# Patient Record
Sex: FEMALE | Race: WHITE | Employment: FULL TIME | URBAN - METROPOLITAN AREA
[De-identification: names, ages, dates, MRNs, and addresses within clinical notes are randomized per-mention and may not be internally consistent; named-entity substitution may affect disease eponyms.]

---

## 2017-02-19 ENCOUNTER — MYC MEDICAL ADVICE (OUTPATIENT)
Dept: FAMILY MEDICINE | Facility: CLINIC | Age: 57
End: 2017-02-19

## 2017-02-20 ENCOUNTER — MYC MEDICAL ADVICE (OUTPATIENT)
Dept: FAMILY MEDICINE | Facility: CLINIC | Age: 57
End: 2017-02-20

## 2017-02-24 ENCOUNTER — OFFICE VISIT (OUTPATIENT)
Dept: FAMILY MEDICINE | Facility: CLINIC | Age: 57
End: 2017-02-24
Payer: COMMERCIAL

## 2017-02-24 VITALS
DIASTOLIC BLOOD PRESSURE: 72 MMHG | SYSTOLIC BLOOD PRESSURE: 118 MMHG | HEIGHT: 69 IN | HEART RATE: 72 BPM | TEMPERATURE: 98 F

## 2017-02-24 DIAGNOSIS — F33.1 MAJOR DEPRESSIVE DISORDER, RECURRENT EPISODE, MODERATE (H): Primary | ICD-10-CM

## 2017-02-24 PROCEDURE — 99213 OFFICE O/P EST LOW 20 MIN: CPT | Performed by: FAMILY MEDICINE

## 2017-02-24 RX ORDER — BUPROPION HYDROCHLORIDE 150 MG/1
150 TABLET ORAL EVERY MORNING
Qty: 30 TABLET | Refills: 1 | Status: SHIPPED | OUTPATIENT
Start: 2017-02-24 | End: 2017-03-21 | Stop reason: DRUGHIGH

## 2017-02-24 RX ORDER — SERTRALINE HYDROCHLORIDE 100 MG/1
TABLET, FILM COATED ORAL
Qty: 180 TABLET | Refills: 1 | Status: CANCELLED | OUTPATIENT
Start: 2017-02-24

## 2017-02-24 NOTE — NURSING NOTE
"Chief Complaint   Patient presents with     Depression     Other     Due for Health Maintenance       Initial /72 (BP Location: Right arm, Patient Position: Chair, Cuff Size: Adult Large)  Pulse 72  Temp 98  F (36.7  C) (Oral)  Ht 5' 9\" (1.753 m) Estimated body mass index is 39.23 kg/(m^2) as calculated from the following:    Height as of 7/21/14: 5' 8\" (1.727 m).    Weight as of 7/21/14: 258 lb (117 kg).  Medication Reconciliation: complete   Dahlia Putnam MA      "

## 2017-02-24 NOTE — PROGRESS NOTES
"  SUBJECTIVE:                                                    Nela Mares is a 56 year old female who presents to clinic today for the following health issues:    Hypertension Follow-up      Outpatient blood pressures are not being checked.    Low Salt Diet: not monitoring salt     Depression Followup    Status since last visit: \"not good,\"    See PHQ-9 for current symptoms.  Other associated symptoms: None    Complicating factors:   Significant life event:  No   Current substance abuse:  None  Anxiety or Panic symptoms:  No    PHQ-9  English PHQ-9   Any Language          Amount of exercise or physical activity: None    Problems taking medications regularly: No    Medication side effects: none    Diet: regular (no restrictions)      She thought she was feeling a little better the last couple days until she filled out the PHQ-9.     Patient has thought this through and here is her plan: She has called three different therapists and would like to consult with them before changing her medications. See someone and ride this out for six weeks. If she gets worse before then she will call and return to clinic. Has considered Wellbutrin.     When her mood is low she is fatigued and sluggish. Last Sunday she sat down in her chair at 8 AM and didn't move until 3 PM.    Asthma has been stable. She had a cold a few weeks ago and no flare in her asthma.        Problem list and histories reviewed & adjusted, as indicated.  Additional history: as documented    Patient Active Problem List   Diagnosis     Allergic rhinitis     Mild persistent asthma     Obstructive sleep apnea     Leiomyoma of uterus     Other disorder of menstruation and other abnormal bleeding from female genital tract     Mild major depression (H)     CARDIOVASCULAR SCREENING; LDL GOAL LESS THAN 160     Surveillance of previously prescribed intrauterine contraceptive device     Hypertension goal BP (blood pressure) < 140/90     Pilar cyst     Hypertrophy " of breast     Past Surgical History   Procedure Laterality Date     No history of surgery       D & c       Colonoscopy  6/21/2012     Procedure: COLONOSCOPY;  COLONOSCOPY, SCREEN;  Surgeon: Bart You MD;  Location: [facility] OR     Laparoscopic cholecystectomy with cholangiograms  1/4/2014     Procedure: LAPAROSCOPIC CHOLECYSTECTOMY WITH CHOLANGIOGRAMS;;  Surgeon: Haja Sage MD;  Location: [facility] OR     Endoscopic retrograde cholangiopancreatogram  1/4/2014     Procedure: ENDOSCOPIC RETROGRADE CHOLANGIOPANCREATOGRAM;  ERCP biliary sphincterotomy, bile duct stone removal, epinepherine washing.;  Surgeon: Ba Meyers MD;  Location: [facility] OR     Operative hysteroscopy  6/17/2014     Procedure: OPERATIVE HYSTEROSCOPY;  Surgeon: Paola Camara MD;  Location: [facility] OR     Remove intrauterine device  6/17/2014     Procedure: REMOVE INTRAUTERINE DEVICE;  Surgeon: Paola Camara MD;  Location: [facility] OR       Social History   Substance Use Topics     Smoking status: Never Smoker     Smokeless tobacco: Never Used     Alcohol use No     Family History   Problem Relation Age of Onset     C.A.D. Paternal Grandmother      CEREBROVASCULAR DISEASE Paternal Grandmother      C.A.D. Paternal Grandfather      CEREBROVASCULAR DISEASE Paternal Grandfather      unsure     Hypertension Father      Neurologic Disorder Mother      Graves disease     Other - See Comments Other      CANCER Sister      mild skin cancer,cured from excision     DIABETES No family hx of          Current Outpatient Prescriptions   Medication Sig Dispense Refill     fluticasone-salmeterol (ADVAIR DISKUS) 500-50 MCG/DOSE diskus inhaler Inhale 1 puff into the lungs 2 times daily 3 Inhaler 3     hydrochlorothiazide (MICROZIDE) 12.5 MG capsule Take 1 capsule (12.5 mg) by mouth daily 90 capsule 1     albuterol (PROAIR HFA, PROVENTIL HFA, VENTOLIN HFA) 108 (90 BASE) MCG/ACT inhaler Inhale 2 puffs into the lungs every 4 hours as needed  "for shortness of breath / dyspnea or wheezing 3 Inhaler 3     sertraline (ZOLOFT) 100 MG tablet 2 tablets (200mg) daily6 180 tablet 1     lisinopril (PRINIVIL,ZESTRIL) 40 MG tablet Take 1 tablet (40 mg) by mouth daily 90 tablet 1     fluticasone (FLONASE) 50 MCG/ACT nasal spray Spray 1-2 sprays into both nostrils daily 48 g 3     cetirizine (ZYRTEC) 10 MG tablet Take 10 mg by mouth daily       order for DME Equipment being ordered: Nebulizer 1 each 0     Allergies   Allergen Reactions     No Known Drug Allergies      BP Readings from Last 3 Encounters:   02/24/17 118/72   10/14/16 112/80   02/16/16 129/84    Wt Readings from Last 3 Encounters:   07/21/14 117 kg (258 lb)   06/17/14 117.3 kg (258 lb 9.6 oz)   03/14/14 117.5 kg (259 lb)            ROS:  Constitutional, HEENT, cardiovascular, pulmonary, gi and gu systems are negative, except as otherwise noted.    This document serves as a record of the services and decisions personally performed and made by Padma Lozano MD. It was created on his/her behalf by Alix Hernandez, trained medical scribe. The creation of this document is based the provider's statements to the medical scribes.    Scribdrew Hernandez 1:59 PM, February 24, 2017    OBJECTIVE:                                                    /72 (BP Location: Right arm, Patient Position: Chair, Cuff Size: Adult Large)  Pulse 72  Temp 98  F (36.7  C) (Oral)  Ht 1.753 m (5' 9\")  There is no height or weight on file to calculate BMI.  GENERAL: healthy, alert and no distress  PSYCH: mentation appears normal, affect normal/bright    Diagnostic Test Results:  none      ASSESSMENT/PLAN:                                                    (F33.1) Major depressive disorder, recurrent episode, moderate (H)  (primary encounter diagnosis)  Comment: Will start to wean off the Zoloft and start taking Wellbutrin for better control. She will continue to look into options for therapy. Follow up with me in " 4-6 weeks for med check and preventative visit.  Plan: buPROPion (WELLBUTRIN XL) 150 MG 24 hr tablet           Patient Instructions     Decrease your Zoloft dose from two pills to one pill for three days.  Start taking Wellbutrin.    When you start the wellbutrin, decrease the zoloft down to 1/2 tablet daily for 5 days and then stop.    In addition I think therapy is a good idea to follow through with.  Follow up in 4-6 weeks.   We can do your preventative visit at this time.    [facility]   Discharged by : Apple AGUILAR MA    If you have any questions regarding your visit please contact your care team:     [facility] Clinic Hours Telephone Number   Dr. Bailey Freed PA-C   7am-7pm Monday - Thursday   7am-5pm Fridays  [phone]   (Appointment scheduling available 24/7)   RN Line   [phone] option 2       For a Price Quote for your services, please call our StreamLine Call Price Line at [phone].     What options do I have for visits at the clinic other than the traditional office visit?     To expand how we care for you, many of our providers are utilizing electronic visits (e-visits) and telephone visits, when medically appropriate, for interactions with their patients rather than a visit in the clinic. We also offer nurse visits for many medical concerns. Just like any other service, we will bill your insurance company for this type of visit based on time spent on the phone with your provider. Not all insurance companies cover these visits. Please check with your medical insurance if this type of visit is covered. You will be responsible for any charges that are not paid by your insurance.   E-visits via Cloud.CM: generally incur a $35.00 fee.     Telephone visits:   Time spent on the phone: *charged based on time that is spent on the phone in increments of 10 minutes. Estimated cost:   5-10 mins $30.00   11-20 mins. $59.00   21-30 mins.  $85.00     Use Appside (secure email communication and access to your chart) to send your primary care provider a message or make an appointment. Ask someone on your Team how to sign up for Appside.     As always, Thank you for trusting us with your health care needs!      [facility]:    Scheduling Appointments  [facility]  Call: [phone]    [facility]  Call: [phone]    [facility]  Call: [phone]      WHERE TO GO FOR CARE?    Clinic    Make an appointment if you:       Are sick (cold, cough, flu, sore throat, earache or in pain).       Have a small injury (sprain, small cut, burn or broken bone).       Need a physical exam, Pap smear, vaccine or prescription refill.       Have questions about your health or medicines.    To reach us:      Call [phone] ([phone]). Open 24 hours every day. (For counseling services, call [phone].)    Log into Appside at [email or web address]. (Visit [email or web address] to create an account.) Hospital emergency room    An emergency is a serious or life- threatening problem that must be treated right away.    Call [phone] or get to the hospital if you have:      Very bad or sudden:            - Chest pain or pressure         - Bleeding         - Head or belly pain         - Dizziness or trouble seeing, walking or                          Speaking      Problems breathing      Blood in your vomit or you are coughing up blood      A major injury (knocked out, loss of a finger or limb, rape, broken bone protruding from skin)    A mental health crisis. (Or call the Mental Health Crisis line at 1-533.543.4686 or Suicide Prevention Hotline at 1-855.796.8826.)    Open 24 hours every day. You don't need an appointment.     Urgent care    Visit urgent care for sickness or small injuries when the clinic is closed. You don't need an appointment. To check hours or find an urgent  care near you, visit [email or web address]. Online care    Get online care from [facility] for more than 70 common problems, like colds, allergies and infections. Open 24 hours every day at: [email or web address]   Need help deciding?    For advice about where to be seen, you may call your clinic and ask to speak with a nurse. We're here for you 24 hours every day.         If you are deaf or hard of hearing, please let us know. We provide many free services including sign language interpreters, oral interpreters, TTYs, telephone amplifiers, note takers and written materials.                     The information in this document, created by the medical scribe for me, accurately reflects the services I personally performed and the decisions made by me. I have reviewed and approved this document for accuracy prior to leaving the patient care area.  Padma Lozano MD  2:05 PM, 02/24/17    Padma Lozano MD  [facility]

## 2017-02-24 NOTE — PATIENT INSTRUCTIONS
Decrease your Zoloft dose from two pills to one pill for three days.  Start taking Wellbutrin.    When you start the wellbutrin, decrease the zoloft down to 1/2 tablet daily for 5 days and then stop.    In addition I think therapy is a good idea to follow through with.  Follow up in 4-6 weeks.   We can do your preventative visit at this time.    [facility]   Discharged by : Apple AGUILAR MA    If you have any questions regarding your visit please contact your care team:     [facility] Clinic Hours Telephone Number   Dr. Bailey Freed, PATEL   7am-7pm Monday - Thursday   7am-5pm Fridays  [phone]   (Appointment scheduling available 24/7)   RN Line   [phone] option 2       For a Price Quote for your services, please call our Galenea Price Line at [phone].     What options do I have for visits at the clinic other than the traditional office visit?     To expand how we care for you, many of our providers are utilizing electronic visits (e-visits) and telephone visits, when medically appropriate, for interactions with their patients rather than a visit in the clinic. We also offer nurse visits for many medical concerns. Just like any other service, we will bill your insurance company for this type of visit based on time spent on the phone with your provider. Not all insurance companies cover these visits. Please check with your medical insurance if this type of visit is covered. You will be responsible for any charges that are not paid by your insurance.   E-visits via [employer]: generally incur a $35.00 fee.     Telephone visits:   Time spent on the phone: *charged based on time that is spent on the phone in increments of 10 minutes. Estimated cost:   5-10 mins $30.00   11-20 mins. $59.00   21-30 mins. $85.00     Use [employer] (secure email communication and access to your chart) to send your primary care provider a message or make an  appointment. Ask someone on your Team how to sign up for [email or web address].     As always, Thank you for trusting us with your health care needs!      [facility]:    Scheduling Appointments  [place], [place], [facility]  Call: [phone]    Denny Mariscal, [facility]  Call: [phone]    [facility]  Call: [phone]      WHERE TO GO FOR CARE?    Clinic    Make an appointment if you:       Are sick (cold, cough, flu, sore throat, earache or in pain).       Have a small injury (sprain, small cut, burn or broken bone).       Need a physical exam, Pap smear, vaccine or prescription refill.       Have questions about your health or medicines.    To reach us:      Call [phone] ([phone]). Open 24 hours every day. (For counseling services, call [phone].)    Log into Tuition.[email or web address] at [email or web address]. (Visit [email or web address] sale to [email or web address] [email or web address] to create an account.) Hospital emergency room    An emergency is a serious or life- threatening problem that must be treated right away.    Call [phone] or get to the hospital if you have:      Very bad or sudden:            - Chest pain or pressure         - Bleeding         - Head or belly pain         - Dizziness or trouble seeing, walking or                          Speaking      Problems breathing      Blood in your vomit or you are coughing up blood      A major injury (knocked out, loss of a finger or limb, rape, broken bone protruding from skin)    A mental health crisis. (Or call the Mental Health Crisis line at 1-129.566.4133 or Suicide Prevention Hotline at 1-588.138.4902.)    Open 24 hours every day. You don't need an appointment.     Urgent care    Visit urgent care for sickness or small injuries when the clinic is closed. You don't need an appointment. To check hours or find an urgent care near you, visit [email or web address]. Online care    Get online care from [facility] for more than 70 common problems, like  colds, allergies and infections. Open 24 hours every day at: [email or web address]   Need help deciding?    For advice about where to be seen, you may call your clinic and ask to speak with a nurse. We're here for you 24 hours every day.         If you are deaf or hard of hearing, please let us know. We provide many free services including sign language interpreters, oral interpreters, TTYs, telephone amplifiers, note takers and written materials.

## 2017-02-24 NOTE — MR AVS SNAPSHOT
After Visit Summary   2/24/2017    Nela Mares    MRN: [id number]           Patient Information     Date Of Birth          1960        Visit Information        Provider Department      2/24/2017 1:40 PM Padma Lozano MD [facility]        Today's Diagnoses     Major depressive disorder, recurrent episode, moderate (H)    -  1      Care Instructions    Decrease your Zoloft dose from two pills to one pill for three days.  Start taking Wellbutrin.    When you start the wellbutrin, decrease the zoloft down to 1/2 tablet daily for 5 days and then stop.    In addition I think therapy is a good idea to follow through with.  Follow up in 4-6 weeks.   We can do your preventative visit at this time.        Follow-ups after your visit        Who to contact     If you have questions or need follow up information about today's clinic visit or your schedule please contact [facility] directly at [phone].  Normal or non-critical lab and imaging results will be communicated to you by Kashhart, letter or phone within 4 business days after the clinic has received the results. If you do not hear from us within 7 days, please contact the clinic through Mashed jobst or phone. If you have a critical or abnormal lab result, we will notify you by phone as soon as possible.  Submit refill requests through Redbeacon or call your pharmacy and they will forward the refill request to us. Please allow 3 business days for your refill to be completed.          Additional Information About Your Visit        Kashhart Information     Redbeacon gives you secure access to your electronic health record. If you see a primary care provider, you can also send messages to your care team and make appointments. If you have questions, please call your primary care clinic.  If you do not have a primary care provider, please call [phone] and they will assist you.        Care EveryWhere ID   "   This is your Care EveryWhere ID. This could be used by other organizations to access your [place] medical records  [id number]        Your Vitals Were     Pulse Temperature Height             72 98  F (36.7  C) (Oral) 5' 9\" (1.753 m)          Blood Pressure from Last 3 Encounters:   02/24/17 118/72   10/14/16 112/80   02/16/16 129/84    Weight from Last 3 Encounters:   07/21/14 258 lb (117 kg)   06/17/14 258 lb 9.6 oz (117.3 kg)   03/14/14 259 lb (117.5 kg)              Today, you had the following     No orders found for display         Today's Medication Changes          These changes are accurate as of: 2/24/17  2:23 PM.  If you have any questions, ask your nurse or doctor.               Start taking these medicines.        Dose/Directions    buPROPion 150 MG 24 hr tablet   Commonly known as:  WELLBUTRIN XL   Used for:  Major depressive disorder, recurrent episode, moderate (H)   Started by:  Padma Lozano MD        Dose:  150 mg   Take 1 tablet (150 mg) by mouth every morning   Quantity:  30 tablet   Refills:  1         Stop taking these medicines if you haven't already. Please contact your care team if you have questions.     predniSONE 20 MG tablet   Commonly known as:  DELTASONE   Stopped by:  Padma Lozano MD           sertraline 100 MG tablet   Commonly known as:  ZOLOFT   Stopped by:  Padma Lozano MD                Where to get your medicines      These medications were sent to [facility] [address] [place] [id number]     Phone:  [phone]     buPROPion 150 MG 24 hr tablet                Primary Care Provider Office Phone # Fax #    Padma Lozano [phone]       [facility] [address] [facility] [address]        Thank you!     Thank you for choosing [facility]  for your care. Our goal is always to provide you with excellent " care. Hearing back from our patients is one way we can continue to improve our services. Please take a few minutes to complete the written survey that you may receive in the mail after your visit with us. Thank you!             Your Updated Medication List - Protect others around you: Learn how to safely use, store and throw away your medicines at www.disposemymeds.org.          This list is accurate as of: 2/24/17  2:23 PM.  Always use your most recent med list.                   Brand Name Dispense Instructions for use    albuterol 108 (90 BASE) MCG/ACT Inhaler    PROAIR HFA/PROVENTIL HFA/VENTOLIN HFA    3 Inhaler    Inhale 2 puffs into the lungs every 4 hours as needed for shortness of breath / dyspnea or wheezing       buPROPion 150 MG 24 hr tablet    WELLBUTRIN XL    30 tablet    Take 1 tablet (150 mg) by mouth every morning       cetirizine 10 MG tablet    zyrTEC     Take 10 mg by mouth daily       fluticasone 50 MCG/ACT spray    FLONASE    48 g    Spray 1-2 sprays into both nostrils daily       fluticasone-salmeterol 500-50 MCG/DOSE diskus inhaler    ADVAIR DISKUS    3 Inhaler    Inhale 1 puff into the lungs 2 times daily       hydrochlorothiazide 12.5 MG capsule    MICROZIDE    90 capsule    Take 1 capsule (12.5 mg) by mouth daily       lisinopril 40 MG tablet    PRINIVIL/ZESTRIL    90 tablet    Take 1 tablet (40 mg) by mouth daily       order for DME     1 each    Equipment being ordered: Nebulizer

## 2017-02-25 ASSESSMENT — PATIENT HEALTH QUESTIONNAIRE - PHQ9: SUM OF ALL RESPONSES TO PHQ QUESTIONS 1-9: 16

## 2017-03-01 ENCOUNTER — DOCUMENTATION ONLY (OUTPATIENT)
Dept: LAB | Facility: CLINIC | Age: 57
End: 2017-03-01

## 2017-03-01 DIAGNOSIS — Z13.6 CARDIOVASCULAR SCREENING; LDL GOAL LESS THAN 160: Primary | ICD-10-CM

## 2017-03-01 DIAGNOSIS — I10 HYPERTENSION GOAL BP (BLOOD PRESSURE) < 140/90: ICD-10-CM

## 2017-03-01 DIAGNOSIS — R79.89 ABNORMAL TSH: ICD-10-CM

## 2017-03-01 NOTE — PROGRESS NOTES
This patient has a future lab only appointment on 03/16/20117 and needs orders. Please sign the pended labs taken from the overdue  and make any needed changes. Thanks aamir

## 2017-03-16 DIAGNOSIS — R73.9 ELEVATED BLOOD SUGAR: Primary | ICD-10-CM

## 2017-03-16 DIAGNOSIS — R79.89 ABNORMAL TSH: ICD-10-CM

## 2017-03-16 DIAGNOSIS — I10 HYPERTENSION GOAL BP (BLOOD PRESSURE) < 140/90: ICD-10-CM

## 2017-03-16 DIAGNOSIS — Z13.6 CARDIOVASCULAR SCREENING; LDL GOAL LESS THAN 160: ICD-10-CM

## 2017-03-16 PROCEDURE — 84443 ASSAY THYROID STIM HORMONE: CPT | Performed by: FAMILY MEDICINE

## 2017-03-16 PROCEDURE — 83036 HEMOGLOBIN GLYCOSYLATED A1C: CPT | Performed by: FAMILY MEDICINE

## 2017-03-16 PROCEDURE — 80048 BASIC METABOLIC PNL TOTAL CA: CPT | Performed by: FAMILY MEDICINE

## 2017-03-16 PROCEDURE — 84439 ASSAY OF FREE THYROXINE: CPT | Performed by: FAMILY MEDICINE

## 2017-03-16 PROCEDURE — 36415 COLL VENOUS BLD VENIPUNCTURE: CPT | Performed by: FAMILY MEDICINE

## 2017-03-16 PROCEDURE — 80061 LIPID PANEL: CPT | Performed by: FAMILY MEDICINE

## 2017-03-17 ENCOUNTER — MYC MEDICAL ADVICE (OUTPATIENT)
Dept: FAMILY MEDICINE | Facility: CLINIC | Age: 57
End: 2017-03-17

## 2017-03-17 LAB
ANION GAP SERPL CALCULATED.3IONS-SCNC: 10 MMOL/L (ref 3–14)
BUN SERPL-MCNC: 12 MG/DL (ref 7–30)
CALCIUM SERPL-MCNC: 9.1 MG/DL (ref 8.5–10.1)
CHLORIDE SERPL-SCNC: 104 MMOL/L (ref 94–109)
CHOLEST SERPL-MCNC: 237 MG/DL
CO2 SERPL-SCNC: 29 MMOL/L (ref 20–32)
CREAT SERPL-MCNC: 0.95 MG/DL (ref 0.52–1.04)
GFR SERPL CREATININE-BSD FRML MDRD: 61 ML/MIN/1.7M2
GLUCOSE SERPL-MCNC: 106 MG/DL (ref 70–99)
HBA1C MFR BLD: 5.7 % (ref 4.3–6)
HDLC SERPL-MCNC: 47 MG/DL
LDLC SERPL CALC-MCNC: 163 MG/DL
NONHDLC SERPL-MCNC: 190 MG/DL
POTASSIUM SERPL-SCNC: 3.9 MMOL/L (ref 3.4–5.3)
SODIUM SERPL-SCNC: 143 MMOL/L (ref 133–144)
T4 FREE SERPL-MCNC: 1.01 NG/DL (ref 0.76–1.46)
TRIGL SERPL-MCNC: 137 MG/DL
TSH SERPL DL<=0.005 MIU/L-ACNC: 4.93 MU/L (ref 0.4–4)

## 2017-03-21 ENCOUNTER — RESULT FOLLOW UP (OUTPATIENT)
Dept: FAMILY MEDICINE | Facility: CLINIC | Age: 57
End: 2017-03-21

## 2017-03-21 ENCOUNTER — OFFICE VISIT (OUTPATIENT)
Dept: FAMILY MEDICINE | Facility: CLINIC | Age: 57
End: 2017-03-21
Payer: COMMERCIAL

## 2017-03-21 VITALS
HEIGHT: 69 IN | HEART RATE: 72 BPM | SYSTOLIC BLOOD PRESSURE: 130 MMHG | TEMPERATURE: 98.3 F | DIASTOLIC BLOOD PRESSURE: 74 MMHG

## 2017-03-21 DIAGNOSIS — Z00.00 ROUTINE GENERAL MEDICAL EXAMINATION AT A HEALTH CARE FACILITY: Primary | ICD-10-CM

## 2017-03-21 DIAGNOSIS — G47.33 OBSTRUCTIVE SLEEP APNEA: ICD-10-CM

## 2017-03-21 DIAGNOSIS — I10 HYPERTENSION GOAL BP (BLOOD PRESSURE) < 140/90: ICD-10-CM

## 2017-03-21 DIAGNOSIS — F33.1 MAJOR DEPRESSIVE DISORDER, RECURRENT EPISODE, MODERATE (H): ICD-10-CM

## 2017-03-21 DIAGNOSIS — R87.810 CERVICAL HIGH RISK HPV (HUMAN PAPILLOMAVIRUS) TEST POSITIVE: Primary | ICD-10-CM

## 2017-03-21 DIAGNOSIS — I10 ESSENTIAL HYPERTENSION WITH GOAL BLOOD PRESSURE LESS THAN 140/90: ICD-10-CM

## 2017-03-21 DIAGNOSIS — J30.89 OTHER ALLERGIC RHINITIS: ICD-10-CM

## 2017-03-21 DIAGNOSIS — J45.30 MILD PERSISTENT ASTHMA WITHOUT COMPLICATION: ICD-10-CM

## 2017-03-21 DIAGNOSIS — N93.9 VAGINAL SPOTTING: ICD-10-CM

## 2017-03-21 DIAGNOSIS — Z12.4 SCREENING FOR MALIGNANT NEOPLASM OF CERVIX: ICD-10-CM

## 2017-03-21 DIAGNOSIS — Z97.5 IUD (INTRAUTERINE DEVICE) IN PLACE: ICD-10-CM

## 2017-03-21 PROCEDURE — 99396 PREV VISIT EST AGE 40-64: CPT | Performed by: FAMILY MEDICINE

## 2017-03-21 PROCEDURE — G0145 SCR C/V CYTO,THINLAYER,RESCR: HCPCS | Performed by: FAMILY MEDICINE

## 2017-03-21 PROCEDURE — 87624 HPV HI-RISK TYP POOLED RSLT: CPT | Performed by: FAMILY MEDICINE

## 2017-03-21 PROCEDURE — 99213 OFFICE O/P EST LOW 20 MIN: CPT | Mod: 25 | Performed by: FAMILY MEDICINE

## 2017-03-21 RX ORDER — ALBUTEROL SULFATE 90 UG/1
2 AEROSOL, METERED RESPIRATORY (INHALATION) EVERY 4 HOURS PRN
Qty: 3 INHALER | Refills: 3 | Status: SHIPPED | OUTPATIENT
Start: 2017-03-21 | End: 2018-03-20

## 2017-03-21 RX ORDER — LISINOPRIL 40 MG/1
40 TABLET ORAL DAILY
Qty: 90 TABLET | Refills: 1 | Status: SHIPPED | OUTPATIENT
Start: 2017-03-21 | End: 2017-09-27

## 2017-03-21 RX ORDER — BUPROPION HYDROCHLORIDE 150 MG/1
150 TABLET ORAL EVERY MORNING
Qty: 30 TABLET | Refills: 1 | Status: CANCELLED | OUTPATIENT
Start: 2017-03-21

## 2017-03-21 RX ORDER — HYDROCHLOROTHIAZIDE 12.5 MG/1
12.5 CAPSULE ORAL DAILY
Qty: 90 CAPSULE | Refills: 1 | Status: SHIPPED | OUTPATIENT
Start: 2017-03-21 | End: 2017-09-27

## 2017-03-21 RX ORDER — FLUTICASONE PROPIONATE 50 MCG
1-2 SPRAY, SUSPENSION (ML) NASAL DAILY
Qty: 48 G | Refills: 3 | Status: SHIPPED | OUTPATIENT
Start: 2017-03-21 | End: 2018-03-20

## 2017-03-21 RX ORDER — BUPROPION HYDROCHLORIDE 300 MG/1
300 TABLET ORAL EVERY MORNING
Qty: 30 TABLET | Refills: 1 | Status: SHIPPED | OUTPATIENT
Start: 2017-03-21 | End: 2017-04-04

## 2017-03-21 ASSESSMENT — ANXIETY QUESTIONNAIRES
GAD7 TOTAL SCORE: 15
7. FEELING AFRAID AS IF SOMETHING AWFUL MIGHT HAPPEN: NOT AT ALL
2. NOT BEING ABLE TO STOP OR CONTROL WORRYING: NEARLY EVERY DAY
1. FEELING NERVOUS, ANXIOUS, OR ON EDGE: MORE THAN HALF THE DAYS
5. BEING SO RESTLESS THAT IT IS HARD TO SIT STILL: SEVERAL DAYS
3. WORRYING TOO MUCH ABOUT DIFFERENT THINGS: NEARLY EVERY DAY
6. BECOMING EASILY ANNOYED OR IRRITABLE: NEARLY EVERY DAY

## 2017-03-21 ASSESSMENT — PATIENT HEALTH QUESTIONNAIRE - PHQ9: 5. POOR APPETITE OR OVEREATING: NEARLY EVERY DAY

## 2017-03-21 NOTE — MR AVS SNAPSHOT
After Visit Summary   3/21/2017    Nela Mares    MRN: [id number]           Patient Information     Date Of Birth          1960        Visit Information        Provider Department      3/21/2017 9:40 AM Padma Lozano MD [facility]        Today's Diagnoses     Routine general medical examination at a health care facility    -  1    Major depressive disorder, recurrent episode, moderate (H)        Mild persistent asthma without complication        Essential hypertension with goal blood pressure less than 140/90        Other allergic rhinitis        Obstructive sleep apnea        Hypertension goal BP (blood pressure) < 140/90        Vaginal spotting        Screening for malignant neoplasm of cervix        IUD (intrauterine device) in place          Care Instructions    Let's follow up in 2 weeks.     Please schedule an ultra sound.  [facility] (xray, mammogram, bone density, and ultrasound) [phone]  OR  [facility] (CT, MRI, ultrasound) [phone]    Mental Health Scheduling will contact you to schedule a counseling appointment.          Preventive Health Recommendations  Female Ages 50 - 64    Yearly exam: See your health care provider every year in order to  o Review health changes.   o Discuss preventive care.    o Review your medicines if your doctor has prescribed any.      Get a Pap test every three years (unless you have an abnormal result and your provider advises testing more often).    If you get Pap tests with HPV test, you only need to test every 5 years, unless you have an abnormal result.     You do not need a Pap test if your uterus was removed (hysterectomy) and you have not had cancer.    You should be tested each year for STDs (sexually transmitted diseases) if you're at risk.     Have a mammogram every 1 to 2 years.    Have a colonoscopy at age 50, or have a yearly FIT  test (stool test). These exams screen for colon cancer.      Have a cholesterol test every 5 years, or more often if advised.    Have a diabetes test (fasting glucose) every three years. If you are at risk for diabetes, you should have this test more often.     If you are at risk for osteoporosis (brittle bone disease), think about having a bone density scan (DEXA).    Shots: Get a flu shot each year. Get a tetanus shot every 10 years.    Nutrition:     Eat at least 5 servings of fruits and vegetables each day.    Eat whole-grain bread, whole-wheat pasta and brown rice instead of white grains and rice.    Talk to your provider about Calcium and Vitamin D.     Lifestyle    Exercise at least 150 minutes a week (30 minutes a day, 5 days a week). This will help you control your weight and prevent disease.    Limit alcohol to one drink per day.    No smoking.     Wear sunscreen to prevent skin cancer.     See your dentist every six months for an exam and cleaning.    See your eye doctor every 1 to 2 years.          Follow-ups after your visit        Additional Services     MENTAL HEALTH REFERRAL       Your provider has referred you to: [facility]: [facility] - Counseling (Individual/Couples/Family) - any site preferred by patient.  Appointment within next two weeks    All scheduling is subject to the client's specific insurance plan & benefits, provider/location availability, and provider clinical specialities.  Please arrive 15 minutes early for your first appointment and bring your completed paperwork.    Please be aware that coverage of these services is subject to the terms and limitations of your health insurance plan.  Call member services at your health plan with any benefit or coverage questions.                  Future tests that were ordered for you today     Open Future Orders        Priority Expected Expires Ordered    US Pelvic Complete w Transvaginal Routine 6/19/2017 3/21/2018 3/21/2017           "  Who to contact     If you have questions or need follow up information about today's clinic visit or your schedule please contact [facility] directly at [phone].  Normal or non-critical lab and imaging results will be communicated to you by MyChart, letter or phone within 4 business days after the clinic has received the results. If you do not hear from us within 7 days, please contact the clinic through [employer] or phone. If you have a critical or abnormal lab result, we will notify you by phone as soon as possible.  Submit refill requests through [employer] or call your pharmacy and they will forward the refill request to us. Please allow 3 business days for your refill to be completed.          Additional Information About Your Visit        [employer] Information     [employer] gives you secure access to your electronic health record. If you see a primary care provider, you can also send messages to your care team and make appointments. If you have questions, please call your primary care clinic.  If you do not have a primary care provider, please call [phone] and they will assist you.        Care EveryWhere ID     This is your Care EveryWhere ID. This could be used by other organizations to access your [place] medical records  [id number]        Your Vitals Were     Pulse Temperature Height             72 98.3  F (36.8  C) (Oral) 5' 9\" (1.753 m)          Blood Pressure from Last 3 Encounters:   03/21/17 130/74   02/24/17 118/72   10/14/16 112/80    Weight from Last 3 Encounters:   07/21/14 258 lb (117 kg)   06/17/14 258 lb 9.6 oz (117.3 kg)   03/14/14 259 lb (117.5 kg)              We Performed the Following     HPV High Risk Types DNA Cervical     MENTAL HEALTH REFERRAL     Pap imaged thin layer screen with HPV - recommended age 30 - 65 years (select HPV order below)          Today's Medication Changes          These changes are accurate as of: 3/21/17 10:51 AM.  If you have any " questions, ask your nurse or doctor.               These medicines have changed or have updated prescriptions.        Dose/Directions    buPROPion 300 MG 24 hr tablet   Commonly known as:  WELLBUTRIN XL   This may have changed:    - medication strength  - how much to take   Used for:  Major depressive disorder, recurrent episode, moderate (H)   Changed by:  Padma Lozano MD        Dose:  300 mg   Take 1 tablet (300 mg) by mouth every morning   Quantity:  30 tablet   Refills:  1            Where to get your medicines      These medications were sent to [place] [id number] [address]     Phone:  [phone]     albuterol 108 (90 BASE) MCG/ACT Inhaler    buPROPion 300 MG 24 hr tablet    fluticasone 50 MCG/ACT spray    fluticasone-salmeterol 500-50 MCG/DOSE diskus inhaler    hydrochlorothiazide 12.5 MG capsule    lisinopril 40 MG tablet                Primary Care Provider Office Phone # Fax #    Padma Lozano [phone]       [facility] [address] [place] [id number]        Thank you!     Thank you for choosing [facility]  for your care. Our goal is always to provide you with excellent care. Hearing back from our patients is one way we can continue to improve our services. Please take a few minutes to complete the written survey that you may receive in the mail after your visit with us. Thank you!             Your Updated Medication List - Protect others around you: Learn how to safely use, store and throw away your medicines at www.disposemymeds.org.          This list is accurate as of: 3/21/17 10:51 AM.  Always use your most recent med list.                   Brand Name Dispense Instructions for use    albuterol 108 (90 BASE) MCG/ACT Inhaler    PROAIR HFA/PROVENTIL HFA/VENTOLIN HFA    3 Inhaler    Inhale 2 puffs into the lungs every 4 hours as needed for  shortness of breath / dyspnea or wheezing       buPROPion 300 MG 24 hr tablet    WELLBUTRIN XL    30 tablet    Take 1 tablet (300 mg) by mouth every morning       cetirizine 10 MG tablet    zyrTEC     Take 10 mg by mouth daily       fluticasone 50 MCG/ACT spray    FLONASE    48 g    Spray 1-2 sprays into both nostrils daily       fluticasone-salmeterol 500-50 MCG/DOSE diskus inhaler    ADVAIR DISKUS    3 Inhaler    Inhale 1 puff into the lungs 2 times daily       hydrochlorothiazide 12.5 MG capsule    MICROZIDE    90 capsule    Take 1 capsule (12.5 mg) by mouth daily       lisinopril 40 MG tablet    PRINIVIL/ZESTRIL    90 tablet    Take 1 tablet (40 mg) by mouth daily       order for DME     1 each    Equipment being ordered: Nebulizer

## 2017-03-21 NOTE — NURSING NOTE
"Chief Complaint   Patient presents with     Physical     RECHECK     depression, worsened     Health Maintenance Due     Due for PAP, Advanced Care Planning and ACT     Refill Request     pended, reported no further refills on any bottles       Initial /74 (BP Location: Right arm, Patient Position: Chair, Cuff Size: Adult Large)  Pulse 72  Temp 98.3  F (36.8  C) (Oral)  Ht 5' 9\" (1.753 m) Estimated body mass index is 39.23 kg/(m^2) as calculated from the following:    Height as of 7/21/14: 5' 8\" (1.727 m).    Weight as of 7/21/14: 258 lb (117 kg).  Medication Reconciliation: complete   Dahlia Putnam MA      "

## 2017-03-21 NOTE — LETTER
Certification of Health Care Provider  Family Medical Leave Act (FMLA)      Patient's name: Nela Mares    Provider's name and business address:  Blake Padma Jones  [address] [address]  Phone: [phone]      PART A:  MEDICAL FACTS  1)  Approximate date condition commenced:  3/12/17    Probable duration of condition:  4-12 weeks, not yet determined     Ken below as applicable:  Was the patient admitted for an overnight stay in a hospital, hospice, or residential medical care facility?  no.    Date(s) you treated the patient for condition: 3/21/17    Will the patient need to have treatment visits at least twice per year due to the                     condition? yes    Was medication, other than over-the-counter medication prescribed?  yes    Was the patient referred to other health care provider(s) for evaluation or treatment     (e.g., physical therapist)?  no.       2)  Is the medical condition pregnancy?  no.      3)  Is the employee unable to perform any of his/her job functions due to the     condition: yes:  Identify the job functions the employee is unable to perform: is unable to manage substitute schedule or planning.  is unable to concentrate on multistep tasks      4)  Describe other relevant medical facts, if any, related to the condition which the employee seeks leave (such as medical facts may include symptoms, diagnosis, or any regimen of continuing treatment such as the use of specialized equipment):   Depression, worsening. Inability to concentrate or do multistep tasks reliably      PART B: AMOUNT OF LEAVE NEEDED  5)  Will the employee be incapacitated for a single continuous period of time due to his/her medical condition, including any time for treatment and recovery?  yes:  Estimate the beginning and ending dates for the period of incapacity: 3/21/17, for 4 weeks ending 4/18/17    6)  Will the employee need to attend  follow-up treatment appointments or work part-time or on a reduced schedule because of the employee's medical condition?  yes:  Are the treatments or the reduced number of hours medically necessary?   Yes:      Estimate treatment schedule, if any, including the dates of any scheduled appointments and the time required for each appointment, including any recovery period: office visit in 2 weeks       Estimate the part-time or reduced work schedule the employee needs, if any:      7) Will the condition cause episodic flare ups periodically preventing the employee from performing his/her job functions? yes:  Is it medically necessary for the patient to be absent from work during the flare-ups?  yes:  Explain: not yet able to determine frequency    Based upon the patient's medical history and your knowledge of the medical condition, estimate the frequency of flare-ups and the duration of related incapacity that the patient may have over the next six months (e.g., 1 episode every 3 months lasting 1-2 days):          ADDITIONAL INFORMATION: IDENTIFY QUESTION NUMBER WITH YOUR ADDITIONAL ANSWER patient's symptoms include also fatigue, inability to concentrate, low mood      ===========================================    IF EMPLOYEE'S FAMILY MEMBER IS THE PATIENT, PLEASE COMPLETE SECTION BELOW:  N/A      13) Does the patient/family member need the employee to provide basic medical or personal needs, or for safety or transportation?      14)  If no, would the employee's presence to provide psychological comfort be beneficial to the patient or assist in the patient's recovery?        ================================================================    TO BE COMPLETED BY THE HEALTH CARE PROVIDER:    Signature:  Padma Lozano ________________________________________  Date:   3/21/2017

## 2017-03-21 NOTE — PROGRESS NOTES
"   SUBJECTIVE:     CC: Nela Mares is an 56 year old woman who presents for preventive health visit.     Physical   Annual:     Getting at least 3 servings of Calcium per day::  Yes    Bi-annual eye exam::  Yes    Dental care twice a year::  Yes    Sleep apnea or symptoms of sleep apnea::  Sleep apnea    Diet::  Regular (no restrictions)    Frequency of exercise::  None    Taking medications regularly::  Yes    Medication side effects::  Lightheadedness    Additional concerns today::  YES      Depression Followup    Status since last visit: Worsened     See PHQ-9 for current symptoms.  Other associated symptoms: None    Complicating factors:   Significant life event:  No   Current substance abuse:  None  Anxiety or Panic symptoms:  No    PHQ-9  English PHQ-9   Any Language        Weaned off Zoloft and switched to Wellbutrin 2/24/17. Patient isn't sure if the Wellbutrin is not working or if the dose is just too low. She would like dose increased.    Also unsure if mood is worse because she's on spring break and \"I can be worse. I don't have to hold it together.\"    Also notes she talked to family and friends who said her depression has been going on much longer than she realized. She says for two weekends a while ago she \"just sat in a chair\" which \"was bizarre.\" Since starting medication she hasn't been sitting in the chair like she used to but says she wants to. She is occupying her time by painting a bedroom but she says she is obsessing over every little thing about the room and it's taking her a lot longer than normal. Says \"I can either get obsessive right now or I can sit and cry.\" She would rather obsess about things.     She doesn't know if her low mood is depression-related, menopause-related, weather-related or a combination.     Supposed to go back to work Monday but says \"I can't. I'll get hysterical if I talk about it. I have an incredibly high stress job.\" She is going to take a leave of absence " from work for 4 weeks. Has a plan to set up care with a therapist while on leave. She has contacted 5-6 therapists but says she has trouble getting in because of scheduling conflicts.     Asthma Follow-Up  Was ACT completed today?    Yes    ACT Total Scores 10/14/2016   ACT TOTAL SCORE (Goal Greater than or Equal to 20) 25   In the past 12 months, how many times did you visit the emergency room for your asthma without being admitted to the hospital? 0   In the past 12 months, how many times were you hospitalized overnight because of your asthma? 0   Recent asthma triggers that patient is dealing with: None    Last week, 10 days ago, patient missed work because of asthma exacerbation. It has been much better the last 5 days.     Menopause:  Having night sweats over the last couple months ago, before starting Wellbutrin. She wakes up hot/cold and sleep is more interrupted. Also gets occasional hot flashes during the day.     First Mirena IUD was placed 2007 to treat excessive bleeding from fibroids. It was removed June 2014 and then she went a year without one. Her excessive bleeding came back so second IUD was placed 01/2015. She wants to know if/when she should get it removed. She did not have a period for 10 months but then recently had 3 months of sporadic spotting.     Today's PHQ-2 Score:   PHQ-2 ( 1999 [employer]) 3/21/2017   Q1: Little interest or pleasure in doing things -   Q2: Feeling down, depressed or hopeless -   PHQ-2 Score -   Little interest or pleasure in doing things Nearly every day   Feeling down, depressed or hopeless Nearly every day   PHQ-2 Score 6       Abuse: Current or Past(Physical, Sexual or Emotional)- No  Do you feel safe in your environment - Yes    Social History   Substance Use Topics     Smoking status: Never Smoker     Smokeless tobacco: Never Used     Alcohol use No     The patient does not drink >3 drinks per day nor >7 drinks per week.    Recent Labs   Lab Test  03/16/17   6539   02/12/16   0714  03/13/14   0748  07/25/12   0900   CHOL  237*  228*  195  186   HDL  47*  51  41*  41*   LDL  163*  145*  127  117   TRIG  137  158*  135  144   CHOLHDLRATIO   --    --   4.8  4.6   NHDL  190*  177*   --    --        Reviewed orders with patient.  Reviewed health maintenance and updated orders accordingly - Yes    Mammo Decision Support:  Patient over age 50, mutual decision to screen reflected in health maintenance.    Pertinent mammograms are reviewed under the imaging tab.  History of abnormal Pap smear: NO - age 30- 65 PAP every 3 years recommended    Reviewed and updated as needed this visit by clinical staff  Tobacco  Allergies  Meds  Problems  Med Hx  Surg Hx  Fam Hx  Soc Hx          Reviewed and updated as needed this visit by Provider  Allergies  Meds  Problems          ROS:  C: NEGATIVE for fever, chills, change in weight  I: NEGATIVE for worrisome rashes, moles or lesions  E: NEGATIVE for vision changes or irritation  ENT: NEGATIVE for ear, mouth and throat problems  R: NEGATIVE for significant cough or SOB  B: NEGATIVE for masses, tenderness or discharge  CV: NEGATIVE for chest pain, palpitations or peripheral edema  GI: NEGATIVE for nausea, abdominal pain, heartburn, or change in bowel habits  : NEGATIVE for unusual urinary or vaginal symptoms. POSITIVE for sporadic spotting.  M: NEGATIVE for significant arthralgias or myalgia  N: NEGATIVE for weakness, dizziness or paresthesias  P: NEGATIVE for changes in mood or affect. POSITIVE for depression.     Patient Active Problem List   Diagnosis     Allergic rhinitis     Mild persistent asthma     Obstructive sleep apnea     Leiomyoma of uterus     Mild major depression (H)     CARDIOVASCULAR SCREENING; LDL GOAL LESS THAN 160     Hypertension goal BP (blood pressure) < 140/90     Pilar cyst     Hypertrophy of breast     IUD (intrauterine device) in place     Past Surgical History   Procedure Laterality Date     No history of  surgery       D & c       Colonoscopy  6/21/2012     Procedure: COLONOSCOPY;  COLONOSCOPY, SCREEN;  Surgeon: Bart You MD;  Location: [facility] OR     Laparoscopic cholecystectomy with cholangiograms  1/4/2014     Procedure: LAPAROSCOPIC CHOLECYSTECTOMY WITH CHOLANGIOGRAMS;;  Surgeon: Haja Sage MD;  Location: [facility] OR     Endoscopic retrograde cholangiopancreatogram  1/4/2014     Procedure: ENDOSCOPIC RETROGRADE CHOLANGIOPANCREATOGRAM;  ERCP biliary sphincterotomy, bile duct stone removal, epinepherine washing.;  Surgeon: Ba Meyesr MD;  Location: [facility] OR     Operative hysteroscopy  6/17/2014     Procedure: OPERATIVE HYSTEROSCOPY;  Surgeon: Paola Camara MD;  Location: [facility] OR     Remove intrauterine device  6/17/2014     Procedure: REMOVE INTRAUTERINE DEVICE;  Surgeon: Paola Camara MD;  Location: [facility] OR       Social History   Substance Use Topics     Smoking status: Never Smoker     Smokeless tobacco: Never Used     Alcohol use No     Family History   Problem Relation Age of Onset     C.A.D. Paternal Grandmother      CEREBROVASCULAR DISEASE Paternal Grandmother      C.A.D. Paternal Grandfather      CEREBROVASCULAR DISEASE Paternal Grandfather      unsure     Hypertension Father      Neurologic Disorder Mother      Graves disease     Other - See Comments Other      CANCER Sister      mild skin cancer,cured from excision     DIABETES No family hx of          Current Outpatient Prescriptions   Medication Sig Dispense Refill     buPROPion (WELLBUTRIN XL) 300 MG 24 hr tablet Take 1 tablet (300 mg) by mouth every morning 30 tablet 1     fluticasone-salmeterol (ADVAIR DISKUS) 500-50 MCG/DOSE diskus inhaler Inhale 1 puff into the lungs 2 times daily 3 Inhaler 3     hydrochlorothiazide (MICROZIDE) 12.5 MG capsule Take 1 capsule (12.5 mg) by mouth daily 90 capsule 1     albuterol (PROAIR HFA, PROVENTIL HFA, VENTOLIN HFA) 108 (90 BASE) MCG/ACT inhaler Inhale 2 puffs into the  "lungs every 4 hours as needed for shortness of breath / dyspnea or wheezing 3 Inhaler 3     lisinopril (PRINIVIL,ZESTRIL) 40 MG tablet Take 1 tablet (40 mg) by mouth daily 90 tablet 1     fluticasone (FLONASE) 50 MCG/ACT nasal spray Spray 1-2 sprays into both nostrils daily 48 g 3     cetirizine (ZYRTEC) 10 MG tablet Take 10 mg by mouth daily       order for DME Equipment being ordered: Nebulizer 1 each 0     [DISCONTINUED] buPROPion (WELLBUTRIN XL) 150 MG 24 hr tablet Take 1 tablet (150 mg) by mouth every morning 30 tablet 1     Allergies   Allergen Reactions     No Known Drug Allergies        This document serves as a record of the services and decisions personally performed by Padma Lozano MD. It was created on his/her behalf by Alix Hayes, a trained medical scribe. The creation of this document is based on the provider's statements to the medical scribe. Alix Hayes March 21, 2017 9:54 AM  OBJECTIVE:     /74 (BP Location: Right arm, Patient Position: Chair, Cuff Size: Adult Large)  Pulse 72  Temp 98.3  F (36.8  C) (Oral)  Ht 5' 9\" (1.753 m)  EXAM:  GENERAL APPEARANCE: healthy, alert and no distress  EYES: Eyes grossly normal to inspection, PERRL and conjunctivae and sclerae normal  HENT: ear canals and TM's normal, nose and mouth without ulcers or lesions, oropharynx clear and oral mucous membranes moist  NECK: no adenopathy, no asymmetry, masses, or scars and thyroid normal to palpation  RESP: lungs clear to auscultation - no rales, rhonchi or wheezes  BREAST: normal without masses, tenderness or nipple discharge and no palpable axillary masses or adenopathy  CV: regular rate and rhythm, normal S1 S2, no S3 or S4, no murmur, click or rub, no peripheral edema and peripheral pulses strong  ABDOMEN: soft, nontender, no hepatosplenomegaly, no masses and bowel sounds normal   (female): Could not visualize IUD strings due to long and collapsing vaginal canal. Normal female external genitalia, " normal urethral meatus, vaginal mucosa normal, normal cervix, adnexae, and uterus without masses or abnormal discharge  MS: no musculoskeletal defects are noted and gait is age appropriate without ataxia  SKIN: no suspicious lesions or rashes  NEURO: Normal strength and tone, sensory exam grossly normal, mentation intact and speech normal  PSYCH: mentation appears normal and affect normal/bright    ASSESSMENT/PLAN:     (Z00.00) Routine general medical examination at a health care facility  (primary encounter diagnosis)  Comment: Stable health  Plan: Other health care maintenance up to date per chart or patient report.    (F33.1) Major depressive disorder, recurrent episode, moderate (H)  Comment: Not well controlled. Pt would like to increase Wellbutrin. Also going to take a leave of absence from work.   Plan: buPROPion (WELLBUTRIN XL) 300 MG 24 hr tablet,         MENTAL HEALTH REFERRAL        Provided 4 weeks leave of absence. But will re-evaluate in 2 weeks,  Discussed that I want her to see a therapist while she is out on leave to establish care and get additional support.   Has a supportive sister.    (J45.30) Mild persistent asthma without complication  Comment: Recent flare but better now  Plan: fluticasone-salmeterol (ADVAIR DISKUS) 500-50         MCG/DOSE diskus inhaler, albuterol (PROAIR         HFA/PROVENTIL HFA/VENTOLIN HFA) 108 (90 BASE)         MCG/ACT Inhaler        Continue current medications. Recheck in 2 weeks.    (I10) Essential hypertension with goal blood pressure less than 140/90  Comment: well controlled  Plan: hydrochlorothiazide (MICROZIDE) 12.5 MG         capsule, lisinopril (PRINIVIL/ZESTRIL) 40 MG         tablet        Continue current medications.    (J30.89) Other allergic rhinitis  Comment: Controlled with Flonase  Plan: fluticasone (FLONASE) 50 MCG/ACT spray        Continue current medications.    (G47.33) Obstructive sleep apnea  Comment: Stable  Plan:     (N92.0) Vaginal  "spotting  Comment: Sporadic, no pattern. Has mirena IUD in place, as well as history of fibroids.  Plan: US Pelvic Complete w Transvaginal        Address treatment plan based on results.    (Z12.4) Screening for malignant neoplasm of cervix  Comment:   Plan: Pap imaged thin layer screen with HPV -         recommended age 30 - 65 years (select HPV order        below), HPV High Risk Types DNA Cervical            (Z97.5) IUD (intrauterine device) in place  Comment: Recently has had sporadic vaginal spotting. IUD strings not visible today.  Plan: US Pelvic Complete w Transvaginal              COUNSELING:  Reviewed preventive health counseling, as reflected in patient instructions       Contraception       ([place]hel[place])menopause management     reports that she has never smoked. She has never used smokeless tobacco.  Estimated body mass index is 39.23 kg/(m^2) as calculated from the following:    Height as of 7/21/14: 5' 8\" (1.727 m).    Weight as of 7/21/14: 258 lb (117 kg).     Counseling Resources:  ATP IV Guidelines  Pooled Cohorts Equation Calculator  Breast Cancer Risk Calculator  FRAX Risk Assessment  ICSI Preventive Guidelines  Dietary Guidelines for Americans, 2010  [employer]'s MyPlate  ASA Prophylaxis  Lung CA Screening    The information in this document, created by the medical scribdrew Hayes for me, accurately reflects the services I personally performed and the decisions made by me. I have reviewed and approved this document for accuracy prior to leaving the patient care area.    Padma Lozano MD  [facility]  Answers for HPI/ROS submitted by the patient on 3/21/2017   Q1: Little interest or pleasure in doing things: 3=Nearly every day  Q2: Feeling down, depressed or hopeless: 3=Nearly every day  PHQ-2 Score: 6    "

## 2017-03-21 NOTE — PATIENT INSTRUCTIONS
Let's follow up in 2 weeks.     Please schedule an ultra sound.  [facility] (xray, mammogram, bone density, and ultrasound) [phone]  OR  [facility] (CT, MRI, ultrasound) [phone]    Mental Health Scheduling will contact you to schedule a counseling appointment.          Preventive Health Recommendations  Female Ages 50 - 64    Yearly exam: See your health care provider every year in order to  o Review health changes.   o Discuss preventive care.    o Review your medicines if your doctor has prescribed any.      Get a Pap test every three years (unless you have an abnormal result and your provider advises testing more often).    If you get Pap tests with HPV test, you only need to test every 5 years, unless you have an abnormal result.     You do not need a Pap test if your uterus was removed (hysterectomy) and you have not had cancer.    You should be tested each year for STDs (sexually transmitted diseases) if you're at risk.     Have a mammogram every 1 to 2 years.    Have a colonoscopy at age 50, or have a yearly FIT test (stool test). These exams screen for colon cancer.      Have a cholesterol test every 5 years, or more often if advised.    Have a diabetes test (fasting glucose) every three years. If you are at risk for diabetes, you should have this test more often.     If you are at risk for osteoporosis (brittle bone disease), think about having a bone density scan (DEXA).    Shots: Get a flu shot each year. Get a tetanus shot every 10 years.    Nutrition:     Eat at least 5 servings of fruits and vegetables each day.    Eat whole-grain bread, whole-wheat pasta and brown rice instead of white grains and rice.    Talk to your provider about Calcium and Vitamin D.     Lifestyle    Exercise at least 150 minutes a week (30 minutes a day, 5 days a week). This will help you control your weight and prevent disease.    Limit alcohol to  one drink per day.    No smoking.     Wear sunscreen to prevent skin cancer.     See your dentist every six months for an exam and cleaning.    See your eye doctor every 1 to 2 years.

## 2017-03-21 NOTE — LETTER
March 7, 2018      Nelaivy Arthur Suzan  [address] [address]    Dear ,      At [facility], your health and wellness is our primary concern. That is why we are following up on a positive high risk HPV test from 3/21/17. Your provider had recommended that you have a Pap smear and HPV test completed by 3/21/18. Our records do not show that this has been scheduled.    It is important to complete the follow up that your provider has suggested for you to ensure that there are no worsening changes which may, over time, develop into cancer.      Please contact our office at  [phone] to schedule an appointment for a Pap smear and HPV test at your earliest convenience. If you have questions or concerns, please call the clinic and we will be happy to assist you.    If you have completed the tests outside of [facility], please have the results forwarded to our office. We will update the chart for your primary Physician to review before your next annual physical.     Thank you for choosing [facility]!    Sincerely,      Padma Lozano MD/kamla

## 2017-03-22 ASSESSMENT — ASTHMA QUESTIONNAIRES: ACT_TOTALSCORE: 17

## 2017-03-22 ASSESSMENT — ANXIETY QUESTIONNAIRES: GAD7 TOTAL SCORE: 15

## 2017-03-22 ASSESSMENT — PATIENT HEALTH QUESTIONNAIRE - PHQ9: SUM OF ALL RESPONSES TO PHQ QUESTIONS 1-9: 23

## 2017-03-22 NOTE — TELEPHONE ENCOUNTER
She was seen yesterday, will close this encounter.     Yaneth Sheikh RN   March 22, 2017 5:14 PM  [facility] Triage   [phone]

## 2017-03-23 LAB
COPATH REPORT: NORMAL
PAP: NORMAL

## 2017-03-24 ENCOUNTER — MYC MEDICAL ADVICE (OUTPATIENT)
Dept: FAMILY MEDICINE | Facility: CLINIC | Age: 57
End: 2017-03-24

## 2017-03-24 DIAGNOSIS — N76.0 BACTERIAL VAGINOSIS: Primary | ICD-10-CM

## 2017-03-24 DIAGNOSIS — B96.89 BACTERIAL VAGINOSIS: Primary | ICD-10-CM

## 2017-03-24 PROBLEM — R87.810 CERVICAL HIGH RISK HPV (HUMAN PAPILLOMAVIRUS) TEST POSITIVE: Status: ACTIVE | Noted: 2017-03-21

## 2017-03-24 LAB
FINAL DIAGNOSIS: ABNORMAL
HPV HR 12 DNA CVX QL NAA+PROBE: POSITIVE
HPV16 DNA SPEC QL NAA+PROBE: NEGATIVE
HPV18 DNA SPEC QL NAA+PROBE: NEGATIVE
SPECIMEN DESCRIPTION: ABNORMAL

## 2017-03-24 NOTE — PROGRESS NOTES
2005, 2007, 2008, 2010, 2014 NIL paps.  3/21/17 NIL pap/+ HR HPV (not 16 or 18).  Plan: cotest 1 yr, due 3/21/18  5/2/18 NIL pap, Neg HPV. Plan: Co-test in 3 years. ([facility]RA)

## 2017-03-27 RX ORDER — METRONIDAZOLE 7.5 MG/G
1 GEL VAGINAL AT BEDTIME
Qty: 70 G | Refills: 0 | Status: SHIPPED | OUTPATIENT
Start: 2017-03-27 | End: 2017-04-01

## 2017-03-27 NOTE — TELEPHONE ENCOUNTER
Reviewed chart- 3/21 pap is suggestive of a bacterial vaginosis. PCP is out of the office this week- route to team for medication treatment for BV. Pended metrogel & flagyl- I can ask patient's preference once I have okay to proceed from provider.     Irma Yu RN

## 2017-03-28 ENCOUNTER — RADIANT APPOINTMENT (OUTPATIENT)
Dept: ULTRASOUND IMAGING | Facility: CLINIC | Age: 57
End: 2017-03-28
Attending: FAMILY MEDICINE
Payer: COMMERCIAL

## 2017-03-28 DIAGNOSIS — Z97.5 IUD (INTRAUTERINE DEVICE) IN PLACE: ICD-10-CM

## 2017-03-28 DIAGNOSIS — N93.9 VAGINAL SPOTTING: ICD-10-CM

## 2017-03-28 PROCEDURE — 76830 TRANSVAGINAL US NON-OB: CPT | Performed by: OBSTETRICS & GYNECOLOGY

## 2017-04-04 ENCOUNTER — TELEPHONE (OUTPATIENT)
Dept: FAMILY MEDICINE | Facility: CLINIC | Age: 57
End: 2017-04-04

## 2017-04-04 ENCOUNTER — VIRTUAL VISIT (OUTPATIENT)
Dept: FAMILY MEDICINE | Facility: CLINIC | Age: 57
End: 2017-04-04
Payer: COMMERCIAL

## 2017-04-04 DIAGNOSIS — F33.0 MAJOR DEPRESSIVE DISORDER, RECURRENT EPISODE, MILD (H): Primary | ICD-10-CM

## 2017-04-04 DIAGNOSIS — F33.1 MAJOR DEPRESSIVE DISORDER, RECURRENT EPISODE, MODERATE (H): ICD-10-CM

## 2017-04-04 DIAGNOSIS — D25.9 UTERINE LEIOMYOMA, UNSPECIFIED LOCATION: ICD-10-CM

## 2017-04-04 DIAGNOSIS — G47.00 INSOMNIA, UNSPECIFIED TYPE: ICD-10-CM

## 2017-04-04 PROCEDURE — 99442 ZZC PHYSICIAN TELEPHONE EVALUATION 11-20 MIN: CPT | Performed by: FAMILY MEDICINE

## 2017-04-04 RX ORDER — TRAZODONE HYDROCHLORIDE 50 MG/1
TABLET, FILM COATED ORAL
Qty: 60 TABLET | Refills: 1 | Status: SHIPPED | OUTPATIENT
Start: 2017-04-04 | End: 2017-05-19

## 2017-04-04 RX ORDER — BUPROPION HYDROCHLORIDE 300 MG/1
300 TABLET ORAL EVERY MORNING
Qty: 90 TABLET | Refills: 1 | Status: SHIPPED | OUTPATIENT
Start: 2017-04-04 | End: 2017-05-19

## 2017-04-04 ASSESSMENT — ANXIETY QUESTIONNAIRES
3. WORRYING TOO MUCH ABOUT DIFFERENT THINGS: NOT AT ALL
GAD7 TOTAL SCORE: 4
7. FEELING AFRAID AS IF SOMETHING AWFUL MIGHT HAPPEN: NOT AT ALL
1. FEELING NERVOUS, ANXIOUS, OR ON EDGE: SEVERAL DAYS
2. NOT BEING ABLE TO STOP OR CONTROL WORRYING: NOT AT ALL
5. BEING SO RESTLESS THAT IT IS HARD TO SIT STILL: NOT AT ALL
6. BECOMING EASILY ANNOYED OR IRRITABLE: NEARLY EVERY DAY

## 2017-04-04 ASSESSMENT — PATIENT HEALTH QUESTIONNAIRE - PHQ9: 5. POOR APPETITE OR OVEREATING: NOT AT ALL

## 2017-04-04 NOTE — TELEPHONE ENCOUNTER
Called patient and left a VM message that I was calling to help schedule another phone visit in 4 weeks and also to find out where to send letter.    Chelsea Khoury

## 2017-04-04 NOTE — LETTER
[facility]  [address] [place] [id number] [phone]      April 4, 2017      RE: Nela Mares  [address] [address]       To whom it may concern:    Nela Mares was re-evaluated today.  She  may return to work with the following: No working or lifting restrictions on or about 4/17/17.          Sincerely,        Bailey Ray MD

## 2017-04-04 NOTE — MR AVS SNAPSHOT
After Visit Summary   4/4/2017    Nela Mares    MRN: [id number]           Patient Information     Date Of Birth          1960        Visit Information        Provider Department      4/4/2017 9:20 AM Padma Lozano MD [facility]        Today's Diagnoses     Major depressive disorder, recurrent episode, mild (H)    -  1    Insomnia, unspecified type        Uterine leiomyoma, unspecified location        Major depressive disorder, recurrent episode, moderate (H)           Follow-ups after your visit        Follow-up notes from your care team     Return in about 4 weeks (around 5/2/2017).      Who to contact     If you have questions or need follow up information about today's clinic visit or your schedule please contact [facility] directly at [phone].  Normal or non-critical lab and imaging results will be communicated to you by MyChart, letter or phone within 4 business days after the clinic has received the results. If you do not hear from us within 7 days, please contact the clinic through MyChart or phone. If you have a critical or abnormal lab result, we will notify you by phone as soon as possible.  Submit refill requests through [employer] or call your pharmacy and they will forward the refill request to us. Please allow 3 business days for your refill to be completed.          Additional Information About Your Visit        MyChart Information     [employer] gives you secure access to your electronic health record. If you see a primary care provider, you can also send messages to your care team and make appointments. If you have questions, please call your primary care clinic.  If you do not have a primary care provider, please call [phone] and they will assist you.        Care EveryWhere ID     This is your Care EveryWhere ID. This could be used by other organizations to access your [facility] medical records  [id number]          Blood Pressure from Last 3 Encounters:   03/21/17 130/74   02/24/17 118/72   10/14/16 112/80    Weight from Last 3 Encounters:   07/21/14 258 lb (117 kg)   06/17/14 258 lb 9.6 oz (117.3 kg)   03/14/14 259 lb (117.5 kg)              Today, you had the following     No orders found for display         Today's Medication Changes          These changes are accurate as of: 4/4/17 10:00 AM.  If you have any questions, ask your nurse or doctor.               Start taking these medicines.        Dose/Directions    traZODone 50 MG tablet   Commonly known as:  DESYREL   Used for:  Insomnia, unspecified type   Started by:  Padma Lozano MD        Take 1/2 up to 2 tablets per night as needed for insomnia   Quantity:  60 tablet   Refills:  1         Stop taking these medicines if you haven't already. Please contact your care team if you have questions.     order for DME   Stopped by:  Padma Lozano MD                Where to get your medicines      These medications were sent to [facility] [address] [place] [id number]     Phone:  [phone]     buPROPion 300 MG 24 hr tablet    traZODone 50 MG tablet                Primary Care Provider Office Phone # Fax #    Padma Lozano [phone]       [facility] [address] [place] [id number]        Thank you!     Thank you for choosing [facility]  for your care. Our goal is always to provide you with excellent care. Hearing back from our patients is one way we can continue to improve our services. Please take a few minutes to complete the written survey that you may receive in the mail after your visit with us. Thank you!             Your Updated Medication List - Protect others around you: Learn how to safely use, store and throw away your medicines at www.disposemymeds.org.          This list is accurate as of: 4/4/17 10:00  AM.  Always use your most recent med list.                   Brand Name Dispense Instructions for use    albuterol 108 (90 BASE) MCG/ACT Inhaler    PROAIR HFA/PROVENTIL HFA/VENTOLIN HFA    3 Inhaler    Inhale 2 puffs into the lungs every 4 hours as needed for shortness of breath / dyspnea or wheezing       buPROPion 300 MG 24 hr tablet    WELLBUTRIN XL    90 tablet    Take 1 tablet (300 mg) by mouth every morning       cetirizine 10 MG tablet    zyrTEC     Take 10 mg by mouth daily       fluticasone 50 MCG/ACT spray    FLONASE    48 g    Spray 1-2 sprays into both nostrils daily       fluticasone-salmeterol 500-50 MCG/DOSE diskus inhaler    ADVAIR DISKUS    3 Inhaler    Inhale 1 puff into the lungs 2 times daily       hydrochlorothiazide 12.5 MG capsule    MICROZIDE    90 capsule    Take 1 capsule (12.5 mg) by mouth daily       lisinopril 40 MG tablet    PRINIVIL/ZESTRIL    90 tablet    Take 1 tablet (40 mg) by mouth daily       traZODone 50 MG tablet    DESYREL    60 tablet    Take 1/2 up to 2 tablets per night as needed for insomnia

## 2017-04-04 NOTE — TELEPHONE ENCOUNTER
Two things:  Please help patient schedule telephone follow up with me in 4 weeks for follow up of her mood and insomnia.  Okay to double book end of day or 40 minute visit.    i have also printed a letter that needs to be sent to patient.  It is in my pending folder.

## 2017-04-04 NOTE — PROGRESS NOTES
"Nela Mares is a 56 year old female who is being evaluated via a telephone visit.      The patient has been notified of following:     \"This telephone visit will be conducted via a call between you and your physician/provider. We have found that certain health care needs can be provided without the need for a physical exam.  This service lets us provide the care you need with a short phone conversation.  If a prescription is necessary we can send it directly to your pharmacy.  If lab work is needed we can place an order for that and you can then stop by our lab to have the test done at a later time.    We will bill your insurance company for this service.  Please check with your medical insurance if this type of visit is covered. You may be responsible for the cost of this type of visit if insurance coverage is denied.  The typical cost is $30 (10min), $59(11-20min) and $85 (21-30min).  Most often these visits are shorter than 10 minutes.    If during the course of the call the physician/provider feels a telephone visit is not appropriate, you will not be charged for this service. \"     Consent has been obtained for this service by 1 care team member:yes. See the scanned image in the medical record.    Preferred patient phone number to be used for this call: [phone]     Nela Mares complains of follow up to change in dose of Wellbutrin.  Symptoms of anxiety and depression improving.  She states her biggest concern is that she wakes up 5-6 times a night with night sweats. This is a real concern.  She does have a lack of appetite with the dose increase but she states \"this is a good thing\".    Asthma has been very well controlled.        Past Medical History:   Diagnosis Date     Allergic rhinitis, cause unspecified      Cervical high risk HPV (human papillomavirus) test positive 3/21/2017    3/21/17 NIL pap/+ HR HPV (not 16 or 18).      Depressive disorder, not elsewhere classified     seasonal     " Hypertension      Mild persistent asthma      Obstructive sleep apnea (adult) (pediatric)     uses CPAP     Scalp mass 7/2014      Social History     Social History     Marital status: Significant other     Spouse name: N/A     Number of children: 0     Years of education: 18     Occupational History           Social History Main Topics     Smoking status: Never Smoker     Smokeless tobacco: Never Used     Alcohol use No     Drug use: No     Sexual activity: Yes     Partners: Male     Birth control/ protection: IUD     Other Topics Concern     Parent/Sibling W/ Cabg, Mi Or Angioplasty Before 65f 55m? No     Social History Narrative    Dairy/d 3 servings/d.     Caffeine 2 servings/d    Exercise 0 x week    Sunscreen used - No    Seatbelts used - Yes    Working smoke/CO detectors in the home - Yes    Guns stored in the home - No    Self Breast Exams - no    Eye Exam up to date - No    Dental Exam up to date - Yes    Pap Smear up to date - Yes    Mammogram up to date - Yes    Dexa Scan up to date - NOT APPLICABLE    Flex Sig / Colonoscopy up to date - NOT APPLICABLE    Immunizations up to date - Yes    Abuse: Current or Past(Physical, Sexual or Emotional)- No    Do you feel safe in your environment - Yes    GÓMEZ Cummings    11/23/11                     ALLERGIES  No known drug allergies        Bailey MARIE, Certified Medical Assistant (AAMA)April 4, 2017 9:05 AM   (MA signature)    Additional provider notes:has been meeting 2 times weekly with a therapist.  Wants to go back to work on 4/17/17  Has been out walking and has had a decreased appetite - so finds that she has been eating healthier.    Has tried to establish a schedule.  Feels her memory and focus are better.    Still finds that she is crabby around other people.    Night sweats have started more regularly.  Also having some hot flashes. All of her sisters - who are about her age - are also struggling with night sweats.    Start 9:17 AM  Stop  9:32 AM      Assessment/Plan:  (F33.0) Major depressive disorder, recurrent episode, mild (H)  (primary encounter diagnosis)  Comment: improving  Plan: wellbutrin refilled.  Encouraged to continue counseling.  Will release back to work on 4/17/17  Advised follow up by phone in 4 weeks.    (G47.00) Insomnia, unspecified type  Comment: due mainly to night sweats  Plan: traZODone (DESYREL) 50 MG tablet        I discussed with the patient risks and benefits of the new medications prescribed including potential side effects.  The patient had opportunity to ask questions and is comfortable with and interested in medications as prescribed.      (D25.9) Uterine leiomyoma, unspecified location  Comment: reducing in size, as expected with menopausal transition  Plan: when she did have vaginal bleeding it was with the timing of q 3-4 weeks like a period.  Suspect this was as the hormone in the mirena was decreasing in strength.  Discussed monitoring - if bleeding not occurring in a 3-4 week pattern, or becoming heavier than spotting, then advised to let me know.  Anticipate that her IUD could be removed once she has a full 12 months without period, or sooner if she desires.            Total time spent on this phone visit with the patient = 15 minutes     I have reviewed the note as documented above.  This accurately captures the substance of my conversation with the patient,  Bailey Ray MD

## 2017-04-05 ASSESSMENT — ASTHMA QUESTIONNAIRES: ACT_TOTALSCORE: 25

## 2017-04-05 ASSESSMENT — ANXIETY QUESTIONNAIRES: GAD7 TOTAL SCORE: 4

## 2017-04-05 ASSESSMENT — PATIENT HEALTH QUESTIONNAIRE - PHQ9: SUM OF ALL RESPONSES TO PHQ QUESTIONS 1-9: 13

## 2017-04-05 NOTE — TELEPHONE ENCOUNTER
Patient returned phone call and scheduled follow up phone visit for 05/12/17. Patient needs the letter faxed to [employer] at [phone]. Patient also requesting that a copy be mailed to her at her home address.

## 2017-04-05 NOTE — TELEPHONE ENCOUNTER
Called patient and left a VM message that I was calling to help schedule another phone visit in 4 weeks and also to find out where to send letter.    Placed letter back in Dr Lozano pending folder.    Chelsea Khoury

## 2017-04-06 NOTE — TELEPHONE ENCOUNTER
Faxed letter to [employer] at [phone] and mailed letter to patient.    Chelsea Khoury

## 2017-04-20 ENCOUNTER — MYC MEDICAL ADVICE (OUTPATIENT)
Dept: FAMILY MEDICINE | Facility: CLINIC | Age: 57
End: 2017-04-20

## 2017-04-20 DIAGNOSIS — F41.9 ANXIETY: Primary | ICD-10-CM

## 2017-04-20 RX ORDER — HYDROXYZINE PAMOATE 25 MG/1
25 CAPSULE ORAL 3 TIMES DAILY PRN
Qty: 30 CAPSULE | Refills: 0 | Status: SHIPPED | OUTPATIENT
Start: 2017-04-20 | End: 2017-04-25

## 2017-04-20 NOTE — TELEPHONE ENCOUNTER
"Called patient- she began to cry stating that she can't wait until then. I explained that PCP is out of the office & offered an appt with a team member tomorrow but patient states she can't miss work. I offered a evening appt tomorrow at another clinic but patient became frustrated stating \"feeling this way isn't normal for me\" & wonders why we \"can't just give her something\" & states that we \"need to figure it out.\" I informed her that I would ask about something in the mean time but that an appointment would be best to determine what would be best for her.  She is seeing a therapist.     I asked if 2:40pm would work for a phone visit on Tuesday. She said we should MyChart her with a telephone visit time & she will try to make it work & then disconnected the line. I scheduled her for Tuesday afternoon around 2:40pm. Sent MyChart asking what time would work for patient, the scheduling number & the crisis connection number.  Irma Yu RN   "

## 2017-04-20 NOTE — TELEPHONE ENCOUNTER
Route to PCP to advise. Virtual visit was on 4/4. Is a phone visit okay for the below or would you prefer an office visit?  Patient is scheduled on 5/12.  Irma Yu RN

## 2017-04-20 NOTE — TELEPHONE ENCOUNTER
I do need to talk to her to best answer her questions.  And I am sorry to hear she is struggling.  I assume she is also discussing this with her therapist.    Phone visit is fine - and okay to double book on Tuesday, April 25th when I am back in clinic.

## 2017-04-20 NOTE — TELEPHONE ENCOUNTER
Ok to call the patient back and let her know I have reviewed the chart and recent notes.  I will send over a script to [facility] for some hydroxyzine, this is a medication she can use as needed to calm her down.  It is not habit forming and safe to use with the wellbutrin until she can follow up with Dr. Ray.    Chema Laura MD

## 2017-04-20 NOTE — TELEPHONE ENCOUNTER
Route to a teammate. Patient sent another MyChart & really wants some medication to get her through until she can speak with PCP on Tuesday.    Irma Yu RN

## 2017-04-21 ENCOUNTER — MYC MEDICAL ADVICE (OUTPATIENT)
Dept: FAMILY MEDICINE | Facility: CLINIC | Age: 57
End: 2017-04-21

## 2017-04-21 NOTE — TELEPHONE ENCOUNTER
Route her response to PCP. There is a 10:40 unavailable on Tuesday instead of the afternoon if that works for her. (I can respond, but if you are responding then it might be easier to do it at the same & may go over better coming from you. Please let me know.)   Irma Yu RN

## 2017-04-21 NOTE — TELEPHONE ENCOUNTER
"See my response.  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  I have a meeting from 4:30-5.  But really we could book her anytime in the afternoon - up to and including 4:15 if need be.   When you remove her 2:40 visit - just put a \"hold\" back on that time.  And then I can work around the timing that is best for her - and have catch up time during that spot.  "

## 2017-04-25 ENCOUNTER — VIRTUAL VISIT (OUTPATIENT)
Dept: FAMILY MEDICINE | Facility: CLINIC | Age: 57
End: 2017-04-25
Payer: COMMERCIAL

## 2017-04-25 DIAGNOSIS — F33.0 MAJOR DEPRESSIVE DISORDER, RECURRENT EPISODE, MILD (H): Primary | ICD-10-CM

## 2017-04-25 DIAGNOSIS — F41.9 ANXIETY: ICD-10-CM

## 2017-04-25 PROCEDURE — 99441 ZZC PHYSICIAN TELEPHONE EVALUATION 5-10 MIN: CPT | Performed by: FAMILY MEDICINE

## 2017-04-25 RX ORDER — HYDROXYZINE PAMOATE 25 MG/1
CAPSULE ORAL
Qty: 90 CAPSULE | Refills: 1 | Status: SHIPPED | OUTPATIENT
Start: 2017-04-25 | End: 2017-05-19

## 2017-04-25 ASSESSMENT — PATIENT HEALTH QUESTIONNAIRE - PHQ9: 5. POOR APPETITE OR OVEREATING: NOT AT ALL

## 2017-04-25 ASSESSMENT — ANXIETY QUESTIONNAIRES
3. WORRYING TOO MUCH ABOUT DIFFERENT THINGS: NEARLY EVERY DAY
2. NOT BEING ABLE TO STOP OR CONTROL WORRYING: NEARLY EVERY DAY
7. FEELING AFRAID AS IF SOMETHING AWFUL MIGHT HAPPEN: NOT AT ALL
6. BECOMING EASILY ANNOYED OR IRRITABLE: NEARLY EVERY DAY
GAD7 TOTAL SCORE: 12
1. FEELING NERVOUS, ANXIOUS, OR ON EDGE: NEARLY EVERY DAY
5. BEING SO RESTLESS THAT IT IS HARD TO SIT STILL: NOT AT ALL

## 2017-04-25 NOTE — MR AVS SNAPSHOT
After Visit Summary   4/25/2017    Nela Mares    MRN: [id number]           Patient Information     Date Of Birth          1960        Visit Information        Provider Department      4/25/2017 10:40 AM Padma Lozano MD [facility]        Today's Diagnoses     Major depressive disorder, recurrent episode, mild (H)    -  1    Anxiety           Follow-ups after your visit        Your next 10 appointments already scheduled     May 12, 2017 11:40 AM CDT   [facility]phone [facility] with [facility]    [address] [place] [id number] [phone]              Who to contact     If you have questions or need follow up information about today's clinic visit or your schedule please contact [facility] directly at [phone].  Normal or non-critical lab and imaging results will be communicated to you by MyChart, letter or phone within 4 business days after the clinic has received the results. If you do not hear from us within 7 days, please contact the clinic through Feed[employer]-Machine[employer] or phone. If you have a critical or abnormal lab result, we will notify you by phone as soon as possible.  Submit refill requests through PatientsLikeMe or call your pharmacy and they will forward the refill request to us. Please allow 3 business days for your refill to be completed.          Additional Information About Your Visit        MyChart Information     PatientsLikeMe gives you secure access to your electronic health record. If you see a primary care provider, you can also send messages to your care team and make appointments. If you have questions, please call your primary care clinic.  If you do not have a primary care provider, please call [phone] and they will assist you.        Care EveryWhere ID     This is your Care EveryWhere ID. This could be used by other organizations to access  your [facility] medical records  [id number]         Blood Pressure from Last 3 Encounters:   03/21/17 130/74   02/24/17 118/72   10/14/16 112/80    Weight from Last 3 Encounters:   07/21/14 258 lb (117 kg)   06/17/14 258 lb 9.6 oz (117.3 kg)   03/14/14 259 lb (117.5 kg)              Today, you had the following     No orders found for display         Today's Medication Changes          These changes are accurate as of: 4/25/17  8:34 PM.  If you have any questions, ask your nurse or doctor.               These medicines have changed or have updated prescriptions.        Dose/Directions    hydrOXYzine 25 MG capsule   Commonly known as:  VISTARIL   This may have changed:    - how much to take  - how to take this  - when to take this  - reasons to take this  - additional instructions   Used for:  Anxiety        Take 1- 2 tablets every 6 hours as needed for anxiety   Quantity:  90 capsule   Refills:  1            Where to get your medicines      These medications were sent to [facility] [address] [place] [id number]     Phone:  [phone]     hydrOXYzine 25 MG capsule                Primary Care Provider Office Phone # Fax #    Padma Lozano [phone]       [facility] [address] [place] [address]        Thank you!     Thank you for choosing [facility]  for your care. Our goal is always to provide you with excellent care. Hearing back from our patients is one way we can continue to improve our services. Please take a few minutes to complete the written survey that you may receive in the mail after your visit with us. Thank you!             Your Updated Medication List - Protect others around you: Learn how to safely use, store and throw away your medicines at www.disposemymeds.org.          This list is accurate as of: 4/25/17  8:34 PM.  Always use your most recent med list.                    Brand Name Dispense Instructions for use    albuterol 108 (90 BASE) MCG/ACT Inhaler    PROAIR HFA/PROVENTIL HFA/VENTOLIN HFA    3 Inhaler    Inhale 2 puffs into the lungs every 4 hours as needed for shortness of breath / dyspnea or wheezing       buPROPion 300 MG 24 hr tablet    WELLBUTRIN XL    90 tablet    Take 1 tablet (300 mg) by mouth every morning       cetirizine 10 MG tablet    zyrTEC     Take 10 mg by mouth daily       fluticasone 50 MCG/ACT spray    FLONASE    48 g    Spray 1-2 sprays into both nostrils daily       fluticasone-salmeterol 500-50 MCG/DOSE diskus inhaler    ADVAIR DISKUS    3 Inhaler    Inhale 1 puff into the lungs 2 times daily       hydrochlorothiazide 12.5 MG capsule    MICROZIDE    90 capsule    Take 1 capsule (12.5 mg) by mouth daily       hydrOXYzine 25 MG capsule    VISTARIL    90 capsule    Take 1- 2 tablets every 6 hours as needed for anxiety       lisinopril 40 MG tablet    PRINIVIL/ZESTRIL    90 tablet    Take 1 tablet (40 mg) by mouth daily       traZODone 50 MG tablet    DESYREL    60 tablet    Take 1/2 up to 2 tablets per night as needed for insomnia

## 2017-04-25 NOTE — PROGRESS NOTES
"Nela Mares is a 56 year old female who is being evaluated via a telephone visit.      The patient has been notified of following:     \"This telephone visit will be conducted via a call between you and your physician/provider. We have found that certain health care needs can be provided without the need for a physical exam.  This service lets us provide the care you need with a short phone conversation.  If a prescription is necessary we can send it directly to your pharmacy.  If lab work is needed we can place an order for that and you can then stop by our lab to have the test done at a later time.    We will bill your insurance company for this service.  Please check with your medical insurance if this type of visit is covered. You may be responsible for the cost of this type of visit if insurance coverage is denied.  The typical cost is $30 (10min), $59(11-20min) and $85 (21-30min).  Most often these visits are shorter than 10 minutes.    If during the course of the call the physician/provider feels a telephone visit is not appropriate, you will not be charged for this service. \"     Consent has been obtained for this service by 1 care team member:yes. See the scanned image in the medical record.    Preferred patient phone number to be used for this call: [phone]     Nela Mares complains of  Drepression    Past Medical History:   Diagnosis Date     Allergic rhinitis, cause unspecified      Cervical high risk HPV (human papillomavirus) test positive 3/21/2017    3/21/17 NIL pap/+ HR HPV (not 16 or 18).      Depressive disorder      Depressive disorder, not elsewhere classified     seasonal     Hypertension      Mild persistent asthma      Obstructive sleep apnea (adult) (pediatric)     uses CPAP     Scalp mass 7/2014      Social History     Social History     Marital status: Significant other     Spouse name: N/A     Number of children: 0     Years of education: 18     Occupational History     " "      Social History Main Topics     Smoking status: Never Smoker     Smokeless tobacco: Never Used     Alcohol use No     Drug use: No     Sexual activity: Not Currently     Partners: Male     Birth control/ protection: IUD     Other Topics Concern     Parent/Sibling W/ Cabg, Mi Or Angioplasty Before 65f 55m? No     Social History Narrative    Dairy/d 3 servings/d.     Caffeine 2 servings/d    Exercise 0 x week    Sunscreen used - No    Seatbelts used - Yes    Working smoke/CO detectors in the home - Yes    Guns stored in the home - No    Self Breast Exams - no    Eye Exam up to date - No    Dental Exam up to date - Yes    Pap Smear up to date - Yes    Mammogram up to date - Yes    Dexa Scan up to date - NOT APPLICABLE    Flex Sig / Colonoscopy up to date - NOT APPLICABLE    Immunizations up to date - Yes    Abuse: Current or Past(Physical, Sexual or Emotional)- No    Do you feel safe in your environment - Yes    GÓMEZ Cummings    11/23/11                     ALLERGIES  No known drug allergies        Evon (MA signature)    Start 10:56 AM   Stop 11:03 AM    Additional provider notes:  \"marginal\" benefit from hydroxyzine.  Takes it at 8 am and 12pm on weekdays.  Helped some.  Was definitely more anxious over the weekend.  Was trying to determine if this anxiety is related to returning to work.  Anxiety was caused this weekend - by work, friendships, \"what if's\"  Is working with therapist on this.  Didn't have a therapy appointment last week - and was the only week she missed a weekly appointments    Assessment/Plan:  (F33.0) Major depressive disorder, recurrent episode, mild (H)  (primary encounter diagnosis)  Comment: improving  Plan: Continue current medication      (F41.9) Anxiety  Comment: heightened upon return to work after time off.  Plan: hydrOXYzine (VISTARIL) 25 MG capsule        Encouraged increasing hydroxyzine to tid or even qid if needed and if she doesn't get too drowsy.  Especially " until she can begin more intensive work with therapist to help manage triggers.    Keep scheduled appointment in two weeks.        Total time spent on this phone visit with the patient = 7 minutes     I have reviewed the note as documented above.  This accurately captures the substance of my conversation with the patient,  Bailey Ray MD

## 2017-04-26 ASSESSMENT — PATIENT HEALTH QUESTIONNAIRE - PHQ9: SUM OF ALL RESPONSES TO PHQ QUESTIONS 1-9: 6

## 2017-04-26 ASSESSMENT — ANXIETY QUESTIONNAIRES: GAD7 TOTAL SCORE: 12

## 2017-05-12 ENCOUNTER — VIRTUAL VISIT (OUTPATIENT)
Dept: FAMILY MEDICINE | Facility: CLINIC | Age: 57
End: 2017-05-12
Payer: COMMERCIAL

## 2017-05-12 DIAGNOSIS — F33.0 MAJOR DEPRESSIVE DISORDER, RECURRENT EPISODE, MILD (H): Primary | ICD-10-CM

## 2017-05-12 PROCEDURE — 99207 ZZC NO CHARGE LOS: CPT | Performed by: FAMILY MEDICINE

## 2017-05-12 NOTE — PROGRESS NOTES
Phone visit attempted with Nela.  She did not respond to call.    Attempted phone visit a second time.    No response to call to cell phone.    Will respond to my chart message.

## 2017-05-12 NOTE — MR AVS SNAPSHOT
After Visit Summary   5/12/2017    Nela Mares    MRN: [id number]           Patient Information     Date Of Birth          1960        Visit Information        Provider Department      5/12/2017 4:20 PM Padma Lozano MD [facility]        Today's Diagnoses     Major depressive disorder, recurrent episode, mild (H)    -  1       Follow-ups after your visit        Who to contact     If you have questions or need follow up information about today's clinic visit or your schedule please contact [facility] directly at [phone].  Normal or non-critical lab and imaging results will be communicated to you by Isentiohart, letter or phone within 4 business days after the clinic has received the results. If you do not hear from us within 7 days, please contact the clinic through ab&jb properties and servicest or phone. If you have a critical or abnormal lab result, we will notify you by phone as soon as possible.  Submit refill requests through Friendemic or call your pharmacy and they will forward the refill request to us. Please allow 3 business days for your refill to be completed.          Additional Information About Your Visit        MyChart Information     Friendemic gives you secure access to your electronic health record. If you see a primary care provider, you can also send messages to your care team and make appointments. If you have questions, please call your primary care clinic.  If you do not have a primary care provider, please call [phone] and they will assist you.        Care EveryWhere ID     This is your Care EveryWhere ID. This could be used by other organizations to access your [facility] medical records  [id number]         Blood Pressure from Last 3 Encounters:   03/21/17 130/74   02/24/17 118/72   10/14/16 112/80    Weight from Last 3 Encounters:   07/21/14 258 lb (117 kg)   06/17/14 258 lb 9.6 oz (117.3 kg)   03/14/14 259 lb (117.5 kg)               Today, you had the following     No orders found for display       Primary Care Provider Office Phone # Fax #    Padma Robert Lozano [phone]       [facility] [address] [facility] [address]        Thank you!     Thank you for choosing [facility]  for your care. Our goal is always to provide you with excellent care. Hearing back from our patients is one way we can continue to improve our services. Please take a few minutes to complete the written survey that you may receive in the mail after your visit with us. Thank you!             Your Updated Medication List - Protect others around you: Learn how to safely use, store and throw away your medicines at www.disposemymeds.org.          This list is accurate as of: 5/12/17  5:20 PM.  Always use your most recent med list.                   Brand Name Dispense Instructions for use    albuterol 108 (90 BASE) MCG/ACT Inhaler    PROAIR HFA/PROVENTIL HFA/VENTOLIN HFA    3 Inhaler    Inhale 2 puffs into the lungs every 4 hours as needed for shortness of breath / dyspnea or wheezing       buPROPion 300 MG 24 hr tablet    WELLBUTRIN XL    90 tablet    Take 1 tablet (300 mg) by mouth every morning       cetirizine 10 MG tablet    zyrTEC     Take 10 mg by mouth daily       fluticasone 50 MCG/ACT spray    FLONASE    48 g    Spray 1-2 sprays into both nostrils daily       fluticasone-salmeterol 500-50 MCG/DOSE diskus inhaler    ADVAIR DISKUS    3 Inhaler    Inhale 1 puff into the lungs 2 times daily       hydrochlorothiazide 12.5 MG capsule    MICROZIDE    90 capsule    Take 1 capsule (12.5 mg) by mouth daily       hydrOXYzine 25 MG capsule    VISTARIL    90 capsule    Take 1- 2 tablets every 6 hours as needed for anxiety       lisinopril 40 MG tablet    PRINIVIL/ZESTRIL    90 tablet    Take 1 tablet (40 mg) by mouth daily       traZODone 50 MG tablet    DESYREL    60 tablet    Take 1/2 up to 2 tablets per night  as needed for insomnia

## 2017-05-13 ENCOUNTER — MYC MEDICAL ADVICE (OUTPATIENT)
Dept: FAMILY MEDICINE | Facility: CLINIC | Age: 57
End: 2017-05-13

## 2017-05-15 NOTE — TELEPHONE ENCOUNTER
Is she willing to schedule a phone visit this week?  And if so, I am very comfortable scheduling it as the last appointment of the day - or double booking at the end of the day if that works better for her schedule

## 2017-05-15 NOTE — TELEPHONE ENCOUNTER
Will route to provider to advise.  See message below.  See [employer] encounter dated 5/12/17, this is response to that message.     Yaneth Sheikh RN

## 2017-05-17 NOTE — TELEPHONE ENCOUNTER
Sent [employer] message confirming appointment with patient and also confirming phone number patient wants to be called at.    Chelsea Khoury

## 2017-05-19 ENCOUNTER — VIRTUAL VISIT (OUTPATIENT)
Dept: FAMILY MEDICINE | Facility: CLINIC | Age: 57
End: 2017-05-19
Payer: COMMERCIAL

## 2017-05-19 DIAGNOSIS — F33.0 MAJOR DEPRESSIVE DISORDER, RECURRENT EPISODE, MILD (H): ICD-10-CM

## 2017-05-19 PROCEDURE — 99441 ZZC PHYSICIAN TELEPHONE EVALUATION 5-10 MIN: CPT | Performed by: FAMILY MEDICINE

## 2017-05-19 NOTE — MR AVS SNAPSHOT
After Visit Summary   5/19/2017    Nela Mares    MRN: [id number]           Patient Information     Date Of Birth          1960        Visit Information        Provider Department      5/19/2017 4:40 PM Padma Lozano MD [facility]        Today's Diagnoses     Major depressive disorder, recurrent episode, mild (H)           Follow-ups after your visit        Who to contact     If you have questions or need follow up information about today's clinic visit or your schedule please contact [facility] directly at [phone].  Normal or non-critical lab and imaging results will be communicated to you by [employer]opol[employer], letter or phone within 4 business days after the clinic has received the results. If you do not hear from us within 7 days, please contact the clinic through [employer] or phone. If you have a critical or abnormal lab result, we will notify you by phone as soon as possible.  Submit refill requests through [employer] or call your pharmacy and they will forward the refill request to us. Please allow 3 business days for your refill to be completed.          Additional Information About Your Visit        MyChart Information     [employer] gives you secure access to your electronic health record. If you see a primary care provider, you can also send messages to your care team and make appointments. If you have questions, please call your primary care clinic.  If you do not have a primary care provider, please call [phone] and they will assist you.        Care EveryWhere ID     This is your Care EveryWhere ID. This could be used by other organizations to access your [facility] medical records  [id number]         Blood Pressure from Last 3 Encounters:   03/21/17 130/74   02/24/17 118/72   10/14/16 112/80    Weight from Last 3 Encounters:   07/21/14 258 lb (117 kg)   06/17/14 258 lb 9.6 oz (117.3 kg)   03/14/14 259 lb (117.5 kg)              Today,  you had the following     No orders found for display       Primary Care Provider Office Phone # Fax #    Padma Robert Lozano [phone]       [facility] [address] [facility] [address]        Thank you!     Thank you for choosing [facility]  for your care. Our goal is always to provide you with excellent care. Hearing back from our patients is one way we can continue to improve our services. Please take a few minutes to complete the written survey that you may receive in the mail after your visit with us. Thank you!             Your Updated Medication List - Protect others around you: Learn how to safely use, store and throw away your medicines at www.disposemymeds.org.          This list is accurate as of: 5/19/17  4:40 PM.  Always use your most recent med list.                   Brand Name Dispense Instructions for use    albuterol 108 (90 BASE) MCG/ACT Inhaler    PROAIR HFA/PROVENTIL HFA/VENTOLIN HFA    3 Inhaler    Inhale 2 puffs into the lungs every 4 hours as needed for shortness of breath / dyspnea or wheezing       cetirizine 10 MG tablet    zyrTEC     Take 10 mg by mouth daily       fluticasone 50 MCG/ACT spray    FLONASE    48 g    Spray 1-2 sprays into both nostrils daily       fluticasone-salmeterol 500-50 MCG/DOSE diskus inhaler    ADVAIR DISKUS    3 Inhaler    Inhale 1 puff into the lungs 2 times daily       hydrochlorothiazide 12.5 MG capsule    MICROZIDE    90 capsule    Take 1 capsule (12.5 mg) by mouth daily       lisinopril 40 MG tablet    PRINIVIL/ZESTRIL    90 tablet    Take 1 tablet (40 mg) by mouth daily

## 2017-05-19 NOTE — PROGRESS NOTES
"Nela Mares is a 56 year old female who is being evaluated via a telephone visit.      The patient has been notified of following:     \"This telephone visit will be conducted via a call between you and your physician/provider. We have found that certain health care needs can be provided without the need for a physical exam.  This service lets us provide the care you need with a short phone conversation.  If a prescription is necessary we can send it directly to your pharmacy.  If lab work is needed we can place an order for that and you can then stop by our lab to have the test done at a later time.    We will bill your insurance company for this service.  Please check with your medical insurance if this type of visit is covered. You may be responsible for the cost of this type of visit if insurance coverage is denied.  The typical cost is $30 (10min), $59(11-20min) and $85 (21-30min).  Most often these visits are shorter than 10 minutes.    If during the course of the call the physician/provider feels a telephone visit is not appropriate, you will not be charged for this service. \"     Consent has been obtained for this service by 1 care team member:yes. See the scanned image in the medical record.    Preferred patient phone number to be used for this call: [phone]     Nela Mares complains of  Depression follow-up    Past Medical History:   Diagnosis Date     Allergic rhinitis, cause unspecified      Cervical high risk HPV (human papillomavirus) test positive 3/21/2017    3/21/17 NIL pap/+ HR HPV (not 16 or 18).      Depressive disorder      Depressive disorder, not elsewhere classified     seasonal     Hypertension      Mild persistent asthma      Obstructive sleep apnea (adult) (pediatric)     uses CPAP     Scalp mass 7/2014      Social History     Social History     Marital status: Significant other     Spouse name: N/A     Number of children: 0     Years of education: 18     Occupational " History           Social History Main Topics     Smoking status: Never Smoker     Smokeless tobacco: Never Used     Alcohol use No     Drug use: No     Sexual activity: Not Currently     Partners: Male     Birth control/ protection: IUD     Other Topics Concern     Parent/Sibling W/ Cabg, Mi Or Angioplasty Before 65f 55m? No     Social History Narrative    Dairy/d 3 servings/d.     Caffeine 2 servings/d    Exercise 0 x week    Sunscreen used - No    Seatbelts used - Yes    Working smoke/CO detectors in the home - Yes    Guns stored in the home - No    Self Breast Exams - no    Eye Exam up to date - No    Dental Exam up to date - Yes    Pap Smear up to date - Yes    Mammogram up to date - Yes    Dexa Scan up to date - NOT APPLICABLE    Flex Sig / Colonoscopy up to date - NOT APPLICABLE    Immunizations up to date - Yes    Abuse: Current or Past(Physical, Sexual or Emotional)- No    Do you feel safe in your environment - Yes    GÓMEZ Cummings    11/23/11                     ALLERGIES  No known drug allergies        Sarah Cruz CMA ([facility])   (MA signature)    Additional provider notes:stopped medication on Saturday and by Monday was back to normal.  wellbutrin seems to have caused the crying - which then lead to anxiety.  Stopped wellbutrin on her own and symptoms resolved immediately.  Mood is now good and she is feeling fine off medication.    Discussed using medication during the fall and winter months - which tend to be her worst months.    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  She has not been on effexor in the past.   zoloft was otherwise the most effective medication for her.        Total  time spent on this phone visit with the patient = 7 minutes     I have reviewed the note as documented above.  This accurately captures the substance of my conversation with the patient,  Dr. Bailey Ray

## 2017-06-29 ENCOUNTER — RADIANT APPOINTMENT (OUTPATIENT)
Dept: MAMMOGRAPHY | Facility: CLINIC | Age: 57
End: 2017-06-29

## 2017-06-29 DIAGNOSIS — Z12.31 VISIT FOR SCREENING MAMMOGRAM: ICD-10-CM

## 2017-07-18 ENCOUNTER — TELEPHONE (OUTPATIENT)
Dept: FAMILY MEDICINE | Facility: CLINIC | Age: 57
End: 2017-07-18

## 2017-07-18 NOTE — TELEPHONE ENCOUNTER
Please repeat PHQ-9.  Index date: 2/24/17  Follow up start date:  7/24/17  Follow up end date:  9/24/17    Dahlia Putnam MA

## 2017-07-20 ENCOUNTER — MYC MEDICAL ADVICE (OUTPATIENT)
Dept: NURSING | Facility: CLINIC | Age: 57
End: 2017-07-20

## 2017-07-20 ASSESSMENT — PATIENT HEALTH QUESTIONNAIRE - PHQ9
SUM OF ALL RESPONSES TO PHQ QUESTIONS 1-9: 3
SUM OF ALL RESPONSES TO PHQ QUESTIONS 1-9: 3
10. IF YOU CHECKED OFF ANY PROBLEMS, HOW DIFFICULT HAVE THESE PROBLEMS MADE IT FOR YOU TO DO YOUR WORK, TAKE CARE OF THINGS AT HOME, OR GET ALONG WITH OTHER PEOPLE: NOT DIFFICULT AT ALL

## 2017-07-20 NOTE — TELEPHONE ENCOUNTER
PHQ-9 SCORE 4/4/2017 4/25/2017 7/20/2017   Total Score - - -   Total Score MyChart - - 3 (Minimal depression)   Total Score 13 6 3     At goal of <5.     Yaneth Sheikh RN

## 2017-07-21 ASSESSMENT — PATIENT HEALTH QUESTIONNAIRE - PHQ9: SUM OF ALL RESPONSES TO PHQ QUESTIONS 1-9: 3

## 2017-08-11 ENCOUNTER — MYC MEDICAL ADVICE (OUTPATIENT)
Dept: FAMILY MEDICINE | Facility: CLINIC | Age: 57
End: 2017-08-11

## 2017-08-11 DIAGNOSIS — F32.0 MILD MAJOR DEPRESSION (H): Primary | ICD-10-CM

## 2017-08-11 RX ORDER — VENLAFAXINE HYDROCHLORIDE 37.5 MG/1
CAPSULE, EXTENDED RELEASE ORAL
Qty: 46 CAPSULE | Refills: 1 | Status: SHIPPED | OUTPATIENT
Start: 2017-08-11 | End: 2017-09-27 | Stop reason: DRUGHIGH

## 2017-09-26 ENCOUNTER — MYC MEDICAL ADVICE (OUTPATIENT)
Dept: FAMILY MEDICINE | Facility: CLINIC | Age: 57
End: 2017-09-26

## 2017-09-27 ENCOUNTER — TELEPHONE (OUTPATIENT)
Dept: FAMILY MEDICINE | Facility: CLINIC | Age: 57
End: 2017-09-27

## 2017-09-27 ENCOUNTER — E-VISIT (OUTPATIENT)
Dept: FAMILY MEDICINE | Facility: CLINIC | Age: 57
End: 2017-09-27
Payer: COMMERCIAL

## 2017-09-27 DIAGNOSIS — I10 ESSENTIAL HYPERTENSION WITH GOAL BLOOD PRESSURE LESS THAN 140/90: ICD-10-CM

## 2017-09-27 DIAGNOSIS — F32.0 MILD MAJOR DEPRESSION (H): ICD-10-CM

## 2017-09-27 PROCEDURE — 99444 ZZC PHYSICIAN ONLINE EVALUATION & MANAGEMENT SERVICE: CPT | Performed by: FAMILY MEDICINE

## 2017-09-27 RX ORDER — VENLAFAXINE HYDROCHLORIDE 75 MG/1
75 CAPSULE, EXTENDED RELEASE ORAL DAILY
Qty: 90 CAPSULE | Refills: 1 | Status: SHIPPED | OUTPATIENT
Start: 2017-09-27 | End: 2018-03-20

## 2017-09-27 RX ORDER — HYDROCHLOROTHIAZIDE 12.5 MG/1
12.5 CAPSULE ORAL DAILY
Qty: 90 CAPSULE | Refills: 1 | Status: SHIPPED | OUTPATIENT
Start: 2017-09-27 | End: 2018-03-20

## 2017-09-27 RX ORDER — LISINOPRIL 40 MG/1
40 TABLET ORAL DAILY
Qty: 90 TABLET | Refills: 1 | Status: SHIPPED | OUTPATIENT
Start: 2017-09-27 | End: 2018-03-20

## 2017-09-27 NOTE — LETTER
[address] [address] [phone]                                                                                                September 27, 2017    Nela Mares  [address] [facility] [address]        Dear Ms. Mares,    Please hold onto the Asthma Control Test contained with this letter. A nurse will be contacting you soon to go over your answers so we can update your asthma status. Thank you and have a great day!      Sincerely,      Bailey Ray MD/hl

## 2017-09-27 NOTE — MR AVS SNAPSHOT
After Visit Summary   9/27/2017    Nela Mares    MRN: [id number]           Patient Information     Date Of Birth          1960        Visit Information        Provider Department      9/27/2017 1:21 PM Padma Lozano MD [facility]        Today's Diagnoses     Mild major depression (H)        Essential hypertension with goal blood pressure less than 140/90           Follow-ups after your visit        Who to contact     If you have questions or need follow up information about today's clinic visit or your schedule please contact [facility] directly at [phone].  Normal or non-critical lab and imaging results will be communicated to you by [employer], letter or phone within 4 business days after the clinic has received the results. If you do not hear from us within 7 days, please contact the clinic through [employer] or phone. If you have a critical or abnormal lab result, we will notify you by phone as soon as possible.  Submit refill requests through [employer] or call your pharmacy and they will forward the refill request to us. Please allow 3 business days for your refill to be completed.          Additional Information About Your Visit        MyChart Information     [employer] gives you secure access to your electronic health record. If you see a primary care provider, you can also send messages to your care team and make appointments. If you have questions, please call your primary care clinic.  If you do not have a primary care provider, please call [phone] and they will assist you.        Care EveryWhere ID     This is your Care EveryWhere ID. This could be used by other organizations to access your [facility] medical records  [id number]         Blood Pressure from Last 3 Encounters:   03/21/17 130/74   02/24/17 118/72   10/14/16 112/80    Weight from Last 3 Encounters:   07/21/14 258 lb (117 kg)   06/17/14 258 lb 9.6 oz (117.3 kg)    03/14/14 259 lb (117.5 kg)              Today, you had the following     No orders found for display         Today's Medication Changes          These changes are accurate as of: 9/27/17  2:21 PM.  If you have any questions, ask your nurse or doctor.               These medicines have changed or have updated prescriptions.        Dose/Directions    venlafaxine 75 MG 24 hr capsule   Commonly known as:  EFFEXOR-XR   This may have changed:    - medication strength  - how much to take  - how to take this  - when to take this  - additional instructions   Used for:  Mild major depression (H)        Dose:  75 mg   Take 1 capsule (75 mg) by mouth daily   Quantity:  90 capsule   Refills:  1            Where to get your medicines      These medications were sent to [facility] [id number] [address] [address]     Phone:  [phone]     hydrochlorothiazide 12.5 MG capsule    lisinopril 40 MG tablet    venlafaxine 75 MG 24 hr capsule                Primary Care Provider Office Phone # Fax #    Padma Lozano [phone]       [address] [facility] [address]        Equal Access to Services     ANISH MUIR AH: Hadii nirmala rosas hadasho Soomaali, waaxda luqadaha, qaybta kaalmada adeegyada, waxay rangel hayabelardon tyson suh. So [facility] [phone].    ATENCIÓN: Si habla español, tiene a burgos disposición servicios gratuitos de asistencia lingüística. Llame al [phone].    We comply with applicable federal civil rights laws and [place] laws. We do not discriminate on the basis of race, color, national origin, age, disability sex, sexual orientation or gender identity.            Thank you!     Thank you for choosing [facility]  for your care. Our goal is always to provide you with excellent care. Hearing back from our patients is one way we can continue to improve our services. Please take a few minutes to  complete the written survey that you may receive in the mail after your visit with us. Thank you!             Your Updated Medication List - Protect others around you: Learn how to safely use, store and throw away your medicines at www.disposemymeds.org.          This list is accurate as of: 9/27/17  2:21 PM.  Always use your most recent med list.                   Brand Name Dispense Instructions for use Diagnosis    albuterol 108 (90 BASE) MCG/ACT Inhaler    PROAIR HFA/PROVENTIL HFA/VENTOLIN HFA    3 Inhaler    Inhale 2 puffs into the lungs every 4 hours as needed for shortness of breath / dyspnea or wheezing    Mild persistent asthma without complication       cetirizine 10 MG tablet    zyrTEC     Take 10 mg by mouth daily        fluticasone 50 MCG/ACT spray    FLONASE    48 g    Spray 1-2 sprays into both nostrils daily    Other allergic rhinitis       fluticasone-salmeterol 500-50 MCG/DOSE diskus inhaler    ADVAIR DISKUS    3 Inhaler    Inhale 1 puff into the lungs 2 times daily    Mild persistent asthma without complication       hydrochlorothiazide 12.5 MG capsule    MICROZIDE    90 capsule    Take 1 capsule (12.5 mg) by mouth daily    Essential hypertension with goal blood pressure less than 140/90       lisinopril 40 MG tablet    PRINIVIL/ZESTRIL    90 tablet    Take 1 tablet (40 mg) by mouth daily    Essential hypertension with goal blood pressure less than 140/90       venlafaxine 75 MG 24 hr capsule    EFFEXOR-XR    90 capsule    Take 1 capsule (75 mg) by mouth daily    Mild major depression (H)

## 2017-09-28 ENCOUNTER — MYC MEDICAL ADVICE (OUTPATIENT)
Dept: FAMILY MEDICINE | Facility: CLINIC | Age: 57
End: 2017-09-28

## 2018-03-20 ENCOUNTER — MYC MEDICAL ADVICE (OUTPATIENT)
Dept: FAMILY MEDICINE | Facility: CLINIC | Age: 58
End: 2018-03-20

## 2018-03-20 DIAGNOSIS — F32.0 MILD MAJOR DEPRESSION (H): ICD-10-CM

## 2018-03-20 DIAGNOSIS — J30.89 OTHER ALLERGIC RHINITIS: ICD-10-CM

## 2018-03-20 DIAGNOSIS — E78.5 HYPERLIPIDEMIA LDL GOAL <130: Primary | ICD-10-CM

## 2018-03-20 DIAGNOSIS — I10 ESSENTIAL HYPERTENSION WITH GOAL BLOOD PRESSURE LESS THAN 140/90: ICD-10-CM

## 2018-03-20 DIAGNOSIS — J45.30 MILD PERSISTENT ASTHMA WITHOUT COMPLICATION: ICD-10-CM

## 2018-03-20 RX ORDER — ALBUTEROL SULFATE 90 UG/1
2 AEROSOL, METERED RESPIRATORY (INHALATION) EVERY 4 HOURS PRN
Qty: 36 G | Refills: 0 | Status: SHIPPED | OUTPATIENT
Start: 2018-03-20 | End: 2018-05-02

## 2018-03-20 RX ORDER — FLUTICASONE PROPIONATE 50 MCG
1-2 SPRAY, SUSPENSION (ML) NASAL DAILY
Qty: 32 G | Refills: 0 | Status: SHIPPED | OUTPATIENT
Start: 2018-03-20 | End: 2018-05-02

## 2018-03-20 RX ORDER — LISINOPRIL 40 MG/1
40 TABLET ORAL DAILY
Qty: 60 TABLET | Refills: 0 | Status: SHIPPED | OUTPATIENT
Start: 2018-03-20 | End: 2018-05-02

## 2018-03-20 RX ORDER — HYDROCHLOROTHIAZIDE 12.5 MG/1
12.5 CAPSULE ORAL DAILY
Qty: 60 CAPSULE | Refills: 0 | Status: SHIPPED | OUTPATIENT
Start: 2018-03-20 | End: 2018-05-02

## 2018-03-20 RX ORDER — VENLAFAXINE HYDROCHLORIDE 75 MG/1
75 CAPSULE, EXTENDED RELEASE ORAL DAILY
Qty: 60 CAPSULE | Refills: 0 | Status: SHIPPED | OUTPATIENT
Start: 2018-03-20 | End: 2018-05-02

## 2018-04-10 ENCOUNTER — MYC MEDICAL ADVICE (OUTPATIENT)
Dept: FAMILY MEDICINE | Facility: CLINIC | Age: 58
End: 2018-04-10

## 2018-04-22 ENCOUNTER — HEALTH MAINTENANCE LETTER (OUTPATIENT)
Age: 58
End: 2018-04-22

## 2018-04-26 DIAGNOSIS — I10 ESSENTIAL HYPERTENSION WITH GOAL BLOOD PRESSURE LESS THAN 140/90: ICD-10-CM

## 2018-04-26 DIAGNOSIS — E78.5 HYPERLIPIDEMIA LDL GOAL <130: ICD-10-CM

## 2018-04-26 LAB
ANION GAP SERPL CALCULATED.3IONS-SCNC: 9 MMOL/L (ref 3–14)
BUN SERPL-MCNC: 14 MG/DL (ref 7–30)
CALCIUM SERPL-MCNC: 9.3 MG/DL (ref 8.5–10.1)
CHLORIDE SERPL-SCNC: 107 MMOL/L (ref 94–109)
CHOLEST SERPL-MCNC: 180 MG/DL
CO2 SERPL-SCNC: 25 MMOL/L (ref 20–32)
CREAT SERPL-MCNC: 0.81 MG/DL (ref 0.52–1.04)
GFR SERPL CREATININE-BSD FRML MDRD: 73 ML/MIN/1.7M2
GLUCOSE SERPL-MCNC: 118 MG/DL (ref 70–99)
HDLC SERPL-MCNC: 42 MG/DL
LDLC SERPL CALC-MCNC: 115 MG/DL
NONHDLC SERPL-MCNC: 138 MG/DL
POTASSIUM SERPL-SCNC: 3.8 MMOL/L (ref 3.4–5.3)
SODIUM SERPL-SCNC: 141 MMOL/L (ref 133–144)
TRIGL SERPL-MCNC: 113 MG/DL

## 2018-04-26 PROCEDURE — 80048 BASIC METABOLIC PNL TOTAL CA: CPT | Performed by: FAMILY MEDICINE

## 2018-04-26 PROCEDURE — 36415 COLL VENOUS BLD VENIPUNCTURE: CPT | Performed by: FAMILY MEDICINE

## 2018-04-26 PROCEDURE — 80061 LIPID PANEL: CPT | Performed by: FAMILY MEDICINE

## 2018-05-02 ENCOUNTER — OFFICE VISIT (OUTPATIENT)
Dept: FAMILY MEDICINE | Facility: CLINIC | Age: 58
End: 2018-05-02
Payer: COMMERCIAL

## 2018-05-02 VITALS
OXYGEN SATURATION: 96 % | HEART RATE: 67 BPM | DIASTOLIC BLOOD PRESSURE: 82 MMHG | HEIGHT: 69 IN | SYSTOLIC BLOOD PRESSURE: 130 MMHG | TEMPERATURE: 98.3 F

## 2018-05-02 DIAGNOSIS — F32.0 MILD MAJOR DEPRESSION (H): ICD-10-CM

## 2018-05-02 DIAGNOSIS — Z00.00 ROUTINE GENERAL MEDICAL EXAMINATION AT A HEALTH CARE FACILITY: Primary | ICD-10-CM

## 2018-05-02 DIAGNOSIS — Z12.4 SCREENING FOR MALIGNANT NEOPLASM OF CERVIX: ICD-10-CM

## 2018-05-02 DIAGNOSIS — Z12.31 ENCOUNTER FOR SCREENING MAMMOGRAM FOR BREAST CANCER: ICD-10-CM

## 2018-05-02 DIAGNOSIS — R87.810 CERVICAL HIGH RISK HPV (HUMAN PAPILLOMAVIRUS) TEST POSITIVE: ICD-10-CM

## 2018-05-02 DIAGNOSIS — Z13.6 CARDIOVASCULAR SCREENING; LDL GOAL LESS THAN 160: ICD-10-CM

## 2018-05-02 DIAGNOSIS — I10 HYPERTENSION GOAL BP (BLOOD PRESSURE) < 140/90: ICD-10-CM

## 2018-05-02 DIAGNOSIS — I10 ESSENTIAL HYPERTENSION WITH GOAL BLOOD PRESSURE LESS THAN 140/90: ICD-10-CM

## 2018-05-02 DIAGNOSIS — J45.30 MILD PERSISTENT ASTHMA WITHOUT COMPLICATION: ICD-10-CM

## 2018-05-02 DIAGNOSIS — R73.9 ELEVATED BLOOD SUGAR: ICD-10-CM

## 2018-05-02 DIAGNOSIS — J30.89 OTHER ALLERGIC RHINITIS: ICD-10-CM

## 2018-05-02 DIAGNOSIS — Z12.4 SCREENING FOR CERVICAL CANCER: ICD-10-CM

## 2018-05-02 PROCEDURE — 88175 CYTOPATH C/V AUTO FLUID REDO: CPT | Performed by: FAMILY MEDICINE

## 2018-05-02 PROCEDURE — 87624 HPV HI-RISK TYP POOLED RSLT: CPT | Performed by: FAMILY MEDICINE

## 2018-05-02 PROCEDURE — 99396 PREV VISIT EST AGE 40-64: CPT | Performed by: FAMILY MEDICINE

## 2018-05-02 RX ORDER — LISINOPRIL 40 MG/1
40 TABLET ORAL DAILY
Qty: 90 TABLET | Refills: 3 | Status: SHIPPED | OUTPATIENT
Start: 2018-05-02 | End: 2019-05-16

## 2018-05-02 RX ORDER — HYDROCHLOROTHIAZIDE 12.5 MG/1
12.5 CAPSULE ORAL DAILY
Qty: 90 CAPSULE | Refills: 3 | Status: SHIPPED | OUTPATIENT
Start: 2018-05-02 | End: 2019-05-16

## 2018-05-02 RX ORDER — ALBUTEROL SULFATE 90 UG/1
2 AEROSOL, METERED RESPIRATORY (INHALATION) EVERY 4 HOURS PRN
Qty: 3 INHALER | Refills: 3 | Status: SHIPPED | OUTPATIENT
Start: 2018-05-02 | End: 2019-06-13

## 2018-05-02 RX ORDER — VENLAFAXINE HYDROCHLORIDE 75 MG/1
75 CAPSULE, EXTENDED RELEASE ORAL DAILY
Qty: 90 CAPSULE | Refills: 3 | Status: SHIPPED | OUTPATIENT
Start: 2018-05-02 | End: 2019-05-16

## 2018-05-02 RX ORDER — FLUTICASONE PROPIONATE 50 MCG
1-2 SPRAY, SUSPENSION (ML) NASAL DAILY
Qty: 32 G | Refills: 3 | Status: SHIPPED | OUTPATIENT
Start: 2018-05-02 | End: 2019-03-15

## 2018-05-02 ASSESSMENT — PAIN SCALES - GENERAL: PAINLEVEL: NO PAIN (0)

## 2018-05-02 NOTE — PROGRESS NOTES
SUBJECTIVE:   CC: Nela Mares is an 57 year old woman who presents for preventive health visit.     Physical   Annual:     Getting at least 3 servings of Calcium per day::  Yes    Bi-annual eye exam::  Yes    Dental care twice a year::  Yes    Sleep apnea or symptoms of sleep apnea::  Sleep apnea    Diet::  Regular (no restrictions)    Frequency of exercise::  None    Taking medications regularly::  Yes    Medication side effects::  None    Additional concerns today::  No            IUD:  She is wondering when she should have her IUD removed. It was placed in 2015. She was having issues with spotting due to fibroids. US last year showed fibroids were shrinking. She doesn't want to go back to having issues with bleeding or spotting, but she also doesn't want to leave her IUD in any longer than needed.    Weight Management:  She has been watching her fat intake. She has been eating low fat proteins. She reports that she has not been paying attention to carbs. She has increased the amount of fruit and dairy she has been eating, and eats lots of vegetables. She states that this has helped lower her cholesterol, but her blood sugar continues to be elevated.     Additional comments:  She reports her asthma has been well controlled.  Effexor has been working very well to control her mood.       Today's PHQ-2 Score:   PHQ-2 ( 1999 Pfizer) 4/26/2018   Q1: Little interest or pleasure in doing things 0   Q2: Feeling down, depressed or hopeless 0   PHQ-2 Score 0   Q1: Little interest or pleasure in doing things Not at all   Q2: Feeling down, depressed or hopeless Not at all   PHQ-2 Score 0       Abuse: Current or Past(Physical, Sexual or Emotional)- NO  Do you feel safe in your environment - YES    Social History   Substance Use Topics     Smoking status: Never Smoker     Smokeless tobacco: Never Used     Alcohol use No     Alcohol Use 4/26/2018   If you drink alcohol do you typically have greater than 3 drinks per day  OR greater than 7 drinks per week? Not Applicable   No flowsheet data found.    Reviewed orders with patient.  Reviewed health maintenance and updated orders accordingly - Yes  Patient Active Problem List   Diagnosis     Allergic rhinitis     Mild persistent asthma     Obstructive sleep apnea     Leiomyoma of uterus     Mild major depression (H)     CARDIOVASCULAR SCREENING; LDL GOAL LESS THAN 160     Hypertension goal BP (blood pressure) < 140/90     Pilar cyst     Hypertrophy of breast     IUD (intrauterine device) in place     Cervical high risk HPV (human papillomavirus) test positive     Past Surgical History:   Procedure Laterality Date     COLONOSCOPY  6/21/2012    Procedure: COLONOSCOPY;  COLONOSCOPY, SCREEN;  Surgeon: Bart You MD;  Location: [facility] OR     D & C       ENDOSCOPIC RETROGRADE CHOLANGIOPANCREATOGRAM  1/4/2014    Procedure: ENDOSCOPIC RETROGRADE CHOLANGIOPANCREATOGRAM;  ERCP biliary sphincterotomy, bile duct stone removal, epinepherine washing.;  Surgeon: Ba Meyers MD;  Location: [facility] OR     LAPAROSCOPIC CHOLECYSTECTOMY WITH CHOLANGIOGRAMS  1/4/2014    Procedure: LAPAROSCOPIC CHOLECYSTECTOMY WITH CHOLANGIOGRAMS;;  Surgeon: Haja Sage MD;  Location: [facility] OR     NO HISTORY OF SURGERY       OPERATIVE HYSTEROSCOPY  6/17/2014    Procedure: OPERATIVE HYSTEROSCOPY;  Surgeon: Paola Camara MD;  Location: [facility]     REMOVE INTRAUTERINE DEVICE  6/17/2014    Procedure: REMOVE INTRAUTERINE DEVICE;  Surgeon: Paola Camara MD;  Location: [facility] OR       Social History   Substance Use Topics     Smoking status: Never Smoker     Smokeless tobacco: Never Used     Alcohol use No     Family History   Problem Relation Age of Onset     C.A.D. Paternal Grandmother      CEREBROVASCULAR DISEASE Paternal Grandmother      Depression Paternal Grandmother      Obesity Paternal Grandmother      C.A.D. Paternal Grandfather      CEREBROVASCULAR DISEASE Paternal Grandfather       unsure     Substance Abuse Paternal Grandfather      Hypertension Father      Asthma Father      Obesity Father      Neurologic Disorder Mother      Graves disease     Hyperlipidemia Mother      Depression Mother      Thyroid Disease Mother      Other - See Comments Other      Depression Maternal Grandmother      CANCER Sister      mild skin cancer,cured from excision     Depression Nephew      Anxiety Disorder Nephew      MENTAL ILLNESS Maternal Half-Brother      MENTAL ILLNESS Paternal Half-Brother      Substance Abuse Nephew      Asthma Sister      Asthma Nephew      Thyroid Disease Maternal Half-Sister      Thyroid Disease Paternal Half-Sister      DIABETES No family hx of          Current Outpatient Prescriptions   Medication Sig Dispense Refill     albuterol (PROAIR HFA/PROVENTIL HFA/VENTOLIN HFA) 108 (90 BASE) MCG/ACT Inhaler Inhale 2 puffs into the lungs every 4 hours as needed for shortness of breath / dyspnea or wheezing 36 g 0     cetirizine (ZYRTEC) 10 MG tablet Take 10 mg by mouth daily       fluticasone (FLONASE) 50 MCG/ACT spray Spray 1-2 sprays into both nostrils daily 32 g 0     fluticasone-salmeterol (ADVAIR DISKUS) 500-50 MCG/DOSE diskus inhaler Inhale 1 puff into the lungs 2 times daily 2 Inhaler 0     hydrochlorothiazide (MICROZIDE) 12.5 MG capsule Take 1 capsule (12.5 mg) by mouth daily 60 capsule 0     lisinopril (PRINIVIL/ZESTRIL) 40 MG tablet Take 1 tablet (40 mg) by mouth daily 60 tablet 0     venlafaxine (EFFEXOR-XR) 75 MG 24 hr capsule Take 1 capsule (75 mg) by mouth daily 60 capsule 0     Allergies   Allergen Reactions     No Known Drug Allergies        Patient over age 50, mutual decision to screen reflected in health maintenance.    Pertinent mammograms are reviewed under the imaging tab.  History of abnormal Pap smear: NO - age 30- 65 PAP every 3 years recommended    Reviewed and updated as needed this visit by clinical staff  Tobacco  Allergies  Meds         Reviewed and updated  "as needed this visit by Provider  Allergies  Meds  Problems            Review of Systems  CONSTITUTIONAL: NEGATIVE for fever, chills, change in weight  INTEGUMENTARY/SKIN: NEGATIVE for worrisome rashes, moles or lesions  EYES: NEGATIVE for vision changes or irritation  ENT: NEGATIVE for ear, mouth and throat problems  RESP: NEGATIVE for significant cough or SOB  BREAST: NEGATIVE for masses, tenderness or discharge  CV: NEGATIVE for chest pain, palpitations or peripheral edema  GI: NEGATIVE for nausea, abdominal pain, heartburn, or change in bowel habits  : NEGATIVE for unusual urinary or vaginal symptoms. No vaginal bleeding.  MUSCULOSKELETAL: NEGATIVE for significant arthralgias or myalgia  NEURO: NEGATIVE for weakness, dizziness or paresthesias  PSYCHIATRIC: NEGATIVE for changes in mood or affect      This document serves as a record of the services and decisions personally performed by BRADLEY FINCH. It was created on his/her behalf by Idania Ferguson, a trained medical scribe. The creation of this document is based on the provider's statements to the medical scribe. Idania Ferguson, May 2, 2018 3:46 PM    OBJECTIVE:   /82 (BP Location: Right arm, Patient Position: Chair, Cuff Size: Adult Large)  Pulse 67  Temp 98.3  F (36.8  C) (Oral)  Ht 1.753 m (5' 9\")  SpO2 96%  Physical Exam  GENERAL APPEARANCE: healthy, alert and no distress  EYES: Eyes grossly normal to inspection, PERRL and conjunctivae and sclerae normal  HENT: ear canals and TM's normal, nose and mouth without ulcers or lesions, oropharynx clear and oral mucous membranes moist  NECK: no adenopathy, no asymmetry, masses, or scars and thyroid normal to palpation  RESP: lungs clear to auscultation - no rales, rhonchi or wheezes  BREAST: normal without masses, tenderness or nipple discharge and no palpable axillary masses or adenopathy  CV: regular rate and rhythm, normal S1 S2, no S3 or S4, no murmur, click or rub, no peripheral edema and " peripheral pulses strong  ABDOMEN: soft, nontender, no hepatosplenomegaly, no masses and bowel sounds normal   (female): normal female external genitalia, normal urethral meatus, vaginal mucosal atrophy noted, normal cervix, adnexae, and uterus without masses or abnormal discharge.  IUD strings not visualized,  But cervix was difficult to fully visualize - although most of os was visualized such that I am confident Pap was performed in necessary area.  MS: no musculoskeletal defects are noted and gait is age appropriate without ataxia  SKIN: no suspicious lesions or rashes  NEURO: Normal strength and tone, sensory exam grossly normal, mentation intact and speech normal  PSYCH: mentation appears normal and affect normal/bright    ASSESSMENT/PLAN:   (Z00.00) Routine general medical examination at a health care facility  (primary encounter diagnosis)  Comment: overweight  Plan: All other health maintenance updated per chart.   We did discuss the health benefits (related not only to weight management, but also to cardiovascular health and bone/joint health) of regular exercise.  IUD can remain in place until 2020.  And patient does not wish to have issues with spotting again, so would like to leave it in until 2020 or until no period for a full 12 months    Elevated blood sugar:  Discussed lowering simple carbs.  Will recheck glucose and cholesterol later this summer after making additional dietary changes.   And anticipates increasing exercise once school year is done.    (J45.30) Mild persistent asthma without complication  Comment: Well controlled on current medications. No flares this winter/spring.  Plan: albuterol (PROAIR HFA/PROVENTIL HFA/VENTOLIN         HFA) 108 (90 Base) MCG/ACT Inhaler,         fluticasone-salmeterol (ADVAIR DISKUS) 500-50         MCG/DOSE diskus inhaler        Continue current medications.    (F32.0) Mild major depression (H)  Comment: Well controlled on current medications.  Plan:  "venlafaxine (EFFEXOR-XR) 75 MG 24 hr capsule        Continue current medications.  She is very happy to be on Effexor    (Z12.4) Screening for cervical cancer  Comment: Health maintenance.  Plan: Pap imaged thin layer diagnostic with HPV         (select HPV order below), HPV High Risk Types         DNA Cervical            (R87.810) Cervical high risk HPV (human papillomavirus) test positive  Comment: Health maintenance.  Plan: Pap imaged thin layer diagnostic with HPV         (select HPV order below), HPV High Risk Types         DNA Cervical            (J30.89) Other allergic rhinitis  Comment: Well controlled on current medications.  Plan: fluticasone (FLONASE) 50 MCG/ACT spray        Continue current medications.    (I10) Essential hypertension with goal blood pressure less than 140/90  Comment: Well controlled on current medications.  Plan: hydrochlorothiazide (MICROZIDE) 12.5 MG         capsule, lisinopril (PRINIVIL/ZESTRIL) 40 MG         tablet        Continue current medications.      COUNSELING:  Reviewed preventive health counseling, as reflected in patient instructions         reports that she has never smoked. She has never used smokeless tobacco.    Estimated body mass index is 39.23 kg/(m^2) as calculated from the following:    Height as of 7/21/14: 1.727 m (5' 8\").    Weight as of 7/21/14: 117 kg (258 lb).   Weight management plan: Discussed healthy diet and exercise guidelines and patient will follow up in 12 months in clinic to re-evaluate.    Counseling Resources:  ATP IV Guidelines  Pooled Cohorts Equation Calculator  Breast Cancer Risk Calculator  FRAX Risk Assessment  ICSI Preventive Guidelines  Dietary Guidelines for Americans, 2010  USDA's MyPlate  ASA Prophylaxis  Lung CA Screening    Padma Lozano MD  [facility]  Answers for HPI/ROS submitted by the patient on 4/26/2018   PHQ-2 Score: 0    The information in this document, created by the medical scribdrew Ferguson " for me, accurately reflects the services I personally performed and the decisions made by me. I have reviewed and approved this document for accuracy prior to leaving the patient care area.

## 2018-05-02 NOTE — PATIENT INSTRUCTIONS
Please schedule your mammogram and lab visit this summer.      Preventive Health Recommendations  Female Ages 50 - 64    Yearly exam: See your health care provider every year in order to  o Review health changes.   o Discuss preventive care.    o Review your medicines if your doctor has prescribed any.      Get a Pap test every three years (unless you have an abnormal result and your provider advises testing more often).    If you get Pap tests with HPV test, you only need to test every 5 years, unless you have an abnormal result.     You do not need a Pap test if your uterus was removed (hysterectomy) and you have not had cancer.    You should be tested each year for STDs (sexually transmitted diseases) if you're at risk.     Have a mammogram every 1 to 2 years.    Have a colonoscopy at age 50, or have a yearly FIT test (stool test). These exams screen for colon cancer.      Have a cholesterol test every 5 years, or more often if advised.    Have a diabetes test (fasting glucose) every three years. If you are at risk for diabetes, you should have this test more often.     If you are at risk for osteoporosis (brittle bone disease), think about having a bone density scan (DEXA).    Shots: Get a flu shot each year. Get a tetanus shot every 10 years.    Nutrition:     Eat at least 5 servings of fruits and vegetables each day.    Eat whole-grain bread, whole-wheat pasta and brown rice instead of white grains and rice.    Talk to your provider about Calcium and Vitamin D.     Lifestyle    Exercise at least 150 minutes a week (30 minutes a day, 5 days a week). This will help you control your weight and prevent disease.    Limit alcohol to one drink per day.    No smoking.     Wear sunscreen to prevent skin cancer.     See your dentist every six months for an exam and cleaning.    See your eye doctor every 1 to 2 years.  [facility]   Discharged by : Dahlia Putnam MA    Paper scripts provided to patient  : no     If you have any questions regarding your visit please contact your care team:     Team Gold                Clinic Hours Telephone Number     Dr. Bailey Kaba, NP 7am-7pm  Monday - Thursday   7am-5pm  Fridays  [phone]   (Appointment scheduling available 24/7)     RN Line  [phone] option 2     Urgent Care - [place] - 11am-9pm Monday-Friday Saturday-Sunday- 9am-5pm     [place] -   5pm-9pm Monday-Friday Saturday-Sunday- 9am-5pm    [phone] - [place]    [phone] - [place]       For a Price Quote for your services, please call our Consumer Price Line at [phone].     What options do I have for visits at the clinic other than the traditional office visit?     To expand how we care for you, many of our providers are utilizing electronic visits (e-visits) and telephone visits, when medically appropriate, for interactions with their patients rather than a visit in the clinic. We also offer nurse visits for many medical concerns. Just like any other service, we will bill your insurance company for this type of visit based on time spent on the phone with your provider. Not all insurance companies cover these visits. Please check with your medical insurance if this type of visit is covered. You will be responsible for any charges that are not paid by your insurance.   E-visits via [employer]: generally incur a $35.00 fee.     Telephone visits:  Time spent on the phone: *charged based on time that is spent on the phone in increments of 10 minutes. Estimated cost:   5-10 mins $30.00   11-20 mins. $59.00   21-30 mins. $85.00       Use [employer] (secure email communication and access to your chart) to send your primary care provider a message or make an appointment. Ask someone on your Team how to sign up for [employer].     As always, Thank you for trusting us with your health care  needs!      [facility]:    Scheduling Appointments  [facility]  Call: [phone]    [facility] [place]  Call: [phone]    [facility]  Call: [phone]    For Gastroenterology referrals   [facility]   Clinics and Surgery Center, 4th Floor   [address] [place] [address]   Appointments: [phone]    WHERE TO GO FOR CARE?  Clinic    Make an appointment if you:       Are sick (cold, cough, flu, sore throat, earache or in pain).       Have a small injury (sprain, small cut, burn or broken bone).       Need a physical exam, Pap smear, vaccine or prescription refill.       Have questions about your health or medicines.    To reach us:      Call [phone] ([phone]). Open 24 hours every day. (For counseling services, call [phone].)    Log into "[employer] at [email or web address]. (Visit [email or web address] to create an account.) Hospital emergency room    An emergency is a serious or life- threatening problem that must be treated right away.    Call 877 or get to the hospital if you have:      Very bad or sudden:            - Chest pain or pressure         - Bleeding         - Head or belly pain         - Dizziness or trouble seeing, walking or                          Speaking      Problems breathing      Blood in your vomit or you are coughing up blood      A major injury (knocked out, loss of a finger or limb, rape, broken bone protruding from skin)    A mental health crisis. (Or call the Mental Health Crisis line at 1-900.799.9424 or Suicide Prevention Hotline at 1-870.616.5249.)    Open 24 hours every day. You don't need an appointment.     Urgent care    Visit urgent care for sickness or small injuries when the clinic is closed. You don't need an appointment. To check hours or find an urgent care near you, visit [email or web address]. Online care    Get online care from [facility] for more  than 70 common problems, like colds, allergies and infections. Open 24 hours every day at:   [email or web address]   Need help deciding?    For advice about where to be seen, you may call your clinic and ask to speak with a nurse. We're here for you 24 hours every day.         If you are deaf or hard of hearing, please let us know. We provide many free services including sign language interpreters, oral interpreters, TTYs, telephone amplifiers, note takers and written materials.

## 2018-05-02 NOTE — MR AVS SNAPSHOT
After Visit Summary   5/2/2018    Nela Mares    MRN: [id number]           Patient Information     Date Of Birth          1960        Visit Information        Provider Department      5/2/2018 3:00 PM Padma Lozano MD [facility]        Today's Diagnoses     Routine general medical examination at a health care facility    -  1    Mild persistent asthma without complication        Mild major depression (H)        Hypertension goal BP (blood pressure) < 140/90        Screening for cervical cancer        Cervical high risk HPV (human papillomavirus) test positive        Other allergic rhinitis        Essential hypertension with goal blood pressure less than 140/90        Screening for malignant neoplasm of cervix          Care Instructions    Please schedule your mammogram and lab visit this summer.      Preventive Health Recommendations  Female Ages 50 - 64    Yearly exam: See your health care provider every year in order to  o Review health changes.   o Discuss preventive care.    o Review your medicines if your doctor has prescribed any.      Get a Pap test every three years (unless you have an abnormal result and your provider advises testing more often).    If you get Pap tests with HPV test, you only need to test every 5 years, unless you have an abnormal result.     You do not need a Pap test if your uterus was removed (hysterectomy) and you have not had cancer.    You should be tested each year for STDs (sexually transmitted diseases) if you're at risk.     Have a mammogram every 1 to 2 years.    Have a colonoscopy at age 50, or have a yearly FIT test (stool test). These exams screen for colon cancer.      Have a cholesterol test every 5 years, or more often if advised.    Have a diabetes test (fasting glucose) every three years. If you are at risk for diabetes, you should have this test more often.     If you are at risk for osteoporosis (brittle bone  disease), think about having a bone density scan (DEXA).    Shots: Get a flu shot each year. Get a tetanus shot every 10 years.    Nutrition:     Eat at least 5 servings of fruits and vegetables each day.    Eat whole-grain bread, whole-wheat pasta and brown rice instead of white grains and rice.    Talk to your provider about Calcium and Vitamin D.     Lifestyle    Exercise at least 150 minutes a week (30 minutes a day, 5 days a week). This will help you control your weight and prevent disease.    Limit alcohol to one drink per day.    No smoking.     Wear sunscreen to prevent skin cancer.     See your dentist every six months for an exam and cleaning.    See your eye doctor every 1 to 2 years.  [facility]   Discharged by : Dahlia Putnam MA    Paper scripts provided to patient : no     If you have any questions regarding your visit please contact your care team:     Team Gold                Clinic Hours Telephone Number     Dr. Bailey Kaba NP 7am-7pm  Monday - Thursday   7am-5pm  Fridays  [phone]   (Appointment scheduling available 24/7)     RN Line  [phone] option 2     Urgent Care - [place] - 11am-9pm Monday-Friday Saturday-Sunday- 9am-5pm     [place] -   5pm-9pm Monday-Friday Saturday-Sunday- 9am-5pm    [phone] - [place]    [phone] - [place]       For a Price Quote for your services, please call our Consumer Price Line at [phone].     What options do I have for visits at the clinic other than the traditional office visit?     To expand how we care for you, many of our providers are utilizing electronic visits (e-visits) and telephone visits, when medically appropriate, for interactions with their patients rather than a visit in the clinic. We also offer nurse visits for many medical concerns. Just like any other service, we will bill your  insurance company for this type of visit based on time spent on the phone with your provider. Not all insurance companies cover these visits. Please check with your medical insurance if this type of visit is covered. You will be responsible for any charges that are not paid by your insurance.   E-visits via [employer]: generally incur a $35.00 fee.     Telephone visits:  Time spent on the phone: *charged based on time that is spent on the phone in increments of 10 minutes. Estimated cost:   5-10 mins $30.00   11-20 mins. $59.00   21-30 mins. $85.00       Use [employer] (secure email communication and access to your chart) to send your primary care provider a message or make an appointment. Ask someone on your Team how to sign up for [employer].     As always, Thank you for trusting us with your health care needs!      [facility]:    Scheduling Appointments  [facility]  Call: [phone]    [facility]  Call: [phone]    [facility]  Call: [phone]    For Gastroenterology referrals   [facility]   Clinics and Surgery Center, 4th Floor   [address] [place] [address]   Appointments: [phone]    WHERE TO GO FOR CARE?  Clinic    Make an appointment if you:       Are sick (cold, cough, flu, sore throat, earache or in pain).       Have a small injury (sprain, small cut, burn or broken bone).       Need a physical exam, Pap smear, vaccine or prescription refill.       Have questions about your health or medicines.    To reach us:      Call [phone] ([phone]). Open 24 hours every day. (For counseling services, call [phone].)    Log into [employer] at [email or web address]. (Visit [email or web address] to create an account.) Hospital emergency room    An emergency is a serious or life- threatening problem that must be treated right away.    Call [phone] or get to the hospital if you  have:      Very bad or sudden:            - Chest pain or pressure         - Bleeding         - Head or belly pain         - Dizziness or trouble seeing, walking or                          Speaking      Problems breathing      Blood in your vomit or you are coughing up blood      A major injury (knocked out, loss of a finger or limb, rape, broken bone protruding from skin)    A mental health crisis. (Or call the Mental Health Crisis line at 1-118.670.4454 or Suicide Prevention Hotline at 1-604.659.8484.)    Open 24 hours every day. You don't need an appointment.     Urgent care    Visit urgent care for sickness or small injuries when the clinic is closed. You don't need an appointment. To check hours or find an urgent care near you, visit [email or web address]. Online care    Get online care from [facility] for more than 70 common problems, like colds, allergies and infections. Open 24 hours every day at:   [email or web address]   Need help deciding?    For advice about where to be seen, you may call your clinic and ask to speak with a nurse. We're here for you 24 hours every day.         If you are deaf or hard of hearing, please let us know. We provide many free services including sign language interpreters, oral interpreters, TTYs, telephone amplifiers, note takers and written materials.                   Follow-ups after your visit        Who to contact     If you have questions or need follow up information about today's clinic visit or your schedule please contact [facility] directly at [phone].  Normal or non-critical lab and imaging results will be communicated to you by MyChart, letter or phone within 4 business days after the clinic has received the results. If you do not hear from us within 7 days, please contact the clinic through [employer] or phone. If you have a critical or abnormal lab result, we will notify you by phone as soon as possible.  Submit refill requests through [employer] or call your  "pharmacy and they will forward the refill request to us. Please allow 3 business days for your refill to be completed.          Additional Information About Your Visit        MyChart Information     [employer] gives you secure access to your electronic health record. If you see a primary care provider, you can also send messages to your care team and make appointments. If you have questions, please call your primary care clinic.  If you do not have a primary care provider, please call [phone] and they will assist you.        Care EveryWhere ID     This is your Care EveryWhere ID. This could be used by other organizations to access your [place] medical records  [id number]        Your Vitals Were     Pulse Temperature Height Pulse Oximetry          67 98.3  F (36.8  C) (Oral) 5' 9\" (1.753 m) 96%         Blood Pressure from Last 3 Encounters:   05/02/18 130/82   03/21/17 130/74   02/24/17 118/72    Weight from Last 3 Encounters:   07/21/14 258 lb (117 kg)   06/17/14 258 lb 9.6 oz (117.3 kg)   03/14/14 259 lb (117.5 kg)              Today, you had the following     No orders found for display       Primary Care Provider Office Phone # Fax #    Padma Robert Lozano [phone]       [address] [facility] [address]        Equal Access to Services     SEBASTIAN MUIR : Hadii aad ku hadasho Soomaali, waaxda luqadaha, qaybta kaalmada adeegyada, westley suh. So [facility] [phone].    ATENCIÓN: Si habla español, tiene a burgos disposición servicios gratuitos de asistencia lingüística. Llame al [phone].    We comply with applicable federal civil rights laws and [place] laws. We do not discriminate on the basis of race, color, national origin, age, disability, sex, sexual orientation, or gender identity.            Thank you!     Thank you for choosing [facility]  for your care. Our goal is always to provide you with excellent care. Hearing " back from our patients is one way we can continue to improve our services. Please take a few minutes to complete the written survey that you may receive in the mail after your visit with us. Thank you!             Your Updated Medication List - Protect others around you: Learn how to safely use, store and throw away your medicines at www.disposemymeds.org.          This list is accurate as of 5/2/18  3:58 PM.  Always use your most recent med list.                   Brand Name Dispense Instructions for use Diagnosis    albuterol 108 (90 Base) MCG/ACT Inhaler    PROAIR HFA/PROVENTIL HFA/VENTOLIN HFA    36 g    Inhale 2 puffs into the lungs every 4 hours as needed for shortness of breath / dyspnea or wheezing    Mild persistent asthma without complication       cetirizine 10 MG tablet    zyrTEC     Take 10 mg by mouth daily        fluticasone 50 MCG/ACT spray    FLONASE    32 g    Spray 1-2 sprays into both nostrils daily    Other allergic rhinitis       fluticasone-salmeterol 500-50 MCG/DOSE diskus inhaler    ADVAIR DISKUS    2 Inhaler    Inhale 1 puff into the lungs 2 times daily    Mild persistent asthma without complication       hydrochlorothiazide 12.5 MG capsule    MICROZIDE    60 capsule    Take 1 capsule (12.5 mg) by mouth daily    Essential hypertension with goal blood pressure less than 140/90       lisinopril 40 MG tablet    PRINIVIL/ZESTRIL    60 tablet    Take 1 tablet (40 mg) by mouth daily    Essential hypertension with goal blood pressure less than 140/90       venlafaxine 75 MG 24 hr capsule    EFFEXOR-XR    60 capsule    Take 1 capsule (75 mg) by mouth daily    Mild major depression (H)

## 2018-05-06 LAB
COPATH REPORT: NORMAL
PAP: NORMAL

## 2018-05-08 PROBLEM — R87.810 CERVICAL HIGH RISK HPV (HUMAN PAPILLOMAVIRUS) TEST POSITIVE: Status: RESOLVED | Noted: 2017-03-21 | Resolved: 2018-05-08

## 2018-05-08 LAB
FINAL DIAGNOSIS: NORMAL
HPV HR 12 DNA CVX QL NAA+PROBE: NEGATIVE
HPV16 DNA SPEC QL NAA+PROBE: NEGATIVE
HPV18 DNA SPEC QL NAA+PROBE: NEGATIVE
SPECIMEN DESCRIPTION: NORMAL
SPECIMEN SOURCE CVX/VAG CYTO: NORMAL

## 2018-07-26 ENCOUNTER — RADIANT APPOINTMENT (OUTPATIENT)
Dept: MAMMOGRAPHY | Facility: CLINIC | Age: 58
End: 2018-07-26
Attending: FAMILY MEDICINE
Payer: COMMERCIAL

## 2018-07-26 DIAGNOSIS — Z12.31 ENCOUNTER FOR SCREENING MAMMOGRAM FOR BREAST CANCER: ICD-10-CM

## 2018-09-29 ENCOUNTER — MYC MEDICAL ADVICE (OUTPATIENT)
Dept: FAMILY MEDICINE | Facility: CLINIC | Age: 58
End: 2018-09-29

## 2018-10-09 ENCOUNTER — OFFICE VISIT (OUTPATIENT)
Dept: ALLERGY | Facility: CLINIC | Age: 58
End: 2018-10-09
Payer: COMMERCIAL

## 2018-10-09 VITALS
RESPIRATION RATE: 16 BRPM | HEIGHT: 69 IN | HEART RATE: 63 BPM | OXYGEN SATURATION: 96 % | BODY MASS INDEX: 38.1 KG/M2 | SYSTOLIC BLOOD PRESSURE: 131 MMHG | DIASTOLIC BLOOD PRESSURE: 81 MMHG

## 2018-10-09 DIAGNOSIS — Z23 NEED FOR PROPHYLACTIC VACCINATION AND INOCULATION AGAINST INFLUENZA: ICD-10-CM

## 2018-10-09 DIAGNOSIS — J30.89 OTHER ALLERGIC RHINITIS: ICD-10-CM

## 2018-10-09 DIAGNOSIS — J45.30 MILD PERSISTENT ASTHMA WITHOUT COMPLICATION: Primary | ICD-10-CM

## 2018-10-09 LAB
FEF 25/75: NORMAL
FEV-1: NORMAL
FEV1/FVC: NORMAL
FVC: NORMAL

## 2018-10-09 PROCEDURE — 99204 OFFICE O/P NEW MOD 45 MIN: CPT | Mod: 25 | Performed by: ALLERGY & IMMUNOLOGY

## 2018-10-09 PROCEDURE — 90471 IMMUNIZATION ADMIN: CPT | Performed by: ALLERGY & IMMUNOLOGY

## 2018-10-09 PROCEDURE — 90682 RIV4 VACC RECOMBINANT DNA IM: CPT | Performed by: ALLERGY & IMMUNOLOGY

## 2018-10-09 PROCEDURE — 94010 BREATHING CAPACITY TEST: CPT | Performed by: ALLERGY & IMMUNOLOGY

## 2018-10-09 RX ORDER — MONTELUKAST SODIUM 10 MG/1
10 TABLET ORAL AT BEDTIME
Qty: 30 TABLET | Refills: 1 | Status: SHIPPED | OUTPATIENT
Start: 2018-10-09 | End: 2018-12-11

## 2018-10-09 NOTE — PROGRESS NOTES
"Nela Mares was seen in the Allergy Clinic at [facility]. The following are my recommendations regarding her Mild Persistent Asthma and Allergic Rhinitis    1. Increase advair 500/50mcg to 1 puff twice daily  2. Begin montelukast 10mg daily  3. Use albuterol HFA, 2-4 puffs every 4 hours as needed  4. Continue cetirizine 10mg daily - will need to hold for 7 days prior to skin testing  5. Continue fluticasone nasal spray, 2 sprays in each nostril daily  6. Influenza vaccine given in clinic today  7. Follow-up in 1 month      Nela Mares is a 58 year old White female being seen today in consultation for allergies and asthma. She states she has had both asthma and allergies for many years and last had allergy testing done 25 years ago. Nela does not recall the results of the testing and was never on immunotherapy. About 2 months ago she got 2 pet dogs and has noted increased symptoms. She is not sure whether this is due to the seasonal changes she typically experiences in the fall or if it may be related to her new pets. Nela's symptoms consist of sneezing, rhinorrhea, nasal congestion, post-nasal drainage, itchy throat, itchy ears, throat clearing, and coughing. She has symptoms throughout the year but they do worsen in the spring and fall. Nela takes zyrtec and flonase daily and while they provide relief her symptoms are not 100% controlled.    Nela was diagnosed with asthma in her 30s. She has never been hospitalized or to the ED. On average she has an exacerbation requiring prednisone once per year but has not been on prednisone since 10/2016. Currently she is taking 1 puff of advair daily but increases to twice daily \"when things get rough.\" On average she uses albuterol once or twice per month. In the last month Nela has had 3 days of more severe asthma symptoms and did miss 1 day of work due to asthma.      Past Medical History:   Diagnosis Date     Allergic rhinitis, " cause unspecified      Cervical high risk HPV (human papillomavirus) test positive 03/21/2017    3/21/17 NIL pap/+ HR HPV (not 16 or 18).      Depressive disorder      Depressive disorder, not elsewhere classified     seasonal     Hypertension      Mild persistent asthma      Obstructive sleep apnea (adult) (pediatric)     uses CPAP     Scalp mass 7/2014     Family History   Problem Relation Age of Onset     C.A.D. Paternal Grandmother      Cerebrovascular Disease Paternal Grandmother      Depression Paternal Grandmother      Obesity Paternal Grandmother      C.A.D. Paternal Grandfather      Cerebrovascular Disease Paternal Grandfather      unsure     Substance Abuse Paternal Grandfather      Hypertension Father      Asthma Father      Obesity Father      Neurologic Disorder Mother      Graves disease     Hyperlipidemia Mother      Depression Mother      Thyroid Disease Mother      Other - See Comments Other      Depression Maternal Grandmother      Cancer Sister      mild skin cancer,cured from excision     Depression Nephew      Anxiety Disorder Nephew      Mental Illness Maternal Half-Brother      Mental Illness Paternal Half-Brother      Substance Abuse Nephew      Asthma Sister      Asthma Nephew      Thyroid Disease Maternal Half-Sister      Thyroid Disease Paternal Half-Sister      Diabetes No family hx of      Past Surgical History:   Procedure Laterality Date     COLONOSCOPY  6/21/2012    Procedure: COLONOSCOPY;  COLONOSCOPY, SCREEN;  Surgeon: Bart You MD;  Location: [facility] OR     D & C       ENDOSCOPIC RETROGRADE CHOLANGIOPANCREATOGRAM  1/4/2014    Procedure: ENDOSCOPIC RETROGRADE CHOLANGIOPANCREATOGRAM;  ERCP biliary sphincterotomy, bile duct stone removal, epinepherine washing.;  Surgeon: Ba Meyers MD;  Location: [facility] OR     LAPAROSCOPIC CHOLECYSTECTOMY WITH CHOLANGIOGRAMS  1/4/2014    Procedure: LAPAROSCOPIC CHOLECYSTECTOMY WITH CHOLANGIOGRAMS;;  Surgeon: Haja Sage MD;   Location: [facility] OR     NO HISTORY OF SURGERY       OPERATIVE HYSTEROSCOPY  6/17/2014    Procedure: OPERATIVE HYSTEROSCOPY;  Surgeon: Paola Camara MD;  Location: [facility] OR     REMOVE INTRAUTERINE DEVICE  6/17/2014    Procedure: REMOVE INTRAUTERINE DEVICE;  Surgeon: Paola Camara MD;  Location: [facility] OR       ENVIRONMENTAL HISTORY: [place]. The home is heated with a forced air and gas furnace. They does have central air conditioning. The patient's bedroom is furnished with feather/wool bedding or pillows, hard deshawn in bedroom, allergen mattress cover, allergen pillowcase cover and fabric window coverings.  Pets inside the house include 2 dog(s). There is no history of cockroach or mice infestation. There is/are 0 smokers in the house.  The house does not have a damp basement.     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 She has missed 1 day on about once a month of school/work due to asthma. She lives alone.    REVIEW OF SYSTEMS:  General: negative for weight gain. negative for weight loss. negative for changes in sleep.   Eyes: negative for itching. negative for redness. negative for tearing/watering. negative for vision changes  Ears: positive  for fullness. negative for hearing loss. positive  for dizziness.   Nose: positive  for snoring.negative for changes in smell. positive  for drainage.   Throat: positive  for hoarseness. negative for sore throat. negative for trouble swallowing.   Lungs: positive  for cough. positive  for shortness of breath.negative for wheezing. positive  for sputum production.   Cardiovascular: negative for chest pain. negative for swelling of ankles. negative for fast or irregular heartbeat.   Gastrointestinal: negative for nausea. negative for heartburn. negative for acid reflux.   Musculoskeletal: negative for joint pain. negative for joint stiffness. negative for joint swelling.   Neurologic: negative for seizures.  "negative for fainting. negative for weakness.   Psychiatric: negative for changes in mood. positive  for anxiety.   Endocrine: negative for cold intolerance. negative for heat intolerance. negative for tremors.   Hematologic: negative for easy bruising. negative for easy bleeding.  Integumentary: negative for rash. negative for scaling. negative for nail changes.       Current Outpatient Prescriptions:      albuterol (PROAIR HFA/PROVENTIL HFA/VENTOLIN HFA) 108 (90 Base) MCG/ACT Inhaler, Inhale 2 puffs into the lungs every 4 hours as needed for shortness of breath / dyspnea or wheezing, Disp: 3 Inhaler, Rfl: 3     cetirizine (ZYRTEC) 10 MG tablet, Take 10 mg by mouth daily, Disp: , Rfl:      fluticasone (FLONASE) 50 MCG/ACT spray, Spray 1-2 sprays into both nostrils daily, Disp: 32 g, Rfl: 3     fluticasone-salmeterol (ADVAIR DISKUS) 500-50 MCG/DOSE diskus inhaler, Inhale 1 puff into the lungs 2 times daily (Patient taking differently: Inhale 1 puff into the lungs daily ), Disp: 3 Inhaler, Rfl: 3     hydrochlorothiazide (MICROZIDE) 12.5 MG capsule, Take 1 capsule (12.5 mg) by mouth daily, Disp: 90 capsule, Rfl: 3     lisinopril (PRINIVIL/ZESTRIL) 40 MG tablet, Take 1 tablet (40 mg) by mouth daily, Disp: 90 tablet, Rfl: 3     venlafaxine (EFFEXOR-XR) 75 MG 24 hr capsule, Take 1 capsule (75 mg) by mouth daily, Disp: 90 capsule, Rfl: 3  Immunization History   Administered Date(s) Administered     HepB 12/12/2007     Influenza (H1N1) 11/25/2009     Influenza (IIV3) PF 12/27/2000, 11/15/2006, 12/12/2007, 11/05/2008, 10/19/2009, 10/22/2010, 11/23/2011     Influenza Vaccine IM 3yrs+ 4 Valent IIV4 10/18/2013, 10/01/2014, 10/03/2015, 10/14/2016     Pneumococcal 23 valent 09/16/2015     TDAP Vaccine (Adacel) 11/05/2008     Allergies   Allergen Reactions     No Known Drug Allergies          EXAM:   /81 (BP Location: Left arm, Patient Position: Sitting, Cuff Size: Adult Large)  Pulse 63  Resp 16  Ht 1.753 m (5' 9\")  " SpO2 96%  BMI 38.1 kg/m2  GENERAL APPEARANCE: alert, cooperative and not in distress  SKIN: no rashes, no lesions  HEAD: atraumatic, normocephalic  EYES: lids and lashes normal, conjunctivae and sclerae clear, pupils equal, round, reactive to light, EOM full and intact  ENT: no scars or lesions, nasal exam showed no discharge, swelling or lesions noted, otoscopy showed external auditory canals clear, tympanic membranes normal, tongue midline and normal, soft palate, uvula, and tonsils normal  NECK: no asymmetry, masses, or scars, supple without significant adenopathy  LUNGS: unlabored respirations, no intercostal retractions or accessory muscle use, clear to auscultation without rales or wheezes  HEART: regular rate and rhythm without murmurs and normal S1 and S2  MUSCULOSKELETAL: no musculoskeletal defects are noted  NEURO: no focal deficits noted  PSYCH: does not appear depressed or anxious    WORKUP: Spirometry  SPIROMETRY       FVC 3.91L (98% of predicted).     FEV1 3.12L (101% of predicted).     FEV1/FVC 80%     FEF 25%-75%  3.26L/s (119% of predicted).    These values are consistent with normal lung function without evidence of airflow obstruction    Asthma Control Test (ACT) total score: 19     ASSESSMENT/PLAN:  Nela Mares is a 58 year old female here for evaluation of allergies and asthma. Skin testing was deferred due to recent antihistamine use. Nela reports having both seasonal and perennial allergy symptoms and her asthma typically flares in the fall as well. She is not sure if her current symptoms are due to her usual seasonal exacerbation or may also be in part due to her new dogs. She is interested in pursuing allergy treatment and possibly immunotherapy as well. We reviewed the risks, benefits, and anticipated duration of immunotherapy long with traditional and accelerated build schedules.    1. Increase advair 500/50mcg to 1 puff twice daily  2. Begin montelukast 10mg daily  3. Use  albuterol HFA, 2-4 puffs every 4 hours as needed  4. Continue cetirizine 10mg daily - will need to hold for 7 days prior to skin testing  5. Continue fluticasone nasal spray, 2 sprays in each nostril daily  6. Influenza vaccine given in clinic today  7. Follow-up in 1 month      Karen Clark MD  Allergy/Immunology  [facility] and [place], [place]      Chart documentation done in part with Dragon Voice Recognition Software. Although reviewed after completion, some word and grammatical errors may remain.

## 2018-10-09 NOTE — LETTER
"    10/9/2018         RE: Nela Mares  [address] [facility] [address]        Dear Colleague,    Thank you for referring your patient, Nela Mares, to the [facility]. Please see a copy of my visit note below.    Nela Mares was seen in the Allergy Clinic at [facility]. The following are my recommendations regarding her Mild Persistent Asthma and Allergic Rhinitis    1. Increase advair 500/50mcg to 1 puff twice daily  2. Begin montelukast 10mg daily  3. Use albuterol HFA, 2-4 puffs every 4 hours as needed  4. Continue cetirizine 10mg daily - will need to hold for 7 days prior to skin testing  5. Continue fluticasone nasal spray, 2 sprays in each nostril daily  6. Influenza vaccine given in clinic today  7. Follow-up in 1 month      Nela Mares is a 58 year old White female being seen today in consultation for allergies and asthma. She states she has had both asthma and allergies for many years and last had allergy testing done 25 years ago. Nela does not recall the results of the testing and was never on immunotherapy. About 2 months ago she got 2 pet dogs and has noted increased symptoms. She is not sure whether this is due to the seasonal changes she typically experiences in the fall or if it may be related to her new pets. Nela's symptoms consist of sneezing, rhinorrhea, nasal congestion, post-nasal drainage, itchy throat, itchy ears, throat clearing, and coughing. She has symptoms throughout the year but they do worsen in the spring and fall. Nela takes zyrtec and flonase daily and while they provide relief her symptoms are not 100% controlled.    Nela was diagnosed with asthma in her 30s. She has never been hospitalized or to the ED. On average she has an exacerbation requiring prednisone once per year but has not been on prednisone since 10/2016. Currently she is taking 1 puff of advair daily but increases to twice daily \"when " "things get rough.\" On average she uses albuterol once or twice per month. In the last month Nela has had 3 days of more severe asthma symptoms and did miss 1 day of work due to asthma.      Past Medical History:   Diagnosis Date     Allergic rhinitis, cause unspecified      Cervical high risk HPV (human papillomavirus) test positive 03/21/2017    3/21/17 NIL pap/+ HR HPV (not 16 or 18).      Depressive disorder      Depressive disorder, not elsewhere classified     seasonal     Hypertension      Mild persistent asthma      Obstructive sleep apnea (adult) (pediatric)     uses CPAP     Scalp mass 7/2014     Family History   Problem Relation Age of Onset     C.A.D. Paternal Grandmother      Cerebrovascular Disease Paternal Grandmother      Depression Paternal Grandmother      Obesity Paternal Grandmother      C.A.D. Paternal Grandfather      Cerebrovascular Disease Paternal Grandfather      unsure     Substance Abuse Paternal Grandfather      Hypertension Father      Asthma Father      Obesity Father      Neurologic Disorder Mother      Graves disease     Hyperlipidemia Mother      Depression Mother      Thyroid Disease Mother      Other - See Comments Other      Depression Maternal Grandmother      Cancer Sister      mild skin cancer,cured from excision     Depression Nephew      Anxiety Disorder Nephew      Mental Illness Maternal Half-Brother      Mental Illness Paternal Half-Brother      Substance Abuse Nephew      Asthma Sister      Asthma Nephew      Thyroid Disease Maternal Half-Sister      Thyroid Disease Paternal Half-Sister      Diabetes No family hx of      Past Surgical History:   Procedure Laterality Date     COLONOSCOPY  6/21/2012    Procedure: COLONOSCOPY;  COLONOSCOPY, SCREEN;  Surgeon: Bart You MD;  Location:  OR     D & C       ENDOSCOPIC RETROGRADE CHOLANGIOPANCREATOGRAM  1/4/2014    Procedure: ENDOSCOPIC RETROGRADE CHOLANGIOPANCREATOGRAM;  ERCP biliary sphincterotomy, bile duct " stone removal, epinepherine washing.;  Surgeon: Ba Meyers MD;  Location: [facility] OR     LAPAROSCOPIC CHOLECYSTECTOMY WITH CHOLANGIOGRAMS  1/4/2014    Procedure: LAPAROSCOPIC CHOLECYSTECTOMY WITH CHOLANGIOGRAMS;;  Surgeon: Haja Sage MD;  Location: [facility] OR     NO HISTORY OF SURGERY       OPERATIVE HYSTEROSCOPY  6/17/2014    Procedure: OPERATIVE HYSTEROSCOPY;  Surgeon: Paola Camara MD;  Location: [facility] OR     REMOVE INTRAUTERINE DEVICE  6/17/2014    Procedure: REMOVE INTRAUTERINE DEVICE;  Surgeon: Paola Camara MD;  Location: [facility] OR       ENVIRONMENTAL HISTORY: [place]. The home is heated with a forced air and gas furnace. They does have central air conditioning. The patient's bedroom is furnished with feather/wool bedding or pillows, hard deshawn in bedroom, allergen mattress cover, allergen pillowcase cover and fabric window coverings.  Pets inside the house include 2 dog(s). There is no history of cockroach or mice infestation. There is/are 0 smokers in the house.  The house does not have a damp basement.     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 She has missed 1 day on about once a month of school/work due to asthma. She lives alone.    REVIEW OF SYSTEMS:  General: negative for weight gain. negative for weight loss. negative for changes in sleep.   Eyes: negative for itching. negative for redness. negative for tearing/watering. negative for vision changes  Ears: positive  for fullness. negative for hearing loss. positive  for dizziness.   Nose: positive  for snoring.negative for changes in smell. positive  for drainage.   Throat: positive  for hoarseness. negative for sore throat. negative for trouble swallowing.   Lungs: positive  for cough. positive  for shortness of breath.negative for wheezing. positive  for sputum production.   Cardiovascular: negative for chest pain. negative for swelling of ankles. negative for  fast or irregular heartbeat.   Gastrointestinal: negative for nausea. negative for heartburn. negative for acid reflux.   Musculoskeletal: negative for joint pain. negative for joint stiffness. negative for joint swelling.   Neurologic: negative for seizures. negative for fainting. negative for weakness.   Psychiatric: negative for changes in mood. positive  for anxiety.   Endocrine: negative for cold intolerance. negative for heat intolerance. negative for tremors.   Hematologic: negative for easy bruising. negative for easy bleeding.  Integumentary: negative for rash. negative for scaling. negative for nail changes.       Current Outpatient Prescriptions:      albuterol (PROAIR HFA/PROVENTIL HFA/VENTOLIN HFA) 108 (90 Base) MCG/ACT Inhaler, Inhale 2 puffs into the lungs every 4 hours as needed for shortness of breath / dyspnea or wheezing, Disp: 3 Inhaler, Rfl: 3     cetirizine (ZYRTEC) 10 MG tablet, Take 10 mg by mouth daily, Disp: , Rfl:      fluticasone (FLONASE) 50 MCG/ACT spray, Spray 1-2 sprays into both nostrils daily, Disp: 32 g, Rfl: 3     fluticasone-salmeterol (ADVAIR DISKUS) 500-50 MCG/DOSE diskus inhaler, Inhale 1 puff into the lungs 2 times daily (Patient taking differently: Inhale 1 puff into the lungs daily ), Disp: 3 Inhaler, Rfl: 3     hydrochlorothiazide (MICROZIDE) 12.5 MG capsule, Take 1 capsule (12.5 mg) by mouth daily, Disp: 90 capsule, Rfl: 3     lisinopril (PRINIVIL/ZESTRIL) 40 MG tablet, Take 1 tablet (40 mg) by mouth daily, Disp: 90 tablet, Rfl: 3     venlafaxine (EFFEXOR-XR) 75 MG 24 hr capsule, Take 1 capsule (75 mg) by mouth daily, Disp: 90 capsule, Rfl: 3  Immunization History   Administered Date(s) Administered     HepB 12/12/2007     Influenza (H1N1) 11/25/2009     Influenza (IIV3) PF 12/27/2000, 11/15/2006, 12/12/2007, 11/05/2008, 10/19/2009, 10/22/2010, 11/23/2011     Influenza Vaccine IM 3yrs+ 4 Valent IIV4 10/18/2013, 10/01/2014, 10/03/2015, 10/14/2016     Pneumococcal 23 valent  "09/16/2015     TDAP Vaccine (Adacel) 11/05/2008     Allergies   Allergen Reactions     No Known Drug Allergies          EXAM:   /81 (BP Location: Left arm, Patient Position: Sitting, Cuff Size: Adult Large)  Pulse 63  Resp 16  Ht 1.753 m (5' 9\")  SpO2 96%  BMI 38.1 kg/m2  GENERAL APPEARANCE: alert, cooperative and not in distress  SKIN: no rashes, no lesions  HEAD: atraumatic, normocephalic  EYES: lids and lashes normal, conjunctivae and sclerae clear, pupils equal, round, reactive to light, EOM full and intact  ENT: no scars or lesions, nasal exam showed no discharge, swelling or lesions noted, otoscopy showed external auditory canals clear, tympanic membranes normal, tongue midline and normal, soft palate, uvula, and tonsils normal  NECK: no asymmetry, masses, or scars, supple without significant adenopathy  LUNGS: unlabored respirations, no intercostal retractions or accessory muscle use, clear to auscultation without rales or wheezes  HEART: regular rate and rhythm without murmurs and normal S1 and S2  MUSCULOSKELETAL: no musculoskeletal defects are noted  NEURO: no focal deficits noted  PSYCH: does not appear depressed or anxious    WORKUP: Spirometry  SPIROMETRY       FVC 3.91L (98% of predicted).     FEV1 3.12L (101% of predicted).     FEV1/FVC 80%     FEF 25%-75%  3.26L/s (119% of predicted).    These values are consistent with normal lung function without evidence of airflow obstruction    Asthma Control Test (ACT) total score: 19     ASSESSMENT/PLAN:  Nela Mares is a 58 year old female here for evaluation of allergies and asthma. Skin testing was deferred due to recent antihistamine use. Nela reports having both seasonal and perennial allergy symptoms and her asthma typically flares in the fall as well. She is not sure if her current symptoms are due to her usual seasonal exacerbation or may also be in part due to her new dogs. She is interested in pursuing allergy treatment and " possibly immunotherapy as well. We reviewed the risks, benefits, and anticipated duration of immunotherapy long with traditional and accelerated build schedules.    1. Increase advair 500/50mcg to 1 puff twice daily  2. Begin montelukast 10mg daily  3. Use albuterol HFA, 2-4 puffs every 4 hours as needed  4. Continue cetirizine 10mg daily - will need to hold for 7 days prior to skin testing  5. Continue fluticasone nasal spray, 2 sprays in each nostril daily  6. Influenza vaccine given in clinic today  7. Follow-up in 1 month      Karen Clark MD  Allergy/Immunology  [place], [place]      Chart documentation done in part with Dragon Voice Recognition Software. Although reviewed after completion, some word and grammatical errors may remain.      Injectable Influenza Immunization Documentation    1.  Is the person to be vaccinated sick today?   No    2. Does the person to be vaccinated have an allergy to a component   of the vaccine?   No  Egg Allergy Algorithm Link    3. Has the person to be vaccinated ever had a serious reaction   to influenza vaccine in the past?   No    4. Has the person to be vaccinated ever had Guillain-Barré syndrome?   No    Form completed by Sarah Allen RN             Again, thank you for allowing me to participate in the care of your patient.        Sincerely,        Karen Clark MD

## 2018-10-09 NOTE — PATIENT INSTRUCTIONS
If you have any questions regarding your allergies, asthma, or what we discussed during your visit today please call the allergy clinic or contact us via [employer].    [facility]: [phone]      Stop the zyrtec (cetirizine) until you come in for allergy testing    Start the singulair (montelukast) and take once a day    Increase the advair to twice daily      These are the billing and diagnosis codes that your insurance company may need to help you determine if allergy shots are covered and what potential out of pocked costs you may have. You may also want to contact the [facility] at [phone] for more information.    Regular Allergy Shots: 53365    Cluster Accelerated Build Codes: 90171 - rapid desensitization. This could be a single unit or multiple units billed per day depending on the length of your visit.    Allergy Shot Serum: 18654 - the amount of serum in total varies depending on how many allergy shots you will need (1 injection is 20mL or 20 doses, 4 injections is 80mL or 80 doses)    Diagnosis Codes:    Allergic Rhinitis Due to Dust Mite or Mold - J30.89  Chronic Allergic Rhinitis Due to Animals - J30.81  Chronic Seasonal Allergic Rhinitis Due to Pollens - J30.1  Allergic Conjunctivitis - H10.13      Allergy Shots (Immunotherapy)  What You Need to Know  What are allergy shots?  Allergy shots are used to treat allergic reactions. They are given in the upper arm.  These shots will slowly build your tolerance to the things you are allergic to (such as pollen, mold and animal dander). They reduce the body's allergic response.  What are the benefits of getting allergy shots?  Allergy shots may:    Relieve symptoms long after treatment has ended    Allow you to take less allergy medicine, or stop taking it altogether    Prevent new allergies in the future    Keep children's allergies from getting worse and turning into asthma    Improve eczema caused by  allergies.  The average patient sees a large improvement in his or her symptoms (up to 80%).  Who should have allergy shots?  Allergy shots are helpful when allergies cause:    Asthma    Itchy, watery eyes (allergic conjunctivitis)    Runny nose, sneezing, sore throat and other symptoms (allergic rhinitis)    Severe reaction to insect stings.  For children, it is best to wait until age 5 before starting allergy shots.  How will I feel during and after treatment?  You will have shots every 3 to 14 days for 4 to 8 months. We will increase the dose each time until we reach a level that works for you. After that, you will have the shots less often.     It may take another year for symptoms to improve. Many people improve much sooner.    You will likely have shots for another 3 to 5 years.  Allergy shots can reduce your symptoms a little or lot. Some people find that their allergies go away entirely.   Most people have long-lasting relief after treatment ends. You should see your doctor every year to find out if you need more shots.  What are the risks?  With each allergy shot, there is the risk of allergic reaction. Some reactions are mild. Others can be life threatening and must be treated at once.   Common reactions  It is common to have a mild reaction, such as redness, swelling, pain and itching in the area of the shot. This can occur right away or up to several hours after the shot. Sometimes the reaction moves into the upper arm. Call your doctor if:    The reaction area is more than 2 inches wide.    You still have the reaction the day after the shot.  Severe reactions  The reactions below are rare, but serious. If not treated, they can lead to very low blood pressure and even death. If you have any of these, get help at once.     Hives include a rash, swelling and itching on more than one part of the body. They may occur within minutes to hours after a shot.    Swelling of any part of the body. For example, you  may have swelling of the ears, tongue, lips, throat, intestines, hands or feet. Swelling may affect more than one body part. These reactions could occur within minutes to hours after the shot.    Trouble breathing. You may develop sneezing, runny nose, stuffy nose, cough or asthma symptoms. These reactions can occur within minutes to hours after the shot.    Anaphylactic shock is sudden, severe and may include symptoms such as hives, trouble breathing, stomach pain, feeling faint or dizzy and low blood pressure. It may cause a loss of consciousness and could lead to death. This reaction usually occurs within minutes of the shot but can happen a few hours later. It is very rare.  You might have a severe reaction after the first shot, or it may occur after a series of shots. If you have a reaction, we will treat you right away. In the future, we will change the dose of your allergy shots.  What happens after each shot?  You should wait in the doctor's office for 30 minutes after each shot. If you have a reaction, we may ask you to stay longer. If you cannot wait the 30 minutes, you should not have your allergy shot.  If you have a severe reaction after leaving the doctor's office, use your epinephrine auto-injector (Epi-pen) and go to the nearest emergency room. If treated promptly, severe reactions are rarely life threatening. We will never give an allergy shot unless medical help is nearby in case of emergency.   What else do I need to know?  If you start taking any new medicines  It is not safe to have these shots while taking certain medicines. If any doctor prescribes new medicine for you, please let us know. This includes:    Beta blockers, often used for heart disease    Blood pressure medicine    Medicine for migraine headaches    Glaucoma medicine.  A note for women  If you get pregnant, tell the office staff at once.   Your doctor will decide how often you should receive shots during pregnancy. We will not  increase your dose while you are pregnant.  If you will have shots at another clinic  If you will have your allergy shots at another clinic, you must provide the name and address of the doctor who will give the shots. We will ask you to fill out a form.  If you have any questions or concerns, please speak to your doctor or a member of our staff.   For informational purposes only. Not to replace the advice of your health care provider.   Copyright   2009 [facility]. All rights reserved. [employer] [id number] - REV 07/17.

## 2018-10-09 NOTE — MR AVS SNAPSHOT
After Visit Summary   10/9/2018    Nela Mares    MRN: [id number]           Patient Information     Date Of Birth          1960        Visit Information        Provider Department      10/9/2018 5:20 PM Karen Clark MD [facility]        Today's Diagnoses     Mild persistent asthma without complication    -  1      Care Instructions    If you have any questions regarding your allergies, asthma, or what we discussed during your visit today please call the allergy clinic or contact us via [employer].    [facility]: [phone]      Stop the zyrtec (cetirizine) until you come in for allergy testing    Start the singulair (montelukast) and take once a day    Increase the advair to twice daily      These are the billing and diagnosis codes that your insurance company may need to help you determine if allergy shots are covered and what potential out of pocked costs you may have. You may also want to contact the [facility] at [phone] for more information.    Regular Allergy Shots: 47405    Cluster Accelerated Build Codes: 32521 - rapid desensitization. This could be a single unit or multiple units billed per day depending on the length of your visit.    Allergy Shot Serum: 33725 - the amount of serum in total varies depending on how many allergy shots you will need (1 injection is 20mL or 20 doses, 4 injections is 80mL or 80 doses)    Diagnosis Codes:    Allergic Rhinitis Due to Dust Mite or Mold - J30.89  Chronic Allergic Rhinitis Due to Animals - J30.81  Chronic Seasonal Allergic Rhinitis Due to Pollens - J30.1  Allergic Conjunctivitis - H10.13      Allergy Shots (Immunotherapy)  What You Need to Know  What are allergy shots?  Allergy shots are used to treat allergic reactions. They are given in the upper arm.  These shots will slowly build your tolerance to the things you are allergic to (such as pollen, mold and animal dander). They reduce  the body's allergic response.  What are the benefits of getting allergy shots?  Allergy shots may:    Relieve symptoms long after treatment has ended    Allow you to take less allergy medicine, or stop taking it altogether    Prevent new allergies in the future    Keep children's allergies from getting worse and turning into asthma    Improve eczema caused by allergies.  The average patient sees a large improvement in his or her symptoms (up to 80%).  Who should have allergy shots?  Allergy shots are helpful when allergies cause:    Asthma    Itchy, watery eyes (allergic conjunctivitis)    Runny nose, sneezing, sore throat and other symptoms (allergic rhinitis)    Severe reaction to insect stings.  For children, it is best to wait until age 5 before starting allergy shots.  How will I feel during and after treatment?  You will have shots every 3 to 14 days for 4 to 8 months. We will increase the dose each time until we reach a level that works for you. After that, you will have the shots less often.     It may take another year for symptoms to improve. Many people improve much sooner.    You will likely have shots for another 3 to 5 years.  Allergy shots can reduce your symptoms a little or lot. Some people find that their allergies go away entirely.   Most people have long-lasting relief after treatment ends. You should see your doctor every year to find out if you need more shots.  What are the risks?  With each allergy shot, there is the risk of allergic reaction. Some reactions are mild. Others can be life threatening and must be treated at once.   Common reactions  It is common to have a mild reaction, such as redness, swelling, pain and itching in the area of the shot. This can occur right away or up to several hours after the shot. Sometimes the reaction moves into the upper arm. Call your doctor if:    The reaction area is more than 2 inches wide.    You still have the reaction the day after the  shot.  Severe reactions  The reactions below are rare, but serious. If not treated, they can lead to very low blood pressure and even death. If you have any of these, get help at once.     Hives include a rash, swelling and itching on more than one part of the body. They may occur within minutes to hours after a shot.    Swelling of any part of the body. For example, you may have swelling of the ears, tongue, lips, throat, intestines, hands or feet. Swelling may affect more than one body part. These reactions could occur within minutes to hours after the shot.    Trouble breathing. You may develop sneezing, runny nose, stuffy nose, cough or asthma symptoms. These reactions can occur within minutes to hours after the shot.    Anaphylactic shock is sudden, severe and may include symptoms such as hives, trouble breathing, stomach pain, feeling faint or dizzy and low blood pressure. It may cause a loss of consciousness and could lead to death. This reaction usually occurs within minutes of the shot but can happen a few hours later. It is very rare.  You might have a severe reaction after the first shot, or it may occur after a series of shots. If you have a reaction, we will treat you right away. In the future, we will change the dose of your allergy shots.  What happens after each shot?  You should wait in the doctor's office for 30 minutes after each shot. If you have a reaction, we may ask you to stay longer. If you cannot wait the 30 minutes, you should not have your allergy shot.  If you have a severe reaction after leaving the doctor's office, use your epinephrine auto-injector (Epi-pen) and go to the nearest emergency room. If treated promptly, severe reactions are rarely life threatening. We will never give an allergy shot unless medical help is nearby in case of emergency.   What else do I need to know?  If you start taking any new medicines  It is not safe to have these shots while taking certain medicines. If  any doctor prescribes new medicine for you, please let us know. This includes:    Beta blockers, often used for heart disease    Blood pressure medicine    Medicine for migraine headaches    Glaucoma medicine.  A note for women  If you get pregnant, tell the office staff at once.   Your doctor will decide how often you should receive shots during pregnancy. We will not increase your dose while you are pregnant.  If you will have shots at another clinic  If you will have your allergy shots at another clinic, you must provide the name and address of the doctor who will give the shots. We will ask you to fill out a form.  If you have any questions or concerns, please speak to your doctor or a member of our staff.   For informational purposes only. Not to replace the advice of your health care provider.   Copyright   2009 [place] [employer]. All rights reserved. [employer] 206506 - REV 07/17.            Follow-ups after your visit        Your next 10 appointments already scheduled     Oct 18, 2018  8:20 AM CDT   Return Visit with Karen Clark MD   [facility] ([facility]    [address] [place] [id number] [phone]              Who to contact     If you have questions or need follow up information about today's clinic visit or your schedule please contact [facility] directly at [phone].  Normal or non-critical lab and imaging results will be communicated to you by MyChart, letter or phone within 4 business days after the clinic has received the results. If you do not hear from us within 7 days, please contact the clinic through MyChart or phone. If you have a critical or abnormal lab result, we will notify you by phone as soon as possible.  Submit refill requests through [employer] or call your pharmacy and they will forward the refill request to us. Please allow 3 business days for your refill to be completed.          Additional Information About Your  "Visit        [employer] Information     [employer] gives you secure access to your electronic health record. If you see a primary care provider, you can also send messages to your care team and make appointments. If you have questions, please call your primary care clinic.  If you do not have a primary care provider, please call [phone] and they will assist you.        Care EveryWhere ID     This is your Care EveryWhere ID. This could be used by other organizations to access your [place] medical records  [id number]        Your Vitals Were     Pulse Respirations Height Pulse Oximetry BMI (Body Mass Index)       63 16 1.753 m (5' 9\") 96% 38.1 kg/m2        Blood Pressure from Last 3 Encounters:   10/09/18 131/81   05/02/18 130/82   03/21/17 130/74    Weight from Last 3 Encounters:   07/21/14 117 kg (258 lb)   06/17/14 117.3 kg (258 lb 9.6 oz)   03/14/14 117.5 kg (259 lb)              We Performed the Following     Spirometry, Breathing Capacity: Normal Order, Clinic Performed          Today's Medication Changes          These changes are accurate as of 10/9/18  5:59 PM.  If you have any questions, ask your nurse or doctor.               Start taking these medicines.        Dose/Directions    montelukast 10 MG tablet   Commonly known as:  SINGULAIR   Used for:  Mild persistent asthma without complication   Started by:  Karen Clark MD        Dose:  10 mg   Take 1 tablet (10 mg) by mouth At Bedtime   Quantity:  30 tablet   Refills:  1         These medicines have changed or have updated prescriptions.        Dose/Directions    fluticasone-salmeterol 500-50 MCG/DOSE diskus inhaler   Commonly known as:  ADVAIR DISKUS   This may have changed:  when to take this   Used for:  Mild persistent asthma without complication        Dose:  1 puff   Inhale 1 puff into the lungs 2 times daily   Quantity:  3 Inhaler   Refills:  3            Where to get your medicines      These medications were sent to [facility] [address] [place], [place] - [address]  [address] [place] [address]     Phone:  [phone]     montelukast 10 MG tablet                Primary Care Provider Office Phone # Fax #    Padma Robert Lozano [phone] [address] [address]        Equal Access to Services     [facility] ServicesNERIS : Hadii aad ku hadasho Soomaali, waaxda luqadaha, qaybta kaalmada adeegyada, waxay idiin hayaan adeeg kharash la'aan ah. So [facility] [phone].    ATENCIÓN: Si habla espjuno, tiene a burgos disposición servicios gratuitos de asistencia lingüística. Chon al [phone].    We comply with applicable federal civil rights laws and [place] laws. We do not discriminate on the basis of race, color, national origin, age, disability, sex, sexual orientation, or gender identity.            Thank you!     Thank you for choosing [facility]  for your care. Our goal is always to provide you with excellent care. Hearing back from our patients is one way we can continue to improve our services. Please take a few minutes to complete the written survey that you may receive in the mail after your visit with us. Thank you!             Your Updated Medication List - Protect others around you: Learn how to safely use, store and throw away your medicines at www.disposemymeds.org.          This list is accurate as of 10/9/18  5:59 PM.  Always use your most recent med list.                   Brand Name Dispense Instructions for use Diagnosis    albuterol 108 (90 Base) MCG/ACT inhaler    PROAIR HFA/PROVENTIL HFA/VENTOLIN HFA    3 Inhaler    Inhale 2 puffs into the lungs every 4 hours as needed for shortness of breath / dyspnea or wheezing    Mild persistent asthma without complication       cetirizine 10 MG tablet    zyrTEC     Take 10 mg by mouth daily        fluticasone 50 MCG/ACT spray    FLONASE    32 g    Spray 1-2 sprays into both nostrils daily    Other allergic rhinitis        fluticasone-salmeterol 500-50 MCG/DOSE diskus inhaler    ADVAIR DISKUS    3 Inhaler    Inhale 1 puff into the lungs 2 times daily    Mild persistent asthma without complication       hydrochlorothiazide 12.5 MG capsule    MICROZIDE    90 capsule    Take 1 capsule (12.5 mg) by mouth daily    Essential hypertension with goal blood pressure less than 140/90       lisinopril 40 MG tablet    PRINIVIL/ZESTRIL    90 tablet    Take 1 tablet (40 mg) by mouth daily    Essential hypertension with goal blood pressure less than 140/90       montelukast 10 MG tablet    SINGULAIR    30 tablet    Take 1 tablet (10 mg) by mouth At Bedtime    Mild persistent asthma without complication       venlafaxine 75 MG 24 hr capsule    EFFEXOR-XR    90 capsule    Take 1 capsule (75 mg) by mouth daily    Mild major depression (H)

## 2018-10-09 NOTE — PROGRESS NOTES

## 2018-10-10 ASSESSMENT — ASTHMA QUESTIONNAIRES: ACT_TOTALSCORE: 19

## 2018-10-18 ENCOUNTER — OFFICE VISIT (OUTPATIENT)
Dept: ALLERGY | Facility: CLINIC | Age: 58
End: 2018-10-18
Payer: COMMERCIAL

## 2018-10-18 VITALS — OXYGEN SATURATION: 98 % | DIASTOLIC BLOOD PRESSURE: 82 MMHG | HEART RATE: 68 BPM | SYSTOLIC BLOOD PRESSURE: 135 MMHG

## 2018-10-18 DIAGNOSIS — J30.1 SEASONAL ALLERGIC RHINITIS DUE TO POLLEN: ICD-10-CM

## 2018-10-18 DIAGNOSIS — J30.89 ALLERGIC RHINITIS DUE TO MOLD: ICD-10-CM

## 2018-10-18 DIAGNOSIS — Z51.6 NEED FOR DESENSITIZATION TO ALLERGENS: ICD-10-CM

## 2018-10-18 DIAGNOSIS — J30.81 ALLERGIC RHINITIS DUE TO ANIMALS: Primary | ICD-10-CM

## 2018-10-18 DIAGNOSIS — J30.89 ALLERGIC RHINITIS DUE TO DUST MITE: ICD-10-CM

## 2018-10-18 PROCEDURE — 95004 PERQ TESTS W/ALRGNC XTRCS: CPT | Performed by: ALLERGY & IMMUNOLOGY

## 2018-10-18 PROCEDURE — 99207 ZZC DROP WITH A PROCEDURE: CPT | Mod: 25 | Performed by: ALLERGY & IMMUNOLOGY

## 2018-10-18 RX ORDER — EPINEPHRINE 0.3 MG/.3ML
0.3 INJECTION SUBCUTANEOUS PRN
Qty: 2 ML | Refills: 2 | Status: SHIPPED | OUTPATIENT
Start: 2018-10-18 | End: 2020-08-05

## 2018-10-18 NOTE — PROGRESS NOTES
ALLERGY SOLUTION NEW REQUEST    Nela Mares 1960 MRN: [id number]    DATE NEEDED:  1-2 weeks  Vial Color   Content   Top Dose         Vial Size  Green 1:1,000, Blue 1:100, Yellow 1:10 and Red 1:1  Cat, Dog, Dust Mite  Red 1:1 0.5   5mL  Green 1:1,000, Blue 1:100, Yellow 1:10 and Red 1:1  Trees, Weeds   Red 1:1 0.5   5mL  Green 1:1,000, Blue 1:100, Yellow 1:10 and Red 1:1  Molds    Red 1:1 0.5   5mL        Shot Clinic Location:  [place]  Ship to Location: [place]  Special Instructions:  Please call patient when serum arrives so she may begin scheduling appointments - would like to do cluster build        Requester Signature  Karen Clark MD

## 2018-10-18 NOTE — PATIENT INSTRUCTIONS
If you have any questions regarding your allergies, asthma, or what we discussed during your visit today please call the allergy clinic or contact us via [employer]    [facility]: [phone]      ACCELERATED IMMUNOTHERAPY PATIENT INFORMATION    Immunotherapy is a treatment that alters the patient s immune system so they have less allergy symptoms, use less medications to control symptoms, improved quality of life, and less health care utilization    Accelerated immunotherapy schedules are designed to allow patients to reach their maintenance immunotherapy dose in a shorter time frame than conventional immunotherapy.    Clinical benefit can be reached more rapidly using accelerated immunotherapy.     A lot of patients do not want to start allergen immunotherapy secondary to the upfront time commitment associated with conventional allergy shots. Rush immunotherapy would allow a patient to be on monthly allergy shots within 6-10 weeks. Cluster immunotherapy would allow the patient to be on monthly injections around 8-9 weeks.     Rush immunotherapy has historically been associated with an increased risk of reactions, however the risk is substantially lowered by only advancing on RUSH immunotherapy day to the mid-yellow vial, using a pre-medication regimen consisting of steroids and antihistamines, and making sure asthma is well controlled the RUSH immunotherapy day. This puts the risk of a systemic reaction similar to conventional immunotherapy. Cluster immunotherapy is similar in the risk of systemic reaction to conventional immunotherapy. The patient would still need to take oral antihistamine on cluster immunotherapy days.    CLUSTER IMMUNOTHERAPY PATIENT INSTRUCTIONS    Asthma medications must be continued and asthma must be well controlled prior to receiving CLUSTER immunotherapy. Immunotherapy should not be given if you are feeling ill.    Other allergy medications may be continued    You  should plan to spend approximately 2 hours at the clinic. Please feel free to bring things to occupy your time such as books, work, or computers. You may also wish to bring something to eat as you will not be able to leave the clinic once the procedure has begun.    You will be required to bring an epinephrine auto-injector with you to your CLUSTER immunotherapy appointments and all subsequent allergy shot appointments    You will be required to take the following pre-medication regimen prior  to your CLUSTER immunotherapy appointments  o Medications to be taken 1 day prior to CLUSTER:  - Zyrtec 10mg or Allegra 180mg twice daily  o Medications to be taken the morning of CLUSTER:  - Zyrtec 10mg or Allegra 180mg      Anaphylaxis Action Plan for Immunotherapy Patients    There is risk of systemic reactions when receiving immunotherapy injections. Local reactions such as a wheal (hive) smaller than a quarter, redness, swelling, and soreness are common. If the wheal is greater than the size of a half dollar (3.4 cm) please contact our clinic (numbers below), as we will need to adjust the dose that you receive at your next injection. Waiting until the next appointment to inform us of the reaction could increase the wait time that you experience, because your allergist will need to be contacted for new orders prior to giving the injection.  If you have symptoms not localized to the injection site, please follow the anaphylaxis plan, and contact our office to update after you have received emergency medical treatment. Please ask to speak to an Allergy RN with any questions or updates.     [facility]: [phone]  [facility]: [phone]  [facility]: [phone]  [place]: [phone]  [phone]

## 2018-10-18 NOTE — NURSING NOTE
Per provider verbal order, placed Adult Environmental Panel scratch test.  Consent was obtained prior to procedure.  Once panels were placed, patient was monitored for 15 minutes in clinic.  Provider read test after 15 minutes..  Pt tolerated procedure well.  All questions and concerns were addressed at office visit.         Sarah Allen RN

## 2018-10-18 NOTE — PROGRESS NOTES
Nela Mares was seen in the Allergy Clinic at [facility]. The following are my recommendations regarding her Allergic Rhinitis Due to Animals, Allergic Rhinitis Due to Pollen, Allergic Rhinitis Due to Dust Mites and Allergic Rhinitis Due to Mold    1. Plan to initiate allergen immunotherapy treatment - consent form signed in clinic. Patient wishes to build via cluster protocol.  2. Follow-up in 1 month      Nela Mares is a 58 year old American female who is seen today for allergy testing. She reports having increased allergy symptoms over the past week since discontinuing her antihistamines but has otherwise felt well.      Past Medical History:   Diagnosis Date     Allergic rhinitis, cause unspecified      Cervical high risk HPV (human papillomavirus) test positive 03/21/2017    3/21/17 NIL pap/+ HR HPV (not 16 or 18).      Depressive disorder      Depressive disorder, not elsewhere classified     seasonal     Hypertension      Mild persistent asthma      Obstructive sleep apnea (adult) (pediatric)     uses CPAP     Scalp mass 7/2014       REVIEW OF SYSTEMS:  General: negative for weight gain. negative for weight loss. negative for changes in sleep.   Eyes: positive  for itching. positive  for redness. positive  for tearing/watering. negative for vision changes  Ears: negative for fullness. negative for hearing loss. negative for dizziness.   Nose: negative for snoring.negative for changes in smell. positive  for drainage. Positive for congestion.  Throat: negative for hoarseness. negative for sore throat. negative for trouble swallowing.   Lungs: positive  for cough. negative for shortness of breath.negative for wheezing. negative for sputum production.   Cardiovascular: negative for chest pain. negative for swelling of ankles. negative for fast or irregular heartbeat.   Gastrointestinal: negative for nausea. negative for heartburn. negative for acid reflux.   Musculoskeletal: negative  for joint pain. negative for joint stiffness. negative for joint swelling.   Neurologic: negative for seizures. negative for fainting. negative for weakness.   Psychiatric: negative for changes in mood. negative for anxiety.   Endocrine: negative for cold intolerance. negative for heat intolerance. negative for tremors.   Hematologic: negative for easy bruising. negative for easy bleeding.  Integumentary: negative for rash. negative for scaling. negative for nail changes.       Current Outpatient Prescriptions:      albuterol (PROAIR HFA/PROVENTIL HFA/VENTOLIN HFA) 108 (90 Base) MCG/ACT Inhaler, Inhale 2 puffs into the lungs every 4 hours as needed for shortness of breath / dyspnea or wheezing, Disp: 3 Inhaler, Rfl: 3     fluticasone-salmeterol (ADVAIR DISKUS) 500-50 MCG/DOSE diskus inhaler, Inhale 1 puff into the lungs 2 times daily (Patient taking differently: Inhale 1 puff into the lungs daily ), Disp: 3 Inhaler, Rfl: 3     hydrochlorothiazide (MICROZIDE) 12.5 MG capsule, Take 1 capsule (12.5 mg) by mouth daily, Disp: 90 capsule, Rfl: 3     lisinopril (PRINIVIL/ZESTRIL) 40 MG tablet, Take 1 tablet (40 mg) by mouth daily, Disp: 90 tablet, Rfl: 3     venlafaxine (EFFEXOR-XR) 75 MG 24 hr capsule, Take 1 capsule (75 mg) by mouth daily, Disp: 90 capsule, Rfl: 3     cetirizine (ZYRTEC) 10 MG tablet, Take 10 mg by mouth daily, Disp: , Rfl:      fluticasone (FLONASE) 50 MCG/ACT spray, Spray 1-2 sprays into both nostrils daily, Disp: 32 g, Rfl: 3     montelukast (SINGULAIR) 10 MG tablet, Take 1 tablet (10 mg) by mouth At Bedtime (Patient not taking: Reported on 10/18/2018), Disp: 30 tablet, Rfl: 1    EXAM:   /82 (BP Location: Left arm, Patient Position: Sitting, Cuff Size: Adult Large)  Pulse 68  SpO2 98%  GENERAL APPEARANCE: alert, cooperative and not in distress  SKIN: no rashes, no lesions  HEAD: atraumatic, normocephalic  MUSCULOSKELETAL: no musculoskeletal defects are noted  NEURO: no focal deficits  noted  PSYCH: does not appear depressed or anxious      WORKUP:  Skin testing    ENVIRONMENTAL PERCUTANEOUS SKIN TESTING: ADULT  [facility] 10/18/2018   Consent Y   Ordering Physician Dr. Clark   Interpreting Physician Dr. Clark   Testing Technician Sarah Allen, [facility]   Location Back   Time start:  8:32 AM   Time End:  8:47 AM   Positive Control: Histatrol*ALK 1 mg/ml 7/30   Negative Control: 50% Glycerin 0   Cat Hair*ALK (10,000 BAU/ml) 4/7   AP Dog Hair/Dander (1:100 w/v) 4/10   Dust Mite p. 30,000 AU/ml 3/8   Dust Mite f. (30,000 AU/ml) 0   Kurt (W/F in millimeters) 0   Grass Mix #7 (100,000 BAU/ml) 0   Red Cedar (W/F in millimeters) 0   Maple/Sargent (W/F in millimeters) 0   Hackberry (W/F in millimeters) 0   Borden (W/F in millimeters) 3/7   [place] *ALK (W/F in millimeters) 0   American Elm (W/F in millimeters) 0   [place] (W/F in millimeters) 0   Black Reeders (W/F in millimeters) 0   Birch Mix (W/F in millimeters) 4/10   Pixley (W/F in millimeters) 3/8   Spicer (W/F in millimeters) 4/12   Cocklebur (W/F in millimeters) 0   Fifty Six (W/F in millimeters) 0   White Freeman (W/F in millimeters) 0   Careless (W/F in millimeters) 0   Nettle (W/F in millimeters) 3/10   English Plantain (W/F in millimeters) 0   Kochia (W/F in millimeters) 4/10   Lamb's Quarter (W/F in millimeters) 0   Marshelder (W/F in millimeters) 0   Ragweed Mix* ALK (W/F in millimeters) 4/12   Russian Thistle (W/F in millimeters) 0   Sagebrush/Mugwort (W/F in millimeters) 3/5   Sheep Sorrel (W/F in millimeters) 0   Feather Mix* ALK (W/F in millimeters) 4/12   Penicillium Mix (1:10 w/v) 4/10   Curvularia spicifera (1:10 w/v) 5/15   Epicoccum (1:10 w/v) 5/20   Aspergillus fumigatus (1:10 w/v): 5/17   Alternaria tenius (1:10 w/v) 5/25   H. Cladosporium (1:10 w/v) 4/10   Phoma herbarum (1:10 w/v) 5/25        ASSESSMENT/PLAN:  Nela Arthur Suzan is a 58 year old female here for allergy testing. Skin testing was positive for sensitization  to several seasonal and perennial aeroallergens. She would like to proceed with allergen immunotherapy treatment as discussed at her previous visit. The risks, benefits, and anticipated duration of treatment were reviewed.    1. Plan to initiate allergen immunotherapy treatment - consent form signed in clinic. Patient wishes to build via cluster protocol.  2. Follow-up in 1 month      Karen Clark MD  Allergy/Immunology  [facility] and [place], [place]      Chart documentation done in part with Dragon Voice Recognition Software. Although reviewed after completion, some word and grammatical errors may remain.

## 2018-10-18 NOTE — MR AVS SNAPSHOT
After Visit Summary   10/18/2018    Nela Mares    MRN: [id number]           Patient Information     Date Of Birth          1960        Visit Information        Provider Department      10/18/2018 8:20 AM Karen Clark MD [facility]        Care Instructions    If you have any questions regarding your allergies, asthma, or what we discussed during your visit today please call the allergy clinic or contact us via [employer].    [facility]: [phone]      ACCELERATED IMMUNOTHERAPY PATIENT INFORMATION    Immunotherapy is a treatment that alters the patient s immune system so they have less allergy symptoms, use less medications to control symptoms, improved quality of life, and less health care utilization    Accelerated immunotherapy schedules are designed to allow patients to reach their maintenance immunotherapy dose in a shorter time frame than conventional immunotherapy.    Clinical benefit can be reached more rapidly using accelerated immunotherapy.     A lot of patients do not want to start allergen immunotherapy secondary to the upfront time commitment associated with conventional allergy shots. Rush immunotherapy would allow a patient to be on monthly allergy shots within 6-10 weeks. Cluster immunotherapy would allow the patient to be on monthly injections around 8-9 weeks.     Rush immunotherapy has historically been associated with an increased risk of reactions, however the risk is substantially lowered by only advancing on RUSH immunotherapy day to the mid-yellow vial, using a pre-medication regimen consisting of steroids and antihistamines, and making sure asthma is well controlled the RUSH immunotherapy day. This puts the risk of a systemic reaction similar to conventional immunotherapy. Cluster immunotherapy is similar in the risk of systemic reaction to conventional immunotherapy. The patient would still need to take oral antihistamine on  cluster immunotherapy days.    CLUSTER IMMUNOTHERAPY PATIENT INSTRUCTIONS    Asthma medications must be continued and asthma must be well controlled prior to receiving CLUSTER immunotherapy. Immunotherapy should not be given if you are feeling ill.    Other allergy medications may be continued    You should plan to spend approximately 2 hours at the clinic. Please feel free to bring things to occupy your time such as books, work, or computers. You may also wish to bring something to eat as you will not be able to leave the clinic once the procedure has begun.    You will be required to bring an epinephrine auto-injector with you to your CLUSTER immunotherapy appointments and all subsequent allergy shot appointments    You will be required to take the following pre-medication regimen prior  to your CLUSTER immunotherapy appointments  o Medications to be taken 1 day prior to CLUSTER:  - Zyrtec 10mg or Allegra 180mg twice daily  o Medications to be taken the morning of CLUSTER:  - Zyrtec 10mg or Allegra 180mg      Anaphylaxis Action Plan for Immunotherapy Patients    There is risk of systemic reactions when receiving immunotherapy injections. Local reactions such as a wheal (hive) smaller than a quarter, redness, swelling, and soreness are common. If the wheal is greater than the size of a half dollar (3.4 cm) please contact our clinic (numbers below), as we will need to adjust the dose that you receive at your next injection. Waiting until the next appointment to inform us of the reaction could increase the wait time that you experience, because your allergist will need to be contacted for new orders prior to giving the injection.  If you have symptoms not localized to the injection site, please follow the anaphylaxis plan, and contact our office to update after you have received emergency medical treatment. Please ask to speak to an Allergy RN with any questions or updates.     [facility]: [phone]  [place]  [facility]: [phone]  [facility] [phone]  [place]: [phone]  [phone]                Follow-ups after your visit        Who to contact     If you have questions or need follow up information about today's clinic visit or your schedule please contact [facility] directly at [phone].  Normal or non-critical lab and imaging results will be communicated to you by [facility], letter or phone within 4 business days after the clinic has received the results. If you do not hear from us within 7 days, please contact the clinic through [employer] or phone. If you have a critical or abnormal lab result, we will notify you by phone as soon as possible.  Submit refill requests through [employer] or call your pharmacy and they will forward the refill request to us. Please allow 3 business days for your refill to be completed.          Additional Information About Your Visit        [facility] [place] Information     [employer] gives you secure access to your electronic health record. If you see a primary care provider, you can also send messages to your care team and make appointments. If you have questions, please call your primary care clinic.  If you do not have a primary care provider, please call [phone] and they will assist you.        Care EveryWhere ID     This is your Care EveryWhere ID. This could be used by other organizations to access your [facility] medical records  [id number]        Your Vitals Were     Pulse Pulse Oximetry                68 98%           Blood Pressure from Last 3 Encounters:   10/18/18 135/82   10/09/18 131/81   18 130/82    Weight from Last 3 Encounters:   14 117 kg (258 lb)   14 117.3 kg (258 lb 9.6 oz)   14 117.5 kg (259 lb)              Today, you had the following     No orders found for display         Today's Medication Changes          These changes are accurate as of 10/18/18  9:00 AM.  If you have any questions, ask your nurse or  doctor.               These medicines have changed or have updated prescriptions.        Dose/Directions    fluticasone-salmeterol 500-50 MCG/DOSE diskus inhaler   Commonly known as:  ADVAIR DISKUS   This may have changed:  when to take this   Used for:  Mild persistent asthma without complication        Dose:  1 puff   Inhale 1 puff into the lungs 2 times daily   Quantity:  3 Inhaler   Refills:  3                Primary Care Provider Office Phone # Fax #    Padma Robert Lozano [phone]       [facility] [address]        Equal Access to Services     [facility]: Hadii nirmala rosas hadashshell Soayse, waaxda luqadaha, qaybta kaalmada ruma, westley engel . So [facility] [phone].    ATENCIÓN: Si habla español, tiene a burgos disposición servicios gratuitos de asistencia lingüística. [facility] [phone].    We comply with applicable federal civil rights laws and [place] laws. We do not discriminate on the basis of race, color, national origin, age, disability, sex, sexual orientation, or gender identity.            Thank you!     Thank you for choosing [facility]  for your care. Our goal is always to provide you with excellent care. Hearing back from our patients is one way we can continue to improve our services. Please take a few minutes to complete the written survey that you may receive in the mail after your visit with us. Thank you!             Your Updated Medication List - Protect others around you: Learn how to safely use, store and throw away your medicines at www.disposemymeds.org.          This list is accurate as of 10/18/18  9:00 AM.  Always use your most recent med list.                   Brand Name Dispense Instructions for use Diagnosis    albuterol 108 (90 Base) MCG/ACT inhaler    PROAIR HFA/PROVENTIL HFA/VENTOLIN HFA    3 Inhaler    Inhale 2 puffs into the lungs every 4 hours as needed for shortness of breath /  dyspnea or wheezing    Mild persistent asthma without complication       cetirizine 10 MG tablet    zyrTEC     Take 10 mg by mouth daily        fluticasone 50 MCG/ACT spray    FLONASE    32 g    Spray 1-2 sprays into both nostrils daily    Other allergic rhinitis       fluticasone-salmeterol 500-50 MCG/DOSE diskus inhaler    ADVAIR DISKUS    3 Inhaler    Inhale 1 puff into the lungs 2 times daily    Mild persistent asthma without complication       hydrochlorothiazide 12.5 MG capsule    MICROZIDE    90 capsule    Take 1 capsule (12.5 mg) by mouth daily    Essential hypertension with goal blood pressure less than 140/90       lisinopril 40 MG tablet    PRINIVIL/ZESTRIL    90 tablet    Take 1 tablet (40 mg) by mouth daily    Essential hypertension with goal blood pressure less than 140/90       montelukast 10 MG tablet    SINGULAIR    30 tablet    Take 1 tablet (10 mg) by mouth At Bedtime    Mild persistent asthma without complication       venlafaxine 75 MG 24 hr capsule    EFFEXOR-XR    90 capsule    Take 1 capsule (75 mg) by mouth daily    Mild major depression (H)

## 2018-10-18 NOTE — LETTER
10/18/2018         RE: Nela Mares  [address] [address]        Dear Colleague,    Thank you for referring your patient, Nela Mares, to the [facility]. Please see a copy of my visit note below.    Nela Mares was seen in the Allergy Clinic at [facility]. The following are my recommendations regarding her Allergic Rhinitis Due to Animals, Allergic Rhinitis Due to Pollen, Allergic Rhinitis Due to Dust Mites and Allergic Rhinitis Due to Mold    1. Plan to initiate allergen immunotherapy treatment - consent form signed in clinic. Patient wishes to build via cluster protocol.  2. Follow-up in 1 month      Nela Mares is a 58 year old American female who is seen today for allergy testing. She reports having increased allergy symptoms over the past week since discontinuing her antihistamines but has otherwise felt well.      Past Medical History:   Diagnosis Date     Allergic rhinitis, cause unspecified      Cervical high risk HPV (human papillomavirus) test positive 03/21/2017    3/21/17 NIL pap/+ HR HPV (not 16 or 18).      Depressive disorder      Depressive disorder, not elsewhere classified     seasonal     Hypertension      Mild persistent asthma      Obstructive sleep apnea (adult) (pediatric)     uses CPAP     Scalp mass 7/2014       REVIEW OF SYSTEMS:  General: negative for weight gain. negative for weight loss. negative for changes in sleep.   Eyes: positive  for itching. positive  for redness. positive  for tearing/watering. negative for vision changes  Ears: negative for fullness. negative for hearing loss. negative for dizziness.   Nose: negative for snoring.negative for changes in smell. positive  for drainage. Positive for congestion.  Throat: negative for hoarseness. negative for sore throat. negative for trouble swallowing.   Lungs: positive  for cough. negative for shortness of breath.negative for wheezing. negative for  sputum production.   Cardiovascular: negative for chest pain. negative for swelling of ankles. negative for fast or irregular heartbeat.   Gastrointestinal: negative for nausea. negative for heartburn. negative for acid reflux.   Musculoskeletal: negative for joint pain. negative for joint stiffness. negative for joint swelling.   Neurologic: negative for seizures. negative for fainting. negative for weakness.   Psychiatric: negative for changes in mood. negative for anxiety.   Endocrine: negative for cold intolerance. negative for heat intolerance. negative for tremors.   Hematologic: negative for easy bruising. negative for easy bleeding.  Integumentary: negative for rash. negative for scaling. negative for nail changes.       Current Outpatient Prescriptions:      albuterol (PROAIR HFA/PROVENTIL HFA/VENTOLIN HFA) 108 (90 Base) MCG/ACT Inhaler, Inhale 2 puffs into the lungs every 4 hours as needed for shortness of breath / dyspnea or wheezing, Disp: 3 Inhaler, Rfl: 3     fluticasone-salmeterol (ADVAIR DISKUS) 500-50 MCG/DOSE diskus inhaler, Inhale 1 puff into the lungs 2 times daily (Patient taking differently: Inhale 1 puff into the lungs daily ), Disp: 3 Inhaler, Rfl: 3     hydrochlorothiazide (MICROZIDE) 12.5 MG capsule, Take 1 capsule (12.5 mg) by mouth daily, Disp: 90 capsule, Rfl: 3     lisinopril (PRINIVIL/ZESTRIL) 40 MG tablet, Take 1 tablet (40 mg) by mouth daily, Disp: 90 tablet, Rfl: 3     venlafaxine (EFFEXOR-XR) 75 MG 24 hr capsule, Take 1 capsule (75 mg) by mouth daily, Disp: 90 capsule, Rfl: 3     cetirizine (ZYRTEC) 10 MG tablet, Take 10 mg by mouth daily, Disp: , Rfl:      fluticasone (FLONASE) 50 MCG/ACT spray, Spray 1-2 sprays into both nostrils daily, Disp: 32 g, Rfl: 3     montelukast (SINGULAIR) 10 MG tablet, Take 1 tablet (10 mg) by mouth At Bedtime (Patient not taking: Reported on 10/18/2018), Disp: 30 tablet, Rfl: 1    EXAM:   /82 (BP Location: Left arm, Patient Position: Sitting,  Cuff Size: Adult Large)  Pulse 68  SpO2 98%  GENERAL APPEARANCE: alert, cooperative and not in distress  SKIN: no rashes, no lesions  HEAD: atraumatic, normocephalic  MUSCULOSKELETAL: no musculoskeletal defects are noted  NEURO: no focal deficits noted  PSYCH: does not appear depressed or anxious      WORKUP:  Skin testing    ENVIRONMENTAL PERCUTANEOUS SKIN TESTING: ADULT  [facility] 10/18/2018   Consent Y   Ordering Physician Dr. Clark   Interpreting Physician Dr. Clark   Testing Technician Sarah Allen, RN   Location Back   Time start:  8:32 AM   Time End:  8:47 AM   Positive Control: Histatrol*ALK 1 mg/ml 7/30   Negative Control: 50% Glycerin 0   Cat Hair*ALK (10,000 BAU/ml) 4/7   AP Dog Hair/Dander (1:100 w/v) 4/10   Dust Mite p. 30,000 AU/ml 3/8   Dust Mite f. (30,000 AU/ml) 0   Kurt (W/F in millimeters) 0   Grass Mix #7 (100,000 BAU/ml) 0   Red Cedar (W/F in millimeters) 0   Maple/[place] (W/F in millimeters) 0   Hackberry (W/F in millimeters) 0   Volusia (W/F in millimeters) 3/7   Grafton *ALK (W/F in millimeters) 0   American Elm (W/F in millimeters) 0   Williamson (W/F in millimeters) 0   Black [place] (W/F in millimeters) 0   Birch Mix (W/F in millimeters) 4/10   Ozark (W/F in millimeters) 3/8   Ellerslie (W/F in millimeters) 4/12   Cocklebur (W/F in millimeters) 0   Neville (W/F in millimeters) 0   White Freeman (W/F in millimeters) 0   Careless (W/F in millimeters) 0   Nettle (W/F in millimeters) 3/10   English Plantain (W/F in millimeters) 0   Kochia (W/F in millimeters) 4/10   Lamb's Quarter (W/F in millimeters) 0   Marshelder (W/F in millimeters) 0   Ragweed Mix* ALK (W/F in millimeters) 4/12   Russian Thistle (W/F in millimeters) 0   Sagebrush/Mugwort (W/F in millimeters) 3/5   Sheep Sorrel (W/F in millimeters) 0   Feather Mix* ALK (W/F in millimeters) 4/12   Penicillium Mix (1:10 w/v) 4/10   Curvularia spicifera (1:10 w/v) 5/15   Epicoccum (1:10 w/v) 5/20   Aspergillus fumigatus (1:10  w/v): 5/17   Alternaria tenius (1:10 w/v) 5/25   H. Cladosporium (1:10 w/v) 4/10   Phoma herbarum (1:10 w/v) 5/25        ASSESSMENT/PLAN:  Nela Mares is a 58 year old female here for allergy testing. Skin testing was positive for sensitization to several seasonal and perennial aeroallergens. She would like to proceed with allergen immunotherapy treatment as discussed at her previous visit. The risks, benefits, and anticipated duration of treatment were reviewed.    1. Plan to initiate allergen immunotherapy treatment - consent form signed in clinic. Patient wishes to build via cluster protocol.  2. Follow-up in 1 month      Karen Clark MD  Allergy/Immunology  [facility] and [place], [place]      Chart documentation done in part with Dragon Voice Recognition Software. Although reviewed after completion, some word and grammatical errors may remain.    Again, thank you for allowing me to participate in the care of your patient.        Sincerely,        Karen Clark MD

## 2018-10-18 NOTE — LETTER
My Asthma Action Plan  Name: Nela Mares   YOB: 1960  Date: 10/18/2018   My doctor: Karen Clark MD   My clinic: [facility]        My Control Medicine: { :606491}  My Rescue Medicine: { :685735}  {AAP include Oral Steroid:924541} My Asthma Severity: { :846538}  Avoid your asthma triggers: { :908357}  None     {Is patient a child or adult?:015632}       GREEN ZONE   Good Control    I feel good    No cough or wheeze    Can work, sleep and play without asthma symptoms       Take your asthma control medicine every day.     1. If exercise triggers your asthma, take your rescue medication    15 minutes before exercise or sports, and    During exercise if you have asthma symptoms  2. Spacer to use with inhaler: If you have a spacer, make sure to use it with your inhaler             YELLOW ZONE Getting Worse  I have ANY of these:    I do not feel good    Cough or wheeze    Chest feels tight    Wake up at night   1. Keep taking your Green Zone medications  2. Start taking your rescue medicine:    every 20 minutes for up to 1 hour. Then every 4 hours for 24-48 hours.  3. If you stay in the Yellow Zone for more than 12-24 hours, contact your doctor.  4. If you do not return to the Green Zone in 12-24 hours or you get worse, start taking your oral steroid medicine if prescribed by your provider.           RED ZONE Medical Alert - Get Help  I have ANY of these:    I feel awful    Medicine is not helping    Breathing getting harder    Trouble walking or talking    Nose opens wide to breathe       1. Take your rescue medicine NOW  2. If your provider has prescribed an oral steroid medicine, start taking it NOW  3. Call your doctor NOW  4. If you are still in the Red Zone after 20 minutes and you have not reached your doctor:    Take your rescue medicine again and    Call 911 or go to the emergency room right away    See your regular doctor within 2 weeks of an Emergency Room or Urgent Care visit  for follow-up treatment.          Annual Reminders:  Meet with Asthma Educator,  Flu Shot in the Fall, consider Pneumonia Vaccination for patients with asthma (aged 19 and older).    Pharmacy: [id number] [address] [place] [address]                      Asthma Triggers  How To Control Things That Make Your Asthma Worse    Triggers are things that make your asthma worse.  Look at the list below to help you find your triggers and what you can do about them.  You can help prevent asthma flare-ups by staying away from your triggers.      Trigger                                                          What you can do   Cigarette Smoke  Tobacco smoke can make asthma worse. Do not allow smoking in your home, car or around you.  Be sure no one smokes at a child s day care or school.  If you smoke, ask your health care provider for ways to help you quit.  Ask family members to quit too.  Ask your health care provider for a referral to Quit Plan to help you quit smoking, or call 5-586-433-PLAN.     Colds, Flu, Bronchitis  These are common triggers of asthma. Wash your hands often.  Don t touch your eyes, nose or mouth.  Get a flu shot every year.     Dust Mites  These are tiny bugs that live in cloth or carpet. They are too small to see. Wash sheets and blankets in hot water every week.   Encase pillows and mattress in dust mite proof covers.  Avoid having carpet if you can. If you have carpet, vacuum weekly.   Use a dust mask and HEPA vacuum.   Pollen and Outdoor Mold  Some people are allergic to trees, grass, or weed pollen, or molds. Try to keep your windows closed.  Limit time out doors when pollen count is high.   Ask you health care provider about taking medicine during allergy season.     Animal Dander  Some people are allergic to skin flakes, urine or saliva from pets with fur or feathers. Keep pets with fur or feathers out of your home.    If you can t keep the pet outdoors, then keep the pet out  of your bedroom.  Keep the bedroom door closed.  Keep pets off cloth furniture and away from stuffed toys.     Mice, Rats, and Cockroaches  Some people are allergic to the waste from these pests.   Cover food and garbage.  Clean up spills and food crumbs.  Store grease in the refrigerator.   Keep food out of the bedroom.   Indoor Mold  This can be a trigger if your home has high moisture. Fix leaking faucets, pipes, or other sources of water.   Clean moldy surfaces.  Dehumidify basement if it is damp and smelly.   Smoke, Strong Odors, and Sprays  These can reduce air quality. Stay away from strong odors and sprays, such as perfume, powder, hair spray, paints, smoke incense, paint, cleaning products, candles and new carpet.   Exercise or Sports  Some people with asthma have this trigger. Be active!  Ask your doctor about taking medicine before sports or exercise to prevent symptoms.    Warm up for 5-10 minutes before and after sports or exercise.     Other Triggers of Asthma  Cold air:  Cover your nose and mouth with a scarf.  Sometimes laughing or crying can be a trigger.  Some medicines and food can trigger asthma.

## 2018-11-05 ENCOUNTER — MYC MEDICAL ADVICE (OUTPATIENT)
Dept: ALLERGY | Facility: CLINIC | Age: 58
End: 2018-11-05

## 2018-11-05 ENCOUNTER — TELEPHONE (OUTPATIENT)
Dept: ALLERGY | Facility: CLINIC | Age: 58
End: 2018-11-05

## 2018-11-05 DIAGNOSIS — Z51.6 NEED FOR DESENSITIZATION TO ALLERGENS: Primary | ICD-10-CM

## 2018-11-05 PROCEDURE — 95165 ANTIGEN THERAPY SERVICES: CPT | Mod: 59 | Performed by: ALLERGY & IMMUNOLOGY

## 2018-11-05 PROCEDURE — 95165 ANTIGEN THERAPY SERVICES: CPT | Performed by: ALLERGY & IMMUNOLOGY

## 2018-11-05 NOTE — PROGRESS NOTES
Allergy serums billed at [facility].     Vials received below:    Vial Color Content                      Vial Size Expiration Date  Green 1:1,000 Cat, Dog, Dust Mite 5mL  04/30/2019  Blue 1:100 Cat, Dog, Dust Mite 5mL  10/30/2019  Yellow 1:10 Cat, Dog, Dust Mite 5mL  10/30/2019  Green 1:1,000 Trees, Weeds 5mL  04/30/2019  Blue 1:100   Trees, Weeds  5mL  10/30/2019  Yellow 1:10   Trees, Weeds  5mL  10/30/2019  Red 1:1   Trees, Weeds  5mL  10/30/2019  Green 1:1,000   Molds   5mL  04/30/2019  Blue 1:100   Molds   5mL  10/30/2019  Yellow 1:10   Molds   5mL  10/30/2019  Red 1:1   Molds   5mL  10/30/2019    Original Refill encounter date: 10/18/2018      Signature  Shawna Osborne, [facility]  11/5/2018  11:05 AM

## 2018-11-05 NOTE — PROGRESS NOTES
Allergy serums received at [place].     Vials received below:    Vial Color Content                      Vial Size Expiration Date  Green 1:1,000 Cat, Dog, Dust Mite 5mL  04/30/2019  Blue 1:100 Cat, Dog, Dust Mite 5mL  10/30/2019  Yellow 1:10 Cat, Dog, Dust Mite 5mL  10/30/2019  Green 1:1,000 Trees, Weeds 5mL  04/30/2019  Blue 1:100   Trees, Weeds  5mL  10/30/2019  Yellow 1:10   Trees, Weeds  5mL  10/30/2019  Red 1:1   Trees, Weeds  5mL  10/30/2019  Green 1:1,000   Molds   5mL  04/30/2019  Blue 1:100   Molds   5mL  10/30/2019  Yellow 1:10   Molds   5mL  10/30/2019  Red 1:1   Molds   5mL  10/30/2019        Signature  Shawna Osborne, [facility]  11/5/2018  10:55 AM

## 2018-11-05 NOTE — TELEPHONE ENCOUNTER
----- Message from Shawna Osborne sent at 11/5/2018 12:07 PM CST -----  Regarding: Cluster Build  Vials have been delivered, received and billed.  Pt is interested in Cluster Building.    Shawna Osborne, [facility]  11/5/2018  12:08 PM

## 2018-11-13 ENCOUNTER — OFFICE VISIT (OUTPATIENT)
Dept: ALLERGY | Facility: CLINIC | Age: 58
End: 2018-11-13
Payer: COMMERCIAL

## 2018-11-13 VITALS
OXYGEN SATURATION: 96 % | SYSTOLIC BLOOD PRESSURE: 126 MMHG | HEART RATE: 69 BPM | TEMPERATURE: 98.5 F | DIASTOLIC BLOOD PRESSURE: 80 MMHG

## 2018-11-13 DIAGNOSIS — J30.1 SEASONAL ALLERGIC RHINITIS DUE TO POLLEN: Primary | ICD-10-CM

## 2018-11-13 DIAGNOSIS — J30.89 ALLERGIC RHINITIS DUE TO MOLD: ICD-10-CM

## 2018-11-13 DIAGNOSIS — J30.89 ALLERGIC RHINITIS DUE TO DUST MITE: ICD-10-CM

## 2018-11-13 DIAGNOSIS — J30.81 ALLERGIC RHINITIS DUE TO ANIMALS: ICD-10-CM

## 2018-11-13 PROCEDURE — 95180 RAPID DESENSITIZATION: CPT | Performed by: ALLERGY & IMMUNOLOGY

## 2018-11-13 PROCEDURE — 99207 ZZC DROP WITH A PROCEDURE: CPT | Performed by: ALLERGY & IMMUNOLOGY

## 2018-11-13 NOTE — LETTER
11/13/2018         RE: Nela Mares  [address] [facility] [id number]        Dear Colleague,    Thank you for referring your patient, Nela Mares, to the [facility]. Please see a copy of my visit note below.    Nela Mares was seen in the Allergy Clinic at [facility]. The following are my recommendations regarding her Allergic Rhinitis Due to Animals, Allergic Rhinitis Due to Pollen, Allergic Rhinitis Due to Dust Mites and Allergic Rhinitis Due to Mold    1. Continue cluster immunotherapy per protocol  2. Pre-medicate with antihistamines as directed prior to injection appointments      Nela Mares is a 58 year old American female who is seen today for cluster immunotherapy. She has been feeling well and has no questions or concerns today. Sanam pre-medicated with antihistamines as directed prior to today's visit.      Past Medical History:   Diagnosis Date     Allergic rhinitis, cause unspecified      Cervical high risk HPV (human papillomavirus) test positive 03/21/2017    3/21/17 NIL pap/+ HR HPV (not 16 or 18).      Depressive disorder      Depressive disorder, not elsewhere classified     seasonal     Hypertension      Mild persistent asthma      Obstructive sleep apnea (adult) (pediatric)     uses CPAP     Scalp mass 7/2014       REVIEW OF SYSTEMS:  General: negative for weight gain. negative for weight loss. negative for changes in sleep.   Eyes: negative for itching. negative for redness. negative for tearing/watering. negative for vision changes  Ears: negative for fullness. negative for hearing loss. negative for dizziness.   Nose: negative for snoring.negative for changes in smell. negative for drainage.   Throat: negative for hoarseness. negative for sore throat. negative for trouble swallowing.   Lungs: negative for cough. negative for shortness of breath.negative for wheezing. negative for sputum production.   Cardiovascular: negative  for chest pain. negative for swelling of ankles. negative for fast or irregular heartbeat.   Gastrointestinal: negative for nausea. negative for heartburn. negative for acid reflux.   Musculoskeletal: negative for joint pain. negative for joint stiffness. negative for joint swelling.   Neurologic: negative for seizures. negative for fainting. negative for weakness.   Psychiatric: negative for changes in mood. negative for anxiety.   Endocrine: negative for cold intolerance. negative for heat intolerance. negative for tremors.   Hematologic: negative for easy bruising. negative for easy bleeding.  Integumentary: negative for rash. negative for scaling. negative for nail changes.       Current Outpatient Prescriptions:      cetirizine (ZYRTEC) 10 MG tablet, Take 10 mg by mouth daily, Disp: , Rfl:      fluticasone (FLONASE) 50 MCG/ACT spray, Spray 1-2 sprays into both nostrils daily, Disp: 32 g, Rfl: 3     fluticasone-salmeterol (ADVAIR DISKUS) 500-50 MCG/DOSE diskus inhaler, Inhale 1 puff into the lungs 2 times daily (Patient taking differently: Inhale 1 puff into the lungs daily ), Disp: 3 Inhaler, Rfl: 3     hydrochlorothiazide (MICROZIDE) 12.5 MG capsule, Take 1 capsule (12.5 mg) by mouth daily, Disp: 90 capsule, Rfl: 3     lisinopril (PRINIVIL/ZESTRIL) 40 MG tablet, Take 1 tablet (40 mg) by mouth daily, Disp: 90 tablet, Rfl: 3     montelukast (SINGULAIR) 10 MG tablet, Take 1 tablet (10 mg) by mouth At Bedtime, Disp: 30 tablet, Rfl: 1     venlafaxine (EFFEXOR-XR) 75 MG 24 hr capsule, Take 1 capsule (75 mg) by mouth daily, Disp: 90 capsule, Rfl: 3     albuterol (PROAIR HFA/PROVENTIL HFA/VENTOLIN HFA) 108 (90 Base) MCG/ACT Inhaler, Inhale 2 puffs into the lungs every 4 hours as needed for shortness of breath / dyspnea or wheezing (Patient not taking: Reported on 11/13/2018), Disp: 3 Inhaler, Rfl: 3     EPINEPHrine (AUVI-Q) 0.3 MG/0.3ML injection 2-pack, Inject 0.3 mLs (0.3 mg) into the muscle as needed for  anaphylaxis (Patient not taking: Reported on 11/13/2018), Disp: 2 mL, Rfl: 2     ORDER FOR ALLERGEN IMMUNOTHERAPY, Name of Mix: Mix #1  Dust Mite, Cat, Dog Cat Hair, Standardized 10,000 BAU/mL, ALK  2.0 ml Dog Hair-Dander, A. P.  1:100 w/v, HS  1.0 ml Dust Mites DP. 30,000 AU/mL, HS  0.6 ml  Diluent: HSA qs to 5ml, Disp: 5 mL, Rfl: PRN     ORDER FOR ALLERGEN IMMUNOTHERAPY, Name of Mix: Mix #2  Tree , Weeds Birch Mix PRW 1:20 w/v, HS  0.5 ml Hickory, Shagbark 1:20 w/v, HS  0.5 ml [place] Mix RW 1:20 w/v, HS 0.5 ml Oak Mix RVW 1:20 w/v, HS 0.5 ml Kochia 1:20 w/v, HS 0.5 ml Nettle 1:20 w/v, HS 0.5 ml Ragweed Mixed 1:20 w/v ALK  0.5 ml Sagebrush, Mugwort 1:20 w/v, HS 0.5 ml Diluent: HSA qs to 5ml, Disp: 5 mL, Rfl: PRN     ORDER FOR ALLERGEN IMMUNOTHERAPY, Name of Mix: Mix #3  Mold Alternaria Tenuis 1:10 w/v, HS  0.5 ml Aspergillus Fumigatus 1:10 w/v, HS  0.5 ml Curvularia Spicifera 1:10 w/v, HS  0.5 ml Epicoccum Nigrum 1:10 w/v, HS 0.5 ml Hormodendrum Cladosporioides 1:10 w/v, HS 0.5 ml Penicillium Mix 1:10 w/v, HS  0.5 ml Phoma Herbarum 1:10 w/v, HS  0.5 ml Diluent: HSA qs to 5ml, Disp: 5 mL, Rfl: PRN    EXAM:   /80 (BP Location: Left arm, Patient Position: Sitting, Cuff Size: Adult Large)  Pulse 69  Temp 98.5  F (36.9  C) (Oral)  SpO2 96%  GENERAL APPEARANCE: alert, cooperative and not in distress  SKIN: no rashes, no lesions  HEAD: atraumatic, normocephalic  ENT: no scars or lesions, tongue midline and normal, soft palate, uvula, and tonsils normal  NECK: no asymmetry, masses, or scars, supple without significant adenopathy  LUNGS: unlabored respirations, no intercostal retractions or accessory muscle use, clear to auscultation without rales or wheezes  HEART: regular rate and rhythm without murmurs and normal S1 and S2  MUSCULOSKELETAL: no musculoskeletal defects are noted  NEURO: no focal deficits noted  PSYCH: does not appear depressed or anxious      WORKUP:  Cluster Immunotherapy    Cluster Allergen  Immunotherapy:    After explaining risks and benefits and obtaining consent, we proceeded with cluster immunotherapy.      Injection given: 14:45  Green 1:1,000   Cat, Dog, Dust Mite    0.1 mL  Green 1:1,000   Trees, Weeds     0.1 mL  Green 1:1,000   Molds      0.1 mL          Injection given: 15:19  Green 1:1,000   Cat, Dog, Dust Mite    0.2 mL  Green 1:1,000   Trees, Weeds     0.2 mL  Green 1:1,000   Molds      0.2 mL      Injection given: 15:53  Green 1:1,000   Cat, Dog, Dust Mite    0.4 mL  Green 1:1,000   Trees, Weeds     0.4 mL  Green 1:1,000   Molds      0.4 mL      Injection given: 16:26  Blue 1:100   Cat, Dog, Dust Mite    0.1 mL  Blue 1:100   Trees, Weeds     0.1 mL  Blue 1:100   Molds      0.1 mL        Start Time: 14:45  End Time: 16:56      ASSESSMENT/PLAN:  Nela Mares is a 58 year old female here for cluster immunotherapy. She tolerated the procedure well without developing any signs or symptoms of an allergic reaction.    1. Continue cluster immunotherapy per protocol  2. Pre-medicate with antihistamines as directed prior to injection appointments      Karen Clark MD  Allergy/Immunology  [place], [place]      Chart documentation done in part with Dragon Voice Recognition Software. Although reviewed after completion, some word and grammatical errors may remain.      Again, thank you for allowing me to participate in the care of your patient.        Sincerely,        Karen Clark MD

## 2018-11-13 NOTE — LETTER
My Asthma Action Plan  Name: Nela Mares   YOB: 1960  Date: 11/13/2018   My doctor: Karen Clark MD   My clinic: [facility]        My Control Medicine: Fluticasone + salmeterol (Advair) -  Diskus 500/50 mcg 1 puff twice daily  Montelukast (Singulair) -  10 mg 1 tablet daily  My Rescue Medicine: Albuterol (Proair/Ventolin/Proventil) inhaler 2-4 puffs every 4 hours as needed   My Asthma Severity: mild persistent  Avoid your asthma triggers: upper respiratory infections and allergens              GREEN ZONE   Good Control    I feel good    No cough or wheeze    Can work, sleep and play without asthma symptoms       Take your asthma control medicine every day.     1. If exercise triggers your asthma, take your rescue medication    15 minutes before exercise or sports, and    During exercise if you have asthma symptoms  2. Spacer to use with inhaler: If you have a spacer, make sure to use it with your inhaler             YELLOW ZONE Getting Worse  I have ANY of these:    I do not feel good    Cough or wheeze    Chest feels tight    Wake up at night   1. Keep taking your Green Zone medications  2. Start taking your rescue medicine:    every 20 minutes for up to 1 hour. Then every 4 hours for 24-48 hours.  3. If you stay in the Yellow Zone for more than 12-24 hours, contact your doctor.             RED ZONE Medical Alert - Get Help  I have ANY of these:    I feel awful    Medicine is not helping    Breathing getting harder    Trouble walking or talking    Nose opens wide to breathe       1. Take your rescue medicine NOW  2. If your provider has prescribed an oral steroid medicine, start taking it NOW  3. Call your doctor NOW  4. If you are still in the Red Zone after 20 minutes and you have not reached your doctor:    Take your rescue medicine again and    Call 911 or go to the emergency room right away    See your regular doctor within 2 weeks of an Emergency Room or Urgent Care visit  for follow-up treatment.          Annual Reminders:  Meet with Asthma Educator,  Flu Shot in the Fall, consider Pneumonia Vaccination for patients with asthma (aged 19 and older).    Pharmacy:    [id number] - [place], [place] - [address] [place] [address]  [facility] - [place], [place] - [address] [place] [address] PHARMACY - [place], [place] - [address]                      Asthma Triggers  How To Control Things That Make Your Asthma Worse    Triggers are things that make your asthma worse.  Look at the list below to help you find your triggers and what you can do about them.  You can help prevent asthma flare-ups by staying away from your triggers.      Trigger                                                          What you can do   Cigarette Smoke  Tobacco smoke can make asthma worse. Do not allow smoking in your home, car or around you.  Be sure no one smokes at a child s day care or school.  If you smoke, ask your health care provider for ways to help you quit.  Ask family members to quit too.  Ask your health care provider for a referral to Quit Plan to help you quit smoking, or call 5-049-354-PLAN.     Colds, Flu, Bronchitis  These are common triggers of asthma. Wash your hands often.  Don t touch your eyes, nose or mouth.  Get a flu shot every year.     Dust Mites  These are tiny bugs that live in cloth or carpet. They are too small to see. Wash sheets and blankets in hot water every week.   Encase pillows and mattress in dust mite proof covers.  Avoid having carpet if you can. If you have carpet, vacuum weekly.   Use a dust mask and HEPA vacuum.   Pollen and Outdoor Mold  Some people are allergic to trees, grass, or weed pollen, or molds. Try to keep your windows closed.  Limit time out doors when pollen count is high.   Ask you health care provider about taking medicine during allergy season.     Animal Dander  Some people are allergic to skin flakes, urine or saliva from pets  with fur or feathers. Keep pets with fur or feathers out of your home.    If you can t keep the pet outdoors, then keep the pet out of your bedroom.  Keep the bedroom door closed.  Keep pets off cloth furniture and away from stuffed toys.     Mice, Rats, and Cockroaches  Some people are allergic to the waste from these pests.   Cover food and garbage.  Clean up spills and food crumbs.  Store grease in the refrigerator.   Keep food out of the bedroom.   Indoor Mold  This can be a trigger if your home has high moisture. Fix leaking faucets, pipes, or other sources of water.   Clean moldy surfaces.  Dehumidify basement if it is damp and smelly.   Smoke, Strong Odors, and Sprays  These can reduce air quality. Stay away from strong odors and sprays, such as perfume, powder, hair spray, paints, smoke incense, paint, cleaning products, candles and new carpet.   Exercise or Sports  Some people with asthma have this trigger. Be active!  Ask your doctor about taking medicine before sports or exercise to prevent symptoms.    Warm up for 5-10 minutes before and after sports or exercise.     Other Triggers of Asthma  Cold air:  Cover your nose and mouth with a scarf.  Sometimes laughing or crying can be a trigger.  Some medicines and food can trigger asthma.

## 2018-11-13 NOTE — PROGRESS NOTES
Nela Mares was seen in the Allergy Clinic at [facility]. The following are my recommendations regarding her Allergic Rhinitis Due to Animals, Allergic Rhinitis Due to Pollen, Allergic Rhinitis Due to Dust Mites and Allergic Rhinitis Due to Mold    1. Continue cluster immunotherapy per protocol  2. Pre-medicate with antihistamines as directed prior to injection appointments      Nela Mares is a 58 year old American female who is seen today for cluster immunotherapy. She has been feeling well and has no questions or concerns today. Sanam pre-medicated with antihistamines as directed prior to today's visit.      Past Medical History:   Diagnosis Date     Allergic rhinitis, cause unspecified      Cervical high risk HPV (human papillomavirus) test positive 03/21/2017    3/21/17 NIL pap/+ HR HPV (not 16 or 18).      Depressive disorder      Depressive disorder, not elsewhere classified     seasonal     Hypertension      Mild persistent asthma      Obstructive sleep apnea (adult) (pediatric)     uses CPAP     Scalp mass 7/2014       REVIEW OF SYSTEMS:  General: negative for weight gain. negative for weight loss. negative for changes in sleep.   Eyes: negative for itching. negative for redness. negative for tearing/watering. negative for vision changes  Ears: negative for fullness. negative for hearing loss. negative for dizziness.   Nose: negative for snoring.negative for changes in smell. negative for drainage.   Throat: negative for hoarseness. negative for sore throat. negative for trouble swallowing.   Lungs: negative for cough. negative for shortness of breath.negative for wheezing. negative for sputum production.   Cardiovascular: negative for chest pain. negative for swelling of ankles. negative for fast or irregular heartbeat.   Gastrointestinal: negative for nausea. negative for heartburn. negative for acid reflux.   Musculoskeletal: negative for joint pain. negative for joint  stiffness. negative for joint swelling.   Neurologic: negative for seizures. negative for fainting. negative for weakness.   Psychiatric: negative for changes in mood. negative for anxiety.   Endocrine: negative for cold intolerance. negative for heat intolerance. negative for tremors.   Hematologic: negative for easy bruising. negative for easy bleeding.  Integumentary: negative for rash. negative for scaling. negative for nail changes.       Current Outpatient Prescriptions:      cetirizine (ZYRTEC) 10 MG tablet, Take 10 mg by mouth daily, Disp: , Rfl:      fluticasone (FLONASE) 50 MCG/ACT spray, Spray 1-2 sprays into both nostrils daily, Disp: 32 g, Rfl: 3     fluticasone-salmeterol (ADVAIR DISKUS) 500-50 MCG/DOSE diskus inhaler, Inhale 1 puff into the lungs 2 times daily (Patient taking differently: Inhale 1 puff into the lungs daily ), Disp: 3 Inhaler, Rfl: 3     hydrochlorothiazide (MICROZIDE) 12.5 MG capsule, Take 1 capsule (12.5 mg) by mouth daily, Disp: 90 capsule, Rfl: 3     lisinopril (PRINIVIL/ZESTRIL) 40 MG tablet, Take 1 tablet (40 mg) by mouth daily, Disp: 90 tablet, Rfl: 3     montelukast (SINGULAIR) 10 MG tablet, Take 1 tablet (10 mg) by mouth At Bedtime, Disp: 30 tablet, Rfl: 1     venlafaxine (EFFEXOR-XR) 75 MG 24 hr capsule, Take 1 capsule (75 mg) by mouth daily, Disp: 90 capsule, Rfl: 3     albuterol (PROAIR HFA/PROVENTIL HFA/VENTOLIN HFA) 108 (90 Base) MCG/ACT Inhaler, Inhale 2 puffs into the lungs every 4 hours as needed for shortness of breath / dyspnea or wheezing (Patient not taking: Reported on 11/13/2018), Disp: 3 Inhaler, Rfl: 3     EPINEPHrine (AUVI-Q) 0.3 MG/0.3ML injection 2-pack, Inject 0.3 mLs (0.3 mg) into the muscle as needed for anaphylaxis (Patient not taking: Reported on 11/13/2018), Disp: 2 mL, Rfl: 2     ORDER FOR ALLERGEN IMMUNOTHERAPY, Name of Mix: Mix #1  Dust Mite, Cat, Dog Cat Hair, Standardized 10,000 BAU/mL, ALK  2.0 ml Dog Hair-Dander, A. P.  1:100 w/v, HS  1.0 ml  Dust Mites DP. 30,000 AU/mL, HS  0.6 ml  Diluent: HSA qs to 5ml, Disp: 5 mL, Rfl: PRN     ORDER FOR ALLERGEN IMMUNOTHERAPY, Name of Mix: Mix #2  Tree , Weeds Birch Mix PRW 1:20 w/v, HS  0.5 ml Hickory, Shagbark 1:20 w/v, HS  0.5 ml [place]cher Mix RW 1:20 w/v, HS 0.5 ml Oak Mix RVW 1:20 w/v, HS 0.5 ml Kochia 1:20 w/v, HS 0.5 ml Nettle 1:20 w/v, HS 0.5 ml Ragweed Mixed 1:20 w/v ALK  0.5 ml Sagebrush, Mugwort 1:20 w/v, HS 0.5 ml Diluent: HSA qs to 5ml, Disp: 5 mL, Rfl: PRN     ORDER FOR ALLERGEN IMMUNOTHERAPY, Name of Mix: Mix #3  Mold Alternaria Tenuis 1:10 w/v, HS  0.5 ml Aspergillus Fumigatus 1:10 w/v, HS  0.5 ml Curvularia Spicifera 1:10 w/v, HS  0.5 ml Epicoccum Nigrum 1:10 w/v, HS 0.5 ml Hormodendrum Cladosporioides 1:10 w/v, HS 0.5 ml Penicillium Mix 1:10 w/v, HS  0.5 ml Phoma Herbarum 1:10 w/v, HS  0.5 ml Diluent: HSA qs to 5ml, Disp: 5 mL, Rfl: PRN    EXAM:   /80 (BP Location: Left arm, Patient Position: Sitting, Cuff Size: Adult Large)  Pulse 69  Temp 98.5  F (36.9  C) (Oral)  SpO2 96%  GENERAL APPEARANCE: alert, cooperative and not in distress  SKIN: no rashes, no lesions  HEAD: atraumatic, normocephalic  ENT: no scars or lesions, tongue midline and normal, soft palate, uvula, and tonsils normal  NECK: no asymmetry, masses, or scars, supple without significant adenopathy  LUNGS: unlabored respirations, no intercostal retractions or accessory muscle use, clear to auscultation without rales or wheezes  HEART: regular rate and rhythm without murmurs and normal S1 and S2  MUSCULOSKELETAL: no musculoskeletal defects are noted  NEURO: no focal deficits noted  PSYCH: does not appear depressed or anxious      WORKUP:  Cluster Immunotherapy    Cluster Allergen Immunotherapy:    After explaining risks and benefits and obtaining consent, we proceeded with cluster immunotherapy.      Injection given: 14:45  Green 1:1,000   Cat, Dog, Dust Mite    0.1 mL  Green 1:1,000   Trees, Weeds     0.1 mL  Green  1:1,000   Molds      0.1 mL          Injection given: 15:19  Green 1:1,000   Cat, Dog, Dust Mite    0.2 mL  Green 1:1,000   Trees, Weeds     0.2 mL  Green 1:1,000   Molds      0.2 mL      Injection given: 15:53  Green 1:1,000   Cat, Dog, Dust Mite    0.4 mL  Green 1:1,000   Trees, Weeds     0.4 mL  Green 1:1,000   Molds      0.4 mL      Injection given: 16:26  Blue 1:100   Cat, Dog, Dust Mite    0.1 mL  Blue 1:100   Trees, Weeds     0.1 mL  Blue 1:100   Molds      0.1 mL        Start Time: 14:45  End Time: 16:56      ASSESSMENT/PLAN:  Nela Mares is a 58 year old female here for cluster immunotherapy. She tolerated the procedure well without developing any signs or symptoms of an allergic reaction.    1. Continue cluster immunotherapy per protocol  2. Pre-medicate with antihistamines as directed prior to injection appointments      Karen Clark MD  Allergy/Immunology  [facility] and [place], [place]      Chart documentation done in part with Dragon Voice Recognition Software. Although reviewed after completion, some word and grammatical errors may remain.

## 2018-11-13 NOTE — MR AVS SNAPSHOT
After Visit Summary   11/13/2018    Nela Mares    MRN: [id number]           Patient Information     Date Of Birth          1960        Visit Information        Provider Department      11/13/2018 2:20 PM Karen Clark MD [facility]        Today's Diagnoses     Seasonal allergic rhinitis due to pollen    -  1    Allergic rhinitis due to dust mite        Allergic rhinitis due to mold        Allergic rhinitis due to animals           Follow-ups after your visit        Your next 10 appointments already scheduled     Nov 20, 2018  2:00 PM CST   [facility]    [address] [place] [address]   [phone]            Nov 27, 2018  2:00 PM CST   [facility]    [address] [place] [address]   [phone]            Dec 04, 2018  2:00 PM CST   Return Visit with Karen Clark MD   [facility] ([facility])    [address] [place] [address]   [phone]            Dec 11, 2018  2:00 PM CST   [facility]    [address] [place] [address]   [phone]            Dec 18, 2018  2:00 PM CST   [facility]    [address] [place] [address]   [phone]              Who to contact     If you have questions or need follow up information about today's clinic visit or your schedule please contact [facility] directly at [phone].  Normal or non-critical lab and imaging results will be communicated to you by MyChart, letter or phone within 4 business days after the clinic has received the results. If you do not hear from us within 7 days, please contact the clinic through MyChart or  phone. If you have a critical or abnormal lab result, we will notify you by phone as soon as possible.  Submit refill requests through Homuork or call your pharmacy and they will forward the refill request to us. Please allow 3 business days for your refill to be completed.          Additional Information About Your Visit        Doochoohart Information     Homuork gives you secure access to your electronic health record. If you see a primary care provider, you can also send messages to your care team and make appointments. If you have questions, please call your primary care clinic.  If you do not have a primary care provider, please call [phone] and they will assist you.        Care EveryWhere ID     This is your Care EveryWhere ID. This could be used by other organizations to access your [facility] medical records  [id number]        Your Vitals Were     Pulse Temperature Pulse Oximetry             69 98.5  F (36.9  C) (Oral) 96%          Blood Pressure from Last 3 Encounters:   11/13/18 126/80   10/18/18 135/82   10/09/18 131/81    Weight from Last 3 Encounters:   07/21/14 117 kg (258 lb)   06/17/14 117.3 kg (258 lb 9.6 oz)   03/14/14 117.5 kg (259 lb)              We Performed the Following     RAPID DESENSITIZATION          Today's Medication Changes          These changes are accurate as of 11/13/18  5:57 PM.  If you have any questions, ask your nurse or doctor.               These medicines have changed or have updated prescriptions.        Dose/Directions    fluticasone-salmeterol 500-50 MCG/DOSE diskus inhaler   Commonly known as:  ADVAIR DISKUS   This may have changed:  when to take this   Used for:  Mild persistent asthma without complication        Dose:  1 puff   Inhale 1 puff into the lungs 2 times daily   Quantity:  3 Inhaler   Refills:  3                Primary Care Provider Office Phone # Fax #    Padma Lozano [phone]       [facility]  [address]        Equal Access to Services     [facility] : Hadii nirmala rosas lorrieshell Jose, wacollinsda luqshahida, qaybta kadishazia hendrix, westley suh. So [facility] [phone].    ATENCIÓN: Si habla español, tiene a burgos disposición servicios gratuitos de asistencia lingüística. Chon al [phone].    We comply with applicable federal civil rights laws and [place] laws. We do not discriminate on the basis of race, color, national origin, age, disability, sex, sexual orientation, or gender identity.            Thank you!     Thank you for choosing [facility]  for your care. Our goal is always to provide you with excellent care. Hearing back from our patients is one way we can continue to improve our services. Please take a few minutes to complete the written survey that you may receive in the mail after your visit with us. Thank you!             Your Updated Medication List - Protect others around you: Learn how to safely use, store and throw away your medicines at www.disposemymeds.org.          This list is accurate as of 11/13/18  5:57 PM.  Always use your most recent med list.                   Brand Name Dispense Instructions for use Diagnosis    albuterol 108 (90 Base) MCG/ACT inhaler    PROAIR HFA/PROVENTIL HFA/VENTOLIN HFA    3 Inhaler    Inhale 2 puffs into the lungs every 4 hours as needed for shortness of breath / dyspnea or wheezing    Mild persistent asthma without complication       cetirizine 10 MG tablet    zyrTEC     Take 10 mg by mouth daily        EPINEPHrine 0.3 MG/0.3ML injection 2-pack    AUVI-Q    2 mL    Inject 0.3 mLs (0.3 mg) into the muscle as needed for anaphylaxis    Need for desensitization to allergens       fluticasone 50 MCG/ACT spray    FLONASE    32 g    Spray 1-2 sprays into both nostrils daily    Other allergic rhinitis       fluticasone-salmeterol 500-50 MCG/DOSE diskus inhaler    ADVAIR DISKUS    3 Inhaler    Inhale 1 puff into the lungs 2  times daily    Mild persistent asthma without complication       hydrochlorothiazide 12.5 MG capsule    MICROZIDE    90 capsule    Take 1 capsule (12.5 mg) by mouth daily    Essential hypertension with goal blood pressure less than 140/90       lisinopril 40 MG tablet    PRINIVIL/ZESTRIL    90 tablet    Take 1 tablet (40 mg) by mouth daily    Essential hypertension with goal blood pressure less than 140/90       montelukast 10 MG tablet    SINGULAIR    30 tablet    Take 1 tablet (10 mg) by mouth At Bedtime    Mild persistent asthma without complication       ORDER FOR ALLERGEN IMMUNOTHERAPY 5 mL vial     5 mL    Name of Mix: Mix #1  Dust Mite, Cat, Dog Cat Hair, Standardized 10,000 BAU/mL, ALK  2.0 ml Dog Hair-Dander, A. P.  1:100 w/v, HS  1.0 ml Dust Mites DP. 30,000 AU/mL, HS  0.6 ml  Diluent: HSA qs to 5ml    Allergic rhinitis due to animals, Allergic rhinitis due to dust mite       ORDER FOR ALLERGEN IMMUNOTHERAPY 5 mL vial     5 mL    Name of Mix: Mix #2  Tree , Weeds Birch Mix PRW 1:20 w/v, HS  0.5 ml Hickory, Shagbark 1:20 w/v, HS  0.5 ml [place]mera Mix RW 1:20 w/v, HS 0.5 ml Oak Mix RVW 1:20 w/v, HS 0.5 ml Kochia 1:20 w/v, HS 0.5 ml Nettle 1:20 w/v, HS 0.5 ml Ragweed Mixed 1:20 w/v ALK  0.5 ml Sagebrush, Mugwort 1:20 w/v, HS 0.5 ml Diluent: HSA qs to 5ml    Seasonal allergic rhinitis due to pollen       ORDER FOR ALLERGEN IMMUNOTHERAPY 5 mL vial     5 mL    Name of Mix: Mix #3  Mold Alternaria Tenuis 1:10 w/v, HS  0.5 ml Aspergillus Fumigatus 1:10 w/v, HS  0.5 ml Curvularia Spicifera 1:10 w/v, HS  0.5 ml Epicoccum Nigrum 1:10 w/v, HS 0.5 ml Hormodendrum Cladosporioides 1:10 w/v, HS 0.5 ml Penicillium Mix 1:10 w/v, HS  0.5 ml Phoma Herbarum 1:10 w/v, HS  0.5 ml Diluent: HSA qs to 5ml    Allergic rhinitis due to mold       venlafaxine 75 MG 24 hr capsule    EFFEXOR-XR    90 capsule    Take 1 capsule (75 mg) by mouth daily    Mild major depression (H)

## 2018-11-20 ENCOUNTER — OFFICE VISIT (OUTPATIENT)
Dept: ALLERGY | Facility: CLINIC | Age: 58
End: 2018-11-20
Payer: COMMERCIAL

## 2018-11-20 VITALS — SYSTOLIC BLOOD PRESSURE: 132 MMHG | OXYGEN SATURATION: 97 % | DIASTOLIC BLOOD PRESSURE: 88 MMHG | HEART RATE: 61 BPM

## 2018-11-20 DIAGNOSIS — J30.89 ALLERGIC RHINITIS DUE TO MOLD: ICD-10-CM

## 2018-11-20 DIAGNOSIS — J30.81 ALLERGIC RHINITIS DUE TO ANIMALS: Primary | ICD-10-CM

## 2018-11-20 DIAGNOSIS — J30.1 SEASONAL ALLERGIC RHINITIS DUE TO POLLEN: ICD-10-CM

## 2018-11-20 DIAGNOSIS — J30.89 ALLERGIC RHINITIS DUE TO DUST MITE: ICD-10-CM

## 2018-11-20 PROCEDURE — 99207 ZZC DROP WITH A PROCEDURE: CPT | Mod: 25 | Performed by: ALLERGY & IMMUNOLOGY

## 2018-11-20 PROCEDURE — 95180 RAPID DESENSITIZATION: CPT | Performed by: ALLERGY & IMMUNOLOGY

## 2018-11-20 NOTE — LETTER
11/20/2018         RE: Nela Mares  [address] [facility] [address]        Dear Colleague,    Thank you for referring your patient, Nela Mares, to the [facility]. Please see a copy of my visit note below.    Nela Mares was seen in the Allergy Clinic at [facility]. The following are my recommendations regarding her Allergic Rhinitis Due to Animals, Allergic Rhinitis Due to Pollen, Allergic Rhinitis Due to Dust Mites and Allergic Rhinitis Due to Mold    1. Continue cluster immunotherapy per protocol  2. Pre-medicate with antihistamines as directed prior to injection appointments      Nela Mares is a 58 year old American female who is seen today for cluster immunotherapy. She did well after last week's visit and has not had any recent illness. Sanam pre-medicated with antihistamines as directed prior to today's visit.      Past Medical History:   Diagnosis Date     Allergic rhinitis, cause unspecified      Cervical high risk HPV (human papillomavirus) test positive 03/21/2017    3/21/17 NIL pap/+ HR HPV (not 16 or 18).      Depressive disorder      Depressive disorder, not elsewhere classified     seasonal     Hypertension      Mild persistent asthma      Obstructive sleep apnea (adult) (pediatric)     uses CPAP     Scalp mass 7/2014       REVIEW OF SYSTEMS:  General: negative for weight gain. negative for weight loss. negative for changes in sleep.   Eyes: negative for itching. negative for redness. negative for tearing/watering. negative for vision changes  Ears: negative for fullness. negative for hearing loss. negative for dizziness.   Nose: negative for snoring.negative for changes in smell. negative for drainage.   Throat: negative for hoarseness. negative for sore throat. negative for trouble swallowing.   Lungs: negative for cough. negative for shortness of breath.negative for wheezing. negative for sputum production.   Cardiovascular:  negative for chest pain. negative for swelling of ankles. negative for fast or irregular heartbeat.   Gastrointestinal: negative for nausea. negative for heartburn. negative for acid reflux.   Musculoskeletal: negative for joint pain. negative for joint stiffness. negative for joint swelling.   Neurologic: negative for seizures. negative for fainting. negative for weakness.   Psychiatric: negative for changes in mood. negative for anxiety.   Endocrine: negative for cold intolerance. negative for heat intolerance. negative for tremors.   Hematologic: negative for easy bruising. negative for easy bleeding.  Integumentary: negative for rash. negative for scaling. negative for nail changes.       Current Outpatient Prescriptions:      cetirizine (ZYRTEC) 10 MG tablet, Take 10 mg by mouth daily, Disp: , Rfl:      fluticasone (FLONASE) 50 MCG/ACT spray, Spray 1-2 sprays into both nostrils daily, Disp: 32 g, Rfl: 3     fluticasone-salmeterol (ADVAIR DISKUS) 500-50 MCG/DOSE diskus inhaler, Inhale 1 puff into the lungs 2 times daily (Patient taking differently: Inhale 1 puff into the lungs daily ), Disp: 3 Inhaler, Rfl: 3     hydrochlorothiazide (MICROZIDE) 12.5 MG capsule, Take 1 capsule (12.5 mg) by mouth daily, Disp: 90 capsule, Rfl: 3     lisinopril (PRINIVIL/ZESTRIL) 40 MG tablet, Take 1 tablet (40 mg) by mouth daily, Disp: 90 tablet, Rfl: 3     montelukast (SINGULAIR) 10 MG tablet, Take 1 tablet (10 mg) by mouth At Bedtime, Disp: 30 tablet, Rfl: 1     ORDER FOR ALLERGEN IMMUNOTHERAPY, Name of Mix: Mix #1  Dust Mite, Cat, Dog Cat Hair, Standardized 10,000 BAU/mL, ALK  2.0 ml Dog Hair-Dander, A. P.  1:100 w/v, HS  1.0 ml Dust Mites DP. 30,000 AU/mL, HS  0.6 ml  Diluent: HSA qs to 5ml, Disp: 5 mL, Rfl: PRN     ORDER FOR ALLERGEN IMMUNOTHERAPY, Name of Mix: Mix #2  Tree , Weeds Birch Mix PRW 1:20 w/v, HS  0.5 ml Hickory, Shagbark 1:20 w/v, HS  0.5 ml [place] Mix RW 1:20 w/v, HS 0.5 ml Oak Mix RVW 1:20 w/v, HS 0.5 ml  Kochia 1:20 w/v, HS 0.5 ml Nettle 1:20 w/v, HS 0.5 ml Ragweed Mixed 1:20 w/v ALK  0.5 ml Sagebrush, Mugwort 1:20 w/v, HS 0.5 ml Diluent: HSA qs to 5ml, Disp: 5 mL, Rfl: PRN     ORDER FOR ALLERGEN IMMUNOTHERAPY, Name of Mix: Mix #3  Mold Alternaria Tenuis 1:10 w/v, HS  0.5 ml Aspergillus Fumigatus 1:10 w/v, HS  0.5 ml Curvularia Spicifera 1:10 w/v, HS  0.5 ml Epicoccum Nigrum 1:10 w/v, HS 0.5 ml Hormodendrum Cladosporioides 1:10 w/v, HS 0.5 ml Penicillium Mix 1:10 w/v, HS  0.5 ml Phoma Herbarum 1:10 w/v, HS  0.5 ml Diluent: HSA qs to 5ml, Disp: 5 mL, Rfl: PRN     venlafaxine (EFFEXOR-XR) 75 MG 24 hr capsule, Take 1 capsule (75 mg) by mouth daily, Disp: 90 capsule, Rfl: 3     albuterol (PROAIR HFA/PROVENTIL HFA/VENTOLIN HFA) 108 (90 Base) MCG/ACT Inhaler, Inhale 2 puffs into the lungs every 4 hours as needed for shortness of breath / dyspnea or wheezing (Patient not taking: Reported on 11/13/2018), Disp: 3 Inhaler, Rfl: 3     EPINEPHrine (AUVI-Q) 0.3 MG/0.3ML injection 2-pack, Inject 0.3 mLs (0.3 mg) into the muscle as needed for anaphylaxis (Patient not taking: Reported on 11/13/2018), Disp: 2 mL, Rfl: 2    EXAM:   /88 (BP Location: Left arm, Patient Position: Sitting, Cuff Size: Adult Large)  Pulse 61  SpO2 97%  GENERAL APPEARANCE: alert, cooperative and not in distress  SKIN: no rashes, no lesions  HEAD: atraumatic, normocephalic  ENT: no scars or lesions, tongue midline and normal, soft palate, uvula, and tonsils normal  NECK: no asymmetry, masses, or scars, supple without significant adenopathy  LUNGS: unlabored respirations, no intercostal retractions or accessory muscle use, clear to auscultation without rales or wheezes  HEART: regular rate and rhythm without murmurs and normal S1 and S2  MUSCULOSKELETAL: no musculoskeletal defects are noted  NEURO: no focal deficits noted  PSYCH: does not appear depressed or anxious      WORKUP:  Cluster Immunotherapy    Cluster Allergen Immunotherapy:    After  explaining risks and benefits and obtaining consent, we proceeded with cluster immunotherapy.      Injection given: 14:10  Blue 1:100   Cat, Dog, Dust Mite    0.2 mL  Blue 1:100   Trees, Weeds     0.2 mL  Blue 1:100   Molds      0.2 mL    Injection given: 14:45  Blue 1:100   Cat, Dog, Dust Mite    0.4 mL  Blue 1:100   Trees, Weeds     0.4 mL  Blue 1:100   Molds      0.4 mL    Injection given: 15:25  Yellow 1:10   Cat, Dog, Dust Mite    0.05 mL  Yellow 1:10   Trees, Weeds     0.05 mL  Yellow 1:10   Molds      0.05 mL      Start Time: 14:10  End Time: 15:55      ASSESSMENT/PLAN:  Nela Mares is a 58 year old female here for cluster immunotherapy. She tolerated the procedure well without developing any signs or symptoms of an allergic reaction.     1. Continue cluster immunotherapy per protocol  2. Pre-medicate with antihistamines as directed prior to injection appointments      Karen Clark MD  Allergy/Immunology  [facility] and [place], [place]      Chart documentation done in part with Dragon Voice Recognition Software. Although reviewed after completion, some word and grammatical errors may remain.    Again, thank you for allowing me to participate in the care of your patient.        Sincerely,        Karen Clark MD

## 2018-11-20 NOTE — MR AVS SNAPSHOT
After Visit Summary   11/20/2018    Nela Mares    MRN: [id number]           Patient Information     Date Of Birth          1960        Visit Information        Provider Department      11/20/2018 2:00 PM Karen Clark MD [facility]        Today's Diagnoses     Allergic rhinitis due to animals    -  1    Seasonal allergic rhinitis due to pollen        Allergic rhinitis due to dust mite        Allergic rhinitis due to mold           Follow-ups after your visit        Your next 10 appointments already scheduled     Nov 27, 2018  2:00 PM CST   [facility]    [address] [place] [address]   [phone]            Dec 04, 2018  2:00 PM CST   [facility]    [address] [place] [address]   [phone]            Dec 11, 2018  2:00 PM CST   Return Visit with Karen Clark MD   [facility] ([facility])    [address] [place] [address]   [phone]            Dec 18, 2018  2:00 PM CST   [facility]    [address] [place] [address]   [phone]            Dec 24, 2018  8:00 AM CST   [facility]    [address] [place] [address]   [phone]              Who to contact     If you have questions or need follow up information about today's clinic visit or your schedule please contact [facility] directly at [phone].  Normal or non-critical lab and imaging results will be communicated to you by MyChart, letter or phone within 4 business days after the clinic has received the results. If you do not hear from us within 7 days, please contact the clinic through MyChart or  phone. If you have a critical or abnormal lab result, we will notify you by phone as soon as possible.  Submit refill requests through SocialTagg or call your pharmacy and they will forward the refill request to us. Please allow 3 business days for your refill to be completed.          Additional Information About Your Visit        IDxhart Information     SocialTagg gives you secure access to your electronic health record. If you see a primary care provider, you can also send messages to your care team and make appointments. If you have questions, please call your primary care clinic.  If you do not have a primary care provider, please call [phone] and they will assist you.        Care EveryWhere ID     This is your Care EveryWhere ID. This could be used by other organizations to access your [facility] medical records  [id number]        Your Vitals Were     Pulse Pulse Oximetry                61 97%           Blood Pressure from Last 3 Encounters:   11/20/18 132/88   11/13/18 126/80   10/18/18 135/82    Weight from Last 3 Encounters:   07/21/14 117 kg (258 lb)   06/17/14 117.3 kg (258 lb 9.6 oz)   03/14/14 117.5 kg (259 lb)              We Performed the Following     RAPID DESENSITIZATION          Today's Medication Changes          These changes are accurate as of 11/20/18  4:17 PM.  If you have any questions, ask your nurse or doctor.               These medicines have changed or have updated prescriptions.        Dose/Directions    fluticasone-salmeterol 500-50 MCG/DOSE diskus inhaler   Commonly known as:  ADVAIR DISKUS   This may have changed:  when to take this   Used for:  Mild persistent asthma without complication        Dose:  1 puff   Inhale 1 puff into the lungs 2 times daily   Quantity:  3 Inhaler   Refills:  3                Primary Care Provider Office Phone # Fax #    Padma Lozano [phone]       [facility] [address]        Equal Access to Services      [facility] : Hadii aad ku avila Garcia, waaxda luqadaha, qaybta kaalmada adeanatoliy, westley kaiserin hayaashakeel mehta veronique toro . So [facility] [phone].    ATENCIÓN: Si habla español, tiene a burgos disposición servicios gratuitos de asistencia lingüística. Llame al [phone].    We comply with applicable federal civil rights laws and [place] laws. We do not discriminate on the basis of race, color, national origin, age, disability, sex, sexual orientation, or gender identity.            Thank you!     Thank you for choosing [facility]  for your care. Our goal is always to provide you with excellent care. Hearing back from our patients is one way we can continue to improve our services. Please take a few minutes to complete the written survey that you may receive in the mail after your visit with us. Thank you!             Your Updated Medication List - Protect others around you: Learn how to safely use, store and throw away your medicines at www.disposemymeds.org.          This list is accurate as of 11/20/18  4:17 PM.  Always use your most recent med list.                   Brand Name Dispense Instructions for use Diagnosis    albuterol 108 (90 Base) MCG/ACT inhaler    PROAIR HFA/PROVENTIL HFA/VENTOLIN HFA    3 Inhaler    Inhale 2 puffs into the lungs every 4 hours as needed for shortness of breath / dyspnea or wheezing    Mild persistent asthma without complication       cetirizine 10 MG tablet    zyrTEC     Take 10 mg by mouth daily        EPINEPHrine 0.3 MG/0.3ML injection 2-pack    AUVI-Q    2 mL    Inject 0.3 mLs (0.3 mg) into the muscle as needed for anaphylaxis    Need for desensitization to allergens       fluticasone 50 MCG/ACT spray    FLONASE    32 g    Spray 1-2 sprays into both nostrils daily    Other allergic rhinitis       fluticasone-salmeterol 500-50 MCG/DOSE diskus inhaler    ADVAIR DISKUS    3 Inhaler    Inhale 1 puff into the lungs 2 times daily    Mild persistent asthma  without complication       hydrochlorothiazide 12.5 MG capsule    MICROZIDE    90 capsule    Take 1 capsule (12.5 mg) by mouth daily    Essential hypertension with goal blood pressure less than 140/90       lisinopril 40 MG tablet    PRINIVIL/ZESTRIL    90 tablet    Take 1 tablet (40 mg) by mouth daily    Essential hypertension with goal blood pressure less than 140/90       montelukast 10 MG tablet    SINGULAIR    30 tablet    Take 1 tablet (10 mg) by mouth At Bedtime    Mild persistent asthma without complication       ORDER FOR ALLERGEN IMMUNOTHERAPY 5 mL vial     5 mL    Name of Mix: Mix #1  Dust Mite, Cat, Dog Cat Hair, Standardized 10,000 BAU/mL, ALK  2.0 ml Dog Hair-Dander, A. P.  1:100 w/v, HS  1.0 ml Dust Mites DP. 30,000 AU/mL, HS  0.6 ml  Diluent: HSA qs to 5ml    Allergic rhinitis due to animals, Allergic rhinitis due to dust mite       ORDER FOR ALLERGEN IMMUNOTHERAPY 5 mL vial     5 mL    Name of Mix: Mix #2  Tree , Weeds Birch Mix PRW 1:20 w/v, HS  0.5 ml Hickory, Shagbark 1:20 w/v, HS  0.5 ml [place] Mix RW 1:20 w/v, HS 0.5 ml Oak Mix RVW 1:20 w/v, HS 0.5 ml Kochia 1:20 w/v, HS 0.5 ml Nettle 1:20 w/v, HS 0.5 ml Ragweed Mixed 1:20 w/v ALK  0.5 ml Sagebrush, Mugwort 1:20 w/v, HS 0.5 ml Diluent: HSA qs to 5ml    Seasonal allergic rhinitis due to pollen       ORDER FOR ALLERGEN IMMUNOTHERAPY 5 mL vial     5 mL    Name of Mix: Mix #3  Mold Alternaria Tenuis 1:10 w/v, HS  0.5 ml Aspergillus Fumigatus 1:10 w/v, HS  0.5 ml Curvularia Spicifera 1:10 w/v, HS  0.5 ml Epicoccum Nigrum 1:10 w/v, HS 0.5 ml Hormodendrum Cladosporioides 1:10 w/v, HS 0.5 ml Penicillium Mix 1:10 w/v, HS  0.5 ml Phoma Herbarum 1:10 w/v, HS  0.5 ml Diluent: HSA qs to 5ml    Allergic rhinitis due to mold       venlafaxine 75 MG 24 hr capsule    EFFEXOR-XR    90 capsule    Take 1 capsule (75 mg) by mouth daily    Mild major depression (H)

## 2018-11-27 ENCOUNTER — OFFICE VISIT (OUTPATIENT)
Dept: ALLERGY | Facility: CLINIC | Age: 58
End: 2018-11-27
Payer: COMMERCIAL

## 2018-11-27 VITALS
TEMPERATURE: 99.1 F | HEART RATE: 65 BPM | SYSTOLIC BLOOD PRESSURE: 118 MMHG | OXYGEN SATURATION: 97 % | DIASTOLIC BLOOD PRESSURE: 81 MMHG

## 2018-11-27 DIAGNOSIS — J30.1 SEASONAL ALLERGIC RHINITIS DUE TO POLLEN: ICD-10-CM

## 2018-11-27 DIAGNOSIS — J30.81 ALLERGIC RHINITIS DUE TO ANIMALS: Primary | ICD-10-CM

## 2018-11-27 DIAGNOSIS — J30.89 ALLERGIC RHINITIS DUE TO DUST MITE: ICD-10-CM

## 2018-11-27 DIAGNOSIS — J30.89 ALLERGIC RHINITIS DUE TO MOLD: ICD-10-CM

## 2018-11-27 PROCEDURE — 99207 ZZC DROP WITH A PROCEDURE: CPT | Mod: 25 | Performed by: ALLERGY & IMMUNOLOGY

## 2018-11-27 PROCEDURE — 95180 RAPID DESENSITIZATION: CPT | Performed by: ALLERGY & IMMUNOLOGY

## 2018-11-27 NOTE — MR AVS SNAPSHOT
After Visit Summary   11/27/2018    Nela Mares    MRN: [id number]           Patient Information     Date Of Birth          1960        Visit Information        Provider Department      11/27/2018 2:00 PM Karen Clark MD [facility]        Today's Diagnoses     Allergic rhinitis due to animals    -  1    Seasonal allergic rhinitis due to pollen        Allergic rhinitis due to dust mite        Allergic rhinitis due to mold           Follow-ups after your visit        Your next 10 appointments already scheduled     Dec 04, 2018  2:00 PM CST   Return Visit with Karen Clark MD   [facility] ([facility])    [address] [place] [address]   [phone]            Dec 11, 2018  2:00 PM CST   Return Visit with Karen Clark MD   [facility] ([facility])    [address] [place] [address]   [phone]            Dec 18, 2018  2:00 PM CST   Return Visit with Karen Clark MD   [facility] ([facility])    [address] [place] [address]   [phone]            Dec 24, 2018  8:00 AM CST   Return Visit with Karen Clark MD   [facility] ([facility])    [address] [place] [address]   [phone]              Who to contact     If you have questions or need follow up information about today's clinic visit or your schedule please contact [facility] directly at [phone].  Normal or non-critical lab and imaging results will be communicated to you by MyChart, letter or phone within 4 business days after the clinic has received the results. If you do not hear from us within 7 days, please contact the clinic through MyChart or phone. If you have a critical or abnormal lab result, we will notify you by phone as soon as possible.  Submit refill requests through [employer] or call your pharmacy and they will forward the  refill request to us. Please allow 3 business days for your refill to be completed.          Additional Information About Your Visit        [employer] Information     [employer] gives you secure access to your electronic health record. If you see a primary care provider, you can also send messages to your care team and make appointments. If you have questions, please call your primary care clinic.  If you do not have a primary care provider, please call [phone] and they will assist you.        Care EveryWhere ID     This is your Care EveryWhere ID. This could be used by other organizations to access your [facility] medical records  [id number]        Your Vitals Were     Pulse Temperature Pulse Oximetry             65 99.1  F (37.3  C) (Oral) 97%          Blood Pressure from Last 3 Encounters:   11/27/18 118/81   11/20/18 132/88   11/13/18 126/80    Weight from Last 3 Encounters:   07/21/14 117 kg (258 lb)   06/17/14 117.3 kg (258 lb 9.6 oz)   03/14/14 117.5 kg (259 lb)              We Performed the Following     RAPID DESENSITIZATION          Today's Medication Changes          These changes are accurate as of 11/27/18  5:26 PM.  If you have any questions, ask your nurse or doctor.               These medicines have changed or have updated prescriptions.        Dose/Directions    fluticasone-salmeterol 500-50 MCG/DOSE inhaler   Commonly known as:  ADVAIR DISKUS   This may have changed:  when to take this   Used for:  Mild persistent asthma without complication        Dose:  1 puff   Inhale 1 puff into the lungs 2 times daily   Quantity:  3 Inhaler   Refills:  3                Primary Care Provider Office Phone # Fax #    Padma Lozano [phone] [address] [facility] [address]        Equal Access to Services     SEBASTIAN MUIR AH: Wendi Garcia, christiana bunn, westley friedman. So [facility]  [phone].    ATENCIÓN: Si destiny dee, tiene a burgos disposición servicios gratuitos de asistencia lingüística. Chon luna [phone].    We comply with applicable federal civil rights laws and [place] laws. We do not discriminate on the basis of race, color, national origin, age, disability, sex, sexual orientation, or gender identity.            Thank you!     Thank you for choosing [facility]  for your care. Our goal is always to provide you with excellent care. Hearing back from our patients is one way we can continue to improve our services. Please take a few minutes to complete the written survey that you may receive in the mail after your visit with us. Thank you!             Your Updated Medication List - Protect others around you: Learn how to safely use, store and throw away your medicines at www.disposemymeds.org.          This list is accurate as of 11/27/18  5:26 PM.  Always use your most recent med list.                   Brand Name Dispense Instructions for use Diagnosis    albuterol 108 (90 Base) MCG/ACT inhaler    PROAIR HFA/PROVENTIL HFA/VENTOLIN HFA    3 Inhaler    Inhale 2 puffs into the lungs every 4 hours as needed for shortness of breath / dyspnea or wheezing    Mild persistent asthma without complication       cetirizine 10 MG tablet    zyrTEC     Take 10 mg by mouth daily        EPINEPHrine 0.3 MG/0.3ML injection 2-pack    AUVI-Q    2 mL    Inject 0.3 mLs (0.3 mg) into the muscle as needed for anaphylaxis    Need for desensitization to allergens       fluticasone 50 MCG/ACT nasal spray    FLONASE    32 g    Spray 1-2 sprays into both nostrils daily    Other allergic rhinitis       fluticasone-salmeterol 500-50 MCG/DOSE inhaler    ADVAIR DISKUS    3 Inhaler    Inhale 1 puff into the lungs 2 times daily    Mild persistent asthma without complication       hydrochlorothiazide 12.5 MG capsule    MICROZIDE    90 capsule    Take 1 capsule (12.5 mg) by mouth daily    Essential  hypertension with goal blood pressure less than 140/90       lisinopril 40 MG tablet    PRINIVIL/ZESTRIL    90 tablet    Take 1 tablet (40 mg) by mouth daily    Essential hypertension with goal blood pressure less than 140/90       montelukast 10 MG tablet    SINGULAIR    30 tablet    Take 1 tablet (10 mg) by mouth At Bedtime    Mild persistent asthma without complication       ORDER FOR ALLERGEN IMMUNOTHERAPY 5 mL vial     5 mL    Name of Mix: Mix #1  Dust Mite, Cat, Dog Cat Hair, Standardized 10,000 BAU/mL, ALK  2.0 ml Dog Hair-Dander, A. P.  1:100 w/v, HS  1.0 ml Dust Mites DP. 30,000 AU/mL, HS  0.6 ml  Diluent: HSA qs to 5ml    Allergic rhinitis due to animals, Allergic rhinitis due to dust mite       ORDER FOR ALLERGEN IMMUNOTHERAPY 5 mL vial     5 mL    Name of Mix: Mix #2  Tree , Weeds Birch Mix PRW 1:20 w/v, HS  0.5 ml Hickory, Shagbark 1:20 w/v, HS  0.5 ml [place] Mix RW 1:20 w/v, HS 0.5 ml Oak Mix RVW 1:20 w/v, HS 0.5 ml Kochia 1:20 w/v, HS 0.5 ml Nettle 1:20 w/v, HS 0.5 ml Ragweed Mixed 1:20 w/v ALK  0.5 ml Sagebrush, Mugwort 1:20 w/v, HS 0.5 ml Diluent: HSA qs to 5ml    Seasonal allergic rhinitis due to pollen       ORDER FOR ALLERGEN IMMUNOTHERAPY 5 mL vial     5 mL    Name of Mix: Mix #3  Mold Alternaria Tenuis 1:10 w/v, HS  0.5 ml Aspergillus Fumigatus 1:10 w/v, HS  0.5 ml Curvularia Spicifera 1:10 w/v, HS  0.5 ml Epicoccum Nigrum 1:10 w/v, HS 0.5 ml Hormodendrum Cladosporioides 1:10 w/v, HS 0.5 ml Penicillium Mix 1:10 w/v, HS  0.5 ml Phoma Herbarum 1:10 w/v, HS  0.5 ml Diluent: HSA qs to 5ml    Allergic rhinitis due to mold       venlafaxine 75 MG 24 hr capsule    EFFEXOR-XR    90 capsule    Take 1 capsule (75 mg) by mouth daily    Mild major depression (H)

## 2018-11-27 NOTE — LETTER
11/27/2018         RE: Nela Mares  [address] [facility] [facility] [address]        Dear Colleague,    Thank you for referring your patient, Nela Mares, to the [facility]. Please see a copy of my visit note below.    Nela Mares was seen in the Allergy Clinic at [facility]. The following are my recommendations regarding her Allergic Rhinitis Due to Animals, Allergic Rhinitis Due to Pollen, Allergic Rhinitis Due to Dust Mites and Allergic Rhinitis Due to Mold    1. Continue cluster immunotherapy per protocol  2. Pre-medicate with antihistamines as directed prior to injection appointments      Nela Mares is a 58 year old American female who is seen today for cluster immunotherapy. She has been feeling well and had no issues after last week's visit. Sanam pre-medicated with antihistamines as directed prior to today's visit.      Past Medical History:   Diagnosis Date     Allergic rhinitis, cause unspecified      Cervical high risk HPV (human papillomavirus) test positive 03/21/2017    3/21/17 NIL pap/+ HR HPV (not 16 or 18).      Depressive disorder      Depressive disorder, not elsewhere classified     seasonal     Hypertension      Mild persistent asthma      Obstructive sleep apnea (adult) (pediatric)     uses CPAP     Scalp mass 7/2014       REVIEW OF SYSTEMS:  General: negative for weight gain. negative for weight loss. negative for changes in sleep.   Eyes: negative for itching. negative for redness. negative for tearing/watering. negative for vision changes  Ears: negative for fullness. negative for hearing loss. negative for dizziness.   Nose: negative for snoring.negative for changes in smell. negative for drainage.   Throat: negative for hoarseness. negative for sore throat. negative for trouble swallowing.   Lungs: negative for cough. negative for shortness of breath.negative for wheezing. negative for sputum production.   Cardiovascular:  negative for chest pain. negative for swelling of ankles. negative for fast or irregular heartbeat.   Gastrointestinal: negative for nausea. negative for heartburn. negative for acid reflux.   Musculoskeletal: negative for joint pain. negative for joint stiffness. negative for joint swelling.   Neurologic: negative for seizures. negative for fainting. negative for weakness.   Psychiatric: negative for changes in mood. negative for anxiety.   Endocrine: negative for cold intolerance. negative for heat intolerance. negative for tremors.   Hematologic: negative for easy bruising. negative for easy bleeding.  Integumentary: negative for rash. negative for scaling. negative for nail changes.       Current Outpatient Prescriptions:      cetirizine (ZYRTEC) 10 MG tablet, Take 10 mg by mouth daily, Disp: , Rfl:      fluticasone (FLONASE) 50 MCG/ACT spray, Spray 1-2 sprays into both nostrils daily, Disp: 32 g, Rfl: 3     fluticasone-salmeterol (ADVAIR DISKUS) 500-50 MCG/DOSE diskus inhaler, Inhale 1 puff into the lungs 2 times daily (Patient taking differently: Inhale 1 puff into the lungs daily ), Disp: 3 Inhaler, Rfl: 3     hydrochlorothiazide (MICROZIDE) 12.5 MG capsule, Take 1 capsule (12.5 mg) by mouth daily, Disp: 90 capsule, Rfl: 3     lisinopril (PRINIVIL/ZESTRIL) 40 MG tablet, Take 1 tablet (40 mg) by mouth daily, Disp: 90 tablet, Rfl: 3     montelukast (SINGULAIR) 10 MG tablet, Take 1 tablet (10 mg) by mouth At Bedtime, Disp: 30 tablet, Rfl: 1     ORDER FOR ALLERGEN IMMUNOTHERAPY, Name of Mix: Mix #1  Dust Mite, Cat, Dog Cat Hair, Standardized 10,000 BAU/mL, ALK  2.0 ml Dog Hair-Dander, A. P.  1:100 w/v, HS  1.0 ml Dust Mites DP. 30,000 AU/mL, HS  0.6 ml  Diluent: HSA qs to 5ml, Disp: 5 mL, Rfl: PRN     ORDER FOR ALLERGEN IMMUNOTHERAPY, Name of Mix: Mix #2  Tree , Weeds Birch Mix PRW 1:20 w/v, HS  0.5 ml Hickory, Shagbark 1:20 w/v, HS  0.5 ml [place] Mix RW 1:20 w/v, HS 0.5 ml Oak Mix RVW 1:20 w/v, HS 0.5 ml  Kochia 1:20 w/v, HS 0.5 ml Nettle 1:20 w/v, HS 0.5 ml Ragweed Mixed 1:20 w/v ALK  0.5 ml Sagebrush, Mugwort 1:20 w/v, HS 0.5 ml Diluent: HSA qs to 5ml, Disp: 5 mL, Rfl: PRN     ORDER FOR ALLERGEN IMMUNOTHERAPY, Name of Mix: Mix #3  Mold Alternaria Tenuis 1:10 w/v, HS  0.5 ml Aspergillus Fumigatus 1:10 w/v, HS  0.5 ml Curvularia Spicifera 1:10 w/v, HS  0.5 ml Epicoccum Nigrum 1:10 w/v, HS 0.5 ml Hormodendrum Cladosporioides 1:10 w/v, HS 0.5 ml Penicillium Mix 1:10 w/v, HS  0.5 ml Phoma Herbarum 1:10 w/v, HS  0.5 ml Diluent: HSA qs to 5ml, Disp: 5 mL, Rfl: PRN     venlafaxine (EFFEXOR-XR) 75 MG 24 hr capsule, Take 1 capsule (75 mg) by mouth daily, Disp: 90 capsule, Rfl: 3     albuterol (PROAIR HFA/PROVENTIL HFA/VENTOLIN HFA) 108 (90 Base) MCG/ACT Inhaler, Inhale 2 puffs into the lungs every 4 hours as needed for shortness of breath / dyspnea or wheezing (Patient not taking: Reported on 11/13/2018), Disp: 3 Inhaler, Rfl: 3     EPINEPHrine (AUVI-Q) 0.3 MG/0.3ML injection 2-pack, Inject 0.3 mLs (0.3 mg) into the muscle as needed for anaphylaxis (Patient not taking: Reported on 11/13/2018), Disp: 2 mL, Rfl: 2    EXAM:   /81 (BP Location: Left arm, Patient Position: Sitting, Cuff Size: Adult Large)  Pulse 65  Temp 99.1  F (37.3  C) (Oral)  SpO2 97%  GENERAL APPEARANCE: alert, cooperative and not in distress  SKIN: no rashes, no lesions  HEAD: atraumatic, normocephalic  ENT: no scars or lesions, tongue midline and normal, soft palate, uvula, and tonsils normal  NECK: no asymmetry, masses, or scars, supple without significant adenopathy  LUNGS: unlabored respirations, no intercostal retractions or accessory muscle use, clear to auscultation without rales or wheezes  HEART: regular rate and rhythm without murmurs and normal S1 and S2  MUSCULOSKELETAL: no musculoskeletal defects are noted  NEURO: no focal deficits noted  PSYCH: does not appear depressed or anxious      WORKUP:  Cluster Immunotherapy  Cluster Allergen  Immunotherapy:    After explaining risks and benefits and obtaining consent, we proceeded with cluster immunotherapy.      Injection given: 14:12  Yellow 1:10   Cat, Dog, Dust Mite    0.1 mL  Yellow 1:10   Trees, Weeds     0.1 mL  Yellow 1:10   Molds      0.1 mL    Injection given: 14:46  Yellow 1:10   Cat, Dog, Dust Mite    0.15 mL  Yellow 1:10   Trees, Weeds     0.15 mL  Yellow 1:10   Molds      0.15 mL    Injection given: 15:24  Yellow 1:10   Cat, Dog, Dust Mite    0.25 mL  Yellow 1:10   Trees, Weeds     0.25 mL  Yellow 1:10   Molds      0.25 mL      Start Time: 14:12  End Time: 15:55    ASSESSMENT/PLAN:  Nela Mares is a 58 year old female here for cluster immunotherapy. She tolerated the procedure well without developing any signs or symptoms of an allergic reaction.      1. Continue cluster immunotherapy per protocol  2. Pre-medicate with antihistamines as directed prior to injection appointments      Karen Clark MD  Allergy/Immunology  [place], [place]      Chart documentation done in part with Dragon Voice Recognition Software. Although reviewed after completion, some word and grammatical errors may remain.      Again, thank you for allowing me to participate in the care of your patient.        Sincerely,        Karen Clark MD

## 2018-11-27 NOTE — PROGRESS NOTES
Nela Mares was seen in the Allergy Clinic at [facility]. The following are my recommendations regarding her Allergic Rhinitis Due to Animals, Allergic Rhinitis Due to Pollen, Allergic Rhinitis Due to Dust Mites and Allergic Rhinitis Due to Mold    1. Continue cluster immunotherapy per protocol  2. Pre-medicate with antihistamines as directed prior to injection appointments      Nela Mares is a 58 year old American female who is seen today for cluster immunotherapy. She has been feeling well and had no issues after last week's visit. Sanam pre-medicated with antihistamines as directed prior to today's visit.      Past Medical History:   Diagnosis Date     Allergic rhinitis, cause unspecified      Cervical high risk HPV (human papillomavirus) test positive 03/21/2017    3/21/17 NIL pap/+ HR HPV (not 16 or 18).      Depressive disorder      Depressive disorder, not elsewhere classified     seasonal     Hypertension      Mild persistent asthma      Obstructive sleep apnea (adult) (pediatric)     uses CPAP     Scalp mass 7/2014       REVIEW OF SYSTEMS:  General: negative for weight gain. negative for weight loss. negative for changes in sleep.   Eyes: negative for itching. negative for redness. negative for tearing/watering. negative for vision changes  Ears: negative for fullness. negative for hearing loss. negative for dizziness.   Nose: negative for snoring.negative for changes in smell. negative for drainage.   Throat: negative for hoarseness. negative for sore throat. negative for trouble swallowing.   Lungs: negative for cough. negative for shortness of breath.negative for wheezing. negative for sputum production.   Cardiovascular: negative for chest pain. negative for swelling of ankles. negative for fast or irregular heartbeat.   Gastrointestinal: negative for nausea. negative for heartburn. negative for acid reflux.   Musculoskeletal: negative for joint pain. negative for joint  stiffness. negative for joint swelling.   Neurologic: negative for seizures. negative for fainting. negative for weakness.   Psychiatric: negative for changes in mood. negative for anxiety.   Endocrine: negative for cold intolerance. negative for heat intolerance. negative for tremors.   Hematologic: negative for easy bruising. negative for easy bleeding.  Integumentary: negative for rash. negative for scaling. negative for nail changes.       Current Outpatient Prescriptions:      cetirizine (ZYRTEC) 10 MG tablet, Take 10 mg by mouth daily, Disp: , Rfl:      fluticasone (FLONASE) 50 MCG/ACT spray, Spray 1-2 sprays into both nostrils daily, Disp: 32 g, Rfl: 3     fluticasone-salmeterol (ADVAIR DISKUS) 500-50 MCG/DOSE diskus inhaler, Inhale 1 puff into the lungs 2 times daily (Patient taking differently: Inhale 1 puff into the lungs daily ), Disp: 3 Inhaler, Rfl: 3     hydrochlorothiazide (MICROZIDE) 12.5 MG capsule, Take 1 capsule (12.5 mg) by mouth daily, Disp: 90 capsule, Rfl: 3     lisinopril (PRINIVIL/ZESTRIL) 40 MG tablet, Take 1 tablet (40 mg) by mouth daily, Disp: 90 tablet, Rfl: 3     montelukast (SINGULAIR) 10 MG tablet, Take 1 tablet (10 mg) by mouth At Bedtime, Disp: 30 tablet, Rfl: 1     ORDER FOR ALLERGEN IMMUNOTHERAPY, Name of Mix: Mix #1  Dust Mite, Cat, Dog Cat Hair, Standardized 10,000 BAU/mL, ALK  2.0 ml Dog Hair-Dander, A. P.  1:100 w/v, HS  1.0 ml Dust Mites DP. 30,000 AU/mL, HS  0.6 ml  Diluent: HSA qs to 5ml, Disp: 5 mL, Rfl: PRN     ORDER FOR ALLERGEN IMMUNOTHERAPY, Name of Mix: Mix #2  Tree , Weeds Birch Mix PRW 1:20 w/v, HS  0.5 ml Hickory, Shagbark 1:20 w/v, HS  0.5 ml [place] Mix RW 1:20 w/v, HS 0.5 ml Oak Mix RVW 1:20 w/v, HS 0.5 ml Kochia 1:20 w/v, HS 0.5 ml Nettle 1:20 w/v, HS 0.5 ml Ragweed Mixed 1:20 w/v ALK  0.5 ml Sagebrush, Mugwort 1:20 w/v, HS 0.5 ml Diluent: HSA qs to 5ml, Disp: 5 mL, Rfl: PRN     ORDER FOR ALLERGEN IMMUNOTHERAPY, Name of Mix: Mix #3  Mold Alternaria Tenuis 1:10  w/v, HS  0.5 ml Aspergillus Fumigatus 1:10 w/v, HS  0.5 ml Curvularia Spicifera 1:10 w/v, HS  0.5 ml Epicoccum Nigrum 1:10 w/v, HS 0.5 ml Hormodendrum Cladosporioides 1:10 w/v, HS 0.5 ml Penicillium Mix 1:10 w/v, HS  0.5 ml Phoma Herbarum 1:10 w/v, HS  0.5 ml Diluent: HSA qs to 5ml, Disp: 5 mL, Rfl: PRN     venlafaxine (EFFEXOR-XR) 75 MG 24 hr capsule, Take 1 capsule (75 mg) by mouth daily, Disp: 90 capsule, Rfl: 3     albuterol (PROAIR HFA/PROVENTIL HFA/VENTOLIN HFA) 108 (90 Base) MCG/ACT Inhaler, Inhale 2 puffs into the lungs every 4 hours as needed for shortness of breath / dyspnea or wheezing (Patient not taking: Reported on 11/13/2018), Disp: 3 Inhaler, Rfl: 3     EPINEPHrine (AUVI-Q) 0.3 MG/0.3ML injection 2-pack, Inject 0.3 mLs (0.3 mg) into the muscle as needed for anaphylaxis (Patient not taking: Reported on 11/13/2018), Disp: 2 mL, Rfl: 2    EXAM:   /81 (BP Location: Left arm, Patient Position: Sitting, Cuff Size: Adult Large)  Pulse 65  Temp 99.1  F (37.3  C) (Oral)  SpO2 97%  GENERAL APPEARANCE: alert, cooperative and not in distress  SKIN: no rashes, no lesions  HEAD: atraumatic, normocephalic  ENT: no scars or lesions, tongue midline and normal, soft palate, uvula, and tonsils normal  NECK: no asymmetry, masses, or scars, supple without significant adenopathy  LUNGS: unlabored respirations, no intercostal retractions or accessory muscle use, clear to auscultation without rales or wheezes  HEART: regular rate and rhythm without murmurs and normal S1 and S2  MUSCULOSKELETAL: no musculoskeletal defects are noted  NEURO: no focal deficits noted  PSYCH: does not appear depressed or anxious      WORKUP:  Cluster Immunotherapy  Cluster Allergen Immunotherapy:    After explaining risks and benefits and obtaining consent, we proceeded with cluster immunotherapy.      Injection given: 14:12  Yellow 1:10   Cat, Dog, Dust Mite    0.1 mL  Yellow 1:10   Trees, Weeds     0.1 mL  Yellow 1:10   Molds      0.1  mL    Injection given: 14:46  Yellow 1:10   Cat, Dog, Dust Mite    0.15 mL  Yellow 1:10   Trees, Weeds     0.15 mL  Yellow 1:10   Molds      0.15 mL    Injection given: 15:24  Yellow 1:10   Cat, Dog, Dust Mite    0.25 mL  Yellow 1:10   Trees, Weeds     0.25 mL  Yellow 1:10   Molds      0.25 mL      Start Time: 14:12  End Time: 15:55    ASSESSMENT/PLAN:  Nela Mares is a 58 year old female here for cluster immunotherapy. She tolerated the procedure well without developing any signs or symptoms of an allergic reaction.      1. Continue cluster immunotherapy per protocol  2. Pre-medicate with antihistamines as directed prior to injection appointments      Karen Clark MD  Allergy/Immunology  [facility] [place]      Chart documentation done in part with Dragon Voice Recognition Software. Although reviewed after completion, some word and grammatical errors may remain.

## 2018-11-28 ENCOUNTER — MYC MEDICAL ADVICE (OUTPATIENT)
Dept: ALLERGY | Facility: CLINIC | Age: 58
End: 2018-11-28

## 2018-12-04 ENCOUNTER — OFFICE VISIT (OUTPATIENT)
Dept: ALLERGY | Facility: CLINIC | Age: 58
End: 2018-12-04
Payer: COMMERCIAL

## 2018-12-04 VITALS — HEART RATE: 66 BPM | SYSTOLIC BLOOD PRESSURE: 137 MMHG | OXYGEN SATURATION: 98 % | DIASTOLIC BLOOD PRESSURE: 92 MMHG

## 2018-12-04 DIAGNOSIS — J30.81 ALLERGIC RHINITIS DUE TO ANIMALS: Primary | ICD-10-CM

## 2018-12-04 DIAGNOSIS — J30.1 SEASONAL ALLERGIC RHINITIS DUE TO POLLEN: ICD-10-CM

## 2018-12-04 DIAGNOSIS — J30.89 ALLERGIC RHINITIS DUE TO DUST MITE: ICD-10-CM

## 2018-12-04 DIAGNOSIS — J30.89 ALLERGIC RHINITIS DUE TO MOLD: ICD-10-CM

## 2018-12-04 PROCEDURE — 99207 ZZC DROP WITH A PROCEDURE: CPT | Mod: 25 | Performed by: ALLERGY & IMMUNOLOGY

## 2018-12-04 PROCEDURE — 95180 RAPID DESENSITIZATION: CPT | Performed by: ALLERGY & IMMUNOLOGY

## 2018-12-04 NOTE — LETTER
"    12/4/2018         RE: Nela Mares  [address] [id number]        Dear Colleague,    Thank you for referring your patient, Nela Mares, to the [facility]. Please see a copy of my visit note below.    Nela Mares was seen in the Allergy Clinic at [facility]. The following are my recommendations regarding her Allergic Rhinitis Due to Animals, Allergic Rhinitis Due to Pollen, Allergic Rhinitis Due to Dust Mites and Allergic Rhinitis Due to Mold    1. Continue cluster immunotherapy per protocol  2. Pre-medicate with antihistamines as directed prior to injection appointments  3. Patient to follow up with Primary Care provider regarding elevated blood pressure.      Nela Mares is a 58 year old American female who is seen today for cluster immunotherapy. She had prolonged localized itching and redness after her last visit but otherwise had no issues. Sanam pre-medicated with antihistamines prior to today's visit as directed.    Sanam was also concerned last week that she may have developed thrush secondary to Advair. She reports having a film in her mouth that looked like a \"net\" however this resolved after 4 or 5 days. She had no soreness or irritation of her mouth and throat and the symptoms have since resolved.      Past Medical History:   Diagnosis Date     Allergic rhinitis, cause unspecified      Cervical high risk HPV (human papillomavirus) test positive 03/21/2017    3/21/17 NIL pap/+ HR HPV (not 16 or 18).      Depressive disorder      Depressive disorder, not elsewhere classified     seasonal     Hypertension      Mild persistent asthma      Obstructive sleep apnea (adult) (pediatric)     uses CPAP     Scalp mass 7/2014       REVIEW OF SYSTEMS:  General: negative for weight gain. negative for weight loss. negative for changes in sleep.   Eyes: negative for itching. negative for redness. negative for tearing/watering. negative for " vision changes  Ears: negative for fullness. negative for hearing loss. negative for dizziness.   Nose: negative for snoring.negative for changes in smell. negative for drainage.   Throat: negative for hoarseness. negative for sore throat. negative for trouble swallowing.   Lungs: negative for cough. negative for shortness of breath.negative for wheezing. negative for sputum production.   Cardiovascular: negative for chest pain. negative for swelling of ankles. negative for fast or irregular heartbeat.   Gastrointestinal: negative for nausea. negative for heartburn. negative for acid reflux.   Musculoskeletal: negative for joint pain. negative for joint stiffness. negative for joint swelling.   Neurologic: negative for seizures. negative for fainting. negative for weakness.   Psychiatric: negative for changes in mood. negative for anxiety.   Endocrine: negative for cold intolerance. negative for heat intolerance. negative for tremors.   Hematologic: negative for easy bruising. negative for easy bleeding.  Integumentary: negative for rash. negative for scaling. negative for nail changes.       Current Outpatient Prescriptions:      cetirizine (ZYRTEC) 10 MG tablet, Take 10 mg by mouth daily, Disp: , Rfl:      fluticasone (FLONASE) 50 MCG/ACT spray, Spray 1-2 sprays into both nostrils daily, Disp: 32 g, Rfl: 3     fluticasone-salmeterol (ADVAIR DISKUS) 500-50 MCG/DOSE diskus inhaler, Inhale 1 puff into the lungs 2 times daily (Patient taking differently: Inhale 1 puff into the lungs daily ), Disp: 3 Inhaler, Rfl: 3     hydrochlorothiazide (MICROZIDE) 12.5 MG capsule, Take 1 capsule (12.5 mg) by mouth daily, Disp: 90 capsule, Rfl: 3     lisinopril (PRINIVIL/ZESTRIL) 40 MG tablet, Take 1 tablet (40 mg) by mouth daily, Disp: 90 tablet, Rfl: 3     montelukast (SINGULAIR) 10 MG tablet, Take 1 tablet (10 mg) by mouth At Bedtime, Disp: 30 tablet, Rfl: 1     ORDER FOR ALLERGEN IMMUNOTHERAPY, Name of Mix: Mix #1  Dust Mite,  Cat, Dog Cat Hair, Standardized 10,000 BAU/mL, ALK  2.0 ml Dog Hair-Dander, A. P.  1:100 w/v, HS  1.0 ml Dust Mites DP. 30,000 AU/mL, HS  0.6 ml  Diluent: HSA qs to 5ml, Disp: 5 mL, Rfl: PRN     ORDER FOR ALLERGEN IMMUNOTHERAPY, Name of Mix: Mix #2  Tree , Weeds Birch Mix PRW 1:20 w/v, HS  0.5 ml Hickory, Shagbark 1:20 w/v, HS  0.5 ml [place] Mix RW 1:20 w/v, HS 0.5 ml Oak Mix RVW 1:20 w/v, HS 0.5 ml Kochia 1:20 w/v, HS 0.5 ml Nettle 1:20 w/v, HS 0.5 ml Ragweed Mixed 1:20 w/v ALK  0.5 ml Sagebrush, Mugwort 1:20 w/v, HS 0.5 ml Diluent: HSA qs to 5ml, Disp: 5 mL, Rfl: PRN     ORDER FOR ALLERGEN IMMUNOTHERAPY, Name of Mix: Mix #3  Mold Alternaria Tenuis 1:10 w/v, HS  0.5 ml Aspergillus Fumigatus 1:10 w/v, HS  0.5 ml Curvularia Spicifera 1:10 w/v, HS  0.5 ml Epicoccum Nigrum 1:10 w/v, HS 0.5 ml Hormodendrum Cladosporioides 1:10 w/v, HS 0.5 ml Penicillium Mix 1:10 w/v, HS  0.5 ml Phoma Herbarum 1:10 w/v, HS  0.5 ml Diluent: HSA qs to 5ml, Disp: 5 mL, Rfl: PRN     venlafaxine (EFFEXOR-XR) 75 MG 24 hr capsule, Take 1 capsule (75 mg) by mouth daily, Disp: 90 capsule, Rfl: 3     albuterol (PROAIR HFA/PROVENTIL HFA/VENTOLIN HFA) 108 (90 Base) MCG/ACT Inhaler, Inhale 2 puffs into the lungs every 4 hours as needed for shortness of breath / dyspnea or wheezing (Patient not taking: Reported on 11/13/2018), Disp: 3 Inhaler, Rfl: 3     EPINEPHrine (AUVI-Q) 0.3 MG/0.3ML injection 2-pack, Inject 0.3 mLs (0.3 mg) into the muscle as needed for anaphylaxis (Patient not taking: Reported on 11/13/2018), Disp: 2 mL, Rfl: 2    EXAM:   BP (!) 137/92 (BP Location: Left arm, Patient Position: Sitting, Cuff Size: Adult Large)  Pulse 66  SpO2 98%  GENERAL APPEARANCE: alert, cooperative and not in distress  SKIN: no rashes, no lesions  HEAD: atraumatic, normocephalic  ENT: no scars or lesions, tongue midline and normal, soft palate, uvula, and tonsils normal  NECK: no asymmetry, masses, or scars, supple without significant adenopathy  LUNGS:  unlabored respirations, no intercostal retractions or accessory muscle use, clear to auscultation without rales or wheezes  HEART: regular rate and rhythm without murmurs and normal S1 and S2  MUSCULOSKELETAL: no musculoskeletal defects are noted  NEURO: no focal deficits noted  PSYCH: does not appear depressed or anxious      WORKUP:  Cluster Immunotherapy    Cluster Allergen Immunotherapy:    After explaining risks and benefits and obtaining consent, we proceeded with cluster immunotherapy.      Injection given: 1418  Yellow 1:10   Cat, Dog, Dust Mite    0.35 mL  Yellow 1:10   Trees, Weeds     0.35 mL  Yellow 1:10   Molds      0.35 mL    Injection given: 1454  Yellow 1:10   Cat, Dog, Dust Mite    0.5 mL  Yellow 1:10   Trees, Weeds     0.5 mL  Yellow 1:10   Molds      0.5 mL      Start Time: 1418  End Time: 1524      ASSESSMENT/PLAN:  Nela Mares is a 58 year old female here for cluster immunotherapy. She tolerated the procedure well without developing any signs or symptoms of an allergic reaction.      1. Continue cluster immunotherapy per protocol  2. Pre-medicate with antihistamines as directed prior to injection appointments  3. Patient to follow up with Primary Care provider regarding elevated blood pressure.      Karen Clark MD  Allergy/Immunology  [place], [place]      Chart documentation done in part with Dragon Voice Recognition Software. Although reviewed after completion, some word and grammatical errors may remain.    Again, thank you for allowing me to participate in the care of your patient.        Sincerely,        Karen Clark MD

## 2018-12-04 NOTE — MR AVS SNAPSHOT
After Visit Summary   12/4/2018    Nela Mares    MRN: [id number]           Patient Information     Date Of Birth          1960        Visit Information        Provider Department      12/4/2018 2:00 PM Karen Clark MD [facility]        Today's Diagnoses     Allergic rhinitis due to animals    -  1    Seasonal allergic rhinitis due to pollen        Allergic rhinitis due to dust mite        Allergic rhinitis due to mold           Follow-ups after your visit        Your next 10 appointments already scheduled     Dec 11, 2018  2:00 PM CST   [facility] Visit with Karen Clark MD   [facility]    [address] [place] [id number]   [phone]            Dec 18, 2018  2:00 PM CST   [facility] Visit with Karen Clark MD   [facility]    [address] [place] [id number]   [phone]            Dec 24, 2018  8:00 AM CST   Return Visit with Karen Clark MD   [facility] ([facility]    [address] [place] [id number]   [phone]              Who to contact     If you have questions or need follow up information about today's clinic visit or your schedule please contact [facility] directly at [phone].  Normal or non-critical lab and imaging results will be communicated to you by MyChart, letter or phone within 4 business days after the clinic has received the results. If you do not hear from us within 7 days, please contact the clinic through [email or web address] or phone. If you have a critical or abnormal lab result, we will notify you by phone as soon as possible.  Submit refill requests through [employer] or call your pharmacy and they will forward the refill request to us. Please allow 3 business days for your refill to be completed.          Additional Information About Your Visit        [email or web address]     [employer] gives you secure  access to your electronic health record. If you see a primary care provider, you can also send messages to your care team and make appointments. If you have questions, please call your primary care clinic.  If you do not have a primary care provider, please call [phone] and they will assist you.        Care EveryWhere ID     This is your Care EveryWhere ID. This could be used by other organizations to access your [facility] medical records  [id number]        Your Vitals Were     Pulse Pulse Oximetry                66 98%           Blood Pressure from Last 3 Encounters:   12/04/18 (!) 137/92   11/27/18 118/81   11/20/18 132/88    Weight from Last 3 Encounters:   07/21/14 117 kg (258 lb)   06/17/14 117.3 kg (258 lb 9.6 oz)   03/14/14 117.5 kg (259 lb)              We Performed the Following     RAPID DESENSITIZATION          Today's Medication Changes          These changes are accurate as of 12/4/18  4:03 PM.  If you have any questions, ask your nurse or doctor.               These medicines have changed or have updated prescriptions.        Dose/Directions    fluticasone-salmeterol 500-50 MCG/DOSE inhaler   Commonly known as:  ADVAIR DISKUS   This may have changed:  when to take this   Used for:  Mild persistent asthma without complication        Dose:  1 puff   Inhale 1 puff into the lungs 2 times daily   Quantity:  3 Inhaler   Refills:  3                Primary Care Provider Office Phone # Fax #    Padma Robert Lozano [phone]       [facility] [facility] [address]        Equal Access to Services     SEBASTIAN MUIR AH: Hadii aad ku hadasho Soayse, waaxda luqadaha, qaybta kaalmada tysonyada, westley suh. So [facility] [phone].    ATENCIÓN: Si habla español, tiene a burgos disposición servicios gratuitos de asistencia lingüística. Llame al [phone].    We comply with applicable federal civil rights laws and [place] laws. We do not discriminate on  the basis of race, color, national origin, age, disability, sex, sexual orientation, or gender identity.            Thank you!     Thank you for choosing [facility]  for your care. Our goal is always to provide you with excellent care. Hearing back from our patients is one way we can continue to improve our services. Please take a few minutes to complete the written survey that you may receive in the mail after your visit with us. Thank you!             Your Updated Medication List - Protect others around you: Learn how to safely use, store and throw away your medicines at www.disposemymeds.org.          This list is accurate as of 12/4/18  4:03 PM.  Always use your most recent med list.                   Brand Name Dispense Instructions for use Diagnosis    albuterol 108 (90 Base) MCG/ACT inhaler    PROAIR HFA/PROVENTIL HFA/VENTOLIN HFA    3 Inhaler    Inhale 2 puffs into the lungs every 4 hours as needed for shortness of breath / dyspnea or wheezing    Mild persistent asthma without complication       cetirizine 10 MG tablet    zyrTEC     Take 10 mg by mouth daily        EPINEPHrine 0.3 MG/0.3ML injection 2-pack    AUVI-Q    2 mL    Inject 0.3 mLs (0.3 mg) into the muscle as needed for anaphylaxis    Need for desensitization to allergens       fluticasone 50 MCG/ACT nasal spray    FLONASE    32 g    Spray 1-2 sprays into both nostrils daily    Other allergic rhinitis       fluticasone-salmeterol 500-50 MCG/DOSE inhaler    ADVAIR DISKUS    3 Inhaler    Inhale 1 puff into the lungs 2 times daily    Mild persistent asthma without complication       hydrochlorothiazide 12.5 MG capsule    MICROZIDE    90 capsule    Take 1 capsule (12.5 mg) by mouth daily    Essential hypertension with goal blood pressure less than 140/90       lisinopril 40 MG tablet    PRINIVIL/ZESTRIL    90 tablet    Take 1 tablet (40 mg) by mouth daily    Essential hypertension with goal blood pressure less than 140/90        montelukast 10 MG tablet    SINGULAIR    30 tablet    Take 1 tablet (10 mg) by mouth At Bedtime    Mild persistent asthma without complication       ORDER FOR ALLERGEN IMMUNOTHERAPY 5 mL vial     5 mL    Name of Mix: Mix #1  Dust Mite, Cat, Dog Cat Hair, Standardized 10,000 BAU/mL, ALK  2.0 ml Dog Hair-Dander, A. P.  1:100 w/v, HS  1.0 ml Dust Mites DP. 30,000 AU/mL, HS  0.6 ml  Diluent: HSA qs to 5ml    Allergic rhinitis due to animals, Allergic rhinitis due to dust mite       ORDER FOR ALLERGEN IMMUNOTHERAPY 5 mL vial     5 mL    Name of Mix: Mix #2  Tree , Weeds Birch Mix PRW 1:20 w/v, HS  0.5 ml Hickory, Shagbark 1:20 w/v, HS  0.5 ml [place] Mix RW 1:20 w/v, HS 0.5 ml Oak Mix RVW 1:20 w/v, HS 0.5 ml Kochia 1:20 w/v, HS 0.5 ml Nettle 1:20 w/v, HS 0.5 ml Ragweed Mixed 1:20 w/v ALK  0.5 ml Sagebrush, Mugwort 1:20 w/v, HS 0.5 ml Diluent: HSA qs to 5ml    Seasonal allergic rhinitis due to pollen       ORDER FOR ALLERGEN IMMUNOTHERAPY 5 mL vial     5 mL    Name of Mix: Mix #3  Mold Alternaria Tenuis 1:10 w/v, HS  0.5 ml Aspergillus Fumigatus 1:10 w/v, HS  0.5 ml Curvularia Spicifera 1:10 w/v, HS  0.5 ml Epicoccum Nigrum 1:10 w/v, HS 0.5 ml Hormodendrum Cladosporioides 1:10 w/v, HS 0.5 ml Penicillium Mix 1:10 w/v, HS  0.5 ml Phoma Herbarum 1:10 w/v, HS  0.5 ml Diluent: HSA qs to 5ml    Allergic rhinitis due to mold       venlafaxine 75 MG 24 hr capsule    EFFEXOR-XR    90 capsule    Take 1 capsule (75 mg) by mouth daily    Mild major depression (H)

## 2018-12-04 NOTE — PROGRESS NOTES
"Nela Mares was seen in the Allergy Clinic at [facility]. The following are my recommendations regarding her Allergic Rhinitis Due to Animals, Allergic Rhinitis Due to Pollen, Allergic Rhinitis Due to Dust Mites and Allergic Rhinitis Due to Mold    1. Continue cluster immunotherapy per protocol  2. Pre-medicate with antihistamines as directed prior to injection appointments  3. Patient to follow up with Primary Care provider regarding elevated blood pressure.      Nela Mares is a 58 year old American female who is seen today for cluster immunotherapy. She had prolonged localized itching and redness after her last visit but otherwise had no issues. Sanam pre-medicated with antihistamines prior to today's visit as directed.    Sanam was also concerned last week that she may have developed thrush secondary to Advair. She reports having a film in her mouth that looked like a \"net\" however this resolved after 4 or 5 days. She had no soreness or irritation of her mouth and throat and the symptoms have since resolved.      Past Medical History:   Diagnosis Date     Allergic rhinitis, cause unspecified      Cervical high risk HPV (human papillomavirus) test positive 03/21/2017    3/21/17 NIL pap/+ HR HPV (not 16 or 18).      Depressive disorder      Depressive disorder, not elsewhere classified     seasonal     Hypertension      Mild persistent asthma      Obstructive sleep apnea (adult) (pediatric)     uses CPAP     Scalp mass 7/2014       REVIEW OF SYSTEMS:  General: negative for weight gain. negative for weight loss. negative for changes in sleep.   Eyes: negative for itching. negative for redness. negative for tearing/watering. negative for vision changes  Ears: negative for fullness. negative for hearing loss. negative for dizziness.   Nose: negative for snoring.negative for changes in smell. negative for drainage.   Throat: negative for hoarseness. negative for sore throat. negative for " trouble swallowing.   Lungs: negative for cough. negative for shortness of breath.negative for wheezing. negative for sputum production.   Cardiovascular: negative for chest pain. negative for swelling of ankles. negative for fast or irregular heartbeat.   Gastrointestinal: negative for nausea. negative for heartburn. negative for acid reflux.   Musculoskeletal: negative for joint pain. negative for joint stiffness. negative for joint swelling.   Neurologic: negative for seizures. negative for fainting. negative for weakness.   Psychiatric: negative for changes in mood. negative for anxiety.   Endocrine: negative for cold intolerance. negative for heat intolerance. negative for tremors.   Hematologic: negative for easy bruising. negative for easy bleeding.  Integumentary: negative for rash. negative for scaling. negative for nail changes.       Current Outpatient Prescriptions:      cetirizine (ZYRTEC) 10 MG tablet, Take 10 mg by mouth daily, Disp: , Rfl:      fluticasone (FLONASE) 50 MCG/ACT spray, Spray 1-2 sprays into both nostrils daily, Disp: 32 g, Rfl: 3     fluticasone-salmeterol (ADVAIR DISKUS) 500-50 MCG/DOSE diskus inhaler, Inhale 1 puff into the lungs 2 times daily (Patient taking differently: Inhale 1 puff into the lungs daily ), Disp: 3 Inhaler, Rfl: 3     hydrochlorothiazide (MICROZIDE) 12.5 MG capsule, Take 1 capsule (12.5 mg) by mouth daily, Disp: 90 capsule, Rfl: 3     lisinopril (PRINIVIL/ZESTRIL) 40 MG tablet, Take 1 tablet (40 mg) by mouth daily, Disp: 90 tablet, Rfl: 3     montelukast (SINGULAIR) 10 MG tablet, Take 1 tablet (10 mg) by mouth At Bedtime, Disp: 30 tablet, Rfl: 1     ORDER FOR ALLERGEN IMMUNOTHERAPY, Name of Mix: Mix #1  Dust Mite, Cat, Dog Cat Hair, Standardized 10,000 BAU/mL, ALK  2.0 ml Dog Hair-Dander, A. P.  1:100 w/v, HS  1.0 ml Dust Mites DP. 30,000 AU/mL, HS  0.6 ml  Diluent: HSA qs to 5ml, Disp: 5 mL, Rfl: PRN     ORDER FOR ALLERGEN IMMUNOTHERAPY, Name of Mix: Mix #2  Tree  , Weeds Birch Mix PRW 1:20 w/v, HS  0.5 ml Hickory, Shagbark 1:20 w/v, HS  0.5 ml [place] Mix RW 1:20 w/v, HS 0.5 ml Oak Mix RVW 1:20 w/v, HS 0.5 ml Kochia 1:20 w/v, HS 0.5 ml Nettle 1:20 w/v, HS 0.5 ml Ragweed Mixed 1:20 w/v ALK  0.5 ml Sagebrush, Mugwort 1:20 w/v, HS 0.5 ml Diluent: HSA qs to 5ml, Disp: 5 mL, Rfl: PRN     ORDER FOR ALLERGEN IMMUNOTHERAPY, Name of Mix: Mix #3  Mold Alternaria Tenuis 1:10 w/v, HS  0.5 ml Aspergillus Fumigatus 1:10 w/v, HS  0.5 ml Curvularia Spicifera 1:10 w/v, HS  0.5 ml Epicoccum Nigrum 1:10 w/v, HS 0.5 ml Hormodendrum Cladosporioides 1:10 w/v, HS 0.5 ml Penicillium Mix 1:10 w/v, HS  0.5 ml Phoma Herbarum 1:10 w/v, HS  0.5 ml Diluent: HSA qs to 5ml, Disp: 5 mL, Rfl: PRN     venlafaxine (EFFEXOR-XR) 75 MG 24 hr capsule, Take 1 capsule (75 mg) by mouth daily, Disp: 90 capsule, Rfl: 3     albuterol (PROAIR HFA/PROVENTIL HFA/VENTOLIN HFA) 108 (90 Base) MCG/ACT Inhaler, Inhale 2 puffs into the lungs every 4 hours as needed for shortness of breath / dyspnea or wheezing (Patient not taking: Reported on 11/13/2018), Disp: 3 Inhaler, Rfl: 3     EPINEPHrine (AUVI-Q) 0.3 MG/0.3ML injection 2-pack, Inject 0.3 mLs (0.3 mg) into the muscle as needed for anaphylaxis (Patient not taking: Reported on 11/13/2018), Disp: 2 mL, Rfl: 2    EXAM:   BP (!) 137/92 (BP Location: Left arm, Patient Position: Sitting, Cuff Size: Adult Large)  Pulse 66  SpO2 98%  GENERAL APPEARANCE: alert, cooperative and not in distress  SKIN: no rashes, no lesions  HEAD: atraumatic, normocephalic  ENT: no scars or lesions, tongue midline and normal, soft palate, uvula, and tonsils normal  NECK: no asymmetry, masses, or scars, supple without significant adenopathy  LUNGS: unlabored respirations, no intercostal retractions or accessory muscle use, clear to auscultation without rales or wheezes  HEART: regular rate and rhythm without murmurs and normal S1 and S2  MUSCULOSKELETAL: no musculoskeletal defects are noted  NEURO:  no focal deficits noted  PSYCH: does not appear depressed or anxious      WORKUP:  Cluster Immunotherapy    Cluster Allergen Immunotherapy:    After explaining risks and benefits and obtaining consent, we proceeded with cluster immunotherapy.      Injection given: 1418  Yellow 1:10   Cat, Dog, Dust Mite    0.35 mL  Yellow 1:10   Trees, Weeds     0.35 mL  Yellow 1:10   Molds      0.35 mL    Injection given: 1454  Yellow 1:10   Cat, Dog, Dust Mite    0.5 mL  Yellow 1:10   Trees, Weeds     0.5 mL  Yellow 1:10   Molds      0.5 mL      Start Time: 1418  End Time: 1524      ASSESSMENT/PLAN:  Nela Mares is a 58 year old female here for cluster immunotherapy. She tolerated the procedure well without developing any signs or symptoms of an allergic reaction.      1. Continue cluster immunotherapy per protocol  2. Pre-medicate with antihistamines as directed prior to injection appointments  3. Patient to follow up with Primary Care provider regarding elevated blood pressure.      Karen Clark MD  Allergy/Immunology  [facility] and [place], [place]      Chart documentation done in part with Dragon Voice Recognition Software. Although reviewed after completion, some word and grammatical errors may remain.

## 2018-12-11 ENCOUNTER — OFFICE VISIT (OUTPATIENT)
Dept: ALLERGY | Facility: CLINIC | Age: 58
End: 2018-12-11
Payer: COMMERCIAL

## 2018-12-11 VITALS
RESPIRATION RATE: 14 BRPM | HEIGHT: 69 IN | HEART RATE: 63 BPM | DIASTOLIC BLOOD PRESSURE: 78 MMHG | SYSTOLIC BLOOD PRESSURE: 135 MMHG | BODY MASS INDEX: 38.1 KG/M2 | OXYGEN SATURATION: 99 %

## 2018-12-11 DIAGNOSIS — J30.81 ALLERGIC RHINITIS DUE TO ANIMALS: Primary | ICD-10-CM

## 2018-12-11 DIAGNOSIS — J30.89 ALLERGIC RHINITIS DUE TO MOLD: ICD-10-CM

## 2018-12-11 DIAGNOSIS — J30.1 SEASONAL ALLERGIC RHINITIS DUE TO POLLEN: ICD-10-CM

## 2018-12-11 DIAGNOSIS — J45.30 MILD PERSISTENT ASTHMA WITHOUT COMPLICATION: ICD-10-CM

## 2018-12-11 DIAGNOSIS — J30.89 ALLERGIC RHINITIS DUE TO DUST MITE: ICD-10-CM

## 2018-12-11 PROCEDURE — 95180 RAPID DESENSITIZATION: CPT | Performed by: ALLERGY & IMMUNOLOGY

## 2018-12-11 PROCEDURE — 99207 ZZC DROP WITH A PROCEDURE: CPT | Performed by: ALLERGY & IMMUNOLOGY

## 2018-12-11 RX ORDER — MONTELUKAST SODIUM 10 MG/1
10 TABLET ORAL AT BEDTIME
Qty: 90 TABLET | Refills: 1 | Status: SHIPPED | OUTPATIENT
Start: 2018-12-11 | End: 2019-06-13

## 2018-12-11 NOTE — LETTER
12/11/2018         RE: Nela Mares  [address] [id number]        Dear Colleague,    Thank you for referring your patient, Nela Mares, to the [facility]. Please see a copy of my visit note below.    Nela Mares was seen in the Allergy Clinic at [facility]. The following are my recommendations regarding her Allergic Rhinitis Due to Animals, Allergic Rhinitis Due to Pollen, Allergic Rhinitis Due to Dust Mites and Allergic Rhinitis Due to Mold    1. Continue cluster immunotherapy per protocol  2. Pre-medicate with antihistamines as directed prior to injection appointments      Nela Mares is a 58 year old American female who is seen today for cluster immunotherapy. She reports that applying ice to her arms last week was helpful in reducing itching and redness. She has been feeling well and has not been ill in the past week. Sanam pre-medicated with antihistamines as directed prior to today's visit.    Sanam also requests a refill of her singulair today.    Past Medical History:   Diagnosis Date     Allergic rhinitis, cause unspecified      Cervical high risk HPV (human papillomavirus) test positive 03/21/2017    3/21/17 NIL pap/+ HR HPV (not 16 or 18).      Depressive disorder      Depressive disorder, not elsewhere classified     seasonal     Hypertension      Mild persistent asthma      Obstructive sleep apnea (adult) (pediatric)     uses CPAP     Scalp mass 7/2014       REVIEW OF SYSTEMS:  General: negative for weight gain. negative for weight loss. negative for changes in sleep.   Eyes: negative for itching. negative for redness. negative for tearing/watering. negative for vision changes  Ears: negative for fullness. negative for hearing loss. negative for dizziness.   Nose: negative for snoring.negative for changes in smell. negative for drainage.   Throat: negative for hoarseness. negative for sore throat. negative for trouble  swallowing.   Lungs: negative for cough. negative for shortness of breath.negative for wheezing. negative for sputum production.   Cardiovascular: negative for chest pain. negative for swelling of ankles. negative for fast or irregular heartbeat.   Gastrointestinal: negative for nausea. negative for heartburn. negative for acid reflux.   Musculoskeletal: negative for joint pain. negative for joint stiffness. negative for joint swelling.   Neurologic: negative for seizures. negative for fainting. negative for weakness.   Psychiatric: negative for changes in mood. negative for anxiety.   Endocrine: negative for cold intolerance. negative for heat intolerance. negative for tremors.   Hematologic: negative for easy bruising. negative for easy bleeding.  Integumentary: negative for rash. negative for scaling. negative for nail changes.       Current Outpatient Medications:      albuterol (PROAIR HFA/PROVENTIL HFA/VENTOLIN HFA) 108 (90 Base) MCG/ACT Inhaler, Inhale 2 puffs into the lungs every 4 hours as needed for shortness of breath / dyspnea or wheezing, Disp: 3 Inhaler, Rfl: 3     cetirizine (ZYRTEC) 10 MG tablet, Take 10 mg by mouth daily, Disp: , Rfl:      EPINEPHrine (AUVI-Q) 0.3 MG/0.3ML injection 2-pack, Inject 0.3 mLs (0.3 mg) into the muscle as needed for anaphylaxis, Disp: 2 mL, Rfl: 2     fluticasone (FLONASE) 50 MCG/ACT spray, Spray 1-2 sprays into both nostrils daily, Disp: 32 g, Rfl: 3     fluticasone-salmeterol (ADVAIR DISKUS) 500-50 MCG/DOSE diskus inhaler, Inhale 1 puff into the lungs 2 times daily (Patient taking differently: Inhale 1 puff into the lungs daily ), Disp: 3 Inhaler, Rfl: 3     hydrochlorothiazide (MICROZIDE) 12.5 MG capsule, Take 1 capsule (12.5 mg) by mouth daily, Disp: 90 capsule, Rfl: 3     lisinopril (PRINIVIL/ZESTRIL) 40 MG tablet, Take 1 tablet (40 mg) by mouth daily, Disp: 90 tablet, Rfl: 3     montelukast (SINGULAIR) 10 MG tablet, Take 1 tablet (10 mg) by mouth At Bedtime, Disp:  "30 tablet, Rfl: 1     ORDER FOR ALLERGEN IMMUNOTHERAPY, Name of Mix: Mix #1  Dust Mite, Cat, Dog Cat Hair, Standardized 10,000 BAU/mL, ALK  2.0 ml Dog Hair-Dander, A. P.  1:100 w/v, HS  1.0 ml Dust Mites DP. 30,000 AU/mL, HS  0.6 ml  Diluent: HSA qs to 5ml, Disp: 5 mL, Rfl: PRN     ORDER FOR ALLERGEN IMMUNOTHERAPY, Name of Mix: Mix #2  Tree , Weeds Birch Mix PRW 1:20 w/v, HS  0.5 ml Hickory, Shagbark 1:20 w/v, HS  0.5 ml [place] Mix RW 1:20 w/v, HS 0.5 ml Oak Mix RVW 1:20 w/v, HS 0.5 ml Kochia 1:20 w/v, HS 0.5 ml Nettle 1:20 w/v, HS 0.5 ml Ragweed Mixed 1:20 w/v ALK  0.5 ml Sagebrush, Mugwort 1:20 w/v, HS 0.5 ml Diluent: HSA qs to 5ml, Disp: 5 mL, Rfl: PRN     ORDER FOR ALLERGEN IMMUNOTHERAPY, Name of Mix: Mix #3  Mold Alternaria Tenuis 1:10 w/v, HS  0.5 ml Aspergillus Fumigatus 1:10 w/v, HS  0.5 ml Curvularia Spicifera 1:10 w/v, HS  0.5 ml Epicoccum Nigrum 1:10 w/v, HS 0.5 ml Hormodendrum Cladosporioides 1:10 w/v, HS 0.5 ml Penicillium Mix 1:10 w/v, HS  0.5 ml Phoma Herbarum 1:10 w/v, HS  0.5 ml Diluent: HSA qs to 5ml, Disp: 5 mL, Rfl: PRN     venlafaxine (EFFEXOR-XR) 75 MG 24 hr capsule, Take 1 capsule (75 mg) by mouth daily, Disp: 90 capsule, Rfl: 3    EXAM:   /78   Pulse 63   Resp 14   Ht 1.753 m (5' 9\")   SpO2 99%   BMI 38.10 kg/m     GENERAL APPEARANCE: alert, cooperative and not in distress  SKIN: no rashes, no lesions  HEAD: atraumatic, normocephalic  ENT: no scars or lesions, tongue midline and normal, soft palate, uvula, and tonsils normal  NECK: no asymmetry, masses, or scars, supple without significant adenopathy  LUNGS: unlabored respirations, no intercostal retractions or accessory muscle use, clear to auscultation without rales or wheezes  HEART: regular rate and rhythm without murmurs and normal S1 and S2  MUSCULOSKELETAL: no musculoskeletal defects are noted  NEURO: no focal deficits noted  PSYCH: does not appear depressed or anxious      WORKUP:  Cluster Immunotherapy  Cluster Allergen " Immunotherapy:    After explaining risks and benefits and obtaining consent, we proceeded with cluster immunotherapy.      Injection given: 14:15  Red 1:1   Cat, Dog, Dust Mite    0.05 mL  Red 1:1   Trees, Weeds     0.05 mL  Red 1:1   Molds      0.05 mL    Injection given: 14:51  Red 1:1   Cat, Dog, Dust Mite    0.1 mL  Red 1:1   Trees, Weeds     0.1 mL  Red 1:1   Molds      0.1 mL      Start Time: 14:15  End Time: 15:21    ASSESSMENT/PLAN:  Nela Mares is a 58 year old female here for cluster immunotherapy. She tolerated the procedure well without developing any signs or symptoms of an allergic reaction.      1. Continue cluster immunotherapy per protocol  2. Pre-medicate with antihistamines as directed prior to injection appointments      Karen Clark MD  Allergy/Immunology  [facility] and [place], [place]      Chart documentation done in part with Dragon Voice Recognition Software. Although reviewed after completion, some word and grammatical errors may remain.    Again, thank you for allowing me to participate in the care of your patient.        Sincerely,        Karen Clark MD

## 2018-12-11 NOTE — PROGRESS NOTES
Nela Mares was seen in the Allergy Clinic at [facility]. The following are my recommendations regarding her Allergic Rhinitis Due to Animals, Allergic Rhinitis Due to Pollen, Allergic Rhinitis Due to Dust Mites and Allergic Rhinitis Due to Mold    1. Continue cluster immunotherapy per protocol  2. Pre-medicate with antihistamines as directed prior to injection appointments      Nela Mares is a 58 year old American female who is seen today for cluster immunotherapy. She reports that applying ice to her arms last week was helpful in reducing itching and redness. She has been feeling well and has not been ill in the past week. Sanam pre-medicated with antihistamines as directed prior to today's visit.    Sanam also requests a refill of her singulair today.    Past Medical History:   Diagnosis Date     Allergic rhinitis, cause unspecified      Cervical high risk HPV (human papillomavirus) test positive 03/21/2017    3/21/17 NIL pap/+ HR HPV (not 16 or 18).      Depressive disorder      Depressive disorder, not elsewhere classified     seasonal     Hypertension      Mild persistent asthma      Obstructive sleep apnea (adult) (pediatric)     uses CPAP     Scalp mass 7/2014       REVIEW OF SYSTEMS:  General: negative for weight gain. negative for weight loss. negative for changes in sleep.   Eyes: negative for itching. negative for redness. negative for tearing/watering. negative for vision changes  Ears: negative for fullness. negative for hearing loss. negative for dizziness.   Nose: negative for snoring.negative for changes in smell. negative for drainage.   Throat: negative for hoarseness. negative for sore throat. negative for trouble swallowing.   Lungs: negative for cough. negative for shortness of breath.negative for wheezing. negative for sputum production.   Cardiovascular: negative for chest pain. negative for swelling of ankles. negative for fast or irregular heartbeat.    Gastrointestinal: negative for nausea. negative for heartburn. negative for acid reflux.   Musculoskeletal: negative for joint pain. negative for joint stiffness. negative for joint swelling.   Neurologic: negative for seizures. negative for fainting. negative for weakness.   Psychiatric: negative for changes in mood. negative for anxiety.   Endocrine: negative for cold intolerance. negative for heat intolerance. negative for tremors.   Hematologic: negative for easy bruising. negative for easy bleeding.  Integumentary: negative for rash. negative for scaling. negative for nail changes.       Current Outpatient Medications:      albuterol (PROAIR HFA/PROVENTIL HFA/VENTOLIN HFA) 108 (90 Base) MCG/ACT Inhaler, Inhale 2 puffs into the lungs every 4 hours as needed for shortness of breath / dyspnea or wheezing, Disp: 3 Inhaler, Rfl: 3     cetirizine (ZYRTEC) 10 MG tablet, Take 10 mg by mouth daily, Disp: , Rfl:      EPINEPHrine (AUVI-Q) 0.3 MG/0.3ML injection 2-pack, Inject 0.3 mLs (0.3 mg) into the muscle as needed for anaphylaxis, Disp: 2 mL, Rfl: 2     fluticasone (FLONASE) 50 MCG/ACT spray, Spray 1-2 sprays into both nostrils daily, Disp: 32 g, Rfl: 3     fluticasone-salmeterol (ADVAIR DISKUS) 500-50 MCG/DOSE diskus inhaler, Inhale 1 puff into the lungs 2 times daily (Patient taking differently: Inhale 1 puff into the lungs daily ), Disp: 3 Inhaler, Rfl: 3     hydrochlorothiazide (MICROZIDE) 12.5 MG capsule, Take 1 capsule (12.5 mg) by mouth daily, Disp: 90 capsule, Rfl: 3     lisinopril (PRINIVIL/ZESTRIL) 40 MG tablet, Take 1 tablet (40 mg) by mouth daily, Disp: 90 tablet, Rfl: 3     montelukast (SINGULAIR) 10 MG tablet, Take 1 tablet (10 mg) by mouth At Bedtime, Disp: 30 tablet, Rfl: 1     ORDER FOR ALLERGEN IMMUNOTHERAPY, Name of Mix: Mix #1  Dust Mite, Cat, Dog Cat Hair, Standardized 10,000 BAU/mL, ALK  2.0 ml Dog Hair-Dander, A. P.  1:100 w/v, HS  1.0 ml Dust Mites DP. 30,000 AU/mL, HS  0.6 ml  Diluent: HSA  "qs to 5ml, Disp: 5 mL, Rfl: PRN     ORDER FOR ALLERGEN IMMUNOTHERAPY, Name of Mix: Mix #2  Tree , Weeds Birch Mix PRW 1:20 w/v, HS  0.5 ml Hickory, Shagbark 1:20 w/v, HS  0.5 ml [place] Mix RW 1:20 w/v, HS 0.5 ml Oak Mix RVW 1:20 w/v, HS 0.5 ml Kochia 1:20 w/v, HS 0.5 ml Nettle 1:20 w/v, HS 0.5 ml Ragweed Mixed 1:20 w/v ALK  0.5 ml Sagebrush, Mugwort 1:20 w/v, HS 0.5 ml Diluent: HSA qs to 5ml, Disp: 5 mL, Rfl: PRN     ORDER FOR ALLERGEN IMMUNOTHERAPY, Name of Mix: Mix #3  Mold Alternaria Tenuis 1:10 w/v, HS  0.5 ml Aspergillus Fumigatus 1:10 w/v, HS  0.5 ml Curvularia Spicifera 1:10 w/v, HS  0.5 ml Epicoccum Nigrum 1:10 w/v, HS 0.5 ml Hormodendrum Cladosporioides 1:10 w/v, HS 0.5 ml Penicillium Mix 1:10 w/v, HS  0.5 ml Phoma Herbarum 1:10 w/v, HS  0.5 ml Diluent: HSA qs to 5ml, Disp: 5 mL, Rfl: PRN     venlafaxine (EFFEXOR-XR) 75 MG 24 hr capsule, Take 1 capsule (75 mg) by mouth daily, Disp: 90 capsule, Rfl: 3    EXAM:   /78   Pulse 63   Resp 14   Ht 1.753 m (5' 9\")   SpO2 99%   BMI 38.10 kg/m    GENERAL APPEARANCE: alert, cooperative and not in distress  SKIN: no rashes, no lesions  HEAD: atraumatic, normocephalic  ENT: no scars or lesions, tongue midline and normal, soft palate, uvula, and tonsils normal  NECK: no asymmetry, masses, or scars, supple without significant adenopathy  LUNGS: unlabored respirations, no intercostal retractions or accessory muscle use, clear to auscultation without rales or wheezes  HEART: regular rate and rhythm without murmurs and normal S1 and S2  MUSCULOSKELETAL: no musculoskeletal defects are noted  NEURO: no focal deficits noted  PSYCH: does not appear depressed or anxious      WORKUP:  Cluster Immunotherapy  Cluster Allergen Immunotherapy:    After explaining risks and benefits and obtaining consent, we proceeded with cluster immunotherapy.      Injection given: 14:15  Red 1:1   Cat, Dog, Dust Mite    0.05 mL  Red 1:1   Trees, Weeds     0.05 mL  Red " 1:1   Molds      0.05 mL    Injection given: 14:51  Red 1:1   Cat, Dog, Dust Mite    0.1 mL  Red 1:1   Trees, Weeds     0.1 mL  Red 1:1   Molds      0.1 mL      Start Time: 14:15  End Time: 15:21    ASSESSMENT/PLAN:  Nela Mares is a 58 year old female here for cluster immunotherapy. She tolerated the procedure well without developing any signs or symptoms of an allergic reaction.      1. Continue cluster immunotherapy per protocol  2. Pre-medicate with antihistamines as directed prior to injection appointments      Karen Clark MD  Allergy/Immunology  [facility] and [place], [place]      Chart documentation done in part with Dragon Voice Recognition Software. Although reviewed after completion, some word and grammatical errors may remain.

## 2018-12-11 NOTE — PATIENT INSTRUCTIONS
If you have any questions regarding your allergies, asthma, or what we discussed during your visit today please call the allergy clinic or contact us via [employer].    [facility]: [phone]

## 2018-12-18 ENCOUNTER — OFFICE VISIT (OUTPATIENT)
Dept: ALLERGY | Facility: CLINIC | Age: 58
End: 2018-12-18
Payer: COMMERCIAL

## 2018-12-18 VITALS
TEMPERATURE: 97.5 F | SYSTOLIC BLOOD PRESSURE: 133 MMHG | HEART RATE: 72 BPM | OXYGEN SATURATION: 99 % | DIASTOLIC BLOOD PRESSURE: 84 MMHG

## 2018-12-18 DIAGNOSIS — J30.1 SEASONAL ALLERGIC RHINITIS DUE TO POLLEN: ICD-10-CM

## 2018-12-18 DIAGNOSIS — J30.89 ALLERGIC RHINITIS DUE TO DUST MITE: ICD-10-CM

## 2018-12-18 DIAGNOSIS — J30.81 ALLERGIC RHINITIS DUE TO ANIMALS: Primary | ICD-10-CM

## 2018-12-18 DIAGNOSIS — J30.89 ALLERGIC RHINITIS DUE TO MOLD: ICD-10-CM

## 2018-12-18 PROCEDURE — 99207 ZZC DROP WITH A PROCEDURE: CPT | Performed by: ALLERGY & IMMUNOLOGY

## 2018-12-18 PROCEDURE — 95180 RAPID DESENSITIZATION: CPT | Performed by: ALLERGY & IMMUNOLOGY

## 2018-12-18 NOTE — PROGRESS NOTES
Nela Mares was seen in the Allergy Clinic at [facility]. The following are my recommendations regarding her Allergic Rhinitis Due to Animals, Allergic Rhinitis Due to Pollen, Allergic Rhinitis Due to Dust Mites and Allergic Rhinitis Due to Mold    1. Continue cluster immunotherapy per protocol  2. Pre-medicate with antihistamines as directed prior to injection appointments      Nela Mares is a 58 year old American female who is seen today for cluster immunotherapy. She had no issues after her last visit and has been feeling well over the last week. Sanam pre-medicated with antihistamines as directed prior to today's visit.      Past Medical History:   Diagnosis Date     Allergic rhinitis, cause unspecified      Cervical high risk HPV (human papillomavirus) test positive 03/21/2017    3/21/17 NIL pap/+ HR HPV (not 16 or 18).      Depressive disorder      Depressive disorder, not elsewhere classified     seasonal     Hypertension      Mild persistent asthma      Obstructive sleep apnea (adult) (pediatric)     uses CPAP     Scalp mass 7/2014       REVIEW OF SYSTEMS:  General: negative for weight gain. negative for weight loss. negative for changes in sleep.   Eyes: negative for itching. negative for redness. negative for tearing/watering. negative for vision changes  Ears: negative for fullness. negative for hearing loss. negative for dizziness.   Nose: negative for snoring.negative for changes in smell. negative for drainage.   Throat: negative for hoarseness. negative for sore throat. negative for trouble swallowing.   Lungs: negative for cough. negative for shortness of breath.negative for wheezing. negative for sputum production.   Cardiovascular: negative for chest pain. negative for swelling of ankles. negative for fast or irregular heartbeat.   Gastrointestinal: negative for nausea. negative for heartburn. negative for acid reflux.   Musculoskeletal: negative for joint pain. negative  for joint stiffness. negative for joint swelling.   Neurologic: negative for seizures. negative for fainting. negative for weakness.   Psychiatric: negative for changes in mood. negative for anxiety.   Endocrine: negative for cold intolerance. negative for heat intolerance. negative for tremors.   Hematologic: negative for easy bruising. negative for easy bleeding.  Integumentary: negative for rash. negative for scaling. negative for nail changes.       Current Outpatient Medications:      cetirizine (ZYRTEC) 10 MG tablet, Take 10 mg by mouth daily, Disp: , Rfl:      fluticasone (FLONASE) 50 MCG/ACT spray, Spray 1-2 sprays into both nostrils daily, Disp: 32 g, Rfl: 3     fluticasone-salmeterol (ADVAIR DISKUS) 500-50 MCG/DOSE diskus inhaler, Inhale 1 puff into the lungs 2 times daily (Patient taking differently: Inhale 1 puff into the lungs daily ), Disp: 3 Inhaler, Rfl: 3     hydrochlorothiazide (MICROZIDE) 12.5 MG capsule, Take 1 capsule (12.5 mg) by mouth daily, Disp: 90 capsule, Rfl: 3     lisinopril (PRINIVIL/ZESTRIL) 40 MG tablet, Take 1 tablet (40 mg) by mouth daily, Disp: 90 tablet, Rfl: 3     montelukast (SINGULAIR) 10 MG tablet, Take 1 tablet (10 mg) by mouth At Bedtime, Disp: 90 tablet, Rfl: 1     ORDER FOR ALLERGEN IMMUNOTHERAPY, Name of Mix: Mix #1  Dust Mite, Cat, Dog Cat Hair, Standardized 10,000 BAU/mL, ALK  2.0 ml Dog Hair-Dander, A. P.  1:100 w/v, HS  1.0 ml Dust Mites DP. 30,000 AU/mL, HS  0.6 ml  Diluent: HSA qs to 5ml, Disp: 5 mL, Rfl: PRN     ORDER FOR ALLERGEN IMMUNOTHERAPY, Name of Mix: Mix #2  Tree , Weeds Birch Mix PRW 1:20 w/v, HS  0.5 ml Hickory, Shagbark 1:20 w/v, HS  0.5 ml [place] Mix RW 1:20 w/v, HS 0.5 ml Oak Mix RVW 1:20 w/v, HS 0.5 ml Kochia 1:20 w/v, HS 0.5 ml Nettle 1:20 w/v, HS 0.5 ml Ragweed Mixed 1:20 w/v ALK  0.5 ml Sagebrush, Mugwort 1:20 w/v, HS 0.5 ml Diluent: HSA qs to 5ml, Disp: 5 mL, Rfl: PRN     ORDER FOR ALLERGEN IMMUNOTHERAPY, Name of Mix: Mix #3  Mold Alternaria  Tenuis 1:10 w/v, HS  0.5 ml Aspergillus Fumigatus 1:10 w/v, HS  0.5 ml Curvularia Spicifera 1:10 w/v, HS  0.5 ml Epicoccum Nigrum 1:10 w/v, HS 0.5 ml Hormodendrum Cladosporioides 1:10 w/v, HS 0.5 ml Penicillium Mix 1:10 w/v, HS  0.5 ml Phoma Herbarum 1:10 w/v, HS  0.5 ml Diluent: HSA qs to 5ml, Disp: 5 mL, Rfl: PRN     venlafaxine (EFFEXOR-XR) 75 MG 24 hr capsule, Take 1 capsule (75 mg) by mouth daily, Disp: 90 capsule, Rfl: 3     albuterol (PROAIR HFA/PROVENTIL HFA/VENTOLIN HFA) 108 (90 Base) MCG/ACT Inhaler, Inhale 2 puffs into the lungs every 4 hours as needed for shortness of breath / dyspnea or wheezing (Patient not taking: Reported on 12/18/2018), Disp: 3 Inhaler, Rfl: 3     EPINEPHrine (AUVI-Q) 0.3 MG/0.3ML injection 2-pack, Inject 0.3 mLs (0.3 mg) into the muscle as needed for anaphylaxis (Patient not taking: Reported on 12/18/2018), Disp: 2 mL, Rfl: 2    EXAM:   /84 (BP Location: Left arm, Patient Position: Sitting, Cuff Size: Adult Large)   Pulse 72   Temp 97.5  F (36.4  C) (Oral)   SpO2 99%   GENERAL APPEARANCE: alert, cooperative and not in distress  SKIN: no rashes, no lesions  HEAD: atraumatic, normocephalic  ENT: no scars or lesions, tongue midline and normal, soft palate, uvula, and tonsils normal  NECK: no asymmetry, masses, or scars, supple without significant adenopathy  LUNGS: unlabored respirations, no intercostal retractions or accessory muscle use, clear to auscultation without rales or wheezes  HEART: regular rate and rhythm without murmurs and normal S1 and S2  MUSCULOSKELETAL: no musculoskeletal defects are noted  NEURO: no focal deficits noted  PSYCH: does not appear depressed or anxious      WORKUP:  Cluster Immunotherapy    Cluster Allergen Immunotherapy:    After explaining risks and benefits and obtaining consent, we proceeded with cluster immunotherapy.      Injection given: 14:10  Red 1:1   Cat, Dog, Dust Mite    0.15mL  Red 1:1   Trees, Weeds     0.15mL  Red  1:1   Molds      0.15mL    Injection given: 14:48  Red 1:1   Cat, Dog, Dust Mite    0.2 mL  Red 1:1   Trees, Weeds     0.2 mL  Red 1:1   Molds      0.2 mL      Start Time: 14:10  End Time: 15:19    ASSESSMENT/PLAN:  Nela Mares is a 58 year old female here for cluster immunotherapy. She tolerated the procedure well without developing any signs or symptoms of an allergic reaction.      1. Continue cluster immunotherapy per protocol  2. Pre-medicate with antihistamines as directed prior to injection appointments      Karen Clark MD  Allergy/Immunology  [facility] and [place], [place]      Chart documentation done in part with Dragon Voice Recognition Software. Although reviewed after completion, some word and grammatical errors may remain.

## 2018-12-18 NOTE — LETTER
12/18/2018         RE: Nela Mares  [address] [facility] [id number]        Dear Colleague,    Thank you for referring your patient, Nela Mares, to the [facility]. Please see a copy of my visit note below.    Nela Mares was seen in the Allergy Clinic at [facility]. The following are my recommendations regarding her Allergic Rhinitis Due to Animals, Allergic Rhinitis Due to Pollen, Allergic Rhinitis Due to Dust Mites and Allergic Rhinitis Due to Mold    1. Continue cluster immunotherapy per protocol  2. Pre-medicate with antihistamines as directed prior to injection appointments      Nela Mares is a 58 year old American female who is seen today for cluster immunotherapy. She had no issues after her last visit and has been feeling well over the last week. Sanam pre-medicated with antihistamines as directed prior to today's visit.      Past Medical History:   Diagnosis Date     Allergic rhinitis, cause unspecified      Cervical high risk HPV (human papillomavirus) test positive 03/21/2017    3/21/17 NIL pap/+ HR HPV (not 16 or 18).      Depressive disorder      Depressive disorder, not elsewhere classified     seasonal     Hypertension      Mild persistent asthma      Obstructive sleep apnea (adult) (pediatric)     uses CPAP     Scalp mass 7/2014       REVIEW OF SYSTEMS:  General: negative for weight gain. negative for weight loss. negative for changes in sleep.   Eyes: negative for itching. negative for redness. negative for tearing/watering. negative for vision changes  Ears: negative for fullness. negative for hearing loss. negative for dizziness.   Nose: negative for snoring.negative for changes in smell. negative for drainage.   Throat: negative for hoarseness. negative for sore throat. negative for trouble swallowing.   Lungs: negative for cough. negative for shortness of breath.negative for wheezing. negative for sputum production.    Cardiovascular: negative for chest pain. negative for swelling of ankles. negative for fast or irregular heartbeat.   Gastrointestinal: negative for nausea. negative for heartburn. negative for acid reflux.   Musculoskeletal: negative for joint pain. negative for joint stiffness. negative for joint swelling.   Neurologic: negative for seizures. negative for fainting. negative for weakness.   Psychiatric: negative for changes in mood. negative for anxiety.   Endocrine: negative for cold intolerance. negative for heat intolerance. negative for tremors.   Hematologic: negative for easy bruising. negative for easy bleeding.  Integumentary: negative for rash. negative for scaling. negative for nail changes.       Current Outpatient Medications:      cetirizine (ZYRTEC) 10 MG tablet, Take 10 mg by mouth daily, Disp: , Rfl:      fluticasone (FLONASE) 50 MCG/ACT spray, Spray 1-2 sprays into both nostrils daily, Disp: 32 g, Rfl: 3     fluticasone-salmeterol (ADVAIR DISKUS) 500-50 MCG/DOSE diskus inhaler, Inhale 1 puff into the lungs 2 times daily (Patient taking differently: Inhale 1 puff into the lungs daily ), Disp: 3 Inhaler, Rfl: 3     hydrochlorothiazide (MICROZIDE) 12.5 MG capsule, Take 1 capsule (12.5 mg) by mouth daily, Disp: 90 capsule, Rfl: 3     lisinopril (PRINIVIL/ZESTRIL) 40 MG tablet, Take 1 tablet (40 mg) by mouth daily, Disp: 90 tablet, Rfl: 3     montelukast (SINGULAIR) 10 MG tablet, Take 1 tablet (10 mg) by mouth At Bedtime, Disp: 90 tablet, Rfl: 1     ORDER FOR ALLERGEN IMMUNOTHERAPY, Name of Mix: Mix #1  Dust Mite, Cat, Dog Cat Hair, Standardized 10,000 BAU/mL, ALK  2.0 ml Dog Hair-Dander, A. P.  1:100 w/v, HS  1.0 ml Dust Mites DP. 30,000 AU/mL, HS  0.6 ml  Diluent: HSA qs to 5ml, Disp: 5 mL, Rfl: PRN     ORDER FOR ALLERGEN IMMUNOTHERAPY, Name of Mix: Mix #2  Tree , Weeds Birch Mix PRW 1:20 w/v, HS  0.5 ml Hickory, Shagbark 1:20 w/v, HS  0.5 ml [place] Mix RW 1:20 w/v, HS 0.5 ml Oak Mix RVW 1:20 w/v,  HS 0.5 ml Kochia 1:20 w/v, HS 0.5 ml Nettle 1:20 w/v, HS 0.5 ml Ragweed Mixed 1:20 w/v ALK  0.5 ml Sagebrush, Mugwort 1:20 w/v, HS 0.5 ml Diluent: HSA qs to 5ml, Disp: 5 mL, Rfl: PRN     ORDER FOR ALLERGEN IMMUNOTHERAPY, Name of Mix: Mix #3  Mold Alternaria Tenuis 1:10 w/v, HS  0.5 ml Aspergillus Fumigatus 1:10 w/v, HS  0.5 ml Curvularia Spicifera 1:10 w/v, HS  0.5 ml Epicoccum Nigrum 1:10 w/v, HS 0.5 ml Hormodendrum Cladosporioides 1:10 w/v, HS 0.5 ml Penicillium Mix 1:10 w/v, HS  0.5 ml Phoma Herbarum 1:10 w/v, HS  0.5 ml Diluent: HSA qs to 5ml, Disp: 5 mL, Rfl: PRN     venlafaxine (EFFEXOR-XR) 75 MG 24 hr capsule, Take 1 capsule (75 mg) by mouth daily, Disp: 90 capsule, Rfl: 3     albuterol (PROAIR HFA/PROVENTIL HFA/VENTOLIN HFA) 108 (90 Base) MCG/ACT Inhaler, Inhale 2 puffs into the lungs every 4 hours as needed for shortness of breath / dyspnea or wheezing (Patient not taking: Reported on 12/18/2018), Disp: 3 Inhaler, Rfl: 3     EPINEPHrine (AUVI-Q) 0.3 MG/0.3ML injection 2-pack, Inject 0.3 mLs (0.3 mg) into the muscle as needed for anaphylaxis (Patient not taking: Reported on 12/18/2018), Disp: 2 mL, Rfl: 2    EXAM:   /84 (BP Location: Left arm, Patient Position: Sitting, Cuff Size: Adult Large)   Pulse 72   Temp 97.5  F (36.4  C) (Oral)   SpO2 99%   GENERAL APPEARANCE: alert, cooperative and not in distress  SKIN: no rashes, no lesions  HEAD: atraumatic, normocephalic  ENT: no scars or lesions, tongue midline and normal, soft palate, uvula, and tonsils normal  NECK: no asymmetry, masses, or scars, supple without significant adenopathy  LUNGS: unlabored respirations, no intercostal retractions or accessory muscle use, clear to auscultation without rales or wheezes  HEART: regular rate and rhythm without murmurs and normal S1 and S2  MUSCULOSKELETAL: no musculoskeletal defects are noted  NEURO: no focal deficits noted  PSYCH: does not appear depressed or anxious      WORKUP:  Cluster  Immunotherapy    Cluster Allergen Immunotherapy:    After explaining risks and benefits and obtaining consent, we proceeded with cluster immunotherapy.      Injection given: 14:10  Red 1:1   Cat, Dog, Dust Mite    0.15mL  Red 1:1   Trees, Weeds     0.15mL  Red 1:1   Molds      0.15mL    Injection given: 14:48  Red 1:1   Cat, Dog, Dust Mite    0.2 mL  Red 1:1   Trees, Weeds     0.2 mL  Red 1:1   Molds      0.2 mL      Start Time: 14:10  End Time: 15:19    ASSESSMENT/PLAN:  Nela Mares is a 58 year old female here for cluster immunotherapy. She tolerated the procedure well without developing any signs or symptoms of an allergic reaction.      1. Continue cluster immunotherapy per protocol  2. Pre-medicate with antihistamines as directed prior to injection appointments      Karen Clark MD  Allergy/Immunology  [place], [place]      Chart documentation done in part with Dragon Voice Recognition Software. Although reviewed after completion, some word and grammatical errors may remain.      Again, thank you for allowing me to participate in the care of your patient.        Sincerely,        Karen Clark MD

## 2018-12-24 ENCOUNTER — OFFICE VISIT (OUTPATIENT)
Dept: ALLERGY | Facility: CLINIC | Age: 58
End: 2018-12-24
Payer: COMMERCIAL

## 2018-12-24 VITALS
SYSTOLIC BLOOD PRESSURE: 124 MMHG | TEMPERATURE: 97.8 F | HEART RATE: 70 BPM | DIASTOLIC BLOOD PRESSURE: 82 MMHG | OXYGEN SATURATION: 97 %

## 2018-12-24 DIAGNOSIS — J30.89 ALLERGIC RHINITIS DUE TO DUST MITE: ICD-10-CM

## 2018-12-24 DIAGNOSIS — J30.81 ALLERGIC RHINITIS DUE TO ANIMALS: ICD-10-CM

## 2018-12-24 DIAGNOSIS — J30.1 SEASONAL ALLERGIC RHINITIS DUE TO POLLEN: Primary | ICD-10-CM

## 2018-12-24 DIAGNOSIS — J30.89 ALLERGIC RHINITIS DUE TO MOLD: ICD-10-CM

## 2018-12-24 PROCEDURE — 95180 RAPID DESENSITIZATION: CPT | Performed by: ALLERGY & IMMUNOLOGY

## 2018-12-24 PROCEDURE — 99207 ZZC DROP WITH A PROCEDURE: CPT | Performed by: ALLERGY & IMMUNOLOGY

## 2018-12-24 NOTE — PROGRESS NOTES
Nela Mares was seen in the Allergy Clinic at [facility]. The following are my recommendations regarding her Allergic Rhinitis Due to Animals, Allergic Rhinitis Due to Pollen, Allergic Rhinitis Due to Dust Mites and Allergic Rhinitis Due to Mold    1. Return in 7-14 days for first maintenance injection dose. Continue at maintenance per the protocol.  2. Pre-medicate with antihistamines as directed prior to injection appointments  3. Follow-up in 6 months      Nela Mares is a 58 year old American female who is seen today for cluster immunotherapy. She has been feeling well and had no issues after her last injection visit. Sanam pre-medicated with antihistamines as directed prior to today's visit.      Past Medical History:   Diagnosis Date     Allergic rhinitis, cause unspecified      Cervical high risk HPV (human papillomavirus) test positive 03/21/2017    3/21/17 NIL pap/+ HR HPV (not 16 or 18).      Depressive disorder      Depressive disorder, not elsewhere classified     seasonal     Hypertension      Mild persistent asthma      Obstructive sleep apnea (adult) (pediatric)     uses CPAP     Scalp mass 7/2014       REVIEW OF SYSTEMS:  General: negative for weight gain. negative for weight loss. negative for changes in sleep.   Eyes: negative for itching. negative for redness. negative for tearing/watering. negative for vision changes  Ears: negative for fullness. negative for hearing loss. negative for dizziness.   Nose: negative for snoring.negative for changes in smell. negative for drainage.   Throat: negative for hoarseness. negative for sore throat. negative for trouble swallowing.   Lungs: negative for cough. negative for shortness of breath.negative for wheezing. negative for sputum production.   Cardiovascular: negative for chest pain. negative for swelling of ankles. negative for fast or irregular heartbeat.   Gastrointestinal: negative for nausea. negative for heartburn.  negative for acid reflux.   Musculoskeletal: negative for joint pain. negative for joint stiffness. negative for joint swelling.   Neurologic: negative for seizures. negative for fainting. negative for weakness.   Psychiatric: negative for changes in mood. negative for anxiety.   Endocrine: negative for cold intolerance. negative for heat intolerance. negative for tremors.   Hematologic: negative for easy bruising. negative for easy bleeding.  Integumentary: negative for rash. negative for scaling. negative for nail changes.       Current Outpatient Medications:      cetirizine (ZYRTEC) 10 MG tablet, Take 10 mg by mouth daily, Disp: , Rfl:      EPINEPHrine (AUVI-Q) 0.3 MG/0.3ML injection 2-pack, Inject 0.3 mLs (0.3 mg) into the muscle as needed for anaphylaxis, Disp: 2 mL, Rfl: 2     fluticasone-salmeterol (ADVAIR DISKUS) 500-50 MCG/DOSE diskus inhaler, Inhale 1 puff into the lungs 2 times daily (Patient taking differently: Inhale 1 puff into the lungs daily ), Disp: 3 Inhaler, Rfl: 3     lisinopril (PRINIVIL/ZESTRIL) 40 MG tablet, Take 1 tablet (40 mg) by mouth daily, Disp: 90 tablet, Rfl: 3     montelukast (SINGULAIR) 10 MG tablet, Take 1 tablet (10 mg) by mouth At Bedtime, Disp: 90 tablet, Rfl: 1     ORDER FOR ALLERGEN IMMUNOTHERAPY, Name of Mix: Mix #1  Dust Mite, Cat, Dog Cat Hair, Standardized 10,000 BAU/mL, ALK  2.0 ml Dog Hair-Dander, A. P.  1:100 w/v, HS  1.0 ml Dust Mites DP. 30,000 AU/mL, HS  0.6 ml  Diluent: HSA qs to 5ml, Disp: 5 mL, Rfl: PRN     ORDER FOR ALLERGEN IMMUNOTHERAPY, Name of Mix: Mix #2  Tree , Weeds Birch Mix PRW 1:20 w/v, HS  0.5 ml Hickory, Shagbark 1:20 w/v, HS  0.5 ml [place] Mix RW 1:20 w/v, HS 0.5 ml Oak Mix RVW 1:20 w/v, HS 0.5 ml Kochia 1:20 w/v, HS 0.5 ml Nettle 1:20 w/v, HS 0.5 ml Ragweed Mixed 1:20 w/v ALK  0.5 ml Sagebrush, Mugwort 1:20 w/v, HS 0.5 ml Diluent: HSA qs to 5ml, Disp: 5 mL, Rfl: PRN     ORDER FOR ALLERGEN IMMUNOTHERAPY, Name of Mix: Mix #3  Mold Alternaria Tenuis  1:10 w/v, HS  0.5 ml Aspergillus Fumigatus 1:10 w/v, HS  0.5 ml Curvularia Spicifera 1:10 w/v, HS  0.5 ml Epicoccum Nigrum 1:10 w/v, HS 0.5 ml Hormodendrum Cladosporioides 1:10 w/v, HS 0.5 ml Penicillium Mix 1:10 w/v, HS  0.5 ml Phoma Herbarum 1:10 w/v, HS  0.5 ml Diluent: HSA qs to 5ml, Disp: 5 mL, Rfl: PRN     venlafaxine (EFFEXOR-XR) 75 MG 24 hr capsule, Take 1 capsule (75 mg) by mouth daily, Disp: 90 capsule, Rfl: 3     albuterol (PROAIR HFA/PROVENTIL HFA/VENTOLIN HFA) 108 (90 Base) MCG/ACT Inhaler, Inhale 2 puffs into the lungs every 4 hours as needed for shortness of breath / dyspnea or wheezing (Patient not taking: Reported on 12/18/2018), Disp: 3 Inhaler, Rfl: 3     fluticasone (FLONASE) 50 MCG/ACT spray, Spray 1-2 sprays into both nostrils daily, Disp: 32 g, Rfl: 3     hydrochlorothiazide (MICROZIDE) 12.5 MG capsule, Take 1 capsule (12.5 mg) by mouth daily, Disp: 90 capsule, Rfl: 3    EXAM:   /82   Pulse 70   Temp 97.8  F (36.6  C)   SpO2 97%   GENERAL APPEARANCE: alert, cooperative and not in distress  SKIN: no rashes, no lesions  HEAD: atraumatic, normocephalic  ENT: no scars or lesions, tongue midline and normal, soft palate, uvula, and tonsils normal  NECK: no asymmetry, masses, or scars, supple without significant adenopathy  LUNGS: unlabored respirations, no intercostal retractions or accessory muscle use, clear to auscultation without rales or wheezes  HEART: regular rate and rhythm without murmurs and normal S1 and S2  MUSCULOSKELETAL: no musculoskeletal defects are noted  NEURO: no focal deficits noted  PSYCH: does not appear depressed or anxious      WORKUP:  Cluster Immunotherapy    Cluster Allergen Immunotherapy:    After explaining risks and benefits and obtaining consent, we proceeded with cluster immunotherapy.      Injection given: 8:26  Red 1:1   Cat, Dog, Dust Mite    0.3 mL  Red 1:1   Trees, Weeds     0.3 mL  Red 1:1   Molds      0.3 mL    Injection given: 9:05  Red 1:1   Cat,  Dog, Dust Mite    0.4 mL  Red 1:1   Trees, Weeds     0.4 mL  Red 1:1   Molds      0.4 mL    Start Time: 8:26  End Time: 9:36      ASSESSMENT/PLAN:  Nela Mares is a 58 year old female here for cluster immunotherapy. She tolerated the procedure well without developing any signs or symptoms of an allergic reaction.      1. Return in 7-14 days for first maintenance injection dose. Continue at maintenance per the protocol.  2. Pre-medicate with antihistamines as directed prior to injection appointments  3. Follow-up in 6 months      Karen Clark MD  Allergy/Immunology  [place], [place]      Chart documentation done in part with Dragon Voice Recognition Software. Although reviewed after completion, some word and grammatical errors may remain.

## 2018-12-24 NOTE — LETTER
12/24/2018         RE: Nela Mares  [address] [facility] [address]        Dear Colleague,    Thank you for referring your patient, Nela Mares, to the [facility]. Please see a copy of my visit note below.    Nela Mares was seen in the Allergy Clinic at [facility]. The following are my recommendations regarding her Allergic Rhinitis Due to Animals, Allergic Rhinitis Due to Pollen, Allergic Rhinitis Due to Dust Mites and Allergic Rhinitis Due to Mold    1. Return in 7-14 days for first maintenance injection dose. Continue at maintenance per the protocol.  2. Pre-medicate with antihistamines as directed prior to injection appointments  3. Follow-up in 6 months      Nela Mares is a 58 year old American female who is seen today for cluster immunotherapy. She has been feeling well and had no issues after her last injection visit. Sanam pre-medicated with antihistamines as directed prior to today's visit.      Past Medical History:   Diagnosis Date     Allergic rhinitis, cause unspecified      Cervical high risk HPV (human papillomavirus) test positive 03/21/2017    3/21/17 NIL pap/+ HR HPV (not 16 or 18).      Depressive disorder      Depressive disorder, not elsewhere classified     seasonal     Hypertension      Mild persistent asthma      Obstructive sleep apnea (adult) (pediatric)     uses CPAP     Scalp mass 7/2014       REVIEW OF SYSTEMS:  General: negative for weight gain. negative for weight loss. negative for changes in sleep.   Eyes: negative for itching. negative for redness. negative for tearing/watering. negative for vision changes  Ears: negative for fullness. negative for hearing loss. negative for dizziness.   Nose: negative for snoring.negative for changes in smell. negative for drainage.   Throat: negative for hoarseness. negative for sore throat. negative for trouble swallowing.   Lungs: negative for cough. negative for shortness  of breath.negative for wheezing. negative for sputum production.   Cardiovascular: negative for chest pain. negative for swelling of ankles. negative for fast or irregular heartbeat.   Gastrointestinal: negative for nausea. negative for heartburn. negative for acid reflux.   Musculoskeletal: negative for joint pain. negative for joint stiffness. negative for joint swelling.   Neurologic: negative for seizures. negative for fainting. negative for weakness.   Psychiatric: negative for changes in mood. negative for anxiety.   Endocrine: negative for cold intolerance. negative for heat intolerance. negative for tremors.   Hematologic: negative for easy bruising. negative for easy bleeding.  Integumentary: negative for rash. negative for scaling. negative for nail changes.       Current Outpatient Medications:      cetirizine (ZYRTEC) 10 MG tablet, Take 10 mg by mouth daily, Disp: , Rfl:      EPINEPHrine (AUVI-Q) 0.3 MG/0.3ML injection 2-pack, Inject 0.3 mLs (0.3 mg) into the muscle as needed for anaphylaxis, Disp: 2 mL, Rfl: 2     fluticasone-salmeterol (ADVAIR DISKUS) 500-50 MCG/DOSE diskus inhaler, Inhale 1 puff into the lungs 2 times daily (Patient taking differently: Inhale 1 puff into the lungs daily ), Disp: 3 Inhaler, Rfl: 3     lisinopril (PRINIVIL/ZESTRIL) 40 MG tablet, Take 1 tablet (40 mg) by mouth daily, Disp: 90 tablet, Rfl: 3     montelukast (SINGULAIR) 10 MG tablet, Take 1 tablet (10 mg) by mouth At Bedtime, Disp: 90 tablet, Rfl: 1     ORDER FOR ALLERGEN IMMUNOTHERAPY, Name of Mix: Mix #1  Dust Mite, Cat, Dog Cat Hair, Standardized 10,000 BAU/mL, ALK  2.0 ml Dog Hair-Dander, A. P.  1:100 w/v, HS  1.0 ml Dust Mites DP. 30,000 AU/mL, HS  0.6 ml  Diluent: HSA qs to 5ml, Disp: 5 mL, Rfl: PRN     ORDER FOR ALLERGEN IMMUNOTHERAPY, Name of Mix: Mix #2  Tree , Weeds Birch Mix PRW 1:20 w/v, HS  0.5 ml Hickory, Shagbark 1:20 w/v, HS  0.5 ml [place] Mix RW 1:20 w/v, HS 0.5 ml Oak Mix RVW 1:20 w/v, HS 0.5 ml Kochia  1:20 w/v, HS 0.5 ml Nettle 1:20 w/v, HS 0.5 ml Ragweed Mixed 1:20 w/v ALK  0.5 ml Sagebrush, Mugwort 1:20 w/v, HS 0.5 ml Diluent: HSA qs to 5ml, Disp: 5 mL, Rfl: PRN     ORDER FOR ALLERGEN IMMUNOTHERAPY, Name of Mix: Mix #3  Mold Alternaria Tenuis 1:10 w/v, HS  0.5 ml Aspergillus Fumigatus 1:10 w/v, HS  0.5 ml Curvularia Spicifera 1:10 w/v, HS  0.5 ml Epicoccum Nigrum 1:10 w/v, HS 0.5 ml Hormodendrum Cladosporioides 1:10 w/v, HS 0.5 ml Penicillium Mix 1:10 w/v, HS  0.5 ml Phoma Herbarum 1:10 w/v, HS  0.5 ml Diluent: HSA qs to 5ml, Disp: 5 mL, Rfl: PRN     venlafaxine (EFFEXOR-XR) 75 MG 24 hr capsule, Take 1 capsule (75 mg) by mouth daily, Disp: 90 capsule, Rfl: 3     albuterol (PROAIR HFA/PROVENTIL HFA/VENTOLIN HFA) 108 (90 Base) MCG/ACT Inhaler, Inhale 2 puffs into the lungs every 4 hours as needed for shortness of breath / dyspnea or wheezing (Patient not taking: Reported on 12/18/2018), Disp: 3 Inhaler, Rfl: 3     fluticasone (FLONASE) 50 MCG/ACT spray, Spray 1-2 sprays into both nostrils daily, Disp: 32 g, Rfl: 3     hydrochlorothiazide (MICROZIDE) 12.5 MG capsule, Take 1 capsule (12.5 mg) by mouth daily, Disp: 90 capsule, Rfl: 3    EXAM:   /82   Pulse 70   Temp 97.8  F (36.6  C)   SpO2 97%   GENERAL APPEARANCE: alert, cooperative and not in distress  SKIN: no rashes, no lesions  HEAD: atraumatic, normocephalic  ENT: no scars or lesions, tongue midline and normal, soft palate, uvula, and tonsils normal  NECK: no asymmetry, masses, or scars, supple without significant adenopathy  LUNGS: unlabored respirations, no intercostal retractions or accessory muscle use, clear to auscultation without rales or wheezes  HEART: regular rate and rhythm without murmurs and normal S1 and S2  MUSCULOSKELETAL: no musculoskeletal defects are noted  NEURO: no focal deficits noted  PSYCH: does not appear depressed or anxious      WORKUP:  Cluster Immunotherapy    Cluster Allergen Immunotherapy:    After explaining risks and  benefits and obtaining consent, we proceeded with cluster immunotherapy.      Injection given: 8:26  Red 1:1   Cat, Dog, Dust Mite    0.3 mL  Red 1:1   Trees, Weeds     0.3 mL  Red 1:1   Molds      0.3 mL    Injection given: 9:05  Red 1:1   Cat, Dog, Dust Mite    0.4 mL  Red 1:1   Trees, Weeds     0.4 mL  Red 1:1   Molds      0.4 mL    Start Time: 8:26  End Time: 9:36      ASSESSMENT/PLAN:  Nela Mares is a 58 year old female here for cluster immunotherapy. She tolerated the procedure well without developing any signs or symptoms of an allergic reaction.      1. Return in 7-14 days for first maintenance injection dose. Continue at maintenance per the protocol.  2. Pre-medicate with antihistamines as directed prior to injection appointments  3. Follow-up in 6 months      Karen Clark MD  Allergy/Immunology  [place], [place]      Chart documentation done in part with Dragon Voice Recognition Software. Although reviewed after completion, some word and grammatical errors may remain.      Again, thank you for allowing me to participate in the care of your patient.        Sincerely,        Karen Clark MD

## 2018-12-31 ENCOUNTER — ALLIED HEALTH/NURSE VISIT (OUTPATIENT)
Dept: ALLERGY | Facility: CLINIC | Age: 58
End: 2018-12-31
Payer: COMMERCIAL

## 2018-12-31 DIAGNOSIS — J30.9 ALLERGIC RHINITIS: Primary | ICD-10-CM

## 2018-12-31 PROCEDURE — 95117 IMMUNOTHERAPY INJECTIONS: CPT

## 2018-12-31 PROCEDURE — 99207 ZZC DROP WITH A PROCEDURE: CPT

## 2018-12-31 NOTE — PROGRESS NOTES
Patient presented after waiting 30 minutes with normal reaction to allergy injections. Discharged from clinic.    Paula Walters RN ............   12/31/2018...9:34 AM

## 2019-01-10 ENCOUNTER — ALLIED HEALTH/NURSE VISIT (OUTPATIENT)
Dept: ALLERGY | Facility: CLINIC | Age: 59
End: 2019-01-10
Payer: COMMERCIAL

## 2019-01-10 DIAGNOSIS — J30.1 SEASONAL ALLERGIC RHINITIS DUE TO POLLEN: Primary | ICD-10-CM

## 2019-01-10 PROCEDURE — 95117 IMMUNOTHERAPY INJECTIONS: CPT

## 2019-01-10 PROCEDURE — 99207 ZZC DROP WITH A PROCEDURE: CPT

## 2019-01-10 NOTE — PROGRESS NOTES
Patient presented after waiting 30 minutes with no reaction to allergy injections. Discharged from clinic.    Sterling Elkins RN....1/10/2019 8:14 AM

## 2019-01-17 ENCOUNTER — OFFICE VISIT (OUTPATIENT)
Dept: URGENT CARE | Facility: URGENT CARE | Age: 59
End: 2019-01-17
Payer: COMMERCIAL

## 2019-01-17 VITALS
TEMPERATURE: 97.8 F | OXYGEN SATURATION: 97 % | BODY MASS INDEX: 38.1 KG/M2 | HEART RATE: 71 BPM | HEIGHT: 69 IN | SYSTOLIC BLOOD PRESSURE: 130 MMHG | DIASTOLIC BLOOD PRESSURE: 78 MMHG

## 2019-01-17 DIAGNOSIS — B37.0 ORAL THRUSH: Primary | ICD-10-CM

## 2019-01-17 PROCEDURE — 99213 OFFICE O/P EST LOW 20 MIN: CPT | Performed by: PHYSICIAN ASSISTANT

## 2019-01-17 RX ORDER — NYSTATIN 100000/ML
500000 SUSPENSION, ORAL (FINAL DOSE FORM) ORAL 4 TIMES DAILY
Qty: 200 ML | Refills: 0 | Status: SHIPPED | OUTPATIENT
Start: 2019-01-17 | End: 2019-04-02

## 2019-01-18 NOTE — PATIENT INSTRUCTIONS
You have oral thrush.    Swish and spit Nystatin mouthwash 4 times daily x 10 days.    Take a probiotic daily.    Be sure to rinse your mouth after using Advair inhaler.    Follow up with your PCP if no improvement in 10 days, sooner if no improvement. If worsening or new symptoms such as fever or difficultly swalling, be seen immediately.

## 2019-01-18 NOTE — PROGRESS NOTES
"SUBJECTIVE:   Nela Mares is a 58 year old female presenting with a chief complaint of   Chief Complaint   Patient presents with     Urgent Care     Mouth/Lip Problem     c/o thrush inside mouth for 6 weeks     White discharge on the roof of her mouth and on uvula x 6 weeks, waxing and waning initially but now consistent. She can scrape it off but it comes right back. This started after she increased her Advair use. She has been on Advair for years she says but this was increased when she started some allergy shots.  No sore throat, but does have \"irritation.\" No fever. No nausea, vomiting. No skin rashes.  She has not had this before. She does not have diabetes. She has not been on oral steroids x 2 years.      ROS   See HPI    PMH:  Past Medical History:   Diagnosis Date     Allergic rhinitis, cause unspecified      Cervical high risk HPV (human papillomavirus) test positive 03/21/2017    3/21/17 NIL pap/+ HR HPV (not 16 or 18).      Depressive disorder      Depressive disorder, not elsewhere classified     seasonal     Hypertension      Mild persistent asthma      Obstructive sleep apnea (adult) (pediatric)     uses CPAP     Scalp mass 7/2014     Patient Active Problem List   Diagnosis     Allergic rhinitis due to animals     Mild persistent asthma     Obstructive sleep apnea     Leiomyoma of uterus     Mild major depression (H)     CARDIOVASCULAR SCREENING; LDL GOAL LESS THAN 160     Hypertension goal BP (blood pressure) < 140/90     Pilar cyst     Hypertrophy of breast     IUD (intrauterine device) in place     Seasonal allergic rhinitis due to pollen     Allergic rhinitis due to dust mite     Allergic rhinitis due to mold       Current Medications:  Current Outpatient Medications   Medication Sig Dispense Refill     albuterol (PROAIR HFA/PROVENTIL HFA/VENTOLIN HFA) 108 (90 Base) MCG/ACT Inhaler Inhale 2 puffs into the lungs every 4 hours as needed for shortness of breath / dyspnea or wheezing 3 Inhaler 3 "     cetirizine (ZYRTEC) 10 MG tablet Take 10 mg by mouth daily       EPINEPHrine (AUVI-Q) 0.3 MG/0.3ML injection 2-pack Inject 0.3 mLs (0.3 mg) into the muscle as needed for anaphylaxis 2 mL 2     fluticasone (FLONASE) 50 MCG/ACT spray Spray 1-2 sprays into both nostrils daily 32 g 3     fluticasone-salmeterol (ADVAIR DISKUS) 500-50 MCG/DOSE diskus inhaler Inhale 1 puff into the lungs 2 times daily (Patient taking differently: Inhale 1 puff into the lungs daily ) 3 Inhaler 3     hydrochlorothiazide (MICROZIDE) 12.5 MG capsule Take 1 capsule (12.5 mg) by mouth daily 90 capsule 3     lisinopril (PRINIVIL/ZESTRIL) 40 MG tablet Take 1 tablet (40 mg) by mouth daily 90 tablet 3     montelukast (SINGULAIR) 10 MG tablet Take 1 tablet (10 mg) by mouth At Bedtime 90 tablet 1     nystatin (MYCOSTATIN) 894173 UNIT/ML suspension Take 5 mLs (500,000 Units) by mouth 4 times daily for 10 days 200 mL 0     ORDER FOR ALLERGEN IMMUNOTHERAPY Name of Mix: Mix #1  Dust Mite, Cat, Dog  Cat Hair, Standardized 10,000 BAU/mL, ALK  2.0 ml  Dog Hair-Dander, A. P.  1:100 w/v, HS  1.0 ml  Dust Mites DP. 30,000 AU/mL, HS  0.6 ml   Diluent: HSA qs to 5ml 5 mL PRN     ORDER FOR ALLERGEN IMMUNOTHERAPY Name of Mix: Mix #2  Tree , Weeds  Birch Mix PRW 1:20 w/v, HS  0.5 ml  Hickory, Shagbark 1:20 w/v, HS  0.5 ml  [place] Mix RW 1:20 w/v, HS 0.5 ml  Oak Mix RVW 1:20 w/v, HS 0.5 ml  Kochia 1:20 w/v, HS 0.5 ml  Nettle 1:20 w/v, HS 0.5 ml  Ragweed Mixed 1:20 w/v ALK  0.5 ml  Sagebrush, Mugwort 1:20 w/v, HS 0.5 ml  Diluent: HSA qs to 5ml 5 mL PRN     ORDER FOR ALLERGEN IMMUNOTHERAPY Name of Mix: Mix #3  Mold  Alternaria Tenuis 1:10 w/v, HS  0.5 ml  Aspergillus Fumigatus 1:10 w/v, HS  0.5 ml  Curvularia Spicifera 1:10 w/v, HS  0.5 ml  Epicoccum Nigrum 1:10 w/v, HS 0.5 ml  Hormodendrum Cladosporioides 1:10 w/v, HS 0.5 ml  Penicillium Mix 1:10 w/v, HS  0.5 ml  Phoma Herbarum 1:10 w/v, HS  0.5 ml  Diluent: HSA qs to 5ml 5 mL PRN     venlafaxine (EFFEXOR-XR) 75  MG 24 hr capsule Take 1 capsule (75 mg) by mouth daily 90 capsule 3       Surgical History:  Past Surgical History:   Procedure Laterality Date     COLONOSCOPY  6/21/2012    Procedure: COLONOSCOPY;  COLONOSCOPY, SCREEN;  Surgeon: Bart You MD;  Location: [facility] OR     D & C       ENDOSCOPIC RETROGRADE CHOLANGIOPANCREATOGRAM  1/4/2014    Procedure: ENDOSCOPIC RETROGRADE CHOLANGIOPANCREATOGRAM;  ERCP biliary sphincterotomy, bile duct stone removal, epinepherine washing.;  Surgeon: Ba Meyers MD;  Location: [facility] OR     LAPAROSCOPIC CHOLECYSTECTOMY WITH CHOLANGIOGRAMS  1/4/2014    Procedure: LAPAROSCOPIC CHOLECYSTECTOMY WITH CHOLANGIOGRAMS;;  Surgeon: Haja Sage MD;  Location: [facility] OR     NO HISTORY OF SURGERY       OPERATIVE HYSTEROSCOPY  6/17/2014    Procedure: OPERATIVE HYSTEROSCOPY;  Surgeon: Paola Camara MD;  Location: [facility]     REMOVE INTRAUTERINE DEVICE  6/17/2014    Procedure: REMOVE INTRAUTERINE DEVICE;  Surgeon: Paola Camara MD;  Location: [facility] OR       Family History:  Family History   Problem Relation Age of Onset     C.A.D. Paternal Grandmother      Cerebrovascular Disease Paternal Grandmother      Depression Paternal Grandmother      Obesity Paternal Grandmother      C.A.D. Paternal Grandfather      Cerebrovascular Disease Paternal Grandfather         unsure     Substance Abuse Paternal Grandfather      Hypertension Father      Asthma Father      Obesity Father      Neurologic Disorder Mother         Graves disease     Hyperlipidemia Mother      Depression Mother      Thyroid Disease Mother      Other - See Comments Other      Depression Maternal Grandmother      Cancer Sister         mild skin cancer,cured from excision     Depression Nephew      Anxiety Disorder Nephew      Mental Illness Maternal Half-Brother      Mental Illness Paternal Half-Brother      Substance Abuse Nephew      Asthma Sister      Asthma Nephew      Thyroid Disease Maternal  "Half-Sister      Thyroid Disease Paternal Half-Sister      Diabetes No family hx of        Social History:  Social History     Tobacco Use     Smoking status: Never Smoker     Smokeless tobacco: Never Used   Substance Use Topics     Alcohol use: No          OBJECTIVE  /78   Pulse 71   Temp 97.8  F (36.6  C) (Tympanic)   Ht 1.753 m (5' 9\")   SpO2 97%   BMI 38.10 kg/m      General: alert, appears well, NAD. Afebrile.  Skin: no suspicious lesions or rashes.  HEENT: Normocephalic.   Eyes: conjunctiva clear.  Oropharynx: MMM. There are patches of mild erythema and scant white patches on the roof of her mouth. No posterior pharyngeal erythema, petechiae, or exudate. There is a raised lesion on the inside of the right cheek- patient reports she bit her cheek there. Uvula midline.    Neck: supple, no lymphadenopathy.  Respiratory: No distress.   Psych: normal mood and pleasant affect.      Labs:  No results found for this or any previous visit (from the past 24 hour(s)).        ASSESSMENT/PLAN:    ICD-10-CM    1. Oral thrush B37.0 nystatin (MYCOSTATIN) 224283 UNIT/ML suspension           Medical Decision Making:    Serious Comorbid Conditions: allergies, asthma    Exam is very consistent with oral thrush. Likely this is due to increased Advair use (per her allergist). Appears confined to the mouth- does not appear to extend to the esophagus. I think treatment with topical nystatin is appropriate. Discussed doing probiotic daily as well, and importance of rinsing mouth after Advair.  Recommend f/up with PCP if no improvement in 10 days, sooner if worsening.    At the end of the encounter, I discussed all available results, as well as the diagnosis and any associated medications. I discussed red flags for immediate return to clinic/ER, as well as indications for follow up. Refer to patient instructions below, which were all addressed with patient. Patient understood and agreed to plan. Patient was appropriate for " discharge.      Patient Instructions   You have oral thrush.    Swish and spit Nystatin mouthwash 4 times daily x 10 days.    Take a probiotic daily.    Be sure to rinse your mouth after using Advair inhaler.    Follow up with your PCP if no improvement in 10 days, sooner if no improvement. If worsening or new symptoms such as fever or difficultly swalling, be seen immediately.               Marnie Burrows PA-C

## 2019-01-22 ENCOUNTER — OFFICE VISIT (OUTPATIENT)
Dept: DERMATOLOGY | Facility: CLINIC | Age: 59
End: 2019-01-22
Payer: COMMERCIAL

## 2019-01-22 VITALS — HEART RATE: 76 BPM | DIASTOLIC BLOOD PRESSURE: 92 MMHG | OXYGEN SATURATION: 96 % | SYSTOLIC BLOOD PRESSURE: 134 MMHG

## 2019-01-22 DIAGNOSIS — L72.11 PILAR CYST: Primary | ICD-10-CM

## 2019-01-22 PROCEDURE — 99213 OFFICE O/P EST LOW 20 MIN: CPT | Performed by: PHYSICIAN ASSISTANT

## 2019-01-22 NOTE — PROGRESS NOTES
HPI:   Nela Mares is a 58 year old female who presents for evaluation of several cyst on scalp    Location: scalp     Review Of Systems  Eyes: negative  Ears/Nose/Throat: negative  Respiratory: No shortness of breath, dyspnea on exertion, cough, or hemoptysis  Cardiovascular: negative  Gastrointestinal: negative  Genitourinary: negative  Musculoskeletal: negative  Neurologic: negative  Psychiatric: negative    This document serves as a record of the services and decisions personally performed and made by Gena Titus, MS, PA-C. It was created on her behalf by Berkley Mercedes, a trained medical scribe. The creation of this document is based on the provider's statements to the medical scribe.  Berkley Mercedes 2:04 PM January 22, 2019    PHYSICAL EXAM:    BP (!) 134/92   Pulse 76   SpO2 96%   Breastfeeding? No   Skin exam performed as follows: Type 2 skin. Mood appropriate  Alert and Oriented X 3. Well developed, well nourished in no distress.  General appearance: Normal  Head including face: Normal  Eyes: conjunctiva and lids: Normal  Mouth: Lips, teeth, gums: Normal  Neck: Normal  Chest-breast/axillae: Normal  Back: Normal  Spleen and liver: Normal  Cardiovascular: Exam of peripheral vascular system by observation for swelling, varicosities, edema: Normal  Genitalia: groin, buttocks: Normal  Extremities: digits/nails (clubbing): Normal  Eccrine and Apocrine glands: Normal  Right upper extremity: Normal  Left upper extremity: Normal  Right lower extremity: Normal  Left lower extremity: Normal  Skin: Scalp and body hair: See below    1. 8 mm smooth subcutaneous nodule located on left parietal   2. 8 mm smooth subcutaneous nodule located on left frontal scalp  3. 7 mm smooth subcutaneous nodule located on right frontal scalp  4. 6 mm smooth subcutaneous nodule located on right parietal scalp  5. 3 mm smooth subcutaneous nodule located on left inferior parietal scalp    ASSESSMENT/PLAN:      1. Pilar cysts on the scalp - advised benign and no treatment needed. Becoming irritated and bothersome. Discussed excision with Dr. German or aNe Grove if bothersome; she will schedule for this. Knows it may take several appointments to address all areas.   2. Patient to follow up with Primary Care provider regarding elevated blood pressure.      Follow-up: follow up for excision   CC:   Scribed By: Berkley Mercedes, Medical Scribe    The information in this document, created by the medical scribe for me, accurately reflects the services I personally performed and the decisions made by me. I have reviewed and approved this document for accuracy prior to leaving the patient care area.  January 22, 2019 2:08 PM    Gena Titus MS, PA-C

## 2019-01-22 NOTE — LETTER
1/22/2019         RE: Nela Mares  [address] [facility] [id number]        Dear Colleague,    Thank you for referring your patient, Nela Mares, to the [facility]. Please see a copy of my visit note below.    HPI:   Nela Mares is a 58 year old female who presents for evaluation of several cyst on scalp    Location: scalp     Review Of Systems  Eyes: negative  Ears/Nose/Throat: negative  Respiratory: No shortness of breath, dyspnea on exertion, cough, or hemoptysis  Cardiovascular: negative  Gastrointestinal: negative  Genitourinary: negative  Musculoskeletal: negative  Neurologic: negative  Psychiatric: negative    This document serves as a record of the services and decisions personally performed and made by Gena Titus, MS, PA-C. It was created on her behalf by Berkley Mercedes, a trained medical scribe. The creation of this document is based on the provider's statements to the medical scribe.  Berkley Mercedes 2:04 PM January 22, 2019    PHYSICAL EXAM:    BP (!) 134/92   Pulse 76   SpO2 96%   Breastfeeding? No   Skin exam performed as follows: Type 2 skin. Mood appropriate  Alert and Oriented X 3. Well developed, well nourished in no distress.  General appearance: Normal  Head including face: Normal  Eyes: conjunctiva and lids: Normal  Mouth: Lips, teeth, gums: Normal  Neck: Normal  Chest-breast/axillae: Normal  Back: Normal  Spleen and liver: Normal  Cardiovascular: Exam of peripheral vascular system by observation for swelling, varicosities, edema: Normal  Genitalia: groin, buttocks: Normal  Extremities: digits/nails (clubbing): Normal  Eccrine and Apocrine glands: Normal  Right upper extremity: Normal  Left upper extremity: Normal  Right lower extremity: Normal  Left lower extremity: Normal  Skin: Scalp and body hair: See below    1. 8 mm smooth subcutaneous nodule located on left parietal   2. 8 mm smooth subcutaneous  nodule located on left frontal scalp  3. 7 mm smooth subcutaneous nodule located on right frontal scalp  4. 6 mm smooth subcutaneous nodule located on right parietal scalp  5. 3 mm smooth subcutaneous nodule located on left inferior parietal scalp    ASSESSMENT/PLAN:     1. Pilar cysts on the scalp - advised benign and no treatment needed. Becoming irritated and bothersome. Discussed excision with Dr. German or [facility] [place] if bothersome; she will schedule for this. Knows it may take several appointments to address all areas.   2. Patient to follow up with Primary Care provider regarding elevated blood pressure.      Follow-up: follow up for excision   CC:   Scribed By: Berkley Mercedes, Medical Scribe    The information in this document, created by the medical scribe for me, accurately reflects the services I personally performed and the decisions made by me. I have reviewed and approved this document for accuracy prior to leaving the patient care area.  January 22, 2019 2:08 PM    Gena Meadows MS, PAKIMBERLEE      Again, thank you for allowing me to participate in the care of your patient.        Sincerely,        Gena Meadows PA-C

## 2019-01-24 ENCOUNTER — ALLIED HEALTH/NURSE VISIT (OUTPATIENT)
Dept: ALLERGY | Facility: CLINIC | Age: 59
End: 2019-01-24
Payer: COMMERCIAL

## 2019-01-24 DIAGNOSIS — J30.1 SEASONAL ALLERGIC RHINITIS DUE TO POLLEN: Primary | ICD-10-CM

## 2019-01-24 PROCEDURE — 95117 IMMUNOTHERAPY INJECTIONS: CPT

## 2019-01-24 PROCEDURE — 99207 ZZC DROP WITH A PROCEDURE: CPT

## 2019-01-24 NOTE — PROGRESS NOTES
Patient presented after waiting 30 minutes with no reaction to allergy injections. Discharged from clinic.    Sterling Elkins RN....1/24/2019 7:57 AM

## 2019-02-07 ENCOUNTER — ALLIED HEALTH/NURSE VISIT (OUTPATIENT)
Dept: ALLERGY | Facility: CLINIC | Age: 59
End: 2019-02-07
Payer: COMMERCIAL

## 2019-02-07 DIAGNOSIS — J30.1 SEASONAL ALLERGIC RHINITIS DUE TO POLLEN: Primary | ICD-10-CM

## 2019-02-07 PROCEDURE — 95117 IMMUNOTHERAPY INJECTIONS: CPT

## 2019-02-07 PROCEDURE — 99207 ZZC DROP WITH A PROCEDURE: CPT

## 2019-02-07 NOTE — PROGRESS NOTES
Patient presented after waiting 30 minutes with normal reaction to allergy injections. Discharged from clinic.    Paula Walters RN ............   2/7/2019...8:05 AM

## 2019-02-18 ENCOUNTER — OFFICE VISIT (OUTPATIENT)
Dept: DERMATOLOGY | Facility: CLINIC | Age: 59
End: 2019-02-18
Payer: COMMERCIAL

## 2019-02-18 ENCOUNTER — TELEPHONE (OUTPATIENT)
Dept: DERMATOLOGY | Facility: CLINIC | Age: 59
End: 2019-02-18

## 2019-02-18 VITALS — DIASTOLIC BLOOD PRESSURE: 88 MMHG | OXYGEN SATURATION: 97 % | HEART RATE: 71 BPM | SYSTOLIC BLOOD PRESSURE: 128 MMHG

## 2019-02-18 DIAGNOSIS — D48.5 NEOPLASM OF UNCERTAIN BEHAVIOR OF SCALP: Primary | ICD-10-CM

## 2019-02-18 DIAGNOSIS — L72.11 PILAR CYSTS: ICD-10-CM

## 2019-02-18 PROCEDURE — 10061 I&D ABSCESS COMP/MULTIPLE: CPT | Performed by: PHYSICIAN ASSISTANT

## 2019-02-18 PROCEDURE — 88304 TISSUE EXAM BY PATHOLOGIST: CPT | Mod: TC | Performed by: PHYSICIAN ASSISTANT

## 2019-02-18 NOTE — PROGRESS NOTES
HPI:  Nela Mares is a 58 year old female patient here today for removal of pilar cysts on scalp.  Patient states this has been present for years.  Patient reports the following symptoms: irritated, growing .  Patient reports the following previous treatments: none. Has had 8 removed in the past with no complications. Have never grown back. Develops new cysts in other areas of scalp..  Patient reports the following modifying factors: none.  Associated symptoms: none.  Patient has no other skin complaints today.  Remainder of the HPI, Meds, PMH, Allergies, FH, and SH was reviewed in chart.    Pertinent Hx:   Pilar cysts  Past Medical History:   Diagnosis Date     Allergic rhinitis, cause unspecified      Cervical high risk HPV (human papillomavirus) test positive 03/21/2017    3/21/17 NIL pap/+ HR HPV (not 16 or 18).      Depressive disorder      Depressive disorder, not elsewhere classified     seasonal     Hypertension      Mild persistent asthma      Obstructive sleep apnea (adult) (pediatric)     uses CPAP     Scalp mass 7/2014       Past Surgical History:   Procedure Laterality Date     COLONOSCOPY  6/21/2012    Procedure: COLONOSCOPY;  COLONOSCOPY, SCREEN;  Surgeon: Bart You MD;  Location: [facility] OR     D & C       ENDOSCOPIC RETROGRADE CHOLANGIOPANCREATOGRAM  1/4/2014    Procedure: ENDOSCOPIC RETROGRADE CHOLANGIOPANCREATOGRAM;  ERCP biliary sphincterotomy, bile duct stone removal, epinepherine washing.;  Surgeon: Ba Meyers MD;  Location: [facility]     LAPAROSCOPIC CHOLECYSTECTOMY WITH CHOLANGIOGRAMS  1/4/2014    Procedure: LAPAROSCOPIC CHOLECYSTECTOMY WITH CHOLANGIOGRAMS;;  Surgeon: Haja Sage MD;  Location: [facility] OR     NO HISTORY OF SURGERY       OPERATIVE HYSTEROSCOPY  6/17/2014    Procedure: OPERATIVE HYSTEROSCOPY;  Surgeon: Paola Camara MD;  Location: [facility]     REMOVE INTRAUTERINE DEVICE  6/17/2014    Procedure: REMOVE INTRAUTERINE DEVICE;  Surgeon:  Paola Camara MD;  Location: [facility]        Family History   Problem Relation Age of Onset     C.A.D. Paternal Grandmother      Cerebrovascular Disease Paternal Grandmother      Depression Paternal Grandmother      Obesity Paternal Grandmother      C.A.D. Paternal Grandfather      Cerebrovascular Disease Paternal Grandfather         unsure     Substance Abuse Paternal Grandfather      Hypertension Father      Asthma Father      Obesity Father      Skin Cancer Father      Neurologic Disorder Mother         Graves disease     Hyperlipidemia Mother      Depression Mother      Thyroid Disease Mother      Other - See Comments Other      Skin Cancer Sister      Depression Maternal Grandmother      Cancer Sister         mild skin cancer,cured from excision     Depression Nephew      Anxiety Disorder Nephew      Mental Illness Maternal Half-Brother      Mental Illness Paternal Half-Brother      Substance Abuse Nephew      Asthma Sister      Asthma Nephew      Thyroid Disease Maternal Half-Sister      Thyroid Disease Paternal Half-Sister      Diabetes No family hx of        Social History     Socioeconomic History     Marital status: Significant other     Spouse name: Not on file     Number of children: 0     Years of education: 18     Highest education level: Not on file   Social Needs     Financial resource strain: Not on file     Food insecurity - worry: Not on file     Food insecurity - inability: Not on file     Transportation needs - medical: Not on file     Transportation needs - non-medical: Not on file   Occupational History     Occupation:    Tobacco Use     Smoking status: Never Smoker     Smokeless tobacco: Never Used   Substance and Sexual Activity     Alcohol use: No     Drug use: No     Sexual activity: Not Currently     Partners: Male     Birth control/protection: IUD   Other Topics Concern     Parent/sibling w/ CABG, MI or angioplasty before 65F 55M? No   Social History  Narrative    Dairy/d 3 servings/d.     Caffeine 2 servings/d    Exercise 0 x week    Sunscreen used - No    Seatbelts used - Yes    Working smoke/CO detectors in the home - Yes    Guns stored in the home - No    Self Breast Exams - no    Eye Exam up to date - No    Dental Exam up to date - Yes    Pap Smear up to date - Yes    Mammogram up to date - Yes    Dexa Scan up to date - NOT APPLICABLE    Flex Sig / Colonoscopy up to date - NOT APPLICABLE    Immunizations up to date - Yes    Abuse: Current or Past(Physical, Sexual or Emotional)- No    Do you feel safe in your environment - Yes    GÓMEZ Cummings    [birth year]                       Outpatient Encounter Medications as of 2/18/2019   Medication Sig Dispense Refill     albuterol (PROAIR HFA/PROVENTIL HFA/VENTOLIN HFA) 108 (90 Base) MCG/ACT Inhaler Inhale 2 puffs into the lungs every 4 hours as needed for shortness of breath / dyspnea or wheezing 3 Inhaler 3     cetirizine (ZYRTEC) 10 MG tablet Take 10 mg by mouth daily       EPINEPHrine (AUVI-Q) 0.3 MG/0.3ML injection 2-pack Inject 0.3 mLs (0.3 mg) into the muscle as needed for anaphylaxis 2 mL 2     fluticasone (FLONASE) 50 MCG/ACT spray Spray 1-2 sprays into both nostrils daily 32 g 3     fluticasone-salmeterol (ADVAIR DISKUS) 500-50 MCG/DOSE diskus inhaler Inhale 1 puff into the lungs 2 times daily (Patient taking differently: Inhale 1 puff into the lungs daily ) 3 Inhaler 3     hydrochlorothiazide (MICROZIDE) 12.5 MG capsule Take 1 capsule (12.5 mg) by mouth daily 90 capsule 3     lisinopril (PRINIVIL/ZESTRIL) 40 MG tablet Take 1 tablet (40 mg) by mouth daily 90 tablet 3     montelukast (SINGULAIR) 10 MG tablet Take 1 tablet (10 mg) by mouth At Bedtime 90 tablet 1     ORDER FOR ALLERGEN IMMUNOTHERAPY Name of Mix: Mix #1  Dust Mite, Cat, Dog  Cat Hair, Standardized 10,000 BAU/mL, ALK  2.0 ml  Dog Hair-Dander, A. P.  1:100 w/v, HS  1.0 ml  Dust Mites DP. 30,000 AU/mL, HS  0.6 ml   Diluent: HSA qs to 5ml 5 mL PRN      ORDER FOR ALLERGEN IMMUNOTHERAPY Name of Mix: Mix #2  Tree , Weeds  Birch Mix PRW 1:20 w/v, HS  0.5 ml  Hickory, Shagbark 1:20 w/v, HS  0.5 ml  [place] Mix RW 1:20 w/v, HS 0.5 ml  Oak Mix RVW 1:20 w/v, HS 0.5 ml  Kochia 1:20 w/v, HS 0.5 ml  Nettle 1:20 w/v, HS 0.5 ml  Ragweed Mixed 1:20 w/v ALK  0.5 ml  Sagebrush, Mugwort 1:20 w/v, HS 0.5 ml  Diluent: HSA qs to 5ml 5 mL PRN     ORDER FOR ALLERGEN IMMUNOTHERAPY Name of Mix: Mix #3  Mold  Alternaria Tenuis 1:10 w/v, HS  0.5 ml  Aspergillus Fumigatus 1:10 w/v, HS  0.5 ml  Curvularia Spicifera 1:10 w/v, HS  0.5 ml  Epicoccum Nigrum 1:10 w/v, HS 0.5 ml  Hormodendrum Cladosporioides 1:10 w/v, HS 0.5 ml  Penicillium Mix 1:10 w/v, HS  0.5 ml  Phoma Herbarum 1:10 w/v, HS  0.5 ml  Diluent: HSA qs to 5ml 5 mL PRN     venlafaxine (EFFEXOR-XR) 75 MG 24 hr capsule Take 1 capsule (75 mg) by mouth daily 90 capsule 3     [] nystatin (MYCOSTATIN) 317886 UNIT/ML suspension Take 5 mLs (500,000 Units) by mouth 4 times daily for 10 days 200 mL 0     No facility-administered encounter medications on file as of 2019.        Review Of Systems:  Skin: As above  Eyes: negative  Ears/Nose/Throat: negative  Respiratory: No shortness of breath, dyspnea on exertion, cough, or hemoptysis  Cardiovascular: negative  Gastrointestinal: negative  Genitourinary: negative  Musculoskeletal: negative  Neurologic: negative  Psychiatric: negative  Hematologic/Lymphatic/Immunologic: negative  Endocrine: negative      Objective:     /88   Pulse 71   SpO2 97%   Eyes: Conjunctivae/lids: Normal   ENT: Lips:  Normal  MSK: Normal  Cardiovascular: Peripheral edema none  Pulm: Breathing Normal  Neuro/Psych: Orientation: Normal; Mood/Affect: Normal, NAD, WDWN  Pt accompanied by: self  Following areas examined: face, neck, scalp  Cesar skin type:i   Findings:  Soft mobile smooth nodule on on left parietal scalp 1.2cm  Soft mobile smooth nodule on on left inferior parietal scalp 0.6cm  Soft  mobile smooth nodule on left  frontal scalp, 0.8cm, right frontal scalp 0.7cm, and right parietal scalp 0.6cm    Assessment and Plan:  1) Neoplasm of uncertain behavior on left parietal scalp 1.2cm  Rule out  pilar cyst  Epidermal cyst was cleansed with surgical cleanser. It was infiltrated with buffered solution of 1% lidocaine with epinephrine. An incision was made with a number 11 blade over the top of the lesion, curette used to dislodge from surrounding tissue, and hemostat used to remove cyst sac and contents. Minimal yellow keratinous foul smelling drainage with minimal blood was expressed.   Dressing was applied. Patient was discharged in satisfactory condition. FACG 0.6 used for superficial closure.   Based on lesion type may need to completely remove lesion. Patient will be notified in 7-10 days of results. Wound care directions given.  FACG 0.6 used for superficial closure.     2) Neoplasm of uncertain behavior on left inferior parietal scalp 0.6cm  Rule out pilar cyst  Epidermal cyst was cleansed with surgical cleanser. It was infiltrated with buffered solution of 1% lidocaine with epinephrine. An incision was made with a number 11 blade over the top of the lesion, curette used to dislodge from surrounding tissue, and hemostat used to remove cyst sac. Minimal yellow keratinous foul smelling drainage with minimal blood was expressed.   Dressing was applied. Patient was discharged in satisfactory condition.  Based on lesion type may need to completely remove lesion. Patient will be notified in 7-10 days of results. Wound care directions given.    3) irritated/inflamed pilar cysts  Follow up for removal. Can do two at each appointment.     Follow up in prn for removal of other pilar cysts

## 2019-02-18 NOTE — TELEPHONE ENCOUNTER
Spoke to pt to remind her of her appt this morning and asked that she arrives 10-15 minutes early to prep for cyst (s) removal. Pt voiced understanding and agreed to come early, had no other questions or concerns.     Mattie Balderrama MA

## 2019-02-18 NOTE — LETTER
2/18/2019         RE: Nela Mares  [address] [facility] [address]        Dear Colleague,    Thank you for referring your patient, Nela Mares, to the [facility]. Please see a copy of my visit note below.    HPI:  Nela Mares is a 58 year old female patient here today for removal of pilar cysts on scalp.  Patient states this has been present for years.  Patient reports the following symptoms: irritated, growing .  Patient reports the following previous treatments: none. Has had 8 removed in the past with no complications. Have never grown back. Develops new cysts in other areas of scalp..  Patient reports the following modifying factors: none.  Associated symptoms: none.  Patient has no other skin complaints today.  Remainder of the HPI, Meds, PMH, Allergies, FH, and SH was reviewed in chart.    Pertinent Hx:   Pilar cysts  Past Medical History:   Diagnosis Date     Allergic rhinitis, cause unspecified      Cervical high risk HPV (human papillomavirus) test positive 03/21/2017    3/21/17 NIL pap/+ HR HPV (not 16 or 18).      Depressive disorder      Depressive disorder, not elsewhere classified     seasonal     Hypertension      Mild persistent asthma      Obstructive sleep apnea (adult) (pediatric)     uses CPAP     Scalp mass 7/2014       Past Surgical History:   Procedure Laterality Date     COLONOSCOPY  6/21/2012    Procedure: COLONOSCOPY;  COLONOSCOPY, SCREEN;  Surgeon: Bart You MD;  Location:  OR     D [facility] C       ENDOSCOPIC RETROGRADE CHOLANGIOPANCREATOGRAM  1/4/2014    Procedure: ENDOSCOPIC RETROGRADE CHOLANGIOPANCREATOGRAM;  ERCP biliary sphincterotomy, bile duct stone removal, epinepherine washing.;  Surgeon: Ba eMyers MD;  Location: [facility] OR     LAPAROSCOPIC CHOLECYSTECTOMY WITH CHOLANGIOGRAMS  1/4/2014    Procedure: LAPAROSCOPIC CHOLECYSTECTOMY WITH CHOLANGIOGRAMS;;  Surgeon: Haja Sage MD;  [facility]     NO HISTORY OF SURGERY       OPERATIVE HYSTEROSCOPY  6/17/2014    Procedure: OPERATIVE HYSTEROSCOPY;  Surgeon: Paola Camara MD;  Location: [facility] OR     REMOVE INTRAUTERINE DEVICE  6/17/2014    Procedure: REMOVE INTRAUTERINE DEVICE;  Surgeon: Paola Camara MD;  Location: [facility] OR        Family History   Problem Relation Age of Onset     C.A.D. Paternal Grandmother      Cerebrovascular Disease Paternal Grandmother      Depression Paternal Grandmother      Obesity Paternal Grandmother      C.A.D. Paternal Grandfather      Cerebrovascular Disease Paternal Grandfather         unsure     Substance Abuse Paternal Grandfather      Hypertension Father      Asthma Father      Obesity Father      Skin Cancer Father      Neurologic Disorder Mother         Graves disease     Hyperlipidemia Mother      Depression Mother      Thyroid Disease Mother      Other - See Comments Other      Skin Cancer Sister      Depression Maternal Grandmother      Cancer Sister         mild skin cancer,cured from excision     Depression Nephew      Anxiety Disorder Nephew      Mental Illness Maternal Half-Brother      Mental Illness Paternal Half-Brother      Substance Abuse Nephew      Asthma Sister      Asthma Nephew      Thyroid Disease Maternal Half-Sister      Thyroid Disease Paternal Half-Sister      Diabetes No family hx of        Social History     Socioeconomic History     Marital status: Significant other     Spouse name: Not on file     Number of children: 0     Years of education: 18     Highest education level: Not on file   Social Needs     Financial resource strain: Not on file     Food insecurity - worry: Not on file     Food insecurity - inability: Not on file     Transportation needs - medical: Not on file     Transportation needs - non-medical: Not on file   Occupational History     Occupation:    Tobacco Use     Smoking status: Never Smoker     Smokeless tobacco: Never Used    Substance and Sexual Activity     Alcohol use: No     Drug use: No     Sexual activity: Not Currently     Partners: Male     Birth control/protection: IUD   Other Topics Concern     Parent/sibling w/ CABG, MI or angioplasty before 65F 55M? No   Social History Narrative    Dairy/d 3 servings/d.     Caffeine 2 servings/d    Exercise 0 x week    Sunscreen used - No    Seatbelts used - Yes    Working smoke/CO detectors in the home - Yes    Guns stored in the home - No    Self Breast Exams - no    Eye Exam up to date - No    Dental Exam up to date - Yes    Pap Smear up to date - Yes    Mammogram up to date - Yes    Dexa Scan up to date - NOT APPLICABLE    Flex Sig / Colonoscopy up to date - NOT APPLICABLE    Immunizations up to date - Yes    Abuse: Current or Past(Physical, Sexual or Emotional)- No    Do you feel safe in your environment - Yes    GÓMEZ Cummings    [birth year]                       Outpatient Encounter Medications as of 2/18/2019   Medication Sig Dispense Refill     albuterol (PROAIR HFA/PROVENTIL HFA/VENTOLIN HFA) 108 (90 Base) MCG/ACT Inhaler Inhale 2 puffs into the lungs every 4 hours as needed for shortness of breath / dyspnea or wheezing 3 Inhaler 3     cetirizine (ZYRTEC) 10 MG tablet Take 10 mg by mouth daily       EPINEPHrine (AUVI-Q) 0.3 MG/0.3ML injection 2-pack Inject 0.3 mLs (0.3 mg) into the muscle as needed for anaphylaxis 2 mL 2     fluticasone (FLONASE) 50 MCG/ACT spray Spray 1-2 sprays into both nostrils daily 32 g 3     fluticasone-salmeterol (ADVAIR DISKUS) 500-50 MCG/DOSE diskus inhaler Inhale 1 puff into the lungs 2 times daily (Patient taking differently: Inhale 1 puff into the lungs daily ) 3 Inhaler 3     hydrochlorothiazide (MICROZIDE) 12.5 MG capsule Take 1 capsule (12.5 mg) by mouth daily 90 capsule 3     lisinopril (PRINIVIL/ZESTRIL) 40 MG tablet Take 1 tablet (40 mg) by mouth daily 90 tablet 3     montelukast (SINGULAIR) 10 MG tablet Take 1 tablet (10 mg) by mouth At Bedtime 90  tablet 1     ORDER FOR ALLERGEN IMMUNOTHERAPY Name of Mix: Mix #1  Dust Mite, Cat, Dog  Cat Hair, Standardized 10,000 BAU/mL, ALK  2.0 ml  Dog Hair-Dander, A. P.  1:100 w/v, HS  1.0 ml  Dust Mites DP. 30,000 AU/mL, HS  0.6 ml   Diluent: HSA qs to 5ml 5 mL PRN     ORDER FOR ALLERGEN IMMUNOTHERAPY Name of Mix: Mix #2  Tree , Weeds  Birch Mix PRW 1:20 w/v, HS  0.5 ml  Hickory, Shagbark 1:20 w/v, HS  0.5 ml  Dequincy Mix RW 1:20 w/v, HS 0.5 ml  Oak Mix RVW 1:20 w/v, HS 0.5 ml  Kochia 1:20 w/v, HS 0.5 ml  Nettle 1:20 w/v, HS 0.5 ml  Ragweed Mixed 1:20 w/v ALK  0.5 ml  Sagebrush, Mugwort 1:20 w/v, HS 0.5 ml  Diluent: HSA qs to 5ml 5 mL PRN     ORDER FOR ALLERGEN IMMUNOTHERAPY Name of Mix: Mix #3  Mold  Alternaria Tenuis 1:10 w/v, HS  0.5 ml  Aspergillus Fumigatus 1:10 w/v, HS  0.5 ml  Curvularia Spicifera 1:10 w/v, HS  0.5 ml  Epicoccum Nigrum 1:10 w/v, HS 0.5 ml  Hormodendrum Cladosporioides 1:10 w/v, HS 0.5 ml  Penicillium Mix 1:10 w/v, HS  0.5 ml  Phoma Herbarum 1:10 w/v, HS  0.5 ml  Diluent: HSA qs to 5ml 5 mL PRN     venlafaxine (EFFEXOR-XR) 75 MG 24 hr capsule Take 1 capsule (75 mg) by mouth daily 90 capsule 3     [] nystatin (MYCOSTATIN) 470625 UNIT/ML suspension Take 5 mLs (500,000 Units) by mouth 4 times daily for 10 days 200 mL 0     No facility-administered encounter medications on file as of 2019.        Review Of Systems:  Skin: As above  Eyes: negative  Ears/Nose/Throat: negative  Respiratory: No shortness of breath, dyspnea on exertion, cough, or hemoptysis  Cardiovascular: negative  Gastrointestinal: negative  Genitourinary: negative  Musculoskeletal: negative  Neurologic: negative  Psychiatric: negative  Hematologic/Lymphatic/Immunologic: negative  Endocrine: negative      Objective:     /88   Pulse 71   SpO2 97%   Eyes: Conjunctivae/lids: Normal   ENT: Lips:  Normal  MSK: Normal  Cardiovascular: Peripheral edema none  Pulm: Breathing Normal  Neuro/Psych: Orientation: Normal;  Mood/Affect: Normal, NAD, WDWN  Pt accompanied by: self  Following areas examined: face, neck, scalp  Cesar skin type:i   Findings:  Soft mobile smooth nodule on on left parietal scalp 1.2cm  Soft mobile smooth nodule on on left inferior parietal scalp 0.6cm  Soft mobile smooth nodule on left  frontal scalp, 0.8cm, right frontal scalp 0.7cm, and right parietal scalp 0.6cm    Assessment and Plan:  1) Neoplasm of uncertain behavior on left parietal scalp 1.2cm  Rule out  pilar cyst  Epidermal cyst was cleansed with surgical cleanser. It was infiltrated with buffered solution of 1% lidocaine with epinephrine. An incision was made with a number 11 blade over the top of the lesion, curette used to dislodge from surrounding tissue, and hemostat used to remove cyst sac and contents. Minimal yellow keratinous foul smelling drainage with minimal blood was expressed.   Dressing was applied. Patient was discharged in satisfactory condition. FACG 0.6 used for superficial closure.   Based on lesion type may need to completely remove lesion. Patient will be notified in 7-10 days of results. Wound care directions given.  FACG 0.6 used for superficial closure.     2) Neoplasm of uncertain behavior on left inferior parietal scalp 0.6cm  Rule out pilar cyst  Epidermal cyst was cleansed with surgical cleanser. It was infiltrated with buffered solution of 1% lidocaine with epinephrine. An incision was made with a number 11 blade over the top of the lesion, curette used to dislodge from surrounding tissue, and hemostat used to remove cyst sac. Minimal yellow keratinous foul smelling drainage with minimal blood was expressed.   Dressing was applied. Patient was discharged in satisfactory condition.  Based on lesion type may need to completely remove lesion. Patient will be notified in 7-10 days of results. Wound care directions given.    3) irritated/inflamed pilar cysts  Follow up for removal. Can do two at each appointment.      Follow up in prn for removal of other pilar cysts      Again, thank you for allowing me to participate in the care of your patient.        Sincerely,        Dana Calabrese PA-C

## 2019-02-18 NOTE — PATIENT INSTRUCTIONS
Wound Care Instructions       [facility] [phone]  [facility] [phone]    Keep Pressure Bandage on for 24 hours.     After pressure bandage removal, keep area dry for 1 week with flat bandage. You may have to take a bath to avoid getting the area wet. The suture is dissolvable. Follow instructions below after 1 week and begin daily bandage change.                    AFTER 1 WEEK YOU SHOULD BEGIN DAILY DRESSING CHANGES AS FOLLOWS:     1) Remove Dressing.     2) Clean and dry the area with tap water using a Q-tip or sterile gauze pad.     3) Apply Vaseline, Aquaphor, Polysporin ointment or Bacitracin ointment over entire wound.  Do NOT use Neosporin ointment.     4) Cover the wound with a band-aid, or a sterile non-stick gauze pad and micropore paper tape      REPEAT THESE INSTRUCTIONS AT LEAST ONCE A DAY UNTIL THE WOUND HAS COMPLETELY HEALED.      It is an old wives tale that a wound heals better when it is exposed to air and allowed to dry out. The wound will heal faster with a better cosmetic result if it is kept moist with ointment and covered with a bandage.    **Do not let the wound dry out.**      Supplies Needed:      *Cotton tipped applicators (Q-tips)    *Vaseline, Aquaphor, Polysporin Ointment or Bacitracin Ointment (NOT NEOSPORIN)    *Band-aids or non-stick gauze pads and micropore paper tape.          PATIENT INFORMATION:  During the healing process you will notice a number of changes. All wounds develop a small halo of redness surrounding the wound.  This means healing is occurring. Severe itching with extensive redness usually indicates sensitivity to the ointment or bandage tape used to dress the wound.  You should call our office if this develops.      Swelling  and/or discoloration around your surgical site is common, particularly when performed around the eye.    After the wound is healed you may discontinue dressing changes.     You may experience a sensation of  tightness as your wound heals. This is normal and will gradually subside.    Your healed wound may be sensitive to temperature changes. This sensitivity improves with time, but if you re having a lot of discomfort, try to avoid temperature extremes.    Patients frequently experience itching after their wound appears to have healed because of the continue healing under the skin.  Plain Vaseline will help relieve the itching.      POSSIBLE COMPLICATIONS    BLEEDIN. Leave the bandage in place.  2. Use tightly rolled up gauze or a cloth to apply direct pressure over the bandage for 30  minutes.  3. Reapply pressure for an additional 30 minutes if necessary  4. Use additional gauze and tape to maintain pressure once the bleeding has stopped.      Proper skin care from [facility]:    -Eliminate harsh soaps as they strip the natural oils from the skin, often resulting in dry itchy skin ( i.e. Dial, Zest, [place] Spring)  -Use mild soaps such as Cetaphil or Dove Sensitive Skin in the shower. You do not need to use soap on arms, legs, and trunk every time you shower unless visibly soiled.   -Avoid hot or cold showers.  -After showering, lightly dry off and apply moisturizing within 2-3 minutes. This will help trap moisture in the skin.   -Aggressive use of a moisturizer at least 1-2 times a day to the entire body (including -Vanicream, Cetaphil, Aquaphor or Cerave) and moisturize hands after every washing.  -We recommend using moisturizers that come in a tub that needs to be scooped out, not a pump. This has more of an oil base. It will hold moisture in your skin much better than a water base moisturizer. The above recommended are non-pore clogging.

## 2019-02-25 LAB — COPATH REPORT: NORMAL

## 2019-03-07 ENCOUNTER — ALLIED HEALTH/NURSE VISIT (OUTPATIENT)
Dept: ALLERGY | Facility: CLINIC | Age: 59
End: 2019-03-07
Payer: COMMERCIAL

## 2019-03-07 DIAGNOSIS — J30.1 ALLERGIC RHINITIS DUE TO POLLEN, UNSPECIFIED SEASONALITY: Primary | ICD-10-CM

## 2019-03-07 DIAGNOSIS — J30.89 ALLERGIC RHINITIS DUE TO DUST MITE: ICD-10-CM

## 2019-03-07 DIAGNOSIS — J30.81 ALLERGIC RHINITIS DUE TO ANIMALS: ICD-10-CM

## 2019-03-07 DIAGNOSIS — J30.89 ALLERGIC RHINITIS DUE TO MOLD: ICD-10-CM

## 2019-03-07 DIAGNOSIS — J30.1 SEASONAL ALLERGIC RHINITIS DUE TO POLLEN: ICD-10-CM

## 2019-03-07 PROCEDURE — 99207 ZZC DROP WITH A PROCEDURE: CPT

## 2019-03-07 PROCEDURE — 95117 IMMUNOTHERAPY INJECTIONS: CPT

## 2019-03-07 NOTE — PROGRESS NOTES
Patient presented after waiting 30 minutes with no reaction to allergy injections. Discharged from clinic.    Sterling Elkins RN....3/7/2019 8:08 AM

## 2019-03-15 DIAGNOSIS — J30.89 OTHER ALLERGIC RHINITIS: ICD-10-CM

## 2019-03-15 RX ORDER — FLUTICASONE PROPIONATE 50 MCG
1-2 SPRAY, SUSPENSION (ML) NASAL DAILY
Qty: 32 G | Refills: 3 | Status: SHIPPED | OUTPATIENT
Start: 2019-03-15 | End: 2020-02-17

## 2019-03-15 NOTE — TELEPHONE ENCOUNTER
"Requested Prescriptions   Pending Prescriptions Disp Refills     fluticasone (FLONASE) 50 MCG/ACT nasal spray [Pharmacy Med Name: Fluticasone Propionate Nasal Suspension 50 MCG/ACT]  Last Written Prescription Date:  5/2/2018  Last Fill Quantity: 32 g,  # refills: 3   Last office visit: 5/2/2018 with prescribing provider:  EARLINE Lozano   Future Office Visit:   Next 5 appointments (look out 90 days)    Apr 04, 2019  7:20 AM CDT  Nurse Only with [facility]  [facility] [address] [address]  [phone]          32 g 2     Sig: Spray 1-2 sprays into both nostrils daily    Inhaled Steroids Protocol Failed - 3/15/2019 10:21 AM       Failed - Asthma control assessment score within normal limits in last 6 months    Please review ACT score.     ACT Total Scores 3/21/2017 4/4/2017 10/9/2018   ACT TOTAL SCORE - - -   ASTHMA ER VISITS - - -   ASTHMA HOSPITALIZATIONS - - -   ACT TOTAL SCORE (Goal Greater than or Equal to 20) 17 25 19   In the past 12 months, how many times did you visit the emergency room for your asthma without being admitted to the hospital? 0 0 0   In the past 12 months, how many times were you hospitalized overnight because of your asthma? 0 0 0            Passed - Patient is age 12 or older       Passed - Medication is active on med list       Passed - Recent (6 mo) or future (30 days) visit within the authorizing provider's specialty    Patient had office visit in the last 6 months or has a visit in the next 30 days with authorizing provider or within the authorizing provider's specialty.  See \"Patient Info\" tab in inbasket, or \"Choose Columns\" in Meds & Orders section of the refill encounter.              "

## 2019-03-18 ENCOUNTER — OFFICE VISIT (OUTPATIENT)
Dept: DERMATOLOGY | Facility: CLINIC | Age: 59
End: 2019-03-18
Payer: COMMERCIAL

## 2019-03-18 VITALS — SYSTOLIC BLOOD PRESSURE: 124 MMHG | DIASTOLIC BLOOD PRESSURE: 86 MMHG | OXYGEN SATURATION: 97 % | HEART RATE: 63 BPM

## 2019-03-18 DIAGNOSIS — L72.11 PILAR CYSTS: ICD-10-CM

## 2019-03-18 DIAGNOSIS — Z51.6 NEED FOR DESENSITIZATION TO ALLERGENS: Primary | ICD-10-CM

## 2019-03-18 DIAGNOSIS — L71.9 ROSACEA: Primary | ICD-10-CM

## 2019-03-18 PROCEDURE — 88304 TISSUE EXAM BY PATHOLOGIST: CPT | Mod: TC | Performed by: PHYSICIAN ASSISTANT

## 2019-03-18 PROCEDURE — 11421 EXC H-F-NK-SP B9+MARG 0.6-1: CPT | Performed by: PHYSICIAN ASSISTANT

## 2019-03-18 PROCEDURE — 95165 ANTIGEN THERAPY SERVICES: CPT | Performed by: ALLERGY & IMMUNOLOGY

## 2019-03-18 PROCEDURE — 11421 EXC H-F-NK-SP B9+MARG 0.6-1: CPT | Mod: 59 | Performed by: PHYSICIAN ASSISTANT

## 2019-03-18 PROCEDURE — 99213 OFFICE O/P EST LOW 20 MIN: CPT | Mod: 25 | Performed by: PHYSICIAN ASSISTANT

## 2019-03-18 RX ORDER — METRONIDAZOLE 7.5 MG/G
GEL TOPICAL 2 TIMES DAILY
Qty: 45 G | Refills: 3 | Status: SHIPPED | OUTPATIENT
Start: 2019-03-18 | End: 2019-07-18

## 2019-03-18 NOTE — LETTER
3/18/2019         RE: Nela Mares  [address] [facility] [address]        Dear Colleague,    Thank you for referring your patient, Nela Mares, to the [facility]. Please see a copy of my visit note below.    HPI:  Nela Mares is a 58 year old female patient here today for removal of irritated pilar cysts on frontal scalp .  Patient states this has been present for a while.  Patient reports the following symptoms: growing and tender .  Patient reports the following previous treatments: none.  Patient reports the following modifying factors: none. Has had pilar cysts removed in the past.  Associated symptoms: none. Also thinks she may have rosacea on face. Pt states she has a family history. Has not tried anything for it. Does not wear sunscreen. Notices that it is worsening lately. Patient has no other skin complaints today.  Remainder of the HPI, Meds, PMH, Allergies, FH, and SH was reviewed in chart.    Pertinent Hx:   Pilar cysts. Family history of rosacea.   Past Medical History:   Diagnosis Date     Allergic rhinitis, cause unspecified      Cervical high risk HPV (human papillomavirus) test positive 03/21/2017    3/21/17 NIL pap/+ HR HPV (not 16 or 18).      Depressive disorder      Depressive disorder, not elsewhere classified     seasonal     Hypertension      Mild persistent asthma      Obstructive sleep apnea (adult) (pediatric)     uses CPAP     Scalp mass 7/2014       Past Surgical History:   Procedure Laterality Date     COLONOSCOPY  6/21/2012    Procedure: COLONOSCOPY;  COLONOSCOPY, SCREEN;  Surgeon: Bart You MD;  Location:  OR     D & C       ENDOSCOPIC RETROGRADE CHOLANGIOPANCREATOGRAM  1/4/2014    Procedure: ENDOSCOPIC RETROGRADE CHOLANGIOPANCREATOGRAM;  ERCP biliary sphincterotomy, bile duct stone removal, epinepherine washing.;  Surgeon: Ba Meyers MD;  Location: [facility] OR     LAPAROSCOPIC CHOLECYSTECTOMY WITH  CHOLANGIOGRAMS  1/4/2014    Procedure: LAPAROSCOPIC CHOLECYSTECTOMY WITH CHOLANGIOGRAMS;;  Surgeon: Haja Sage MD;  Location: [facility] OR     NO HISTORY OF SURGERY       OPERATIVE HYSTEROSCOPY  6/17/2014    Procedure: OPERATIVE HYSTEROSCOPY;  Surgeon: Paola Camara MD;  Location: [facility] OR     REMOVE INTRAUTERINE DEVICE  6/17/2014    Procedure: REMOVE INTRAUTERINE DEVICE;  Surgeon: Paola Camara MD;  Location: [facility] OR        Family History   Problem Relation Age of Onset     C.A.D. Paternal Grandmother      Cerebrovascular Disease Paternal Grandmother      Depression Paternal Grandmother      Obesity Paternal Grandmother      C.A.D. Paternal Grandfather      Cerebrovascular Disease Paternal Grandfather         unsure     Substance Abuse Paternal Grandfather      Hypertension Father      Asthma Father      Obesity Father      Skin Cancer Father      Neurologic Disorder Mother         Graves disease     Hyperlipidemia Mother      Depression Mother      Thyroid Disease Mother      Other - See Comments Other      Skin Cancer Sister      Depression Maternal Grandmother      Cancer Sister         mild skin cancer,cured from excision     Depression Nephew      Anxiety Disorder Nephew      Mental Illness Maternal Half-Brother      Mental Illness Paternal Half-Brother      Substance Abuse Nephew      Asthma Sister      Asthma Nephew      Thyroid Disease Maternal Half-Sister      Thyroid Disease Paternal Half-Sister      Diabetes No family hx of        Social History     Socioeconomic History     Marital status: Significant other     Spouse name: Not on file     Number of children: 0     Years of education: 18     Highest education level: Not on file   Occupational History     Occupation:    Social Needs     Financial resource strain: Not on file     Food insecurity:     Worry: Not on file     Inability: Not on file     Transportation needs:     Medical: Not on file      Non-medical: Not on file   Tobacco Use     Smoking status: Never Smoker     Smokeless tobacco: Never Used   Substance and Sexual Activity     Alcohol use: No     Drug use: No     Sexual activity: Not Currently     Partners: Male     Birth control/protection: IUD   Lifestyle     Physical activity:     Days per week: Not on file     Minutes per session: Not on file     Stress: Not on file   Relationships     Social connections:     Talks on phone: Not on file     Gets together: Not on file     Attends Sikh service: Not on file     Active member of club or organization: Not on file     Attends meetings of clubs or organizations: Not on file     Relationship status: Not on file     Intimate partner violence:     Fear of current or ex partner: Not on file     Emotionally abused: Not on file     Physically abused: Not on file     Forced sexual activity: Not on file   Other Topics Concern     Parent/sibling w/ CABG, MI or angioplasty before 65F 55M? No   Social History Narrative    Dairy/d 3 servings/d.     Caffeine 2 servings/d    Exercise 0 x week    Sunscreen used - No    Seatbelts used - Yes    Working smoke/CO detectors in the home - Yes    Guns stored in the home - No    Self Breast Exams - no    Eye Exam up to date - No    Dental Exam up to date - Yes    Pap Smear up to date - Yes    Mammogram up to date - Yes    Dexa Scan up to date - NOT APPLICABLE    Flex Sig / Colonoscopy up to date - NOT APPLICABLE    Immunizations up to date - Yes    Abuse: Current or Past(Physical, Sexual or Emotional)- No    Do you feel safe in your environment - Yes    GÓMEZ Cummings    [birth year]                       Outpatient Encounter Medications as of 3/18/2019   Medication Sig Dispense Refill     albuterol (PROAIR HFA/PROVENTIL HFA/VENTOLIN HFA) 108 (90 Base) MCG/ACT Inhaler Inhale 2 puffs into the lungs every 4 hours as needed for shortness of breath / dyspnea or wheezing 3 Inhaler 3     cetirizine (ZYRTEC) 10 MG tablet Take 10 mg by  mouth daily       EPINEPHrine (AUVI-Q) 0.3 MG/0.3ML injection 2-pack Inject 0.3 mLs (0.3 mg) into the muscle as needed for anaphylaxis 2 mL 2     fluticasone (FLONASE) 50 MCG/ACT nasal spray Spray 1-2 sprays into both nostrils daily 32 g 3     fluticasone-salmeterol (ADVAIR DISKUS) 500-50 MCG/DOSE diskus inhaler Inhale 1 puff into the lungs 2 times daily (Patient taking differently: Inhale 1 puff into the lungs daily ) 3 Inhaler 3     hydrochlorothiazide (MICROZIDE) 12.5 MG capsule Take 1 capsule (12.5 mg) by mouth daily 90 capsule 3     lisinopril (PRINIVIL/ZESTRIL) 40 MG tablet Take 1 tablet (40 mg) by mouth daily 90 tablet 3     montelukast (SINGULAIR) 10 MG tablet Take 1 tablet (10 mg) by mouth At Bedtime 90 tablet 1     ORDER FOR ALLERGEN IMMUNOTHERAPY Name of Mix: Mix #1  Dust Mite, Cat, Dog  Cat Hair, Standardized 10,000 BAU/mL, ALK  2.0 ml  Dog Hair-Dander, A. P.  1:100 w/v, HS  1.0 ml  Dust Mites DP. 30,000 AU/mL, HS  0.6 ml   Diluent: HSA qs to 5ml 5 mL PRN     ORDER FOR ALLERGEN IMMUNOTHERAPY Name of Mix: Mix #2  Tree , Weeds  Birch Mix PRW 1:20 w/v, HS  0.5 ml  Hickory, Shagbark 1:20 w/v, HS  0.5 ml  [place] Mix RW 1:20 w/v, HS 0.5 ml  Oak Mix RVW 1:20 w/v, HS 0.5 ml  Kochia 1:20 w/v, HS 0.5 ml  Nettle 1:20 w/v, HS 0.5 ml  Ragweed Mixed 1:20 w/v ALK  0.5 ml  Sagebrush, Mugwort 1:20 w/v, HS 0.5 ml  Diluent: HSA qs to 5ml 5 mL PRN     ORDER FOR ALLERGEN IMMUNOTHERAPY Name of Mix: Mix #3  Mold  Alternaria Tenuis 1:10 w/v, HS  0.5 ml  Aspergillus Fumigatus 1:10 w/v, HS  0.5 ml  Curvularia Spicifera 1:10 w/v, HS  0.5 ml  Epicoccum Nigrum 1:10 w/v, HS 0.5 ml  Hormodendrum Cladosporioides 1:10 w/v, HS 0.5 ml  Penicillium Mix 1:10 w/v, HS  0.5 ml  Phoma Herbarum 1:10 w/v, HS  0.5 ml  Diluent: HSA qs to 5ml 5 mL PRN     venlafaxine (EFFEXOR-XR) 75 MG 24 hr capsule Take 1 capsule (75 mg) by mouth daily 90 capsule 3     [] nystatin (MYCOSTATIN) 387961 UNIT/ML suspension Take 5 mLs (500,000 Units) by mouth 4  times daily for 10 days 200 mL 0     No facility-administered encounter medications on file as of 3/18/2019.        Review Of Systems:  Skin: As above  Eyes: negative  Ears/Nose/Throat: negative  Respiratory: No shortness of breath, dyspnea on exertion, cough, or hemoptysis  Cardiovascular: negative  Gastrointestinal: negative  Genitourinary: negative  Musculoskeletal: negative  Neurologic: negative  Psychiatric: negative  Hematologic/Lymphatic/Immunologic: negative  Endocrine: negative      Objective:     /86   Pulse 63   SpO2 97%   Eyes: Conjunctivae/lids: Normal   ENT: Lips:  Normal  MSK: Normal  Cardiovascular: Peripheral edema none  Pulm: Breathing Normal  Neuro/Psych: Orientation: Normal; Mood/Affect: Normal, NAD, WDWN  Pt accompanied by: self  Following areas examined: face, neck, scalp  Cesar skin type:i   Findings:  1)Pink beni cheeks, forehead, chin and nose. No inflammatory papules seen  2) Soft mobile smooth nodule on right frontal scalp 0.7cm and left frontal scalp 0.8cm  3) Soft mobile smooth nodule on right parietal scalp and left superior parietal scalp  Assessment and Plan:  1) rosacea  Disc etiology and course  Pt elects laser. She will call for appt.   Wear sunscreen daily.  Treatment options including topical Finacea, Sulfa, Metronidazole, Ivermectin, and oral Doxycyline. Redness of rosacea is more difficult to treat and treatment is considered cosmetic. Redness can be treated with lasers, Mirvaso or Rofade.    Avoid triggers such as sunlight, spicy foods, and alcohol. Wear sunscreen everyday of the year.     Wear a sunscreen with at least SPF 30 on your face, ears, neck and V of the chest daily. Wear sunscreen on other areas of the body if those areas are exposed to the sun throughout the day. Sunscreens can contain physical and/or chemical blockers. Physical blockers are less likely to clog pores, these include zinc oxide and titanium dioxide. Reapply every two hour and after  swimming. Sunscreen examples include Neutrogena, CeraVe, Blue Lizard, Elta MD and many others.    2) Neoplasm of uncertain behavior on on right frontal scalp 0.8cm  Rule out inflamed pilar cyst  EXCISIONAL BIOPSY AND COMPLEX:  After thorough discussion of PGACAC, consent obtained, anesthesia with LEC and prep, the margins of the lesion were identified and an elliptical incision was made encompassing the lesion with 0 mm margin.  The incisions were made through to include the mid-subcutaneous tissue.  The lesion was removed en bloc and submitted for derm pathologic review. Because of the full-thickness nature of the wound and to avoid standing cone deformities, a complex linear repair was planned.  The wound edges were widely undermined until adequate tissue mobility was obtained.  Hemostasis was achieved.  The wound edges were then closed in a layered fashion, using Vicryl for subcutaneous stitches and FAPG sutures for running top stitches.  Postoperative length was 1.0 cm.  Patient will be contacted with result.  EBL minimal; complications none; wound care routine.  The patient was discharged in good condition and will return in one week for wound evaluation.    3) Neoplasm of uncertain behavior on on left frontal scalp 0.7cm  Rule out inflamed pilar cyst  EXCISIONAL BIOPSY AND COMPLEX:  After thorough discussion of PGACAC, consent obtained, anesthesia with LEC and prep, the margins of the lesion were identified and an elliptical incision was made encompassing the lesion with 0 mm margin.  The incisions were made through to include the mid-subcutaneous tissue.  The lesion was removed en bloc and submitted for derm pathologic review. Because of the full-thickness nature of the wound and to avoid standing cone deformities, a complex linear repair was planned.  The wound edges were widely undermined until adequate tissue mobility was obtained.  Hemostasis was achieved.  The wound edges were then closed in a layered  fashion, using Vicryl for subcutaneous stitches and FAPG sutures for running top stitches.  Postoperative length was 1.2 cm.  Patient will be contacted with result.  EBL minimal; complications none; wound care routine.  The patient was discharged in good condition and will return in one week for wound evaluation.    4) pilar cysts  Pt will schedule an appointment for excision         Follow up in 2 months      Again, thank you for allowing me to participate in the care of your patient.        Sincerely,        Dana Calabrese PA-C

## 2019-03-18 NOTE — PROGRESS NOTES
Allergy serums billed at [facility].     Vials received below:  Vial Color Content                      Vial Size Expiration Date  Red 1:1 Cat, Dog, Dust Mite 5mL  03/08/2020  Red 1:1 Molds 5mL  03/08/2020  Red 1:1 Trees, Weeds 5mL  03/08/2020      Original Refill encounter date: 03/07/2019      Signature  Shawna Osborne CMA  3/18/2019  10:46 AM

## 2019-03-18 NOTE — PATIENT INSTRUCTIONS
Leave Bandage on For 1 week and keep area DRY - Follow Instructions below After:    WOUND CARE INSTRUCTIONS  for  Cyst Draining          1) Wash area daily with gentle soap and water. Apply Vaseline and cover area daily with gauze. Cyst may drain for the next few days.    2) Once area stops draining and incision has healed you may resume your regular skin care routine, including washing with mild soap and water, applying moisturizer, make-up and sunscreen.    3) If there are any open or bleeding areas at the incision you should continue to cover the area with a bandage daily as follows:    1) Clean and dry the area with plain tap water using a Q-tip or sterile gauze pad.  2) Apply Polysporin or Bacitracin ointment to the open area.  3) Cover the wound with a band-aid or a sterile non-stick gauze pad and micropore paper tape.         SIGNS OF INFECTION  - If you notice any of these signs of infection, call your doctor right away: expanding redness around the wound.  - Yellow or greenish-colored pus or cloudy wound drainage.    - Red streaking spreading from the wound.  - Increased swelling, tenderness, or pain around the wound.   - Fever.    Please remember that yellow and clear drainage from a wound can be normal and related to normal wound healing.  Isolated drainage from a wound without a combination of the above features does not indicate infection.       *Once the bandages are removed, the scar may be red and firm. This is normal and will fade in time. It might take 6-12 months for this to happen.     *Numbness in the surgical area is expected. It might take 12-18 months for the feeling to return to normal. During this time sensations of itchiness, tingling and occasional sharp pains might be noted. These feelings are normal and will subside once the nerves have completely healed.       If you were seen in [place] call : [phone]    If you were seen in [place] call: [phone]    Proper skin care  from [facility]:    -Eliminate harsh soaps as they strip the natural oils from the skin, often resulting in dry itchy skin ( i.e. Dial, Zest, [place])  -Use mild soaps such as Cetaphil or Dove Sensitive Skin in the shower. You do not need to use soap on arms, legs, and trunk every time you shower unless visibly soiled.   -Avoid hot or cold showers.  -After showering, lightly dry off and apply moisturizing within 2-3 minutes. This will help trap moisture in the skin.   -Aggressive use of a moisturizer at least 1-2 times a day to the entire body (including -Vanicream, Cetaphil, Aquaphor or Cerave) and moisturize hands after every washing.  -We recommend using moisturizers that come in a tub that needs to be scooped out, not a pump. This has more of an oil base. It will hold moisture in your skin much better than a water base moisturizer. The above recommended are non-pore clogging.           Wear a sunscreen with at least SPF 30 on your face, ears, neck and V of the chest daily. Wear sunscreen on other areas of the body if those areas are exposed to the sun throughout the day. Sunscreens can contain physical and/or chemical blockers. Physical blockers are less likely to clog pores, these include zinc oxide and titanium dioxide. Reapply every two hour and after swimming. Sunscreen examples include Neutrogena, CeraVe, Blue Lizard, Elta MD and many others.    UV radiation  UVA radiation remains constant throughout the day and throughout the year. It is a longer wavelength than UVB and therefore penetrates deeper into the skin leading to immediate and delayed tanning, photoaging, and skin cancer. 70-80% of UVA and UVB radiation occurs between the hours of 10am-2pm.  UVB radiation  UVB radiation causes the most harmful effects and is more significant during the summer months. However, snow and ice can reflect UVB radiation leading to skin damage during the winter months as well. UVB radiation is responsible  for tanning, burning, inflammation, delayed erythema (pinkness), pigmentation (brown spots), and skin cancer.   Just because you do not burn or are not developing a tan does not mean that you are not damaging your skin. A 15 minute drive to and from work for 30 years an lead to chronic sun damage of the skin. It is important to wear a broad spectrum (both UVA and UVB) sunscreen EVERY day with at least 30 SPF. Apply to face, ears, neck and v of the chest as this is where most of our sun exposure is. Reapply sunscreen every two hours if you plan on being outside.   Ronny Duran. Clinical Dermatology: A Color Guide to Diagnosis and Therapy. Elsevier, 2016.     Wear sunscreen daily.  Treatment options including topical Finacea, Sulfa, Metronidazole, Ivermectin, and oral Doxycyline. Redness of rosacea is more difficult to treat and treatment is considered cosmetic. Redness can be treated with lasers, Mirvaso or Rofade.    Avoid triggers such as sunlight, spicy foods, and alcohol. Wear sunscreen everyday of the year.     Wear a sunscreen with at least SPF 30 on your face, ears, neck and V of the chest daily. Wear sunscreen on other areas of the body if those areas are exposed to the sun throughout the day. Sunscreens can contain physical and/or chemical blockers. Physical blockers are less likely to clog pores, these include zinc oxide and titanium dioxide. Reapply every two hour and after swimming. Sunscreen examples include Neutrogena, CeraVe, Blue Lizard, Elta MD and many others.    [facility]   [address] [place] [id number]  Phone: [phone]      [facility]   [address] [place], [place] [address] [phone]

## 2019-03-18 NOTE — PROGRESS NOTES
HPI:  Nela Mares is a 58 year old female patient here today for removal of irritated pilar cysts on frontal scalp .  Patient states this has been present for a while.  Patient reports the following symptoms: growing and tender .  Patient reports the following previous treatments: none.  Patient reports the following modifying factors: none. Has had pilar cysts removed in the past.  Associated symptoms: none. Also thinks she may have rosacea on face. Pt states she has a family history. Has not tried anything for it. Does not wear sunscreen. Notices that it is worsening lately. Patient has no other skin complaints today.  Remainder of the HPI, Meds, PMH, Allergies, FH, and SH was reviewed in chart.    Pertinent Hx:   Pilar cysts. Family history of rosacea.   Past Medical History:   Diagnosis Date     Allergic rhinitis, cause unspecified      Cervical high risk HPV (human papillomavirus) test positive 03/21/2017    3/21/17 NIL pap/+ HR HPV (not 16 or 18).      Depressive disorder      Depressive disorder, not elsewhere classified     seasonal     Hypertension      Mild persistent asthma      Obstructive sleep apnea (adult) (pediatric)     uses CPAP     Scalp mass 7/2014       Past Surgical History:   Procedure Laterality Date     COLONOSCOPY  6/21/2012    Procedure: COLONOSCOPY;  COLONOSCOPY, SCREEN;  Surgeon: Bart You MD;  Location:  OR     [facility]       ENDOSCOPIC RETROGRADE CHOLANGIOPANCREATOGRAM  1/4/2014    Procedure: ENDOSCOPIC RETROGRADE CHOLANGIOPANCREATOGRAM;  ERCP biliary sphincterotomy, bile duct stone removal, epinepherine washing.;  Surgeon: Ba Meyers MD;  Location: [facility] OR     LAPAROSCOPIC CHOLECYSTECTOMY WITH CHOLANGIOGRAMS  1/4/2014    Procedure: LAPAROSCOPIC CHOLECYSTECTOMY WITH CHOLANGIOGRAMS;;  Surgeon: Haja Sage MD;  Location: [facility] OR     NO HISTORY OF SURGERY       OPERATIVE HYSTEROSCOPY  6/17/2014    Procedure: OPERATIVE HYSTEROSCOPY;  Surgeon:  Paola Camara MD;  Location: [facility] OR     REMOVE INTRAUTERINE DEVICE  6/17/2014    Procedure: REMOVE INTRAUTERINE DEVICE;  Surgeon: Paola Camara MD;  Location: [facility] OR        Family History   Problem Relation Age of Onset     C.A.D. Paternal Grandmother      Cerebrovascular Disease Paternal Grandmother      Depression Paternal Grandmother      Obesity Paternal Grandmother      C.A.D. Paternal Grandfather      Cerebrovascular Disease Paternal Grandfather         unsure     Substance Abuse Paternal Grandfather      Hypertension Father      Asthma Father      Obesity Father      Skin Cancer Father      Neurologic Disorder Mother         Graves disease     Hyperlipidemia Mother      Depression Mother      Thyroid Disease Mother      Other - See Comments Other      Skin Cancer Sister      Depression Maternal Grandmother      Cancer Sister         mild skin cancer,cured from excision     Depression Nephew      Anxiety Disorder Nephew      Mental Illness Maternal Half-Brother      Mental Illness Paternal Half-Brother      Substance Abuse Nephew      Asthma Sister      Asthma Nephew      Thyroid Disease Maternal Half-Sister      Thyroid Disease Paternal Half-Sister      Diabetes No family hx of        Social History     Socioeconomic History     Marital status: Significant other     Spouse name: Not on file     Number of children: 0     Years of education: 18     Highest education level: Not on file   Occupational History     Occupation:    Social Needs     Financial resource strain: Not on file     Food insecurity:     Worry: Not on file     Inability: Not on file     Transportation needs:     Medical: Not on file     Non-medical: Not on file   Tobacco Use     Smoking status: Never Smoker     Smokeless tobacco: Never Used   Substance and Sexual Activity     Alcohol use: No     Drug use: No     Sexual activity: Not Currently     Partners: Male     Birth control/protection: IUD    Lifestyle     Physical activity:     Days per week: Not on file     Minutes per session: Not on file     Stress: Not on file   Relationships     Social connections:     Talks on phone: Not on file     Gets together: Not on file     Attends Hindu service: Not on file     Active member of club or organization: Not on file     Attends meetings of clubs or organizations: Not on file     Relationship status: Not on file     Intimate partner violence:     Fear of current or ex partner: Not on file     Emotionally abused: Not on file     Physically abused: Not on file     Forced sexual activity: Not on file   Other Topics Concern     Parent/sibling w/ CABG, MI or angioplasty before 65F 55M? No   Social History Narrative    Dairy/d 3 servings/d.     Caffeine 2 servings/d    Exercise 0 x week    Sunscreen used - No    Seatbelts used - Yes    Working smoke/CO detectors in the home - Yes    Guns stored in the home - No    Self Breast Exams - no    Eye Exam up to date - No    Dental Exam up to date - Yes    Pap Smear up to date - Yes    Mammogram up to date - Yes    Dexa Scan up to date - NOT APPLICABLE    Flex Sig / Colonoscopy up to date - NOT APPLICABLE    Immunizations up to date - Yes    Abuse: Current or Past(Physical, Sexual or Emotional)- No    Do you feel safe in your environment - Yes    GMÓEZ Cummings    11[birth year]                       Outpatient Encounter Medications as of 3/18/2019   Medication Sig Dispense Refill     albuterol (PROAIR HFA/PROVENTIL HFA/VENTOLIN HFA) 108 (90 Base) MCG/ACT Inhaler Inhale 2 puffs into the lungs every 4 hours as needed for shortness of breath / dyspnea or wheezing 3 Inhaler 3     cetirizine (ZYRTEC) 10 MG tablet Take 10 mg by mouth daily       EPINEPHrine (AUVI-Q) 0.3 MG/0.3ML injection 2-pack Inject 0.3 mLs (0.3 mg) into the muscle as needed for anaphylaxis 2 mL 2     fluticasone (FLONASE) 50 MCG/ACT nasal spray Spray 1-2 sprays into both nostrils daily 32 g 3      fluticasone-salmeterol (ADVAIR DISKUS) 500-50 MCG/DOSE diskus inhaler Inhale 1 puff into the lungs 2 times daily (Patient taking differently: Inhale 1 puff into the lungs daily ) 3 Inhaler 3     hydrochlorothiazide (MICROZIDE) 12.5 MG capsule Take 1 capsule (12.5 mg) by mouth daily 90 capsule 3     lisinopril (PRINIVIL/ZESTRIL) 40 MG tablet Take 1 tablet (40 mg) by mouth daily 90 tablet 3     montelukast (SINGULAIR) 10 MG tablet Take 1 tablet (10 mg) by mouth At Bedtime 90 tablet 1     ORDER FOR ALLERGEN IMMUNOTHERAPY Name of Mix: Mix #1  Dust Mite, Cat, Dog  Cat Hair, Standardized 10,000 BAU/mL, ALK  2.0 ml  Dog Hair-Dander, A. P.  1:100 w/v, HS  1.0 ml  Dust Mites DP. 30,000 AU/mL, HS  0.6 ml   Diluent: HSA qs to 5ml 5 mL PRN     ORDER FOR ALLERGEN IMMUNOTHERAPY Name of Mix: Mix #2  Tree , Weeds  Birch Mix PRW 1:20 w/v, HS  0.5 ml  Hickory, Shagbark 1:20 w/v, HS  0.5 ml  [place] Mix RW 1:20 w/v, HS 0.5 ml  Oak Mix RVW 1:20 w/v, HS 0.5 ml  Kochia 1:20 w/v, HS 0.5 ml  Nettle 1:20 w/v, HS 0.5 ml  Ragweed Mixed 1:20 w/v ALK  0.5 ml  Sagebrush, Mugwort 1:20 w/v, HS 0.5 ml  Diluent: HSA qs to 5ml 5 mL PRN     ORDER FOR ALLERGEN IMMUNOTHERAPY Name of Mix: Mix #3  Mold  Alternaria Tenuis 1:10 w/v, HS  0.5 ml  Aspergillus Fumigatus 1:10 w/v, HS  0.5 ml  Curvularia Spicifera 1:10 w/v, HS  0.5 ml  Epicoccum Nigrum 1:10 w/v, HS 0.5 ml  Hormodendrum Cladosporioides 1:10 w/v, HS 0.5 ml  Penicillium Mix 1:10 w/v, HS  0.5 ml  Phoma Herbarum 1:10 w/v, HS  0.5 ml  Diluent: HSA qs to 5ml 5 mL PRN     venlafaxine (EFFEXOR-XR) 75 MG 24 hr capsule Take 1 capsule (75 mg) by mouth daily 90 capsule 3     [] nystatin (MYCOSTATIN) 428057 UNIT/ML suspension Take 5 mLs (500,000 Units) by mouth 4 times daily for 10 days 200 mL 0     No facility-administered encounter medications on file as of 3/18/2019.        Review Of Systems:  Skin: As above  Eyes: negative  Ears/Nose/Throat: negative  Respiratory: No shortness of breath, dyspnea on  exertion, cough, or hemoptysis  Cardiovascular: negative  Gastrointestinal: negative  Genitourinary: negative  Musculoskeletal: negative  Neurologic: negative  Psychiatric: negative  Hematologic/Lymphatic/Immunologic: negative  Endocrine: negative      Objective:     /86   Pulse 63   SpO2 97%   Eyes: Conjunctivae/lids: Normal   ENT: Lips:  Normal  MSK: Normal  Cardiovascular: Peripheral edema none  Pulm: Breathing Normal  Neuro/Psych: Orientation: Normal; Mood/Affect: Normal, NAD, WDWN  Pt accompanied by: self  Following areas examined: face, neck, scalp  Cesar skin type:i   Findings:  1)Pink beni cheeks, forehead, chin and nose. No inflammatory papules seen  2) Soft mobile smooth nodule on right frontal scalp 0.7cm and left frontal scalp 0.8cm  3) Soft mobile smooth nodule on right parietal scalp and left superior parietal scalp  Assessment and Plan:  1) rosacea  Disc etiology and course  Pt elects laser. She will call for appt.   Wear sunscreen daily.  Treatment options including topical Finacea, Sulfa, Metronidazole, Ivermectin, and oral Doxycyline. Redness of rosacea is more difficult to treat and treatment is considered cosmetic. Redness can be treated with lasers, Mirvaso or Rofade.    Avoid triggers such as sunlight, spicy foods, and alcohol. Wear sunscreen everyday of the year.     Wear a sunscreen with at least SPF 30 on your face, ears, neck and V of the chest daily. Wear sunscreen on other areas of the body if those areas are exposed to the sun throughout the day. Sunscreens can contain physical and/or chemical blockers. Physical blockers are less likely to clog pores, these include zinc oxide and titanium dioxide. Reapply every two hour and after swimming. Sunscreen examples include Neutrogena, CeraVe, Blue Lizard, Elta MD and many others.    2) Neoplasm of uncertain behavior on on right frontal scalp 0.8cm  Rule out inflamed pilar cyst  EXCISIONAL BIOPSY AND COMPLEX:  After thorough  discussion of PGACAC, consent obtained, anesthesia with LEC and prep, the margins of the lesion were identified and an elliptical incision was made encompassing the lesion with 0 mm margin.  The incisions were made through to include the mid-subcutaneous tissue.  The lesion was removed en bloc and submitted for derm pathologic review. Because of the full-thickness nature of the wound and to avoid standing cone deformities, a complex linear repair was planned.  The wound edges were widely undermined until adequate tissue mobility was obtained.  Hemostasis was achieved.  The wound edges were then closed in a layered fashion, using Vicryl for subcutaneous stitches and FAPG sutures for running top stitches.  Postoperative length was 1.0 cm.  Patient will be contacted with result.  EBL minimal; complications none; wound care routine.  The patient was discharged in good condition and will return in one week for wound evaluation.    3) Neoplasm of uncertain behavior on on left frontal scalp 0.7cm  Rule out inflamed pilar cyst  EXCISIONAL BIOPSY AND COMPLEX:  After thorough discussion of PGACA, consent obtained, anesthesia with LEC and prep, the margins of the lesion were identified and an elliptical incision was made encompassing the lesion with 0 mm margin.  The incisions were made through to include the mid-subcutaneous tissue.  The lesion was removed en bloc and submitted for derm pathologic review. Because of the full-thickness nature of the wound and to avoid standing cone deformities, a complex linear repair was planned.  The wound edges were widely undermined until adequate tissue mobility was obtained.  Hemostasis was achieved.  The wound edges were then closed in a layered fashion, using Vicryl for subcutaneous stitches and FAPG sutures for running top stitches.  Postoperative length was 1.2 cm.  Patient will be contacted with result.  EBL minimal; complications none; wound care routine.  The patient was  discharged in good condition and will return in one week for wound evaluation.    4) pilar cysts  Pt will schedule an appointment for excision         Follow up in 2 months

## 2019-03-18 NOTE — TELEPHONE ENCOUNTER
Allergy serums received at [facility].     Vials received below:    Vial Color Content                      Vial Size Expiration Date  Red 1:1 Cat, Dog, Dust Mite 5mL  03/08/2020  Red 1:1 Molds 5mL  03/08/2020  Red 1:1 Trees, Weeds 5mL  03/08/2020        Signature  Shawna Osborne CMA  3/18/2019  10:42 AM

## 2019-03-22 LAB — COPATH REPORT: NORMAL

## 2019-04-02 ENCOUNTER — OFFICE VISIT (OUTPATIENT)
Dept: URGENT CARE | Facility: URGENT CARE | Age: 59
End: 2019-04-02
Payer: COMMERCIAL

## 2019-04-02 VITALS
DIASTOLIC BLOOD PRESSURE: 85 MMHG | SYSTOLIC BLOOD PRESSURE: 122 MMHG | TEMPERATURE: 97.5 F | HEART RATE: 64 BPM | OXYGEN SATURATION: 96 %

## 2019-04-02 DIAGNOSIS — B37.0 ORAL THRUSH: Primary | ICD-10-CM

## 2019-04-02 DIAGNOSIS — B37.0 THRUSH, ORAL: ICD-10-CM

## 2019-04-02 PROCEDURE — 99213 OFFICE O/P EST LOW 20 MIN: CPT | Performed by: INTERNAL MEDICINE

## 2019-04-02 RX ORDER — NYSTATIN 100000/ML
500000 SUSPENSION, ORAL (FINAL DOSE FORM) ORAL 4 TIMES DAILY
Qty: 400 ML | Refills: 0 | Status: SHIPPED | OUTPATIENT
Start: 2019-04-02 | End: 2019-07-18

## 2019-04-02 NOTE — PATIENT INSTRUCTIONS
Gargling after advair.  Nystatin suspension 4 x day.    Add probiotics & yogurt.    Call or return to clinic if symptoms worsen or fail to improve as anticipated.    Preventing thrush  You may be able to help prevent some cases of thrush. Make sure to:    Practice good oral hygiene. Try using a chlorhexidine mouthwash.    After using a corticosteroid inhaler, rinse out your mouth with water or mouthwash.    Do not use broad-spectrum antibiotics, if possible.    Get treated for health problems that increase your risk for thrush, such as diabetes.

## 2019-04-02 NOTE — PROGRESS NOTES
SUBJECTIVE:   Nela Mares is a 58 year old female presenting with a chief complaint of   Chief Complaint   Patient presents with     Urgent Care     Pt in clinic to have eval for possible thush of the mouth.     Mouth Problem       She is an established patient of [facility].    On advair  Concerned recurrent thrush    Seen 1/1/7/2019 for thrush    Returned this past week  White spots on roof of mouth  nontender   Out of medication  Treatment measures tried include rinse mouth.  Predisposing factors include advair.        Review of Systems    Past Medical History:   Diagnosis Date     Allergic rhinitis, cause unspecified      Cervical high risk HPV (human papillomavirus) test positive 03/21/2017    3/21/17 NIL pap/+ HR HPV (not 16 or 18).      Depressive disorder      Depressive disorder, not elsewhere classified     seasonal     Hypertension      Mild persistent asthma      Obstructive sleep apnea (adult) (pediatric)     uses CPAP     Scalp mass 7/2014     Family History   Problem Relation Age of Onset     C.A.D. Paternal Grandmother      Cerebrovascular Disease Paternal Grandmother      Depression Paternal Grandmother      Obesity Paternal Grandmother      C.A.D. Paternal Grandfather      Cerebrovascular Disease Paternal Grandfather         unsure     Substance Abuse Paternal Grandfather      Hypertension Father      Asthma Father      Obesity Father      Skin Cancer Father      Neurologic Disorder Mother         Graves disease     Hyperlipidemia Mother      Depression Mother      Thyroid Disease Mother      Other - See Comments Other      Skin Cancer Sister      Depression Maternal Grandmother      Cancer Sister         mild skin cancer,cured from excision     Depression Nephew      Anxiety Disorder Nephew      Mental Illness Maternal Half-Brother      Mental Illness Paternal Half-Brother      Substance Abuse Nephew      Asthma Sister      Asthma Nephew      Thyroid Disease Maternal Half-Sister      Thyroid  Disease Paternal Half-Sister      Diabetes No family hx of      Current Outpatient Medications   Medication Sig Dispense Refill     albuterol (PROAIR HFA/PROVENTIL HFA/VENTOLIN HFA) 108 (90 Base) MCG/ACT Inhaler Inhale 2 puffs into the lungs every 4 hours as needed for shortness of breath / dyspnea or wheezing 3 Inhaler 3     cetirizine (ZYRTEC) 10 MG tablet Take 10 mg by mouth daily       EPINEPHrine (AUVI-Q) 0.3 MG/0.3ML injection 2-pack Inject 0.3 mLs (0.3 mg) into the muscle as needed for anaphylaxis 2 mL 2     fluticasone (FLONASE) 50 MCG/ACT nasal spray Spray 1-2 sprays into both nostrils daily 32 g 3     fluticasone-salmeterol (ADVAIR DISKUS) 500-50 MCG/DOSE diskus inhaler Inhale 1 puff into the lungs 2 times daily (Patient taking differently: Inhale 1 puff into the lungs daily ) 3 Inhaler 3     hydrochlorothiazide (MICROZIDE) 12.5 MG capsule Take 1 capsule (12.5 mg) by mouth daily 90 capsule 3     lisinopril (PRINIVIL/ZESTRIL) 40 MG tablet Take 1 tablet (40 mg) by mouth daily 90 tablet 3     metroNIDAZOLE (METROGEL) 0.75 % external gel Apply topically 2 times daily To affected area. 45 g 3     montelukast (SINGULAIR) 10 MG tablet Take 1 tablet (10 mg) by mouth At Bedtime 90 tablet 1     nystatin (MYCOSTATIN) 138361 UNIT/ML suspension Take 5 mLs (500,000 Units) by mouth 4 times daily 400 mL 0     ORDER FOR ALLERGEN IMMUNOTHERAPY Name of Mix: Mix #1  Dust Mite, Cat, Dog  Cat Hair, Standardized 10,000 BAU/mL, ALK  2.0 ml  Dog Hair-Dander, A. P.  1:100 w/v, HS  1.0 ml  Dust Mites DP. 30,000 AU/mL, HS  0.6 ml   Diluent: HSA qs to 5ml 5 mL PRN     ORDER FOR ALLERGEN IMMUNOTHERAPY Name of Mix: Mix #2  Tree , Weeds  Birch Mix PRW 1:20 w/v, HS  0.5 ml  Hickory, Shagbark 1:20 w/v, HS  0.5 ml  [place] Mix RW 1:20 w/v, HS 0.5 ml  Oak Mix RVW 1:20 w/v, HS 0.5 ml  Kochia 1:20 w/v, HS 0.5 ml  Nettle 1:20 w/v, HS 0.5 ml  Ragweed Mixed 1:20 w/v ALK  0.5 ml  Sagebrush, Mugwort 1:20 w/v, HS 0.5 ml  Diluent: HSA qs to 5ml 5 mL  PRN     ORDER FOR ALLERGEN IMMUNOTHERAPY Name of Mix: Mix #3  Mold  Alternaria Tenuis 1:10 w/v, HS  0.5 ml  Aspergillus Fumigatus 1:10 w/v, HS  0.5 ml  Curvularia Spicifera 1:10 w/v, HS  0.5 ml  Epicoccum Nigrum 1:10 w/v, HS 0.5 ml  Hormodendrum Cladosporioides 1:10 w/v, HS 0.5 ml  Penicillium Mix 1:10 w/v, HS  0.5 ml  Phoma Herbarum 1:10 w/v, HS  0.5 ml  Diluent: HSA qs to 5ml 5 mL PRN     venlafaxine (EFFEXOR-XR) 75 MG 24 hr capsule Take 1 capsule (75 mg) by mouth daily 90 capsule 3     Social History     Tobacco Use     Smoking status: Never Smoker     Smokeless tobacco: Never Used   Substance Use Topics     Alcohol use: No       OBJECTIVE  /85   Pulse 64   Temp 97.5  F (36.4  C) (Tympanic)   SpO2 96%     Physical Exam   Constitutional: She appears well-developed and well-nourished.   HENT:   Soft palate  right upper soft palate 1 cm white patch   Vitals reviewed.      Labs:  No results found for this or any previous visit (from the past 24 hour(s)).        ASSESSMENT:      ICD-10-CM    1. Oral thrush B37.0 nystatin (MYCOSTATIN) 619447 UNIT/ML suspension   2. Thrush, oral B37.0     related to advair          Patient Instructions   Gargling after advair.  Nystatin suspension 4 x day.    Add probiotics & yogurt.    Call or return to clinic if symptoms worsen or fail to improve as anticipated.    Preventing thrush  You may be able to help prevent some cases of thrush. Make sure to:    Practice good oral hygiene. Try using a chlorhexidine mouthwash.    After using a corticosteroid inhaler, rinse out your mouth with water or mouthwash.    Do not use broad-spectrum antibiotics, if possible.    Get treated for health problems that increase your risk for thrush, such as diabetes.

## 2019-04-03 ENCOUNTER — TELEPHONE (OUTPATIENT)
Dept: URGENT CARE | Facility: URGENT CARE | Age: 59
End: 2019-04-03

## 2019-04-03 ENCOUNTER — NURSE TRIAGE (OUTPATIENT)
Dept: NURSING | Facility: CLINIC | Age: 59
End: 2019-04-03

## 2019-04-03 ENCOUNTER — OFFICE VISIT (OUTPATIENT)
Dept: DERMATOLOGY | Facility: CLINIC | Age: 59
End: 2019-04-03
Payer: COMMERCIAL

## 2019-04-03 VITALS — DIASTOLIC BLOOD PRESSURE: 82 MMHG | OXYGEN SATURATION: 96 % | SYSTOLIC BLOOD PRESSURE: 112 MMHG | HEART RATE: 64 BPM

## 2019-04-03 DIAGNOSIS — B37.0 THRUSH: Primary | ICD-10-CM

## 2019-04-03 DIAGNOSIS — L71.9 ROSACEA: Primary | ICD-10-CM

## 2019-04-03 PROCEDURE — 99213 OFFICE O/P EST LOW 20 MIN: CPT | Performed by: PHYSICIAN ASSISTANT

## 2019-04-03 RX ORDER — CLOTRIMAZOLE 10 MG/1
10 LOZENGE ORAL
Qty: 100 TROCHE | Refills: 0 | Status: SHIPPED | OUTPATIENT
Start: 2019-04-03 | End: 2019-08-21

## 2019-04-03 NOTE — PATIENT INSTRUCTIONS
[facility] ph: [phone]    PDL laser works great for broken blood vessels : can ask how much PDL is for cheeks.

## 2019-04-03 NOTE — TELEPHONE ENCOUNTER
Pharmacist is calling and states that Nystatin is on back order from [facility].    Pharmacist is requesting to leave a message from MD Franco to prescribe a different medication for patient.

## 2019-04-03 NOTE — PROGRESS NOTES
Nela Mares is a 58 year old year old female patient here today for rosacea.  Patient was just prescribed metrogel which she hasn't started using. She denies any acne lesions.  Patient has no other skin complaints today.  Remainder of the HPI, Meds, PMH, Allergies, FH, and SH was reviewed in chart.    Past Medical History:   Diagnosis Date     Allergic rhinitis, cause unspecified      Cervical high risk HPV (human papillomavirus) test positive 03/21/2017    3/21/17 NIL pap/+ HR HPV (not 16 or 18).      Depressive disorder      Depressive disorder, not elsewhere classified     seasonal     Hypertension      Mild persistent asthma      Obstructive sleep apnea (adult) (pediatric)     uses CPAP     Scalp mass 7/2014       Past Surgical History:   Procedure Laterality Date     COLONOSCOPY  6/21/2012    Procedure: COLONOSCOPY;  COLONOSCOPY, SCREEN;  Surgeon: Bart You MD;  Location: [facility]     D & C       ENDOSCOPIC RETROGRADE CHOLANGIOPANCREATOGRAM  1/4/2014    Procedure: ENDOSCOPIC RETROGRADE CHOLANGIOPANCREATOGRAM;  ERCP biliary sphincterotomy, bile duct stone removal, epinepherine washing.;  Surgeon: Ba Meyers MD;  Location: [facility]     LAPAROSCOPIC CHOLECYSTECTOMY WITH CHOLANGIOGRAMS  1/4/2014    Procedure: LAPAROSCOPIC CHOLECYSTECTOMY WITH CHOLANGIOGRAMS;;  Surgeon: Haja Sage MD;  Location: [facility]     NO HISTORY OF SURGERY       OPERATIVE HYSTEROSCOPY  6/17/2014    Procedure: OPERATIVE HYSTEROSCOPY;  Surgeon: Paola Camara MD;  Location: [facility]     REMOVE INTRAUTERINE DEVICE  6/17/2014    Procedure: REMOVE INTRAUTERINE DEVICE;  Surgeon: Paola Camara MD;  Location: [facility]        Family History   Problem Relation Age of Onset     C.A.D. Paternal Grandmother      Cerebrovascular Disease Paternal Grandmother      Depression Paternal Grandmother      Obesity Paternal Grandmother      C.A.D. Paternal Grandfather      Cerebrovascular Disease Paternal  Grandfather         unsure     Substance Abuse Paternal Grandfather      Hypertension Father      Asthma Father      Obesity Father      Skin Cancer Father      Neurologic Disorder Mother         Graves disease     Hyperlipidemia Mother      Depression Mother      Thyroid Disease Mother      Other - See Comments Other      Skin Cancer Sister      Depression Maternal Grandmother      Cancer Sister         mild skin cancer,cured from excision     Depression Nephew      Anxiety Disorder Nephew      Mental Illness Maternal Half-Brother      Mental Illness Paternal Half-Brother      Substance Abuse Nephew      Asthma Sister      Asthma Nephew      Thyroid Disease Maternal Half-Sister      Thyroid Disease Paternal Half-Sister      Diabetes No family hx of        Social History     Socioeconomic History     Marital status: Significant other     Spouse name: Not on file     Number of children: 0     Years of education: 18     Highest education level: Not on file   Occupational History     Occupation:    Social Needs     Financial resource strain: Not on file     Food insecurity:     Worry: Not on file     Inability: Not on file     Transportation needs:     Medical: Not on file     Non-medical: Not on file   Tobacco Use     Smoking status: Never Smoker     Smokeless tobacco: Never Used   Substance and Sexual Activity     Alcohol use: No     Drug use: No     Sexual activity: Not Currently     Partners: Male     Birth control/protection: IUD   Lifestyle     Physical activity:     Days per week: Not on file     Minutes per session: Not on file     Stress: Not on file   Relationships     Social connections:     Talks on phone: Not on file     Gets together: Not on file     Attends Islam service: Not on file     Active member of club or organization: Not on file     Attends meetings of clubs or organizations: Not on file     Relationship status: Not on file     Intimate partner violence:     Fear of  current or ex partner: Not on file     Emotionally abused: Not on file     Physically abused: Not on file     Forced sexual activity: Not on file   Other Topics Concern     Parent/sibling w/ CABG, MI or angioplasty before 65F 55M? No   Social History Narrative    Dairy/d 3 servings/d.     Caffeine 2 servings/d    Exercise 0 x week    Sunscreen used - No    Seatbelts used - Yes    Working smoke/CO detectors in the home - Yes    Guns stored in the home - No    Self Breast Exams - no    Eye Exam up to date - No    Dental Exam up to date - Yes    Pap Smear up to date - Yes    Mammogram up to date - Yes    Dexa Scan up to date - NOT APPLICABLE    Flex Sig / Colonoscopy up to date - NOT APPLICABLE    Immunizations up to date - Yes    Abuse: Current or Past(Physical, Sexual or Emotional)- No    Do you feel safe in your environment - Yes    GÓMEZ Cummings    [birth year]                       Outpatient Encounter Medications as of 4/3/2019   Medication Sig Dispense Refill     albuterol (PROAIR HFA/PROVENTIL HFA/VENTOLIN HFA) 108 (90 Base) MCG/ACT Inhaler Inhale 2 puffs into the lungs every 4 hours as needed for shortness of breath / dyspnea or wheezing 3 Inhaler 3     cetirizine (ZYRTEC) 10 MG tablet Take 10 mg by mouth daily       EPINEPHrine (AUVI-Q) 0.3 MG/0.3ML injection 2-pack Inject 0.3 mLs (0.3 mg) into the muscle as needed for anaphylaxis 2 mL 2     fluticasone (FLONASE) 50 MCG/ACT nasal spray Spray 1-2 sprays into both nostrils daily 32 g 3     fluticasone-salmeterol (ADVAIR DISKUS) 500-50 MCG/DOSE diskus inhaler Inhale 1 puff into the lungs 2 times daily (Patient taking differently: Inhale 1 puff into the lungs daily ) 3 Inhaler 3     hydrochlorothiazide (MICROZIDE) 12.5 MG capsule Take 1 capsule (12.5 mg) by mouth daily 90 capsule 3     lisinopril (PRINIVIL/ZESTRIL) 40 MG tablet Take 1 tablet (40 mg) by mouth daily 90 tablet 3     montelukast (SINGULAIR) 10 MG tablet Take 1 tablet (10 mg) by mouth At Bedtime 90 tablet 1      nystatin (MYCOSTATIN) 032351 UNIT/ML suspension Take 5 mLs (500,000 Units) by mouth 4 times daily 400 mL 0     ORDER FOR ALLERGEN IMMUNOTHERAPY Name of Mix: Mix #1  Dust Mite, Cat, Dog  Cat Hair, Standardized 10,000 BAU/mL, ALK  2.0 ml  Dog Hair-Dander, A. P.  1:100 w/v, HS  1.0 ml  Dust Mites DP. 30,000 AU/mL, HS  0.6 ml   Diluent: HSA qs to 5ml 5 mL PRN     ORDER FOR ALLERGEN IMMUNOTHERAPY Name of Mix: Mix #2  Tree , Weeds  Birch Mix PRW 1:20 w/v, HS  0.5 ml  Hickory, Shagbark 1:20 w/v, HS  0.5 ml  Golden Mix RW 1:20 w/v, HS 0.5 ml  Oak Mix RVW 1:20 w/v, HS 0.5 ml  Kochia 1:20 w/v, HS 0.5 ml  Nettle 1:20 w/v, HS 0.5 ml  Ragweed Mixed 1:20 w/v ALK  0.5 ml  Sagebrush, Mugwort 1:20 w/v, HS 0.5 ml  Diluent: HSA qs to 5ml 5 mL PRN     ORDER FOR ALLERGEN IMMUNOTHERAPY Name of Mix: Mix #3  Mold  Alternaria Tenuis 1:10 w/v, HS  0.5 ml  Aspergillus Fumigatus 1:10 w/v, HS  0.5 ml  Curvularia Spicifera 1:10 w/v, HS  0.5 ml  Epicoccum Nigrum 1:10 w/v, HS 0.5 ml  Hormodendrum Cladosporioides 1:10 w/v, HS 0.5 ml  Penicillium Mix 1:10 w/v, HS  0.5 ml  Phoma Herbarum 1:10 w/v, HS  0.5 ml  Diluent: HSA qs to 5ml 5 mL PRN     venlafaxine (EFFEXOR-XR) 75 MG 24 hr capsule Take 1 capsule (75 mg) by mouth daily 90 capsule 3     metroNIDAZOLE (METROGEL) 0.75 % external gel Apply topically 2 times daily To affected area. (Patient not taking: Reported on 4/3/2019) 45 g 3     No facility-administered encounter medications on file as of 4/3/2019.              Review Of Systems  Skin: As above  Eyes: negative  Ears/Nose/Throat: negative  Respiratory: No shortness of breath, dyspnea on exertion, cough, or hemoptysis  Cardiovascular: negative  Gastrointestinal: negative  Genitourinary: negative  Musculoskeletal: negative  Neurologic: negative  Psychiatric: negative  Hematologic/Lymphatic/Immunologic: negative  Endocrine: negative      O:   NAD, WDWN, Alert & Oriented, Mood & Affect wnl, Vitals stable   Here today alone   /82 (BP  Location: Left arm, Patient Position: Sitting, Cuff Size: Adult Large)   Pulse 64   SpO2 96%    General appearance normal   Vitals stable   Alert, oriented and in no acute distress     telangiectasia mostly located on cheeks, mild redness    Eyes: Conjunctivae/lids:Normal     ENT: Lips: normal    MSK:Normal    Pulm: Breathing Normal    Neuro/Psych: Orientation:Normal; Mood/Affect:Normal  A/P:  1. Rosacea   Discussed Mirvaso, rhofade, PDL vs IPL.   Discussed IPL will help decrease redness and give a photofacial, if just wanting to focus on broken blood vessel discussed to try PDL laser.   Discussed PDL laser, zel skin and laser.   IPL- treatment for cheek $150 dollar a treatment.     Wear daily sunscreen.

## 2019-04-03 NOTE — TELEPHONE ENCOUNTER
Dr. Franco,  Received call from [facility]. Staff states nystatin (MYCOSTATIN) [id number] UNIT/ML suspension is on back order.    Recommended alternative Clotrimazole pari and they do have supply in stock    Medication cued    Please review/sign or advise    Thank You!  Margi Cole, RN  Triage Nurse

## 2019-04-03 NOTE — NURSING NOTE
"Initial /82 (BP Location: Left arm, Patient Position: Sitting, Cuff Size: Adult Large)   Pulse 64   SpO2 96%  Estimated body mass index is 38.1 kg/m  as calculated from the following:    Height as of 1/17/19: 1.753 m (5' 9\").    Weight as of 7/21/14: 117 kg (258 lb). .      "

## 2019-04-03 NOTE — LETTER
4/3/2019         RE: Nela Mares  [address] [id number]        Dear Colleague,    Thank you for referring your patient, Nela Mares, to the [facility]. Please see a copy of my visit note below.    Nela Mares is a 58 year old year old female patient here today for rosacea.  Patient was just prescribed metrogel which she hasn't started using. She denies any acne lesions.  Patient has no other skin complaints today.  Remainder of the HPI, Meds, PMH, Allergies, FH, and SH was reviewed in chart.    Past Medical History:   Diagnosis Date     Allergic rhinitis, cause unspecified      Cervical high risk HPV (human papillomavirus) test positive 03/21/2017    3/21/17 NIL pap/+ HR HPV (not 16 or 18).      Depressive disorder      Depressive disorder, not elsewhere classified     seasonal     Hypertension      Mild persistent asthma      Obstructive sleep apnea (adult) (pediatric)     uses CPAP     Scalp mass 7/2014       Past Surgical History:   Procedure Laterality Date     COLONOSCOPY  6/21/2012    Procedure: COLONOSCOPY;  COLONOSCOPY, SCREEN;  Surgeon: Bart You MD;  Location: [facility]     D & C       ENDOSCOPIC RETROGRADE CHOLANGIOPANCREATOGRAM  1/4/2014    Procedure: ENDOSCOPIC RETROGRADE CHOLANGIOPANCREATOGRAM;  ERCP biliary sphincterotomy, bile duct stone removal, epinepherine washing.;  Surgeon: Ba Meyers MD;  [facility]     LAPAROSCOPIC CHOLECYSTECTOMY WITH CHOLANGIOGRAMS  1/4/2014    Procedure: LAPAROSCOPIC CHOLECYSTECTOMY WITH CHOLANGIOGRAMS;;  Surgeon: Haja Sage MD;  Location: [facility]     NO HISTORY OF SURGERY       OPERATIVE HYSTEROSCOPY  6/17/2014    Procedure: OPERATIVE HYSTEROSCOPY;  Surgeon: Paola Camara MD;  [facility]     REMOVE INTRAUTERINE DEVICE  6/17/2014    Procedure: REMOVE INTRAUTERINE DEVICE;  Surgeon: Paola Camara MD;  Location: [facility]        Family History   Problem  Relation Age of Onset     C.A.D. Paternal Grandmother      Cerebrovascular Disease Paternal Grandmother      Depression Paternal Grandmother      Obesity Paternal Grandmother      C.A.D. Paternal Grandfather      Cerebrovascular Disease Paternal Grandfather         unsure     Substance Abuse Paternal Grandfather      Hypertension Father      Asthma Father      Obesity Father      Skin Cancer Father      Neurologic Disorder Mother         Graves disease     Hyperlipidemia Mother      Depression Mother      Thyroid Disease Mother      Other - See Comments Other      Skin Cancer Sister      Depression Maternal Grandmother      Cancer Sister         mild skin cancer,cured from excision     Depression Nephew      Anxiety Disorder Nephew      Mental Illness Maternal Half-Brother      Mental Illness Paternal Half-Brother      Substance Abuse Nephew      Asthma Sister      Asthma Nephew      Thyroid Disease Maternal Half-Sister      Thyroid Disease Paternal Half-Sister      Diabetes No family hx of        Social History     Socioeconomic History     Marital status: Significant other     Spouse name: Not on file     Number of children: 0     Years of education: 18     Highest education level: Not on file   Occupational History     Occupation:    Social Needs     Financial resource strain: Not on file     Food insecurity:     Worry: Not on file     Inability: Not on file     Transportation needs:     Medical: Not on file     Non-medical: Not on file   Tobacco Use     Smoking status: Never Smoker     Smokeless tobacco: Never Used   Substance and Sexual Activity     Alcohol use: No     Drug use: No     Sexual activity: Not Currently     Partners: Male     Birth control/protection: IUD   Lifestyle     Physical activity:     Days per week: Not on file     Minutes per session: Not on file     Stress: Not on file   Relationships     Social connections:     Talks on phone: Not on file     Gets together: Not  on file     Attends Adventism service: Not on file     Active member of club or organization: Not on file     Attends meetings of clubs or organizations: Not on file     Relationship status: Not on file     Intimate partner violence:     Fear of current or ex partner: Not on file     Emotionally abused: Not on file     Physically abused: Not on file     Forced sexual activity: Not on file   Other Topics Concern     Parent/sibling w/ CABG, MI or angioplasty before 65F 55M? No   Social History Narrative    Dairy/d 3 servings/d.     Caffeine 2 servings/d    Exercise 0 x week    Sunscreen used - No    Seatbelts used - Yes    Working smoke/CO detectors in the home - Yes    Guns stored in the home - No    Self Breast Exams - no    Eye Exam up to date - No    Dental Exam up to date - Yes    Pap Smear up to date - Yes    Mammogram up to date - Yes    Dexa Scan up to date - NOT APPLICABLE    Flex Sig / Colonoscopy up to date - NOT APPLICABLE    Immunizations up to date - Yes    Abuse: Current or Past(Physical, Sexual or Emotional)- No    Do you feel safe in your environment - Yes    GÓMEZ Cummings    11/[birth year]/11                       Outpatient Encounter Medications as of 4/3/2019   Medication Sig Dispense Refill     albuterol (PROAIR HFA/PROVENTIL HFA/VENTOLIN HFA) 108 (90 Base) MCG/ACT Inhaler Inhale 2 puffs into the lungs every 4 hours as needed for shortness of breath / dyspnea or wheezing 3 Inhaler 3     cetirizine (ZYRTEC) 10 MG tablet Take 10 mg by mouth daily       EPINEPHrine (AUVI-Q) 0.3 MG/0.3ML injection 2-pack Inject 0.3 mLs (0.3 mg) into the muscle as needed for anaphylaxis 2 mL 2     fluticasone (FLONASE) 50 MCG/ACT nasal spray Spray 1-2 sprays into both nostrils daily 32 g 3     fluticasone-salmeterol (ADVAIR DISKUS) 500-50 MCG/DOSE diskus inhaler Inhale 1 puff into the lungs 2 times daily (Patient taking differently: Inhale 1 puff into the lungs daily ) 3 Inhaler 3     hydrochlorothiazide (MICROZIDE) 12.5 MG capsule  Take 1 capsule (12.5 mg) by mouth daily 90 capsule 3     lisinopril (PRINIVIL/ZESTRIL) 40 MG tablet Take 1 tablet (40 mg) by mouth daily 90 tablet 3     montelukast (SINGULAIR) 10 MG tablet Take 1 tablet (10 mg) by mouth At Bedtime 90 tablet 1     nystatin (MYCOSTATIN) 390607 UNIT/ML suspension Take 5 mLs (500,000 Units) by mouth 4 times daily 400 mL 0     ORDER FOR ALLERGEN IMMUNOTHERAPY Name of Mix: Mix #1  Dust Mite, Cat, Dog  Cat Hair, Standardized 10,000 BAU/mL, ALK  2.0 ml  Dog Hair-Dander, A. P.  1:100 w/v, HS  1.0 ml  Dust Mites DP. 30,000 AU/mL, HS  0.6 ml   Diluent: HSA qs to 5ml 5 mL PRN     ORDER FOR ALLERGEN IMMUNOTHERAPY Name of Mix: Mix #2  Tree , Weeds  Birch Mix PRW 1:20 w/v, HS  0.5 ml  Hickory, Shagbark 1:20 w/v, HS  0.5 ml  Cumming Mix RW 1:20 w/v, HS 0.5 ml  Oak Mix RVW 1:20 w/v, HS 0.5 ml  Kochia 1:20 w/v, HS 0.5 ml  Nettle 1:20 w/v, HS 0.5 ml  Ragweed Mixed 1:20 w/v ALK  0.5 ml  Sagebrush, Mugwort 1:20 w/v, HS 0.5 ml  Diluent: HSA qs to 5ml 5 mL PRN     ORDER FOR ALLERGEN IMMUNOTHERAPY Name of Mix: Mix #3  Mold  Alternaria Tenuis 1:10 w/v, HS  0.5 ml  Aspergillus Fumigatus 1:10 w/v, HS  0.5 ml  Curvularia Spicifera 1:10 w/v, HS  0.5 ml  Epicoccum Nigrum 1:10 w/v, HS 0.5 ml  Hormodendrum Cladosporioides 1:10 w/v, HS 0.5 ml  Penicillium Mix 1:10 w/v, HS  0.5 ml  Phoma Herbarum 1:10 w/v, HS  0.5 ml  Diluent: HSA qs to 5ml 5 mL PRN     venlafaxine (EFFEXOR-XR) 75 MG 24 hr capsule Take 1 capsule (75 mg) by mouth daily 90 capsule 3     metroNIDAZOLE (METROGEL) 0.75 % external gel Apply topically 2 times daily To affected area. (Patient not taking: Reported on 4/3/2019) 45 g 3     No facility-administered encounter medications on file as of 4/3/2019.              Review Of Systems  Skin: As above  Eyes: negative  Ears/Nose/Throat: negative  Respiratory: No shortness of breath, dyspnea on exertion, cough, or hemoptysis  Cardiovascular: negative  Gastrointestinal: negative  Genitourinary:  negative  Musculoskeletal: negative  Neurologic: negative  Psychiatric: negative  Hematologic/Lymphatic/Immunologic: negative  Endocrine: negative      O:   NAD, WDWN, Alert & Oriented, Mood & Affect wnl, Vitals stable   Here today alone   /82 (BP Location: Left arm, Patient Position: Sitting, Cuff Size: Adult Large)   Pulse 64   SpO2 96%    General appearance normal   Vitals stable   Alert, oriented and in no acute distress     telangiectasia mostly located on cheeks, mild redness    Eyes: Conjunctivae/lids:Normal     ENT: Lips: normal    MSK:Normal    Pulm: Breathing Normal    Neuro/Psych: Orientation:Normal; Mood/Affect:Normal  A/P:  1. Rosacea   Discussed Mirvaso, rhofade, PDL vs IPL.   Discussed IPL will help decrease redness and give a photofacial, if just wanting to focus on broken blood vessel discussed to try PDL laser.   Discussed PDL laser, zel skin and laser.   IPL- treatment for cheek $150 dollar a treatment.     Wear daily sunscreen.     Again, thank you for allowing me to participate in the care of your patient.        Sincerely,        Shea Coulter PA-C

## 2019-04-03 NOTE — TELEPHONE ENCOUNTER
Clinic Action Needed:  Yes, new prescription  [facility] Triage Call  Presenting Problem:    Pharmacist is calling and states that Nystatin is on back order from [facility].    Pharmacist is requesting to leave a message from MD Franco to prescribe a different medication for patient.      Routed to:  RN Pool    Please be sure to close this encounter once this patient's issue/question has been addressed.    Monica Scott RN/[facility]

## 2019-04-08 ENCOUNTER — ALLIED HEALTH/NURSE VISIT (OUTPATIENT)
Dept: ALLERGY | Facility: CLINIC | Age: 59
End: 2019-04-08
Payer: COMMERCIAL

## 2019-04-08 DIAGNOSIS — J30.1 SEASONAL ALLERGIC RHINITIS DUE TO POLLEN: Primary | ICD-10-CM

## 2019-04-08 PROCEDURE — 95117 IMMUNOTHERAPY INJECTIONS: CPT

## 2019-04-08 PROCEDURE — 99207 ZZC DROP WITH A PROCEDURE: CPT

## 2019-04-08 NOTE — PROGRESS NOTES
Patient presented after waiting 30 minutes with normal reaction to allergy injections. Discharged from clinic.    Paula Walters RN ............   4/8/2019...8:08 AM

## 2019-04-10 ENCOUNTER — OFFICE VISIT (OUTPATIENT)
Dept: DERMATOLOGY | Facility: CLINIC | Age: 59
End: 2019-04-10
Payer: COMMERCIAL

## 2019-04-10 VITALS — OXYGEN SATURATION: 97 % | HEART RATE: 69 BPM | DIASTOLIC BLOOD PRESSURE: 81 MMHG | SYSTOLIC BLOOD PRESSURE: 121 MMHG

## 2019-04-10 DIAGNOSIS — D48.5 NEOPLASM OF UNCERTAIN BEHAVIOR OF SCALP: Primary | ICD-10-CM

## 2019-04-10 PROCEDURE — 88305 TISSUE EXAM BY PATHOLOGIST: CPT | Mod: TC | Performed by: PHYSICIAN ASSISTANT

## 2019-04-10 PROCEDURE — 11420 EXC H-F-NK-SP B9+MARG 0.5/<: CPT | Performed by: PHYSICIAN ASSISTANT

## 2019-04-10 NOTE — LETTER
4/10/2019         RE: Nela Mares  [address] [id number]        Dear Colleague,    Thank you for referring your patient, Nela Mares, to the [facility]. Please see a copy of my visit note below.    HPI:  Nela Mares is a 58 year old female patient here today for growth on left cheek .  Patient states this has been present for a while.  Patient reports the following symptoms: growing .  Patient reports the following previous treatments: none.  Patient reports the following modifying factors: none.  Associated symptoms: none.  Patient has no other skin complaints today.  Remainder of the HPI, Meds, PMH, Allergies, FH, and SH was reviewed in chart.    Pertinent Hx:   No personal or family history of skin cancer    Past Medical History:   Diagnosis Date     Allergic rhinitis, cause unspecified      Cervical high risk HPV (human papillomavirus) test positive 03/21/2017    3/21/17 NIL pap/+ HR HPV (not 16 or 18).      Depressive disorder      Depressive disorder, not elsewhere classified     seasonal     Hypertension      Mild persistent asthma      Obstructive sleep apnea (adult) (pediatric)     uses CPAP     Scalp mass 7/2014       Past Surgical History:   Procedure Laterality Date     COLONOSCOPY  6/21/2012    Procedure: COLONOSCOPY;  COLONOSCOPY, SCREEN;  Surgeon: Bart You MD;  Location:  OR     D & C       ENDOSCOPIC RETROGRADE CHOLANGIOPANCREATOGRAM  1/4/2014    Procedure: ENDOSCOPIC RETROGRADE CHOLANGIOPANCREATOGRAM;  ERCP biliary sphincterotomy, bile duct stone removal, epinepherine washing.;  Surgeon: Ba Meyers MD;  Location: [facility] OR     LAPAROSCOPIC CHOLECYSTECTOMY WITH CHOLANGIOGRAMS  1/4/2014    Procedure: LAPAROSCOPIC CHOLECYSTECTOMY WITH CHOLANGIOGRAMS;;  Surgeon: Haja Sage MD;  Location: [facility] OR     NO HISTORY OF SURGERY       OPERATIVE HYSTEROSCOPY  6/17/2014    Procedure: OPERATIVE HYSTEROSCOPY;   Surgeon: Paola Camara MD;  Location: [facility] OR     REMOVE INTRAUTERINE DEVICE  6/17/2014    Procedure: REMOVE INTRAUTERINE DEVICE;  Surgeon: Paola Camara MD;  Location: [facility] OR        Family History   Problem Relation Age of Onset     C.A.D. Paternal Grandmother      Cerebrovascular Disease Paternal Grandmother      Depression Paternal Grandmother      Obesity Paternal Grandmother      C.A.D. Paternal Grandfather      Cerebrovascular Disease Paternal Grandfather         unsure     Substance Abuse Paternal Grandfather      Hypertension Father      Asthma Father      Obesity Father      Skin Cancer Father      Neurologic Disorder Mother         Graves disease     Hyperlipidemia Mother      Depression Mother      Thyroid Disease Mother      Other - See Comments Other      Skin Cancer Sister      Depression Maternal Grandmother      Cancer Sister         mild skin cancer,cured from excision     Depression Nephew      Anxiety Disorder Nephew      Mental Illness Maternal Half-Brother      Mental Illness Paternal Half-Brother      Substance Abuse Nephew      Asthma Sister      Asthma Nephew      Thyroid Disease Maternal Half-Sister      Thyroid Disease Paternal Half-Sister      Diabetes No family hx of        Social History     Socioeconomic History     Marital status: Significant other     Spouse name: Not on file     Number of children: 0     Years of education: 18     Highest education level: Not on file   Occupational History     Occupation:    Social Needs     Financial resource strain: Not on file     Food insecurity:     Worry: Not on file     Inability: Not on file     Transportation needs:     Medical: Not on file     Non-medical: Not on file   Tobacco Use     Smoking status: Never Smoker     Smokeless tobacco: Never Used   Substance and Sexual Activity     Alcohol use: No     Drug use: No     Sexual activity: Not Currently     Partners: Male     Birth  control/protection: IUD   Lifestyle     Physical activity:     Days per week: Not on file     Minutes per session: Not on file     Stress: Not on file   Relationships     Social connections:     Talks on phone: Not on file     Gets together: Not on file     Attends Yazdan[employer] service: Not on file     Active member of club or organization: Not on file     Attends meetings of clubs or organizations: Not on file     Relationship status: Not on file     Intimate partner violence:     Fear of current or ex partner: Not on file     Emotionally abused: Not on file     Physically abused: Not on file     Forced sexual activity: Not on file   Other Topics Concern     Parent/sibling w/ CABG, MI or angioplasty before 65F 55M? No   Social History Narrative    Dairy/d 3 servings/d.     Caffeine 2 servings/d    Exercise 0 x week    Sunscreen used - No    Seatbelts used - Yes    Working smoke/CO detectors in the home - Yes    Guns stored in the home - No    Self Breast Exams - no    Eye Exam up to date - No    Dental Exam up to date - Yes    Pap Smear up to date - Yes    Mammogram up to date - Yes    Dexa Scan up to date - NOT APPLICABLE    Flex Sig / Colonoscopy up to date - NOT APPLICABLE    Immunizations up to date - Yes    Abuse: Current or Past(Physical, Sexual or Emotional)- No    Do you feel safe in your environment - Yes    GÓMEZ Cummings    11/[birth year]                       Outpatient Encounter Medications as of 4/10/2019   Medication Sig Dispense Refill     albuterol (PROAIR HFA/PROVENTIL HFA/VENTOLIN HFA) 108 (90 Base) MCG/ACT Inhaler Inhale 2 puffs into the lungs every 4 hours as needed for shortness of breath / dyspnea or wheezing 3 Inhaler 3     cetirizine (ZYRTEC) 10 MG tablet Take 10 mg by mouth daily       clotrimazole 10 MG vika Take 1 Vika (10 mg) by mouth 5 times daily for 20 days 100 Vika 0     EPINEPHrine (AUVI-Q) 0.3 MG/0.3ML injection 2-pack Inject 0.3 mLs (0.3 mg) into the muscle as needed for anaphylaxis 2 mL  2     fluticasone (FLONASE) 50 MCG/ACT nasal spray Spray 1-2 sprays into both nostrils daily 32 g 3     fluticasone-salmeterol (ADVAIR DISKUS) 500-50 MCG/DOSE diskus inhaler Inhale 1 puff into the lungs 2 times daily (Patient taking differently: Inhale 1 puff into the lungs daily ) 3 Inhaler 3     hydrochlorothiazide (MICROZIDE) 12.5 MG capsule Take 1 capsule (12.5 mg) by mouth daily 90 capsule 3     lisinopril (PRINIVIL/ZESTRIL) 40 MG tablet Take 1 tablet (40 mg) by mouth daily 90 tablet 3     metroNIDAZOLE (METROGEL) 0.75 % external gel Apply topically 2 times daily To affected area. 45 g 3     montelukast (SINGULAIR) 10 MG tablet Take 1 tablet (10 mg) by mouth At Bedtime 90 tablet 1     nystatin (MYCOSTATIN) 377074 UNIT/ML suspension Take 5 mLs (500,000 Units) by mouth 4 times daily 400 mL 0     ORDER FOR ALLERGEN IMMUNOTHERAPY Name of Mix: Mix #1  Dust Mite, Cat, Dog  Cat Hair, Standardized 10,000 BAU/mL, ALK  2.0 ml  Dog Hair-Dander, A. P.  1:100 w/v, HS  1.0 ml  Dust Mites DP. 30,000 AU/mL, HS  0.6 ml   Diluent: HSA qs to 5ml 5 mL PRN     ORDER FOR ALLERGEN IMMUNOTHERAPY Name of Mix: Mix #2  Tree , Weeds  Birch Mix PRW 1:20 w/v, HS  0.5 ml  Hickory, Shagbark 1:20 w/v, HS  0.5 ml  Lane Mix RW 1:20 w/v, HS 0.5 ml  Oak Mix RVW 1:20 w/v, HS 0.5 ml  Kochia 1:20 w/v, HS 0.5 ml  Nettle 1:20 w/v, HS 0.5 ml  Ragweed Mixed 1:20 w/v ALK  0.5 ml  Sagebrush, Mugwort 1:20 w/v, HS 0.5 ml  Diluent: HSA qs to 5ml 5 mL PRN     ORDER FOR ALLERGEN IMMUNOTHERAPY Name of Mix: Mix #3  Mold  Alternaria Tenuis 1:10 w/v, HS  0.5 ml  Aspergillus Fumigatus 1:10 w/v, HS  0.5 ml  Curvularia Spicifera 1:10 w/v, HS  0.5 ml  Epicoccum Nigrum 1:10 w/v, HS 0.5 ml  Hormodendrum Cladosporioides 1:10 w/v, HS 0.5 ml  Penicillium Mix 1:10 w/v, HS  0.5 ml  Phoma Herbarum 1:10 w/v, HS  0.5 ml  Diluent: HSA qs to 5ml 5 mL PRN     venlafaxine (EFFEXOR-XR) 75 MG 24 hr capsule Take 1 capsule (75 mg) by mouth daily 90 capsule 3     No  facility-administered encounter medications on file as of 4/10/2019.        Review Of Systems:  Skin: As above  Eyes: negative  Ears/Nose/Throat: negative  Respiratory: No shortness of breath, dyspnea on exertion, cough, or hemoptysis  Cardiovascular: negative  Gastrointestinal: negative  Genitourinary: negative  Musculoskeletal: negative  Neurologic: negative  Psychiatric: negative  Hematologic/Lymphatic/Immunologic: negative  Endocrine: negative      Objective:     /81   Pulse 69   SpO2 97%   Eyes: Conjunctivae/lids: Normal   ENT: Lips:  Normal  MSK: Normal  Cardiovascular: Peripheral edema none  Pulm: Breathing Normal  Neuro/Psych: Orientation: Normal; Mood/Affect: Normal, NAD, WDWN  Pt accompanied by: self  Following areas examined: face and scalp  Cesar skin type:i   Findings:  Soft mobile smooth nodule on left superior occipital scalp 0.4cm  Soft mobile smooth nodule on left inferior occipital scalp 0.4cm  Soft mobile smooth nodule on right parietal 0.4cm      Assessment and Plan:  1) Neoplasm of uncertain behavior on on left superior occipital scalp 0.4cm  Rule out pilar cyst  I&D: Epidermal cyst was cleansed with surgical cleanser. It was infiltrated with buffered solution of 1% lidocaine with epinephrine. An incision was made with a number 11 blade over the top of the lesion, curette used to dislodge from surrounding tissue, and hemostat used to remove cyst sac and contents. Minimal yellow keratinous foul smelling drainage with minimal blood was expressed.   Dressing was applied. Patient was discharged in satisfactory condition. FACG 0.6 used for superficial closure.   Based on lesion type may need to completely remove lesion. Patient will be notified in 7-10 days of results. Wound care directions given.      2) Neoplasm of uncertain behavior left inferior occipital scalp 0.4cm  Rule out pilar cyst  I&D: Epidermal cyst was cleansed with surgical cleanser. It was infiltrated with buffered  solution of 1% lidocaine with epinephrine. An incision was made with a number 11 blade over the top of the lesion, curette used to dislodge from surrounding tissue, and hemostat used to remove cyst sac and contents. Minimal yellow keratinous foul smelling drainage with minimal blood was expressed.   Dressing was applied. Patient was discharged in satisfactory condition. FACG 0.6 used for superficial closure.   Based on lesion type may need to completely remove lesion. Patient will be notified in 7-10 days of results. Wound care directions given.    3) Neoplasm of uncertain behavior on right parietal 0.4cm  Rule out pilar cyst  I&D: Epidermal cyst was cleansed with surgical cleanser. It was infiltrated with buffered solution of 1% lidocaine with epinephrine. An incision was made with a number 11 blade over the top of the lesion, curette used to dislodge from surrounding tissue, and hemostat used to remove cyst sac and contents. Minimal yellow keratinous foul smelling drainage with minimal blood was expressed.   Dressing was applied. Patient was discharged in satisfactory condition. FACG 0.6 used for superficial closure.   Based on lesion type may need to completely remove lesion. Patient will be notified in 7-10 days of results. Wound care directions given.      Follow up in prn      Again, thank you for allowing me to participate in the care of your patient.        Sincerely,        Dana Calabrese PA-C

## 2019-04-10 NOTE — PROGRESS NOTES
HPI:  Nela Mares is a 58 year old female patient here today for growth on left cheek .  Patient states this has been present for a while.  Patient reports the following symptoms: growing .  Patient reports the following previous treatments: none.  Patient reports the following modifying factors: none.  Associated symptoms: none.  Patient has no other skin complaints today.  Remainder of the HPI, Meds, PMH, Allergies, FH, and SH was reviewed in chart.    Pertinent Hx:   No personal or family history of skin cancer    Past Medical History:   Diagnosis Date     Allergic rhinitis, cause unspecified      Cervical high risk HPV (human papillomavirus) test positive 03/21/2017    3/21/17 NIL pap/+ HR HPV (not 16 or 18).      Depressive disorder      Depressive disorder, not elsewhere classified     seasonal     Hypertension      Mild persistent asthma      Obstructive sleep apnea (adult) (pediatric)     uses CPAP     Scalp mass 7/2014       Past Surgical History:   Procedure Laterality Date     COLONOSCOPY  6/21/2012    Procedure: COLONOSCOPY;  COLONOSCOPY, SCREEN;  Surgeon: Bart You MD;  Location: [facility] OR     D & C       ENDOSCOPIC RETROGRADE CHOLANGIOPANCREATOGRAM  1/4/2014    Procedure: ENDOSCOPIC RETROGRADE CHOLANGIOPANCREATOGRAM;  ERCP biliary sphincterotomy, bile duct stone removal, epinepherine washing.;  Surgeon: Ba Meyers MD;  Location: [facility] OR     LAPAROSCOPIC CHOLECYSTECTOMY WITH CHOLANGIOGRAMS  1/4/2014    Procedure: LAPAROSCOPIC CHOLECYSTECTOMY WITH CHOLANGIOGRAMS;;  Surgeon: Haja Sage MD;  Location: [facility] OR     NO HISTORY OF SURGERY       OPERATIVE HYSTEROSCOPY  6/17/2014    Procedure: OPERATIVE HYSTEROSCOPY;  Surgeon: Paola Camara MD;  Location: [facility]     REMOVE INTRAUTERINE DEVICE  6/17/2014    Procedure: REMOVE INTRAUTERINE DEVICE;  Surgeon: Paola Camara MD;  Location: [facility] OR        Family History   Problem Relation Age of Onset     C.A.D.  Paternal Grandmother      Cerebrovascular Disease Paternal Grandmother      Depression Paternal Grandmother      Obesity Paternal Grandmother      C.A.D. Paternal Grandfather      Cerebrovascular Disease Paternal Grandfather         unsure     Substance Abuse Paternal Grandfather      Hypertension Father      Asthma Father      Obesity Father      Skin Cancer Father      Neurologic Disorder Mother         Graves disease     Hyperlipidemia Mother      Depression Mother      Thyroid Disease Mother      Other - See Comments Other      Skin Cancer Sister      Depression Maternal Grandmother      Cancer Sister         mild skin cancer,cured from excision     Depression Nephew      Anxiety Disorder Nephew      Mental Illness Maternal Half-Brother      Mental Illness Paternal Half-Brother      Substance Abuse Nephew      Asthma Sister      Asthma Nephew      Thyroid Disease Maternal Half-Sister      Thyroid Disease Paternal Half-Sister      Diabetes No family hx of        Social History     Socioeconomic History     Marital status: Significant other     Spouse name: Not on file     Number of children: 0     Years of education: 18     Highest education level: Not on file   Occupational History     Occupation:    Social Needs     Financial resource strain: Not on file     Food insecurity:     Worry: Not on file     Inability: Not on file     Transportation needs:     Medical: Not on file     Non-medical: Not on file   Tobacco Use     Smoking status: Never Smoker     Smokeless tobacco: Never Used   Substance and Sexual Activity     Alcohol use: No     Drug use: No     Sexual activity: Not Currently     Partners: Male     Birth control/protection: IUD   Lifestyle     Physical activity:     Days per week: Not on file     Minutes per session: Not on file     Stress: Not on file   Relationships     Social connections:     Talks on phone: Not on file     Gets together: Not on file     Attends [employer]  service: Not on file     Active member of club or organization: Not on file     Attends meetings of clubs or organizations: Not on file     Relationship status: Not on file     Intimate partner violence:     Fear of current or ex partner: Not on file     Emotionally abused: Not on file     Physically abused: Not on file     Forced sexual activity: Not on file   Other Topics Concern     Parent/sibling w/ CABG, MI or angioplasty before 65F 55M? No   Social History Narrative    Dairy/d 3 servings/d.     Caffeine 2 servings/d    Exercise 0 x week    Sunscreen used - No    Seatbelts used - Yes    Working smoke/CO detectors in the home - Yes    Guns stored in the home - No    Self Breast Exams - no    Eye Exam up to date - No    Dental Exam up to date - Yes    Pap Smear up to date - Yes    Mammogram up to date - Yes    Dexa Scan up to date - NOT APPLICABLE    Flex Sig / Colonoscopy up to date - NOT APPLICABLE    Immunizations up to date - Yes    Abuse: Current or Past(Physical, Sexual or Emotional)- No    Do you feel safe in your environment - Yes    GÓMEZ Cummings    11/23/11                       Outpatient Encounter Medications as of 4/10/2019   Medication Sig Dispense Refill     albuterol (PROAIR HFA/PROVENTIL HFA/VENTOLIN HFA) 108 (90 Base) MCG/ACT Inhaler Inhale 2 puffs into the lungs every 4 hours as needed for shortness of breath / dyspnea or wheezing 3 Inhaler 3     cetirizine (ZYRTEC) 10 MG tablet Take 10 mg by mouth daily       clotrimazole 10 MG vika Take 1 Vika (10 mg) by mouth 5 times daily for 20 days 100 Vika 0     EPINEPHrine (AUVI-Q) 0.3 MG/0.3ML injection 2-pack Inject 0.3 mLs (0.3 mg) into the muscle as needed for anaphylaxis 2 mL 2     fluticasone (FLONASE) 50 MCG/ACT nasal spray Spray 1-2 sprays into both nostrils daily 32 g 3     fluticasone-salmeterol (ADVAIR DISKUS) 500-50 MCG/DOSE diskus inhaler Inhale 1 puff into the lungs 2 times daily (Patient taking differently: Inhale 1 puff into the lungs  daily ) 3 Inhaler 3     hydrochlorothiazide (MICROZIDE) 12.5 MG capsule Take 1 capsule (12.5 mg) by mouth daily 90 capsule 3     lisinopril (PRINIVIL/ZESTRIL) 40 MG tablet Take 1 tablet (40 mg) by mouth daily 90 tablet 3     metroNIDAZOLE (METROGEL) 0.75 % external gel Apply topically 2 times daily To affected area. 45 g 3     montelukast (SINGULAIR) 10 MG tablet Take 1 tablet (10 mg) by mouth At Bedtime 90 tablet 1     nystatin (MYCOSTATIN) 323000 UNIT/ML suspension Take 5 mLs (500,000 Units) by mouth 4 times daily 400 mL 0     ORDER FOR ALLERGEN IMMUNOTHERAPY Name of Mix: Mix #1  Dust Mite, Cat, Dog  Cat Hair, Standardized 10,000 BAU/mL, ALK  2.0 ml  Dog Hair-Dander, A. P.  1:100 w/v, HS  1.0 ml  Dust Mites DP. 30,000 AU/mL, HS  0.6 ml   Diluent: HSA qs to 5ml 5 mL PRN     ORDER FOR ALLERGEN IMMUNOTHERAPY Name of Mix: Mix #2  Tree , Weeds  Birch Mix PRW 1:20 w/v, HS  0.5 ml  Hickory, Shagbark 1:20 w/v, HS  0.5 ml  [place] Mix RW 1:20 w/v, HS 0.5 ml  Oak Mix RVW 1:20 w/v, HS 0.5 ml  Kochia 1:20 w/v, HS 0.5 ml  Nettle 1:20 w/v, HS 0.5 ml  Ragweed Mixed 1:20 w/v ALK  0.5 ml  Sagebrush, Mugwort 1:20 w/v, HS 0.5 ml  Diluent: HSA qs to 5ml 5 mL PRN     ORDER FOR ALLERGEN IMMUNOTHERAPY Name of Mix: Mix #3  Mold  Alternaria Tenuis 1:10 w/v, HS  0.5 ml  Aspergillus Fumigatus 1:10 w/v, HS  0.5 ml  Curvularia Spicifera 1:10 w/v, HS  0.5 ml  Epicoccum Nigrum 1:10 w/v, HS 0.5 ml  Hormodendrum Cladosporioides 1:10 w/v, HS 0.5 ml  Penicillium Mix 1:10 w/v, HS  0.5 ml  Phoma Herbarum 1:10 w/v, HS  0.5 ml  Diluent: HSA qs to 5ml 5 mL PRN     venlafaxine (EFFEXOR-XR) 75 MG 24 hr capsule Take 1 capsule (75 mg) by mouth daily 90 capsule 3     No facility-administered encounter medications on file as of 4/10/2019.        Review Of Systems:  Skin: As above  Eyes: negative  Ears/Nose/Throat: negative  Respiratory: No shortness of breath, dyspnea on exertion, cough, or hemoptysis  Cardiovascular: negative  Gastrointestinal:  negative  Genitourinary: negative  Musculoskeletal: negative  Neurologic: negative  Psychiatric: negative  Hematologic/Lymphatic/Immunologic: negative  Endocrine: negative      Objective:     /81   Pulse 69   SpO2 97%   Eyes: Conjunctivae/lids: Normal   ENT: Lips:  Normal  MSK: Normal  Cardiovascular: Peripheral edema none  Pulm: Breathing Normal  Neuro/Psych: Orientation: Normal; Mood/Affect: Normal, NAD, WDWN  Pt accompanied by: self  Following areas examined: face and scalp  Cesar skin type:i   Findings:  Soft mobile smooth nodule on left superior occipital scalp 0.4cm  Soft mobile smooth nodule on left inferior occipital scalp 0.4cm  Soft mobile smooth nodule on right parietal 0.4cm      Assessment and Plan:  1) Neoplasm of uncertain behavior on on left superior occipital scalp 0.4cm  Rule out pilar cyst  Excision of cyst: Skin was cleansed with surgical cleanser. It was infiltrated with buffered solution of 1% lidocaine with epinephrine. An incision was made with a number 11 blade over the top of the lesion, curette used to dislodge from surrounding tissue, and hemostat used to remove cyst sac and contents. Minimal yellow keratinous foul smelling drainage with minimal blood was expressed.   Dressing was applied. Patient was discharged in satisfactory condition. FACG 0.6 used for superficial closure.   Based on lesion type may need to completely remove lesion. Patient will be notified in 7-10 days of results. Wound care directions given.      2) Neoplasm of uncertain behavior left inferior occipital scalp 0.4cm  Rule out pilar cyst  Excision of cyst: Skin was cleansed with surgical cleanser. It was infiltrated with buffered solution of 1% lidocaine with epinephrine. An incision was made with a number 11 blade over the top of the lesion, curette used to dislodge from surrounding tissue, and hemostat used to remove cyst sac and contents. Minimal yellow keratinous foul smelling drainage with minimal  blood was expressed.   Dressing was applied. Patient was discharged in satisfactory condition. FACG 0.6 used for superficial closure.   Based on lesion type may need to completely remove lesion. Patient will be notified in 7-10 days of results. Wound care directions given.    3) Neoplasm of uncertain behavior on right parietal 0.4cm  Rule out pilar cyst  Excision of cyst: skin was cleansed with surgical cleanser. It was infiltrated with buffered solution of 1% lidocaine with epinephrine. An incision was made with a number 11 blade over the top of the lesion, curette used to dislodge from surrounding tissue, and hemostat used to remove cyst sac and contents. Minimal yellow keratinous foul smelling drainage with minimal blood was expressed.   Dressing was applied. Patient was discharged in satisfactory condition. FACG 0.6 used for superficial closure.   Based on lesion type may need to completely remove lesion. Patient will be notified in 7-10 days of results. Wound care directions given.      Follow up in prn

## 2019-04-10 NOTE — PATIENT INSTRUCTIONS
[facility] [phone]  [facility] [phone]     Keep Bandage on for 24 hours.       You may have to take a bath to avoid getting the area wet. The suture is dissolvable.   Follow instructions below after 1 week and begin daily bandage change.                      AFTER 1 WEEK YOU SHOULD BEGIN DAILY DRESSING CHANGES AS FOLLOWS:     1)        Remove Dressing.     2)        Clean and dry the area with tap water using a Q-tip or sterile gauze pad.     3)        Apply Vaseline, Aquaphor, Polysporin ointment or Bacitracin ointment over entire wound.  Do NOT use Neosporin ointment.     4)        Cover the wound with a band-aid, or a sterile non-stick gauze pad and micropore paper tape        REPEAT THESE INSTRUCTIONS AT LEAST ONCE A DAY UNTIL THE WOUND HAS COMPLETELY HEALED.        It is an old wives tale that a wound heals better when it is exposed to air and allowed to dry out. The wound will heal faster with a better cosmetic result if it is kept moist with ointment and covered with a bandage.     **Do not let the wound dry out.**        Supplies Needed:                *Cotton tipped applicators (Q-tips)             *Vaseline, Aquaphor, Polysporin Ointment or Bacitracin Ointment (NOT NEOSPORIN)             *Band-aids or non-stick gauze pads and micropore paper tape.              PATIENT INFORMATION:  During the healing process you will notice a number of changes. All wounds develop a small halo of redness surrounding the wound.  This means healing is occurring. Severe itching with extensive redness usually indicates sensitivity to the ointment or bandage tape used to dress the wound.  You should call our office if this develops.       Swelling  and/or discoloration around your surgical site is common, particularly when performed around the eye.     After the wound is healed you may discontinue dressing changes.      You may experience a sensation of tightness as your wound heals. This is  normal and will gradually subside.     Your healed wound may be sensitive to temperature changes. This sensitivity improves with time, but if you re having a lot of discomfort, try to avoid temperature extremes.     Patients frequently experience itching after their wound appears to have healed because of the continue healing under the skin.  Plain Vaseline will help relieve the itching.        POSSIBLE COMPLICATIONS     BLEEDIN. Leave the bandage in place.  2. Use tightly rolled up gauze or a cloth to apply direct pressure over the bandage for 30  minutes.  3. Reapply pressure for an additional 30 minutes if necessary  4. Use additional gauze and tape to maintain pressure once the bleeding has stopped.        Proper skin care from [facility]:     -Eliminate harsh soaps as they strip the natural oils from the skin, often resulting in dry itchy skin ( i.e. Dial, Zest, [place])  -Use mild soaps such as Cetaphil or Dove Sensitive Skin in the shower. You do not need to use soap on arms, legs, and trunk every time you shower unless visibly soiled.   -Avoid hot or cold showers.  -After showering, lightly dry off and apply moisturizing within 2-3 minutes. This will help trap moisture in the skin.   -Aggressive use of a moisturizer at least 1-2 times a day to the entire body (including -Vanicream, Cetaphil, Aquaphor or Cerave) and moisturize hands after every washing.  -We recommend using moisturizers that come in a tub that needs to be scooped out, not a pump. This has more of an oil base. It will hold moisture in your skin much better than a water base moisturizer. The above recommended are non-pore clogging.         Wear a sunscreen with at least SPF 30 on your face, ears, neck and V of the chest daily. Wear sunscreen on other areas of the body if those areas are exposed to the sun throughout the day. Sunscreens can contain physical and/or chemical blockers. Physical blockers are less likely to  clog pores, these include zinc oxide and titanium dioxide. Reapply every two hour and after swimming. Sunscreen examples include Neutrogena, CeraVe, Blue Lizard, Elta MD and many others.    UV radiation  UVA radiation remains constant throughout the day and throughout the year. It is a longer wavelength than UVB and therefore penetrates deeper into the skin leading to immediate and delayed tanning, photoaging, and skin cancer. 70-80% of UVA and UVB radiation occurs between the hours of 10am-2pm.  UVB radiation  UVB radiation causes the most harmful effects and is more significant during the summer months. However, snow and ice can reflect UVB radiation leading to skin damage during the winter months as well. UVB radiation is responsible for tanning, burning, inflammation, delayed erythema (pinkness), pigmentation (brown spots), and skin cancer.

## 2019-04-12 LAB — COPATH REPORT: NORMAL

## 2019-04-15 ENCOUNTER — ALLIED HEALTH/NURSE VISIT (OUTPATIENT)
Dept: ALLERGY | Facility: CLINIC | Age: 59
End: 2019-04-15
Payer: COMMERCIAL

## 2019-04-15 DIAGNOSIS — J30.1 SEASONAL ALLERGIC RHINITIS DUE TO POLLEN: Primary | ICD-10-CM

## 2019-04-15 PROCEDURE — 95117 IMMUNOTHERAPY INJECTIONS: CPT

## 2019-04-15 PROCEDURE — 99207 ZZC DROP WITH A PROCEDURE: CPT

## 2019-04-15 NOTE — PROGRESS NOTES
Patient presented after waiting 30 minutes with normal reaction to allergy injections. Discharged from clinic.    Paula Walters RN ............   4/15/2019...8:23 AM

## 2019-04-22 ENCOUNTER — ALLIED HEALTH/NURSE VISIT (OUTPATIENT)
Dept: ALLERGY | Facility: CLINIC | Age: 59
End: 2019-04-22
Payer: COMMERCIAL

## 2019-04-22 DIAGNOSIS — J30.1 SEASONAL ALLERGIC RHINITIS DUE TO POLLEN: Primary | ICD-10-CM

## 2019-04-22 PROCEDURE — 95117 IMMUNOTHERAPY INJECTIONS: CPT

## 2019-04-22 PROCEDURE — 99207 ZZC DROP WITH A PROCEDURE: CPT

## 2019-04-22 NOTE — PROGRESS NOTES
Patient presented after waiting 30 minutes with normal reaction to allergy injections. Discharged from clinic.    Paula Walters RN ............   4/22/2019...8:50 AM

## 2019-05-16 ENCOUNTER — MYC MEDICAL ADVICE (OUTPATIENT)
Dept: FAMILY MEDICINE | Facility: CLINIC | Age: 59
End: 2019-05-16

## 2019-05-16 DIAGNOSIS — F32.0 MILD MAJOR DEPRESSION (H): ICD-10-CM

## 2019-05-16 DIAGNOSIS — J45.30 MILD PERSISTENT ASTHMA WITHOUT COMPLICATION: ICD-10-CM

## 2019-05-16 DIAGNOSIS — I10 ESSENTIAL HYPERTENSION WITH GOAL BLOOD PRESSURE LESS THAN 140/90: ICD-10-CM

## 2019-05-16 NOTE — TELEPHONE ENCOUNTER
"Requested Prescriptions   Pending Prescriptions Disp Refills     hydrochlorothiazide (MICROZIDE) 12.5 MG capsule [Pharmacy Med Name: hydroCHLOROthiazide Oral Capsule 12.5 MG]  Last Written Prescription Date:  5/2/2018  Last Fill Quantity: 90 caps,  # refills: 3   Last office visit: 5/2/2018 with prescribing provider:  Blake   Future Office Visit:   Next 5 appointments (look out 90 days)    May 20, 2019  7:20 AM CDT  Nurse Only with FZ ALLERGY SHOTS  [facility] ([facility] [address] [facility] [address]  [phone]   Jul 18, 2019  3:00 PM CDT  MyChart Long with Padma Lozano MD  [facility] ([facility]) [address] [place] [id number] [phone]          90 capsule 2     Sig: TAKE ONE CAPSULE BY MOUTH ONE TIME DAILY       Diuretics (Including Combos) Protocol Failed - 5/16/2019  5:04 PM        Failed - Normal serum creatinine on file in past 12 months     Recent Labs   Lab Test 04/26/18  0718   CR 0.81              Failed - Normal serum potassium on file in past 12 months     Recent Labs   Lab Test 04/26/18  0718   POTASSIUM 3.8                    Failed - Normal serum sodium on file in past 12 months     Recent Labs   Lab Test 04/26/18  0718                 Passed - Blood pressure under 140/90 in past 12 months     BP Readings from Last 3 Encounters:   04/10/19 121/81   04/03/19 112/82   04/02/19 122/85                 Passed - Recent (12 mo) or future (30 days) visit within the authorizing provider's specialty     Patient had office visit in the last 12 months or has a visit in the next 30 days with authorizing provider or within the authorizing provider's specialty.  See \"Patient Info\" tab in inbasket, or \"Choose Columns\" in Meds & Orders section of the refill encounter.              Passed - Medication is active on med list        Passed - Patient is age 18 or older        Passed - No active " "pregancy on record        Passed - No positive pregnancy test in past 12 months        lisinopril (PRINIVIL/ZESTRIL) 40 MG tablet [Pharmacy Med Name: Lisinopril Oral Tablet 40 MG]  Last Written Prescription Date:  5/2/2018  Last Fill Quantity: 90 tabs,  # refills: 3   Last office visit: 5/2/2018 with prescribing provider:  Blake   Future Office Visit:   Next 5 appointments (look out 90 days)    May 20, 2019  7:20 AM CDT  Nurse Only with FZ ALLERGY SHOTS  [facility] ([address] [address]  [phone]   Jul 18, 2019  3:00 PM CDT  MyChart Long with Padma Lozano MD  [facility] [address] [place] [id number] [phone]          90 tablet 2     Sig: TAKE ONE TABLET BY MOUTH ONE TIME DAILY       ACE Inhibitors (Including Combos) Protocol Failed - 5/16/2019  5:04 PM        Failed - Normal serum creatinine on file in past 12 months     Recent Labs   Lab Test 04/26/18  0718   CR 0.81             Failed - Normal serum potassium on file in past 12 months     Recent Labs   Lab Test 04/26/18  0718   POTASSIUM 3.8             Passed - Blood pressure under 140/90 in past 12 months     BP Readings from Last 3 Encounters:   04/10/19 121/81   04/03/19 112/82   04/02/19 122/85                 Passed - Recent (12 mo) or future (30 days) visit within the authorizing provider's specialty     Patient had office visit in the last 12 months or has a visit in the next 30 days with authorizing provider or within the authorizing provider's specialty.  See \"Patient Info\" tab in inbasket, or \"Choose Columns\" in Meds & Orders section of the refill encounter.              Passed - Medication is active on med list        Passed - Patient is age 18 or older        Passed - No active pregnancy on record        Passed - No positive pregnancy test within past 12 months        venlafaxine (EFFEXOR-XR) 75 " "MG 24 hr capsule [Pharmacy Med Name:  Last Written Prescription Date:  5/2/2018  Last Fill Quantity: 90 caps,  # refills: 3   Last office visit: 5/2/2018 with prescribing provider:  Blake   Future Office Visit:   Next 5 appointments (look out 90 days)    May 20, 2019  7:20 AM CDT  Nurse Only with FZ ALLERGY SHOTS  [facility] ([place] [id number] [facility] [address]  [phone]   Jul 18, 2019  3:00 PM CDT  MyChart Long with Padma Lozano MD  [facility] ([facility]) [address] [place] [id number] [phone]          Venlafaxine HCl ER Oral Capsule Extended Release 24 Hour 75 MG] 90 capsule 2     Sig: TAKE ONE CAPSULE BY MOUTH ONE TIME DAILY       Serotonin-Norepinephrine Reuptake Inhibitors  Failed - 5/16/2019  5:04 PM        Failed - PHQ-9 score of less than 5 in past 6 months     Please review last PHQ-9 score.           Failed - Normal serum creatinine on file in past 12 months     Recent Labs   Lab Test 04/26/18  0718   CR 0.81             Passed - Blood pressure under 140/90 in past 12 months     BP Readings from Last 3 Encounters:   04/10/19 121/81   04/03/19 112/82   04/02/19 122/85                 Passed - Medication is active on med list        Passed - Patient is age 18 or older        Passed - No active pregnancy on record        Passed - No positive pregnancy test in past 12 months        Passed - Recent (6 mo) or future (30 days) visit within the authorizing provider's specialty     Patient had office visit in the last 6 months or has a visit in the next 30 days with authorizing provider or within the authorizing provider's specialty.  See \"Patient Info\" tab in inbasket, or \"Choose Columns\" in Meds & Orders section of the refill encounter.            ADVAIR DISKUS 500-50 MCG/DOSE inhaler [Pharmacy Med Name: Advair Diskus Inhalation Aerosol Powder Breath Activated 500-50 MCG/DOSE]  Last " "Written Prescription Date:  5/2/2018  Last Fill Quantity: 3 inhaler,  # refills: 3   Last office visit: 5/2/2018 with prescribing provider:  Blake   Future Office Visit:   Next 5 appointments (look out 90 days)    May 20, 2019  7:20 AM CDT  Nurse Only with FZ ALLERGY SHOTS  [facility] ([facility]) [address] [facility] [address]  [phone]   Jul 18, 2019  3:00 PM CDT  MyChart Long with Padma Lozano MD  [facility] ([facility]) [address] [place] [id number] [phone]           2     Sig: Inhale 1 puff into the lungs 2 times daily       Inhaled Steroids Protocol Failed - 5/16/2019  5:04 PM        Failed - Asthma control assessment score within normal limits in last 6 months     Please review ACT score.           Passed - Patient is age 12 or older        Passed - Medication is active on med list        Passed - Recent (6 mo) or future (30 days) visit within the authorizing provider's specialty     Patient had office visit in the last 6 months or has a visit in the next 30 days with authorizing provider or within the authorizing provider's specialty.  See \"Patient Info\" tab in inbasket, or \"Choose Columns\" in Meds & Orders section of the refill encounter.              "

## 2019-05-20 ENCOUNTER — ALLIED HEALTH/NURSE VISIT (OUTPATIENT)
Dept: ALLERGY | Facility: CLINIC | Age: 59
End: 2019-05-20
Payer: COMMERCIAL

## 2019-05-20 DIAGNOSIS — J30.1 SEASONAL ALLERGIC RHINITIS DUE TO POLLEN: Primary | ICD-10-CM

## 2019-05-20 PROCEDURE — 95117 IMMUNOTHERAPY INJECTIONS: CPT

## 2019-05-20 PROCEDURE — 99207 ZZC DROP WITH A PROCEDURE: CPT

## 2019-05-20 NOTE — PROGRESS NOTES
Patient presented after waiting 30 minutes with normal reaction to allergy injections. Discharged from clinic.    Paula Walters RN ............   5/20/2019...8:03 AM

## 2019-05-21 RX ORDER — VENLAFAXINE HYDROCHLORIDE 75 MG/1
CAPSULE, EXTENDED RELEASE ORAL
Qty: 90 CAPSULE | Refills: 1 | Status: SHIPPED | OUTPATIENT
Start: 2019-05-21 | End: 2019-06-13 | Stop reason: DRUGHIGH

## 2019-05-21 RX ORDER — LISINOPRIL 40 MG/1
TABLET ORAL
Qty: 90 TABLET | Refills: 1 | Status: SHIPPED | OUTPATIENT
Start: 2019-05-21 | End: 2019-07-18

## 2019-05-21 RX ORDER — HYDROCHLOROTHIAZIDE 12.5 MG/1
CAPSULE ORAL
Qty: 90 CAPSULE | Refills: 1 | Status: SHIPPED | OUTPATIENT
Start: 2019-05-21 | End: 2019-07-18

## 2019-05-22 ENCOUNTER — MYC MEDICAL ADVICE (OUTPATIENT)
Dept: FAMILY MEDICINE | Facility: CLINIC | Age: 59
End: 2019-05-22

## 2019-05-22 ENCOUNTER — E-VISIT (OUTPATIENT)
Dept: FAMILY MEDICINE | Facility: CLINIC | Age: 59
End: 2019-05-22
Payer: COMMERCIAL

## 2019-05-22 DIAGNOSIS — F32.0 MILD MAJOR DEPRESSION (H): Primary | ICD-10-CM

## 2019-05-22 PROCEDURE — 99444 ZZC PHYSICIAN ONLINE EVALUATION & MANAGEMENT SERVICE: CPT | Performed by: FAMILY MEDICINE

## 2019-05-22 RX ORDER — VENLAFAXINE HYDROCHLORIDE 150 MG/1
150 TABLET, EXTENDED RELEASE ORAL DAILY
Qty: 90 TABLET | Refills: 1 | Status: SHIPPED | OUTPATIENT
Start: 2019-05-22 | End: 2019-06-13

## 2019-05-22 ASSESSMENT — PATIENT HEALTH QUESTIONNAIRE - PHQ9
SUM OF ALL RESPONSES TO PHQ QUESTIONS 1-9: 19
10. IF YOU CHECKED OFF ANY PROBLEMS, HOW DIFFICULT HAVE THESE PROBLEMS MADE IT FOR YOU TO DO YOUR WORK, TAKE CARE OF THINGS AT HOME, OR GET ALONG WITH OTHER PEOPLE: SOMEWHAT DIFFICULT
SUM OF ALL RESPONSES TO PHQ QUESTIONS 1-9: 19

## 2019-05-22 NOTE — TELEPHONE ENCOUNTER
I am sorry for any delays.  This is the first I am hearing about this.  And I do want to help her.  I would suggest an e-visit so I at least can get a handle on what is going on and why she feels she needs an increase.  I can respond to an e-visit tonight or tomorrow morning - whenever she has time to do it.

## 2019-05-22 NOTE — TELEPHONE ENCOUNTER
Will forward to PCP for review and recommendations.  See also encounter from 5/19/19.  Patient requesting increase in Effexor dose.   PHQ-9 completed via [facility]. Should patient also schedule either office visit or telephone visit?    She is schedule for PCP office visit in July.    PHQ-9 score:    PHQ-9 SCORE 5/20/2019   PHQ-9 Total Score -   PHQ-9 Total Score MyChart 15 (Moderately severe depression)   PHQ-9 Total Score 15     Pradip Granados RN

## 2019-05-23 ASSESSMENT — PATIENT HEALTH QUESTIONNAIRE - PHQ9: SUM OF ALL RESPONSES TO PHQ QUESTIONS 1-9: 19

## 2019-05-23 NOTE — TELEPHONE ENCOUNTER
Had an e-visit with patient today.  We adjusted medication.  I would like a phone visit with her in about 3-4 weeks - okay to double book if needed.   Please My chart message her to help her schedule

## 2019-05-24 NOTE — TELEPHONE ENCOUNTER
Left msg on machine and MyChart msg sent requesting patient to call back on TC line to schedule phone visit.    Thank you,  Taniya SEPULVEDA    NE Team Priscilla

## 2019-05-28 NOTE — TELEPHONE ENCOUNTER
2nd attempt to reach patient by phone, call was not answered, left another msg. [facility] msg was read, but no reply and apt has not been set up yet.

## 2019-05-29 NOTE — TELEPHONE ENCOUNTER
Reached patient and set up phone visit for 06/13.    Thank you,  Taniya SEPULVEDA    NE Team Priscilla

## 2019-06-13 ENCOUNTER — VIRTUAL VISIT (OUTPATIENT)
Dept: FAMILY MEDICINE | Facility: CLINIC | Age: 59
End: 2019-06-13
Payer: COMMERCIAL

## 2019-06-13 DIAGNOSIS — F32.0 MILD MAJOR DEPRESSION (H): Primary | ICD-10-CM

## 2019-06-13 DIAGNOSIS — J45.30 MILD PERSISTENT ASTHMA WITHOUT COMPLICATION: ICD-10-CM

## 2019-06-13 PROCEDURE — 99441 ZZC PHYSICIAN TELEPHONE EVALUATION 5-10 MIN: CPT | Performed by: FAMILY MEDICINE

## 2019-06-13 RX ORDER — VENLAFAXINE HYDROCHLORIDE 150 MG/1
150 TABLET, EXTENDED RELEASE ORAL DAILY
Qty: 90 TABLET | Refills: 3 | Status: SHIPPED | OUTPATIENT
Start: 2019-06-13 | End: 2020-07-03

## 2019-06-13 RX ORDER — ALBUTEROL SULFATE 90 UG/1
2 AEROSOL, METERED RESPIRATORY (INHALATION) EVERY 4 HOURS PRN
Qty: 3 INHALER | Refills: 3 | Status: SHIPPED | OUTPATIENT
Start: 2019-06-13 | End: 2020-06-12

## 2019-06-13 ASSESSMENT — ANXIETY QUESTIONNAIRES
7. FEELING AFRAID AS IF SOMETHING AWFUL MIGHT HAPPEN: NOT AT ALL
5. BEING SO RESTLESS THAT IT IS HARD TO SIT STILL: NOT AT ALL
1. FEELING NERVOUS, ANXIOUS, OR ON EDGE: SEVERAL DAYS
IF YOU CHECKED OFF ANY PROBLEMS ON THIS QUESTIONNAIRE, HOW DIFFICULT HAVE THESE PROBLEMS MADE IT FOR YOU TO DO YOUR WORK, TAKE CARE OF THINGS AT HOME, OR GET ALONG WITH OTHER PEOPLE: NOT DIFFICULT AT ALL
2. NOT BEING ABLE TO STOP OR CONTROL WORRYING: NOT AT ALL
GAD7 TOTAL SCORE: 3
3. WORRYING TOO MUCH ABOUT DIFFERENT THINGS: SEVERAL DAYS
6. BECOMING EASILY ANNOYED OR IRRITABLE: SEVERAL DAYS

## 2019-06-13 ASSESSMENT — PATIENT HEALTH QUESTIONNAIRE - PHQ9
SUM OF ALL RESPONSES TO PHQ QUESTIONS 1-9: 7
5. POOR APPETITE OR OVEREATING: NOT AT ALL

## 2019-06-13 NOTE — PROGRESS NOTES
"Nela Mares is a 58 year old female who is being evaluated via a telephone visit.      The patient has been notified of following:     \"This telephone visit will be conducted via a call between you and your physician/provider. We have found that certain health care needs can be provided without the need for a physical exam.  This service lets us provide the care you need with a short phone conversation.  If a prescription is necessary we can send it directly to your pharmacy.  If lab work is needed we can place an order for that and you can then stop by our lab to have the test done at a later time.    We will bill your insurance company for this service.  Please check with your medical insurance if this type of visit is covered. You may be responsible for the cost of this type of visit if insurance coverage is denied.  The typical cost is $30 (10min), $59(11-20min) and $85 (21-30min).  Most often these visits are shorter than 10 minutes.    If during the course of the call the physician/provider feels a telephone visit is not appropriate, you will not be charged for this service. \"     Consent has been obtained for this service by 1 care team member:yes. See the scanned image in the medical record.    Preferred patient phone number to be used for this call: [phone]     Nela Mares complains of  Medication follow-up    Past Medical History:   Diagnosis Date     Allergic rhinitis, cause unspecified      Cervical high risk HPV (human papillomavirus) test positive 03/21/2017    3/21/17 NIL pap/+ HR HPV (not 16 or 18).      Depressive disorder      Depressive disorder, not elsewhere classified     seasonal     Hypertension      Mild persistent asthma      Obstructive sleep apnea (adult) (pediatric)     uses CPAP     Scalp mass 7/2014      Social History     Socioeconomic History     Marital status: Significant other     Spouse name: Not on file     Number of children: 0     Years of education: 18     " Highest education level: Not on file   Occupational History     Occupation:    Social Needs     Financial resource strain: Not on file     Food insecurity:     Worry: Not on file     Inability: Not on file     Transportation needs:     Medical: Not on file     Non-medical: Not on file   Tobacco Use     Smoking status: Never Smoker     Smokeless tobacco: Never Used   Substance and Sexual Activity     Alcohol use: No     Drug use: No     Sexual activity: Not Currently     Partners: Male     Birth control/protection: IUD   Lifestyle     Physical activity:     Days per week: Not on file     Minutes per session: Not on file     Stress: Not on file   Relationships     Social connections:     Talks on phone: Not on file     Gets together: Not on file     Attends Caodaism service: Not on file     Active member of club or organization: Not on file     Attends meetings of clubs or organizations: Not on file     Relationship status: Not on file     Intimate partner violence:     Fear of current or ex partner: Not on file     Emotionally abused: Not on file     Physically abused: Not on file     Forced sexual activity: Not on file   Other Topics Concern     Parent/sibling w/ CABG, MI or angioplasty before 65F 55M? No   Social History Narrative    Dairy/d 3 servings/d.     Caffeine 2 servings/d    Exercise 0 x week    Sunscreen used - No    Seatbelts used - Yes    Working smoke/CO detectors in the home - Yes    Guns stored in the home - No    Self Breast Exams - no    Eye Exam up to date - No    Dental Exam up to date - Yes    Pap Smear up to date - Yes    Mammogram up to date - Yes    Dexa Scan up to date - NOT APPLICABLE    Flex Sig / Colonoscopy up to date - NOT APPLICABLE    Immunizations up to date - Yes    Abuse: Current or Past(Physical, Sexual or Emotional)- No    Do you feel safe in your environment - Yes    GÓMEZ Cummings    11/[birth year]                     ALLERGIES  Patient has no known  allergies.        Sarah Cruz CMA (AAMA)   (MA signature)      Start 1:02 PM  Stop 1:07 PM        Has been taking 150 mg of effexor for about 4 weeks.  Working with a new therapist , since beginning of May  Finding therapy very helpful.  No side effects from increased dose of medication - except perhaps a little fatigue.    Has been getting allergy shots.  March and May tend to be worst for her asthma. But with allergy shots has not had problems with asthma at all this year. Has been able to stop singulair and decrease advair down to once daily.    Assessment/Plan:  (F32.0) Mild major depression (H)  (primary encounter diagnosis)  Comment: much improved  Plan: Continue current medication      (J45.30) Mild persistent asthma without complication  Comment:   Plan: albuterol (PROAIR HFA/PROVENTIL HFA/VENTOLIN         HFA) 108 (90 Base) MCG/ACT inhaler        Refills needed.        Total time spent on this phone visit with the patient = 5 minutes     I have reviewed the note as documented above.  This accurately captures the substance of my conversation with the patient,  Dr. Bailey Ray

## 2019-06-14 ASSESSMENT — ANXIETY QUESTIONNAIRES: GAD7 TOTAL SCORE: 3

## 2019-06-14 ASSESSMENT — ASTHMA QUESTIONNAIRES: ACT_TOTALSCORE: 25

## 2019-06-17 ENCOUNTER — ALLIED HEALTH/NURSE VISIT (OUTPATIENT)
Dept: ALLERGY | Facility: CLINIC | Age: 59
End: 2019-06-17
Payer: COMMERCIAL

## 2019-06-17 DIAGNOSIS — J30.1 SEASONAL ALLERGIC RHINITIS DUE TO POLLEN: Primary | ICD-10-CM

## 2019-06-17 PROCEDURE — 99207 ZZC DROP WITH A PROCEDURE: CPT

## 2019-06-17 PROCEDURE — 95117 IMMUNOTHERAPY INJECTIONS: CPT

## 2019-06-17 NOTE — PROGRESS NOTES
Patient presented after waiting 30 minutes with normal reaction to allergy injections. Discharged from clinic.    Paula Walters RN ............   6/17/2019...10:05 AM

## 2019-07-15 ENCOUNTER — ALLIED HEALTH/NURSE VISIT (OUTPATIENT)
Dept: ALLERGY | Facility: CLINIC | Age: 59
End: 2019-07-15
Payer: COMMERCIAL

## 2019-07-15 DIAGNOSIS — Z51.6 NEED FOR DESENSITIZATION TO ALLERGENS: Primary | ICD-10-CM

## 2019-07-15 PROCEDURE — 99207 ZZC DROP WITH A PROCEDURE: CPT

## 2019-07-15 PROCEDURE — 95117 IMMUNOTHERAPY INJECTIONS: CPT

## 2019-07-15 NOTE — PROGRESS NOTES
Patient presented after waiting 30 minutes with normal reaction to allergy injections. Discharged from clinic.    Paula Walters RN ............   7/15/2019...9:43 AM

## 2019-07-18 ENCOUNTER — OFFICE VISIT (OUTPATIENT)
Dept: FAMILY MEDICINE | Facility: CLINIC | Age: 59
End: 2019-07-18
Payer: COMMERCIAL

## 2019-07-18 VITALS
BODY MASS INDEX: 38.1 KG/M2 | HEART RATE: 83 BPM | SYSTOLIC BLOOD PRESSURE: 121 MMHG | DIASTOLIC BLOOD PRESSURE: 86 MMHG | TEMPERATURE: 98.1 F | HEIGHT: 69 IN

## 2019-07-18 DIAGNOSIS — Z13.6 CARDIOVASCULAR SCREENING; LDL GOAL LESS THAN 160: ICD-10-CM

## 2019-07-18 DIAGNOSIS — E66.01 MORBID OBESITY (H): ICD-10-CM

## 2019-07-18 DIAGNOSIS — F32.0 MILD MAJOR DEPRESSION (H): ICD-10-CM

## 2019-07-18 DIAGNOSIS — I10 ESSENTIAL HYPERTENSION WITH GOAL BLOOD PRESSURE LESS THAN 140/90: ICD-10-CM

## 2019-07-18 DIAGNOSIS — R73.01 IMPAIRED FASTING GLUCOSE: ICD-10-CM

## 2019-07-18 DIAGNOSIS — R73.9 ELEVATED BLOOD SUGAR: ICD-10-CM

## 2019-07-18 DIAGNOSIS — J45.30 MILD PERSISTENT ASTHMA WITHOUT COMPLICATION: Primary | ICD-10-CM

## 2019-07-18 LAB
ANION GAP SERPL CALCULATED.3IONS-SCNC: 3 MMOL/L (ref 3–14)
BUN SERPL-MCNC: 13 MG/DL (ref 7–30)
CALCIUM SERPL-MCNC: 8.8 MG/DL (ref 8.5–10.1)
CHLORIDE SERPL-SCNC: 108 MMOL/L (ref 94–109)
CHOLEST SERPL-MCNC: 198 MG/DL
CO2 SERPL-SCNC: 30 MMOL/L (ref 20–32)
CREAT SERPL-MCNC: 0.92 MG/DL (ref 0.52–1.04)
GFR SERPL CREATININE-BSD FRML MDRD: 68 ML/MIN/{1.73_M2}
GLUCOSE SERPL-MCNC: 108 MG/DL (ref 70–99)
GLUCOSE SERPL-MCNC: 110 MG/DL (ref 70–99)
HBA1C MFR BLD: 5.9 % (ref 0–5.6)
HDLC SERPL-MCNC: 45 MG/DL
LDLC SERPL CALC-MCNC: 128 MG/DL
NONHDLC SERPL-MCNC: 153 MG/DL
POTASSIUM SERPL-SCNC: 3.8 MMOL/L (ref 3.4–5.3)
SODIUM SERPL-SCNC: 141 MMOL/L (ref 133–144)
TRIGL SERPL-MCNC: 123 MG/DL

## 2019-07-18 PROCEDURE — 90471 IMMUNIZATION ADMIN: CPT | Performed by: FAMILY MEDICINE

## 2019-07-18 PROCEDURE — 83036 HEMOGLOBIN GLYCOSYLATED A1C: CPT | Performed by: FAMILY MEDICINE

## 2019-07-18 PROCEDURE — 36415 COLL VENOUS BLD VENIPUNCTURE: CPT | Performed by: FAMILY MEDICINE

## 2019-07-18 PROCEDURE — 80048 BASIC METABOLIC PNL TOTAL CA: CPT | Performed by: FAMILY MEDICINE

## 2019-07-18 PROCEDURE — 80061 LIPID PANEL: CPT | Performed by: FAMILY MEDICINE

## 2019-07-18 PROCEDURE — 99214 OFFICE O/P EST MOD 30 MIN: CPT | Mod: 25 | Performed by: FAMILY MEDICINE

## 2019-07-18 PROCEDURE — 82947 ASSAY GLUCOSE BLOOD QUANT: CPT | Performed by: FAMILY MEDICINE

## 2019-07-18 PROCEDURE — 90715 TDAP VACCINE 7 YRS/> IM: CPT | Performed by: FAMILY MEDICINE

## 2019-07-18 RX ORDER — HYDROCHLOROTHIAZIDE 12.5 MG/1
1 CAPSULE ORAL DAILY
Qty: 90 CAPSULE | Refills: 3 | Status: SHIPPED | OUTPATIENT
Start: 2019-07-18 | End: 2020-07-09

## 2019-07-18 RX ORDER — LISINOPRIL 40 MG/1
40 TABLET ORAL DAILY
Qty: 90 TABLET | Refills: 3 | Status: SHIPPED | OUTPATIENT
Start: 2019-07-18 | End: 2020-07-09

## 2019-07-18 ASSESSMENT — ANXIETY QUESTIONNAIRES
IF YOU CHECKED OFF ANY PROBLEMS ON THIS QUESTIONNAIRE, HOW DIFFICULT HAVE THESE PROBLEMS MADE IT FOR YOU TO DO YOUR WORK, TAKE CARE OF THINGS AT HOME, OR GET ALONG WITH OTHER PEOPLE: NOT DIFFICULT AT ALL
GAD7 TOTAL SCORE: 0
3. WORRYING TOO MUCH ABOUT DIFFERENT THINGS: NOT AT ALL
2. NOT BEING ABLE TO STOP OR CONTROL WORRYING: NOT AT ALL
1. FEELING NERVOUS, ANXIOUS, OR ON EDGE: NOT AT ALL
7. FEELING AFRAID AS IF SOMETHING AWFUL MIGHT HAPPEN: NOT AT ALL
5. BEING SO RESTLESS THAT IT IS HARD TO SIT STILL: NOT AT ALL
6. BECOMING EASILY ANNOYED OR IRRITABLE: NOT AT ALL

## 2019-07-18 ASSESSMENT — PATIENT HEALTH QUESTIONNAIRE - PHQ9
SUM OF ALL RESPONSES TO PHQ QUESTIONS 1-9: 3
5. POOR APPETITE OR OVEREATING: NOT AT ALL

## 2019-07-18 NOTE — PROGRESS NOTES
Subjective     Nela Mares is a 58 year old female who presents to clinic today for the following health issues:    HPI     Hypertension Follow-up      Do you check your blood pressure regularly outside of the clinic? No     Are you following a low salt diet? Yes    Are your blood pressures ever more than 140 on the top number (systolic) OR more   than 90 on the bottom number (diastolic), for example 140/90? No     Depression Followup    How are you doing with your depression since your last visit? Improved due to med increase     Are you having other symptoms that might be associated with depression? No    Have you had a significant life event?  No     Are you feeling anxious or having panic attacks?   No    Do you have any concerns with your use of alcohol or other drugs? No     Medication working well for patient. She states that she is no longer seeing her therapist and is comfortable with the change.     Diet/Exercise  Patient does not enjoy exercising like she used to. She is aware that her diet could be improved. Patient will frequently eat fast foods and sweets. Meal planning helps her stay on track but does not do it often.     Other Concerns  She is wondering when her IUD is due for removal. Patient is also concerned about vaginal dryness. She has tried vaseline with little improvement. Patient is wondering if there is anything else she should try.     Social History     Tobacco Use     Smoking status: Never Smoker     Smokeless tobacco: Never Used   Substance Use Topics     Alcohol use: No     Drug use: No     PHQ 5/20/2019 5/22/2019 6/13/2019   PHQ-9 Total Score 15 19 7   Q9: Thoughts of better off dead/self-harm past 2 weeks Not at all Several days Not at all   F/U: Thoughts of suicide or self-harm - No -   F/U: Safety concerns - No -     PETE-7 SCORE 4/4/2017 4/25/2017 6/13/2019   Total Score 4 12 3       Suicide Assessment Five-step Evaluation and Treatment (SAFE-T)  Asthma Follow-Up    Was ACT  completed today?    Yes    ACT Total Scores 6/13/2019   ACT TOTAL SCORE -   ASTHMA ER VISITS -   ASTHMA HOSPITALIZATIONS -   ACT TOTAL SCORE (Goal Greater than or Equal to 20) 25   In the past 12 months, how many times did you visit the emergency room for your asthma without being admitted to the hospital? 0   In the past 12 months, how many times were you hospitalized overnight because of your asthma? 0       How many days per week do you miss taking your asthma controller medication?  0    Please describe any recent triggers for your asthma: humidity    Have you had any Emergency Room Visits, Urgent Care Visits, or Hospital Admissions since your last office visit?  No        Amount of exercise or physical activity: None    Problems taking medications regularly: No    Medication side effects: none    Diet: regular (no restrictions)      Patient Active Problem List   Diagnosis     Allergic rhinitis due to animals     Mild persistent asthma     Obstructive sleep apnea     Leiomyoma of uterus     Mild major depression (H)     CARDIOVASCULAR SCREENING; LDL GOAL LESS THAN 160     Hypertension goal BP (blood pressure) < 140/90     Pilar cyst     Hypertrophy of breast     IUD (intrauterine device) in place     Seasonal allergic rhinitis due to pollen     Allergic rhinitis due to dust mite     Allergic rhinitis due to mold     Overweight (H)     Impaired fasting glucose     Past Surgical History:   Procedure Laterality Date     COLONOSCOPY  6/21/2012    Procedure: COLONOSCOPY;  COLONOSCOPY, SCREEN;  Surgeon: Bart You MD;  Location:  OR     [facility]       ENDOSCOPIC RETROGRADE CHOLANGIOPANCREATOGRAM  1/4/2014    Procedure: ENDOSCOPIC RETROGRADE CHOLANGIOPANCREATOGRAM;  ERCP biliary sphincterotomy, bile duct stone removal, epinepherine washing.;  Surgeon: Ba Meyers MD;  Location: [facility]     LAPAROSCOPIC CHOLECYSTECTOMY WITH CHOLANGIOGRAMS  1/4/2014    Procedure: LAPAROSCOPIC CHOLECYSTECTOMY WITH  CHOLANGIOGRAMS;;  Surgeon: Haja Sage MD;  Location: [facility] OR     NO HISTORY OF SURGERY       OPERATIVE HYSTEROSCOPY  6/17/2014    Procedure: OPERATIVE HYSTEROSCOPY;  Surgeon: Paola Camara MD;  [facility]     REMOVE INTRAUTERINE DEVICE  6/17/2014    Procedure: REMOVE INTRAUTERINE DEVICE;  Surgeon: Paola Camara MD;  [facility]       Social History     Tobacco Use     Smoking status: Never Smoker     Smokeless tobacco: Never Used   Substance Use Topics     Alcohol use: No     Family History   Problem Relation Age of Onset     C.A.D. Paternal Grandmother      Cerebrovascular Disease Paternal Grandmother      Depression Paternal Grandmother      Obesity Paternal Grandmother      C.A.D. Paternal Grandfather      Cerebrovascular Disease Paternal Grandfather         unsure     Substance Abuse Paternal Grandfather      Hypertension Father      Asthma Father      Obesity Father      Skin Cancer Father      Diabetes Father      Neurologic Disorder Mother         Graves disease     Hyperlipidemia Mother      Depression Mother      Thyroid Disease Mother      Other - See Comments Other      Skin Cancer Sister      Depression Maternal Grandmother      Cancer Sister         mild skin cancer,cured from excision     Depression Nephew      Anxiety Disorder Nephew      Mental Illness Maternal Half-Brother      Mental Illness Paternal Half-Brother      Substance Abuse Nephew      Asthma Sister      Asthma Nephew      Thyroid Disease Maternal Half-Sister      Thyroid Disease Paternal Half-Sister      Diabetes No family hx of          Current Outpatient Medications   Medication Sig Dispense Refill     albuterol (PROAIR HFA/PROVENTIL HFA/VENTOLIN HFA) 108 (90 Base) MCG/ACT inhaler Inhale 2 puffs into the lungs every 4 hours as needed for shortness of breath / dyspnea or wheezing 3 Inhaler 3     cetirizine (ZYRTEC) 10 MG tablet Take 10 mg by mouth daily       EPINEPHrine (AUVI-Q) 0.3 MG/0.3ML  injection 2-pack Inject 0.3 mLs (0.3 mg) into the muscle as needed for anaphylaxis 2 mL 2     fluticasone (FLONASE) 50 MCG/ACT nasal spray Spray 1-2 sprays into both nostrils daily 32 g 3     fluticasone-salmeterol (ADVAIR DISKUS) 500-50 MCG/DOSE inhaler Inhale 1 puff into the lungs 2 times daily 3 Inhaler 3     hydrochlorothiazide (MICROZIDE) 12.5 MG capsule Take 1 capsule (12.5 mg) by mouth daily 90 capsule 3     lisinopril (PRINIVIL/ZESTRIL) 40 MG tablet Take 1 tablet (40 mg) by mouth daily 90 tablet 3     ORDER FOR ALLERGEN IMMUNOTHERAPY Name of Mix: Mix #1  Dust Mite, Cat, Dog  Cat Hair, Standardized 10,000 BAU/mL, ALK  2.0 ml  Dog Hair-Dander, A. P.  1:100 w/v, HS  1.0 ml  Dust Mites DP. 30,000 AU/mL, HS  0.6 ml   Diluent: HSA qs to 5ml 5 mL PRN     ORDER FOR ALLERGEN IMMUNOTHERAPY Name of Mix: Mix #2  Tree , Weeds  Birch Mix PRW 1:20 w/v, HS  0.5 ml  Hickory, Shagbark 1:20 w/v, HS  0.5 ml  [place] Run Mix RW 1:20 w/v, HS 0.5 ml  Oak Mix RVW 1:20 w/v, HS 0.5 ml  Kochia 1:20 w/v, HS 0.5 ml  Nettle 1:20 w/v, HS 0.5 ml  Ragweed Mixed 1:20 w/v ALK  0.5 ml  Sagebrush, Mugwort 1:20 w/v, HS 0.5 ml  Diluent: HSA qs to 5ml 5 mL PRN     ORDER FOR ALLERGEN IMMUNOTHERAPY Name of Mix: Mix #3  Mold  Alternaria Tenuis 1:10 w/v, HS  0.5 ml  Aspergillus Fumigatus 1:10 w/v, HS  0.5 ml  Curvularia Spicifera 1:10 w/v, HS  0.5 ml  Epicoccum Nigrum 1:10 w/v, HS 0.5 ml  Hormodendrum Cladosporioides 1:10 w/v, HS 0.5 ml  Penicillium Mix 1:10 w/v, HS  0.5 ml  Phoma Herbarum 1:10 w/v, HS  0.5 ml  Diluent: HSA qs to 5ml 5 mL PRN     venlafaxine (EFFEXOR-ER) 150 MG 24 hr tablet Take 1 tablet (150 mg) by mouth daily 90 tablet 3     BP Readings from Last 3 Encounters:   07/18/19 121/86   04/10/19 121/81   04/03/19 112/82    Wt Readings from Last 3 Encounters:   07/21/14 117 kg (258 lb)   06/17/14 117.3 kg (258 lb 9.6 oz)   03/14/14 117.5 kg (259 lb)                    Reviewed and updated as needed this visit by Provider  This document serves  "as a record of the services and decisions personally performed by Padma Lozano MD. It was created on her behalf by Ramila Newsome, a trained medical scribe. The creation of this document is based on the provider's statements to the medical scribe. Ramila Newsome July 18, 2019 3:35 PM           Review of Systems   ROS COMP: Constitutional, HEENT, cardiovascular, pulmonary, gi and gu systems are negative, except as otherwise noted.      Objective    /86   Pulse 83   Temp 98.1  F (36.7  C) (Oral)   Ht 1.753 m (5' 9\")   BMI 38.10 kg/m    Body mass index is 38.1 kg/m .  Physical Exam   GENERAL: alert, no distress and over weight  RESP: lungs clear to auscultation - no rales, rhonchi or wheezes  CV: regular rate and rhythm, normal S1 S2, no S3 or S4, no murmur, click or rub, no peripheral edema and peripheral pulses strong  PSYCH: mentation appears normal, affect normal/bright    Diagnostic Test Results:  Labs reviewed in Epic        Assessment & Plan     (J45.30) Mild persistent asthma without complication  (primary encounter diagnosis)  Comment: Stable  Plan: fluticasone-salmeterol (ADVAIR DISKUS) 500-50         MCG/DOSE inhaler        Continue with current medications    (I10) Essential hypertension with goal blood pressure less than 140/90  Comment: Controlled  Plan: hydrochlorothiazide (MICROZIDE) 12.5 MG         capsule, lisinopril (PRINIVIL/ZESTRIL) 40 MG         tablet        Continue current medication      (E66.01) Overweight (H)  Comment: Healthy diet and exercise guidelines discussed with patient   Plan: Continue to with healthy diet and exercise    (F32.0) Mild major depression (H)  Comment: Improved with medication and therapy  Plan: Will continue to monitor    (Z13.6) CARDIOVASCULAR SCREENING; LDL GOAL LESS THAN 160  Comment: Health Maintenance  Plan: labs pending.  adjust therapy based on labs      (R73.01) Impaired fasting glucose  Comment:   Plan: reviewed recent Hemoglobin HEMOGLOBIN " A1C. Discussed dietary management.   Continue to monitor periodically       Patient Instructions     Consider Astroglide for vaginal dryness    Schedule exercise sessions so it becomes part of your routine    Continue with meal planning     IUD removal in 2020      Return in about 1 year (around 7/18/2020) for Preventive Health Visit/Physical.    The information in this document, created by the medical scribe for me, accurately reflects the services I personally performed and the decisions made by me. I have reviewed and approved this document for accuracy.     Padma Ray MD  [facility]

## 2019-07-18 NOTE — PATIENT INSTRUCTIONS
Consider Astroglide for vaginal dryness    Schedule exercise sessions so it becomes part of your routine    Continue with meal planning     IUD removal in 2020

## 2019-07-19 ASSESSMENT — ANXIETY QUESTIONNAIRES: GAD7 TOTAL SCORE: 0

## 2019-07-29 ENCOUNTER — ANCILLARY PROCEDURE (OUTPATIENT)
Dept: MAMMOGRAPHY | Facility: CLINIC | Age: 59
End: 2019-07-29
Attending: FAMILY MEDICINE
Payer: COMMERCIAL

## 2019-07-29 DIAGNOSIS — Z12.31 VISIT FOR SCREENING MAMMOGRAM: ICD-10-CM

## 2019-08-12 ENCOUNTER — ALLIED HEALTH/NURSE VISIT (OUTPATIENT)
Dept: ALLERGY | Facility: CLINIC | Age: 59
End: 2019-08-12
Payer: COMMERCIAL

## 2019-08-12 DIAGNOSIS — Z51.6 NEED FOR DESENSITIZATION TO ALLERGENS: Primary | ICD-10-CM

## 2019-08-12 PROCEDURE — 99207 ZZC DROP WITH A PROCEDURE: CPT

## 2019-08-12 PROCEDURE — 95117 IMMUNOTHERAPY INJECTIONS: CPT

## 2019-08-12 NOTE — PROGRESS NOTES
Patient presented after waiting 30 minutes with normal reaction to allergy injections. Discharged from clinic.    Paula Walters RN ............   8/12/2019...9:50 AM

## 2019-08-21 ENCOUNTER — OFFICE VISIT (OUTPATIENT)
Dept: FAMILY MEDICINE | Facility: CLINIC | Age: 59
End: 2019-08-21
Payer: COMMERCIAL

## 2019-08-21 VITALS
DIASTOLIC BLOOD PRESSURE: 86 MMHG | HEART RATE: 84 BPM | BODY MASS INDEX: 38.1 KG/M2 | HEIGHT: 69 IN | TEMPERATURE: 98.4 F | SYSTOLIC BLOOD PRESSURE: 104 MMHG

## 2019-08-21 DIAGNOSIS — B96.89 BV (BACTERIAL VAGINOSIS): Primary | ICD-10-CM

## 2019-08-21 DIAGNOSIS — N76.0 BV (BACTERIAL VAGINOSIS): Primary | ICD-10-CM

## 2019-08-21 DIAGNOSIS — N89.8 VAGINAL ITCHING: ICD-10-CM

## 2019-08-21 LAB
SPECIMEN SOURCE: ABNORMAL
WET PREP SPEC: ABNORMAL

## 2019-08-21 PROCEDURE — 87210 SMEAR WET MOUNT SALINE/INK: CPT | Performed by: NURSE PRACTITIONER

## 2019-08-21 PROCEDURE — 99213 OFFICE O/P EST LOW 20 MIN: CPT | Performed by: NURSE PRACTITIONER

## 2019-08-21 RX ORDER — METRONIDAZOLE 500 MG/1
500 TABLET ORAL 2 TIMES DAILY
Qty: 14 TABLET | Refills: 0 | Status: SHIPPED | OUTPATIENT
Start: 2019-08-21 | End: 2020-06-08

## 2019-08-21 NOTE — PROGRESS NOTES
Subjective     Nela Mares is a 59 year old female who presents to clinic today for the following health issues:    HPI   Vaginal Symptoms  Onset: off/on for several weeks    Description:  Vaginal Discharge: none   Itching (Pruritis): YES  Burning sensation:  YES- sometimes  Odor: no     Accompanying Signs & Symptoms:  Pain with Urination: no   Abdominal Pain: no   Fever: no     History:   Sexually active: no   New Partner: no   Possibility of Pregnancy:  No    Precipitating factors:   Recent Antibiotic Use: no     Alleviating factors:  none    Therapies Tried and outcome: Replens - no relief  She was recently seen by Dr. Ray a few weeks ago and brought up at the end of the visit that she had some vaginal dryness.  She tried over-the-counter David glide this was ineffective.  She continues to itch a lot.  No other vaginal symptoms of discharge or pain.    Patient Active Problem List   Diagnosis     Allergic rhinitis due to animals     Mild persistent asthma     Obstructive sleep apnea     Leiomyoma of uterus     Mild major depression (H)     CARDIOVASCULAR SCREENING; LDL GOAL LESS THAN 160     Hypertension goal BP (blood pressure) < 140/90     Pilar cyst     Hypertrophy of breast     IUD (intrauterine device) in place     Seasonal allergic rhinitis due to pollen     Allergic rhinitis due to dust mite     Allergic rhinitis due to mold     Overweight (H)     Impaired fasting glucose     Past Surgical History:   Procedure Laterality Date     COLONOSCOPY  6/21/2012    Procedure: COLONOSCOPY;  COLONOSCOPY, SCREEN;  Surgeon: Bart You MD;  Location:  OR     [facility]       ENDOSCOPIC RETROGRADE CHOLANGIOPANCREATOGRAM  1/4/2014    Procedure: ENDOSCOPIC RETROGRADE CHOLANGIOPANCREATOGRAM;  ERCP biliary sphincterotomy, bile duct stone removal, epinepherine washing.;  Surgeon: Ba Meyers MD;  Location: [facility]     LAPAROSCOPIC CHOLECYSTECTOMY WITH CHOLANGIOGRAMS  1/4/2014    Procedure:  LAPAROSCOPIC CHOLECYSTECTOMY WITH CHOLANGIOGRAMS;;  Surgeon: Haja Sage MD;  Location: [facility] OR     NO HISTORY OF SURGERY       OPERATIVE HYSTEROSCOPY  6/17/2014    Procedure: OPERATIVE HYSTEROSCOPY;  Surgeon: Paola Camara MD;  [facility]     REMOVE INTRAUTERINE DEVICE  6/17/2014    Procedure: REMOVE INTRAUTERINE DEVICE;  Surgeon: Paola Camara MD;  [facility]       Social History     Tobacco Use     Smoking status: Never Smoker     Smokeless tobacco: Never Used   Substance Use Topics     Alcohol use: No     Family History   Problem Relation Age of Onset     C.A.D. Paternal Grandmother      Cerebrovascular Disease Paternal Grandmother      Depression Paternal Grandmother      Obesity Paternal Grandmother      C.A.D. Paternal Grandfather      Cerebrovascular Disease Paternal Grandfather         unsure     Substance Abuse Paternal Grandfather      Hypertension Father      Asthma Father      Obesity Father      Skin Cancer Father      Diabetes Father      Neurologic Disorder Mother         Graves disease     Hyperlipidemia Mother      Depression Mother      Thyroid Disease Mother      Other - See Comments Other      Skin Cancer Sister      Depression Maternal Grandmother      Cancer Sister         mild skin cancer,cured from excision     Depression Nephew      Anxiety Disorder Nephew      Mental Illness Maternal Half-Brother      Mental Illness Paternal Half-Brother      Substance Abuse Nephew      Asthma Sister      Asthma Nephew      Thyroid Disease Maternal Half-Sister      Thyroid Disease Paternal Half-Sister      Diabetes No family hx of          Reviewed and updated as needed this visit by Provider         Review of Systems   ROS COMP: Constitutional, HEENT, cardiovascular, pulmonary, GI, , musculoskeletal, neuro, skin, endocrine and psych systems are negative, except as otherwise noted.      Objective    There were no vitals taken for this visit.  There is no height or  weight on file to calculate BMI.  Physical Exam   GENERAL: healthy, alert and no distress  EYES: Eyes grossly normal to inspection, PERRL and conjunctivae and sclerae normal  NECK: no adenopathy, no asymmetry, masses, or scars and thyroid normal to palpation  RESP: lungs clear to auscultation - no rales, rhonchi or wheezes  CV: regular rate and rhythm, normal S1 S2, no S3 or S4, no murmur, click or rub, no peripheral edema and peripheral pulses strong  ABDOMEN: soft, nontender, no hepatosplenomegaly, no masses and bowel sounds normal   (female): normal female external genitalia, normal urethral meatus  and vaginal mucosa pink, moist, well rugated  MS: no gross musculoskeletal defects noted, no edema    Diagnostic Test Results:  Labs reviewed in Epic  Results for orders placed or performed in visit on 08/21/19 (from the past 24 hour(s))   Wet prep   Result Value Ref Range    Specimen Description Vagina     Wet Prep Few  Clue cells seen   (A)     Wet Prep Few  WBC'S seen       Wet Prep No yeast seen     Wet Prep No Trichomonas seen            Assessment & Plan     1. BV (bacterial vaginosis)  Positive clue cells  - metroNIDAZOLE (FLAGYL) 500 MG tablet; Take 1 tablet (500 mg) by mouth 2 times daily  Dispense: 14 tablet; Refill: 0    2. Vaginal itching    - Wet prep      No follow-ups on file.    RAYMOND Orosco [facility]

## 2019-08-27 ENCOUNTER — OFFICE VISIT (OUTPATIENT)
Dept: ALLERGY | Facility: CLINIC | Age: 59
End: 2019-08-27
Payer: COMMERCIAL

## 2019-08-27 VITALS
OXYGEN SATURATION: 96 % | SYSTOLIC BLOOD PRESSURE: 123 MMHG | BODY MASS INDEX: 38.1 KG/M2 | HEIGHT: 69 IN | DIASTOLIC BLOOD PRESSURE: 80 MMHG | HEART RATE: 67 BPM

## 2019-08-27 DIAGNOSIS — J45.30 MILD PERSISTENT ASTHMA WITHOUT COMPLICATION: Primary | ICD-10-CM

## 2019-08-27 DIAGNOSIS — J30.1 SEASONAL ALLERGIC RHINITIS DUE TO POLLEN: ICD-10-CM

## 2019-08-27 DIAGNOSIS — J30.89 ALLERGIC RHINITIS DUE TO MOLD: ICD-10-CM

## 2019-08-27 DIAGNOSIS — H10.13 ALLERGIC CONJUNCTIVITIS, BILATERAL: ICD-10-CM

## 2019-08-27 DIAGNOSIS — J30.81 ALLERGIC RHINITIS DUE TO ANIMALS: ICD-10-CM

## 2019-08-27 DIAGNOSIS — J30.89 ALLERGIC RHINITIS DUE TO DUST MITE: ICD-10-CM

## 2019-08-27 LAB
FEF 25/75: NORMAL
FEV-1: NORMAL
FEV1/FVC: NORMAL
FVC: NORMAL

## 2019-08-27 PROCEDURE — 94010 BREATHING CAPACITY TEST: CPT | Performed by: ALLERGY & IMMUNOLOGY

## 2019-08-27 PROCEDURE — 99214 OFFICE O/P EST MOD 30 MIN: CPT | Mod: 25 | Performed by: ALLERGY & IMMUNOLOGY

## 2019-08-27 RX ORDER — ALBUTEROL SULFATE 0.83 MG/ML
2.5 SOLUTION RESPIRATORY (INHALATION) EVERY 4 HOURS PRN
Qty: 1 BOX | Refills: 1 | Status: SHIPPED | OUTPATIENT
Start: 2019-08-27 | End: 2020-11-12

## 2019-08-27 RX ORDER — MONTELUKAST SODIUM 10 MG/1
TABLET ORAL
Refills: 1 | COMMUNITY
Start: 2019-03-18 | End: 2019-08-27

## 2019-08-27 RX ORDER — MONTELUKAST SODIUM 10 MG/1
10 TABLET ORAL DAILY
Qty: 90 TABLET | Refills: 1 | Status: SHIPPED | OUTPATIENT
Start: 2019-08-27 | End: 2020-02-17

## 2019-08-27 NOTE — PATIENT INSTRUCTIONS
If you have any questions regarding your allergies, asthma, or what we discussed during your visit today please call the allergy clinic or contact us via [employer].    [facility] RN Line: [phone]  [facility] Scheduling Line: [phone]  [facility] Scheduling Line: [phone]      Continue your medications as prescribed    You may take the montelukast (Singulair) in the morning    Follow-up in 6 months

## 2019-08-27 NOTE — LETTER
My Asthma Action Plan    Name: Nela Mares   YOB: 1960  Date: 8/27/2019   My doctor: Karen Clark MD   My clinic: [facility]        My Control Medicine: Fluticasone propionate + salmeterol (Advair Diskus or Wixela Inhub) -  500/50 mcg 1 puff twice daily  Montelukast (Singulair) -  10 mg 1 tablet daily  My Rescue Medicine: Albuterol (Proair/Ventolin/Proventil HFA) 2-4 puffs EVERY 4 HOURS as needed. Use a spacer if recommended by your provider.   My Asthma Severity:   Mild Persistent  Know your asthma triggers: upper respiratory infections, pollens, animal dander, humidity and strong odors and fumes              GREEN ZONE   Good Control    I feel good    No cough or wheeze    Can work, sleep and play without asthma symptoms       Take your asthma control medicine every day.     1. If exercise triggers your asthma, take your rescue medication    15 minutes before exercise or sports, and    During exercise if you have asthma symptoms  2. Spacer to use with inhaler: If you have a spacer, make sure to use it with your inhaler             YELLOW ZONE Getting Worse  I have ANY of these:    I do not feel good    Cough or wheeze    Chest feels tight    Wake up at night   1. Keep taking your Green Zone medications  2. Start taking your rescue medicine:    every 20 minutes for up to 1 hour. Then every 4 hours for 24-48 hours.  3. If you stay in the Yellow Zone for more than 12-24 hours, contact your doctor.  4. If you do not return to the Green Zone in 12-24 hours or you get worse, start taking your oral steroid medicine if prescribed by your provider.           RED ZONE Medical Alert - Get Help  I have ANY of these:    I feel awful    Medicine is not helping    Breathing getting harder    Trouble walking or talking    Nose opens wide to breathe       1. Take your rescue medicine NOW  2. If your provider has prescribed an oral steroid medicine, start taking it NOW  3. Call your doctor  NOW  4. If you are still in the Red Zone after 20 minutes and you have not reached your doctor:    Take your rescue medicine again and    Call 911 or go to the emergency room right away    See your regular doctor within 2 weeks of an Emergency Room or Urgent Care visit for follow-up treatment.          Annual Reminders:  Meet with Asthma Educator,  Flu Shot in the Fall, consider Pneumonia Vaccination for patients with asthma (aged 19 and older).    Pharmacy:    [id number] - [place], [place] - [address] [place] [address]  [facility] - [place], [place] - [facility] - [place], [place] - [address]                          Asthma Triggers  How To Control Things That Make Your Asthma Worse    Triggers are things that make your asthma worse.  Look at the list below to help you find your triggers and what you can do about them.  You can help prevent asthma flare-ups by staying away from your triggers.      Trigger                                                          What you can do   Cigarette Smoke  Tobacco smoke can make asthma worse. Do not allow smoking in your home, car or around you.  Be sure no one smokes at a child s day care or school.  If you smoke, ask your health care provider for ways to help you quit.  Ask family members to quit too.  Ask your health care provider for a referral to Quit Plan to help you quit smoking, or call 6-391-621-PLAN.     Colds, Flu, Bronchitis  These are common triggers of asthma. Wash your hands often.  Don t touch your eyes, nose or mouth.  Get a flu shot every year.     Dust Mites  These are tiny bugs that live in cloth or carpet. They are too small to see. Wash sheets and blankets in hot water every week.   Encase pillows and mattress in dust mite proof covers.  Avoid having carpet if you can. If you have carpet, vacuum weekly.   Use a dust mask and HEPA vacuum.   Pollen and Outdoor Mold  Some people are allergic to trees, grass,  or weed pollen, or molds. Try to keep your windows closed.  Limit time out doors when pollen count is high.   Ask you health care provider about taking medicine during allergy season.     Animal Dander  Some people are allergic to skin flakes, urine or saliva from pets with fur or feathers. Keep pets with fur or feathers out of your home.    If you can t keep the pet outdoors, then keep the pet out of your bedroom.  Keep the bedroom door closed.  Keep pets off cloth furniture and away from stuffed toys.     Mice, Rats, and Cockroaches   Some people are allergic to the waste from these pests.   Cover food and garbage.  Clean up spills and food crumbs.  Store grease in the refrigerator.   Keep food out of the bedroom.   Indoor Mold  This can be a trigger if your home has high moisture. Fix leaking faucets, pipes, or other sources of water.   Clean moldy surfaces.  Dehumidify basement if it is damp and smelly.   Smoke, Strong Odors, and Sprays  These can reduce air quality. Stay away from strong odors and sprays, such as perfume, powder, hair spray, paints, smoke incense, paint, cleaning products, candles and new carpet.   Exercise or Sports  Some people with asthma have this trigger. Be active!  Ask your doctor about taking medicine before sports or exercise to prevent symptoms.    Warm up for 5-10 minutes before and after sports or exercise.     Other Triggers of Asthma  Cold air:  Cover your nose and mouth with a scarf.  Sometimes laughing or crying can be a trigger.  Some medicines and food can trigger asthma.

## 2019-08-27 NOTE — LETTER
8/27/2019         RE: Nela Mares  [address] [id number]        Dear Colleague,    Thank you for referring your patient, Nela Mares, to the [facility]. Please see a copy of my visit note below.    Nela Mares was seen in the Allergy Clinic at [facility]. The following are my recommendations regarding her Mild Persistent Asthma, Allergic Rhinitis Due to Animals, Allergic Rhinitis Due to Pollen, Allergic Rhinitis Due to Dust Mites, Allergic Rhinitis Due to Mold and Allergic Conjunctivitis    1. Continue Advair 500/50mcg, 1 puff twice daily  2. Continue montelukast 10mg daily - dose may be taken in the morning  3. Take 2 to 4 puffs of albuterol HFA every 4 hours as needed  4. Continue allergen immunotherapy per protocol  5. Continue cetirizine 10mg daily  6. Continue fluticasone nasal spray, 2 sprays in each nostril daily  7. Follow-up in 6 months      Nela Mares is a 59 year old American female who is seen today for follow-up of asthma and allergic rhinitis. She states that her asthma has been well controlled over the last year. She has not had any acute exacerbations or treatment with prednisone over the past year. She continues to take Advair twice daily but reports she frequently misses the montelukast dose as she has been told to take this medication in the evening. Sanam would like to take this medication in the morning to help with medication adherence. She wonders if some of her previous asthma symptoms were triggered or exacerbated by missing doses of this medication. She reports that she was diagnosed with thrush in April and was initially treated with nystatin followed by clotrimazole and her symptoms have resolved.    Sanam began allergen immunotherapy treatment in 11/2018 and has reached her maintenance dose. She continues to take cetirizine and fluticasone daily. She has not had any systemic or significant large local  reactions to treatment. Sanam reports that she has noticed significant improvement in her symptoms since beginning treatment. She is not having symptoms when around her dogs and this spring had no rhinoconjunctivitis symptoms. However, in the last 1 to 2 weeks her allergy symptoms have returned.       Past Medical History:   Diagnosis Date     Allergic rhinitis, cause unspecified      Cervical high risk HPV (human papillomavirus) test positive 03/21/2017    3/21/17 NIL pap/+ HR HPV (not 16 or 18).      Depressive disorder      Depressive disorder, not elsewhere classified     seasonal     Hypertension      Mild persistent asthma      Obstructive sleep apnea (adult) (pediatric)     uses CPAP     Scalp mass 7/2014     Family History   Problem Relation Age of Onset     C.A.D. Paternal Grandmother      Cerebrovascular Disease Paternal Grandmother      Depression Paternal Grandmother      Obesity Paternal Grandmother      C.A.D. Paternal Grandfather      Cerebrovascular Disease Paternal Grandfather         unsure     Substance Abuse Paternal Grandfather      Hypertension Father      Asthma Father      Obesity Father      Skin Cancer Father      Diabetes Father      Neurologic Disorder Mother         Graves disease     Hyperlipidemia Mother      Depression Mother      Thyroid Disease Mother      Other - See Comments Other      Skin Cancer Sister      Depression Maternal Grandmother      Cancer Sister         mild skin cancer,cured from excision     Depression Nephew      Anxiety Disorder Nephew      Mental Illness Maternal Half-Brother      Mental Illness Paternal Half-Brother      Substance Abuse Nephew      Asthma Sister      Asthma Nephew      Thyroid Disease Maternal Half-Sister      Thyroid Disease Paternal Half-Sister      Diabetes No family hx of      Social History     Tobacco Use     Smoking status: Never Smoker     Smokeless tobacco: Never Used   Substance Use Topics     Alcohol use: No     Drug use: No        Past medical, family, and social history were reviewed.    REVIEW OF SYSTEMS:  General: negative for weight gain. negative for weight loss. negative for changes in sleep.   Eyes: positive  for itching. positive  for redness. positive  for tearing/watering. negative for vision changes  Ears: positive  for fullness. negative for hearing loss. positive  for dizziness.   Nose: negative for snoring.negative for changes in smell. positive  for drainage. Positive for congestion.  Throat: negative for hoarseness. negative for sore throat. negative for trouble swallowing.   Lungs: negative for cough. negative for shortness of breath.negative for wheezing. negative for sputum production.   Cardiovascular: negative for chest pain. negative for swelling of ankles. negative for fast or irregular heartbeat.   Gastrointestinal: negative for nausea. negative for heartburn. negative for acid reflux.   Musculoskeletal: negative for joint pain. negative for joint stiffness. negative for joint swelling.   Neurologic: negative for seizures. negative for fainting. negative for weakness.   Psychiatric: negative for changes in mood. negative for anxiety.   Endocrine: negative for cold intolerance. negative for heat intolerance. negative for tremors.   Hematologic: negative for easy bruising. negative for easy bleeding.  Integumentary: negative for rash. negative for scaling. negative for nail changes.       Current Outpatient Medications:      albuterol (PROAIR HFA/PROVENTIL HFA/VENTOLIN HFA) 108 (90 Base) MCG/ACT inhaler, Inhale 2 puffs into the lungs every 4 hours as needed for shortness of breath / dyspnea or wheezing, Disp: 3 Inhaler, Rfl: 3     cetirizine (ZYRTEC) 10 MG tablet, Take 10 mg by mouth daily, Disp: , Rfl:      fluticasone (FLONASE) 50 MCG/ACT nasal spray, Spray 1-2 sprays into both nostrils daily, Disp: 32 g, Rfl: 3     fluticasone-salmeterol (ADVAIR DISKUS) 500-50 MCG/DOSE inhaler, Inhale 1 puff into the lungs 2  times daily, Disp: 3 Inhaler, Rfl: 3     hydrochlorothiazide (MICROZIDE) 12.5 MG capsule, Take 1 capsule (12.5 mg) by mouth daily, Disp: 90 capsule, Rfl: 3     lisinopril (PRINIVIL/ZESTRIL) 40 MG tablet, Take 1 tablet (40 mg) by mouth daily, Disp: 90 tablet, Rfl: 3     metroNIDAZOLE (FLAGYL) 500 MG tablet, Take 1 tablet (500 mg) by mouth 2 times daily, Disp: 14 tablet, Rfl: 0     montelukast (SINGULAIR) 10 MG tablet, Take 1 tablet (10 mg) by mouth daily, Disp: 90 tablet, Rfl: 1     ORDER FOR ALLERGEN IMMUNOTHERAPY, Name of Mix: Mix #1  Dust Mite, Cat, Dog Cat Hair, Standardized 10,000 BAU/mL, ALK  2.0 ml Dog Hair-Dander, A. P.  1:100 w/v, HS  1.0 ml Dust Mites DP. 30,000 AU/mL, HS  0.6 ml  Diluent: HSA qs to 5ml, Disp: 5 mL, Rfl: PRN     ORDER FOR ALLERGEN IMMUNOTHERAPY, Name of Mix: Mix #2  Tree , Weeds Birch Mix PRW 1:20 w/v, HS  0.5 ml Hickory, Shagbark 1:20 w/v, HS  0.5 ml [place] Mix RW 1:20 w/v, HS 0.5 ml Oak Mix RVW 1:20 w/v, HS 0.5 ml Kochia 1:20 w/v, HS 0.5 ml Nettle 1:20 w/v, HS 0.5 ml Ragweed Mixed 1:20 w/v ALK  0.5 ml Sagebrush, Mugwort 1:20 w/v, HS 0.5 ml Diluent: HSA qs to 5ml, Disp: 5 mL, Rfl: PRN     ORDER FOR ALLERGEN IMMUNOTHERAPY, Name of Mix: Mix #3  Mold Alternaria Tenuis 1:10 w/v, HS  0.5 ml Aspergillus Fumigatus 1:10 w/v, HS  0.5 ml Curvularia Spicifera 1:10 w/v, HS  0.5 ml Epicoccum Nigrum 1:10 w/v, HS 0.5 ml Hormodendrum Cladosporioides 1:10 w/v, HS 0.5 ml Penicillium Mix 1:10 w/v, HS  0.5 ml Phoma Herbarum 1:10 w/v, HS  0.5 ml Diluent: HSA qs to 5ml, Disp: 5 mL, Rfl: PRN     venlafaxine (EFFEXOR-ER) 150 MG 24 hr tablet, Take 1 tablet (150 mg) by mouth daily, Disp: 90 tablet, Rfl: 3     EPINEPHrine (AUVI-Q) 0.3 MG/0.3ML injection 2-pack, Inject 0.3 mLs (0.3 mg) into the muscle as needed for anaphylaxis (Patient not taking: Reported on 8/27/2019), Disp: 2 mL, Rfl: 2    EXAM:   /80 (BP Location: Left arm, Patient Position: Sitting, Cuff Size: Adult Large)   Pulse 67   Ht 1.753 m (5'  "9\")   SpO2 96%   BMI 38.10 kg/m     GENERAL APPEARANCE: alert, cooperative and not in distress  SKIN: no rashes, no lesions  HEAD: atraumatic, normocephalic  EYES: lids and lashes normal, conjunctivae and sclerae clear  ENT: no scars or lesions, nasal exam showed no discharge, swelling or lesions noted, tongue midline and normal, soft palate, uvula, and tonsils normal  NECK: no asymmetry, masses, or scars, supple without significant adenopathy  LUNGS: unlabored respirations, no intercostal retractions or accessory muscle use, clear to auscultation without rales or wheezes  HEART: regular rate and rhythm without murmurs and normal S1 and S2  MUSCULOSKELETAL: no musculoskeletal defects are noted  NEURO: no focal deficits noted  PSYCH: does not appear depressed or anxious      WORKUP:  Spirometry    SPIROMETRY       FVC 3.99L (101% of predicted).     FEV1 3.34L (109% of predicted).     FEV1/FVC 84%      I have reviewed and interpreted these results. These values and flow volume loop are consistent with normal lung function.    Asthma Control Test (ACT) total score: 23       ASSESSMENT/PLAN:  Nela Mares is a 59 year old female here for follow-up of asthma and allergic rhinitis. She reports that her asthma has been well controlled over and she has not had any exacerbations or use of prednisone in the past year.    Sanam has completed nearly 1 year of allergen immunotherapy treatment and has noted significant improvement in her symptoms. She was counseled regarding the risks and benefits of continued immunotherapy treatment and she wishes to continue at this time.    1. Continue Advair 500/50mcg, 1 puff twice daily  2. Continue montelukast 10mg daily - dose may be taken in the morning  3. Take 2 to 4 puffs of albuterol HFA every 4 hours as needed  4. Continue allergen immunotherapy per protocol  5. Continue cetirizine 10mg daily  6. Continue fluticasone nasal spray, 2 sprays in each nostril daily  7. Follow-up in " 6 months      Thank you for allowing me to participate in the care of Nela Mares.      Karen Clark MD  Allergy/Immunology  [facility] and [place], [place]      Chart documentation done in part with Dragon Voice Recognition Software. Although reviewed after completion, some word and grammatical errors may remain.    Again, thank you for allowing me to participate in the care of your patient.        Sincerely,        Karen Clark MD

## 2019-08-27 NOTE — PROGRESS NOTES
Nela Mares was seen in the Allergy Clinic at [facility]. The following are my recommendations regarding her Mild Persistent Asthma, Allergic Rhinitis Due to Animals, Allergic Rhinitis Due to Pollen, Allergic Rhinitis Due to Dust Mites, Allergic Rhinitis Due to Mold and Allergic Conjunctivitis    1. Continue Advair 500/50mcg, 1 puff twice daily  2. Continue montelukast 10mg daily - dose may be taken in the morning  3. Take 2 to 4 puffs of albuterol HFA every 4 hours as needed  4. Continue allergen immunotherapy per protocol  5. Continue cetirizine 10mg daily  6. Continue fluticasone nasal spray, 2 sprays in each nostril daily  7. Follow-up in 6 months      Nela Mares is a 59 year old American female who is seen today for follow-up of asthma and allergic rhinitis. She states that her asthma has been well controlled over the last year. She has not had any acute exacerbations or treatment with prednisone over the past year. She continues to take Advair twice daily but reports she frequently misses the montelukast dose as she has been told to take this medication in the evening. Sanam would like to take this medication in the morning to help with medication adherence. She wonders if some of her previous asthma symptoms were triggered or exacerbated by missing doses of this medication. She reports that she was diagnosed with thrush in April and was initially treated with nystatin followed by clotrimazole and her symptoms have resolved.    Sanam began allergen immunotherapy treatment in 11/2018 and has reached her maintenance dose. She continues to take cetirizine and fluticasone daily. She has not had any systemic or significant large local reactions to treatment. Sanam reports that she has noticed significant improvement in her symptoms since beginning treatment. She is not having symptoms when around her dogs and this spring had no rhinoconjunctivitis symptoms. However, in the last 1 to 2  weeks her allergy symptoms have returned.       Past Medical History:   Diagnosis Date     Allergic rhinitis, cause unspecified      Cervical high risk HPV (human papillomavirus) test positive 03/21/2017    3/21/17 NIL pap/+ HR HPV (not 16 or 18).      Depressive disorder      Depressive disorder, not elsewhere classified     seasonal     Hypertension      Mild persistent asthma      Obstructive sleep apnea (adult) (pediatric)     uses CPAP     Scalp mass 7/2014     Family History   Problem Relation Age of Onset     C.A.D. Paternal Grandmother      Cerebrovascular Disease Paternal Grandmother      Depression Paternal Grandmother      Obesity Paternal Grandmother      C.A.D. Paternal Grandfather      Cerebrovascular Disease Paternal Grandfather         unsure     Substance Abuse Paternal Grandfather      Hypertension Father      Asthma Father      Obesity Father      Skin Cancer Father      Diabetes Father      Neurologic Disorder Mother         Graves disease     Hyperlipidemia Mother      Depression Mother      Thyroid Disease Mother      Other - See Comments Other      Skin Cancer Sister      Depression Maternal Grandmother      Cancer Sister         mild skin cancer,cured from excision     Depression Nephew      Anxiety Disorder Nephew      Mental Illness Maternal Half-Brother      Mental Illness Paternal Half-Brother      Substance Abuse Nephew      Asthma Sister      Asthma Nephew      Thyroid Disease Maternal Half-Sister      Thyroid Disease Paternal Half-Sister      Diabetes No family hx of      Social History     Tobacco Use     Smoking status: Never Smoker     Smokeless tobacco: Never Used   Substance Use Topics     Alcohol use: No     Drug use: No       Past medical, family, and social history were reviewed.    REVIEW OF SYSTEMS:  General: negative for weight gain. negative for weight loss. negative for changes in sleep.   Eyes: positive  for itching. positive  for redness. positive  for tearing/watering.  negative for vision changes  Ears: positive  for fullness. negative for hearing loss. positive  for dizziness.   Nose: negative for snoring.negative for changes in smell. positive  for drainage. Positive for congestion.  Throat: negative for hoarseness. negative for sore throat. negative for trouble swallowing.   Lungs: negative for cough. negative for shortness of breath.negative for wheezing. negative for sputum production.   Cardiovascular: negative for chest pain. negative for swelling of ankles. negative for fast or irregular heartbeat.   Gastrointestinal: negative for nausea. negative for heartburn. negative for acid reflux.   Musculoskeletal: negative for joint pain. negative for joint stiffness. negative for joint swelling.   Neurologic: negative for seizures. negative for fainting. negative for weakness.   Psychiatric: negative for changes in mood. negative for anxiety.   Endocrine: negative for cold intolerance. negative for heat intolerance. negative for tremors.   Hematologic: negative for easy bruising. negative for easy bleeding.  Integumentary: negative for rash. negative for scaling. negative for nail changes.       Current Outpatient Medications:      albuterol (PROAIR HFA/PROVENTIL HFA/VENTOLIN HFA) 108 (90 Base) MCG/ACT inhaler, Inhale 2 puffs into the lungs every 4 hours as needed for shortness of breath / dyspnea or wheezing, Disp: 3 Inhaler, Rfl: 3     cetirizine (ZYRTEC) 10 MG tablet, Take 10 mg by mouth daily, Disp: , Rfl:      fluticasone (FLONASE) 50 MCG/ACT nasal spray, Spray 1-2 sprays into both nostrils daily, Disp: 32 g, Rfl: 3     fluticasone-salmeterol (ADVAIR DISKUS) 500-50 MCG/DOSE inhaler, Inhale 1 puff into the lungs 2 times daily, Disp: 3 Inhaler, Rfl: 3     hydrochlorothiazide (MICROZIDE) 12.5 MG capsule, Take 1 capsule (12.5 mg) by mouth daily, Disp: 90 capsule, Rfl: 3     lisinopril (PRINIVIL/ZESTRIL) 40 MG tablet, Take 1 tablet (40 mg) by mouth daily, Disp: 90 tablet, Rfl:  "3     metroNIDAZOLE (FLAGYL) 500 MG tablet, Take 1 tablet (500 mg) by mouth 2 times daily, Disp: 14 tablet, Rfl: 0     montelukast (SINGULAIR) 10 MG tablet, Take 1 tablet (10 mg) by mouth daily, Disp: 90 tablet, Rfl: 1     ORDER FOR ALLERGEN IMMUNOTHERAPY, Name of Mix: Mix #1  Dust Mite, Cat, Dog Cat Hair, Standardized 10,000 BAU/mL, ALK  2.0 ml Dog Hair-Dander, A. P.  1:100 w/v, HS  1.0 ml Dust Mites DP. 30,000 AU/mL, HS  0.6 ml  Diluent: HSA qs to 5ml, Disp: 5 mL, Rfl: PRN     ORDER FOR ALLERGEN IMMUNOTHERAPY, Name of Mix: Mix #2  Tree , Weeds Birch Mix PRW 1:20 w/v, HS  0.5 ml Hickory, Shagbark 1:20 w/v, HS  0.5 ml [place] Mix RW 1:20 w/v, HS 0.5 ml Oak Mix RVW 1:20 w/v, HS 0.5 ml Kochia 1:20 w/v, HS 0.5 ml Nettle 1:20 w/v, HS 0.5 ml Ragweed Mixed 1:20 w/v ALK  0.5 ml Sagebrush, Mugwort 1:20 w/v, HS 0.5 ml Diluent: HSA qs to 5ml, Disp: 5 mL, Rfl: PRN     ORDER FOR ALLERGEN IMMUNOTHERAPY, Name of Mix: Mix #3  Mold Alternaria Tenuis 1:10 w/v, HS  0.5 ml Aspergillus Fumigatus 1:10 w/v, HS  0.5 ml Curvularia Spicifera 1:10 w/v, HS  0.5 ml Epicoccum Nigrum 1:10 w/v, HS 0.5 ml Hormodendrum Cladosporioides 1:10 w/v, HS 0.5 ml Penicillium Mix 1:10 w/v, HS  0.5 ml Phoma Herbarum 1:10 w/v, HS  0.5 ml Diluent: HSA qs to 5ml, Disp: 5 mL, Rfl: PRN     venlafaxine (EFFEXOR-ER) 150 MG 24 hr tablet, Take 1 tablet (150 mg) by mouth daily, Disp: 90 tablet, Rfl: 3     EPINEPHrine (AUVI-Q) 0.3 MG/0.3ML injection 2-pack, Inject 0.3 mLs (0.3 mg) into the muscle as needed for anaphylaxis (Patient not taking: Reported on 8/27/2019), Disp: 2 mL, Rfl: 2    EXAM:   /80 (BP Location: Left arm, Patient Position: Sitting, Cuff Size: Adult Large)   Pulse 67   Ht 1.753 m (5' 9\")   SpO2 96%   BMI 38.10 kg/m    GENERAL APPEARANCE: alert, cooperative and not in distress  SKIN: no rashes, no lesions  HEAD: atraumatic, normocephalic  EYES: lids and lashes normal, conjunctivae and sclerae clear  ENT: no scars or lesions, nasal exam showed " no discharge, swelling or lesions noted, tongue midline and normal, soft palate, uvula, and tonsils normal  NECK: no asymmetry, masses, or scars, supple without significant adenopathy  LUNGS: unlabored respirations, no intercostal retractions or accessory muscle use, clear to auscultation without rales or wheezes  HEART: regular rate and rhythm without murmurs and normal S1 and S2  MUSCULOSKELETAL: no musculoskeletal defects are noted  NEURO: no focal deficits noted  PSYCH: does not appear depressed or anxious      WORKUP:  Spirometry    SPIROMETRY       FVC 3.99L (101% of predicted).     FEV1 3.34L (109% of predicted).     FEV1/FVC 84%      I have reviewed and interpreted these results. These values and flow volume loop are consistent with normal lung function.    Asthma Control Test (ACT) total score: 23       ASSESSMENT/PLAN:  Nela Mares is a 59 year old female here for follow-up of asthma and allergic rhinitis. She reports that her asthma has been well controlled over and she has not had any exacerbations or use of prednisone in the past year.    Sanam has completed nearly 1 year of allergen immunotherapy treatment and has noted significant improvement in her symptoms. She was counseled regarding the risks and benefits of continued immunotherapy treatment and she wishes to continue at this time.    1. Continue Advair 500/50mcg, 1 puff twice daily  2. Continue montelukast 10mg daily - dose may be taken in the morning  3. Take 2 to 4 puffs of albuterol HFA every 4 hours as needed  4. Continue allergen immunotherapy per protocol  5. Continue cetirizine 10mg daily  6. Continue fluticasone nasal spray, 2 sprays in each nostril daily  7. Follow-up in 6 months      Thank you for allowing me to participate in the care of Nela Mares.      Karen Clark MD  Allergy/Immunology  [facility] and [place], [place]      Chart documentation done in part with Dragon Voice Recognition Software. Although  reviewed after completion, some word and grammatical errors may remain.

## 2019-08-28 ASSESSMENT — ASTHMA QUESTIONNAIRES: ACT_TOTALSCORE: 23

## 2019-09-09 ENCOUNTER — ALLIED HEALTH/NURSE VISIT (OUTPATIENT)
Dept: ALLERGY | Facility: CLINIC | Age: 59
End: 2019-09-09
Payer: COMMERCIAL

## 2019-09-09 DIAGNOSIS — Z51.6 NEED FOR DESENSITIZATION TO ALLERGENS: Primary | ICD-10-CM

## 2019-09-09 PROCEDURE — 99207 ZZC DROP WITH A PROCEDURE: CPT

## 2019-09-09 PROCEDURE — 95117 IMMUNOTHERAPY INJECTIONS: CPT

## 2019-09-09 NOTE — PROGRESS NOTES
Patient presented after waiting 30 minutes with normal reaction to allergy injections. Discharged from clinic.    Paula Walters RN, RN ............   9/9/2019...7:56 AM

## 2019-10-04 ENCOUNTER — HEALTH MAINTENANCE LETTER (OUTPATIENT)
Age: 59
End: 2019-10-04

## 2019-10-07 ENCOUNTER — ALLIED HEALTH/NURSE VISIT (OUTPATIENT)
Dept: ALLERGY | Facility: CLINIC | Age: 59
End: 2019-10-07
Payer: COMMERCIAL

## 2019-10-07 DIAGNOSIS — Z51.6 NEED FOR DESENSITIZATION TO ALLERGENS: Primary | ICD-10-CM

## 2019-10-07 PROCEDURE — 95117 IMMUNOTHERAPY INJECTIONS: CPT

## 2019-10-07 PROCEDURE — 99207 ZZC DROP WITH A PROCEDURE: CPT

## 2019-10-07 NOTE — PROGRESS NOTES
Patient presented after waiting 30 minutes with normal reaction to allergy injections. Discharged from clinic.    Paula Walters RN, RN ............   10/7/2019...8:02 AM

## 2019-11-12 ENCOUNTER — ALLIED HEALTH/NURSE VISIT (OUTPATIENT)
Dept: ALLERGY | Facility: CLINIC | Age: 59
End: 2019-11-12
Payer: COMMERCIAL

## 2019-11-12 DIAGNOSIS — J30.1 SEASONAL ALLERGIC RHINITIS DUE TO POLLEN: ICD-10-CM

## 2019-11-12 DIAGNOSIS — Z51.6 NEED FOR DESENSITIZATION TO ALLERGENS: Primary | ICD-10-CM

## 2019-11-12 DIAGNOSIS — J30.89 ALLERGIC RHINITIS DUE TO MOLD: ICD-10-CM

## 2019-11-12 DIAGNOSIS — J30.81 ALLERGIC RHINITIS DUE TO ANIMALS: ICD-10-CM

## 2019-11-12 DIAGNOSIS — J30.89 ALLERGIC RHINITIS DUE TO DUST MITE: ICD-10-CM

## 2019-11-12 PROCEDURE — 99207 ZZC DROP WITH A PROCEDURE: CPT

## 2019-11-12 PROCEDURE — 95117 IMMUNOTHERAPY INJECTIONS: CPT

## 2019-11-12 NOTE — PROGRESS NOTES
Patient presented after waiting 30 minutes with normal reaction to allergy injections. Discharged from clinic.    Paual Walters RN, RN ............   11/12/2019...5:11 PM

## 2019-11-12 NOTE — TELEPHONE ENCOUNTER
ALLERGY SOLUTION RE-ORDER REQUEST    Nela Mares 1960 MRN: [id number]    DATE NEEDED:  11/18/19  Vial Color Content   Top Dose   Last Dose     Vial Size  Red 1:1 Cat, Dog, Dust Mite   Red 1:1 0.5   Red 1:10.5                                                 5ml  Red 1:1 Trees, Weeds   Red 1:1 0.5   Red 1:10.5     5ml  Red 1:1 Molds   Red 1:1 0.5   Red 1:10.5     5ml        Serum reorder consent signed and patient/parent was advised that new serums would be ordered through the pharmacy and billed to their insurance company when they arrive in clinic. Yes    Shot Clinic Location:  [facility]  Ship to Location: [facility]  Serum billed to:  [facility]    Special Instructions:  n/a      Updated Prescription Needed: No      Requester Signature  Neeru Roach MA

## 2019-11-13 NOTE — TELEPHONE ENCOUNTER
Routing to provider - please send new prescription for serum to compounding pharmacy.  Thank you.    Pearl Mireles RN

## 2019-11-18 DIAGNOSIS — Z51.6 NEED FOR DESENSITIZATION TO ALLERGENS: Primary | ICD-10-CM

## 2019-11-18 PROCEDURE — 95165 ANTIGEN THERAPY SERVICES: CPT | Performed by: ALLERGY & IMMUNOLOGY

## 2019-11-18 NOTE — TELEPHONE ENCOUNTER
Allergy serums received at [place].     Vials received below:    Vial Color Content                      Vial Size Expiration Date  Red 1:1 Cat, Dog, Dust Mite 5mL  11/14/2020  Red 1:1 Trees, Weeds 5mL  11/14/2020  Red 1:1 Molds 5mL  11/14/2020        Signature  Neeru Roach MA

## 2019-11-18 NOTE — PROGRESS NOTES
Allergy serums billed at [place].     Vials billed below:    Vial Color Content                      Vial Size Expiration Date  Red 1:1 Cat, Dog, Dust Mite 5mL  11/14/2020  Red 1:1 Trees, Weeds 5mL  11/14/2020  Red 1:1 Molds 5mL  11/14/2020        Original Refill encounter date: 11/12/19      Signature  Neeru Roach MA

## 2019-11-25 ENCOUNTER — ALLIED HEALTH/NURSE VISIT (OUTPATIENT)
Dept: ALLERGY | Facility: CLINIC | Age: 59
End: 2019-11-25
Payer: COMMERCIAL

## 2019-11-25 DIAGNOSIS — Z51.6 NEED FOR DESENSITIZATION TO ALLERGENS: Primary | ICD-10-CM

## 2019-11-25 PROCEDURE — 99207 ZZC DROP WITH A PROCEDURE: CPT

## 2019-11-25 PROCEDURE — 95117 IMMUNOTHERAPY INJECTIONS: CPT

## 2019-11-25 NOTE — PROGRESS NOTES
Patient did not check in with RN prior to leaving clinic 30 min post allergy injections. RN called patient, who states that she forgot she needed to do so. Patient denies systemic symptoms, or symptoms of a large local reaction.     Advised the patient, who has been coming for allergy shots for a year now that she will always need to stay the full 30 min and check in with the RN before leaving. She verbalized understanding to this.    Paula Walters RN on 11/25/2019 at 10:24 AM

## 2019-12-02 ENCOUNTER — ALLIED HEALTH/NURSE VISIT (OUTPATIENT)
Dept: ALLERGY | Facility: CLINIC | Age: 59
End: 2019-12-02
Payer: COMMERCIAL

## 2019-12-02 DIAGNOSIS — Z51.6 NEED FOR DESENSITIZATION TO ALLERGENS: Primary | ICD-10-CM

## 2019-12-02 PROCEDURE — 95117 IMMUNOTHERAPY INJECTIONS: CPT

## 2019-12-02 PROCEDURE — 99207 ZZC DROP WITH A PROCEDURE: CPT

## 2019-12-12 ENCOUNTER — MYC MEDICAL ADVICE (OUTPATIENT)
Dept: PEDIATRICS | Facility: CLINIC | Age: 59
End: 2019-12-12

## 2019-12-12 ENCOUNTER — TELEPHONE (OUTPATIENT)
Dept: FAMILY MEDICINE | Facility: CLINIC | Age: 59
End: 2019-12-12

## 2019-12-12 ASSESSMENT — PATIENT HEALTH QUESTIONNAIRE - PHQ9
10. IF YOU CHECKED OFF ANY PROBLEMS, HOW DIFFICULT HAVE THESE PROBLEMS MADE IT FOR YOU TO DO YOUR WORK, TAKE CARE OF THINGS AT HOME, OR GET ALONG WITH OTHER PEOPLE: NOT DIFFICULT AT ALL
SUM OF ALL RESPONSES TO PHQ QUESTIONS 1-9: 1
SUM OF ALL RESPONSES TO PHQ QUESTIONS 1-9: 1

## 2019-12-12 NOTE — TELEPHONE ENCOUNTER
Panel Management Review      Patient has the following on her problem list:     Depression / Dysthymia review    Measure:  Needs PHQ-9 score of 4 or less during index window.  Administer PHQ-9 and if score is 5 or more, send encounter to provider for next steps.    5 - 7 month window range: 9/23-1/21    PHQ-9 SCORE 5/22/2019 6/13/2019 7/18/2019   PHQ-9 Total Score - - -   PHQ-9 Total Score MyChart 19 (Moderately severe depression) - -   PHQ-9 Total Score 19 7 3       If PHQ-9 recheck is 5 or more, route to provider for next steps.    Patient is due for:  PHQ9      Composite cancer screening  Chart review shows that this patient is due/due soon for the following None  Summary:    Patient is due/failing the following:   Influenza vaccine, PHQ9 and PHYSICAL    Action needed:   Patient needs office visit for preventive care. and Patient needs to do PHQ9.    Type of outreach:    Sent [employer]hart message.    Questions for provider review:    None                                                                                                                                    Alicia Patterson,        Chart routed to Care Team .

## 2019-12-12 NOTE — TELEPHONE ENCOUNTER
PHQ-9 completed with score of 1.     Remission is defined as a follow-up PHQ-9 score less than 5. Route to provider for review if score is 5 or above.    Chart routed to: No Action Needed.    Alicia Patterson,

## 2019-12-13 ASSESSMENT — PATIENT HEALTH QUESTIONNAIRE - PHQ9: SUM OF ALL RESPONSES TO PHQ QUESTIONS 1-9: 1

## 2019-12-16 ENCOUNTER — ALLIED HEALTH/NURSE VISIT (OUTPATIENT)
Dept: ALLERGY | Facility: CLINIC | Age: 59
End: 2019-12-16
Payer: COMMERCIAL

## 2019-12-16 DIAGNOSIS — Z51.6 NEED FOR DESENSITIZATION TO ALLERGENS: Primary | ICD-10-CM

## 2019-12-16 PROCEDURE — 95117 IMMUNOTHERAPY INJECTIONS: CPT

## 2019-12-16 PROCEDURE — 99207 ZZC DROP WITH A PROCEDURE: CPT

## 2019-12-16 NOTE — PROGRESS NOTES
Patient presented after waiting 30 minutes with normal reaction to allergy injections. Discharged from clinic.    Paula Walters RN, RN ............   12/16/2019...8:39 AM

## 2019-12-30 ENCOUNTER — ALLIED HEALTH/NURSE VISIT (OUTPATIENT)
Dept: ALLERGY | Facility: CLINIC | Age: 59
End: 2019-12-30
Payer: COMMERCIAL

## 2019-12-30 DIAGNOSIS — Z51.6 NEED FOR DESENSITIZATION TO ALLERGENS: Primary | ICD-10-CM

## 2019-12-30 PROCEDURE — 95117 IMMUNOTHERAPY INJECTIONS: CPT

## 2019-12-30 PROCEDURE — 99207 ZZC DROP WITH A PROCEDURE: CPT

## 2019-12-30 NOTE — PROGRESS NOTES
Patient presented after waiting 30 minutes with normal reaction to allergy injections. Discharged from clinic.    Paula Walters RN, RN ............   12/30/2019...8:01 AM

## 2020-01-27 ENCOUNTER — ALLIED HEALTH/NURSE VISIT (OUTPATIENT)
Dept: ALLERGY | Facility: CLINIC | Age: 60
End: 2020-01-27
Payer: COMMERCIAL

## 2020-01-27 DIAGNOSIS — J30.1 NON-SEASONAL ALLERGIC RHINITIS DUE TO POLLEN: Primary | ICD-10-CM

## 2020-01-27 DIAGNOSIS — Z51.6 NEED FOR DESENSITIZATION TO ALLERGENS: ICD-10-CM

## 2020-01-27 PROCEDURE — 99207 ZZC DROP WITH A PROCEDURE: CPT

## 2020-01-27 PROCEDURE — 95117 IMMUNOTHERAPY INJECTIONS: CPT

## 2020-01-27 NOTE — PROGRESS NOTES
Patient presented after waiting 30 minutes with normal reaction to allergy injections. Discharged from clinic.    Paula Walters RN, RN ............   1/27/2020...8:15 AM

## 2020-02-08 ENCOUNTER — HEALTH MAINTENANCE LETTER (OUTPATIENT)
Age: 60
End: 2020-02-08

## 2020-02-16 DIAGNOSIS — J30.89 OTHER ALLERGIC RHINITIS: ICD-10-CM

## 2020-02-17 DIAGNOSIS — J45.30 MILD PERSISTENT ASTHMA WITHOUT COMPLICATION: ICD-10-CM

## 2020-02-17 RX ORDER — MONTELUKAST SODIUM 10 MG/1
10 TABLET ORAL DAILY
Qty: 90 TABLET | Refills: 0 | Status: SHIPPED | OUTPATIENT
Start: 2020-02-17 | End: 2020-05-21

## 2020-02-17 RX ORDER — FLUTICASONE PROPIONATE 50 MCG
1-2 SPRAY, SUSPENSION (ML) NASAL DAILY
Qty: 32 G | Refills: 2 | Status: SHIPPED | OUTPATIENT
Start: 2020-02-17 | End: 2020-06-08

## 2020-02-17 NOTE — TELEPHONE ENCOUNTER
Prescription approved per [facility] Refill Protocol.    Idania Parish, RN, BSN, PHN  [facility] [place]

## 2020-02-17 NOTE — TELEPHONE ENCOUNTER
Requested Prescriptions   Pending Prescriptions Disp Refills     fluticasone (FLONASE) 50 MCG/ACT nasal spray [Pharmacy Med Name: Fluticasone Propionate Nasal Suspension 50 MCG/ACT]  Last Written Prescription Date:  3/15/2019  Last Fill Quantity: 32 g,  # refills: 3   Last office visit: 5/2/2018 with prescribing provider:  EARLINE Lozano   Future Office Visit:   Next 5 appointments (look out 90 days)    Feb 24, 2020  7:40 AM CST  Nurse [facility] with [facility]  [facility] [address] [address]  [phone]   Mar 23, 2020  7:30 AM CDT  Nurse Only with [facility]  [facility] [address] [address]  [phone]   Apr 27, 2020  7:20 AM CDT  Nurse Only with [facility]  [facility] [address] [address]  [phone]        32 g 2     Sig: Spray 1-2 sprays into both nostrils daily       Inhaled Steroids Protocol Passed - 2/16/2020  1:09 PM        Passed - Patient is age 12 or older        Passed - Asthma control assessment score within normal limits in last 6 months     Please review ACT score.     ACT Total Scores 10/9/2018 6/13/2019 8/27/2019   ACT TOTAL SCORE - - -   ASTHMA ER VISITS - - -   ASTHMA HOSPITALIZATIONS - - -   ACT TOTAL SCORE (Goal Greater than or Equal to 20) 19 25 23   In the past 12 months, how many times did you visit the emergency room for your asthma without being admitted to the hospital? 0 0 0   In the past 12 months, how many times were you hospitalized overnight because of your asthma? 0 0 0           Passed - Medication is active on med list        Passed - Recent (6 mo) or future (30 days) visit within the authorizing provider's specialty     Patient had office visit in the last 6 months or has a visit in the next 30 days with authorizing provider or within the authorizing provider's specialty.   "See \"Patient Info\" tab in inbasket, or \"Choose Columns\" in Meds & Orders section of the refill encounter.            "

## 2020-02-17 NOTE — TELEPHONE ENCOUNTER
"Requested Prescriptions   Pending Prescriptions Disp Refills     montelukast (SINGULAIR) 10 MG tablet 90 tablet 1     Sig: Take 1 tablet (10 mg) by mouth daily       Leukotriene Inhibitors Protocol Passed - 2/17/2020 11:04 AM        Passed - Patient is age 12 or older     If patient is under 16, ok to refill using age based dosing.           Passed - Asthma control assessment score within normal limits in last 6 months     Please review ACT score.           Passed - Medication is active on med list        Passed - Recent (6 mo) or future (30 days) visit within the authorizing provider's specialty     Patient had office visit in the last 6 months or has a visit in the next 30 days with authorizing provider or within the authorizing provider's specialty.  See \"Patient Info\" tab in inbasket, or \"Choose Columns\" in Meds & Orders section of the refill encounter.            Medication filled per [facility] protocol.     Sarah Allen RN            "

## 2020-02-24 ENCOUNTER — ALLIED HEALTH/NURSE VISIT (OUTPATIENT)
Dept: ALLERGY | Facility: CLINIC | Age: 60
End: 2020-02-24
Payer: COMMERCIAL

## 2020-02-24 DIAGNOSIS — J30.1 SEASONAL ALLERGIC RHINITIS DUE TO POLLEN: Primary | ICD-10-CM

## 2020-02-24 PROCEDURE — 99207 ZZC DROP WITH A PROCEDURE: CPT

## 2020-02-24 PROCEDURE — 95117 IMMUNOTHERAPY INJECTIONS: CPT

## 2020-02-24 NOTE — PROGRESS NOTES
Patient presented after waiting 30 minutes with normal reaction to allergy injections. Discharged from clinic by Dr. Clark.    Paula Walters RN, RN ............   2/24/2020...8:53 AM

## 2020-03-19 ENCOUNTER — MYC MEDICAL ADVICE (OUTPATIENT)
Dept: ALLERGY | Facility: CLINIC | Age: 60
End: 2020-03-19

## 2020-03-23 ENCOUNTER — ALLIED HEALTH/NURSE VISIT (OUTPATIENT)
Dept: ALLERGY | Facility: CLINIC | Age: 60
End: 2020-03-23
Payer: COMMERCIAL

## 2020-03-23 DIAGNOSIS — Z51.6 NEED FOR DESENSITIZATION TO ALLERGENS: Primary | ICD-10-CM

## 2020-03-23 PROCEDURE — 99207 ZZC DROP WITH A PROCEDURE: CPT

## 2020-03-23 PROCEDURE — 95117 IMMUNOTHERAPY INJECTIONS: CPT

## 2020-05-05 ENCOUNTER — TELEPHONE (OUTPATIENT)
Dept: ALLERGY | Facility: CLINIC | Age: 60
End: 2020-05-05

## 2020-05-08 ENCOUNTER — TELEPHONE (OUTPATIENT)
Dept: ALLERGY | Facility: CLINIC | Age: 60
End: 2020-05-08

## 2020-05-08 NOTE — TELEPHONE ENCOUNTER
Decrease to 0.3mL of red vial for all injections. Dose adjustment valid through 5/25/20.    Please advise patient to schedule follow-up visit at the same time as next injection appointment (6/2020)

## 2020-05-08 NOTE — TELEPHONE ENCOUNTER
Patient rescheduled shot appointment. Last injection 3/23/2020 (49 days) of Red 0.5 all vials. Please advise restart dose and length of dosing validation.    Sarita Romero MA

## 2020-05-11 ENCOUNTER — ALLIED HEALTH/NURSE VISIT (OUTPATIENT)
Dept: ALLERGY | Facility: CLINIC | Age: 60
End: 2020-05-11
Payer: COMMERCIAL

## 2020-05-11 DIAGNOSIS — Z51.6 NEED FOR DESENSITIZATION TO ALLERGENS: Primary | ICD-10-CM

## 2020-05-11 PROCEDURE — 95117 IMMUNOTHERAPY INJECTIONS: CPT

## 2020-05-11 PROCEDURE — 99207 ZZC DROP WITH A PROCEDURE: CPT

## 2020-05-11 NOTE — NURSING NOTE
Patient presented after waiting 30 minutes with no reaction to allergy injections. Discharged from clinic.    Destini Goodman RN, RN ............   5/11/2020...9:44 AM

## 2020-05-18 DIAGNOSIS — J45.30 MILD PERSISTENT ASTHMA WITHOUT COMPLICATION: ICD-10-CM

## 2020-05-20 NOTE — TELEPHONE ENCOUNTER
Pending Prescriptions:                       Disp   Refills    montelukast (SINGULAIR) 10 MG tablet      90 tab*0            Sig: Take 1 tablet (10 mg) by mouth daily    Routing refill request to provider for review/approval because:  Last ACT and office visit > 6 mos ago.    Pearl Mireles RN

## 2020-05-21 RX ORDER — MONTELUKAST SODIUM 10 MG/1
10 TABLET ORAL DAILY
Qty: 90 TABLET | Refills: 0 | Status: SHIPPED | OUTPATIENT
Start: 2020-05-21 | End: 2020-06-12

## 2020-06-08 ENCOUNTER — OFFICE VISIT (OUTPATIENT)
Dept: ALLERGY | Facility: CLINIC | Age: 60
End: 2020-06-08
Payer: COMMERCIAL

## 2020-06-08 ENCOUNTER — ALLIED HEALTH/NURSE VISIT (OUTPATIENT)
Dept: ALLERGY | Facility: CLINIC | Age: 60
End: 2020-06-08
Payer: COMMERCIAL

## 2020-06-08 VITALS — DIASTOLIC BLOOD PRESSURE: 85 MMHG | SYSTOLIC BLOOD PRESSURE: 128 MMHG | OXYGEN SATURATION: 99 % | HEART RATE: 64 BPM

## 2020-06-08 DIAGNOSIS — Z91.09 OTHER ALLERGY STATUS, OTHER THAN TO DRUGS AND BIOLOGICAL SUBSTANCES: Primary | ICD-10-CM

## 2020-06-08 DIAGNOSIS — J45.30 MILD PERSISTENT ASTHMA WITHOUT COMPLICATION: ICD-10-CM

## 2020-06-08 DIAGNOSIS — Z51.6 NEED FOR DESENSITIZATION TO ALLERGENS: Primary | ICD-10-CM

## 2020-06-08 PROCEDURE — 99214 OFFICE O/P EST MOD 30 MIN: CPT | Mod: 25 | Performed by: ALLERGY & IMMUNOLOGY

## 2020-06-08 PROCEDURE — 95117 IMMUNOTHERAPY INJECTIONS: CPT

## 2020-06-08 PROCEDURE — 99207 ZZC DROP WITH A PROCEDURE: CPT

## 2020-06-08 RX ORDER — FLUTICASONE PROPIONATE 50 MCG
1-2 SPRAY, SUSPENSION (ML) NASAL DAILY
Qty: 48 G | Refills: 3 | Status: SHIPPED | OUTPATIENT
Start: 2020-06-08 | End: 2021-07-05

## 2020-06-08 ASSESSMENT — ASTHMA QUESTIONNAIRES
QUESTION_3 LAST FOUR WEEKS HOW OFTEN DID YOUR ASTHMA SYMPTOMS (WHEEZING, COUGHING, SHORTNESS OF BREATH, CHEST TIGHTNESS OR PAIN) WAKE YOU UP AT NIGHT OR EARLIER THAN USUAL IN THE MORNING: ONCE OR TWICE
ACT_TOTALSCORE: 19
QUESTION_1 LAST FOUR WEEKS HOW MUCH OF THE TIME DID YOUR ASTHMA KEEP YOU FROM GETTING AS MUCH DONE AT WORK, SCHOOL OR AT HOME: A LITTLE OF THE TIME
QUESTION_5 LAST FOUR WEEKS HOW WOULD YOU RATE YOUR ASTHMA CONTROL: WELL CONTROLLED
QUESTION_4 LAST FOUR WEEKS HOW OFTEN HAVE YOU USED YOUR RESCUE INHALER OR NEBULIZER MEDICATION (SUCH AS ALBUTEROL): TWO OR THREE TIMES PER WEEK
QUESTION_2 LAST FOUR WEEKS HOW OFTEN HAVE YOU HAD SHORTNESS OF BREATH: ONCE OR TWICE A WEEK

## 2020-06-08 NOTE — LETTER
6/8/2020         RE: Nela Mares  [address] [address]        Dear Colleague,    Thank you for referring your patient, Nela Mares, to the [facility]. Please see a copy of my visit note below.    Nela Mares was seen in the Allergy Clinic at [facility].       Nela Mares is a 59 year old American female who is seen today for follow-up of asthma and allergic rhinitis. She began allergen immunotherapy in 11/2018 and reached her maintenance dose in 12/2018. She has no history of systemic or significant large local reactions to treatment. Sanam reports that her symptoms have improved with immunotherapy treatment. She did experience some worsening this spring due to delays in treatment caused by COVID-19 but overall feels she has improved compared to pre-treatment. She has spent time outdoors most days and has managed her symptoms with cetirizine.    Sanam reports that her asthma has been well controlled. May and September are her worst months, typically triggered by her allergies. She did have increased shortness of breath last month and was able to manage with albuterol taken a few times per week. She also increased her Advair from once to twice daily during this time. She now feels she has returned to her usual state of health. Sanam has not had any significant exacerbations or need for prednisone in the past year.      Past Medical History:   Diagnosis Date     Allergic rhinitis, cause unspecified      Cervical high risk HPV (human papillomavirus) test positive 03/21/2017    3/21/17 NIL pap/+ HR HPV (not 16 or 18).      Depressive disorder      Depressive disorder, not elsewhere classified     seasonal     Hypertension      Mild persistent asthma      Obstructive sleep apnea (adult) (pediatric)     uses CPAP     Scalp mass 7/2014     Family History   Problem Relation Age of Onset     C.A.D. Paternal Grandmother      Cerebrovascular  Disease Paternal Grandmother      Depression Paternal Grandmother      Obesity Paternal Grandmother      C.A.D. Paternal Grandfather      Cerebrovascular Disease Paternal Grandfather         unsure     Substance Abuse Paternal Grandfather      Hypertension Father      Asthma Father      Obesity Father      Skin Cancer Father      Diabetes Father      Neurologic Disorder Mother         Graves disease     Hyperlipidemia Mother      Depression Mother      Thyroid Disease Mother      Other - See Comments Other      Skin Cancer Sister      Depression Maternal Grandmother      Cancer Sister         mild skin cancer,cured from excision     Depression Nephew      Anxiety Disorder Nephew      Mental Illness Maternal Half-Brother      Mental Illness Paternal Half-Brother      Substance Abuse Nephew      Asthma Sister      Asthma Nephew      Thyroid Disease Maternal Half-Sister      Thyroid Disease Paternal Half-Sister      Diabetes No family hx of      Social History     Tobacco Use     Smoking status: Never Smoker     Smokeless tobacco: Never Used   Substance Use Topics     Alcohol use: No     Drug use: No     Social History     Social History Narrative    Dairy/d 3 servings/d.     Caffeine 2 servings/d    Exercise 0 x week    Sunscreen used - No    Seatbelts used - Yes    Working smoke/CO detectors in the home - Yes    Guns stored in the home - No    Self Breast Exams - no    Eye Exam up to date - No    Dental Exam up to date - Yes    Pap Smear up to date - Yes    Mammogram up to date - Yes    Dexa Scan up to date - NOT APPLICABLE    Flex Sig / Colonoscopy up to date - NOT APPLICABLE    Immunizations up to date - Yes    Abuse: Current or Past(Physical, Sexual or Emotional)- No    Do you feel safe in your environment - Yes    GÓMEZ Cummings    11/[birth year]/11                       Past medical, family, and social history were reviewed.    REVIEW OF SYSTEMS:  General: negative for weight gain. negative for weight loss. negative for  changes in sleep.   Eyes: negative for itching. negative for redness. negative for tearing/watering. negative for vision changes  Ears: negative for fullness. negative for hearing loss. negative for dizziness.   Nose: negative for snoring.negative for changes in smell. negative for drainage.   Throat: negative for hoarseness. negative for sore throat. negative for trouble swallowing.   Lungs: negative for cough. negative for shortness of breath.negative for wheezing. negative for sputum production.   Cardiovascular: negative for chest pain. negative for swelling of ankles. negative for fast or irregular heartbeat.   Gastrointestinal: negative for nausea. negative for heartburn. negative for acid reflux.   Musculoskeletal: negative for joint pain. negative for joint stiffness. negative for joint swelling.   Neurologic: negative for seizures. negative for fainting. negative for weakness.   Psychiatric: negative for changes in mood. negative for anxiety.   Endocrine: negative for cold intolerance. negative for heat intolerance. negative for tremors.   Hematologic: negative for easy bruising. negative for easy bleeding.  Integumentary: negative for rash. negative for scaling. negative for nail changes.       Current Outpatient Medications:      albuterol (PROAIR HFA/PROVENTIL HFA/VENTOLIN HFA) 108 (90 Base) MCG/ACT inhaler, Inhale 2 puffs into the lungs every 4 hours as needed for shortness of breath / dyspnea or wheezing, Disp: 3 Inhaler, Rfl: 3     albuterol (PROVENTIL) (2.5 MG/3ML) 0.083% neb solution, Take 1 vial (2.5 mg) by nebulization every 4 hours as needed for shortness of breath / dyspnea or wheezing, Disp: 1 Box, Rfl: 1     cetirizine (ZYRTEC) 10 MG tablet, Take 10 mg by mouth daily, Disp: , Rfl:      fluticasone (FLONASE) 50 MCG/ACT nasal spray, Spray 1-2 sprays into both nostrils daily, Disp: 48 g, Rfl: 3     fluticasone-salmeterol (ADVAIR DISKUS) 500-50 MCG/DOSE inhaler, Inhale 1 puff into the lungs 2  times daily, Disp: 3 Inhaler, Rfl: 3     hydrochlorothiazide (MICROZIDE) 12.5 MG capsule, Take 1 capsule (12.5 mg) by mouth daily, Disp: 90 capsule, Rfl: 3     lisinopril (PRINIVIL/ZESTRIL) 40 MG tablet, Take 1 tablet (40 mg) by mouth daily, Disp: 90 tablet, Rfl: 3     montelukast (SINGULAIR) 10 MG tablet, Take 1 tablet (10 mg) by mouth daily, Disp: 90 tablet, Rfl: 0     ORDER FOR ALLERGEN IMMUNOTHERAPY, Name of Mix: Mix #1  Dust Mite, Cat, Dog Cat Hair, Standardized 10,000 BAU/mL, ALK  2.0 ml Dog Hair-Dander, A. P.  1:100 w/v, HS  1.0 ml Dust Mites DP. 30,000 AU/mL, HS  0.6 ml  Diluent: HSA qs to 5ml, Disp: 5 mL, Rfl: PRN     ORDER FOR ALLERGEN IMMUNOTHERAPY, Name of Mix: Mix #2  Tree , Weeds Birch Mix PRW 1:20 w/v, HS  0.5 ml Hickory, Shagbark 1:20 w/v, HS  0.5 ml Augusta Mix RW 1:20 w/v, HS 0.5 ml Oak Mix RVW 1:20 w/v, HS 0.5 ml Kochia 1:20 w/v, HS 0.5 ml Nettle 1:20 w/v, HS 0.5 ml Ragweed Mixed 1:20 w/v ALK  0.5 ml Sagebrush, Mugwort 1:20 w/v, HS 0.5 ml Diluent: HSA qs to 5ml, Disp: 5 mL, Rfl: PRN     ORDER FOR ALLERGEN IMMUNOTHERAPY, Name of Mix: Mix #3  Mold Alternaria Tenuis 1:10 w/v, HS  0.5 ml Aspergillus Fumigatus 1:10 w/v, HS  0.5 ml Curvularia Spicifera 1:10 w/v, HS  0.5 ml Epicoccum Nigrum 1:10 w/v, HS 0.5 ml Hormodendrum Cladosporioides 1:10 w/v, HS 0.5 ml Penicillium Mix 1:10 w/v, HS  0.5 ml Phoma Herbarum 1:10 w/v, HS  0.5 ml Diluent: HSA qs to 5ml, Disp: 5 mL, Rfl: PRN     venlafaxine (EFFEXOR-ER) 150 MG 24 hr tablet, Take 1 tablet (150 mg) by mouth daily, Disp: 90 tablet, Rfl: 3     EPINEPHrine (AUVI-Q) 0.3 MG/0.3ML injection 2-pack, Inject 0.3 mLs (0.3 mg) into the muscle as needed for anaphylaxis (Patient not taking: Reported on 8/27/2019), Disp: 2 mL, Rfl: 2    EXAM:   /85 (BP Location: Left arm, Patient Position: Sitting, Cuff Size: Adult Large)   Pulse 64   SpO2 99%   GENERAL APPEARANCE: alert, cooperative and not in distress  SKIN: no rashes, no lesions  HEAD: atraumatic,  normocephalic  EYES: lids and lashes normal, conjunctivae and sclerae clear, EOM full and intact  ENT: no scars or lesions, nasal exam showed no discharge, swelling or lesions noted, tongue midline and normal, soft palate, uvula, and tonsils normal  NECK: no asymmetry, masses, or scars, supple without significant adenopathy  LUNGS: unlabored respirations, no intercostal retractions or accessory muscle use, clear to auscultation without rales or wheezes  HEART: regular rate and rhythm without murmurs and normal S1 and S2  MUSCULOSKELETAL: no musculoskeletal defects are noted  NEURO: oriented for age x3  PSYCH: does not appear depressed or anxious      WORKUP:  None    ASSESSMENT/PLAN:  Nela Mares is a 59 year old female seen for follow-up of asthma and allergies.    1. Other allergy status, other than to drugs and biological substances - she has completed 1.5 years of immunotherapy treatment at her maintenance dose with improvement in her symptoms. We reviewed the risks, benefits, and recommended duration of immunotherapy and she wishes to continue at this time.    - continue allergen immunotherapy per protocol  - continue cetirizine 10mg once to twice daily as needed  - fluticasone (FLONASE) 50 MCG/ACT nasal spray; Spray 1-2 sprays into both nostrils daily  Dispense: 48 g; Refill: 3    2. Mild persistent asthma without complication - mild increase in symptoms last month likely due to pollen allergies. Overall has been well controlled with no acute exacerbations, ED visits, or oral steroids.    - fluticasone-salmeterol (ADVAIR DISKUS) 500-50 MCG/DOSE inhaler; Inhale 1 puff into the lungs 2 times daily  Dispense: 3 Inhaler; Refill: 1  - montelukast (SINGULAIR) 10 MG tablet; Take 1 tablet (10 mg) by mouth daily  Dispense: 90 tablet; Refill: 1  - albuterol (PROAIR HFA/PROVENTIL HFA/VENTOLIN HFA) 108 (90 Base) MCG/ACT inhaler; Inhale 2 puffs into the lungs every 4 hours as needed for shortness of breath / dyspnea  or wheezing  Dispense: 1 Inhaler; Refill: 3      Follow-up in 6 months, sooner if needed      Thank you for allowing me to participate in the care of Nela Mares.      Karen Clark MD  Allergy/Immunology  [facility]      Chart documentation done in part with Dragon Voice Recognition Software. Although reviewed after completion, some word and grammatical errors may remain.    Again, thank you for allowing me to participate in the care of your patient.        Sincerely,        Karen Clark MD

## 2020-06-08 NOTE — PROGRESS NOTES
Patient presented after waiting 30 minutes with no reaction to allergy injections. Discharged from clinic.    Tania Yoder RN

## 2020-06-08 NOTE — PROGRESS NOTES
Nela Mares was seen in the Allergy Clinic at [facility].       Nela Mares is a 59 year old American female who is seen today for follow-up of asthma and allergic rhinitis. She began allergen immunotherapy in 11/2018 and reached her maintenance dose in 12/2018. She has no history of systemic or significant large local reactions to treatment. Sanam reports that her symptoms have improved with immunotherapy treatment. She did experience some worsening this spring due to delays in treatment caused by COVID-19 but overall feels she has improved compared to pre-treatment. She has spent time outdoors most days and has managed her symptoms with cetirizine.    Sanam reports that her asthma has been well controlled. May and September are her worst months, typically triggered by her allergies. She did have increased shortness of breath last month and was able to manage with albuterol taken a few times per week. She also increased her Advair from once to twice daily during this time. She now feels she has returned to her usual state of health. Sanam has not had any significant exacerbations or need for prednisone in the past year.      Past Medical History:   Diagnosis Date     Allergic rhinitis, cause unspecified      Cervical high risk HPV (human papillomavirus) test positive 03/21/2017    3/21/17 NIL pap/+ HR HPV (not 16 or 18).      Depressive disorder      Depressive disorder, not elsewhere classified     seasonal     Hypertension      Mild persistent asthma      Obstructive sleep apnea (adult) (pediatric)     uses CPAP     Scalp mass 7/2014     Family History   Problem Relation Age of Onset     C.A.D. Paternal Grandmother      Cerebrovascular Disease Paternal Grandmother      Depression Paternal Grandmother      Obesity Paternal Grandmother      C.A.D. Paternal Grandfather      Cerebrovascular Disease Paternal Grandfather         unsure     Substance Abuse Paternal Grandfather      Hypertension  Father      Asthma Father      Obesity Father      Skin Cancer Father      Diabetes Father      Neurologic Disorder Mother         Graves disease     Hyperlipidemia Mother      Depression Mother      Thyroid Disease Mother      Other - See Comments Other      Skin Cancer Sister      Depression Maternal Grandmother      Cancer Sister         mild skin cancer,cured from excision     Depression Nephew      Anxiety Disorder Nephew      Mental Illness Maternal Half-Brother      Mental Illness Paternal Half-Brother      Substance Abuse Nephew      Asthma Sister      Asthma Nephew      Thyroid Disease Maternal Half-Sister      Thyroid Disease Paternal Half-Sister      Diabetes No family hx of      Social History     Tobacco Use     Smoking status: Never Smoker     Smokeless tobacco: Never Used   Substance Use Topics     Alcohol use: No     Drug use: No     Social History     Social History Narrative    Dairy/d 3 servings/d.     Caffeine 2 servings/d    Exercise 0 x week    Sunscreen used - No    Seatbelts used - Yes    Working smoke/CO detectors in the home - Yes    Guns stored in the home - No    Self Breast Exams - no    Eye Exam up to date - No    Dental Exam up to date - Yes    Pap Smear up to date - Yes    Mammogram up to date - Yes    Dexa Scan up to date - NOT APPLICABLE    Flex Sig / Colonoscopy up to date - NOT APPLICABLE    Immunizations up to date - Yes    Abuse: Current or Past(Physical, Sexual or Emotional)- No    Do you feel safe in your environment - Yes    GÓMEZ Cummings    11/23/11                       Past medical, family, and social history were reviewed.    REVIEW OF SYSTEMS:  General: negative for weight gain. negative for weight loss. negative for changes in sleep.   Eyes: negative for itching. negative for redness. negative for tearing/watering. negative for vision changes  Ears: negative for fullness. negative for hearing loss. negative for dizziness.   Nose: negative for snoring.negative for changes in  smell. negative for drainage.   Throat: negative for hoarseness. negative for sore throat. negative for trouble swallowing.   Lungs: negative for cough. negative for shortness of breath.negative for wheezing. negative for sputum production.   Cardiovascular: negative for chest pain. negative for swelling of ankles. negative for fast or irregular heartbeat.   Gastrointestinal: negative for nausea. negative for heartburn. negative for acid reflux.   Musculoskeletal: negative for joint pain. negative for joint stiffness. negative for joint swelling.   Neurologic: negative for seizures. negative for fainting. negative for weakness.   Psychiatric: negative for changes in mood. negative for anxiety.   Endocrine: negative for cold intolerance. negative for heat intolerance. negative for tremors.   Hematologic: negative for easy bruising. negative for easy bleeding.  Integumentary: negative for rash. negative for scaling. negative for nail changes.       Current Outpatient Medications:      albuterol (PROAIR HFA/PROVENTIL HFA/VENTOLIN HFA) 108 (90 Base) MCG/ACT inhaler, Inhale 2 puffs into the lungs every 4 hours as needed for shortness of breath / dyspnea or wheezing, Disp: 3 Inhaler, Rfl: 3     albuterol (PROVENTIL) (2.5 MG/3ML) 0.083% neb solution, Take 1 vial (2.5 mg) by nebulization every 4 hours as needed for shortness of breath / dyspnea or wheezing, Disp: 1 Box, Rfl: 1     cetirizine (ZYRTEC) 10 MG tablet, Take 10 mg by mouth daily, Disp: , Rfl:      fluticasone (FLONASE) 50 MCG/ACT nasal spray, Spray 1-2 sprays into both nostrils daily, Disp: 48 g, Rfl: 3     fluticasone-salmeterol (ADVAIR DISKUS) 500-50 MCG/DOSE inhaler, Inhale 1 puff into the lungs 2 times daily, Disp: 3 Inhaler, Rfl: 3     hydrochlorothiazide (MICROZIDE) 12.5 MG capsule, Take 1 capsule (12.5 mg) by mouth daily, Disp: 90 capsule, Rfl: 3     lisinopril (PRINIVIL/ZESTRIL) 40 MG tablet, Take 1 tablet (40 mg) by mouth daily, Disp: 90 tablet, Rfl:  3     montelukast (SINGULAIR) 10 MG tablet, Take 1 tablet (10 mg) by mouth daily, Disp: 90 tablet, Rfl: 0     ORDER FOR ALLERGEN IMMUNOTHERAPY, Name of Mix: Mix #1  Dust Mite, Cat, Dog Cat Hair, Standardized 10,000 BAU/mL, ALK  2.0 ml Dog Hair-Dander, A. P.  1:100 w/v, HS  1.0 ml Dust Mites DP. 30,000 AU/mL, HS  0.6 ml  Diluent: HSA qs to 5ml, Disp: 5 mL, Rfl: PRN     ORDER FOR ALLERGEN IMMUNOTHERAPY, Name of Mix: Mix #2  Tree , Weeds Birch Mix PRW 1:20 w/v, HS  0.5 ml Hickory, Shagbark 1:20 w/v, HS  0.5 ml Pearl Mix RW 1:20 w/v, HS 0.5 ml Oak Mix RVW 1:20 w/v, HS 0.5 ml Kochia 1:20 w/v, HS 0.5 ml Nettle 1:20 w/v, HS 0.5 ml Ragweed Mixed 1:20 w/v ALK  0.5 ml Sagebrush, Mugwort 1:20 w/v, HS 0.5 ml Diluent: HSA qs to 5ml, Disp: 5 mL, Rfl: PRN     ORDER FOR ALLERGEN IMMUNOTHERAPY, Name of Mix: Mix #3  Mold Alternaria Tenuis 1:10 w/v, HS  0.5 ml Aspergillus Fumigatus 1:10 w/v, HS  0.5 ml Curvularia Spicifera 1:10 w/v, HS  0.5 ml Epicoccum Nigrum 1:10 w/v, HS 0.5 ml Hormodendrum Cladosporioides 1:10 w/v, HS 0.5 ml Penicillium Mix 1:10 w/v, HS  0.5 ml Phoma Herbarum 1:10 w/v, HS  0.5 ml Diluent: HSA qs to 5ml, Disp: 5 mL, Rfl: PRN     venlafaxine (EFFEXOR-ER) 150 MG 24 hr tablet, Take 1 tablet (150 mg) by mouth daily, Disp: 90 tablet, Rfl: 3     EPINEPHrine (AUVI-Q) 0.3 MG/0.3ML injection 2-pack, Inject 0.3 mLs (0.3 mg) into the muscle as needed for anaphylaxis (Patient not taking: Reported on 8/27/2019), Disp: 2 mL, Rfl: 2    EXAM:   /85 (BP Location: Left arm, Patient Position: Sitting, Cuff Size: Adult Large)   Pulse 64   SpO2 99%   GENERAL APPEARANCE: alert, cooperative and not in distress  SKIN: no rashes, no lesions  HEAD: atraumatic, normocephalic  EYES: lids and lashes normal, conjunctivae and sclerae clear, EOM full and intact  ENT: no scars or lesions, nasal exam showed no discharge, swelling or lesions noted, tongue midline and normal, soft palate, uvula, and tonsils normal  NECK: no asymmetry,  masses, or scars, supple without significant adenopathy  LUNGS: unlabored respirations, no intercostal retractions or accessory muscle use, clear to auscultation without rales or wheezes  HEART: regular rate and rhythm without murmurs and normal S1 and S2  MUSCULOSKELETAL: no musculoskeletal defects are noted  NEURO: oriented for age x3  PSYCH: does not appear depressed or anxious      WORKUP:  None    ASSESSMENT/PLAN:  Nela Mares is a 59 year old female seen for follow-up of asthma and allergies.    1. Other allergy status, other than to drugs and biological substances - she has completed 1.5 years of immunotherapy treatment at her maintenance dose with improvement in her symptoms. We reviewed the risks, benefits, and recommended duration of immunotherapy and she wishes to continue at this time.    - continue allergen immunotherapy per protocol  - continue cetirizine 10mg once to twice daily as needed  - fluticasone (FLONASE) 50 MCG/ACT nasal spray; Spray 1-2 sprays into both nostrils daily  Dispense: 48 g; Refill: 3    2. Mild persistent asthma without complication - mild increase in symptoms last month likely due to pollen allergies. Overall has been well controlled with no acute exacerbations, ED visits, or oral steroids.    - fluticasone-salmeterol (ADVAIR DISKUS) 500-50 MCG/DOSE inhaler; Inhale 1 puff into the lungs 2 times daily  Dispense: 3 Inhaler; Refill: 1  - montelukast (SINGULAIR) 10 MG tablet; Take 1 tablet (10 mg) by mouth daily  Dispense: 90 tablet; Refill: 1  - albuterol (PROAIR HFA/PROVENTIL HFA/VENTOLIN HFA) 108 (90 Base) MCG/ACT inhaler; Inhale 2 puffs into the lungs every 4 hours as needed for shortness of breath / dyspnea or wheezing  Dispense: 1 Inhaler; Refill: 3      Follow-up in 6 months, sooner if needed      Thank you for allowing me to participate in the care of Nela Mares.      Karen Clark MD  Allergy/Immunology  [facility]  documentation done in part with Dragon Voice Recognition Software. Although reviewed after completion, some word and grammatical errors may remain.

## 2020-06-09 ASSESSMENT — ASTHMA QUESTIONNAIRES: ACT_TOTALSCORE: 19

## 2020-06-12 RX ORDER — MONTELUKAST SODIUM 10 MG/1
10 TABLET ORAL DAILY
Qty: 90 TABLET | Refills: 1 | Status: SHIPPED | OUTPATIENT
Start: 2020-06-12 | End: 2021-01-26

## 2020-06-12 RX ORDER — ALBUTEROL SULFATE 90 UG/1
2 AEROSOL, METERED RESPIRATORY (INHALATION) EVERY 4 HOURS PRN
Qty: 1 INHALER | Refills: 3 | Status: SHIPPED | OUTPATIENT
Start: 2020-06-12 | End: 2022-02-10

## 2020-06-30 ENCOUNTER — TELEPHONE (OUTPATIENT)
Dept: FAMILY MEDICINE | Facility: CLINIC | Age: 60
End: 2020-06-30

## 2020-06-30 DIAGNOSIS — F32.0 MILD MAJOR DEPRESSION (H): ICD-10-CM

## 2020-07-03 RX ORDER — VENLAFAXINE HYDROCHLORIDE 150 MG/1
CAPSULE, EXTENDED RELEASE ORAL
Qty: 30 CAPSULE | Refills: 0 | Status: SHIPPED | OUTPATIENT
Start: 2020-07-03 | End: 2020-07-09

## 2020-07-03 NOTE — TELEPHONE ENCOUNTER
Routing to RN. Please refill until patient appointment.  Patient is scheduled on 07/09/20 with Dr Lozano.    Chelsea Khoury

## 2020-07-03 NOTE — TELEPHONE ENCOUNTER
Routing to schedulers to set up virtual appointment for patient for depression.  Please also ask patient how much medication she has left and schedule appropriately.   If patient needs a refill before their appointment, please route to PROVIDER for completion of refill.  Upon routing message, write WHEN appointment is scheduled and if they have enough medication to last to appointment.  Thank you!    Routing refill request to provider for review/approval because:  Labs not current:  PHQ-9  Last Written Prescription Date:  6/13/19  Last Fill Quantity: 90,  # refills: 3   Last office visit: 8/21/2019 with prescribing provider:     Future Office Visit:   Next 5 appointments (look out 90 days)    Jul 06, 2020 10:30 AM CDT  Nurse Only with [facility]  [facility] ([facility]) [address]  [place] [id number]  [phone]         Angie Mensah, CMN, RN

## 2020-07-06 ENCOUNTER — ALLIED HEALTH/NURSE VISIT (OUTPATIENT)
Dept: ALLERGY | Facility: CLINIC | Age: 60
End: 2020-07-06
Payer: COMMERCIAL

## 2020-07-06 ENCOUNTER — TELEPHONE (OUTPATIENT)
Dept: ALLERGY | Facility: CLINIC | Age: 60
End: 2020-07-06

## 2020-07-06 DIAGNOSIS — Z51.6 NEED FOR DESENSITIZATION TO ALLERGENS: Primary | ICD-10-CM

## 2020-07-06 PROCEDURE — 95117 IMMUNOTHERAPY INJECTIONS: CPT

## 2020-07-06 PROCEDURE — 99207 ZZC DROP WITH A PROCEDURE: CPT

## 2020-07-06 NOTE — TELEPHONE ENCOUNTER
Please advise new dosing orders, building schedule, and date which orders are valid through.  Patient's last shot was 06-, dose was 0.3ml red, next appointment scheduled for 07- which will be 28 days.    Should patient build by 0.1ml or repeat prior dose?     New orders will be added to immunotherapy flowsheet once they are received.     Tania Yoder RN

## 2020-07-06 NOTE — PROGRESS NOTES
Patient presented after waiting 30 minutes with no reaction to allergy injections. Discharged from clinic.    Sarah Allen RN

## 2020-07-09 ENCOUNTER — VIRTUAL VISIT (OUTPATIENT)
Dept: FAMILY MEDICINE | Facility: CLINIC | Age: 60
End: 2020-07-09
Payer: COMMERCIAL

## 2020-07-09 ENCOUNTER — TELEPHONE (OUTPATIENT)
Dept: FAMILY MEDICINE | Facility: CLINIC | Age: 60
End: 2020-07-09

## 2020-07-09 DIAGNOSIS — R73.01 IMPAIRED FASTING GLUCOSE: ICD-10-CM

## 2020-07-09 DIAGNOSIS — I10 ESSENTIAL HYPERTENSION WITH GOAL BLOOD PRESSURE LESS THAN 140/90: ICD-10-CM

## 2020-07-09 DIAGNOSIS — N89.8 VAGINAL ITCHING: ICD-10-CM

## 2020-07-09 DIAGNOSIS — F32.0 MILD MAJOR DEPRESSION (H): Primary | ICD-10-CM

## 2020-07-09 DIAGNOSIS — Z13.6 CARDIOVASCULAR SCREENING; LDL GOAL LESS THAN 160: ICD-10-CM

## 2020-07-09 DIAGNOSIS — Z12.31 ENCOUNTER FOR SCREENING MAMMOGRAM FOR BREAST CANCER: ICD-10-CM

## 2020-07-09 PROCEDURE — 99214 OFFICE O/P EST MOD 30 MIN: CPT | Mod: GT | Performed by: FAMILY MEDICINE

## 2020-07-09 RX ORDER — HYDROCHLOROTHIAZIDE 12.5 MG/1
1 CAPSULE ORAL DAILY
Qty: 90 CAPSULE | Refills: 0 | Status: SHIPPED | OUTPATIENT
Start: 2020-07-09 | End: 2020-11-02

## 2020-07-09 RX ORDER — METRONIDAZOLE 7.5 MG/G
1 GEL VAGINAL DAILY
Qty: 25 G | Refills: 0 | Status: SHIPPED | OUTPATIENT
Start: 2020-07-09 | End: 2020-07-14

## 2020-07-09 RX ORDER — LISINOPRIL 40 MG/1
40 TABLET ORAL DAILY
Qty: 90 TABLET | Refills: 0 | Status: SHIPPED | OUTPATIENT
Start: 2020-07-09 | End: 2020-11-17

## 2020-07-09 RX ORDER — VENLAFAXINE HYDROCHLORIDE 150 MG/1
150 CAPSULE, EXTENDED RELEASE ORAL DAILY
Qty: 90 CAPSULE | Refills: 1 | Status: SHIPPED | OUTPATIENT
Start: 2020-07-09 | End: 2021-01-22

## 2020-07-09 ASSESSMENT — ASTHMA QUESTIONNAIRES
QUESTION_3 LAST FOUR WEEKS HOW OFTEN DID YOUR ASTHMA SYMPTOMS (WHEEZING, COUGHING, SHORTNESS OF BREATH, CHEST TIGHTNESS OR PAIN) WAKE YOU UP AT NIGHT OR EARLIER THAN USUAL IN THE MORNING: ONCE OR TWICE
QUESTION_2 LAST FOUR WEEKS HOW OFTEN HAVE YOU HAD SHORTNESS OF BREATH: THREE TO SIX TIMES A WEEK
QUESTION_5 LAST FOUR WEEKS HOW WOULD YOU RATE YOUR ASTHMA CONTROL: SOMEWHAT CONTROLLED
QUESTION_1 LAST FOUR WEEKS HOW MUCH OF THE TIME DID YOUR ASTHMA KEEP YOU FROM GETTING AS MUCH DONE AT WORK, SCHOOL OR AT HOME: MOST OF THE TIME
ACT_TOTALSCORE: 15
QUESTION_4 LAST FOUR WEEKS HOW OFTEN HAVE YOU USED YOUR RESCUE INHALER OR NEBULIZER MEDICATION (SUCH AS ALBUTEROL): TWO OR THREE TIMES PER WEEK

## 2020-07-09 ASSESSMENT — PATIENT HEALTH QUESTIONNAIRE - PHQ9: SUM OF ALL RESPONSES TO PHQ QUESTIONS 1-9: 2

## 2020-07-09 NOTE — TELEPHONE ENCOUNTER
Please assist Sanam with setting up some appointments.  She sees allergy at [place] - so would also like lab appointment and mammogram scheduled the next time she goes to [place].    She needs to see Dr. Salas for IUD removal.  At [facility].  Can you please assist with scheduling?    And then she is due to see me for her physical.  Please schedule at her convenience - will need to be in person visit.

## 2020-07-09 NOTE — PROGRESS NOTES
"Nela Mares is a 59 year old female who is being evaluated via a billable video visit.      The patient has been notified of following:     \"This video visit will be conducted via a call between you and your physician/provider. We have found that certain health care needs can be provided without the need for an in-person physical exam.  This service lets us provide the care you need with a video conversation.  If a prescription is necessary we can send it directly to your pharmacy.  If lab work is needed we can place an order for that and you can then stop by our lab to have the test done at a later time.    Video visits are billed at different rates depending on your insurance coverage.  Please reach out to your insurance provider with any questions.    If during the course of the call the physician/provider feels a video visit is not appropriate, you will not be charged for this service.\"    Patient has given verbal consent for Video visit? Yes  How would you like to obtain your AVS? Traci  Patient would like the video invitation sent by: Traci  Will anyone else be joining your video visit? No    Subjective     Nela Mares is a 59 year old female who presents today via video visit for the following health issues:    HPI     Depression Followup    How are you doing with your depression since your last visit? Improved     Are you having other symptoms that might be associated with depression? No    Have you had a significant life event?  No     Are you feeling anxious or having panic attacks?   No    Do you have any concerns with your use of alcohol or other drugs? No    Social History     Tobacco Use     Smoking status: Never Smoker     Smokeless tobacco: Never Used   Substance Use Topics     Alcohol use: No     Drug use: No     PHQ 7/18/2019 12/12/2019 7/9/2020   PHQ-9 Total Score 3 1 2   Q9: Thoughts of better off dead/self-harm past 2 weeks Not at all Not at all Not at all   F/U: Thoughts of " suicide or self-harm - - -   F/U: Safety concerns - - -     PETE-7 SCORE 4/25/2017 6/13/2019 7/18/2019   Total Score 12 3 0     Last PHQ-9 7/9/2020   1.  Little interest or pleasure in doing things 0   2.  Feeling down, depressed, or hopeless 0   3.  Trouble falling or staying asleep, or sleeping too much 0   4.  Feeling tired or having little energy 0   5.  Poor appetite or overeating 2   6.  Feeling bad about yourself 0   7.  Trouble concentrating 0   8.  Moving slowly or restless 0   Q9: Thoughts of better off dead/self-harm past 2 weeks 0   PHQ-9 Total Score 2   Difficulty at work, home, or with people Not difficult at all   In the past two weeks have you had thoughts of suicide or self harm? -   Do you have concerns about your personal safety or the safety of others? -         Suicide Assessment Five-step Evaluation and Treatment (SAFE-T)        How many servings of fruits and vegetables do you eat daily?  2-3    On average, how many sweetened beverages do you drink each day (Examples: soda, juice, sweet tea, etc.  Do NOT count diet or artificially sweetened beverages)?   0    How many days per week do you exercise enough to make your heart beat faster? 3 or less    How many minutes a day do you exercise enough to make your heart beat faster? 30 - 60    How many days per week do you miss taking your medication? 0    Video Start Time: 3:13 PM    Seeing allergist due to flares  Talked to insurance and [facility] - request for new CPAP  Needs refills - except for albuterol, needs those now.    Vaginal itching.  Previously had BV.  This has been intermittent for a long time.  Worse in past three days.  No discharge. Feels dry and quite itchy and vaginal opening.    This summer was supposed to get IUD removed.  This is second IUD removal.  Previously had to have the IUD removed under anesthesia.    Overall mood feels very good, despite COVID.    Does not check home blood pressure    Reviewed and updated as needed this  visit by Provider  Allergies  Meds  Problems         Review of Systems   Constitutional, HEENT, cardiovascular, pulmonary, gi and gu systems are negative, except as otherwise noted.      Objective             Physical Exam     GENERAL: Healthy, alert and no distress  EYES: Eyes grossly normal to inspection.  No discharge or erythema, or obvious scleral/conjunctival abnormalities.  RESP: No audible wheeze, cough, or visible cyanosis.  No visible retractions or increased work of breathing.    SKIN: Visible skin clear. No significant rash, abnormal pigmentation or lesions.  NEURO: Cranial nerves grossly intact.  Mentation and speech appropriate for age.  PSYCH: Mentation appears normal, affect normal/bright, judgement and insight intact, normal speech and appearance well-groomed.      Diagnostic Test Results:  No results found for this or any previous visit (from the past 24 hour(s)).        Assessment & Plan     (F32.0) Mild major depression (H)  (primary encounter diagnosis)  Comment: well controlled  Plan: venlafaxine (EFFEXOR-XR) 150 MG 24 hr capsule        Continue current medication      (N89.8) Vaginal itching  Comment: presumed BV based on previous symptoms.  Plan: metroNIDAZOLE (METROGEL) 0.75 % vaginal gel        Follow up for lab self swab if not resolved with treatment.    (I10) Essential hypertension with goal blood pressure less than 140/90  Comment: does not check home blood pressures  Plan: hydrochlorothiazide (MICROZIDE) 12.5 MG         capsule, lisinopril (ZESTRIL) 40 MG tablet,         **Basic metabolic panel FUTURE anytime        Will schedule a physical soon    (Z12.31) Encounter for screening mammogram for breast cancer  Comment:   Plan: MA Screen Bilateral w/Ramiro            (Z13.6) CARDIOVASCULAR SCREENING; LDL GOAL LESS THAN 160  Comment:   Plan: Lipid panel reflex to direct LDL Fasting        adjust therapy based on labs      (R73.01) Impaired fasting glucose  Comment:   Plan: **A1C FUTURE  anytime        adjust therapy based on labs      See Patient Instructions    No follow-ups on file.    Padma Ray MD  [facility]      Video-Visit Details    Type of service:  Video Visit    Video End Time:3:25 PM    Originating Location (pt. Location): Home    Distant Location (provider location):  [facility]     Platform used for Video Visit: AmWell    Return in about 2 months (around 9/9/2020) for Preventive Health Visit/Physical.       Padma Ray MD

## 2020-07-10 ASSESSMENT — ASTHMA QUESTIONNAIRES: ACT_TOTALSCORE: 15

## 2020-07-10 NOTE — PATIENT INSTRUCTIONS
My  will contact you.  They can help you schedule a visit with OB/Gyn for IUD removal  - in case you need to also have this one removed under anesthesia.    They can also help you schedule a lab visit and a mammogram at [place] the next time you are there for your allergy shots.    See you soon, for your physical.

## 2020-07-16 ENCOUNTER — MYC MEDICAL ADVICE (OUTPATIENT)
Dept: FAMILY MEDICINE | Facility: CLINIC | Age: 60
End: 2020-07-16

## 2020-07-20 ENCOUNTER — ALLIED HEALTH/NURSE VISIT (OUTPATIENT)
Dept: ALLERGY | Facility: CLINIC | Age: 60
End: 2020-07-20
Payer: COMMERCIAL

## 2020-07-20 DIAGNOSIS — Z13.6 CARDIOVASCULAR SCREENING; LDL GOAL LESS THAN 160: ICD-10-CM

## 2020-07-20 DIAGNOSIS — J30.81 ALLERGIC RHINITIS DUE TO ANIMALS: ICD-10-CM

## 2020-07-20 DIAGNOSIS — I10 ESSENTIAL HYPERTENSION WITH GOAL BLOOD PRESSURE LESS THAN 140/90: ICD-10-CM

## 2020-07-20 DIAGNOSIS — J30.1 SEASONAL ALLERGIC RHINITIS DUE TO POLLEN: ICD-10-CM

## 2020-07-20 DIAGNOSIS — J30.89 ALLERGIC RHINITIS DUE TO MOLD: ICD-10-CM

## 2020-07-20 DIAGNOSIS — Z51.6 NEED FOR DESENSITIZATION TO ALLERGENS: Primary | ICD-10-CM

## 2020-07-20 DIAGNOSIS — R73.01 IMPAIRED FASTING GLUCOSE: ICD-10-CM

## 2020-07-20 DIAGNOSIS — J30.89 ALLERGIC RHINITIS DUE TO DUST MITE: ICD-10-CM

## 2020-07-20 LAB
ANION GAP SERPL CALCULATED.3IONS-SCNC: 6 MMOL/L (ref 3–14)
BUN SERPL-MCNC: 14 MG/DL (ref 7–30)
CALCIUM SERPL-MCNC: 9.2 MG/DL (ref 8.5–10.1)
CHLORIDE SERPL-SCNC: 106 MMOL/L (ref 94–109)
CHOLEST SERPL-MCNC: 225 MG/DL
CO2 SERPL-SCNC: 28 MMOL/L (ref 20–32)
CREAT SERPL-MCNC: 0.9 MG/DL (ref 0.52–1.04)
GFR SERPL CREATININE-BSD FRML MDRD: 70 ML/MIN/{1.73_M2}
GLUCOSE SERPL-MCNC: 119 MG/DL (ref 70–99)
HBA1C MFR BLD: 5.7 % (ref 0–5.6)
HDLC SERPL-MCNC: 47 MG/DL
LDLC SERPL CALC-MCNC: 149 MG/DL
NONHDLC SERPL-MCNC: 178 MG/DL
POTASSIUM SERPL-SCNC: 4.1 MMOL/L (ref 3.4–5.3)
SODIUM SERPL-SCNC: 140 MMOL/L (ref 133–144)
TRIGL SERPL-MCNC: 146 MG/DL

## 2020-07-20 PROCEDURE — 99207 ZZC DROP WITH A PROCEDURE: CPT

## 2020-07-20 PROCEDURE — 83036 HEMOGLOBIN GLYCOSYLATED A1C: CPT | Performed by: FAMILY MEDICINE

## 2020-07-20 PROCEDURE — 80048 BASIC METABOLIC PNL TOTAL CA: CPT | Performed by: FAMILY MEDICINE

## 2020-07-20 PROCEDURE — 36415 COLL VENOUS BLD VENIPUNCTURE: CPT | Performed by: FAMILY MEDICINE

## 2020-07-20 PROCEDURE — 95117 IMMUNOTHERAPY INJECTIONS: CPT

## 2020-07-20 PROCEDURE — 80061 LIPID PANEL: CPT | Performed by: FAMILY MEDICINE

## 2020-07-20 NOTE — TELEPHONE ENCOUNTER
ALLERGY SOLUTION RE-ORDER REQUEST    Nela Mares 1960 MRN: [id number]    DATE NEEDED:  08-  Vial Color Content   Top Dose       Last Dose     Vial Size  Red 1:1 Cat, Dog, Dust Mite   Red 1:1 0.5   Red 1:10.5 5ml  Red 1:1 Trees, Weeds   Red 1:1 0.5   Red 1:10.5 5ml  Red 1:1 Molds   Red 1:1 0.5   Red 1:10.5 5ml        Serum reorder consent signed and patient/parent was advised that new serums would be ordered through the pharmacy and billed to their insurance company when they arrive in clinic. Yes    Shot Clinic Location:  [place]  Ship to Location: [place]  Serum billed to:  [place]        Updated Prescription Needed: No      Requester Signature  Tania Yoder RN

## 2020-07-28 ENCOUNTER — MYC MEDICAL ADVICE (OUTPATIENT)
Dept: ALLERGY | Facility: CLINIC | Age: 60
End: 2020-07-28

## 2020-07-28 ENCOUNTER — MYC MEDICAL ADVICE (OUTPATIENT)
Dept: FAMILY MEDICINE | Facility: CLINIC | Age: 60
End: 2020-07-28

## 2020-07-28 NOTE — TELEPHONE ENCOUNTER
Called patient to discuss her concerns. Left message for her to call back at [phone].   Occupational Therapy Note 10:01    Orders acknowledged, chart reviewed. Met with patient, no functional deficits d/t MS flare-up. Patient at baseline for self care and functional mobility. Patient has supportive . No acute OT needs at this time, will complete orders. Thanks.     Rosa Maria Hernández, OT

## 2020-07-28 NOTE — TELEPHONE ENCOUNTER
"I sent a message back to Sanam.  Unfortunately I will be out of the office for a little while - so wouldn't be available for a phone visit until August 13th.    She does have health conditions that would put her in a \"risk\" category.  And, I appreciate that she might feel great satisfaction from her work.  So I don't have an easy answer for her.  "

## 2020-07-28 NOTE — TELEPHONE ENCOUNTER
Routing to provider. 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     Sarah Allen RN

## 2020-07-28 NOTE — TELEPHONE ENCOUNTER
Please see [employer] message.     Routed to PCP to review and advise.     Idania Parish RN, BSN, PHN  [facility] [place]

## 2020-07-29 ENCOUNTER — ANCILLARY PROCEDURE (OUTPATIENT)
Dept: MAMMOGRAPHY | Facility: CLINIC | Age: 60
End: 2020-07-29
Attending: FAMILY MEDICINE
Payer: COMMERCIAL

## 2020-07-29 DIAGNOSIS — Z12.31 ENCOUNTER FOR SCREENING MAMMOGRAM FOR BREAST CANCER: ICD-10-CM

## 2020-07-29 NOTE — TELEPHONE ENCOUNTER
Patient sent separate [employer] message stating she will message provider next week to schedule a time to discuss.  Sent to provider as filipe.    Pearl Mireles RN

## 2020-07-30 DIAGNOSIS — Z51.6 NEED FOR DESENSITIZATION TO ALLERGENS: Primary | ICD-10-CM

## 2020-07-30 PROCEDURE — 95165 ANTIGEN THERAPY SERVICES: CPT | Performed by: ALLERGY & IMMUNOLOGY

## 2020-07-30 NOTE — PROGRESS NOTES
Allergy serums billed at [place].     Vials billed below:  Vial Color Content                      Vial Size Expiration Date  Red 1:1 Cat, Dog, Dust Mite 5mL  07/29/2021  Red 1:1 Trees, Weeds 5mL  07/29/2021  Red 1:1 Molds 5mL  07/29/2021    Original Refill encounter date: 07-      Signature  Tania Yoder RN

## 2020-07-30 NOTE — TELEPHONE ENCOUNTER
Allergy serums received at [place].     Vials received below:    Vial Color Content                      Vial Size Expiration Date  Red 1:1 Cat, Dog, Dust Mite 5mL  07/29/2021  Red 1:1 Trees, Weeds 5mL  07/29/2021  Red 1:1 Molds 5mL  07/29/2021        Signature  Tania Yoder RN

## 2020-07-31 NOTE — TELEPHONE ENCOUNTER
Patient's response noted and appears nothing further needed at this time, so closing encounter.    Grecia Sorto RN

## 2020-08-05 ENCOUNTER — OFFICE VISIT (OUTPATIENT)
Dept: OBGYN | Facility: CLINIC | Age: 60
End: 2020-08-05
Payer: COMMERCIAL

## 2020-08-05 VITALS — DIASTOLIC BLOOD PRESSURE: 81 MMHG | HEART RATE: 68 BPM | TEMPERATURE: 98.6 F | SYSTOLIC BLOOD PRESSURE: 118 MMHG

## 2020-08-05 DIAGNOSIS — Z12.4 SCREENING FOR CERVICAL CANCER: ICD-10-CM

## 2020-08-05 DIAGNOSIS — N76.0 BV (BACTERIAL VAGINOSIS): ICD-10-CM

## 2020-08-05 DIAGNOSIS — N95.2 ATROPHIC VAGINITIS: ICD-10-CM

## 2020-08-05 DIAGNOSIS — Z30.432 ENCOUNTER FOR IUD REMOVAL: Primary | ICD-10-CM

## 2020-08-05 DIAGNOSIS — B96.89 BV (BACTERIAL VAGINOSIS): ICD-10-CM

## 2020-08-05 LAB
SPECIMEN SOURCE: ABNORMAL
WET PREP SPEC: ABNORMAL

## 2020-08-05 PROCEDURE — 87624 HPV HI-RISK TYP POOLED RSLT: CPT | Performed by: OBSTETRICS & GYNECOLOGY

## 2020-08-05 PROCEDURE — 58301 REMOVE INTRAUTERINE DEVICE: CPT | Performed by: OBSTETRICS & GYNECOLOGY

## 2020-08-05 PROCEDURE — G0145 SCR C/V CYTO,THINLAYER,RESCR: HCPCS | Performed by: OBSTETRICS & GYNECOLOGY

## 2020-08-05 PROCEDURE — 99213 OFFICE O/P EST LOW 20 MIN: CPT | Mod: 25 | Performed by: OBSTETRICS & GYNECOLOGY

## 2020-08-05 PROCEDURE — 87210 SMEAR WET MOUNT SALINE/INK: CPT | Performed by: OBSTETRICS & GYNECOLOGY

## 2020-08-05 RX ORDER — METRONIDAZOLE 7.5 MG/G
1 GEL VAGINAL AT BEDTIME
Qty: 70 G | Refills: 0 | Status: SHIPPED | OUTPATIENT
Start: 2020-08-05 | End: 2020-08-12

## 2020-08-05 RX ORDER — ESTRADIOL 10 UG/1
10 INSERT VAGINAL
Qty: 28 TABLET | Refills: 3 | Status: SHIPPED | OUTPATIENT
Start: 2020-08-06 | End: 2021-09-03

## 2020-08-05 RX ORDER — ESTRADIOL 0.1 MG/G
2 CREAM VAGINAL
Qty: 42.5 G | Refills: 3 | Status: SHIPPED | OUTPATIENT
Start: 2020-08-06 | End: 2020-09-17

## 2020-08-05 RX ORDER — ESTRADIOL 2 MG/1
1 RING VAGINAL
Qty: 0.9 EACH | Refills: 0 | Status: SHIPPED | OUTPATIENT
Start: 2020-08-05 | End: 2020-09-17

## 2020-08-05 NOTE — NURSING NOTE
"Chief Complaint   Patient presents with     IUD     removal       Initial /81 (BP Location: Left arm, Patient Position: Sitting, Cuff Size: Adult Regular)   Pulse 68   Temp 98.6  F (37  C) (Oral)  Estimated body mass index is 38.1 kg/m  as calculated from the following:    Height as of 19: 1.753 m (5' 9\").    Weight as of 14: 117 kg (258 lb).  BP completed using cuff size: regular    Questioned patient about current smoking habits.  Pt. has never smoked.          The following HM Due: NONE      The following patient reported/Care Every where data was sent to:  P ABSTRACT QUALITY INITIATIVES [[id number]]  MARTA Holloway MA             "

## 2020-08-05 NOTE — PROGRESS NOTES
HPI:  Nela Mares is a 59 year old female post menopausal female here for a consultation regarding: Needs to have her IUD removed.  In addition she has been experiencing Unbelievable dryness and itching of the vagina.   Was treated for BV a while back and that helped for a couple days and then symptoms came back.     Not in a current relationship for 5 yrs.      Since she is here today she would like to go ahead and get her Pap smear done as well.  She has a history of positive high-risk HPV back in 2017.    No LMP recorded. (Menstrual status: IUD).        Past GYN history:   Lab Results   Component Value Date    PAP NIL 05/02/2018    PAP NIL 03/21/2017    PAP NIL 03/14/2014           Past Medical History:   Diagnosis Date     Allergic rhinitis, cause unspecified      Cervical high risk HPV (human papillomavirus) test positive 03/21/2017    3/21/17 NIL pap/+ HR HPV (not 16 or 18).      Depressive disorder      Depressive disorder, not elsewhere classified     seasonal     Hypertension      Mild persistent asthma      Obstructive sleep apnea (adult) (pediatric)     uses CPAP     Scalp mass 7/2014       Past Surgical History:   Procedure Laterality Date     COLONOSCOPY  6/21/2012    Procedure: COLONOSCOPY;  COLONOSCOPY, SCREEN;  Surgeon: Bart You MD;  Location: [facility] OR     D & C       ENDOSCOPIC RETROGRADE CHOLANGIOPANCREATOGRAM  1/4/2014    Procedure: ENDOSCOPIC RETROGRADE CHOLANGIOPANCREATOGRAM;  ERCP biliary sphincterotomy, bile duct stone removal, epinepherine washing.;  Surgeon: Ba Meyers MD;  Location: [facility]     LAPAROSCOPIC CHOLECYSTECTOMY WITH CHOLANGIOGRAMS  1/4/2014    Procedure: LAPAROSCOPIC CHOLECYSTECTOMY WITH CHOLANGIOGRAMS;;  Surgeon: Haja Sage MD;  Location: [facility] OR     NO HISTORY OF SURGERY       OPERATIVE HYSTEROSCOPY  6/17/2014    Procedure: OPERATIVE HYSTEROSCOPY;  Surgeon: Paola Camara MD;  Location: [facility]     REMOVE INTRAUTERINE DEVICE   6/17/2014    Procedure: REMOVE INTRAUTERINE DEVICE;  Surgeon: Paola Camara MD;  Location: [facility]       Family History   Problem Relation Age of Onset     C.A.D. Paternal Grandmother      Cerebrovascular Disease Paternal Grandmother      Depression Paternal Grandmother      Obesity Paternal Grandmother      C.A.D. Paternal Grandfather      Cerebrovascular Disease Paternal Grandfather         unsure     Substance Abuse Paternal Grandfather      Hypertension Father      Asthma Father      Obesity Father      Skin Cancer Father      Diabetes Father      Neurologic Disorder Mother         Graves disease     Hyperlipidemia Mother      Depression Mother      Thyroid Disease Mother      Other - See Comments Other      Skin Cancer Sister      Depression Maternal Grandmother      Cancer Sister         mild skin cancer,cured from excision     Depression Nephew      Anxiety Disorder Nephew      Mental Illness Maternal Half-Brother      Mental Illness Paternal Half-Brother      Substance Abuse Nephew      Asthma Sister      Asthma Nephew      Thyroid Disease Maternal Half-Sister      Thyroid Disease Paternal Half-Sister      Diabetes No family hx of          Medications:    Current Outpatient Medications:      albuterol (PROAIR HFA/PROVENTIL HFA/VENTOLIN HFA) 108 (90 Base) MCG/ACT inhaler, Inhale 2 puffs into the lungs every 4 hours as needed for shortness of breath / dyspnea or wheezing, Disp: 1 Inhaler, Rfl: 3     albuterol (PROVENTIL) (2.5 MG/3ML) 0.083% neb solution, Take 1 vial (2.5 mg) by nebulization every 4 hours as needed for shortness of breath / dyspnea or wheezing, Disp: 1 Box, Rfl: 1     cetirizine (ZYRTEC) 10 MG tablet, Take 10 mg by mouth daily, Disp: , Rfl:      fluticasone (FLONASE) 50 MCG/ACT nasal spray, Spray 1-2 sprays into both nostrils daily, Disp: 48 g, Rfl: 3     fluticasone-salmeterol (ADVAIR DISKUS) 500-50 MCG/DOSE inhaler, Inhale 1 puff into the lungs 2 times daily, Disp: 3 Inhaler, Rfl:  1     hydrochlorothiazide (MICROZIDE) 12.5 MG capsule, Take 1 capsule (12.5 mg) by mouth daily, Disp: 90 capsule, Rfl: 0     lisinopril (ZESTRIL) 40 MG tablet, Take 1 tablet (40 mg) by mouth daily, Disp: 90 tablet, Rfl: 0     montelukast (SINGULAIR) 10 MG tablet, Take 1 tablet (10 mg) by mouth daily, Disp: 90 tablet, Rfl: 1     ORDER FOR ALLERGEN IMMUNOTHERAPY, Name of Mix: Mix #1  Dust Mite, Cat, Dog Cat Hair, Standardized 10,000 BAU/mL, ALK  2.0 ml Dog Hair-Dander, A. P.  1:100 w/v, HS  1.0 ml Dust Mites DP. 30,000 AU/mL, HS  0.6 ml  Diluent: HSA qs to 5ml, Disp: 5 mL, Rfl: PRN     ORDER FOR ALLERGEN IMMUNOTHERAPY, Name of Mix: Mix #2  Tree , Weeds Birch Mix PRW 1:20 w/v, HS  0.5 ml Hickory, Shagbark 1:20 w/v, HS  0.5 ml [place] Mix RW 1:20 w/v, HS 0.5 ml Oak Mix RVW 1:20 w/v, HS 0.5 ml Kochia 1:20 w/v, HS 0.5 ml Nettle 1:20 w/v, HS 0.5 ml Ragweed Mixed 1:20 w/v ALK  0.5 ml Sagebrush, Mugwort 1:20 w/v, HS 0.5 ml Diluent: HSA qs to 5ml, Disp: 5 mL, Rfl: PRN     ORDER FOR ALLERGEN IMMUNOTHERAPY, Name of Mix: Mix #3  Mold Alternaria Tenuis 1:10 w/v, HS  0.5 ml Aspergillus Fumigatus 1:10 w/v, HS  0.5 ml Curvularia Spicifera 1:10 w/v, HS  0.5 ml Epicoccum Nigrum 1:10 w/v, HS 0.5 ml Hormodendrum Cladosporioides 1:10 w/v, HS 0.5 ml Penicillium Mix 1:10 w/v, HS  0.5 ml Phoma Herbarum 1:10 w/v, HS  0.5 ml Diluent: HSA qs to 5ml, Disp: 5 mL, Rfl: PRN     venlafaxine (EFFEXOR-XR) 150 MG 24 hr capsule, Take 1 capsule (150 mg) by mouth daily, Disp: 90 capsule, Rfl: 1    Allergies:  Patient has no known allergies.    ROS:  She denies abnormal vaginal bleeding, + discharge  and vaginal discomfort, no dysuria, frequency or hematuria.    EXAM:  /81 (BP Location: Left arm, Patient Position: Sitting, Cuff Size: Adult Regular)   Pulse 68   Temp 98.6  F (37  C) (Oral)   General - pleasant female in no acute distress.  Neurological - Alert and oriented  Psych:  normal mood and affect.  normal respiratory and cardiovascular  effort   Breast - deferred.  Abdomen - soft, nontender, nondistended, no masses.  Musculoskeletal - no gross deformities.  Skin- no rashes seen     Pelvic:  EG - normal post menopausal adult female.  BUS - within normal limits.  Vagina - well rugated, moderate discharge.  Cervix - no lesions, no CMT.  BME limited by body habitus Uterus - seems normal size and nontender.  Adnexae - no masses or tenderness.  RV - deferred.    Procedure:  After verbal consent was obtained from the patient, IUD strings were grasped with ring forcep and IUD easily removed intact with minimal patient discomfort noted.  No bleeding noted.    ASSESSMENT:  Encounter Diagnoses   Name Primary?     Encounter for IUD removal Yes     Screening for cervical cancer      Atrophic vaginitis      BV (bacterial vaginosis)         PLAN:   Pap smear done and sent today.   IUD was removed without difficulty.  discussed management of her vaginal discomfort which is likely a combination of irritation from bacterial vaginosis and atrophic vaginitis.  Combination of both of those things can create a lot of discomfort.  We will treat her with MetroGel vaginal cream.  Prescription was sent today.  Discussed management of postmenopausal atrophic vaginitis.  Options include vaginal estrogen cream, Vagifem, Estring.  Since she is not sure what her insurance will cover, I sent a prescription for each of those things to her pharmacy.  She will choose the best option based on insurance coverage.    Instructions given to patient:   Your wet prep today shows a few clue cells which suggests a mild case of bacterial vaginosis.  This can definitely be contributing to your symptoms as we discussed in the visit today.  This creates inflammation on top of already fragile vaginal mucosa.  I will send a prescription to your pharmacy for MetroGel vaginal cream.  Please use this before bed for 5 nights in a row.  If you still feel symptomatic you can treat yourself for 2 more  days.  In the meantime, you can go ahead and start using the vaginal treatment as well.  I would expect with the next 2 to 3 weeks you will be feeling better.     Jadyn Salas MD

## 2020-08-07 LAB
COPATH REPORT: NORMAL
PAP: NORMAL

## 2020-08-17 ENCOUNTER — ALLIED HEALTH/NURSE VISIT (OUTPATIENT)
Dept: ALLERGY | Facility: CLINIC | Age: 60
End: 2020-08-17
Payer: COMMERCIAL

## 2020-08-17 DIAGNOSIS — J30.9 ALLERGIC RHINITIS: Primary | ICD-10-CM

## 2020-08-17 DIAGNOSIS — Z51.6 NEED FOR DESENSITIZATION TO ALLERGENS: ICD-10-CM

## 2020-08-17 PROCEDURE — 95117 IMMUNOTHERAPY INJECTIONS: CPT

## 2020-08-17 PROCEDURE — 99207 ZZC DROP WITH A PROCEDURE: CPT

## 2020-08-17 RX ORDER — EPINEPHRINE 0.3 MG/.3ML
0.3 INJECTION SUBCUTANEOUS PRN
Qty: 2 ML | Refills: 2 | Status: CANCELLED | OUTPATIENT
Start: 2020-08-17

## 2020-08-17 NOTE — TELEPHONE ENCOUNTER
Pending Prescriptions:                       Disp   Refills    EPINEPHrine (AUVI-Q) 0.3 MG/0.3ML injecti*2 mL   2            Sig: Inject 0.3 mLs (0.3 mg) into the muscle as needed           for anaphylaxis    Please refill if appropriate.    Tania Yoder RN

## 2020-08-17 NOTE — PROGRESS NOTES
Immunotherapy 2020   Interval from last injection (days): 14   Has the vial ? No   Did you have a reaction after the last visit? No   Are you ill today? No   Asthma exacerbation or symptoms No   Do you have a fever today? No   Are you pregnant? No   Are you on any beta blockers? No   Does the patient need to be pre-medicated? Yes   Please explain the pre-medication regimen 10mg cetirizine pm prior, am of shots   Does patient have their Epi Pen today that is not ?   Yes   Serum #1 Antigen(s) C;D;Dm   Expiration Date #1 2020   Vial Concentration #1 1:1 (Red)   Dose (mL) #1 0.5   Location #1 [facility]   Antigen Top Dose #1 0.5   Comment #1 red   Given By    Verified By jose   Serum #2 Antigen(s) T;W   Expiration Date #2 2020   Vial Concentration #2 1:1 (Red)   Dose (mL)#2 0.5   Location #2 [facility]   Antigen Top Dose #2 0.5   Comment #2 red   Given By    Verified By ojse   Serum #3 Antigen(s) M   Expiration Date #3 2020   Vial Concentration #3 1:1 (Red)   Dose (mL) #3 0.5   Location #3 [facility]   Antigen Top Dose #3 0.5   Comment #3 red   Given By    Verified By jose

## 2020-08-31 ENCOUNTER — ALLIED HEALTH/NURSE VISIT (OUTPATIENT)
Dept: ALLERGY | Facility: CLINIC | Age: 60
End: 2020-08-31
Payer: COMMERCIAL

## 2020-08-31 DIAGNOSIS — Z51.6 NEED FOR DESENSITIZATION TO ALLERGENS: Primary | ICD-10-CM

## 2020-08-31 PROCEDURE — 99207 ZZC DROP WITH A PROCEDURE: CPT

## 2020-08-31 PROCEDURE — 95117 IMMUNOTHERAPY INJECTIONS: CPT

## 2020-09-14 ENCOUNTER — ALLIED HEALTH/NURSE VISIT (OUTPATIENT)
Dept: ALLERGY | Facility: CLINIC | Age: 60
End: 2020-09-14
Payer: COMMERCIAL

## 2020-09-14 DIAGNOSIS — Z51.6 NEED FOR DESENSITIZATION TO ALLERGENS: Primary | ICD-10-CM

## 2020-09-14 PROCEDURE — 95117 IMMUNOTHERAPY INJECTIONS: CPT

## 2020-09-14 PROCEDURE — 99207 ZZC DROP WITH A PROCEDURE: CPT

## 2020-09-16 ENCOUNTER — MYC MEDICAL ADVICE (OUTPATIENT)
Dept: ALLERGY | Facility: CLINIC | Age: 60
End: 2020-09-16

## 2020-09-16 NOTE — LETTER
[facility]  [address] [place] [id number]  [phone]        September 17, 2020      Nela Mares  [address] [facility] [address]      Dear Nela,    As your health care provider, I am concerned that your age and/or underlying medical condition puts you at particularly high risk for serious complications should you contract a COVID-19 infection.     COVID-19 is a new disease and there is limited information regarding risk factors for severe disease. Based on currently available information and clinical expertise, older adults and people of any age who have serious underlying medical conditions might be at higher risk for severe illness from COVID-19.     It is my medical opinion that you should maintain physical distancing of at least 6 feet when possible, wear a mask and face shield, and be excused from duties that require physical contact with other individuals.     Please do not hesitate to contact me at the number above if there are any questions or concerns.      Sincerely,          Karen Clark MD    [facility]

## 2020-10-05 ENCOUNTER — HOSPITAL ENCOUNTER (EMERGENCY)
Facility: CLINIC | Age: 60
Discharge: HOME | End: 2020-10-05
Attending: EMERGENCY MEDICINE | Admitting: EMERGENCY MEDICINE
Payer: COMMERCIAL

## 2020-10-05 ENCOUNTER — HOSPITAL ENCOUNTER (OUTPATIENT)
Dept: CARDIOLOGY | Facility: CLINIC | Age: 60
Discharge: HOME | End: 2020-10-05
Attending: EMERGENCY MEDICINE | Admitting: EMERGENCY MEDICINE
Payer: COMMERCIAL

## 2020-10-05 ENCOUNTER — APPOINTMENT (OUTPATIENT)
Dept: GENERAL RADIOLOGY | Facility: CLINIC | Age: 60
End: 2020-10-05
Attending: EMERGENCY MEDICINE
Payer: COMMERCIAL

## 2020-10-05 ENCOUNTER — APPOINTMENT (OUTPATIENT)
Dept: CARDIOLOGY | Facility: CLINIC | Age: 60
End: 2020-10-05
Attending: EMERGENCY MEDICINE
Payer: COMMERCIAL

## 2020-10-05 VITALS
DIASTOLIC BLOOD PRESSURE: 88 MMHG | HEART RATE: 63 BPM | SYSTOLIC BLOOD PRESSURE: 141 MMHG | TEMPERATURE: 98.3 F | OXYGEN SATURATION: 97 %

## 2020-10-05 DIAGNOSIS — R55 NEAR SYNCOPE: ICD-10-CM

## 2020-10-05 LAB
ALBUMIN SERPL-MCNC: 3.7 G/DL (ref 3.4–5)
ALP SERPL-CCNC: 73 U/L (ref 40–150)
ALT SERPL W P-5'-P-CCNC: 120 U/L (ref 0–50)
ANION GAP SERPL CALCULATED.3IONS-SCNC: 10 MMOL/L (ref 3–14)
AST SERPL W P-5'-P-CCNC: 85 U/L (ref 0–45)
BASOPHILS # BLD AUTO: 0 10E9/L (ref 0–0.2)
BASOPHILS NFR BLD AUTO: 0.4 %
BILIRUB SERPL-MCNC: 0.3 MG/DL (ref 0.2–1.3)
BUN SERPL-MCNC: 16 MG/DL (ref 7–30)
CALCIUM SERPL-MCNC: 9.6 MG/DL (ref 8.5–10.1)
CHLORIDE SERPL-SCNC: 107 MMOL/L (ref 94–109)
CO2 SERPL-SCNC: 24 MMOL/L (ref 20–32)
CREAT SERPL-MCNC: 0.78 MG/DL (ref 0.52–1.04)
DIFFERENTIAL METHOD BLD: NORMAL
EOSINOPHIL # BLD AUTO: 0.1 10E9/L (ref 0–0.7)
EOSINOPHIL NFR BLD AUTO: 2 %
ERYTHROCYTE [DISTWIDTH] IN BLOOD BY AUTOMATED COUNT: 13.1 % (ref 10–15)
GFR SERPL CREATININE-BSD FRML MDRD: 82 ML/MIN/{1.73_M2}
GLUCOSE SERPL-MCNC: 100 MG/DL (ref 70–99)
HCT VFR BLD AUTO: 44.4 % (ref 35–47)
HGB BLD-MCNC: 14.9 G/DL (ref 11.7–15.7)
IMM GRANULOCYTES # BLD: 0 10E9/L (ref 0–0.4)
IMM GRANULOCYTES NFR BLD: 0.2 %
INR PPP: 1.03 (ref 0.86–1.14)
INTERPRETATION ECG - MUSE: NORMAL
LYMPHOCYTES # BLD AUTO: 2 10E9/L (ref 0.8–5.3)
LYMPHOCYTES NFR BLD AUTO: 44 %
MCH RBC QN AUTO: 30.7 PG (ref 26.5–33)
MCHC RBC AUTO-ENTMCNC: 33.6 G/DL (ref 31.5–36.5)
MCV RBC AUTO: 91 FL (ref 78–100)
MONOCYTES # BLD AUTO: 0.3 10E9/L (ref 0–1.3)
MONOCYTES NFR BLD AUTO: 5.8 %
NEUTROPHILS # BLD AUTO: 2.1 10E9/L (ref 1.6–8.3)
NEUTROPHILS NFR BLD AUTO: 47.6 %
NRBC # BLD AUTO: 0 10*3/UL
NRBC BLD AUTO-RTO: 0 /100
PLATELET # BLD AUTO: 231 10E9/L (ref 150–450)
POTASSIUM SERPL-SCNC: 3 MMOL/L (ref 3.4–5.3)
PROT SERPL-MCNC: 7.7 G/DL (ref 6.8–8.8)
RBC # BLD AUTO: 4.86 10E12/L (ref 3.8–5.2)
SODIUM SERPL-SCNC: 141 MMOL/L (ref 133–144)
TROPONIN I BLD-MCNC: 0 UG/L (ref 0–0.08)
TROPONIN I SERPL-MCNC: <0.015 UG/L (ref 0–0.04)
TROPONIN I SERPL-MCNC: <0.015 UG/L (ref 0–0.04)
TSH SERPL DL<=0.005 MIU/L-ACNC: 4.84 MU/L (ref 0.4–4)
WBC # BLD AUTO: 4.5 10E9/L (ref 4–11)

## 2020-10-05 PROCEDURE — 250N000013 HC RX MED GY IP 250 OP 250 PS 637: Performed by: EMERGENCY MEDICINE

## 2020-10-05 PROCEDURE — 93010 ELECTROCARDIOGRAM REPORT: CPT | Performed by: EMERGENCY MEDICINE

## 2020-10-05 PROCEDURE — 93005 ELECTROCARDIOGRAM TRACING: CPT | Performed by: EMERGENCY MEDICINE

## 2020-10-05 PROCEDURE — 84443 ASSAY THYROID STIM HORMONE: CPT | Performed by: EMERGENCY MEDICINE

## 2020-10-05 PROCEDURE — 93227 XTRNL ECG REC<48 HR R&I: CPT | Performed by: INTERNAL MEDICINE

## 2020-10-05 PROCEDURE — 99285 EMERGENCY DEPT VISIT HI MDM: CPT | Mod: 25 | Performed by: EMERGENCY MEDICINE

## 2020-10-05 PROCEDURE — 93306 TTE W/DOPPLER COMPLETE: CPT | Mod: 26 | Performed by: INTERNAL MEDICINE

## 2020-10-05 PROCEDURE — 93225 XTRNL ECG REC<48 HRS REC: CPT

## 2020-10-05 PROCEDURE — 80053 COMPREHEN METABOLIC PANEL: CPT | Performed by: EMERGENCY MEDICINE

## 2020-10-05 PROCEDURE — 85025 COMPLETE CBC W/AUTO DIFF WBC: CPT | Performed by: EMERGENCY MEDICINE

## 2020-10-05 PROCEDURE — 84484 ASSAY OF TROPONIN QUANT: CPT | Performed by: EMERGENCY MEDICINE

## 2020-10-05 PROCEDURE — 71045 X-RAY EXAM CHEST 1 VIEW: CPT

## 2020-10-05 PROCEDURE — 84484 ASSAY OF TROPONIN QUANT: CPT

## 2020-10-05 PROCEDURE — 999N000208 ECHOCARDIOGRAM COMPLETE

## 2020-10-05 PROCEDURE — 85610 PROTHROMBIN TIME: CPT | Performed by: EMERGENCY MEDICINE

## 2020-10-05 PROCEDURE — 255N000002 HC RX 255 OP 636: Performed by: STUDENT IN AN ORGANIZED HEALTH CARE EDUCATION/TRAINING PROGRAM

## 2020-10-05 RX ORDER — POTASSIUM CHLORIDE 750 MG/1
40 TABLET, EXTENDED RELEASE ORAL ONCE
Status: COMPLETED | OUTPATIENT
Start: 2020-10-05 | End: 2020-10-05

## 2020-10-05 RX ADMIN — POTASSIUM CHLORIDE 40 MEQ: 750 TABLET, EXTENDED RELEASE ORAL at 14:05

## 2020-10-05 RX ADMIN — HUMAN ALBUMIN MICROSPHERES AND PERFLUTREN 6 ML: 10; .22 INJECTION, SOLUTION INTRAVENOUS at 14:45

## 2020-10-05 ASSESSMENT — ENCOUNTER SYMPTOMS
SHORTNESS OF BREATH: 0
ANAL BLEEDING: 0
BLOOD IN STOOL: 0
VOMITING: 0
ABDOMINAL PAIN: 0
NAUSEA: 0
LIGHT-HEADEDNESS: 0
FEVER: 0
DIAPHORESIS: 0
DIARRHEA: 0
WEAKNESS: 0
PALPITATIONS: 0

## 2020-10-05 NOTE — ED AVS SNAPSHOT
[facility]  [address]  [place] [address]  Phone: [phone]  Fax: [phone]                                    Nela Mares   MRN: [id number]    Department: [facility] Emergency Department   Date of Visit: 10/5/2020           After Visit Summary Signature Page    I have received my discharge instructions, and my questions have been answered. I have discussed any challenges I see with this plan with the nurse or doctor.    ..........................................................................................................................................  Patient/Patient Representative Signature      ..........................................................................................................................................  Patient Representative Print Name and Relationship to Patient    ..................................................               ................................................  Date                                   Time    ..........................................................................................................................................  Reviewed by Signature/Title    ...................................................              ..............................................  Date                                               Time          22EPIC Rev 08/18

## 2020-10-05 NOTE — ED PROVIDER NOTES
[facility] EMERGENCY DEPARTMENT ([facility])     October 5, 2020    History     Chief Complaint   Patient presents with     Shortness of Breath     jaw pain, SOB     HPI  Nela Mares is a 60 year old female who has a PMH of depression, HTN, asthma, allergic rhinitis, MACIE w/ CPAP, hx of acute cholecystitis s/p lap mario (2014) who presents to the ER with complaints of a near syncopal episode that happened at work.  Patient states she was talking with one of her partners when she started to feel palpitations and her heart racing.  Patient states that she had associated bilateral jaw pain, shortness of breath, diaphoresis but no nausea.  Patient states that she was near syncopal during this whole time and the whole episode lasted approximately 45 minutes.  911 was not called as the patient was brought to the hospital by her sister who also works in the same facility and during transport the patient symptoms resolved.  Patient states that she has had a number of these episodes in the past and has had a Holter monitor done in 2010 which revealed no evidence of arrhythmias.  Patient states that this is the longest this episode has ever happened in the past and states that she does commonly have bilateral jaw pain associated with the episodes previously.  Patient denies any fevers, denies any vomiting, diarrhea melena or bright red blood per rectum.  Patient denies any diabetes but admits to hypertension as her primary cardiac risk factor.  Patient has never had any angiography or cardiac stress test and has never had any EP studies.    This part of the medical record was transcribed by Jose Rodriguez Scribe.       PAST MEDICAL HISTORY:   Past Medical History:   Diagnosis Date     Allergic rhinitis, cause unspecified      Cervical high risk HPV (human papillomavirus) test positive 03/21/2017    3/21/17 NIL pap/+ HR HPV (not 16 or 18).      Depressive disorder      Depressive disorder, not elsewhere  classified     seasonal     Hypertension      Mild persistent asthma      Obstructive sleep apnea (adult) (pediatric)     uses CPAP     Scalp mass 7/2014       PAST SURGICAL HISTORY:   Past Surgical History:   Procedure Laterality Date     COLONOSCOPY  6/21/2012    Procedure: COLONOSCOPY;  COLONOSCOPY, SCREEN;  Surgeon: Bart You MD;  Location: [facility] OR     D & C       ENDOSCOPIC RETROGRADE CHOLANGIOPANCREATOGRAM  1/4/2014    Procedure: ENDOSCOPIC RETROGRADE CHOLANGIOPANCREATOGRAM;  ERCP biliary sphincterotomy, bile duct stone removal, epinepherine washing.;  Surgeon: Ba Meyers MD;  Location: [facility] OR     LAPAROSCOPIC CHOLECYSTECTOMY WITH CHOLANGIOGRAMS  1/4/2014    Procedure: LAPAROSCOPIC CHOLECYSTECTOMY WITH CHOLANGIOGRAMS;;  Surgeon: Haja Sage MD;  Location: [facility] OR     NO HISTORY OF SURGERY       OPERATIVE HYSTEROSCOPY  6/17/2014    Procedure: OPERATIVE HYSTEROSCOPY;  Surgeon: Paola Camara MD;  Location: [facility] OR     REMOVE INTRAUTERINE DEVICE  6/17/2014    Procedure: REMOVE INTRAUTERINE DEVICE;  Surgeon: Paola Camara MD;  Location: [facility] OR       Past medical history, past surgical history, medications, and allergies were reviewed with the patient. Additional pertinent items: None    FAMILY HISTORY:   Family History   Problem Relation Age of Onset     C.A.D. Paternal Grandmother      Cerebrovascular Disease Paternal Grandmother      Depression Paternal Grandmother      Obesity Paternal Grandmother      C.A.D. Paternal Grandfather      Cerebrovascular Disease Paternal Grandfather         unsure     Substance Abuse Paternal Grandfather      Hypertension Father      Asthma Father      Obesity Father      Skin Cancer Father      Diabetes Father      Neurologic Disorder Mother         Graves disease     Hyperlipidemia Mother      Depression Mother      Thyroid Disease Mother      Other - See Comments Other      Skin Cancer Sister      Depression Maternal Grandmother       Cancer Sister         mild skin cancer,cured from excision     Depression Nephew      Anxiety Disorder Nephew      Mental Illness Maternal Half-Brother      Mental Illness Paternal Half-Brother      Substance Abuse Nephew      Asthma Sister      Asthma Nephew      Thyroid Disease Maternal Half-Sister      Thyroid Disease Paternal Half-Sister      Diabetes No family hx of        SOCIAL HISTORY:   Social History     Tobacco Use     Smoking status: Never Smoker     Smokeless tobacco: Never Used   Substance Use Topics     Alcohol use: No     Social history was reviewed with the patient. Additional pertinent items: None        Dose / Directions   * albuterol (2.5 MG/3ML) 0.083% neb solution  Commonly known as: PROVENTIL  Used for: Mild persistent asthma without complication      Dose: 2.5 mg  Take 1 vial (2.5 mg) by nebulization every 4 hours as needed for shortness of breath / dyspnea or wheezing  Quantity: 1 Box  Refills: 1     * albuterol 108 (90 Base) MCG/ACT inhaler  Commonly known as: PROAIR HFA/PROVENTIL HFA/VENTOLIN HFA  Used for: Mild persistent asthma without complication      Dose: 2 puff  Inhale 2 puffs into the lungs every 4 hours as needed for shortness of breath / dyspnea or wheezing  Quantity: 1 Inhaler  Refills: 3     cetirizine 10 MG tablet  Commonly known as: zyrTEC      Dose: 10 mg  Take 10 mg by mouth daily  Refills: 0     estradiol 10 MCG Tabs vaginal tablet  Commonly known as: Vagifem  Used for: Atrophic vaginitis      Dose: 10 mcg  Place 1 tablet (10 mcg) vaginally twice a week  Quantity: 28 tablet  Refills: 3     fluticasone 50 MCG/ACT nasal spray  Commonly known as: FLONASE  Used for: Other allergy status, other than to drugs and biological substances      Dose: 1-2 spray  Spray 1-2 sprays into both nostrils daily  Quantity: 48 g  Refills: 3     fluticasone-salmeterol 500-50 MCG/DOSE inhaler  Commonly known as: Advair Diskus  Used for: Mild persistent asthma without complication      Inhale  1 puff into the lungs 2 times daily  Quantity: 3 Inhaler  Refills: 1     hydrochlorothiazide 12.5 MG capsule  Commonly known as: MICROZIDE  Used for: Essential hypertension with goal blood pressure less than 140/90      Dose: 1 capsule  Take 1 capsule (12.5 mg) by mouth daily  Quantity: 90 capsule  Refills: 0     lisinopril 40 MG tablet  Commonly known as: ZESTRIL  Used for: Essential hypertension with goal blood pressure less than 140/90      Dose: 40 mg  Take 1 tablet (40 mg) by mouth daily  Quantity: 90 tablet  Refills: 0     montelukast 10 MG tablet  Commonly known as: SINGULAIR  Used for: Mild persistent asthma without complication      Dose: 10 mg  Take 1 tablet (10 mg) by mouth daily  Quantity: 90 tablet  Refills: 1     ORDER FOR ALLERGEN IMMUNOTHERAPY  5 mL vial  Used for: Allergic rhinitis due to animals, Allergic rhinitis due to dust mite      Name of Mix: Mix #1  Dust Mite, Cat, Dog  Cat Hair, Standardized 10,000 BAU/mL, ALK  2.0 ml  Dog Hair-Dander, A. P.  1:100 w/v, HS  1.0 ml  Dust Mites DP. 30,000 AU/mL, HS  0.6 ml   Diluent: HSA qs to 5ml  Quantity: 5 mL  Refills: PRN     ORDER FOR ALLERGEN IMMUNOTHERAPY  5 mL vial  Used for: Seasonal allergic rhinitis due to pollen      Name of Mix: Mix #2  Tree , Weeds  Birch Mix PRW 1:20 w/v, HS  0.5 ml  Hickory, Shagbark 1:20 w/v, HS  0.5 ml  [place] Mix RW 1:20 w/v, HS 0.5 ml  Oak Mix RVW 1:20 w/v, HS 0.5 ml  Kochia 1:20 w/v, HS 0.5 ml  Nettle 1:20 w/v, HS 0.5 ml  Ragweed Mixed 1:20 w/v ALK  0.5 ml  Sagebrush, Mugwort 1:20 w/v, HS 0.5 ml  Diluent: HSA qs to 5ml  Quantity: 5 mL  Refills: PRN     ORDER FOR ALLERGEN IMMUNOTHERAPY  5 mL vial  Used for: Allergic rhinitis due to mold      Name of Mix: Mix #3  Mold  Alternaria Tenuis 1:10 w/v, HS  0.5 ml  Aspergillus Fumigatus 1:10 w/v, HS  0.5 ml  Curvularia Spicifera 1:10 w/v, HS  0.5 ml  Epicoccum Nigrum 1:10 w/v, HS 0.5 ml  Hormodendrum Cladosporioides 1:10 w/v, HS 0.5 ml  Penicillium Mix 1:10 w/v, HS  0.5  ml  Phoma Herbarum 1:10 w/v, HS  0.5 ml  Diluent: HSA qs to 5ml  Quantity: 5 mL  Refills: PRN     venlafaxine 150 MG 24 hr capsule  Commonly known as: EFFEXOR-XR  Used for: Mild major depression (H)      Dose: 150 mg  Take 1 capsule (150 mg) by mouth daily  Quantity: 90 capsule  Refills: 1               No Known Allergies     Review of Systems   Constitutional: Negative for diaphoresis ( earlier episode) and fever.   Respiratory: Negative for shortness of breath ( earlier episode).    Cardiovascular: Negative for chest pain and palpitations (earlier episode).   Gastrointestinal: Negative for abdominal pain, anal bleeding, blood in stool, diarrhea, nausea and vomiting.   Neurological: Negative for weakness and light-headedness.   All other systems reviewed and are negative.    A complete review of systems was performed with pertinent positives and negatives noted in the HPI, and all other systems negative.    Physical Exam   BP: (!) 136/96  Pulse: 88  Temp: 98.3  F (36.8  C)  SpO2: 96 %  BP (!) 141/88   Pulse 70   Temp 98.3  F (36.8  C)   LMP 12/21/2014   SpO2 98%     Physical Exam  Vitals signs and nursing note reviewed.   Constitutional:       General: She is not in acute distress.     Appearance: She is not diaphoretic.   HENT:      Head: Atraumatic.   Eyes:      Extraocular Movements: Extraocular movements intact.      Pupils: Pupils are equal, round, and reactive to light.   Neck:      Musculoskeletal: Neck supple.      Vascular: No JVD.   Cardiovascular:      Rate and Rhythm: Regular rhythm.      Heart sounds: No murmur.   Pulmonary:      Breath sounds: Normal breath sounds.   Abdominal:      Palpations: Abdomen is soft.      Tenderness: There is no abdominal tenderness.   Musculoskeletal:      Right lower leg: No edema.      Left lower leg: No edema.   Skin:     General: Skin is warm.   Neurological:      General: No focal deficit present.      Mental Status: She is alert and oriented to person, place, and  time.   Psychiatric:         Mood and Affect: Mood normal.         ED Course     Patient presents to the ER with symptoms having resolved and was placed on cardiac monitor and oximetry.    IV was established for blood draw.       Procedures          EKG revealed a normal sinus rhythm at a rate of 89 with a MT interval of 0.158 and a QRS duration of 0.082.  The patient had a normal axis with no acute ST or T wave changes significant for ischemia.  This is read by me personally.    Orders Placed This Encounter   Procedures     XR Chest Port 1 View     CBC with platelets differential     Troponin I     INR     Comprehensive metabolic panel     TSH     Troponin I     CARDIOLOGY EVAL ADULT REFERRAL     EKG 12-lead, tracing only     Pulse oximetry nursing     Cardiac Continuous Monitoring     Peripheral IV: Standard     Review medications with patient     Oxygen: Nasal cannula, Oxygen mask     Leadless EKG Monitor 3 to 14 Days     Troponin POCT     Echocardiogram Complete     Results for orders placed or performed during the hospital encounter of 10/05/20 (from the past 24 hour(s))   EKG 12-lead, tracing only   Result Value Ref Range    Interpretation ECG Click View Image link to view waveform and result    CBC with platelets differential   Result Value Ref Range    WBC 4.5 4.0 - 11.0 10e9/L    RBC Count 4.86 3.8 - 5.2 10e12/L    Hemoglobin 14.9 11.7 - 15.7 g/dL    Hematocrit 44.4 35.0 - 47.0 %    MCV 91 78 - 100 fl    MCH 30.7 26.5 - 33.0 pg    MCHC 33.6 31.5 - 36.5 g/dL    RDW 13.1 10.0 - 15.0 %    Platelet Count 231 150 - 450 10e9/L    Diff Method Automated Method     % Neutrophils 47.6 %    % Lymphocytes 44.0 %    % Monocytes 5.8 %    % Eosinophils 2.0 %    % Basophils 0.4 %    % Immature Granulocytes 0.2 %    Nucleated RBCs 0 0 /100    Absolute Neutrophil 2.1 1.6 - 8.3 10e9/L    Absolute Lymphocytes 2.0 0.8 - 5.3 10e9/L    Absolute Monocytes 0.3 0.0 - 1.3 10e9/L    Absolute Eosinophils 0.1 0.0 - 0.7 10e9/L     Absolute Basophils 0.0 0.0 - 0.2 10e9/L    Abs Immature Granulocytes 0.0 0 - 0.4 10e9/L    Absolute Nucleated RBC 0.0    Troponin I   Result Value Ref Range    Troponin I ES <0.015 0.000 - 0.045 ug/L   INR   Result Value Ref Range    INR 1.03 0.86 - 1.14   Comprehensive metabolic panel   Result Value Ref Range    Sodium 141 133 - 144 mmol/L    Potassium 3.0 (L) 3.4 - 5.3 mmol/L    Chloride 107 94 - 109 mmol/L    Carbon Dioxide 24 20 - 32 mmol/L    Anion Gap 10 3 - 14 mmol/L    Glucose 100 (H) 70 - 99 mg/dL    Urea Nitrogen 16 7 - 30 mg/dL    Creatinine 0.78 0.52 - 1.04 mg/dL    GFR Estimate 82 >60 mL/min/[1.73_m2]    GFR Estimate If Black >90 >60 mL/min/[1.73_m2]    Calcium 9.6 8.5 - 10.1 mg/dL    Bilirubin Total 0.3 0.2 - 1.3 mg/dL    Albumin 3.7 3.4 - 5.0 g/dL    Protein Total 7.7 6.8 - 8.8 g/dL    Alkaline Phosphatase 73 40 - 150 U/L     (H) 0 - 50 U/L    AST 85 (H) 0 - 45 U/L   TSH   Result Value Ref Range    TSH 4.84 (H) 0.40 - 4.00 mU/L   Troponin POCT   Result Value Ref Range    Troponin I 0.00 0.00 - 0.08 ug/L   XR Chest Port 1 View    Narrative    CHEST PORTABLE ONE VIEW 2020 11:41 AM     HISTORY: Shortness of breath.    COMPARISON: May 17, 2007.      Impression    IMPRESSION: Heart size is normal. No pleural effusion, pneumothorax,  or abnormal area of consolidation. Overall, no change.    DORON EASON MD   Troponin I   Result Value Ref Range    Troponin I ES <0.015 0.000 - 0.045 ug/L   Echocardiogram Complete    Narrative    [id number]  [id number]  [id number]  [id number]^HELDER^GUICHO^[facility],[place]  Echocardiography Laboratory  [address] [place] [id number]     Name: JAEL SMITH  MRN: [id number]  : 1960  Study Date: 10/05/2020 02:22 PM  Age: 60 yrs  Gender: Female  Patient Location: [facility]  Reason For Study: Syncope  Ordering Physician: GUICHO PENDLETON  Performed By: Keesha Hicks RDCS     BSA: 2.3 m2  Height: 69  in  Weight: 258 lb  HR: 64  _____________________________________________________________________________  __        Procedure  Complete Portable Echo Adult. Contrast Optison. Optison (NDC #3654-7785-03)  given intravenously. Patient was given 6 ml mixture of 3 ml Optison and 6 ml  saline. 3 ml wasted.  _____________________________________________________________________________  __        Interpretation Summary  No structrual cardiac etiology of syncope.     Left ventricular function, chamber size, wall motion, and wall thickness are  normal.The LVEF is 60-65%.  The right ventricle is normal size and systolic.  Both atria appear normal.  No significant valvular dysfunction.  The aorta root is normal.  No pericardial effusion is present.     No prior studies available for comparison.  _____________________________________________________________________________  __        Left Ventricle  Left ventricular function, chamber size, wall motion, and wall thickness are  normal.The EF is 60-65%. Borderline concentric wall thickening consistent with  left ventricular hypertrophy is present. Left ventricular diastolic function  is normal.     Right Ventricle  The right ventricle is normal size. Global right ventricular function is  normal.     Atria  Both atria appear normal. The atrial septum is intact as assessed by color  Doppler .        Mitral Valve  The mitral valve is normal. On Doppler interrogation, there is no significant  stenosis or regurgitation.     Aortic Valve  Aortic valve is normal in structure and function. The aortic valve is  tricuspid. On Doppler interrogation, there is no significant stenosis or  regurgitation.     Tricuspid Valve  The tricuspid valve is normal. Trace tricuspid insufficiency is present.  Pulmonary artery systolic pressure cannot be assessed.     Pulmonic Valve  The pulmonic valve is normal. Trace pulmonic insufficiency is present.     Vessels  The aorta root is normal. The pulmonary  artery and bifurcation cannot be  assessed. Sinuses of Valsalva 3.5 cm. Ascending aorta 4.0 cm. Indexed  ascending aorta diameter is 1.7 cm/m2. Unable to assess mean RA pressure due  to technically difficult study.     Pericardium  No pericardial effusion is present.        Compared to Previous Study  There is no prior study for direct comparison.     Attestation  I have personally viewed the imaging and agree with the interpretation and  report as documented by the fellow, Gareth Babin, and/or edited by me.  _____________________________________________________________________________  __     MMode/2D Measurements & Calculations  IVSd: 1.00 cm  LVIDd: 4.6 cm  LVIDs: 3.1 cm  LVPWd: 0.74 cm  FS: 31.4 %  LV mass(C)d: 130.0 grams  LV mass(C)dI: 56.5 grams/m2  Ao root diam: 3.5 cm  asc Aorta Diam: 4.0 cm  LVOT diam: 2.1 cm  LVOT area: 3.6 cm2  RWT: 0.32        Doppler Measurements & Calculations  MV E max ángel: 34.1 cm/sec  MV A max ángel: 69.6 cm/sec  MV E/A: 0.49  MV dec time: 0.21 sec     TR max ángel: 145.2 cm/sec  TR max P.4 mmHg  E/E' av.2  Lateral E/e': 8.4  Medial E/e': 6.0     _____________________________________________________________________________  __           Report approved by: Zeus Clifford 10/05/2020 03:32 PM        Medications   sodium chloride (PF) 0.9% PF flush 10 mL (has no administration in time range)   potassium chloride ER (KLOR-CON M) CR tablet 40 mEq (40 mEq Oral Given 10/5/20 1405)   perflutren diluted 1mL to 2mL with saline (OPTISON) diluted injection 6 mL (6 mLs Intravenous Given 10/5/20 1445)         Assessments & Plan (with Medical Decision Making)     I have reviewed the nursing notes.    The patient was watched in the ER for 6 hours and had no recurrence of any arrhythmia.  Patient had 2 serial troponins that were both negative and had a formal echo done which was unremarkable.    At this point I believe the patient is stable enough to go home with a Holter monitor and  follow-up with her primary care or cardiology.  Patient seems to have an underlying tachyarrhythmia that is transient in nature causing her symptoms that has yet to be defined.    I have reviewed the findings, diagnosis, plan and need for follow up with the patient.        Final diagnoses:   Near syncope - with probable transient tachyarrhytmia     Home today with Holter monitor in place.    Return the monitor as instructed for analysis.    Please make an appointment to follow up with Your Primary Care Provider or our Cardiology Clinic (phone: [phone]) 3-5 days after your monitor is turned in for analysis, for results and recheck.    A referral was placed into the computer system for cardiology so that you can follow-up with them.    Return to the ER for recurrence of symptoms lasting more than 5 minutes.    Routine discharge instructions were given for this diagnosis    Robert Lopes MD, MD    10/5/2020   [facility] EMERGENCY DEPARTMENT     Robert Lopes MD  10/05/20 [id number]

## 2020-10-05 NOTE — DISCHARGE INSTRUCTIONS
Home today with Holter monitor in place.    Return the monitor as instructed for analysis.    Please make an appointment to follow up with Your Primary Care Provider or our Cardiology Clinic (phone: [phone]) 3-5 days after your monitor is turned in for analysis, for results and recheck.    A referral was placed into the computer system for cardiology so that you can follow-up with them.    Return to the ER for recurrence of symptoms lasting more than 5 minutes.

## 2020-10-08 NOTE — TELEPHONE ENCOUNTER
RECORDS RECEIVED FROM: Internal   DATE RECEIVED: 11-16   NOTES STATUS DETAILS   OFFICE NOTE from referring provider    Internal Hospital    OFFICE NOTE from other cardiologist    N/A    DISCHARGE SUMMARY from hospital    N/A    DISCHARGE REPORT from the ER   Internal 10-5-20   OPERATIVE REPORT    N/A    MEDICATION LIST   Internal    LABS     BMP   Internal 7-20-20   CBC   Internal 10-5-20   CMP   Internal 10-5-20   Lipids   Internal 7-20-20   TSH   Internal 10-5-20   DIAGNOSTIC PROCEDURES     EKG   Internal 10-5-20   Monitor Reports   Internal 10-5-20   IMAGING (DISC & REPORT)      Echo   Internal 10-5-20   Stress Tests   N/A    Cath   N/A    MRI/MRA   N/A    CT/CTA   N/A

## 2020-10-12 ENCOUNTER — ALLIED HEALTH/NURSE VISIT (OUTPATIENT)
Dept: ALLERGY | Facility: CLINIC | Age: 60
End: 2020-10-12
Payer: COMMERCIAL

## 2020-10-12 DIAGNOSIS — Z51.6 NEED FOR DESENSITIZATION TO ALLERGENS: Primary | ICD-10-CM

## 2020-10-12 PROCEDURE — 99207 PR DROP WITH A PROCEDURE: CPT

## 2020-10-12 PROCEDURE — 95117 IMMUNOTHERAPY INJECTIONS: CPT

## 2020-10-29 ASSESSMENT — ENCOUNTER SYMPTOMS
LIGHT-HEADEDNESS: 1
SHORTNESS OF BREATH: 1

## 2020-11-02 ENCOUNTER — OFFICE VISIT (OUTPATIENT)
Dept: CARDIOLOGY | Facility: CLINIC | Age: 60
End: 2020-11-02
Attending: EMERGENCY MEDICINE
Payer: COMMERCIAL

## 2020-11-02 VITALS
WEIGHT: 258 LBS | SYSTOLIC BLOOD PRESSURE: 129 MMHG | DIASTOLIC BLOOD PRESSURE: 86 MMHG | BODY MASS INDEX: 38.21 KG/M2 | OXYGEN SATURATION: 97 % | HEART RATE: 70 BPM | HEIGHT: 69 IN

## 2020-11-02 DIAGNOSIS — R55 VASOVAGAL NEAR SYNCOPE: Primary | ICD-10-CM

## 2020-11-02 DIAGNOSIS — E87.6 HYPOKALEMIA: ICD-10-CM

## 2020-11-02 DIAGNOSIS — I47.10 NONSUSTAINED SUPRAVENTRICULAR TACHYCARDIA (H): ICD-10-CM

## 2020-11-02 DIAGNOSIS — I10 ESSENTIAL HYPERTENSION: ICD-10-CM

## 2020-11-02 LAB — POTASSIUM SERPL-SCNC: 4.6 MMOL/L (ref 3.4–5.3)

## 2020-11-02 PROCEDURE — 99204 OFFICE O/P NEW MOD 45 MIN: CPT | Mod: 25 | Performed by: INTERNAL MEDICINE

## 2020-11-02 PROCEDURE — 36415 COLL VENOUS BLD VENIPUNCTURE: CPT | Performed by: PATHOLOGY

## 2020-11-02 PROCEDURE — G0463 HOSPITAL OUTPT CLINIC VISIT: HCPCS | Mod: 25

## 2020-11-02 PROCEDURE — 84132 ASSAY OF SERUM POTASSIUM: CPT | Performed by: PATHOLOGY

## 2020-11-02 PROCEDURE — 93005 ELECTROCARDIOGRAM TRACING: CPT

## 2020-11-02 RX ORDER — AMLODIPINE BESYLATE 2.5 MG/1
2.5 TABLET ORAL DAILY
Qty: 90 TABLET | Refills: 3 | Status: SHIPPED | OUTPATIENT
Start: 2020-11-02 | End: 2021-02-11 | Stop reason: DRUGHIGH

## 2020-11-02 ASSESSMENT — MIFFLIN-ST. JEOR: SCORE: 1804.66

## 2020-11-02 ASSESSMENT — PAIN SCALES - GENERAL: PAINLEVEL: NO PAIN (0)

## 2020-11-02 NOTE — PATIENT INSTRUCTIONS
Patient Instructions:  Stop hydrochlorothiazide.  Begin amlodipine 2.5 mg daily - RX sent to pharmacy.  Lab today : potassium  Increase water intake.  If you choose to purchase a wearable heart monitor that records EKG such as Apple Watch or [employer], send the tracing to us as [employer] attachment for Dr. Dia to review.   Follow up with your primary care provider in about a month to discuss blood pressure management. It would be a good idea to keep a BP log, measuring once daily after 10 minutes of calm rest, and present the log at that visit.   Follow up with cardiology as needed.         It was a pleasure to see you in the cardiology clinic today.    We are encouraging the use of [employer] to communicate with your Healthcare Provider.  If you have any questions, call  Antoine Santiago LPN, at [phone].  Press Option #1 for the [facility], and then press Option #4 for nursing.  Cardiology Fax  : [phone]      If you have an urgent need after hours (8:00 am to 4:30 pm) please call [phone] and ask for the cardiology fellow on call.

## 2020-11-02 NOTE — NURSING NOTE
Chief Complaint   Patient presents with     New Patient      new - referral from 10-5-20 ED admission        Vitals were taken and medications were reconciled. EKG was performed.    Yeni Levine  3:14 PM

## 2020-11-02 NOTE — PROGRESS NOTES
HPI: Ms. Nela Mares is a 60 year old  female with PMH significant for depression, HTN, asthma, allergic rhinitis,  And MACIE w/ CPAP.    Patient is being seen today for presyncopal episode on 10/5/2020.  Patient was seen at [facility] ED and recommended to follow-up with cardiology.    Patient tells me that on 10/5/2020 she was in her usual health.  Around 10:15 AM in the morning she walked to her coworkers office and she started talking to her.  She was standing at the time.  She tells me that it was a stressful day.  She suddenly started feeling drained and sweaty.  She initially sat down and then started feeling palpitations.  She laid down and rested herself.  She then was taken to the ER.  In the ER she was found to have low potassium at 3.0 otherwise echocardiogram, EKG, CBC and vitals were normal.  She was given 48-hour Holter monitor which showed asymptomatic nonsustained SVT episodes.     Today patient tells me that since  ER visit on 10/5 she has been feeling well.  She did not have any recurrent palpitations or presyncope.  Of note patient has no prior history of syncope or pre-syncope.    She tells me that she has history of palpitations for several years (at least 10 years).  They usually last minutes but occasionally up to 20 minutes.  The frequency is 3-4 times a year.  She cannot recall when was the last time she had palpitations.  She tells me that on the day of the presyncope on 10/5/2020 palpitations felt very similar to her prior palpitations.  She tells me that she has lightheadedness and dizziness for several years now that she attributes to her allergies.  Patient denies other cardiac symptoms like chest pain, dyspnea on exertion, LE edema or syncope.    She has no known cardiac disease.  Does not smoke.  No alcohol abuse.  No family history of premature CAD. No DM.    Patient is on lisinopril 40 mg and hydrochlorothiazide 12.5 mg for a long time with adequate blood pressure control.    I have  personally reviewed her EKG in clinic today which shows sinus rhythm otherwise normal WV/QRS and QTc intervals.  No ST-T wave changes.  I personally reviewed the 48-hour Holter monitor from 10/5/2020.  I discussed the findings with the patient in clinic today.  There are few episodes of nonsustained SVT lasting up to 14 beats.  Patient reports no symptoms during the monitoring.  Baseline rhythm is sinus.    Medications, personal, family, and social history reviewed with patient and revised.    PAST MEDICAL HISTORY:  Past Medical History:   Diagnosis Date     Allergic rhinitis, cause unspecified      Cervical high risk HPV (human papillomavirus) test positive 03/21/2017    3/21/17 NIL pap/+ HR HPV (not 16 or 18).      Depressive disorder      Depressive disorder, not elsewhere classified     seasonal     Hypertension      Mild persistent asthma      Obstructive sleep apnea (adult) (pediatric)     uses CPAP     Scalp mass 7/2014       CURRENT MEDICATIONS:  Current Outpatient Medications   Medication Sig Dispense Refill     albuterol (PROAIR HFA/PROVENTIL HFA/VENTOLIN HFA) 108 (90 Base) MCG/ACT inhaler Inhale 2 puffs into the lungs every 4 hours as needed for shortness of breath / dyspnea or wheezing 1 Inhaler 3     albuterol (PROVENTIL) (2.5 MG/3ML) 0.083% neb solution Take 1 vial (2.5 mg) by nebulization every 4 hours as needed for shortness of breath / dyspnea or wheezing 1 Box 1     cetirizine (ZYRTEC) 10 MG tablet Take 10 mg by mouth daily       estradiol (VAGIFEM) 10 MCG TABS vaginal tablet Place 1 tablet (10 mcg) vaginally twice a week 28 tablet 3     fluticasone (FLONASE) 50 MCG/ACT nasal spray Spray 1-2 sprays into both nostrils daily 48 g 3     fluticasone-salmeterol (ADVAIR DISKUS) 500-50 MCG/DOSE inhaler Inhale 1 puff into the lungs 2 times daily 3 Inhaler 1     hydrochlorothiazide (MICROZIDE) 12.5 MG capsule Take 1 capsule (12.5 mg) by mouth daily 90 capsule 0     lisinopril (ZESTRIL) 40 MG tablet Take 1  tablet (40 mg) by mouth daily 90 tablet 0     montelukast (SINGULAIR) 10 MG tablet Take 1 tablet (10 mg) by mouth daily 90 tablet 1     ORDER FOR ALLERGEN IMMUNOTHERAPY Name of Mix: Mix #1  Dust Mite, Cat, Dog  Cat Hair, Standardized 10,000 BAU/mL, ALK  2.0 ml  Dog Hair-Dander, A. P.  1:100 w/v, HS  1.0 ml  Dust Mites DP. 30,000 AU/mL, HS  0.6 ml   Diluent: HSA qs to 5ml 5 mL PRN     ORDER FOR ALLERGEN IMMUNOTHERAPY Name of Mix: Mix #2  Tree , Weeds  Birch Mix PRW 1:20 w/v, HS  0.5 ml  Hickory, Shagbark 1:20 w/v, HS  0.5 ml  Hinckley Mix RW 1:20 w/v, HS 0.5 ml  Oak Mix RVW 1:20 w/v, HS 0.5 ml  Kochia 1:20 w/v, HS 0.5 ml  Nettle 1:20 w/v, HS 0.5 ml  Ragweed Mixed 1:20 w/v ALK  0.5 ml  Sagebrush, Mugwort 1:20 w/v, HS 0.5 ml  Diluent: HSA qs to 5ml 5 mL PRN     ORDER FOR ALLERGEN IMMUNOTHERAPY Name of Mix: Mix #3  Mold  Alternaria Tenuis 1:10 w/v, HS  0.5 ml  Aspergillus Fumigatus 1:10 w/v, HS  0.5 ml  Curvularia Spicifera 1:10 w/v, HS  0.5 ml  Epicoccum Nigrum 1:10 w/v, HS 0.5 ml  Hormodendrum Cladosporioides 1:10 w/v, HS 0.5 ml  Penicillium Mix 1:10 w/v, HS  0.5 ml  Phoma Herbarum 1:10 w/v, HS  0.5 ml  Diluent: HSA qs to 5ml 5 mL PRN     venlafaxine (EFFEXOR-XR) 150 MG 24 hr capsule Take 1 capsule (150 mg) by mouth daily 90 capsule 1       PAST SURGICAL HISTORY:  Past Surgical History:   Procedure Laterality Date     COLONOSCOPY  6/21/2012    Procedure: COLONOSCOPY;  COLONOSCOPY, SCREEN;  Surgeon: Bart You MD;  Location:  OR     [facility]       ENDOSCOPIC RETROGRADE CHOLANGIOPANCREATOGRAM  1/4/2014    Procedure: ENDOSCOPIC RETROGRADE CHOLANGIOPANCREATOGRAM;  ERCP biliary sphincterotomy, bile duct stone removal, epinepherine washing.;  Surgeon: Ba Meyers MD;  Location: [facility] OR     LAPAROSCOPIC CHOLECYSTECTOMY WITH CHOLANGIOGRAMS  1/4/2014    Procedure: LAPAROSCOPIC CHOLECYSTECTOMY WITH CHOLANGIOGRAMS;;  Surgeon: Haja Sage MD;  Location: [facility] OR     NO HISTORY OF SURGERY        OPERATIVE HYSTEROSCOPY  6/17/2014    Procedure: OPERATIVE HYSTEROSCOPY;  Surgeon: Paola Camara MD;  Location: [facility] OR     REMOVE INTRAUTERINE DEVICE  6/17/2014    Procedure: REMOVE INTRAUTERINE DEVICE;  Surgeon: Paola Camara MD;  Location: [facility] OR       ALLERGIES:   No Known Allergies    FAMILY HISTORY:  Family History   Problem Relation Age of Onset     C.A.D. Paternal Grandmother      Cerebrovascular Disease Paternal Grandmother      Depression Paternal Grandmother      Obesity Paternal Grandmother      C.A.D. Paternal Grandfather      Cerebrovascular Disease Paternal Grandfather         unsure     Substance Abuse Paternal Grandfather      Hypertension Father      Asthma Father      Obesity Father      Skin Cancer Father      Diabetes Father      Neurologic Disorder Mother         Graves disease     Hyperlipidemia Mother      Depression Mother      Thyroid Disease Mother      Other - See Comments Other      Skin Cancer Sister      Depression Maternal Grandmother      Cancer Sister         mild skin cancer,cured from excision     Depression Nephew      Anxiety Disorder Nephew      Mental Illness Maternal Half-Brother      Mental Illness Paternal Half-Brother      Substance Abuse Nephew      Asthma Sister      Asthma Nephew      Thyroid Disease Maternal Half-Sister      Thyroid Disease Paternal Half-Sister      Diabetes No family hx of          SOCIAL HISTORY:  Social History     Tobacco Use     Smoking status: Never Smoker     Smokeless tobacco: Never Used   Substance Use Topics     Alcohol use: No     Drug use: No       ROS:   Answers for HPI/ROS submitted by the patient on 10/29/2020   General Symptoms: No  Skin Symptoms: No  HENT Symptoms: No  EYE SYMPTOMS: No  HEART SYMPTOMS: Yes  LUNG SYMPTOMS: Yes  INTESTINAL SYMPTOMS: No  URINARY SYMPTOMS: No  GYNECOLOGIC SYMPTOMS: Yes  BREAST SYMPTOMS: No  SKELETAL SYMPTOMS: No  BLOOD SYMPTOMS: No  NERVOUS SYSTEM SYMPTOMS: No  MENTAL HEALTH SYMPTOMS:  "No  Difficulty breating or shortness of breath: Yes  Light-headedness: Yes  Vaginal dryness: Yes    Exam:  /86 (BP Location: Right arm, Patient Position: Sitting, Cuff Size: Adult Large)   Pulse 70   Ht 1.753 m (5' 9\")   Wt 117 kg (258 lb)   LMP 12/21/2014   SpO2 97%   BMI 38.10 kg/m    GENERAL APPEARANCE: alert and no distress  HEENT: no icterus, no central cyanosis  LYMPH/NECK:no asymmetry, JVP not elevated.  RESPIRATORY: lungs clear to auscultation - no rales, rhonchi or wheezes, no use of accessory muscles, no retractions, respirations are unlabored, normal respiratory rate  CARDIOVASCULAR: regular rhythm, normal S1, S2, no S3 or S4 and no murmur, click or rub, precordium quiet with normal PMI.  GI: soft, non tender  EXTREMITIES: no edema  NEURO: alert, normal speech,and affect  SKIN: no ecchymoses, no rashes     I have reviewed the labs and the imaging below and made my comment in the assessment and plan.    Labs:  CBC RESULTS:   Lab Results   Component Value Date    WBC 4.5 10/05/2020    RBC 4.86 10/05/2020    HGB 14.9 10/05/2020    HCT 44.4 10/05/2020    MCV 91 10/05/2020    MCH 30.7 10/05/2020    MCHC 33.6 10/05/2020    RDW 13.1 10/05/2020     10/05/2020       BMP RESULTS:  Lab Results   Component Value Date     10/05/2020    POTASSIUM 3.0 (L) 10/05/2020    CHLORIDE 107 10/05/2020    CO2 24 10/05/2020    ANIONGAP 10 10/05/2020     (H) 10/05/2020    BUN 16 10/05/2020    CR 0.78 10/05/2020    GFRESTIMATED 82 10/05/2020    GFRESTBLACK >90 10/05/2020    ASHLEY 9.6 10/05/2020        INR RESULTS:  Lab Results   Component Value Date    INR 1.03 10/05/2020     Echocardiogram 10/5/2020  No structrual cardiac etiology of syncope.   Left ventricular function, chamber size, wall motion, and wall thickness are  normal.The LVEF is 60-65%.  The right ventricle is normal size and systolic.  Both atria appear normal.  No significant valvular dysfunction.  The aorta root is normal.  No pericardial " effusion is present.     No prior studies available for comparison.      EKG 10/5/2020 and today in clinic personally reviewed: Normal EKG    48 h Holter Monitor 10/5/2020 (personally reviewed in clinic today).  Patient was asymptomatic throughout the whole monitoring.    The rhythm was sinus throughout.  2. The heart rate varied from a minimum of 52 beats per minute to a maximum of 131 beats per minute. The average heart rate was 69 beats per minute.  3. There were infrequent isolate supraventricular ectopic beats noted. The ectopic beats ranged from 0-9 beats per hour. There were 10 supraventricular  couplets noted. There were 7 supraventricular runs noted. The longest run was 14 beats. The fastest ru n was 132 beats per minute.  4. There were infrequent isolated ventricular ectopic beats noted. The ectopic beats range from 0-10 beats per hour.  5. No ST-T wave changes were noted.  6. The patient reported symptoms in the patient diary but they did not correlate with ECG changes.      Assessment and Plan:    Ms. Nela Mares is a 60 year old  female with PMH significant for depression, HTN, asthma, allergic rhinitis, MACIE w/ CPAP.    She was recently in the ER on 10/5/2020 after she had a presyncopal episode while she was at work.  Patient has history of palpitations for several years with no documented arrhythmia.  Work-up in the ER was all normal except hypokalemia with potassium of 3.  No medication changes were recommended in the ER.  Today I discussed the results of Holter monitor she wore when she was discharged from the ER on 10/5/2020.  She was completely asymptomatic throughout the whole monitoring for 48 hours which showed few episodes of nonsustained SVT otherwise normal.  The features of presyncopal episode on 10/5 appears to be vasovagal (patient felt presyncopal when she was standing, stressful day which might be the trigger and perspiration etc.).  Less likely hypokalemia might have caused  palpitations leading to presyncope.    With regards to her history of palpitations which has been ongoing for several years, I recommended self monitoring at home either with Apple Watch or [employer] [place] mobile to document the symptom rhythm correlation.  Otherwise the symptoms are not very frequent at this time to warrant medications or any other procedures.     Plan:   1.  Vasovagal presyncope  2.  Hypokalemia  3.  Hypertension  4.  Asymptomatic nonsustained SVT with history of palpitations   -Discontinue hydrochlorothiazide due to hypokalemia on 10/5  -Start amlodipine 2.5 mg  -Continue lisinopril 40 mg daily  -Self monitor palpitations and record EKG at home  -Follow-up with primary care physician in a month to recheck blood pressure control  -Potassium recheck today    Return to clinic as needed or in case of recurrent palpitations.      A total of 30 minutes spent face-toface with greater than 50% of the time spent in counseling and coordinating cares of the issues above.     Please donot hesitate to contact me if you have any questions or concerns. Again, thank you for allowing me to participate in the care of your patient.    Chrisotphe LOMELI MD  [facility] Division of Cardiology  Pager [phone]    Addendum note 5/5/2021:      I received a call from the patient today reporting she had palpitations and was able to record her rhythm on her KARDIA device.  It shows regular SVT with heart rate at 190 bpm.  This lasted for several hours.  She tells me that she was feeling uncomfortable with the heart rate and she had to urinate for several times.  Denies dizziness or syncope.  She did not go to ER.  When I called her at 6 PM she repeated her EKG on kardia device which showed sinus rhythm with normal heart rate (patient report).  I discussed with her the treatment options for SVT being medical versus catheter ablation.  She told me that she does not want to be on any other medication.  She prefers catheter  ablation.  I will refer her to EP for catheter ablation.  Patient is agreeable to proceed.

## 2020-11-02 NOTE — LETTER
11/2/2020      RE: Nela Mares  [address] [address]       Dear Colleague,    Thank you for the opportunity to participate in the care of your patient, Nela Mares, at the [facility] HEART [facility]. Please see a copy of my visit note below.    HPI: Ms. Nela Mares is a 60 year old  female with PMH significant for depression, HTN, asthma, allergic rhinitis,  And MACIE w/ CPAP.    Patient is being seen today for presyncopal episode on 10/5/2020.  Patient was seen at [facility] ED and recommended to follow-up with cardiology.    Patient tells me that on 10/5/2020 she was in her usual health.  Around 10:15 AM in the morning she walked to her coworkers office and she started talking to her.  She was standing at the time.  She tells me that it was a stressful day.  She suddenly started feeling drained and sweaty.  She initially sat down and then started feeling palpitations.  She laid down and rested herself.  She then was taken to the ER.  In the ER she was found to have low potassium at 3.0 otherwise echocardiogram, EKG, CBC and vitals were normal.  She was given 48-hour Holter monitor which showed asymptomatic nonsustained SVT episodes.     Today patient tells me that since  ER visit on 10/5 she has been feeling well.  She did not have any recurrent palpitations or presyncope.  Of note patient has no prior history of syncope or pre-syncope.    She tells me that she has history of palpitations for several years (at least 10 years).  They usually last minutes but occasionally up to 20 minutes.  The frequency is 3-4 times a year.  She cannot recall when was the last time she had palpitations.  She tells me that on the day of the presyncope on 10/5/2020 palpitations felt very similar to her prior palpitations.  She tells me that she has lightheadedness and dizziness for several years now that she attributes to her allergies.   Patient denies other cardiac symptoms like chest pain, dyspnea on exertion, LE edema or syncope.    She has no known cardiac disease.  Does not smoke.  No alcohol abuse.  No family history of premature CAD. No DM.    Patient is on lisinopril 40 mg and hydrochlorothiazide 12.5 mg for a long time with adequate blood pressure control.    I have personally reviewed her EKG in clinic today which shows sinus rhythm otherwise normal CO/QRS and QTc intervals.  No ST-T wave changes.  I personally reviewed the 48-hour Holter monitor from 10/5/2020.  I discussed the findings with the patient in clinic today.  There are few episodes of nonsustained SVT lasting up to 14 beats.  Patient reports no symptoms during the monitoring.  Baseline rhythm is sinus.    Medications, personal, family, and social history reviewed with patient and revised.    PAST MEDICAL HISTORY:  Past Medical History:   Diagnosis Date     Allergic rhinitis, cause unspecified      Cervical high risk HPV (human papillomavirus) test positive 03/21/2017    3/21/17 NIL pap/+ HR HPV (not 16 or 18).      Depressive disorder      Depressive disorder, not elsewhere classified     seasonal     Hypertension      Mild persistent asthma      Obstructive sleep apnea (adult) (pediatric)     uses CPAP     Scalp mass 7/2014       CURRENT MEDICATIONS:  Current Outpatient Medications   Medication Sig Dispense Refill     albuterol (PROAIR HFA/PROVENTIL HFA/VENTOLIN HFA) 108 (90 Base) MCG/ACT inhaler Inhale 2 puffs into the lungs every 4 hours as needed for shortness of breath / dyspnea or wheezing 1 Inhaler 3     albuterol (PROVENTIL) (2.5 MG/3ML) 0.083% neb solution Take 1 vial (2.5 mg) by nebulization every 4 hours as needed for shortness of breath / dyspnea or wheezing 1 Box 1     cetirizine (ZYRTEC) 10 MG tablet Take 10 mg by mouth daily       estradiol (VAGIFEM) 10 MCG TABS vaginal tablet Place 1 tablet (10 mcg) vaginally twice a week 28 tablet 3     fluticasone (FLONASE)  50 MCG/ACT nasal spray Spray 1-2 sprays into both nostrils daily 48 g 3     fluticasone-salmeterol (ADVAIR DISKUS) 500-50 MCG/DOSE inhaler Inhale 1 puff into the lungs 2 times daily 3 Inhaler 1     hydrochlorothiazide (MICROZIDE) 12.5 MG capsule Take 1 capsule (12.5 mg) by mouth daily 90 capsule 0     lisinopril (ZESTRIL) 40 MG tablet Take 1 tablet (40 mg) by mouth daily 90 tablet 0     montelukast (SINGULAIR) 10 MG tablet Take 1 tablet (10 mg) by mouth daily 90 tablet 1     ORDER FOR ALLERGEN IMMUNOTHERAPY Name of Mix: Mix #1  Dust Mite, Cat, Dog  Cat Hair, Standardized 10,000 BAU/mL, ALK  2.0 ml  Dog Hair-Dander, A. P.  1:100 w/v, HS  1.0 ml  Dust Mites DP. 30,000 AU/mL, HS  0.6 ml   Diluent: HSA qs to 5ml 5 mL PRN     ORDER FOR ALLERGEN IMMUNOTHERAPY Name of Mix: Mix #2  Tree , Weeds  Birch Mix PRW 1:20 w/v, HS  0.5 ml  Hickory, Shagbark 1:20 w/v, HS  0.5 ml  Oakwood Mix RW 1:20 w/v, HS 0.5 ml  Oak Mix RVW 1:20 w/v, HS 0.5 ml  Kochia 1:20 w/v, HS 0.5 ml  Nettle 1:20 w/v, HS 0.5 ml  Ragweed Mixed 1:20 w/v ALK  0.5 ml  Sagebrush, Mugwort 1:20 w/v, HS 0.5 ml  Diluent: HSA qs to 5ml 5 mL PRN     ORDER FOR ALLERGEN IMMUNOTHERAPY Name of Mix: Mix #3  Mold  Alternaria Tenuis 1:10 w/v, HS  0.5 ml  Aspergillus Fumigatus 1:10 w/v, HS  0.5 ml  Curvularia Spicifera 1:10 w/v, HS  0.5 ml  Epicoccum Nigrum 1:10 w/v, HS 0.5 ml  Hormodendrum Cladosporioides 1:10 w/v, HS 0.5 ml  Penicillium Mix 1:10 w/v, HS  0.5 ml  Phoma Herbarum 1:10 w/v, HS  0.5 ml  Diluent: HSA qs to 5ml 5 mL PRN     venlafaxine (EFFEXOR-XR) 150 MG 24 hr capsule Take 1 capsule (150 mg) by mouth daily 90 capsule 1       PAST SURGICAL HISTORY:  Past Surgical History:   Procedure Laterality Date     COLONOSCOPY  6/21/2012    Procedure: COLONOSCOPY;  COLONOSCOPY, SCREEN;  Surgeon: Bart You MD;  Location: [facility] OR     D & C       ENDOSCOPIC RETROGRADE CHOLANGIOPANCREATOGRAM  1/4/2014    Procedure: ENDOSCOPIC RETROGRADE CHOLANGIOPANCREATOGRAM;  ERCP  biliary sphincterotomy, bile duct stone removal, epinepherine washing.;  Surgeon: Ba Meyers MD;  Location: [facility] OR     LAPAROSCOPIC CHOLECYSTECTOMY WITH CHOLANGIOGRAMS  1/4/2014    Procedure: LAPAROSCOPIC CHOLECYSTECTOMY WITH CHOLANGIOGRAMS;;  Surgeon: Haja Sage MD;  Location: [facility] OR     NO HISTORY OF SURGERY       OPERATIVE HYSTEROSCOPY  6/17/2014    Procedure: OPERATIVE HYSTEROSCOPY;  Surgeon: Paola Camara MD;  Location: [facility] OR     REMOVE INTRAUTERINE DEVICE  6/17/2014    Procedure: REMOVE INTRAUTERINE DEVICE;  Surgeon: Paola Camara MD;  Location: [facility] OR       ALLERGIES:   No Known Allergies    FAMILY HISTORY:  Family History   Problem Relation Age of Onset     C.A.D. Paternal Grandmother      Cerebrovascular Disease Paternal Grandmother      Depression Paternal Grandmother      Obesity Paternal Grandmother      C.A.D. Paternal Grandfather      Cerebrovascular Disease Paternal Grandfather         unsure     Substance Abuse Paternal Grandfather      Hypertension Father      Asthma Father      Obesity Father      Skin Cancer Father      Diabetes Father      Neurologic Disorder Mother         Graves disease     Hyperlipidemia Mother      Depression Mother      Thyroid Disease Mother      Other - See Comments Other      Skin Cancer Sister      Depression Maternal Grandmother      Cancer Sister         mild skin cancer,cured from excision     Depression Nephew      Anxiety Disorder Nephew      Mental Illness Maternal Half-Brother      Mental Illness Paternal Half-Brother      Substance Abuse Nephew      Asthma Sister      Asthma Nephew      Thyroid Disease Maternal Half-Sister      Thyroid Disease Paternal Half-Sister      Diabetes No family hx of          SOCIAL HISTORY:  Social History     Tobacco Use     Smoking status: Never Smoker     Smokeless tobacco: Never Used   Substance Use Topics     Alcohol use: No     Drug use: No       ROS:   Answers for HPI/ROS submitted by  "the patient on 10/29/2020   General Symptoms: No  Skin Symptoms: No  HENT Symptoms: No  EYE SYMPTOMS: No  HEART SYMPTOMS: Yes  LUNG SYMPTOMS: Yes  INTESTINAL SYMPTOMS: No  URINARY SYMPTOMS: No  GYNECOLOGIC SYMPTOMS: Yes  BREAST SYMPTOMS: No  SKELETAL SYMPTOMS: No  BLOOD SYMPTOMS: No  NERVOUS SYSTEM SYMPTOMS: No  MENTAL HEALTH SYMPTOMS: No  Difficulty breating or shortness of breath: Yes  Light-headedness: Yes  Vaginal dryness: Yes    Exam:  /86 (BP Location: Right arm, Patient Position: Sitting, Cuff Size: Adult Large)   Pulse 70   Ht 1.753 m (5' 9\")   Wt 117 kg (258 lb)   LMP 12/21/2014   SpO2 97%   BMI 38.10 kg/m    GENERAL APPEARANCE: alert and no distress  HEENT: no icterus, no central cyanosis  LYMPH/NECK:no asymmetry, JVP not elevated.  RESPIRATORY: lungs clear to auscultation - no rales, rhonchi or wheezes, no use of accessory muscles, no retractions, respirations are unlabored, normal respiratory rate  CARDIOVASCULAR: regular rhythm, normal S1, S2, no S3 or S4 and no murmur, click or rub, precordium quiet with normal PMI.  GI: soft, non tender  EXTREMITIES: no edema  NEURO: alert, normal speech,and affect  SKIN: no ecchymoses, no rashes     I have reviewed the labs and the imaging below and made my comment in the assessment and plan.    Labs:  CBC RESULTS:   Lab Results   Component Value Date    WBC 4.5 10/05/2020    RBC 4.86 10/05/2020    HGB 14.9 10/05/2020    HCT 44.4 10/05/2020    MCV 91 10/05/2020    MCH 30.7 10/05/2020    MCHC 33.6 10/05/2020    RDW 13.1 10/05/2020     10/05/2020       BMP RESULTS:  Lab Results   Component Value Date     10/05/2020    POTASSIUM 3.0 (L) 10/05/2020    CHLORIDE 107 10/05/2020    CO2 24 10/05/2020    ANIONGAP 10 10/05/2020     (H) 10/05/2020    BUN 16 10/05/2020    CR 0.78 10/05/2020    GFRESTIMATED 82 10/05/2020    GFRESTBLACK >90 10/05/2020    ASHLEY 9.6 10/05/2020        INR RESULTS:  Lab Results   Component Value Date    INR 1.03 10/05/2020 "     Echocardiogram 10/5/2020  No structrual cardiac etiology of syncope.   Left ventricular function, chamber size, wall motion, and wall thickness are  normal.The LVEF is 60-65%.  The right ventricle is normal size and systolic.  Both atria appear normal.  No significant valvular dysfunction.  The aorta root is normal.  No pericardial effusion is present.     No prior studies available for comparison.      EKG 10/5/2020 and today in clinic personally reviewed: Normal EKG    48 h Holter Monitor 10/5/2020 (personally reviewed in clinic today).  Patient was asymptomatic throughout the whole monitoring.    The rhythm was sinus throughout.  2. The heart rate varied from a minimum of 52 beats per minute to a maximum of 131 beats per minute. The average heart rate was 69 beats per minute.  3. There were infrequent isolate supraventricular ectopic beats noted. The ectopic beats ranged from 0-9 beats per hour. There were 10 supraventricular  couplets noted. There were 7 supraventricular runs noted. The longest run was 14 beats. The fastest ru n was 132 beats per minute.  4. There were infrequent isolated ventricular ectopic beats noted. The ectopic beats range from 0-10 beats per hour.  5. No ST-T wave changes were noted.  6. The patient reported symptoms in the patient diary but they did not correlate with ECG changes.      Assessment and Plan:    Ms. Nela Mares is a 60 year old  female with PMH significant for depression, HTN, asthma, allergic rhinitis, MACIE w/ CPAP.    She was recently in the ER on 10/5/2020 after she had a presyncopal episode while she was at work.  Patient has history of palpitations for several years with no documented arrhythmia.  Work-up in the ER was all normal except hypokalemia with potassium of 3.  No medication changes were recommended in the ER.  Today I discussed the results of Holter monitor she wore when she was discharged from the ER on 10/5/2020.  She was completely asymptomatic  throughout the whole monitoring for 48 hours which showed few episodes of nonsustained SVT otherwise normal.  The features of presyncopal episode on 10/5 appears to be vasovagal (patient felt presyncopal when she was standing, stressful day which might be the trigger and perspiration etc.).  Less likely hypokalemia might have caused palpitations leading to presyncope.    With regards to her history of palpitations which has been ongoing for several years, I recommended self monitoring at home either with [employer] Watch or [employer] to document the symptom rhythm correlation.  Otherwise the symptoms are not very frequent at this time to warrant medications or any other procedures.     Plan:   1.  Vasovagal presyncope  2.  Hypokalemia  3.  Hypertension  4.  Asymptomatic nonsustained SVT with history of palpitations   -Discontinue hydrochlorothiazide due to hypokalemia on 10/5  -Start amlodipine 2.5 mg  -Continue lisinopril 40 mg daily  -Self monitor palpitations and record EKG at home  -Follow-up with primary care physician in a month to recheck blood pressure control  -Potassium recheck today    Return to clinic as needed or in case of recurrent palpitations.      A total of 30 minutes spent face-toface with greater than 50% of the time spent in counseling and coordinating cares of the issues above.     Please donot hesitate to contact me if you have any questions or concerns. Again, thank you for allowing me to participate in the care of your patient.    Christophe LOMELI MD  [facility] Division of Cardiology  Pager [phone]

## 2020-11-03 LAB — INTERPRETATION ECG - MUSE: NORMAL

## 2020-11-04 ENCOUNTER — VIRTUAL VISIT (OUTPATIENT)
Dept: FAMILY MEDICINE | Facility: CLINIC | Age: 60
End: 2020-11-04
Payer: COMMERCIAL

## 2020-11-04 ENCOUNTER — NURSE TRIAGE (OUTPATIENT)
Dept: NURSING | Facility: CLINIC | Age: 60
End: 2020-11-04

## 2020-11-04 DIAGNOSIS — I10 HYPERTENSION GOAL BP (BLOOD PRESSURE) < 140/90: ICD-10-CM

## 2020-11-04 DIAGNOSIS — R68.89 FLU-LIKE SYMPTOMS: ICD-10-CM

## 2020-11-04 DIAGNOSIS — J45.41 MODERATE PERSISTENT ASTHMA WITH ACUTE EXACERBATION: Primary | ICD-10-CM

## 2020-11-04 PROCEDURE — 99214 OFFICE O/P EST MOD 30 MIN: CPT | Mod: TEL | Performed by: NURSE PRACTITIONER

## 2020-11-04 RX ORDER — PREDNISONE 20 MG/1
20 TABLET ORAL DAILY
Qty: 5 TABLET | Refills: 0 | Status: SHIPPED | OUTPATIENT
Start: 2020-11-04 | End: 2020-11-09

## 2020-11-04 NOTE — PROGRESS NOTES
"Nela Mares is a 60 year old female who is being evaluated via a billable telephone visit.      The patient has been notified of following:     \"This telephone visit will be conducted via a call between you and your physician/provider. We have found that certain health care needs can be provided without the need for a physical exam.  This service lets us provide the care you need with a short phone conversation.  If a prescription is necessary we can send it directly to your pharmacy.  If lab work is needed we can place an order for that and you can then stop by our lab to have the test done at a later time.    Telephone visits are billed at different rates depending on your insurance coverage. During this emergency period, for some insurers they may be billed the same as an in-person visit.  Please reach out to your insurance provider with any questions.    If during the course of the call the physician/provider feels a telephone visit is not appropriate, you will not be charged for this service.\"    Patient has given verbal consent for Telephone visit?  Yes    What phone number would you like to be contacted at? [phone]    How would you like to obtain your AVS? Traci Mandujano     Nela Mares is a 60 year old female who presents via phone visit today for the following health issues:    HPI       Concern for COVID-19  About how many days ago did these symptoms start? 2 days ago  Is this your first visit for this illness? Yes  In the 14 days before your symptoms started, have you had close contact with someone with COVID-19 (Coronavirus)? Yes, I have been in contact with someone who has COVID-19/Coronavirus (confirmed by lab test).  Do you have a fever or chills? Yes, the highest temperature was 99.9  Are you having new or worsening difficulty breathing? Yes   Please describe what kind of difficulty you are having breathing:Mild dyspnea (decreased exercise tolerance, able to do ADLs without " difficulty)  Do you have new or worsening cough? Yes, it's a dry cough.   Have you had any new or unexplained body aches? YES    Have you experienced any of the following NEW symptoms?    Headache: No    Sore throat: No    Loss of taste or smell: No    Chest pain: No    Diarrhea: No    Rash: No  What treatments have you tried? Albuterol every 2-4 hours since yesterday   Who do you live with? Alone   Are you, or a household member, a healthcare worker or a ? No  Do you live in a nursing home, group home, or shelter? No  Do you have a way to get food/medications if quarantined? Yes, I have a friend or family member who can help me.          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   advair 1 puff twice daily  singulair   Albuterol PRN     Saw cardiologist hydrochlorothiazide was discontinued d/t hypokalemia and she was started on amlodipine instead. Wants to know if she can continue her hydrochlorothiazide instead since so much is going on      Review of Systems   Constitutional, HEENT, cardiovascular, pulmonary, GI, , musculoskeletal, neuro, skin, endocrine and psych systems are negative, except as otherwise noted.       Objective          Vitals:  No vitals were obtained today due to virtual visit.    alert and coughing   PSYCH: Alert and oriented times 3; coherent speech, normal   rate and volume, able to articulate logical thoughts, able   to abstract reason, no tangential thoughts, no hallucinations   or delusions  Her affect is normal  RESP: No cough, no audible wheezing, able to talk in full sentences  Remainder of exam unable to be completed due to telephone visits    No results found for this or any previous visit (from the past 24 hour(s)).        Assessment/Plan:    Assessment & Plan       ICD-10-CM    1. Moderate persistent asthma with acute exacerbation  J45.41 predniSONE (DELTASONE) 20 MG tablet   2. Flu-like symptoms  R68.89 Symptomatic COVID-19 Virus (Coronavirus) by PCR      "predniSONE (DELTASONE) 20 MG tablet   3. Hypertension goal BP (blood pressure) < 140/90  I10      Recommend COVID 19 testing symptomatic, works in school, underlying asthma and HTN  For asthma continue with inhaler use and start prednisone 20 mg daily x 5 days  HTN- advised pt to continue with amlodipine discussed side effects     BMI:   Estimated body mass index is 38.1 kg/m  as calculated from the following:    Height as of 11/2/20: 1.753 m (5' 9\").    Weight as of 11/2/20: 117 kg (258 lb).                No follow-ups on file.    RAYMOND Orosco [facility]    Phone call duration:   minutes                "

## 2020-11-04 NOTE — TELEPHONE ENCOUNTER
"Pt calls to report probable asthma flare.  \"Could also be covid, but doubtful.\"  Symptoms resemble pt's yearly allergies and asthma attacks.    Symptoms x 24 hours:  Cough.  Runny nose.  Fatigue.  Shortness of breath with any activity.    \"Had a big asthma flare yesterday.\"  \"Had to leave work early.\"  \"Inhalers not working.\"  Advised repeating her rescue inhaler now.  Pt agrees to do so.  Also advised warm fluids to relax tight airway (instead of ice water).    Pt agrees to schedule virtual provider visit to determine next best steps for treatment as well as possible covid testing.  Transferred to a  for an appointment now.    Nella Ray   Health Nurse Advisor     Reason for Disposition    MODERATE difficulty breathing (e.g., speaks in phrases, SOB even at rest, pulse 100-120)    Additional Information    Negative: Severe difficulty breathing (e.g., struggling for each breath, speaks in single words)    Negative: Bluish (or gray) lips or face    Negative: Wheezing started suddenly after medicine, an allergic food, or bee sting    Negative: Passed out (i.e., fainted, collapsed and was not responding)    Negative: Sounds like a life-threatening emergency to the triager    Negative: SEVERE asthma attack (e.g., very SOB at rest, speaks in single words, loud wheezes)    Negative: SEVERE wheezing or coughing and doesn't have nebulizer or inhaler available    Negative: Peak flow rate less than 50% of baseline level (RED zone)    Negative: Hospitalized before with asthma; now feels same    Negative: Chest pain    Negative: Patient sounds very sick or weak to the triager    MODERATE asthma attack (e.g., SOB at rest, speaks in phrases, audible wheezes) and not resolved after 2 nebulizer or inhaler treatments given 20 minutes apart    Negative: SEVERE difficulty breathing (e.g., struggling for each breath, speaks in single words)    Negative: Difficult to awaken or acting confused (e.g., disoriented, slurred " speech)    Negative: Bluish (or gray) lips or face now    Negative: Shock suspected (e.g., cold/pale/clammy skin, too weak to stand, low BP, rapid pulse)    Negative: Sounds like a life-threatening emergency to the triager    Negative: [1] COVID-19 exposure AND [2] no symptoms    Negative: COVID-19 and Breastfeeding, questions about    Negative: [1] Adult with possible COVID-19 symptoms AND [2] triager concerned about severity of symptoms or other causes    Negative: SEVERE or constant chest pain or pressure (Exception: mild central chest pain, present only when coughing)    [facility] Specific Disposition  - [facility] Specific Patient Instructions  COVID 19 Nurse Triage Plan/Patient Instructions    Please be aware that novel coronavirus (COVID-19) may be circulating in the community. If you develop symptoms such as fever, cough, or SOB or if you have concerns about the presence of another infection including coronavirus (COVID-19), please contact your health care provider or visit [email or web address].       Virtual Visit with provider recommended. Reference Visit Selection Guide.    Thank you for taking steps to prevent the spread of this virus.  Limit your contact with others.  Wear a simple mask to cover your cough.  Wash your hands well and often.    Resources  [facility]: About COVID-19: [email or web address]  CDC: What to Do If You're Sick: www.cdc.gov/coronavirus/2019-ncov/about/steps-when-sick.html  CDC: Ending Home Isolation: www.cdc.gov/coronavirus/2019-ncov/hcp/disposition-in-home-patients.html   CDC: Caring for Someone: www.cdc.gov/coronavirus/2019-ncov/if-you-are-sick/care-for-someone.html   [facility]: Interim Guidance for Hospital Discharge to Home: www.health.[facility].[place]/diseases/coronavirus/hcp/hospdischarge.pdf  [facility] clinical trials (COVID-19 research studies): [email or web address]-trials   Below are the COVID-19 hotlines at the [place]  Department of Health ([facility]). Interpreters are available.   For health questions: Call 167-966-7850 or 1-228.493.5096 (7 a.m. to 7 p.m.)  For questions about schools and childcare: Call 298-915-5111 or 1-260.711.8313 (7 a.m. to 7 p.m.)    Protocols used: CORONAVIRUS (COVID-19) DIAGNOSED OR [id number] 8.4.20, ASTHMA ATTACK-A-OH

## 2020-11-08 ENCOUNTER — HEALTH MAINTENANCE LETTER (OUTPATIENT)
Age: 60
End: 2020-11-08

## 2020-11-08 ENCOUNTER — MYC MEDICAL ADVICE (OUTPATIENT)
Dept: ALLERGY | Facility: CLINIC | Age: 60
End: 2020-11-08

## 2020-11-08 DIAGNOSIS — Z20.822 SUSPECTED COVID-19 VIRUS INFECTION: Primary | ICD-10-CM

## 2020-11-08 DIAGNOSIS — R68.89 FLU-LIKE SYMPTOMS: ICD-10-CM

## 2020-11-08 DIAGNOSIS — J45.41 MODERATE PERSISTENT ASTHMA WITH ACUTE EXACERBATION: ICD-10-CM

## 2020-11-09 ENCOUNTER — VIRTUAL VISIT (OUTPATIENT)
Dept: FAMILY MEDICINE | Facility: OTHER | Age: 60
End: 2020-11-09

## 2020-11-09 RX ORDER — PREDNISONE 10 MG/1
TABLET ORAL
Qty: 30 TABLET | Refills: 0 | Status: SHIPPED | OUTPATIENT
Start: 2020-11-09 | End: 2020-11-21

## 2020-11-09 NOTE — PROGRESS NOTES
"Date: 2020 17:13:10  Clinician: Ken Gonzalez  Clinician NPI: [id number]  Patient: Nela Mares  Patient : 1960  Patient Address: [address] [place] [id number]  Patient Phone: [phone]  Visit Protocol: URI  Patient Summary:  Nela is a 60 year old ( : 1960 ) female who initiated a [facility] Visit for COVID-19 (Coronavirus) evaluation and screening. When asked the question \"Please sign me up to receive news, health information and promotions from [facility].\", Nela responded \"Yes\".    Nela states her symptoms started suddenly 7-9 days ago. After her symptoms started, they improved and then got worse again.   Her symptoms consist of rhinitis, myalgia, malaise, ageusia, a cough, nasal congestion, and anosmia. Neal also feels feverish.   Symptom details     Nasal secretions: The color of her mucus is clear.    Cough: Nela coughs every 5-10 minutes and her cough is not more bothersome at night. Phlegm comes into her throat when she coughs. She does not believe her cough is caused by post-nasal drip. The color of the phlegm is clear.     Temperature: Her current temperature is 100.8 degrees Fahrenheit. Nlea has had a temperature over 100 degrees Fahrenheit for 5-7 days.      Nela denies having vomiting, facial pain or pressure, chills, sore throat, teeth pain, diarrhea, ear pain, headache, wheezing, and nausea. She also denies taking antibiotic medication in the past month, having recent facial or sinus surgery in the past 60 days, and having a sinus infection within the past year.   Precipitating events  She has not recently been exposed to someone with influenza. Nela has been in close contact with the following high risk individuals: people with asthma, heart disease or diabetes.   Pertinent COVID-19 (Coronavirus) information  Nela does not work or volunteer as healthcare worker or a . In the past 14 days, Nela has not worked or " volunteered at a healthcare facility or group living setting.   In the past 14 days, she also has not lived in a congregate living setting.   Nela has had a close contact with a laboratory-confirmed COVID-19 patient within 14 days of symptom onset. She was exposed at her work. Date Nela was exposed to the laboratory-confirmed COVID-19 patient: 10/30/2020   Additional information about contact with COVID-19 (Coronavirus) patient as reported by the patient (free text): Exposed at work    Since December 2019, Nela has not been tested for COVID-19 and has not had upper respiratory infection or influenza-like illness.   Triage Point(s) temporarily suspended for COVID-19 (Coronavirus) screening  Nela reported the following symptoms which were previously protocol referral points. These protocol referral points have temporarily been removed for purposes of COVID-19 (Coronavirus) screening.   Difficulty breathing even when resting and can only speak in phrase(s)   Pertinent medical history  Nela typically gets a yeast infection when she takes antibiotics. She is not sure if she has used fluconazole (Diflucan) to treat previous yeast infections.   Nela does not need a return to work/school note.   Weight: 258 lbs   Nela does not smoke or use smokeless tobacco.   Additional information as reported by the patient (free text): Oximeter reading is fluctuating between 88 - 96.  Have asthma.  How accurate are oximeters in determining if a hospital visit is necessary   Weight: 258 lbs  A synchronous phone visit was initiated by the provider for the following reason: low o2 sat    MEDICATIONS: No current medications, ALLERGIES: NKDA  Clinician Response:  Dear izzy Rondon    Diagnosis: Contact with and (suspected) exposure to other viral communicable diseases  Diagnosis ICD: Z20.828  Triage Notes: I reviewed the patient's history, verified their identity, and explained the [facility] Visit process.    low o2  levels  Synchronous Triage: phone, status: completed, duration: 129 seconds

## 2020-11-09 NOTE — TELEPHONE ENCOUNTER
"Returned call to patient. She has been ill since last Tuesday. Symptoms include fever, fatigue, cough, and shortness of breath. She has been taking Advair twice daily and using her nebulizer at least 3 times per day. With the frequent nebs she has been able to manage. Her peak flow is in her \"yellow zone.\" She also has a pulse oximeter at home with readings between 94-98%. Her fever persists but has not been above 102. She is coughing while on the phone. Advised to continue with fluids, supportive cares, and nebulizer as needed. If shortness of breath or cough worsen or pOx decreases below 94% she is to go to the ED. A prescription for an extended course of prednisone was sent to her preferred pharmacy. She verbalized understanding and agreement with the plan.  "

## 2020-11-12 DIAGNOSIS — J45.30 MILD PERSISTENT ASTHMA WITHOUT COMPLICATION: ICD-10-CM

## 2020-11-12 RX ORDER — ALBUTEROL SULFATE 0.83 MG/ML
2.5 SOLUTION RESPIRATORY (INHALATION) EVERY 4 HOURS PRN
Qty: 1 BOX | Refills: 1 | Status: SHIPPED | OUTPATIENT
Start: 2020-11-12 | End: 2021-12-02

## 2020-11-12 NOTE — TELEPHONE ENCOUNTER
"Routing refill request to provider for review/approval because:  ACT completed on 07--score 15    Tania OROZCO RN    LOV: 06-, recommended follow up in 6 months.    Requested Prescriptions   Pending Prescriptions Disp Refills     albuterol (PROVENTIL) (2.5 MG/3ML) 0.083% neb solution 1 Box 1     Sig: Take 1 vial (2.5 mg) by nebulization every 4 hours as needed for shortness of breath / dyspnea or wheezing       Asthma Maintenance Inhalers - Anticholinergics Failed - 11/12/2020 10:45 AM        Failed - Asthma control assessment score within normal limits in last 6 months     Please review ACT score.           Passed - Patient is age 12 years or older        Passed - Medication is active on med list        Passed - Recent (6 mo) or future (30 days) visit within the authorizing provider's specialty     Patient had office visit in the last 6 months or has a visit in the next 30 days with authorizing provider or within the authorizing provider's specialty.  See \"Patient Info\" tab in inbasket, or \"Choose Columns\" in Meds & Orders section of the refill encounter.           Short-Acting Beta Agonist Inhalers Protocol  Failed - 11/12/2020 10:45 AM        Failed - Asthma control assessment score within normal limits in last 6 months     Please review ACT score.           Passed - Patient is age 12 or older        Passed - Medication is active on med list        Passed - Recent (6 mo) or future (30 days) visit within the authorizing provider's specialty     Patient had office visit in the last 6 months or has a visit in the next 30 days with authorizing provider or within the authorizing provider's specialty.  See \"Patient Info\" tab in inbasket, or \"Choose Columns\" in Meds & Orders section of the refill encounter.                 "

## 2020-11-16 ENCOUNTER — PRE VISIT (OUTPATIENT)
Dept: CARDIOLOGY | Facility: CLINIC | Age: 60
End: 2020-11-16

## 2020-11-16 DIAGNOSIS — I10 ESSENTIAL HYPERTENSION WITH GOAL BLOOD PRESSURE LESS THAN 140/90: ICD-10-CM

## 2020-11-17 RX ORDER — LISINOPRIL 40 MG/1
40 TABLET ORAL DAILY
Qty: 90 TABLET | Refills: 3 | Status: SHIPPED | OUTPATIENT
Start: 2020-11-17 | End: 2021-11-15

## 2020-12-17 ENCOUNTER — TELEPHONE (OUTPATIENT)
Dept: ALLERGY | Facility: CLINIC | Age: 60
End: 2020-12-17

## 2020-12-17 NOTE — TELEPHONE ENCOUNTER
Please advise new dosing orders, building schedule, and date which orders are valid through.  Patient's last shot was 10-, dose was 0.5ml red, next appointment scheduled for 12- which will be 66 days.     New orders will be added to immunotherapy flowsheet once they are received.    Tania OROZCO RN

## 2020-12-17 NOTE — TELEPHONE ENCOUNTER
Decrease to 0.3mL of red vial for all injections. If tolerated resume building back to maintenance dose in 0.1mL increments every 14-28 days.

## 2020-12-22 ENCOUNTER — TELEPHONE (OUTPATIENT)
Dept: ALLERGY | Facility: CLINIC | Age: 60
End: 2020-12-22

## 2020-12-22 NOTE — TELEPHONE ENCOUNTER
Please advise new dosing orders, building schedule, and date which orders are valid through.  Patient's last shot was 10/12/2020, dose was 0.5 ml Red, next appointment scheduled for 12/28/2020 which will be 77 days.     New orders will be added to immunotherapy flowsheet once they are received.     Lin SAMUEL MA

## 2020-12-23 NOTE — TELEPHONE ENCOUNTER
Decrease to 0.2mL of red vial for all injections. If tolerated resume building back to maintenance dose in 0.1mL increments every 14-28 days.

## 2020-12-28 ENCOUNTER — ALLIED HEALTH/NURSE VISIT (OUTPATIENT)
Dept: ALLERGY | Facility: CLINIC | Age: 60
End: 2020-12-28
Payer: COMMERCIAL

## 2020-12-28 DIAGNOSIS — J30.1 SEASONAL ALLERGIC RHINITIS DUE TO POLLEN: Primary | ICD-10-CM

## 2020-12-28 PROCEDURE — 99207 PR DROP WITH A PROCEDURE: CPT

## 2020-12-28 PROCEDURE — 95117 IMMUNOTHERAPY INJECTIONS: CPT

## 2020-12-28 NOTE — PROGRESS NOTES
Patient presented after waiting 30 minutes with no reaction to allergy injections. Discharged from clinic.    Tania OROZCO RN

## 2021-01-11 ENCOUNTER — ALLIED HEALTH/NURSE VISIT (OUTPATIENT)
Dept: ALLERGY | Facility: CLINIC | Age: 61
End: 2021-01-11
Payer: COMMERCIAL

## 2021-01-11 DIAGNOSIS — J30.1 SEASONAL ALLERGIC RHINITIS DUE TO POLLEN: Primary | ICD-10-CM

## 2021-01-11 PROCEDURE — 99207 PR DROP WITH A PROCEDURE: CPT

## 2021-01-11 PROCEDURE — 95117 IMMUNOTHERAPY INJECTIONS: CPT

## 2021-01-17 ENCOUNTER — TELEPHONE (OUTPATIENT)
Dept: FAMILY MEDICINE | Facility: CLINIC | Age: 61
End: 2021-01-17

## 2021-01-17 DIAGNOSIS — F32.0 MILD MAJOR DEPRESSION (H): ICD-10-CM

## 2021-01-22 RX ORDER — VENLAFAXINE HYDROCHLORIDE 150 MG/1
150 CAPSULE, EXTENDED RELEASE ORAL DAILY
Qty: 90 CAPSULE | Refills: 0 | Status: SHIPPED | OUTPATIENT
Start: 2021-01-22 | End: 2021-02-11

## 2021-01-22 NOTE — TELEPHONE ENCOUNTER
This patient is overdue for advised follow up, which is why he/she is out of refills.  I do not want this patient to go without medication - so one refill has been provided to allow time for appointment scheduling.  Please notify the patient and assist with scheduling either in person follow up for preventive visit or virtual visit for medication check

## 2021-01-25 ENCOUNTER — ALLIED HEALTH/NURSE VISIT (OUTPATIENT)
Dept: ALLERGY | Facility: CLINIC | Age: 61
End: 2021-01-25
Payer: COMMERCIAL

## 2021-01-25 DIAGNOSIS — J30.1 SEASONAL ALLERGIC RHINITIS DUE TO POLLEN: Primary | ICD-10-CM

## 2021-01-25 PROCEDURE — 99207 PR DROP WITH A PROCEDURE: CPT

## 2021-01-25 PROCEDURE — 95117 IMMUNOTHERAPY INJECTIONS: CPT

## 2021-01-26 ENCOUNTER — VIRTUAL VISIT (OUTPATIENT)
Dept: ALLERGY | Facility: CLINIC | Age: 61
End: 2021-01-26
Payer: COMMERCIAL

## 2021-01-26 DIAGNOSIS — J45.30 MILD PERSISTENT ASTHMA WITHOUT COMPLICATION: Primary | ICD-10-CM

## 2021-01-26 DIAGNOSIS — J30.89 ALLERGIC RHINITIS DUE TO MOLD: ICD-10-CM

## 2021-01-26 DIAGNOSIS — J30.1 SEASONAL ALLERGIC RHINITIS DUE TO POLLEN: ICD-10-CM

## 2021-01-26 DIAGNOSIS — J30.89 ALLERGIC RHINITIS DUE TO DUST MITE: ICD-10-CM

## 2021-01-26 PROCEDURE — 99213 OFFICE O/P EST LOW 20 MIN: CPT | Mod: GT | Performed by: ALLERGY & IMMUNOLOGY

## 2021-01-26 RX ORDER — MONTELUKAST SODIUM 10 MG/1
10 TABLET ORAL DAILY
Qty: 90 TABLET | Refills: 1 | Status: SHIPPED | OUTPATIENT
Start: 2021-01-26 | End: 2021-08-17

## 2021-01-26 ASSESSMENT — ASTHMA QUESTIONNAIRES
QUESTION_5 LAST FOUR WEEKS HOW WOULD YOU RATE YOUR ASTHMA CONTROL: COMPLETELY CONTROLLED
QUESTION_3 LAST FOUR WEEKS HOW OFTEN DID YOUR ASTHMA SYMPTOMS (WHEEZING, COUGHING, SHORTNESS OF BREATH, CHEST TIGHTNESS OR PAIN) WAKE YOU UP AT NIGHT OR EARLIER THAN USUAL IN THE MORNING: NOT AT ALL
ACT_TOTALSCORE: 25
QUESTION_1 LAST FOUR WEEKS HOW MUCH OF THE TIME DID YOUR ASTHMA KEEP YOU FROM GETTING AS MUCH DONE AT WORK, SCHOOL OR AT HOME: NONE OF THE TIME
QUESTION_2 LAST FOUR WEEKS HOW OFTEN HAVE YOU HAD SHORTNESS OF BREATH: NOT AT ALL
QUESTION_4 LAST FOUR WEEKS HOW OFTEN HAVE YOU USED YOUR RESCUE INHALER OR NEBULIZER MEDICATION (SUCH AS ALBUTEROL): NOT AT ALL

## 2021-01-26 NOTE — LETTER
1/26/2021         RE: Nela Mares  [address] [id number]        Dear Colleague,    Thank you for referring your patient, Nela Mares, to the [facility]. Please see a copy of my visit note below.    Sanam is a 60 year old who is being evaluated via a billable video visit.      How would you like to obtain your AVS? MyChart  If the video visit is dropped, the invitation should be resent by:   Will anyone else be joining your video visit? No      Video Start Time: 5:54 PM    Video-Visit Details    Type of service:  Video Visit    Video End Time: 6:02 PM    Originating Location (pt. Location): Home    Distant Location (provider location):  [facility]     Platform used for Video Visit: Bernard     Nela Mares was seen in the Allergy Clinic at [facility].      Nela Mares is a 60 year old American female who is seen today for follow-up of asthma. She states that she has not had any issues with her asthma since she recovered from COVID. She was diagnosed in early November and it took 3-4 weeks for her to recover from her infection. She has continued to do well and has also returned to work. Sanam continues to take Advair and montelukast daily. She had increased her Advair to twice daily while she was ill but has resumed once daily dosing. She has rarely needed to use her albuterol inhaler in the last month. She denies having nocturnal asthma symptoms or limitations in her activity.    Sanam began allergen immunotherapy in 11/2018 and reached her maintenance dose in 12/2018. She has not had any significant large local or systemic reactions to treatment. She feels that her symptoms have improved since beginning immunotherapy treatment.      Past Medical History:   Diagnosis Date     Allergic rhinitis, cause unspecified      Cervical high risk HPV (human papillomavirus) test positive 03/21/2017    3/21/17 NIL  pap/+ HR HPV (not 16 or 18).      Depressive disorder      Depressive disorder, not elsewhere classified     seasonal     Hypertension      Mild persistent asthma      Obstructive sleep apnea (adult) (pediatric)     uses CPAP     Scalp mass 7/2014     Family History   Problem Relation Age of Onset     C.A.D. Paternal Grandmother      Cerebrovascular Disease Paternal Grandmother      Depression Paternal Grandmother      Obesity Paternal Grandmother      C.A.D. Paternal Grandfather      Cerebrovascular Disease Paternal Grandfather         unsure     Substance Abuse Paternal Grandfather      Hypertension Father      Asthma Father      Obesity Father      Skin Cancer Father      Diabetes Father      Neurologic Disorder Mother         Graves disease     Hyperlipidemia Mother      Depression Mother      Thyroid Disease Mother      Other - See Comments Other      Skin Cancer Sister      Depression Maternal Grandmother      Cancer Sister         mild skin cancer,cured from excision     Depression Nephew      Anxiety Disorder Nephew      Mental Illness Maternal Half-Brother      Mental Illness Paternal Half-Brother      Substance Abuse Nephew      Asthma Sister      Asthma Nephew      Thyroid Disease Maternal Half-Sister      Thyroid Disease Paternal Half-Sister      Diabetes No family hx of      Social History     Tobacco Use     Smoking status: Never Smoker     Smokeless tobacco: Never Used   Substance Use Topics     Alcohol use: No     Drug use: No     Social History     Social History Narrative    Dairy/d 3 servings/d.     Caffeine 2 servings/d    Exercise 0 x week    Sunscreen used - No    Seatbelts used - Yes    Working smoke/CO detectors in the home - Yes    Guns stored in the home - No    Self Breast Exams - no    Eye Exam up to date - No    Dental Exam up to date - Yes    Pap Smear up to date - Yes    Mammogram up to date - Yes    Dexa Scan up to date - NOT APPLICABLE    Flex Sig / Colonoscopy up to date - NOT  APPLICABLE    Immunizations up to date - Yes    Abuse: Current or Past(Physical, Sexual or Emotional)- No    Do you feel safe in your environment - Yes    GÓMEZ Cummings    11/23/11                       Past medical, family, and social history were reviewed.    REVIEW OF SYSTEMS:  General: negative for weight gain. negative for weight loss. negative for changes in sleep.   Eyes: negative for itching. negative for redness. negative for tearing/watering. negative for vision changes  Ears: negative for fullness. negative for hearing loss. negative for dizziness.   Nose: negative for snoring.negative for changes in smell. negative for drainage.   Throat: negative for hoarseness. negative for sore throat. negative for trouble swallowing.   Lungs: negative for cough. negative for shortness of breath.negative for wheezing. negative for sputum production.   Cardiovascular: negative for chest pain. negative for swelling of ankles. negative for fast or irregular heartbeat.   Gastrointestinal: negative for nausea. negative for heartburn. negative for acid reflux.   Musculoskeletal: negative for joint pain. negative for joint stiffness. negative for joint swelling.   Neurologic: negative for seizures. negative for fainting. negative for weakness.   Psychiatric: negative for changes in mood. negative for anxiety.   Endocrine: negative for cold intolerance. negative for heat intolerance. negative for tremors.   Hematologic: negative for easy bruising. negative for easy bleeding.  Integumentary: negative for rash. negative for scaling. negative for nail changes.       Current Outpatient Medications:      albuterol (PROAIR HFA/PROVENTIL HFA/VENTOLIN HFA) 108 (90 Base) MCG/ACT inhaler, Inhale 2 puffs into the lungs every 4 hours as needed for shortness of breath / dyspnea or wheezing, Disp: 1 Inhaler, Rfl: 3     albuterol (PROVENTIL) (2.5 MG/3ML) 0.083% neb solution, Take 1 vial (2.5 mg) by nebulization every 4 hours as needed for  shortness of breath / dyspnea or wheezing, Disp: 1 Box, Rfl: 1     cetirizine (ZYRTEC) 10 MG tablet, Take 10 mg by mouth daily, Disp: , Rfl:      estradiol (VAGIFEM) 10 MCG TABS vaginal tablet, Place 1 tablet (10 mcg) vaginally twice a week, Disp: 28 tablet, Rfl: 3     fluticasone (FLONASE) 50 MCG/ACT nasal spray, Spray 1-2 sprays into both nostrils daily, Disp: 48 g, Rfl: 3     fluticasone-salmeterol (ADVAIR DISKUS) 500-50 MCG/DOSE inhaler, Inhale 1 puff into the lungs 2 times daily, Disp: 3 each, Rfl: 1     lisinopril (ZESTRIL) 40 MG tablet, Take 1 tablet (40 mg) by mouth daily, Disp: 90 tablet, Rfl: 3     montelukast (SINGULAIR) 10 MG tablet, Take 1 tablet (10 mg) by mouth daily, Disp: 90 tablet, Rfl: 1     ORDER FOR ALLERGEN IMMUNOTHERAPY, Name of Mix: Mix #1  Dust Mite, Cat, Dog Cat Hair, Standardized 10,000 BAU/mL, ALK  2.0 ml Dog Hair-Dander, A. P.  1:100 w/v, HS  1.0 ml Dust Mites DP. 30,000 AU/mL, HS  0.6 ml  Diluent: HSA qs to 5ml, Disp: 5 mL, Rfl: PRN     ORDER FOR ALLERGEN IMMUNOTHERAPY, Name of Mix: Mix #2  Tree , Weeds Birch Mix PRW 1:20 w/v, HS  0.5 ml Hickory, Shagbark 1:20 w/v, HS  0.5 ml [place] Mix RW 1:20 w/v, HS 0.5 ml Oak Mix RVW 1:20 w/v, HS 0.5 ml Kochia 1:20 w/v, HS 0.5 ml Nettle 1:20 w/v, HS 0.5 ml Ragweed Mixed 1:20 w/v ALK  0.5 ml Sagebrush, Mugwort 1:20 w/v, HS 0.5 ml Diluent: HSA qs to 5ml, Disp: 5 mL, Rfl: PRN     ORDER FOR ALLERGEN IMMUNOTHERAPY, Name of Mix: Mix #3  Mold Alternaria Tenuis 1:10 w/v, HS  0.5 ml Aspergillus Fumigatus 1:10 w/v, HS  0.5 ml Curvularia Spicifera 1:10 w/v, HS  0.5 ml Epicoccum Nigrum 1:10 w/v, HS 0.5 ml Hormodendrum Cladosporioides 1:10 w/v, HS 0.5 ml Penicillium Mix 1:10 w/v, HS  0.5 ml Phoma Herbarum 1:10 w/v, HS  0.5 ml Diluent: HSA qs to 5ml, Disp: 5 mL, Rfl: PRN     venlafaxine (EFFEXOR-XR) 150 MG 24 hr capsule, Take 1 capsule (150 mg) by mouth daily, Disp: 90 capsule, Rfl: 0     amLODIPine (NORVASC) 2.5 MG tablet, Take 1 tablet (2.5 mg) by mouth daily  (Patient not taking: Reported on 11/4/2020), Disp: 90 tablet, Rfl: 3  No Known Allergies    EXAM:   LMP 12/21/2014   GENERAL APPEARANCE: alert, cooperative and not in distress  SKIN: no rashes, no lesions  HEAD: atraumatic, normocephalic  EYES: lids and lashes normal, EOM full and intact  ENT: normal external ears and nose, no nasal drainage  NECK: no asymmetry, masses, or scars  LUNGS: unlabored respirations, no accessory muscle use, speaking comfortably in full sentences, no cough or audible wheezing  MUSCULOSKELETAL: no musculoskeletal defects are noted  NEURO: no focal deficits noted  PSYCH: does not appear depressed or anxious      WORKUP:  None    ASSESSMENT/PLAN:  Nela Mares is a 60 year old female seen for follow-up of asthma and allergic rhinitis.    1. Mild persistent asthma without complication - Sanam reports her asthma has been well controlled with the exception of when she was sick with COVID-19 last fall. She has since recovered and reports no persistent respiratory symptoms. She was treated with an extended course of prednisone last November but had previously not needed oral steroids to manage acute asthma symptoms for several years.    - fluticasone-salmeterol (ADVAIR DISKUS) 500-50 MCG/DOSE inhaler; Inhale 1 puff into the lungs 2 times daily  Dispense: 3 each; Refill: 1  - montelukast (SINGULAIR) 10 MG tablet; Take 1 tablet (10 mg) by mouth daily  Dispense: 90 tablet; Refill: 1  Take 2 to 4 puffs of albuterol HFA every 4 hours as needed    2. Allergic rhinitis due to dust mite, mold, and pollen - Sanam has completed 2 years of allergen immunotherapy treatment at her maintenance dose. She reports improvement in her symptoms during this time.    - continue allergen immunotherapy per protocol  - continue taking 10mg of cetirizine once to twice daily as needed  - continue fluticasone nasal spray, 1-2 sprays in each nostril daily as needed      Follow-up in 6 months, sooner if needed      Thank  you for allowing me to participate in the care of Nela Mares.      Karen Clark MD, [facility]  Allergy/Immunology  [facility]      Chart documentation done in part with Dragon Voice Recognition Software. Although reviewed after completion, some word and grammatical errors may remain.      Again, thank you for allowing me to participate in the care of your patient.        Sincerely,        Karen Clark MD

## 2021-01-26 NOTE — PROGRESS NOTES
Sanam is a 60 year old who is being evaluated via a billable video visit.      How would you like to obtain your AVS? MyChart  If the video visit is dropped, the invitation should be resent by:   Will anyone else be joining your video visit? No      Video Start Time: 5:54 PM    Video-Visit Details    Type of service:  Video Visit    Video End Time: 6:02 PM    Originating Location (pt. Location): Home    Distant Location (provider location):  [facility]     Platform used for Video Visit: [facility]     Nela Mares was seen in the Allergy Clinic at [facility].      Nela Marse is a 60 year old American female who is seen today for follow-up of asthma. She states that she has not had any issues with her asthma since she recovered from COVID. She was diagnosed in early November and it took 3-4 weeks for her to recover from her infection. She has continued to do well and has also returned to work. Sanam continues to take Advair and montelukast daily. She had increased her Advair to twice daily while she was ill but has resumed once daily dosing. She has rarely needed to use her albuterol inhaler in the last month. She denies having nocturnal asthma symptoms or limitations in her activity.    Sanam began allergen immunotherapy in 11/2018 and reached her maintenance dose in 12/2018. She has not had any significant large local or systemic reactions to treatment. She feels that her symptoms have improved since beginning immunotherapy treatment.      Past Medical History:   Diagnosis Date     Allergic rhinitis, cause unspecified      Cervical high risk HPV (human papillomavirus) test positive 03/21/2017    3/21/17 NIL pap/+ HR HPV (not 16 or 18).      Depressive disorder      Depressive disorder, not elsewhere classified     seasonal     Hypertension      Mild persistent asthma      Obstructive sleep apnea (adult) (pediatric)     uses CPAP     Scalp mass 7/2014     Family  History   Problem Relation Age of Onset     C.A.D. Paternal Grandmother      Cerebrovascular Disease Paternal Grandmother      Depression Paternal Grandmother      Obesity Paternal Grandmother      C.A.D. Paternal Grandfather      Cerebrovascular Disease Paternal Grandfather         unsure     Substance Abuse Paternal Grandfather      Hypertension Father      Asthma Father      Obesity Father      Skin Cancer Father      Diabetes Father      Neurologic Disorder Mother         Graves disease     Hyperlipidemia Mother      Depression Mother      Thyroid Disease Mother      Other - See Comments Other      Skin Cancer Sister      Depression Maternal Grandmother      Cancer Sister         mild skin cancer,cured from excision     Depression Nephew      Anxiety Disorder Nephew      Mental Illness Maternal Half-Brother      Mental Illness Paternal Half-Brother      Substance Abuse Nephew      Asthma Sister      Asthma Nephew      Thyroid Disease Maternal Half-Sister      Thyroid Disease Paternal Half-Sister      Diabetes No family hx of      Social History     Tobacco Use     Smoking status: Never Smoker     Smokeless tobacco: Never Used   Substance Use Topics     Alcohol use: No     Drug use: No     Social History     Social History Narrative    Dairy/d 3 servings/d.     Caffeine 2 servings/d    Exercise 0 x week    Sunscreen used - No    Seatbelts used - Yes    Working smoke/CO detectors in the home - Yes    Guns stored in the home - No    Self Breast Exams - no    Eye Exam up to date - No    Dental Exam up to date - Yes    Pap Smear up to date - Yes    Mammogram up to date - Yes    Dexa Scan up to date - NOT APPLICABLE    Flex Sig / Colonoscopy up to date - NOT APPLICABLE    Immunizations up to date - Yes    Abuse: Current or Past(Physical, Sexual or Emotional)- No    Do you feel safe in your environment - Yes    GÓMEZ Cummings    11/23/11                       Past medical, family, and social history were reviewed.    REVIEW  OF SYSTEMS:  General: negative for weight gain. negative for weight loss. negative for changes in sleep.   Eyes: negative for itching. negative for redness. negative for tearing/watering. negative for vision changes  Ears: negative for fullness. negative for hearing loss. negative for dizziness.   Nose: negative for snoring.negative for changes in smell. negative for drainage.   Throat: negative for hoarseness. negative for sore throat. negative for trouble swallowing.   Lungs: negative for cough. negative for shortness of breath.negative for wheezing. negative for sputum production.   Cardiovascular: negative for chest pain. negative for swelling of ankles. negative for fast or irregular heartbeat.   Gastrointestinal: negative for nausea. negative for heartburn. negative for acid reflux.   Musculoskeletal: negative for joint pain. negative for joint stiffness. negative for joint swelling.   Neurologic: negative for seizures. negative for fainting. negative for weakness.   Psychiatric: negative for changes in mood. negative for anxiety.   Endocrine: negative for cold intolerance. negative for heat intolerance. negative for tremors.   Hematologic: negative for easy bruising. negative for easy bleeding.  Integumentary: negative for rash. negative for scaling. negative for nail changes.       Current Outpatient Medications:      albuterol (PROAIR HFA/PROVENTIL HFA/VENTOLIN HFA) 108 (90 Base) MCG/ACT inhaler, Inhale 2 puffs into the lungs every 4 hours as needed for shortness of breath / dyspnea or wheezing, Disp: 1 Inhaler, Rfl: 3     albuterol (PROVENTIL) (2.5 MG/3ML) 0.083% neb solution, Take 1 vial (2.5 mg) by nebulization every 4 hours as needed for shortness of breath / dyspnea or wheezing, Disp: 1 Box, Rfl: 1     cetirizine (ZYRTEC) 10 MG tablet, Take 10 mg by mouth daily, Disp: , Rfl:      estradiol (VAGIFEM) 10 MCG TABS vaginal tablet, Place 1 tablet (10 mcg) vaginally twice a week, Disp: 28 tablet, Rfl: 3      fluticasone (FLONASE) 50 MCG/ACT nasal spray, Spray 1-2 sprays into both nostrils daily, Disp: 48 g, Rfl: 3     fluticasone-salmeterol (ADVAIR DISKUS) 500-50 MCG/DOSE inhaler, Inhale 1 puff into the lungs 2 times daily, Disp: 3 each, Rfl: 1     lisinopril (ZESTRIL) 40 MG tablet, Take 1 tablet (40 mg) by mouth daily, Disp: 90 tablet, Rfl: 3     montelukast (SINGULAIR) 10 MG tablet, Take 1 tablet (10 mg) by mouth daily, Disp: 90 tablet, Rfl: 1     ORDER FOR ALLERGEN IMMUNOTHERAPY, Name of Mix: Mix #1  Dust Mite, Cat, Dog Cat Hair, Standardized 10,000 BAU/mL, ALK  2.0 ml Dog Hair-Dander, A. P.  1:100 w/v, HS  1.0 ml Dust Mites DP. 30,000 AU/mL, HS  0.6 ml  Diluent: HSA qs to 5ml, Disp: 5 mL, Rfl: PRN     ORDER FOR ALLERGEN IMMUNOTHERAPY, Name of Mix: Mix #2  Tree , Weeds Birch Mix PRW 1:20 w/v, HS  0.5 ml Hickory, Shagbark 1:20 w/v, HS  0.5 ml Prospect Mix RW 1:20 w/v, HS 0.5 ml Oak Mix RVW 1:20 w/v, HS 0.5 ml Kochia 1:20 w/v, HS 0.5 ml Nettle 1:20 w/v, HS 0.5 ml Ragweed Mixed 1:20 w/v ALK  0.5 ml Sagebrush, Mugwort 1:20 w/v, HS 0.5 ml Diluent: HSA qs to 5ml, Disp: 5 mL, Rfl: PRN     ORDER FOR ALLERGEN IMMUNOTHERAPY, Name of Mix: Mix #3  Mold Alternaria Tenuis 1:10 w/v, HS  0.5 ml Aspergillus Fumigatus 1:10 w/v, HS  0.5 ml Curvularia Spicifera 1:10 w/v, HS  0.5 ml Epicoccum Nigrum 1:10 w/v, HS 0.5 ml Hormodendrum Cladosporioides 1:10 w/v, HS 0.5 ml Penicillium Mix 1:10 w/v, HS  0.5 ml Phoma Herbarum 1:10 w/v, HS  0.5 ml Diluent: HSA qs to 5ml, Disp: 5 mL, Rfl: PRN     venlafaxine (EFFEXOR-XR) 150 MG 24 hr capsule, Take 1 capsule (150 mg) by mouth daily, Disp: 90 capsule, Rfl: 0     amLODIPine (NORVASC) 2.5 MG tablet, Take 1 tablet (2.5 mg) by mouth daily (Patient not taking: Reported on 11/4/2020), Disp: 90 tablet, Rfl: 3  No Known Allergies    EXAM:   [facility] 12/21/2014   GENERAL APPEARANCE: alert, cooperative and not in distress  SKIN: no rashes, no lesions  HEAD: atraumatic, normocephalic  EYES: lids and lashes normal,  EOM full and intact  ENT: normal external ears and nose, no nasal drainage  NECK: no asymmetry, masses, or scars  LUNGS: unlabored respirations, no accessory muscle use, speaking comfortably in full sentences, no cough or audible wheezing  MUSCULOSKELETAL: no musculoskeletal defects are noted  NEURO: no focal deficits noted  PSYCH: does not appear depressed or anxious      WORKUP:  None    ASSESSMENT/PLAN:  Nela Mares is a 60 year old female seen for follow-up of asthma and allergic rhinitis.    1. Mild persistent asthma without complication - Sanam reports her asthma has been well controlled with the exception of when she was sick with COVID-19 last fall. She has since recovered and reports no persistent respiratory symptoms. She was treated with an extended course of prednisone last November but had previously not needed oral steroids to manage acute asthma symptoms for several years.    - fluticasone-salmeterol (ADVAIR DISKUS) 500-50 MCG/DOSE inhaler; Inhale 1 puff into the lungs 2 times daily  Dispense: 3 each; Refill: 1  - montelukast (SINGULAIR) 10 MG tablet; Take 1 tablet (10 mg) by mouth daily  Dispense: 90 tablet; Refill: 1  Take 2 to 4 puffs of albuterol HFA every 4 hours as needed    2. Allergic rhinitis due to dust mite, mold, and pollen - Sanam has completed 2 years of allergen immunotherapy treatment at her maintenance dose. She reports improvement in her symptoms during this time.    - continue allergen immunotherapy per protocol  - continue taking 10mg of cetirizine once to twice daily as needed  - continue fluticasone nasal spray, 1-2 sprays in each nostril daily as needed      Follow-up in 6 months, sooner if needed      Thank you for allowing me to participate in the care of Nela Mares.      Karen Clark MD, FAAAAI  Allergy/Immunology  [facility]      Chart documentation done in part with Dragon Voice  Recognition Software. Although reviewed after completion, some word and grammatical errors may remain.

## 2021-01-27 ASSESSMENT — ASTHMA QUESTIONNAIRES: ACT_TOTALSCORE: 25

## 2021-01-31 ENCOUNTER — HEALTH MAINTENANCE LETTER (OUTPATIENT)
Age: 61
End: 2021-01-31

## 2021-02-04 ENCOUNTER — MYC MEDICAL ADVICE (OUTPATIENT)
Dept: ALLERGY | Facility: CLINIC | Age: 61
End: 2021-02-04

## 2021-02-11 ENCOUNTER — TELEPHONE (OUTPATIENT)
Dept: FAMILY MEDICINE | Facility: CLINIC | Age: 61
End: 2021-02-11

## 2021-02-11 ENCOUNTER — VIRTUAL VISIT (OUTPATIENT)
Dept: FAMILY MEDICINE | Facility: CLINIC | Age: 61
End: 2021-02-11
Payer: COMMERCIAL

## 2021-02-11 DIAGNOSIS — I10 ESSENTIAL HYPERTENSION WITH GOAL BLOOD PRESSURE LESS THAN 140/90: ICD-10-CM

## 2021-02-11 DIAGNOSIS — E87.6 HYPOKALEMIA: ICD-10-CM

## 2021-02-11 DIAGNOSIS — F32.0 MILD MAJOR DEPRESSION (H): Primary | ICD-10-CM

## 2021-02-11 DIAGNOSIS — R73.01 IMPAIRED FASTING GLUCOSE: ICD-10-CM

## 2021-02-11 DIAGNOSIS — E66.01 MORBID OBESITY (H): ICD-10-CM

## 2021-02-11 PROBLEM — I47.10 NONSUSTAINED SUPRAVENTRICULAR TACHYCARDIA (H): Status: ACTIVE | Noted: 2021-02-11

## 2021-02-11 PROBLEM — I47.10 NONSUSTAINED SUPRAVENTRICULAR TACHYCARDIA (H): Status: RESOLVED | Noted: 2021-02-11 | Resolved: 2021-02-11

## 2021-02-11 PROCEDURE — 96127 BRIEF EMOTIONAL/BEHAV ASSMT: CPT | Performed by: FAMILY MEDICINE

## 2021-02-11 PROCEDURE — 99214 OFFICE O/P EST MOD 30 MIN: CPT | Mod: TEL | Performed by: FAMILY MEDICINE

## 2021-02-11 RX ORDER — AMLODIPINE BESYLATE 5 MG/1
5 TABLET ORAL DAILY
Qty: 90 TABLET | Refills: 1 | Status: SHIPPED | OUTPATIENT
Start: 2021-02-11 | End: 2021-09-14

## 2021-02-11 RX ORDER — VENLAFAXINE HYDROCHLORIDE 150 MG/1
150 CAPSULE, EXTENDED RELEASE ORAL DAILY
Qty: 90 CAPSULE | Refills: 1 | Status: SHIPPED | OUTPATIENT
Start: 2021-02-11 | End: 2021-10-18

## 2021-02-11 ASSESSMENT — ANXIETY QUESTIONNAIRES
2. NOT BEING ABLE TO STOP OR CONTROL WORRYING: NOT AT ALL
5. BEING SO RESTLESS THAT IT IS HARD TO SIT STILL: NOT AT ALL
1. FEELING NERVOUS, ANXIOUS, OR ON EDGE: NOT AT ALL
7. FEELING AFRAID AS IF SOMETHING AWFUL MIGHT HAPPEN: NOT AT ALL
GAD7 TOTAL SCORE: 0
6. BECOMING EASILY ANNOYED OR IRRITABLE: NOT AT ALL
3. WORRYING TOO MUCH ABOUT DIFFERENT THINGS: NOT AT ALL

## 2021-02-11 ASSESSMENT — PATIENT HEALTH QUESTIONNAIRE - PHQ9
5. POOR APPETITE OR OVEREATING: NOT AT ALL
SUM OF ALL RESPONSES TO PHQ QUESTIONS 1-9: 3

## 2021-02-11 NOTE — PROGRESS NOTES
"Sanam is a 60 year old who is being evaluated via a billable telephone visit.      What phone number would you like to be contacted at? [phone]  How would you like to obtain your AVS? MyChart    Assessment & Plan     Mild major depression (H)  Doing well.  Continue current medication    - venlafaxine (EFFEXOR-XR) 150 MG 24 hr capsule  Dispense: 90 capsule; Refill: 1    Essential hypertension with goal blood pressure less than 140/90  Increased amlodipine to 5 mg daily.  Advised to check home blood pressures and touch base with my RN in three weeks.  Goal blood pressure under 140/90  - amLODIPine (NORVASC) 5 MG tablet  Dispense: 90 tablet; Refill: 1    Impaired fasting glucose  Will schedule lab appointment.  adjust therapy based on labs    - **Glucose FUTURE anytime  - **A1C FUTURE anytime    Hypokalemia  adjust therapy based on labs    - **Potassium FUTURE anytime    Morbid obesity (H)  Not able to measure weight today.   But will continue to monitor               BMI:   Estimated body mass index is 38.1 kg/m  as calculated from the following:    Height as of 11/2/20: 1.753 m (5' 9\").    Weight as of 11/2/20: 117 kg (258 lb).   Weight management plan: Discussed healthy diet and exercise guidelines    See Patient Instructions    Return in about 2 weeks (around 2/25/2021) for Non-fasting Lab Visit.    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  Is happy to be with the kids again.    Was tested for covid in November.  Was positive at [facility]  Has recovered.    Home blood pressure 141/93 today  Within past week 153/103 was highest.    Got first COVID vaccine last Friday.  Did have some symptoms afterwards    HPI       Depression Followup    How are you doing with your depression since your last visit? No issues     Are you " having other symptoms that might be associated with depression? No    Have you had a significant life event?  No     Are you feeling anxious or having panic attacks?   No    Do you have any concerns with your use of alcohol or other drugs? No    Social History     Tobacco Use     Smoking status: Never Smoker     Smokeless tobacco: Never Used   Substance Use Topics     Alcohol use: No     Drug use: No     PHQ 12/12/2019 7/9/2020 2/11/2021   PHQ-9 Total Score 1 2 3   Q9: Thoughts of better off dead/self-harm past 2 weeks Not at all Not at all Not at all   F/U: Thoughts of suicide or self-harm - - -   F/U: Safety concerns - - -     PETE-7 SCORE 6/13/2019 7/18/2019 2/11/2021   Total Score 3 0 0       Suicide Assessment Five-step Evaluation and Treatment (SAFE-T)    Asthma Follow-Up    Was ACT completed today?  No      Do you have a cough?  No    Are you experiencing any wheezing in your chest?  No    Do you have any shortness of breath?  No     How often are you using a short-acting (rescue) inhaler or nebulizer, such as Albuterol?  Never    How many days per week do you miss taking your asthma controller medication?  I do not have an asthma controller medication    Please describe any recent triggers for your asthma: None    Have you had any Emergency Room Visits, Urgent Care Visits, or Hospital Admissions since your last office visit?  No      How many servings of fruits and vegetables do you eat daily?  4 or more    On average, how many sweetened beverages do you drink each day (Examples: soda, juice, sweet tea, etc.  Do NOT count diet or artificially sweetened beverages)?   0    How many days per week do you exercise enough to make your heart beat faster?     How many minutes a day do you exercise enough to make your heart beat faster?     How many days per week do you miss taking your medication? 0        Review of Systems   Constitutional, HEENT, cardiovascular, pulmonary, gi and gu systems are negative, except as  otherwise noted.      Objective           Vitals:  No vitals were obtained today due to virtual visit.    Physical Exam   healthy, alert and no distress  PSYCH: Alert and oriented times 3; coherent speech, normal   rate and volume, able to articulate logical thoughts, able   to abstract reason, no tangential thoughts, no hallucinations   or delusions  Her affect is normal  RESP: No cough, no audible wheezing, able to talk in full sentences  Remainder of exam unable to be completed due to telephone visits                Phone call duration: 7 minutes

## 2021-02-11 NOTE — TELEPHONE ENCOUNTER
Please call patient to find out how home blood pressures are doing.  We increased her amlodipine to 5 mg on 2/11/21

## 2021-02-11 NOTE — PATIENT INSTRUCTIONS
Let's increase your amlodipine to 5 mg daily.  You can finish your home supply by taking two 2.5 mg tablets daily.  Your new prescription will be one 5 mg tablet.  I will ask my nurse to check in with you regarding your home blood pressures in 2-3 weeks.  The goal is that they are generally close to 120-130/70-80.    And please schedule a lab appointment

## 2021-02-12 ASSESSMENT — ANXIETY QUESTIONNAIRES: GAD7 TOTAL SCORE: 0

## 2021-02-25 NOTE — TELEPHONE ENCOUNTER
Attempt #1 to call patient.     Patient did not answer, RN left voicemail at home number. RN requested patient return call to [facility] at [phone].     Idania Parish RN, BSN, PHN  [facility]: [place]

## 2021-02-26 ENCOUNTER — MYC MEDICAL ADVICE (OUTPATIENT)
Dept: FAMILY MEDICINE | Facility: CLINIC | Age: 61
End: 2021-02-26

## 2021-02-26 DIAGNOSIS — I10 ESSENTIAL HYPERTENSION WITH GOAL BLOOD PRESSURE LESS THAN 140/90: Primary | ICD-10-CM

## 2021-02-26 NOTE — TELEPHONE ENCOUNTER
Attempt #2      Called and left message for patient to please call clinic back  Patient is active for mychart- sent message to patient so she may respond that way as well.      Mattie Miranda RN

## 2021-03-01 NOTE — TELEPHONE ENCOUNTER
Provided she is not having any side effects from the increase in her amlodipine, we should make one more adjustment up to 10 mg daily.    And then, if we have RN HTN management available again - I would like her to come in for an RN visit in 3-4 weeks.    If not, please let me know and we will make another plan.

## 2021-03-01 NOTE — TELEPHONE ENCOUNTER
Since we have HTN RN's available. I will adjust the order for HTN management.  Then let's not adjust her amlodipine just yet (keep her at 5 mg). And when she comes in, if her blood pressure remains high, we can adjust the amlodipine up.    THanks

## 2021-03-01 NOTE — TELEPHONE ENCOUNTER
RN relayed plan of care to patient. Will hold open for response/ until scheduled.     Idania Parish RN, BSN, PHN  [facility]: [place]

## 2021-03-05 ENCOUNTER — ALLIED HEALTH/NURSE VISIT (OUTPATIENT)
Dept: NURSING | Facility: CLINIC | Age: 61
End: 2021-03-05
Payer: COMMERCIAL

## 2021-03-05 VITALS
HEART RATE: 62 BPM | HEIGHT: 69 IN | DIASTOLIC BLOOD PRESSURE: 82 MMHG | BODY MASS INDEX: 37.03 KG/M2 | SYSTOLIC BLOOD PRESSURE: 122 MMHG | WEIGHT: 250 LBS

## 2021-03-05 DIAGNOSIS — I10 ESSENTIAL HYPERTENSION: Primary | ICD-10-CM

## 2021-03-05 DIAGNOSIS — E87.6 HYPOKALEMIA: ICD-10-CM

## 2021-03-05 DIAGNOSIS — R73.01 IMPAIRED FASTING GLUCOSE: ICD-10-CM

## 2021-03-05 LAB — HBA1C MFR BLD: 6 % (ref 0–5.6)

## 2021-03-05 PROCEDURE — 99207 PR NO CHARGE NURSE ONLY: CPT

## 2021-03-05 PROCEDURE — 84132 ASSAY OF SERUM POTASSIUM: CPT | Performed by: FAMILY MEDICINE

## 2021-03-05 PROCEDURE — 36415 COLL VENOUS BLD VENIPUNCTURE: CPT | Performed by: FAMILY MEDICINE

## 2021-03-05 PROCEDURE — 83036 HEMOGLOBIN GLYCOSYLATED A1C: CPT | Performed by: FAMILY MEDICINE

## 2021-03-05 ASSESSMENT — MIFFLIN-ST. JEOR: SCORE: 1768.37

## 2021-03-05 NOTE — PATIENT INSTRUCTIONS
PATIENT INSTRUCTIONS     1.  You will continue current medication regimen unchanged    2.  FOLLOW UP:   Follow up with Provider -  In 6 months with 3 blood pressure readings     3.  Limit total daily sodium to 1500-2000mg per day    Your BP Goal is: less than <140/90 consistently    Today we used a large cuff on your both arm.    BP Readings from Last 3 Encounters:   20 129/86   10/05/20 (!) 141/88   20 118/81     Pulse Readings from Last 1 Encounters:   20 70       You will need baseline lab tests done within 12 months of beginning a new blood pressure medication and during/after medication adjustment is complete.    Last Labs:  Sodium   Date Value Ref Range Status   10/05/2020 141 133 - 144 mmol/L Final      Potassium   Date Value Ref Range Status   2020 4.6 3.4 - 5.3 mmol/L Final     Urea Nitrogen   Date Value Ref Range Status   10/05/2020 16 7 - 30 mg/dL Final     Creatinine   Date Value Ref Range Status   10/05/2020 0.78 0.52 - 1.04 mg/dL Final     GFR Estimate   Date Value Ref Range Status   10/05/2020 82 >60 mL/min/[1.73_m2] Final     Comment:     Non  GFR Calc  Starting 2018, serum creatinine based estimated GFR (eGFR) will be   calculated using the Chronic Kidney Disease Epidemiology Collaboration   (CKD-EPI) equation.         Today we talked about...     The key to effective blood pressure monitorin) Take your BP medication in the morning or as directed.     2) Sit in a chair with feet flat on the floor for 5-10 minutes before checking your blood pressure.     3) Use the same arm every time.       4) Use the same machine and appropriate cuff size.       5) Check your blood pressure at the same time of day.    High Blood Pressure      Over time, high blood pressure can damage the blood vessels and vital organs. This can lead to strokes, heart disease, and kidney disease. Treating high blood pressure decreases your chances of having heart and kidney  problems.  A healthy diet low in sodium and high in grains along with regular exercise have a direct effect on your blood pressure.      Several kinds of medicines are used to treat high blood pressure. The medicines may be effective alone, or they may be used with other drugs.     How do high blood pressure medicines work?   Each type of medicine lowers blood pressure in a different way.     Diuretics: also called water pills, rid the body of excess sodium (salt) and water. This means there is less blood volume putting pressure on the vessel walls.   Examples include: hydrochlorothiazide or furosemide (Lasix).     Beta blockers: reduce the heart rate and the heart's output of blood.  Slowing the heart down a bit allows it to fill more completely in between beats.  Examples include: metoprolol, atenolol, and propranolol.     Vasodilators, ACE inhibitors, ARBs, and calcium channel blockers: lower blood pressure by relaxing and opening up narrowed blood vessels allowing more room for blood to flow.   Examples:  -Calcium Channel Blockers: diltiazem, verapamil, nifedipine, and amlodipine .     -ACE inhibitors (angiotensin-converting enzyme inhibitor): enalapril, captopril and lisinopril.     -ARBs (angiotensin receptor blockers)- irbesartan, valsartan and losartan.      -Vasodilators- nitroglycerin, isosorbide mononitrate, and hydralazine.     What should I watch out for while taking high blood pressure medicines?   When you take high blood pressure medicine, it is important to:     Take the medicine regularly, exactly as prescribed. Do not change your dosage or stop taking the medicine without talking to your provider first. It can be dangerous to suddenly stop taking blood pressure medicine.     Tell your provider about any side effects right away. You may feel dizzy or have headaches while taking these medicines. Older people may be more sensitive than younger people to the medicine's effects. It may take several  weeks or months to find the best treatment for you. Make sure that you keep your follow-up appointments with your healthcare provider and let your provider know how you are tolerating your medicine.     Check your blood pressure (or have it checked) as often as your healthcare provider advises. Ask your provider if you should have a home blood pressure monitor to check your blood pressure between visits.    Does everyone need to take medication to control high blood pressure?  No.  Some patients with elevated BP readings are able to lower their blood pressure by modifying diet and increasing activity level.    Your pharmacist is a great resource for questions regarding your medication's side effects and potential interactions.     If you are having a side effect from your medication, please contact your primary provider.     If you believe you are experiencing a life threatening reaction to your medication call 911 [facility]: [place]  Phone: [phone]  Fax:[phone]

## 2021-03-05 NOTE — Clinical Note
Pt came in for HTN management. BP in both arms 122/82. Wondering if her home blood pressure monitor is accurate. Did not adjust meds today. Sent her to lab after our visit, as did not get her labs done prior. Would you like her to come back with her home monitor to check it against ours. What would the next steps in her plan of care be.    Thanks    Fiona Solorio RN

## 2021-03-05 NOTE — PROGRESS NOTES
Nela Mares is being followed for Blood Pressure management.    NURSING ASSESSMENT/PLAN:  Blood pressure reading today is at the provider specified goal of less than 140/90.    Current blood pressure medication(s):  Amlodipine 5 mg, one tablet, daily.  Lisinopril 40 mg, one tablet, daily.  1.  The patient will continue current medication regimen unchanged.     2.  We will be checking a potassium and A1c level today    3.  Follow up instructions include:  Will discuss next visit and plan of care with provider    SUBJECTIVE:                                                    The patient is taking medication as prescribed and is tolerating well. No side effects or complaints  Patient is monitoring Blood Pressure at home.   Last 3 home readings: ranging 140-150's/80-90's  In addition notes: Blood pressure here in clinic much lower than at home. Check BP in both arms.     Out of the following complicating factors: Cough, Headache, Lightheadedness, Shortness of breath, Fatigue, Nausea, Sexual Dysfunction, New onset of swelling or edema, Weakness and New onset of Chest Pain, the patient reports:  None    OBJECTIVE:                                                    Is pulse 55 or greater? - Yes  Pulse Readings from Last 1 Encounters:   11/02/20 70     Today's BP completed using cuff size: large on both sides arm.  BP Readings from Last 3 Encounters:   11/02/20 129/86   10/05/20 (!) 141/88   08/05/20 118/81       Current Outpatient Medications   Medication Sig Dispense Refill     albuterol (PROAIR HFA/PROVENTIL HFA/VENTOLIN HFA) 108 (90 Base) MCG/ACT inhaler Inhale 2 puffs into the lungs every 4 hours as needed for shortness of breath / dyspnea or wheezing 1 Inhaler 3     albuterol (PROVENTIL) (2.5 MG/3ML) 0.083% neb solution Take 1 vial (2.5 mg) by nebulization every 4 hours as needed for shortness of breath / dyspnea or wheezing 1 Box 1     amLODIPine (NORVASC) 5 MG tablet Take 1 tablet (5 mg) by mouth daily 90  tablet 1     cetirizine (ZYRTEC) 10 MG tablet Take 10 mg by mouth daily       estradiol (VAGIFEM) 10 MCG TABS vaginal tablet Place 1 tablet (10 mcg) vaginally twice a week 28 tablet 3     fluticasone (FLONASE) 50 MCG/ACT nasal spray Spray 1-2 sprays into both nostrils daily 48 g 3     fluticasone-salmeterol (ADVAIR DISKUS) 500-50 MCG/DOSE inhaler Inhale 1 puff into the lungs 2 times daily 3 each 1     lisinopril (ZESTRIL) 40 MG tablet Take 1 tablet (40 mg) by mouth daily 90 tablet 3     montelukast (SINGULAIR) 10 MG tablet Take 1 tablet (10 mg) by mouth daily 90 tablet 1     ORDER FOR ALLERGEN IMMUNOTHERAPY Name of Mix: Mix #1  Dust Mite, Cat, Dog  Cat Hair, Standardized 10,000 BAU/mL, ALK  2.0 ml  Dog Hair-Dander, A. P.  1:100 w/v, HS  1.0 ml  Dust Mites DP. 30,000 AU/mL, HS  0.6 ml   Diluent: HSA qs to 5ml 5 mL PRN     ORDER FOR ALLERGEN IMMUNOTHERAPY Name of Mix: Mix #2  Tree , Weeds  Birch Mix PRW 1:20 w/v, HS  0.5 ml  Hickory, Shagbark 1:20 w/v, HS  0.5 ml  [place] Mix RW 1:20 w/v, HS 0.5 ml  Oak Mix RVW 1:20 w/v, HS 0.5 ml  Kochia 1:20 w/v, HS 0.5 ml  Nettle 1:20 w/v, HS 0.5 ml  Ragweed Mixed 1:20 w/v ALK  0.5 ml  Sagebrush, Mugwort 1:20 w/v, HS 0.5 ml  Diluent: HSA qs to 5ml 5 mL PRN     ORDER FOR ALLERGEN IMMUNOTHERAPY Name of Mix: Mix #3  Mold  Alternaria Tenuis 1:10 w/v, HS  0.5 ml  Aspergillus Fumigatus 1:10 w/v, HS  0.5 ml  Curvularia Spicifera 1:10 w/v, HS  0.5 ml  Epicoccum Nigrum 1:10 w/v, HS 0.5 ml  Hormodendrum Cladosporioides 1:10 w/v, HS 0.5 ml  Penicillium Mix 1:10 w/v, HS  0.5 ml  Phoma Herbarum 1:10 w/v, HS  0.5 ml  Diluent: HSA qs to 5ml 5 mL PRN     venlafaxine (EFFEXOR-XR) 150 MG 24 hr capsule Take 1 capsule (150 mg) by mouth daily 90 capsule 1     Potassium   Date Value Ref Range Status   11/02/2020 4.6 3.4 - 5.3 mmol/L Final     Creatinine   Date Value Ref Range Status   10/05/2020 0.78 0.52 - 1.04 mg/dL Final     Urea Nitrogen   Date Value Ref Range Status   10/05/2020 16 7 - 30 mg/dL  Final     GFR Estimate   Date Value Ref Range Status   10/05/2020 82 >60 mL/min/[1.73_m2] Final     Comment:     Non  GFR Calc  Starting 12/18/2018, serum creatinine based estimated GFR (eGFR) will be   calculated using the Chronic Kidney Disease Epidemiology Collaboration   (CKD-EPI) equation.        A baseline potassium, creatinine, BUN, GFR has been done within past 12 months    Education:  written materials handout  Limit dietary sodium intake between 1500-2000mg every day  Regular aerobic exercise.  Discussed habit change regarding Diet  These interventions were used: Empathy/Understanding  Education material provided and patient was given an opportunity to ask questions.    Patient verbalized understanding of this plan and is agreeable.    Professional Reference click here   Copy of current RN HTN MGMT order or refer to Other Orders:  Add blood pressure goal to problem list:  <140/90     Patient is being referred to RN Hypertension Program for the following diagnoses:   Hypertension     RN will confirm Potassium, Creatinine, BUN (or BMP) have been done within the past 12 months.   RN will order follow-up labs according to RN protocol.       According to RN Protocol, start or titrate:     Lisinopril (Zestril, Prinivil) starting dose 40mg may be titrated to 40mg.  Increase by doubling mg every 2 to 3 weeks.  Usual dose 10 mg to 40 mg orally once daily.  Most common side effect: cough.   Amlodipine (Norvasc) starting dose 5 mg may be titrated to 10mg. Increase by doubling mg every 2 to 3 weeks.  Usual dose 5 mg to 10 mg orally once daily.  Most common side effect:  fluid retention     If blood pressure not at goal after current medication titrated, the following medication(s) may be prescribed and titrated.     No further medications ordered.     If blood pressure not at goal after maximum dose reached, consult provider.     If blood pressure at goal, follow up: in 6 mos. by MyChart/phone with 3  BP readings     Contraindications and dosing considerations for current and recommended medications have been reviewed. Yes  Dosage adjustment made based on patient specific, physician directed, care plan.    Fiona Solorio RN

## 2021-03-06 LAB — POTASSIUM SERPL-SCNC: 3.5 MMOL/L (ref 3.4–5.3)

## 2021-05-05 ENCOUNTER — NURSE TRIAGE (OUTPATIENT)
Dept: NURSING | Facility: CLINIC | Age: 61
End: 2021-05-05

## 2021-05-05 ENCOUNTER — TELEPHONE (OUTPATIENT)
Dept: CARDIOLOGY | Facility: CLINIC | Age: 61
End: 2021-05-05

## 2021-05-05 NOTE — TELEPHONE ENCOUNTER
"Patient calling to clarify the message left on Mychart from her cardiologist. (shown below)    \"Sanam,   I called to talk to you but cannot reach you. I left voicemail. Can you go to ER. You have SVT. Not life threatening but you need to be seen.  I will call my nurse to schedule an appointment to see you to talk to you about this.     \"      While writer was reading the message, patient states she was getting another phone call. Patient thought her doctor was calling again, and stated that she need to go. Patient hung up.    Joan Howell, RN/M [facility]        Additional Information    Negative: [1] Caller is not with the adult (patient) AND [2] reporting urgent symptoms    Negative: Lab result questions    Negative: Medication questions    Negative: Caller can't be reached by phone    Negative: Caller has already spoken to PCP or another triager    Negative: RN needs further essential information from caller in order to complete triage    Negative: Requesting regular office appointment    Negative: [1] Caller requesting NON-URGENT health information AND [2] PCP's office is the best resource    Negative: Health Information question, no triage required and triager able to answer question    Negative: General information question, no triage required and triager able to answer question    Negative: Question about upcoming scheduled test, no triage required and triager able to answer question    Negative: [1] Caller is not with the adult (patient) AND [2] probable NON-URGENT symptoms    [1] Follow-up call to recent contact AND [2] information only call, no triage required    Protocols used: INFORMATION ONLY CALL-A-      "

## 2021-05-05 NOTE — TELEPHONE ENCOUNTER
I called and talked the patient on the phone today.  She rechecked her rhythm through her Kardia device.  She is in normal rate and rhythm now.  Therefore she does not need to go to the emergency phone.  I have discussed with the patient about SVT and treatment options being medical versus catheter ablation.  The patient does not want to be on any other medication.  She prefers catheter ablation.  I will refer her to EP for SVT ablation.    DR.CAN

## 2021-05-10 ENCOUNTER — VIRTUAL VISIT (OUTPATIENT)
Dept: CARDIOLOGY | Facility: CLINIC | Age: 61
End: 2021-05-10
Attending: INTERNAL MEDICINE
Payer: COMMERCIAL

## 2021-05-10 DIAGNOSIS — I47.10 PAROXYSMAL SUPRAVENTRICULAR TACHYCARDIA (H): Primary | ICD-10-CM

## 2021-05-10 PROCEDURE — 99215 OFFICE O/P EST HI 40 MIN: CPT | Mod: GT | Performed by: INTERNAL MEDICINE

## 2021-05-10 PROCEDURE — 99417 PROLNG OP E/M EACH 15 MIN: CPT | Performed by: INTERNAL MEDICINE

## 2021-05-10 RX ORDER — LIDOCAINE 40 MG/G
CREAM TOPICAL
Status: CANCELLED | OUTPATIENT
Start: 2021-05-10

## 2021-05-10 RX ORDER — SODIUM CHLORIDE 9 MG/ML
INJECTION, SOLUTION INTRAVENOUS CONTINUOUS
Status: CANCELLED | OUTPATIENT
Start: 2021-05-10

## 2021-05-10 NOTE — LETTER
May 10, 2021      TO: Nela Mares  [address] [facility] [address]         Dear Nela,        You are scheduled for an SVT ablation, at [facility] with Dr. Magaña. The hospital is located at [address] [place] [address] on the [place] bank of [place] campus.  If you need to cancel this procedure, please call [phone].      You will need to undergo a COVID-19 PCR swab test within 4 days of procedure. You will receive a phone call with more information. If you do not hear from the COVID scheduling team, please call: [phone] to schedule.    Your appointment is scheduled for May 29, 2021 at 9:30 am. Location:  [address] [place] [address]    Date:_June 2, 2021_____Time: __6:30 am___To the [facility] Waiting Room at the [facility]  Supraventricular Tachycardia Ablation (SVT)    1. Your history and physical will be completed by our nurse practitioner when you arrive.  2. Please do not eat anything for 8 hours prior to your procedure. You may have sips of water up until 2 hours prior to your arrival.  3. The morning of your procedure, you may take your scheduled medications with a sip of water.  4. You will discharge the same day and need a .        Post-Procedure Instructions  Care of groin site:    Remove the Band-Aid after 24 hours. If there is minor oozing, apply another Band-aid and remove it after 12 hours.     Do NOT take a bath, use a hot tub, pool, or submerse in water for at least 3 days. You may shower.     It is normal to have a small bruise or lump at the site.    Do not scrub the site.    Do not use lotion or powder near the puncture site for 3 days.    If you start bleeding from the site in your groin: Lie down flat and press firmly on the site. Call your physician immediately, or, come to the emergency room.  Call 911 right away if you have bleeding that is heavy or does not stop.    Call your doctor/provider if:     You have a large or growing hard  lump around the site.     The site is red, swollen, hot or tender.     Blood or fluid is draining from the site.     You have chills or a fever greater than 101 F (38 C).     Your leg or arm turns bluish, feels numb or cool.     You have hives, a rash or unusual itching.      Activity Restrictions    For the first 2 days: Do not stoop or squat. When you cough, sneeze or move your bowels, hold your hand over the puncture site and press gently.    Do not lift more than 10 pounds or exertional activity for 10 days.  - No driving for 24 hours after (with or without general anesthesia).       Date: __June 3, 2021__3pm___ Follow up with Dr. Magaña.      Please do not hesitate to utilize [email or web address] or call us if you have any questions or concerns.    Agnieszka Yeh RN  Electrophysiology Nurse Coordinator  [phone]    [facility]   [phone]

## 2021-05-10 NOTE — PATIENT INSTRUCTIONS
You are scheduled for an SVT ablation, at [facility] with Dr. Magaña. The hospital is located at [address] [place] [address] on the [place] bank of the [place].  If you need to cancel this procedure, please call [phone].      You will need to undergo a COVID-19 PCR swab test within 4 days of procedure. You will receive a phone call with more information. If you do not hear from the COVID scheduling team, please call: [phone] to schedule.    Date:______  Time: _______________To the [facility] Waiting Room at the [facility]  Supraventricular Tachycardia Ablation (SVT)    1. Your history and physical will be completed by our nurse practitioner when you arrive.  2. Please do not eat anything for 8 hours prior to your procedure. You may have sips of water up until 2 hours prior to your arrival.  3. The morning of your procedure, you may take your scheduled medications with a sip of water.  4. You will discharge the same day and need a .        Post-Procedure Instructions  Care of groin site:    Remove the Band-Aid after 24 hours. If there is minor oozing, apply another Band-aid and remove it after 12 hours.     Do NOT take a bath, use a hot tub, pool, or submerse in water for at least 3 days. You may shower.     It is normal to have a small bruise or lump at the site.    Do not scrub the site.    Do not use lotion or powder near the puncture site for 3 days.    If you start bleeding from the site in your groin: Lie down flat and press firmly on the site. Call your physician immediately, or, come to the emergency room.  Call 911 right away if you have bleeding that is heavy or does not stop.    Call your doctor/provider if:     You have a large or growing hard lump around the site.     The site is red, swollen, hot or tender.     Blood or fluid is draining from the site.     You have chills or a fever greater than 101 F (38 C).     Your leg or arm turns bluish, feels numb or  cool.     You have hives, a rash or unusual itching.      Activity Restrictions    For the first 2 days: Do not stoop or squat. When you cough, sneeze or move your bowels, hold your hand over the puncture site and press gently.    Do not lift more than 10 pounds or exertional activity for 10 days.  - No driving for 24 hours after (with or without general anesthesia).       Date: _______ Follow up with Dr. Magaña.      Please do not hesitate to utilize [employer] or call us if you have any questions or concerns.    Agnieszka Yeh RN  Electrophysiology Nurse Coordinator  [phone]    [facility]   [phone]

## 2021-05-10 NOTE — PROGRESS NOTES
Sanam is a 60 year old who is being evaluated via a billable video visit.      How would you like to obtain your AVS? MyChart  If the video visit is dropped, the invitation should be resent by: Text to cell phone: [phone]   Will anyone else be joining your video visit? No      Video Start Time: 3:02 PM      Video-Visit Details    Type of service:  Video Visit    Video End Time:3:26PM    Originating Location (pt. Location): Home    Distant Location (provider location):  [facility] HEART [facility]     Platform used for Video Visit: [employer]       HPI:  Ms. Mares is a 59 yo Female with a PMH of HTN, asthma, MACIE with CPAP, COVID-19 infection complicated with skin rash and hair loss in 11/2020 and SVT.  She was referred to consider whether she might be a candidate for catheter ablation of SVT.  She started having SVT in late teen's, which has been getting worse.  She recently bought Wize, which documented her SVTs.  Her SVTs occurs several times in a year and last for 2-3 hours.  She reported that mental stress might be a trigger of her SVTs.  She has never attempted any medication to treat SVTs.    PAST MEDICAL HISTORY:  Past Medical History:   Diagnosis Date     Allergic rhinitis, cause unspecified      Cervical high risk HPV (human papillomavirus) test positive 03/21/2017    3/21/17 NIL pap/+ HR HPV (not 16 or 18).      Depressive disorder      Depressive disorder, not elsewhere classified     seasonal     Hypertension      Mild persistent asthma      Obstructive sleep apnea (adult) (pediatric)     uses CPAP     Scalp mass 7/2014       CURRENT MEDICATIONS:  Current Outpatient Medications   Medication Sig Dispense Refill     albuterol (PROAIR HFA/PROVENTIL HFA/VENTOLIN HFA) 108 (90 Base) MCG/ACT inhaler Inhale 2 puffs into the lungs every 4 hours as needed for shortness of breath / dyspnea or wheezing 1 Inhaler 3     albuterol (PROVENTIL) (2.5 MG/3ML) 0.083% neb solution Take 1 vial (2.5 mg) by  nebulization every 4 hours as needed for shortness of breath / dyspnea or wheezing 1 Box 1     amLODIPine (NORVASC) 5 MG tablet Take 1 tablet (5 mg) by mouth daily 90 tablet 1     cetirizine (ZYRTEC) 10 MG tablet Take 10 mg by mouth daily       estradiol (VAGIFEM) 10 MCG TABS vaginal tablet Place 1 tablet (10 mcg) vaginally twice a week 28 tablet 3     fluticasone (FLONASE) 50 MCG/ACT nasal spray Spray 1-2 sprays into both nostrils daily 48 g 3     fluticasone-salmeterol (ADVAIR DISKUS) 500-50 MCG/DOSE inhaler Inhale 1 puff into the lungs 2 times daily 3 each 1     lisinopril (ZESTRIL) 40 MG tablet Take 1 tablet (40 mg) by mouth daily 90 tablet 3     montelukast (SINGULAIR) 10 MG tablet Take 1 tablet (10 mg) by mouth daily 90 tablet 1     ORDER FOR ALLERGEN IMMUNOTHERAPY Name of Mix: Mix #1  Dust Mite, Cat, Dog  Cat Hair, Standardized 10,000 BAU/mL, ALK  2.0 ml  Dog Hair-Dander, A. P.  1:100 w/v, HS  1.0 ml  Dust Mites DP. 30,000 AU/mL, HS  0.6 ml   Diluent: HSA qs to 5ml 5 mL PRN     ORDER FOR ALLERGEN IMMUNOTHERAPY Name of Mix: Mix #2  Tree , Weeds  Birch Mix PRW 1:20 w/v, HS  0.5 ml  Hickory, Shagbark 1:20 w/v, HS  0.5 ml  [place] Mix RW 1:20 w/v, HS 0.5 ml  Oak Mix RVW 1:20 w/v, HS 0.5 ml  Kochia 1:20 w/v, HS 0.5 ml  Nettle 1:20 w/v, HS 0.5 ml  Ragweed Mixed 1:20 w/v ALK  0.5 ml  Sagebrush, Mugwort 1:20 w/v, HS 0.5 ml  Diluent: HSA qs to 5ml 5 mL PRN     ORDER FOR ALLERGEN IMMUNOTHERAPY Name of Mix: Mix #3  Mold  Alternaria Tenuis 1:10 w/v, HS  0.5 ml  Aspergillus Fumigatus 1:10 w/v, HS  0.5 ml  Curvularia Spicifera 1:10 w/v, HS  0.5 ml  Epicoccum Nigrum 1:10 w/v, HS 0.5 ml  Hormodendrum Cladosporioides 1:10 w/v, HS 0.5 ml  Penicillium Mix 1:10 w/v, HS  0.5 ml  Phoma Herbarum 1:10 w/v, HS  0.5 ml  Diluent: HSA qs to 5ml 5 mL PRN     venlafaxine (EFFEXOR-XR) 150 MG 24 hr capsule Take 1 capsule (150 mg) by mouth daily 90 capsule 1       PAST SURGICAL HISTORY:  Past Surgical History:   Procedure Laterality Date      COLONOSCOPY  6/21/2012    Procedure: COLONOSCOPY;  COLONOSCOPY, SCREEN;  Surgeon: Bart You MD;  Location: [facility] OR     D & C       ENDOSCOPIC RETROGRADE CHOLANGIOPANCREATOGRAM  1/4/2014    Procedure: ENDOSCOPIC RETROGRADE CHOLANGIOPANCREATOGRAM;  ERCP biliary sphincterotomy, bile duct stone removal, epinepherine washing.;  Surgeon: Ba Meyers MD;  Location: [facility] OR     LAPAROSCOPIC CHOLECYSTECTOMY WITH CHOLANGIOGRAMS  1/4/2014    Procedure: LAPAROSCOPIC CHOLECYSTECTOMY WITH CHOLANGIOGRAMS;;  Surgeon: Haja Sage MD;  Location: [facility] OR     NO HISTORY OF SURGERY       OPERATIVE HYSTEROSCOPY  6/17/2014    Procedure: OPERATIVE HYSTEROSCOPY;  Surgeon: Paola Camara MD;  Location: [facility] OR     REMOVE INTRAUTERINE DEVICE  6/17/2014    Procedure: REMOVE INTRAUTERINE DEVICE;  Surgeon: Paola Camara MD;  Location: [facility] OR       ALLERGIES:   No Known Allergies    FAMILY HISTORY:  - Premature coronary artery disease  - Atrial fibrillation  - Sudden cardiac death     SOCIAL HISTORY:  Social History     Tobacco Use     Smoking status: Never Smoker     Smokeless tobacco: Never Used   Substance Use Topics     Alcohol use: No     Drug use: No       ROS:   12 points of ROS were reviewed.  Constitutional: No fever, chills, or sweats. Weight stable.   ENT: No visual disturbance, ear ache, epistaxis, sore throat.   Cardiovascular: As per HPI.   Respiratory: No cough, hemoptysis.    GI: No nausea, vomiting, hematemesis, melena, or hematochezia.   : No hematuria.   Integument: Negative.   Psychiatric: Negative.   Hematologic:  Easy bruising, no easy bleeding.  Neuro: Negative.   Endocrinology: No significant heat or cold intolerance   Musculoskeletal: No myalgia.    Exam:  Vitals:  No vitals were obtained today due to virtual visit.    GENERAL: Healthy, alert and no distress  EYES: Eyes grossly normal to inspection.  No discharge or erythema, or obvious scleral/conjunctival  abnormalities.  RESP: No audible wheeze, cough, or visible cyanosis.  No visible retractions or increased work of breathing.    SKIN: Visible skin clear. No significant rash, abnormal pigmentation or lesions.  NEURO: Cranial nerves grossly intact.  Mentation and speech appropriate for age.  PSYCH: Mentation appears normal, affect normal/bright, judgement and insight intact, normal speech and appearance well-groomed.      Labs:  CBC RESULTS:   Lab Results   Component Value Date    WBC 4.5 10/05/2020    RBC 4.86 10/05/2020    HGB 14.9 10/05/2020    HCT 44.4 10/05/2020    MCV 91 10/05/2020    MCH 30.7 10/05/2020    MCHC 33.6 10/05/2020    RDW 13.1 10/05/2020     10/05/2020       BMP RESULTS:  Lab Results   Component Value Date     10/05/2020    POTASSIUM 3.5 03/05/2021    CHLORIDE 107 10/05/2020    CO2 24 10/05/2020    ANIONGAP 10 10/05/2020     (H) 10/05/2020    BUN 16 10/05/2020    CR 0.78 10/05/2020    GFRESTIMATED 82 10/05/2020    GFRESTBLACK >90 10/05/2020    ASHLEY 9.6 10/05/2020        INR RESULTS:  Lab Results   Component Value Date    INR 1.03 10/05/2020       Procedures:  Kardia strips on 5/5/2021: Reviewed.      Assessment and Plan:  # HTN  She is on Amlodipine and Lisinopril.  # Asthma  # MACIE with CPAP  # COVID-19 infection complicated with skin rash and hair loss in 11/2020.  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  We discussed treatment options including medication and catheter ablation. The nature, risks, benefits, alternatives and anticipated results of the procedure were explained to the patient. These risks include but are not limited to local vascular damage, bleeding, pulmonary vein stenosis, tamponade and stroke. After careful consideration the patient wished to proceed with the procedure. The patient was verbally consented. We will schedule the patient to have this done at her earliest convenience.    I spent a total of 75 min to review the records, see the patient, and complete the documents.        CC  Patient Care Team:  Padma Lozano MD as PCP - General  Karen Clark MD as Assigned Allergy Provider  Jadyn Salas MD as Assigned OBGYN Provider  Christophe Dia MD as Assigned Heart and Vascular Provider  Padma Lozano MD as Assigned PCP  Agnieszka Yeh, RN as Specialty Care Coordinator (Cardiology)  Cuca Magaña MD (Cardiovascular Disease)

## 2021-05-10 NOTE — LETTER
May 17, 2021      TO: Nela Mares  [address] [facility] [address]         Dear Nela,                            UPDATED (correction on month for follow up visit)    You are scheduled for an SVT ablation, at [facility] with Dr. Magaña. The hospital is located at [address] [place] [address] on the [place] of [place] campus.  If you need to cancel this procedure, please call [phone].    You will need to undergo a COVID-19 PCR swab test within 4 days of procedure. You will receive a phone call with more information. If you do not hear from the COVID scheduling team, please call: [phone] to schedule.    Your appointment is scheduled for May 29, 2021 at 9:30 am. Location:  [address] [place] [address]    Date:_June 2, 2021_____Time: __6:30 am___To the [facility] Waiting Room at the [facility]  Supraventricular Tachycardia Ablation (SVT)  1. Your history and physical will be completed by our nurse practitioner when you arrive.  2. Please do not eat anything for 8 hours prior to your procedure. You may have sips of water up until 2 hours prior to your arrival.  3. The morning of your procedure, you may take your scheduled medications with a sip of water.  4. You will discharge the same day and need a .    Post-Procedure Instructions  Care of groin site:    Remove the Band-Aid after 24 hours. If there is minor oozing, apply another Band-aid and remove it after 12 hours.     Do NOT take a bath, use a hot tub, pool, or submerse in water for at least 3 days. You may shower.     It is normal to have a small bruise or lump at the site.    Do not scrub the site.    Do not use lotion or powder near the puncture site for 3 days.    If you start bleeding from the site in your groin: Lie down flat and press firmly on the site. Call your physician immediately, or, come to the emergency room.  Call 911 right away if you have bleeding that is heavy or does not  stop.    Call your doctor/provider if:     You have a large or growing hard lump around the site.     The site is red, swollen, hot or tender.     Blood or fluid is draining from the site.     You have chills or a fever greater than 101 F (38 C).     Your leg or arm turns bluish, feels numb or cool.     You have hives, a rash or unusual itching.    Activity Restrictions    For the first 2 days: Do not stoop or squat. When you cough, sneeze or move your bowels, hold your hand over the puncture site and press gently.    Do not lift more than 10 pounds or exertional activity for 10 days.  - No driving for 24 hours after (with or without general anesthesia).     Date: _Sept.  3, 2021__3pm_ Follow up with Dr. Magaña.(oringal letter had Demetrice by mistake)    Please do not hesitate to utilize [employer] or call us if you have any questions or concerns.    Agnieszka Yeh RN  Electrophysiology Nurse Coordinator  [phone]    [facility] Procedure   [phone]

## 2021-05-10 NOTE — LETTER
5/10/2021      RE: Nela Mares  [address] [facility] [address]       Dear Colleague,    Thank you for the opportunity to participate in the care of your patient, Nela Mares, at the [facility]. Please see a copy of my visit note below.    Sanam is a 60 year old who is being evaluated via a billable video visit.      How would you like to obtain your AVS? MyChart  If the video visit is dropped, the invitation should be resent by: Text to cell phone: [phone]   Will anyone else be joining your video visit? No      Video Start Time: 3:02 PM      Video-Visit Details    Type of service:  Video Visit    Video End Time:3:26PM    Originating Location (pt. Location): Home    Distant Location (provider location):  [facility]     Platform used for Video Visit: [employer]       HPI:  Ms. Mares is a 59 yo Female with a PMH of HTN, asthma, MACIE with CPAP, COVID-19 infection complicated with skin rash and hair loss in 11/2020 and SVT.  She was referred to consider whether she might be a candidate for catheter ablation of SVT.  She started having SVT in late teen's, which has been getting worse.  She recently bought [employer], which documented her SVTs.  Her SVTs occurs several times in a year and last for 2-3 hours.  She reported that mental stress might be a trigger of her SVTs.  She has never attempted any medication to treat SVTs.    PAST MEDICAL HISTORY:  Past Medical History:   Diagnosis Date     Allergic rhinitis, cause unspecified      Cervical high risk HPV (human papillomavirus) test positive 03/21/2017    3/21/17 NIL pap/+ HR HPV (not 16 or 18).      Depressive disorder      Depressive disorder, not elsewhere classified     seasonal     Hypertension      Mild persistent asthma      Obstructive sleep apnea (adult) (pediatric)     uses CPAP     Scalp mass 7/2014       CURRENT  MEDICATIONS:  Current Outpatient Medications   Medication Sig Dispense Refill     albuterol (PROAIR HFA/PROVENTIL HFA/VENTOLIN HFA) 108 (90 Base) MCG/ACT inhaler Inhale 2 puffs into the lungs every 4 hours as needed for shortness of breath / dyspnea or wheezing 1 Inhaler 3     albuterol (PROVENTIL) (2.5 MG/3ML) 0.083% neb solution Take 1 vial (2.5 mg) by nebulization every 4 hours as needed for shortness of breath / dyspnea or wheezing 1 Box 1     amLODIPine (NORVASC) 5 MG tablet Take 1 tablet (5 mg) by mouth daily 90 tablet 1     cetirizine (ZYRTEC) 10 MG tablet Take 10 mg by mouth daily       estradiol (VAGIFEM) 10 MCG TABS vaginal tablet Place 1 tablet (10 mcg) vaginally twice a week 28 tablet 3     fluticasone (FLONASE) 50 MCG/ACT nasal spray Spray 1-2 sprays into both nostrils daily 48 g 3     fluticasone-salmeterol (ADVAIR DISKUS) 500-50 MCG/DOSE inhaler Inhale 1 puff into the lungs 2 times daily 3 each 1     lisinopril (ZESTRIL) 40 MG tablet Take 1 tablet (40 mg) by mouth daily 90 tablet 3     montelukast (SINGULAIR) 10 MG tablet Take 1 tablet (10 mg) by mouth daily 90 tablet 1     ORDER FOR ALLERGEN IMMUNOTHERAPY Name of Mix: Mix #1  Dust Mite, Cat, Dog  Cat Hair, Standardized 10,000 BAU/mL, ALK  2.0 ml  Dog Hair-Dander, A. P.  1:100 w/v, HS  1.0 ml  Dust Mites DP. 30,000 AU/mL, HS  0.6 ml   Diluent: HSA qs to 5ml 5 mL PRN     ORDER FOR ALLERGEN IMMUNOTHERAPY Name of Mix: Mix #2  Tree , Weeds  Birch Mix PRW 1:20 w/v, HS  0.5 ml  Hickory, Shagbark 1:20 w/v, HS  0.5 ml  Harrison Mix RW 1:20 w/v, HS 0.5 ml  Oak Mix RVW 1:20 w/v, HS 0.5 ml  Kochia 1:20 w/v, HS 0.5 ml  Nettle 1:20 w/v, HS 0.5 ml  Ragweed Mixed 1:20 w/v ALK  0.5 ml  Sagebrush, Mugwort 1:20 w/v, HS 0.5 ml  Diluent: HSA qs to 5ml 5 mL PRN     ORDER FOR ALLERGEN IMMUNOTHERAPY Name of Mix: Mix #3  Mold  Alternaria Tenuis 1:10 w/v, HS  0.5 ml  Aspergillus Fumigatus 1:10 w/v, HS  0.5 ml  Curvularia Spicifera 1:10 w/v, HS  0.5 ml  Epicoccum Nigrum 1:10  w/v, HS 0.5 ml  Hormodendrum Cladosporioides 1:10 w/v, HS 0.5 ml  Penicillium Mix 1:10 w/v, HS  0.5 ml  Phoma Herbarum 1:10 w/v, HS  0.5 ml  Diluent: HSA qs to 5ml 5 mL PRN     venlafaxine (EFFEXOR-XR) 150 MG 24 hr capsule Take 1 capsule (150 mg) by mouth daily 90 capsule 1       PAST SURGICAL HISTORY:  Past Surgical History:   Procedure Laterality Date     COLONOSCOPY  6/21/2012    Procedure: COLONOSCOPY;  COLONOSCOPY, SCREEN;  Surgeon: Bart You MD;  Location: [facility] OR     D & C       ENDOSCOPIC RETROGRADE CHOLANGIOPANCREATOGRAM  1/4/2014    Procedure: ENDOSCOPIC RETROGRADE CHOLANGIOPANCREATOGRAM;  ERCP biliary sphincterotomy, bile duct stone removal, epinepherine washing.;  Surgeon: Ba Meyers MD;  Location: [facility] OR     LAPAROSCOPIC CHOLECYSTECTOMY WITH CHOLANGIOGRAMS  1/4/2014    Procedure: LAPAROSCOPIC CHOLECYSTECTOMY WITH CHOLANGIOGRAMS;;  Surgeon: Haja Sage MD;  Location: [facility] OR     NO HISTORY OF SURGERY       OPERATIVE HYSTEROSCOPY  6/17/2014    Procedure: OPERATIVE HYSTEROSCOPY;  Surgeon: Paola Camara MD;  Location: [facility] OR     REMOVE INTRAUTERINE DEVICE  6/17/2014    Procedure: REMOVE INTRAUTERINE DEVICE;  Surgeon: Paola Camara MD;  Location: [facility] OR       ALLERGIES:   No Known Allergies    FAMILY HISTORY:  - Premature coronary artery disease  - Atrial fibrillation  - Sudden cardiac death     SOCIAL HISTORY:  Social History     Tobacco Use     Smoking status: Never Smoker     Smokeless tobacco: Never Used   Substance Use Topics     Alcohol use: No     Drug use: No       ROS:   12 points of ROS were reviewed.  Constitutional: No fever, chills, or sweats. Weight stable.   ENT: No visual disturbance, ear ache, epistaxis, sore throat.   Cardiovascular: As per HPI.   Respiratory: No cough, hemoptysis.    GI: No nausea, vomiting, hematemesis, melena, or hematochezia.   : No hematuria.   Integument: Negative.   Psychiatric: Negative.   Hematologic:  Easy  bruising, no easy bleeding.  Neuro: Negative.   Endocrinology: No significant heat or cold intolerance   Musculoskeletal: No myalgia.    Exam:  Vitals:  No vitals were obtained today due to virtual visit.    GENERAL: Healthy, alert and no distress  EYES: Eyes grossly normal to inspection.  No discharge or erythema, or obvious scleral/conjunctival abnormalities.  RESP: No audible wheeze, cough, or visible cyanosis.  No visible retractions or increased work of breathing.    SKIN: Visible skin clear. No significant rash, abnormal pigmentation or lesions.  NEURO: Cranial nerves grossly intact.  Mentation and speech appropriate for age.  PSYCH: Mentation appears normal, affect normal/bright, judgement and insight intact, normal speech and appearance well-groomed.      Labs:  CBC RESULTS:   Lab Results   Component Value Date    WBC 4.5 10/05/2020    RBC 4.86 10/05/2020    HGB 14.9 10/05/2020    HCT 44.4 10/05/2020    MCV 91 10/05/2020    MCH 30.7 10/05/2020    MCHC 33.6 10/05/2020    RDW 13.1 10/05/2020     10/05/2020       BMP RESULTS:  Lab Results   Component Value Date     10/05/2020    POTASSIUM 3.5 03/05/2021    CHLORIDE 107 10/05/2020    CO2 24 10/05/2020    ANIONGAP 10 10/05/2020     (H) 10/05/2020    BUN 16 10/05/2020    CR 0.78 10/05/2020    GFRESTIMATED 82 10/05/2020    GFRESTBLACK >90 10/05/2020    ASHLEY 9.6 10/05/2020        INR RESULTS:  Lab Results   Component Value Date    INR 1.03 10/05/2020       Procedures:  Kardia strips on 5/5/2021: Reviewed.      Assessment and Plan:  # HTN  She is on Amlodipine and Lisinopril.  # Asthma  # MACIE with CPAP  # COVID-19 infection complicated with skin rash and hair loss in 11/2020.  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  We discussed treatment options including medication and catheter ablation. The nature, risks, benefits, alternatives and anticipated results of the procedure were explained to the patient. These risks include but are not limited to local vascular  damage, bleeding, pulmonary vein stenosis, tamponade and stroke. After careful consideration the patient wished to proceed with the procedure. The patient was verbally consented. We will schedule the patient to have this done at her earliest convenience.    I spent a total of 75 min to review the records, see the patient, and complete the documents.       CC  Patient Care Team:  Padma Lozano MD as PCP - General  Karen Clark MD as Assigned Allergy Provider  Jadyn Saals MD as Assigned OBGYN Provider  Christophe Dia MD as Assigned Heart and Vascular Provider  Padma Lozano MD as Assigned PCP  Agnieszka Yeh, RN as Specialty Care Coordinator (Cardiology)  Cuca Magaña MD (Cardiovascular Disease)

## 2021-05-10 NOTE — LETTER
May 19, 2021      TO: Nela Mares  [address] [facility] [address]         Dear Nela,                                                NEW ARRIVAL TIME                 You are scheduled for an SVT ablation, at [facility] with Dr. Magaña. The hospital is located at [address] [place] [address] on the [place] bank of the [place].  If you need to cancel this procedure, please call [phone].      You will need to undergo a COVID-19 PCR swab test within 4 days of procedure. You will receive a phone call with more information. If you do not hear from the COVID scheduling team, please call: [phone] to schedule.    Your appointment is scheduled for May 29, 2021 at 9:30 am. Location:  [address] [place] [address]    Date:_June 2, 2021_____Time: __11:30 am_To the [facility] Waiting Room at the [facility]  Supraventricular Tachycardia Ablation (SVT)    1. Your history and physical will be completed by our nurse practitioner when you arrive.  2. Please do not eat anything for 8 hours prior to your procedure. You may have sips of water up until 2 hours prior to your arrival.  3. The morning of your procedure, you may take your scheduled medications with a sip of water.  4. You will discharge the same day and need a .        Post-Procedure Instructions  Care of groin site:    Remove the Band-Aid after 24 hours. If there is minor oozing, apply another Band-aid and remove it after 12 hours.     Do NOT take a bath, use a hot tub, pool, or submerse in water for at least 3 days. You may shower.     It is normal to have a small bruise or lump at the site.    Do not scrub the site.    Do not use lotion or powder near the puncture site for 3 days.    If you start bleeding from the site in your groin: Lie down flat and press firmly on the site. Call your physician immediately, or, come to the emergency room.  Call 911 right away if you have bleeding that is heavy or does not  stop.    Call your doctor/provider if:     You have a large or growing hard lump around the site.     The site is red, swollen, hot or tender.     Blood or fluid is draining from the site.     You have chills or a fever greater than 101 F (38 C).     Your leg or arm turns bluish, feels numb or cool.     You have hives, a rash or unusual itching.      Activity Restrictions    For the first 2 days: Do not stoop or squat. When you cough, sneeze or move your bowels, hold your hand over the puncture site and press gently.    Do not lift more than 10 pounds or exertional activity for 10 days.  - No driving for 24 hours after (with or without general anesthesia).       Date: __June 3, 2021__3pm___ Follow up with Dr. Magaña.      Please do not hesitate to utilize [employer] or call us if you have any questions or concerns.    Agnieszka Yeh RN  Electrophysiology Nurse Coordinator  [phone]    [facility] Procedure   [phone]

## 2021-05-17 DIAGNOSIS — Z11.59 ENCOUNTER FOR SCREENING FOR OTHER VIRAL DISEASES: ICD-10-CM

## 2021-05-29 DIAGNOSIS — Z11.59 ENCOUNTER FOR SCREENING FOR OTHER VIRAL DISEASES: ICD-10-CM

## 2021-05-29 LAB
LABORATORY COMMENT REPORT: NORMAL
SARS-COV-2 RNA RESP QL NAA+PROBE: NEGATIVE
SARS-COV-2 RNA RESP QL NAA+PROBE: NORMAL
SPECIMEN SOURCE: NORMAL
SPECIMEN SOURCE: NORMAL

## 2021-05-29 PROCEDURE — U0005 INFEC AGEN DETEC AMPLI PROBE: HCPCS | Mod: 90 | Performed by: PATHOLOGY

## 2021-05-29 PROCEDURE — U0003 INFECTIOUS AGENT DETECTION BY NUCLEIC ACID (DNA OR RNA); SEVERE ACUTE RESPIRATORY SYNDROME CORONAVIRUS 2 (SARS-COV-2) (CORONAVIRUS DISEASE [COVID-19]), AMPLIFIED PROBE TECHNIQUE, MAKING USE OF HIGH THROUGHPUT TECHNOLOGIES AS DESCRIBED BY CMS-2020-01-R: HCPCS | Mod: 90 | Performed by: PATHOLOGY

## 2021-06-01 ENCOUNTER — TELEPHONE (OUTPATIENT)
Dept: CARDIOLOGY | Facility: CLINIC | Age: 61
End: 2021-06-01

## 2021-06-02 ENCOUNTER — HOSPITAL ENCOUNTER (OUTPATIENT)
Facility: CLINIC | Age: 61
Discharge: HOME | End: 2021-06-02
Attending: INTERNAL MEDICINE | Admitting: INTERNAL MEDICINE
Payer: COMMERCIAL

## 2021-06-02 ENCOUNTER — APPOINTMENT (OUTPATIENT)
Dept: LAB | Facility: CLINIC | Age: 61
End: 2021-06-02
Attending: INTERNAL MEDICINE
Payer: COMMERCIAL

## 2021-06-02 ENCOUNTER — APPOINTMENT (OUTPATIENT)
Dept: MEDSURG UNIT | Facility: CLINIC | Age: 61
End: 2021-06-02
Attending: INTERNAL MEDICINE
Payer: COMMERCIAL

## 2021-06-02 VITALS
DIASTOLIC BLOOD PRESSURE: 97 MMHG | RESPIRATION RATE: 16 BRPM | TEMPERATURE: 97.9 F | HEART RATE: 57 BPM | SYSTOLIC BLOOD PRESSURE: 145 MMHG | OXYGEN SATURATION: 95 %

## 2021-06-02 DIAGNOSIS — I47.10 PAROXYSMAL SUPRAVENTRICULAR TACHYCARDIA (H): ICD-10-CM

## 2021-06-02 LAB
ANION GAP SERPL CALCULATED.3IONS-SCNC: 3 MMOL/L (ref 3–14)
BUN SERPL-MCNC: 9 MG/DL (ref 7–30)
CALCIUM SERPL-MCNC: 9.6 MG/DL (ref 8.5–10.1)
CHLORIDE SERPL-SCNC: 107 MMOL/L (ref 94–109)
CO2 SERPL-SCNC: 28 MMOL/L (ref 20–32)
CREAT SERPL-MCNC: 0.77 MG/DL (ref 0.52–1.04)
ERYTHROCYTE [DISTWIDTH] IN BLOOD BY AUTOMATED COUNT: 13.4 % (ref 10–15)
GFR SERPL CREATININE-BSD FRML MDRD: 83 ML/MIN/{1.73_M2}
GLUCOSE BLDC GLUCOMTR-MCNC: 103 MG/DL (ref 70–99)
GLUCOSE SERPL-MCNC: 100 MG/DL (ref 70–99)
HCT VFR BLD AUTO: 43.4 % (ref 35–47)
HGB BLD-MCNC: 14.3 G/DL (ref 11.7–15.7)
KCT BLD-ACNC: 94 SEC (ref 75–150)
MCH RBC QN AUTO: 30 PG (ref 26.5–33)
MCHC RBC AUTO-ENTMCNC: 32.9 G/DL (ref 31.5–36.5)
MCV RBC AUTO: 91 FL (ref 78–100)
PLATELET # BLD AUTO: 220 10E9/L (ref 150–450)
POTASSIUM SERPL-SCNC: 3.7 MMOL/L (ref 3.4–5.3)
RBC # BLD AUTO: 4.76 10E12/L (ref 3.8–5.2)
SODIUM SERPL-SCNC: 139 MMOL/L (ref 133–144)
WBC # BLD AUTO: 4.7 10E9/L (ref 4–11)

## 2021-06-02 PROCEDURE — 93010 ELECTROCARDIOGRAM REPORT: CPT | Mod: 76 | Performed by: INTERNAL MEDICINE

## 2021-06-02 PROCEDURE — 93005 ELECTROCARDIOGRAM TRACING: CPT

## 2021-06-02 PROCEDURE — 93621 COMP EP EVL L PAC&REC C SINS: CPT | Mod: 26 | Performed by: INTERNAL MEDICINE

## 2021-06-02 PROCEDURE — 85347 COAGULATION TIME ACTIVATED: CPT

## 2021-06-02 PROCEDURE — 99152 MOD SED SAME PHYS/QHP 5/>YRS: CPT | Mod: 59 | Performed by: INTERNAL MEDICINE

## 2021-06-02 PROCEDURE — C1894 INTRO/SHEATH, NON-LASER: HCPCS | Performed by: INTERNAL MEDICINE

## 2021-06-02 PROCEDURE — 99152 MOD SED SAME PHYS/QHP 5/>YRS: CPT | Performed by: INTERNAL MEDICINE

## 2021-06-02 PROCEDURE — 93621 COMP EP EVL L PAC&REC C SINS: CPT | Performed by: INTERNAL MEDICINE

## 2021-06-02 PROCEDURE — 82962 GLUCOSE BLOOD TEST: CPT

## 2021-06-02 PROCEDURE — 93623 PRGRMD STIMJ&PACG IV RX NFS: CPT | Performed by: INTERNAL MEDICINE

## 2021-06-02 PROCEDURE — 80048 BASIC METABOLIC PNL TOTAL CA: CPT | Performed by: INTERNAL MEDICINE

## 2021-06-02 PROCEDURE — 250N000011 HC RX IP 250 OP 636: Performed by: INTERNAL MEDICINE

## 2021-06-02 PROCEDURE — 93613 INTRACARDIAC EPHYS 3D MAPG: CPT | Performed by: INTERNAL MEDICINE

## 2021-06-02 PROCEDURE — C1732 CATH, EP, DIAG/ABL, 3D/VECT: HCPCS | Performed by: INTERNAL MEDICINE

## 2021-06-02 PROCEDURE — 36415 COLL VENOUS BLD VENIPUNCTURE: CPT | Performed by: INTERNAL MEDICINE

## 2021-06-02 PROCEDURE — 999N000054 HC STATISTIC EKG NON-CHARGEABLE

## 2021-06-02 PROCEDURE — C1730 CATH, EP, 19 OR FEW ELECT: HCPCS | Performed by: INTERNAL MEDICINE

## 2021-06-02 PROCEDURE — 93653 COMPRE EP EVAL TX SVT: CPT | Performed by: INTERNAL MEDICINE

## 2021-06-02 PROCEDURE — 250N000009 HC RX 250: Performed by: INTERNAL MEDICINE

## 2021-06-02 PROCEDURE — 999N000142 HC STATISTIC PROCEDURE PREP ONLY

## 2021-06-02 PROCEDURE — 258N000003 HC RX IP 258 OP 636: Performed by: INTERNAL MEDICINE

## 2021-06-02 PROCEDURE — C1733 CATH, EP, OTHR THAN COOL-TIP: HCPCS | Performed by: INTERNAL MEDICINE

## 2021-06-02 PROCEDURE — 99153 MOD SED SAME PHYS/QHP EA: CPT | Performed by: INTERNAL MEDICINE

## 2021-06-02 PROCEDURE — 85027 COMPLETE CBC AUTOMATED: CPT | Performed by: INTERNAL MEDICINE

## 2021-06-02 PROCEDURE — 272N000001 HC OR GENERAL SUPPLY STERILE: Performed by: INTERNAL MEDICINE

## 2021-06-02 PROCEDURE — 93623 PRGRMD STIMJ&PACG IV RX NFS: CPT | Mod: 26 | Performed by: INTERNAL MEDICINE

## 2021-06-02 RX ORDER — VENLAFAXINE HYDROCHLORIDE 150 MG/1
150 CAPSULE, EXTENDED RELEASE ORAL DAILY
Status: DISCONTINUED | OUTPATIENT
Start: 2021-06-02 | End: 2021-06-02 | Stop reason: HOSPADM

## 2021-06-02 RX ORDER — CETIRIZINE HYDROCHLORIDE 10 MG/1
10 TABLET ORAL DAILY
Status: DISCONTINUED | OUTPATIENT
Start: 2021-06-02 | End: 2021-06-02 | Stop reason: HOSPADM

## 2021-06-02 RX ORDER — AMLODIPINE BESYLATE 5 MG/1
5 TABLET ORAL DAILY
Status: DISCONTINUED | OUTPATIENT
Start: 2021-06-02 | End: 2021-06-02 | Stop reason: HOSPADM

## 2021-06-02 RX ORDER — ONDANSETRON 2 MG/ML
INJECTION INTRAMUSCULAR; INTRAVENOUS
Status: DISCONTINUED | OUTPATIENT
Start: 2021-06-02 | End: 2021-06-02 | Stop reason: HOSPADM

## 2021-06-02 RX ORDER — OXYCODONE AND ACETAMINOPHEN 5; 325 MG/1; MG/1
1 TABLET ORAL EVERY 4 HOURS PRN
Status: DISCONTINUED | OUTPATIENT
Start: 2021-06-02 | End: 2021-06-02 | Stop reason: HOSPADM

## 2021-06-02 RX ORDER — SODIUM CHLORIDE 9 MG/ML
INJECTION, SOLUTION INTRAVENOUS CONTINUOUS
Status: DISCONTINUED | OUTPATIENT
Start: 2021-06-02 | End: 2021-06-02 | Stop reason: HOSPADM

## 2021-06-02 RX ORDER — NALOXONE HYDROCHLORIDE 0.4 MG/ML
0.4 INJECTION, SOLUTION INTRAMUSCULAR; INTRAVENOUS; SUBCUTANEOUS
Status: DISCONTINUED | OUTPATIENT
Start: 2021-06-02 | End: 2021-06-02 | Stop reason: HOSPADM

## 2021-06-02 RX ORDER — LIDOCAINE 40 MG/G
CREAM TOPICAL
Status: DISCONTINUED | OUTPATIENT
Start: 2021-06-02 | End: 2021-06-02 | Stop reason: HOSPADM

## 2021-06-02 RX ORDER — LISINOPRIL 40 MG/1
40 TABLET ORAL DAILY
Status: DISCONTINUED | OUTPATIENT
Start: 2021-06-02 | End: 2021-06-02 | Stop reason: HOSPADM

## 2021-06-02 RX ORDER — NALOXONE HYDROCHLORIDE 0.4 MG/ML
0.2 INJECTION, SOLUTION INTRAMUSCULAR; INTRAVENOUS; SUBCUTANEOUS
Status: DISCONTINUED | OUTPATIENT
Start: 2021-06-02 | End: 2021-06-02 | Stop reason: HOSPADM

## 2021-06-02 RX ORDER — FENTANYL CITRATE 50 UG/ML
INJECTION, SOLUTION INTRAMUSCULAR; INTRAVENOUS
Status: DISCONTINUED | OUTPATIENT
Start: 2021-06-02 | End: 2021-06-02 | Stop reason: HOSPADM

## 2021-06-02 RX ADMIN — SODIUM CHLORIDE: 9 INJECTION, SOLUTION INTRAVENOUS at 13:20

## 2021-06-02 NOTE — DISCHARGE INSTRUCTIONS
Care of groin site:         Remove the Band-Aid after 24 hours. If there is minor oozing, apply another Band-aid and remove it after 12 hours.          Do NOT take a bath, use a hot tub, pool, or submerse in water for at least 3 days You may shower.          It is normal to have a small bruise or lump at the site.         Do not scrub the site.         Do not use lotion or powder near the puncture site for 3 days.         For the first 2 days: Do not stoop or squat. When you cough, sneeze or move your bowels, hold your hand over the puncture site and press gently.         Do not lift more than 10 pounds or exertional activity for 10 days.      If you start bleeding from the site in your groin:  Lie down flat and press firmly on the site.  Call your physician immediately, or, come to the emergency room.    Call 911 right away if you have bleeding that is heavy or does not stop.     Call your doctor/provider if:         You have a large or growing hard lump around the site.         The site is red, swollen, hot or tender.         Blood or fluid is draining from the site.         You have chills or a fever greater than 101 F (38 C).         Your leg or arm turns bluish, feels numb or cool.         You have hives, a rash or unusual itching.     Cardiovascular Clinic:   [address] [place] [address]. [place] [address]  Your Care Team:  EP Cardiology   Telephone Number     Agnieszka Irizarry RN  [phone]     For scheduling appts or procedures:    Iona Browne   [phone]     As always, Thank you for trusting us with your health care needs

## 2021-06-02 NOTE — PROGRESS NOTES
2A prep for EP Ablation. Pt alert, oriented and aware of planned procedure. VSS. EKG anil. Left piv in place and infusing. Groin clipped. Pedal pulses marked, +2. Conrast consent reviewed and signed. Neuros intact.

## 2021-06-02 NOTE — PROGRESS NOTES
D/I/A: Pt roomed on 3C in bay 34  Arrived via litter and accompanied by RN on monitor.  VSSA.  Rhythm upon arrival SR on monitor.  Denies pain or sob.  Reviewed activity restrictions and when to notify RN, ie-changes to breathing or increased chest pressure or chest pain.  CCL access:  Right venous groin  P: Continue to monitor status.  Discharge to home once meeting criteria.

## 2021-06-03 LAB
INTERPRETATION ECG - MUSE: NORMAL
INTERPRETATION ECG - MUSE: NORMAL

## 2021-06-03 NOTE — PROGRESS NOTES
D/I/A:  Patient is tolerating liquids and foods, ambulating, urinating, puncture sites are stable ( no bleeding and no hematoma) and patient has a .  A+O x4 and making needs known.  CCL access site C/D/I, no bleeding or hematoma; CMS intact.  VSS.  IV access removed.  Education completed and outlined in AVS or handout: medications reviewed with patient.  Questions answered prior to discharge.  Belongings returned to patient at discharge.    P: Discharged to home. Patient to follow up with appts as per discharge instruction.

## 2021-06-04 ENCOUNTER — PATIENT OUTREACH (OUTPATIENT)
Dept: CARDIOLOGY | Facility: CLINIC | Age: 61
End: 2021-06-04

## 2021-07-05 DIAGNOSIS — Z91.09 OTHER ALLERGY STATUS, OTHER THAN TO DRUGS AND BIOLOGICAL SUBSTANCES: ICD-10-CM

## 2021-07-05 RX ORDER — FLUTICASONE PROPIONATE 50 MCG
1-2 SPRAY, SUSPENSION (ML) NASAL DAILY
Qty: 48 G | Refills: 0 | Status: SHIPPED | OUTPATIENT
Start: 2021-07-05 | End: 2021-12-02

## 2021-07-05 NOTE — TELEPHONE ENCOUNTER
"Requested Prescriptions   Pending Prescriptions Disp Refills     fluticasone (FLONASE) 50 MCG/ACT nasal spray 48 g 3     Sig: Spray 1-2 sprays into both nostrils daily       Nasal Allergy Protocol Passed - 7/5/2021 10:54 AM        Passed - Patient is age 12 or older        Passed - Recent (12 mo) or future (30 days) visit within the authorizing provider's specialty     Patient has had an office visit with the authorizing provider or a provider within the authorizing providers department within the previous 12 mos or has a future within next 30 days. See \"Patient Info\" tab in inbasket, or \"Choose Columns\" in Meds & Orders section of the refill encounter.              Passed - Medication is active on med list           Medication filled per [facility] protocol.     Sarah Allen RN  "

## 2021-08-10 ENCOUNTER — ANCILLARY PROCEDURE (OUTPATIENT)
Dept: MAMMOGRAPHY | Facility: CLINIC | Age: 61
End: 2021-08-10
Attending: FAMILY MEDICINE
Payer: COMMERCIAL

## 2021-08-10 DIAGNOSIS — Z12.31 VISIT FOR SCREENING MAMMOGRAM: ICD-10-CM

## 2021-08-10 PROCEDURE — 77067 SCR MAMMO BI INCL CAD: CPT | Performed by: STUDENT IN AN ORGANIZED HEALTH CARE EDUCATION/TRAINING PROGRAM

## 2021-08-10 PROCEDURE — 77063 BREAST TOMOSYNTHESIS BI: CPT | Performed by: STUDENT IN AN ORGANIZED HEALTH CARE EDUCATION/TRAINING PROGRAM

## 2021-08-16 DIAGNOSIS — J45.30 MILD PERSISTENT ASTHMA WITHOUT COMPLICATION: ICD-10-CM

## 2021-08-17 RX ORDER — MONTELUKAST SODIUM 10 MG/1
10 TABLET ORAL DAILY
Qty: 90 TABLET | Refills: 0 | Status: SHIPPED | OUTPATIENT
Start: 2021-08-17 | End: 2021-12-02

## 2021-08-17 NOTE — TELEPHONE ENCOUNTER
"Requested Prescriptions   Pending Prescriptions Disp Refills     montelukast (SINGULAIR) 10 MG tablet 90 tablet 1     Sig: Take 1 tablet (10 mg) by mouth daily       Leukotriene Inhibitors Protocol Failed - 8/16/2021 11:36 AM        Failed - Asthma control assessment score within normal limits in last 6 months     Please review ACT score.           Failed - Recent (6 mo) or future (30 days) visit within the authorizing provider's specialty     Patient had office visit in the last 6 months or has a visit in the next 30 days with authorizing provider or within the authorizing provider's specialty.  See \"Patient Info\" tab in inbasket, or \"Choose Columns\" in Meds & Orders section of the refill encounter.            Passed - Patient is age 12 or older     If patient is under 16, ok to refill using age based dosing.           Passed - Medication is active on med list           Routing refill request to provider for review/approval because:  LOV: 1/26/2021  Last ACT=25 on 1/26/21      CM PaulaN, RN    "

## 2021-08-18 NOTE — TELEPHONE ENCOUNTER
Sent [employer] message notifying patient of refill and need for follow-up visit.     CM PaulaN, RN

## 2021-08-27 ENCOUNTER — MYC MEDICAL ADVICE (OUTPATIENT)
Dept: CARDIOLOGY | Facility: CLINIC | Age: 61
End: 2021-08-27

## 2021-09-03 ENCOUNTER — VIRTUAL VISIT (OUTPATIENT)
Dept: CARDIOLOGY | Facility: CLINIC | Age: 61
End: 2021-09-03
Attending: INTERNAL MEDICINE
Payer: COMMERCIAL

## 2021-09-03 DIAGNOSIS — I47.10 SVT (SUPRAVENTRICULAR TACHYCARDIA) (H): Primary | ICD-10-CM

## 2021-09-03 PROCEDURE — 99213 OFFICE O/P EST LOW 20 MIN: CPT | Mod: GT | Performed by: INTERNAL MEDICINE

## 2021-09-03 NOTE — LETTER
9/3/2021      RE: Nela Mares  [address] [facility] [address]       Dear Colleague,    Thank you for the opportunity to participate in the care of your patient, Nela Mares, at the [facility] [place] [facility]. Please see a copy of my visit note below.          HPI:  Ms. Mares is a 62 yo Female with a PMH of HTN, asthma, MACIE with CPAP, COVID-19 infection complicated with skin rash and hair loss in 11/2020 and SVT.  She was referred to consider whether she might be a candidate for catheter ablation of SVT.  She started having SVT in late teen's, which has been getting worse.  She recently bought [employer], which documented her SVTs.  Her SVTs occurs several times in a year and last for 2-3 hours.  She reported that mental stress might be a trigger of her SVTs.  She has never attempted any medication to treat SVTs.    She underwent catheter ablation of AVNRT on 6/2/2021.  Since then she has been doing well except some fluttering, and is now seen for a follow up.      PAST MEDICAL HISTORY:  Past Medical History:   Diagnosis Date     Allergic rhinitis, cause unspecified      Cervical high risk HPV (human papillomavirus) test positive 03/21/2017    3/21/17 NIL pap/+ HR HPV (not 16 or 18).      Depressive disorder      Depressive disorder, not elsewhere classified     seasonal     Hypertension      Mild persistent asthma      Obstructive sleep apnea (adult) (pediatric)     uses CPAP     Scalp mass 7/2014       CURRENT MEDICATIONS:  Current Outpatient Medications   Medication Sig Dispense Refill     albuterol (PROAIR HFA/PROVENTIL HFA/VENTOLIN HFA) 108 (90 Base) MCG/ACT inhaler Inhale 2 puffs into the lungs every 4 hours as needed for shortness of breath / dyspnea or wheezing 1 Inhaler 3     albuterol (PROVENTIL) (2.5 MG/3ML) 0.083% neb solution Take 1 vial (2.5 mg) by nebulization every 4 hours as needed for shortness of breath  / dyspnea or wheezing 1 Box 1     amLODIPine (NORVASC) 5 MG tablet Take 1 tablet (5 mg) by mouth daily 90 tablet 1     cetirizine (ZYRTEC) 10 MG tablet Take 10 mg by mouth daily       fluticasone (FLONASE) 50 MCG/ACT nasal spray Spray 1-2 sprays into both nostrils daily 48 g 0     fluticasone-salmeterol (ADVAIR DISKUS) 500-50 MCG/DOSE inhaler Inhale 1 puff into the lungs 2 times daily 3 each 1     lisinopril (ZESTRIL) 40 MG tablet Take 1 tablet (40 mg) by mouth daily 90 tablet 3     montelukast (SINGULAIR) 10 MG tablet Take 1 tablet (10 mg) by mouth daily Needs appointment for additional refills 90 tablet 0     venlafaxine (EFFEXOR-XR) 150 MG 24 hr capsule Take 1 capsule (150 mg) by mouth daily 90 capsule 1     estradiol (VAGIFEM) 10 MCG TABS vaginal tablet Place 1 tablet (10 mcg) vaginally twice a week (Patient not taking: Reported on 9/3/2021) 28 tablet 3     ORDER FOR ALLERGEN IMMUNOTHERAPY Name of Mix: Mix #1  Dust Mite, Cat, Dog  Cat Hair, Standardized 10,000 BAU/mL, ALK  2.0 ml  Dog Hair-Dander, A. P.  1:100 w/v, HS  1.0 ml  Dust Mites DP. 30,000 AU/mL, HS  0.6 ml   Diluent: HSA qs to 5ml (Patient not taking: Reported on 9/3/2021) 5 mL PRN     ORDER FOR ALLERGEN IMMUNOTHERAPY Name of Mix: Mix #2  Tree , Weeds  Birch Mix PRW 1:20 w/v, HS  0.5 ml  Hickory, Shagbark 1:20 w/v, HS  0.5 ml  [place] Mix RW 1:20 w/v, HS 0.5 ml  Oak Mix RVW 1:20 w/v, HS 0.5 ml  Kochia 1:20 w/v, HS 0.5 ml  Nettle 1:20 w/v, HS 0.5 ml  Ragweed Mixed 1:20 w/v ALK  0.5 ml  Sagebrush, Mugwort 1:20 w/v, HS 0.5 ml  Diluent: HSA qs to 5ml (Patient not taking: Reported on 9/3/2021) 5 mL PRN     ORDER FOR ALLERGEN IMMUNOTHERAPY Name of Mix: Mix #3  Mold  Alternaria Tenuis 1:10 w/v, HS  0.5 ml  Aspergillus Fumigatus 1:10 w/v, HS  0.5 ml  Curvularia Spicifera 1:10 w/v, HS  0.5 ml  Epicoccum Nigrum 1:10 w/v, HS 0.5 ml  Hormodendrum Cladosporioides 1:10 w/v, HS 0.5 ml  Penicillium Mix 1:10 w/v, HS  0.5 ml  Phoma Herbarum 1:10 w/v, HS  0.5  ml  Diluent: HSA qs to 5ml (Patient not taking: Reported on 9/3/2021) 5 mL PRN       PAST SURGICAL HISTORY:  Past Surgical History:   Procedure Laterality Date     COLONOSCOPY  6/21/2012    Procedure: COLONOSCOPY;  COLONOSCOPY, SCREEN;  Surgeon: Bart You MD;  Location: [facility] OR     D & C       ENDOSCOPIC RETROGRADE CHOLANGIOPANCREATOGRAM  1/4/2014    Procedure: ENDOSCOPIC RETROGRADE CHOLANGIOPANCREATOGRAM;  ERCP biliary sphincterotomy, bile duct stone removal, epinepherine washing.;  Surgeon: Ba Meyers MD;  [facility]     EP ABLATION SVT N/A 6/2/2021    Procedure: EP ABLATION SVT;  Surgeon: Cuca Magaña MD;  [facility]     LAPAROSCOPIC CHOLECYSTECTOMY WITH CHOLANGIOGRAMS  1/4/2014    Procedure: LAPAROSCOPIC CHOLECYSTECTOMY WITH CHOLANGIOGRAMS;;  Surgeon: Haja Sage MD;  [facility]     NO HISTORY OF SURGERY       OPERATIVE HYSTEROSCOPY  6/17/2014    Procedure: OPERATIVE HYSTEROSCOPY;  Surgeon: Paola Camara MD;  Location: [facility] OR     REMOVE INTRAUTERINE DEVICE  6/17/2014    Procedure: REMOVE INTRAUTERINE DEVICE;  Surgeon: Paola Camara MD;  [facility]: [facility] OR       ALLERGIES:   No Known Allergies    FAMILY HISTORY:  - Premature coronary artery disease  - Atrial fibrillation  - Sudden cardiac death     SOCIAL HISTORY:  Social History     Tobacco Use     Smoking status: Never Smoker     Smokeless tobacco: Never Used   Substance Use Topics     Alcohol use: No     Drug use: No       ROS:   12 points of ROS were reviewed.  Constitutional: No fever, chills, or sweats. Weight stable.   ENT: No visual disturbance, ear ache, epistaxis, sore throat.   Cardiovascular: As per HPI.   Respiratory: No cough, hemoptysis.    GI: No nausea, vomiting, hematemesis, melena, or hematochezia.   : No hematuria.   Integument: Negative.   Psychiatric: Negative.   Hematologic:  Easy bruising, no easy bleeding.  Neuro: Negative.   Endocrinology: No  significant heat or cold intolerance   Musculoskeletal: No myalgia.    Exam:  Vitals:  No vitals were obtained today due to virtual visit.    GENERAL: Healthy, alert and no distress  EYES: Eyes grossly normal to inspection.  No discharge or erythema, or obvious scleral/conjunctival abnormalities.  RESP: No audible wheeze, cough, or visible cyanosis.  No visible retractions or increased work of breathing.    SKIN: Visible skin clear. No significant rash, abnormal pigmentation or lesions.  NEURO: Cranial nerves grossly intact.  Mentation and speech appropriate for age.  PSYCH: Mentation appears normal, affect normal/bright, judgement and insight intact, normal speech and appearance well-groomed.      Labs:  CBC RESULTS:   Lab Results   Component Value Date    WBC 4.7 06/02/2021    RBC 4.76 06/02/2021    HGB 14.3 06/02/2021    HCT 43.4 06/02/2021    MCV 91 06/02/2021    MCH 30.0 06/02/2021    MCHC 32.9 06/02/2021    RDW 13.4 06/02/2021     06/02/2021       BMP RESULTS:  Lab Results   Component Value Date     06/02/2021    POTASSIUM 3.7 06/02/2021    CHLORIDE 107 06/02/2021    CO2 28 06/02/2021    ANIONGAP 3 06/02/2021     (H) 06/02/2021    BUN 9 06/02/2021    CR 0.77 06/02/2021    GFRESTIMATED 83 06/02/2021    GFRESTBLACK >90 06/02/2021    ASHLEY 9.6 06/02/2021        INR RESULTS:  Lab Results   Component Value Date    INR 1.03 10/05/2020       Procedures:  Baldevdia strips on 5/5/2021: Reviewed.      Assessment and Plan:  # HTN  She is on Amlodipine and Lisinopril.  # Asthma  # MACIE with CPAP  # COVID-19 infection complicated with skin rash and hair loss in 11/2020.  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  She has been doing well except some fluttering since the ablation.  I explained to her that she might be having some premature beats that could have triggered her SVTs. I advised her to leave it alone because it is not harmful.  I also advised her to keep monitoring for  some more time because her SVTs occurred with an interval of a few months before the ablation.  No regular follow up will be required, and she was advised to call us if she has any concern about her heart rhythm.    I spent a total of 20 min to review the records, see the patient, and complete the documents.         Please do not hesitate to contact me if you have any questions/concerns.     Sincerely,     Cuca Magaña MD    CC  Patient Care Team:  Padma Lozano MD as PCP - General  Karen Clark MD as Assigned Allergy Provider  Jadyn Salas MD as Assigned OBGYN Provider  Padma Lozano MD as Assigned PCP  Cuca Magaña MD (Cardiovascular Disease)  Ariela Das, RN as Specialty Care Coordinator

## 2021-09-03 NOTE — PATIENT INSTRUCTIONS
You were seen in the Electrophysiology Clinic today by: Dr Magaña    Plan:         Follow up visit:    As needed with Dr Magaña      Your Care Team:  EP Cardiology   Telephone Number     Nurse Line [phone]     For scheduling appts or procedures:    Iona Browne   [phone]   For the Device Clinic (Pacemakers, ICDs, Loop Recorders)    During business hours: [phone]  After business hours:   [phone]- select option 4 and ask for job code [id number].     On-call cardiologist for after hours or on weekends: [phone], option #4, and ask to speak to the on-call cardiologist.     Cardiovascular Clinic:   [address] [place] [address]. [place] [address]      As always, Thank you for trusting us with your health care needs!

## 2021-09-03 NOTE — NURSING NOTE
"Sanam is a 61 year old who is being evaluated via a billable video visit.      How would you like to obtain your AVS? MyChart  If the video visit is dropped, the invitation should be resent by: Text to cell phone: [phone]  Will anyone else be joining your video visit? No    Vitals - Patient Reported  Weight (Patient Reported): 113.4 kg (250 lb)  Height (Patient Reported): 175.3 cm (5' 9\")  BMI (Based on Pt Reported Ht/Wt): 36.92  Pain Score: No Pain (0) (No SOB)      Vitals were taken and medications were reconciled.   Jm Cortez, EMT  3:08 PM    "

## 2021-09-03 NOTE — PROGRESS NOTES
Sanam is a 60 year old who is being evaluated via a billable video visit.      How would you like to obtain your AVS? MyChart  If the video visit is dropped, the invitation should be resent by: Text to cell phone: [phone]   Will anyone else be joining your video visit? No      Video Start Time: 3:23 PM      Video-Visit Details    Type of service:  Video Visit    Video End Time: 3:28 PM    Originating Location (pt. Location): Home    Distant Location (provider location):  [facility]     Platform used for Video Visit: [employer]       HPI:  Ms. Mares is a 60 yo Female with a PMH of HTN, asthma, MACIE with CPAP, COVID-19 infection complicated with skin rash and hair loss in 11/2020 and SVT.  She was referred to consider whether she might be a candidate for catheter ablation of SVT.  She started having SVT in late teen's, which has been getting worse.  She recently bought [employer], which documented her SVTs.  Her SVTs occurs several times in a year and last for 2-3 hours.  She reported that mental stress might be a trigger of her SVTs.  She has never attempted any medication to treat SVTs.    She underwent catheter ablation of AVNRT on 6/2/2021.  Since then she has been doing well except some fluttering, and is now seen for a follow up.      PAST MEDICAL HISTORY:  Past Medical History:   Diagnosis Date     Allergic rhinitis, cause unspecified      Cervical high risk HPV (human papillomavirus) test positive 03/21/2017    3/21/17 NIL pap/+ HR HPV (not 16 or 18).      Depressive disorder      Depressive disorder, not elsewhere classified     seasonal     Hypertension      Mild persistent asthma      Obstructive sleep apnea (adult) (pediatric)     uses CPAP     Scalp mass 7/2014       CURRENT MEDICATIONS:  Current Outpatient Medications   Medication Sig Dispense Refill     albuterol (PROAIR HFA/PROVENTIL HFA/VENTOLIN HFA) 108 (90 Base) MCG/ACT inhaler Inhale 2 puffs into the lungs every 4 hours as  needed for shortness of breath / dyspnea or wheezing 1 Inhaler 3     albuterol (PROVENTIL) (2.5 MG/3ML) 0.083% neb solution Take 1 vial (2.5 mg) by nebulization every 4 hours as needed for shortness of breath / dyspnea or wheezing 1 Box 1     amLODIPine (NORVASC) 5 MG tablet Take 1 tablet (5 mg) by mouth daily 90 tablet 1     cetirizine (ZYRTEC) 10 MG tablet Take 10 mg by mouth daily       fluticasone (FLONASE) 50 MCG/ACT nasal spray Spray 1-2 sprays into both nostrils daily 48 g 0     fluticasone-salmeterol (ADVAIR DISKUS) 500-50 MCG/DOSE inhaler Inhale 1 puff into the lungs 2 times daily 3 each 1     lisinopril (ZESTRIL) 40 MG tablet Take 1 tablet (40 mg) by mouth daily 90 tablet 3     montelukast (SINGULAIR) 10 MG tablet Take 1 tablet (10 mg) by mouth daily Needs appointment for additional refills 90 tablet 0     venlafaxine (EFFEXOR-XR) 150 MG 24 hr capsule Take 1 capsule (150 mg) by mouth daily 90 capsule 1     estradiol (VAGIFEM) 10 MCG TABS vaginal tablet Place 1 tablet (10 mcg) vaginally twice a week (Patient not taking: Reported on 9/3/2021) 28 tablet 3     ORDER FOR ALLERGEN IMMUNOTHERAPY Name of Mix: Mix #1  Dust Mite, Cat, Dog  Cat Hair, Standardized 10,000 BAU/mL, ALK  2.0 ml  Dog Hair-Dander, A. P.  1:100 w/v, HS  1.0 ml  Dust Mites DP. 30,000 AU/mL, HS  0.6 ml   Diluent: HSA qs to 5ml (Patient not taking: Reported on 9/3/2021) 5 mL PRN     ORDER FOR ALLERGEN IMMUNOTHERAPY Name of Mix: Mix #2  Tree , Weeds  Birch Mix PRW 1:20 w/v, HS  0.5 ml  Hickory, Shagbark 1:20 w/v, HS  0.5 ml  [place] Mix RW 1:20 w/v, HS 0.5 ml  Oak Mix RVW 1:20 w/v, HS 0.5 ml  Kochia 1:20 w/v, HS 0.5 ml  Nettle 1:20 w/v, HS 0.5 ml  Ragweed Mixed 1:20 w/v ALK  0.5 ml  Sagebrush, Mugwort 1:20 w/v, HS 0.5 ml  Diluent: HSA qs to 5ml (Patient not taking: Reported on 9/3/2021) 5 mL PRN     ORDER FOR ALLERGEN IMMUNOTHERAPY Name of Mix: Mix #3  Mold  Alternaria Tenuis 1:10 w/v, HS  0.5 ml  Aspergillus Fumigatus 1:10 w/v, HS  0.5  ml  Curvularia Spicifera 1:10 w/v, HS  0.5 ml  Epicoccum Nigrum 1:10 w/v, HS 0.5 ml  Hormodendrum Cladosporioides 1:10 w/v, HS 0.5 ml  Penicillium Mix 1:10 w/v, HS  0.5 ml  Phoma Herbarum 1:10 w/v, HS  0.5 ml  Diluent: HSA qs to 5ml (Patient not taking: Reported on 9/3/2021) 5 mL PRN       PAST SURGICAL HISTORY:  Past Surgical History:   Procedure Laterality Date     COLONOSCOPY  6/21/2012    Procedure: COLONOSCOPY;  COLONOSCOPY, SCREEN;  Surgeon: Bart You MD;  Location: [facility] OR     D & C       ENDOSCOPIC RETROGRADE CHOLANGIOPANCREATOGRAM  1/4/2014    Procedure: ENDOSCOPIC RETROGRADE CHOLANGIOPANCREATOGRAM;  ERCP biliary sphincterotomy, bile duct stone removal, epinepherine washing.;  Surgeon: Ba Meyers MD;  [facility]     EP ABLATION SVT N/A 6/2/2021    Procedure: EP ABLATION SVT;  Surgeon: Cuca Magaña MD;  Location:  HEART CARDIAC CATH LAB     LAPAROSCOPIC CHOLECYSTECTOMY WITH CHOLANGIOGRAMS  1/4/2014    Procedure: LAPAROSCOPIC CHOLECYSTECTOMY WITH CHOLANGIOGRAMS;;  Surgeon: Haja Sage MD;  Location:  OR     NO HISTORY OF SURGERY       OPERATIVE HYSTEROSCOPY  6/17/2014    Procedure: OPERATIVE HYSTEROSCOPY;  Surgeon: Paola Camara MD;  Location: [facility] OR     REMOVE INTRAUTERINE DEVICE  6/17/2014    Procedure: REMOVE INTRAUTERINE DEVICE;  Surgeon: Paola Camara MD;  Location: [facility] OR       ALLERGIES:   No Known Allergies    FAMILY HISTORY:  - Premature coronary artery disease  - Atrial fibrillation  - Sudden cardiac death     SOCIAL HISTORY:  Social History     Tobacco Use     Smoking status: Never Smoker     Smokeless tobacco: Never Used   Substance Use Topics     Alcohol use: No     Drug use: No       ROS:   12 points of ROS were reviewed.  Constitutional: No fever, chills, or sweats. Weight stable.   ENT: No visual disturbance, ear ache, epistaxis, sore throat.   Cardiovascular: As per HPI.   Respiratory: No cough, hemoptysis.    GI: No nausea,  vomiting, hematemesis, melena, or hematochezia.   : No hematuria.   Integument: Negative.   Psychiatric: Negative.   Hematologic:  Easy bruising, no easy bleeding.  Neuro: Negative.   Endocrinology: No significant heat or cold intolerance   Musculoskeletal: No myalgia.    Exam:  Vitals:  No vitals were obtained today due to virtual visit.    GENERAL: Healthy, alert and no distress  EYES: Eyes grossly normal to inspection.  No discharge or erythema, or obvious scleral/conjunctival abnormalities.  RESP: No audible wheeze, cough, or visible cyanosis.  No visible retractions or increased work of breathing.    SKIN: Visible skin clear. No significant rash, abnormal pigmentation or lesions.  NEURO: Cranial nerves grossly intact.  Mentation and speech appropriate for age.  PSYCH: Mentation appears normal, affect normal/bright, judgement and insight intact, normal speech and appearance well-groomed.      Labs:  CBC RESULTS:   Lab Results   Component Value Date    WBC 4.7 06/02/2021    RBC 4.76 06/02/2021    HGB 14.3 06/02/2021    HCT 43.4 06/02/2021    MCV 91 06/02/2021    MCH 30.0 06/02/2021    MCHC 32.9 06/02/2021    RDW 13.4 06/02/2021     06/02/2021       BMP RESULTS:  Lab Results   Component Value Date     06/02/2021    POTASSIUM 3.7 06/02/2021    CHLORIDE 107 06/02/2021    CO2 28 06/02/2021    ANIONGAP 3 06/02/2021     (H) 06/02/2021    BUN 9 06/02/2021    CR 0.77 06/02/2021    GFRESTIMATED 83 06/02/2021    GFRESTBLACK >90 06/02/2021    ASHLEY 9.6 06/02/2021        INR RESULTS:  Lab Results   Component Value Date    INR 1.03 10/05/2020       Procedures:  Mirtaa strips on 5/5/2021: Reviewed.      Assessment and Plan:  # HTN  She is on Amlodipine and Lisinopril.  # Asthma  # MACIE with CPAP  # COVID-19 infection complicated with skin rash and hair loss in 11/2020.  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  She has been doing well except some fluttering since the ablation.  I  explained to her that she might be having some premature beats that could have triggered her SVTs. I advised her to leave it alone because it is not harmful.  I also advised her to keep monitoring for some more time because her SVTs occurred with an interval of a few months before the ablation.  No regular follow up will be required, and she was advised to call us if she has any concern about her heart rhythm.    I spent a total of 20 min to review the records, see the patient, and complete the documents.       CC  Patient Care Team:  Padma Lozano MD as PCP - General  Karen Clark MD as Assigned Allergy Provider  Jadyn Salas MD as Assigned OBGYN Provider  Padma Lozano MD as Assigned PCP  Cuca Magaña MD (Cardiovascular Disease)  Cuca Magaña MD as Assigned Heart and Vascular Provider  Ariela Das, RN as Specialty Care Coordinator

## 2021-09-11 ENCOUNTER — HEALTH MAINTENANCE LETTER (OUTPATIENT)
Age: 61
End: 2021-09-11

## 2021-09-12 DIAGNOSIS — I10 ESSENTIAL HYPERTENSION WITH GOAL BLOOD PRESSURE LESS THAN 140/90: ICD-10-CM

## 2021-09-14 RX ORDER — AMLODIPINE BESYLATE 5 MG/1
5 TABLET ORAL DAILY
Qty: 90 TABLET | Refills: 0 | Status: SHIPPED | OUTPATIENT
Start: 2021-09-14 | End: 2021-12-02

## 2021-09-14 NOTE — TELEPHONE ENCOUNTER
Routing refill request to provider for review/approval because:  Blood pressure under 140/90 in past 12 months    **Scheduled for next visit 12/21/21    Fiona Solorio RN

## 2021-10-17 DIAGNOSIS — F32.0 MILD MAJOR DEPRESSION (H): ICD-10-CM

## 2021-10-18 RX ORDER — VENLAFAXINE HYDROCHLORIDE 150 MG/1
150 CAPSULE, EXTENDED RELEASE ORAL DAILY
Qty: 90 CAPSULE | Refills: 0 | Status: SHIPPED | OUTPATIENT
Start: 2021-10-18 | End: 2021-12-02

## 2021-10-18 NOTE — TELEPHONE ENCOUNTER
Routing refill request to provider for review/approval because:  Labs out of range:    BP Readings from Last 3 Encounters:   06/02/21 (!) 145/97   03/05/21 122/82   11/02/20 129/86     PHQ-9 score out of date:    PHQ 2/11/2021   PHQ-9 Total Score 3   Q9: Thoughts of better off dead/self-harm past 2 weeks Not at all   F/U: Thoughts of suicide or self-harm -   F/U: Safety concerns -       Idania Parish, RN, BSN, PHN  [facility]: [place]

## 2021-11-14 DIAGNOSIS — I10 ESSENTIAL HYPERTENSION WITH GOAL BLOOD PRESSURE LESS THAN 140/90: ICD-10-CM

## 2021-11-15 RX ORDER — LISINOPRIL 40 MG/1
40 TABLET ORAL DAILY
Qty: 90 TABLET | Refills: 0 | Status: SHIPPED | OUTPATIENT
Start: 2021-11-15 | End: 2021-12-02

## 2021-11-15 NOTE — TELEPHONE ENCOUNTER
Routing refill request to provider for review/approval because:    Blood pressure under 140/90 in past 12 months    **Scheduled to see you 12/21/21    Fiona Solorio RN

## 2021-12-02 ENCOUNTER — OFFICE VISIT (OUTPATIENT)
Dept: FAMILY MEDICINE | Facility: CLINIC | Age: 61
End: 2021-12-02
Payer: COMMERCIAL

## 2021-12-02 VITALS
TEMPERATURE: 98.1 F | DIASTOLIC BLOOD PRESSURE: 84 MMHG | HEART RATE: 76 BPM | SYSTOLIC BLOOD PRESSURE: 118 MMHG | OXYGEN SATURATION: 98 %

## 2021-12-02 DIAGNOSIS — J45.30 MILD PERSISTENT ASTHMA WITHOUT COMPLICATION: ICD-10-CM

## 2021-12-02 DIAGNOSIS — L29.9 ITCHING: Primary | ICD-10-CM

## 2021-12-02 DIAGNOSIS — R53.83 OTHER FATIGUE: ICD-10-CM

## 2021-12-02 DIAGNOSIS — Z91.09 OTHER ALLERGY STATUS, OTHER THAN TO DRUGS AND BIOLOGICAL SUBSTANCES: ICD-10-CM

## 2021-12-02 DIAGNOSIS — I10 ESSENTIAL HYPERTENSION WITH GOAL BLOOD PRESSURE LESS THAN 140/90: ICD-10-CM

## 2021-12-02 DIAGNOSIS — F32.0 MILD MAJOR DEPRESSION (H): ICD-10-CM

## 2021-12-02 DIAGNOSIS — M25.50 ARTHRALGIA, UNSPECIFIED JOINT: ICD-10-CM

## 2021-12-02 DIAGNOSIS — R73.01 IMPAIRED FASTING GLUCOSE: ICD-10-CM

## 2021-12-02 LAB
BASOPHILS # BLD AUTO: 0 10E3/UL (ref 0–0.2)
BASOPHILS NFR BLD AUTO: 0 %
CRP SERPL-MCNC: 22 MG/L (ref 0–8)
EOSINOPHIL # BLD AUTO: 0.5 10E3/UL (ref 0–0.7)
EOSINOPHIL NFR BLD AUTO: 8 %
ERYTHROCYTE [DISTWIDTH] IN BLOOD BY AUTOMATED COUNT: 13.5 % (ref 10–15)
ERYTHROCYTE [SEDIMENTATION RATE] IN BLOOD BY WESTERGREN METHOD: 89 MM/HR (ref 0–30)
FOLATE SERPL-MCNC: 9.5 NG/ML
HBA1C MFR BLD: 6 % (ref 0–5.6)
HCT VFR BLD AUTO: 40.5 % (ref 35–47)
HGB BLD-MCNC: 13.1 G/DL (ref 11.7–15.7)
LYMPHOCYTES # BLD AUTO: 2.1 10E3/UL (ref 0.8–5.3)
LYMPHOCYTES NFR BLD AUTO: 34 %
MCH RBC QN AUTO: 29.7 PG (ref 26.5–33)
MCHC RBC AUTO-ENTMCNC: 32.3 G/DL (ref 31.5–36.5)
MCV RBC AUTO: 92 FL (ref 78–100)
MONOCYTES # BLD AUTO: 0.4 10E3/UL (ref 0–1.3)
MONOCYTES NFR BLD AUTO: 6 %
NEUTROPHILS # BLD AUTO: 3.2 10E3/UL (ref 1.6–8.3)
NEUTROPHILS NFR BLD AUTO: 52 %
PLATELET # BLD AUTO: 252 10E3/UL (ref 150–450)
RBC # BLD AUTO: 4.41 10E6/UL (ref 3.8–5.2)
VIT B12 SERPL-MCNC: 405 PG/ML (ref 193–986)
WBC # BLD AUTO: 6.2 10E3/UL (ref 4–11)

## 2021-12-02 PROCEDURE — 82306 VITAMIN D 25 HYDROXY: CPT | Performed by: FAMILY MEDICINE

## 2021-12-02 PROCEDURE — 99214 OFFICE O/P EST MOD 30 MIN: CPT | Performed by: FAMILY MEDICINE

## 2021-12-02 PROCEDURE — 85652 RBC SED RATE AUTOMATED: CPT | Performed by: FAMILY MEDICINE

## 2021-12-02 PROCEDURE — 36415 COLL VENOUS BLD VENIPUNCTURE: CPT | Performed by: FAMILY MEDICINE

## 2021-12-02 PROCEDURE — 83550 IRON BINDING TEST: CPT | Performed by: FAMILY MEDICINE

## 2021-12-02 PROCEDURE — 86038 ANTINUCLEAR ANTIBODIES: CPT | Performed by: FAMILY MEDICINE

## 2021-12-02 PROCEDURE — 80050 GENERAL HEALTH PANEL: CPT | Performed by: FAMILY MEDICINE

## 2021-12-02 PROCEDURE — 82607 VITAMIN B-12: CPT | Performed by: FAMILY MEDICINE

## 2021-12-02 PROCEDURE — 86140 C-REACTIVE PROTEIN: CPT | Performed by: FAMILY MEDICINE

## 2021-12-02 PROCEDURE — 82746 ASSAY OF FOLIC ACID SERUM: CPT | Performed by: FAMILY MEDICINE

## 2021-12-02 PROCEDURE — 83036 HEMOGLOBIN GLYCOSYLATED A1C: CPT | Performed by: FAMILY MEDICINE

## 2021-12-02 PROCEDURE — 82728 ASSAY OF FERRITIN: CPT | Performed by: FAMILY MEDICINE

## 2021-12-02 RX ORDER — FLUTICASONE PROPIONATE 50 MCG
1-2 SPRAY, SUSPENSION (ML) NASAL DAILY
Qty: 48 G | Refills: 3 | Status: SHIPPED | OUTPATIENT
Start: 2021-12-02 | End: 2022-12-08

## 2021-12-02 RX ORDER — ALBUTEROL SULFATE 0.83 MG/ML
2.5 SOLUTION RESPIRATORY (INHALATION) EVERY 4 HOURS PRN
Qty: 30 ML | Refills: 11 | Status: SHIPPED | OUTPATIENT
Start: 2021-12-02

## 2021-12-02 RX ORDER — MONTELUKAST SODIUM 10 MG/1
10 TABLET ORAL DAILY
Qty: 90 TABLET | Refills: 3 | Status: SHIPPED | OUTPATIENT
Start: 2021-12-02 | End: 2023-02-20

## 2021-12-02 RX ORDER — AMLODIPINE BESYLATE 5 MG/1
5 TABLET ORAL DAILY
Qty: 90 TABLET | Refills: 3 | Status: SHIPPED | OUTPATIENT
Start: 2021-12-02 | End: 2022-11-28

## 2021-12-02 RX ORDER — VENLAFAXINE HYDROCHLORIDE 150 MG/1
150 CAPSULE, EXTENDED RELEASE ORAL DAILY
Qty: 90 CAPSULE | Refills: 3 | Status: SHIPPED | OUTPATIENT
Start: 2021-12-02 | End: 2023-01-16

## 2021-12-02 RX ORDER — LISINOPRIL 40 MG/1
40 TABLET ORAL DAILY
Qty: 90 TABLET | Refills: 3 | Status: SHIPPED | OUTPATIENT
Start: 2021-12-02 | End: 2023-02-09

## 2021-12-02 RX ORDER — CETIRIZINE HYDROCHLORIDE 10 MG/1
10 TABLET ORAL DAILY
Qty: 90 TABLET | Refills: 3 | Status: SHIPPED | OUTPATIENT
Start: 2021-12-02

## 2021-12-02 NOTE — PROGRESS NOTES
Assessment & Plan     Itching  Arthralgia, unspecified joint  Other fatigue  - Multiple systemic symptoms of unknown cause  - Check labs today to screen for autoimmune/inflammatory condition  - May need a rheumatology referral depending on lab results   - Comprehensive metabolic panel (BMP + Alb, Alk Phos, ALT, AST, Total. Bili, TP); Future  - Anti Nuclear Catrachita IgG by IFA with Reflex; Future  - ESR: Erythrocyte sedimentation rate; Future  - CRP, inflammation; Future  - TSH with free T4 reflex; Future  - Vitamin B12; Future  - Folate; Future  - CBC with platelets and differential; Future  - Vitamin D Deficiency; Future  - Iron and iron binding capacity; Future  - Ferritin; Future    Mild persistent asthma without complication  - Well controlled, needs refills   - albuterol (PROVENTIL) (2.5 MG/3ML) 0.083% neb solution; Take 1 vial (2.5 mg) by nebulization every 4 hours as needed for shortness of breath / dyspnea or wheezing  - montelukast (SINGULAIR) 10 MG tablet; Take 1 tablet (10 mg) by mouth daily    Essential hypertension with goal blood pressure less than 140/90  - Well controlled   - amLODIPine (NORVASC) 5 MG tablet; Take 1 tablet (5 mg) by mouth daily  - lisinopril (ZESTRIL) 40 MG tablet; Take 1 tablet (40 mg) by mouth daily    Other allergy status, other than to drugs and biological substances  - Well controlled besides recent pruritis, hives   - cetirizine (ZYRTEC) 10 MG tablet; Take 1 tablet (10 mg) by mouth daily  - fluticasone (FLONASE) 50 MCG/ACT nasal spray; Spray 1-2 sprays into both nostrils daily    Mild major depression (H)  - Well controlled   - venlafaxine (EFFEXOR-XR) 150 MG 24 hr capsule; Take 1 capsule (150 mg) by mouth daily    Impaired fasting glucose  - Hemoglobin A1c      Return in about 1 week (around 12/9/2021) for test results.    Gena Beal DO  [facility]    ===============================================================================    Subjective  "  Sanam is a 61 year old who presents for the following health issues:    History of Present Illness   She consumes 0 sweetened beverage(s) daily.She exercises with enough effort to increase her heart rate 9 or less minutes per day.  She exercises with enough effort to increase her heart rate 3 or less days per week.   She is taking medications regularly.     This is a patient of Dr. Lozano's with a PMH of allergic rhinitis, mild persistent asthma, MACIE, impaired fasting glucose, HTN, paroxysmal SVT, and depression.  She's noticed multiple systemic symptoms over the past several months that are concerning to her.  She has a rash over most of her body that is very itchy.  Also notes many scabs and bruises.  She has had hair loss, muscle aches, generalized weakness, joint pain (wrists, elbows, fingers, knees, ankles), and brain fog.  She's concerned that she may have thyroid disorder.    Of note, she did have COVID in November 2020.  She did notice some hair loss and a rash after that.  However, the hair loss has changed (seems more along the hairline now and previously was coming out in chunks).  The rash right now is different as well.  She constantly feels itchy.  The rash is intermittent.  She reports getting \"welts\" that are red and raised and also small bumps.  She has many allergies so has tried changing her products but nothing has helped.  The itching is really bad and keeps her up at night.  She gets sores and bruises from the incessant scratching.      The brain fog is a new symptom.  She has attributed it to work stress.  She has some word finding issues and is more forgetful.  She also can physically not move as fast as previous.  She feels like she \"has concrete shoes on\".      Concern - thyroid  Onset: several months  Description: possible thyroid issue  Intensity: moderate  Progression of Symptoms:  worsening  Accompanying Signs & Symptoms: rash all over covered in scabs and bruises, hair loss, muscle " aches/weakness, joint pain (wrists, elbow, fingers, knees, ankles), brain fog and feeling as if she is moving through [employer]  Previous history of similar problem: None  Precipitating factors:        Worsened by: None  Alleviating factors:        Improved by: None  Therapies tried and outcome: switching soaps, lotions, showering less and washing hair less, double rinsing clothes in the laundry and cutting out dairy; not effective      Review of Systems   CONSTITUTIONAL: NEGATIVE for fever, chills, change in weight  INTEGUMENTARY/SKIN: POSITIVE for itching, bruising, rash   EYES: POSITIVE for cataracts   ENT/MOUTH: NEGATIVE for ear, mouth and throat problems  RESP: NEGATIVE for significant cough or SOB  CV: NEGATIVE for chest pain, palpitations or peripheral edema  GI: POSITIVE for constipation   : NEGATIVE for frequency, dysuria, or hematuria  MUSCULOSKELETAL:POSITIVE for myalgias, weakness   NEURO: POSITIVE for weakness   ENDOCRINE: POSITIVE  for constipation, hair loss and skin changes   HEME: NEGATIVE for bleeding problems  PSYCHIATRIC: NEGATIVE for changes in mood or affect      Objective    /84 (BP Location: Right arm, Patient Position: Chair, Cuff Size: Adult Large)   Pulse 76   Temp 98.1  F (36.7  C) (Oral)   LMP 12/21/2014   SpO2 98%   There is no height or weight on file to calculate BMI.  Physical Exam   GENERAL: healthy, alert and no distress  EYES: Eyes grossly normal to inspection  HENT: ear canals and TM's normal, nose and mouth without ulcers or lesions  NECK: no adenopathy, no asymmetry, masses, or scars and thyroid normal to palpation  RESP: lungs clear to auscultation - no rales, rhonchi or wheezes  CV: regular rates and rhythm, normal S1 S2, no S3 or S4 and no murmur, click or rub  ABDOMEN: soft, nontender, without hepatosplenomegaly or masses  MS: no gross musculoskeletal defects noted, no edema  SKIN: Approx 12 cm area of erythema and excoriation on left medial knee/thigh.  Multiple  areas of excoriation on back and arms.    NEURO: Normal strength and tone, mentation intact and speech normal  PSYCH: mentation appears normal, affect normal/bright

## 2021-12-03 LAB
ALBUMIN SERPL-MCNC: 3.4 G/DL (ref 3.4–5)
ALP SERPL-CCNC: 81 U/L (ref 40–150)
ALT SERPL W P-5'-P-CCNC: 87 U/L (ref 0–50)
ANA SER QL IF: NEGATIVE
ANION GAP SERPL CALCULATED.3IONS-SCNC: 3 MMOL/L (ref 3–14)
AST SERPL W P-5'-P-CCNC: 54 U/L (ref 0–45)
BILIRUB SERPL-MCNC: 0.3 MG/DL (ref 0.2–1.3)
BUN SERPL-MCNC: 13 MG/DL (ref 7–30)
CALCIUM SERPL-MCNC: 9.3 MG/DL (ref 8.5–10.1)
CHLORIDE BLD-SCNC: 103 MMOL/L (ref 94–109)
CO2 SERPL-SCNC: 28 MMOL/L (ref 20–32)
CREAT SERPL-MCNC: 0.84 MG/DL (ref 0.52–1.04)
DEPRECATED CALCIDIOL+CALCIFEROL SERPL-MC: 14 UG/L (ref 20–75)
FERRITIN SERPL-MCNC: 168 NG/ML (ref 8–252)
GFR SERPL CREATININE-BSD FRML MDRD: 75 ML/MIN/1.73M2
GLUCOSE BLD-MCNC: 103 MG/DL (ref 70–99)
IRON SATN MFR SERPL: 16 % (ref 15–46)
IRON SERPL-MCNC: 55 UG/DL (ref 35–180)
POTASSIUM BLD-SCNC: 3.9 MMOL/L (ref 3.4–5.3)
PROT SERPL-MCNC: 7.8 G/DL (ref 6.8–8.8)
SODIUM SERPL-SCNC: 134 MMOL/L (ref 133–144)
TIBC SERPL-MCNC: 344 UG/DL (ref 240–430)
TSH SERPL DL<=0.005 MIU/L-ACNC: 2.89 MU/L (ref 0.4–4)

## 2021-12-06 DIAGNOSIS — R70.0 ELEVATED ERYTHROCYTE SEDIMENTATION RATE: ICD-10-CM

## 2021-12-06 DIAGNOSIS — R79.82 ELEVATED C-REACTIVE PROTEIN (CRP): ICD-10-CM

## 2021-12-06 DIAGNOSIS — E55.9 VITAMIN D DEFICIENCY: Primary | ICD-10-CM

## 2021-12-06 DIAGNOSIS — M25.50 ARTHRALGIA, UNSPECIFIED JOINT: ICD-10-CM

## 2021-12-06 RX ORDER — ERGOCALCIFEROL 1.25 MG/1
50000 CAPSULE, LIQUID FILLED ORAL WEEKLY
Qty: 12 CAPSULE | Refills: 0 | Status: SHIPPED | OUTPATIENT
Start: 2021-12-06 | End: 2022-04-01

## 2021-12-16 ENCOUNTER — ANCILLARY PROCEDURE (OUTPATIENT)
Dept: GENERAL RADIOLOGY | Facility: CLINIC | Age: 61
End: 2021-12-16
Attending: INTERNAL MEDICINE
Payer: COMMERCIAL

## 2021-12-16 ENCOUNTER — OFFICE VISIT (OUTPATIENT)
Dept: RHEUMATOLOGY | Facility: CLINIC | Age: 61
End: 2021-12-16
Attending: FAMILY MEDICINE
Payer: COMMERCIAL

## 2021-12-16 ENCOUNTER — LAB (OUTPATIENT)
Dept: LAB | Facility: CLINIC | Age: 61
End: 2021-12-16
Payer: COMMERCIAL

## 2021-12-16 VITALS
HEIGHT: 69 IN | HEART RATE: 84 BPM | DIASTOLIC BLOOD PRESSURE: 87 MMHG | SYSTOLIC BLOOD PRESSURE: 132 MMHG | OXYGEN SATURATION: 96 % | BODY MASS INDEX: 36.92 KG/M2

## 2021-12-16 DIAGNOSIS — R21 RASH AND NONSPECIFIC SKIN ERUPTION: ICD-10-CM

## 2021-12-16 DIAGNOSIS — R79.82 ELEVATED C-REACTIVE PROTEIN (CRP): ICD-10-CM

## 2021-12-16 DIAGNOSIS — R70.0 ELEVATED ERYTHROCYTE SEDIMENTATION RATE: ICD-10-CM

## 2021-12-16 DIAGNOSIS — L65.9 HAIR LOSS: ICD-10-CM

## 2021-12-16 DIAGNOSIS — M25.50 ARTHRALGIA, UNSPECIFIED JOINT: Primary | ICD-10-CM

## 2021-12-16 DIAGNOSIS — M25.50 ARTHRALGIA, UNSPECIFIED JOINT: ICD-10-CM

## 2021-12-16 LAB
B BURGDOR IGG+IGM SER QL: 0.07
CRP SERPL-MCNC: 9.7 MG/L (ref 0–8)
ERYTHROCYTE [SEDIMENTATION RATE] IN BLOOD BY WESTERGREN METHOD: 26 MM/HR (ref 0–30)
HBV CORE AB SERPL QL IA: NONREACTIVE
HBV SURFACE AG SERPL QL IA: NONREACTIVE
HCV AB SERPL QL IA: NONREACTIVE

## 2021-12-16 PROCEDURE — 81374 HLA I TYPING 1 ANTIGEN LR: CPT

## 2021-12-16 PROCEDURE — 72200 X-RAY EXAM SI JOINTS: CPT | Performed by: RADIOLOGY

## 2021-12-16 PROCEDURE — 36415 COLL VENOUS BLD VENIPUNCTURE: CPT

## 2021-12-16 PROCEDURE — 86200 CCP ANTIBODY: CPT

## 2021-12-16 PROCEDURE — 87340 HEPATITIS B SURFACE AG IA: CPT

## 2021-12-16 PROCEDURE — 99204 OFFICE O/P NEW MOD 45 MIN: CPT | Performed by: INTERNAL MEDICINE

## 2021-12-16 PROCEDURE — 72100 X-RAY EXAM L-S SPINE 2/3 VWS: CPT | Performed by: RADIOLOGY

## 2021-12-16 PROCEDURE — 86803 HEPATITIS C AB TEST: CPT

## 2021-12-16 PROCEDURE — 86618 LYME DISEASE ANTIBODY: CPT

## 2021-12-16 PROCEDURE — 86704 HEP B CORE ANTIBODY TOTAL: CPT

## 2021-12-16 PROCEDURE — 73130 X-RAY EXAM OF HAND: CPT | Mod: 59 | Performed by: RADIOLOGY

## 2021-12-16 PROCEDURE — 85652 RBC SED RATE AUTOMATED: CPT

## 2021-12-16 PROCEDURE — 73630 X-RAY EXAM OF FOOT: CPT | Mod: 59 | Performed by: RADIOLOGY

## 2021-12-16 PROCEDURE — 86431 RHEUMATOID FACTOR QUANT: CPT

## 2021-12-16 PROCEDURE — 86140 C-REACTIVE PROTEIN: CPT

## 2021-12-16 NOTE — PROGRESS NOTES
Rheumatology Clinic Visit      Nela Mares MRN# [id number]   YOB: 1960 Age: 61 year old      Date of visit: 12/16/21   Referring provider: Dr. Gena Beal  PCP: Dr. Padma Lozano    Chief Complaint   Patient presents with:  Consult: Knees and lower back pain    Assessment and Plan     1.  Elevated inflammatory markers ESR and CRP, polyarthralgia: Reportedly with intermittent pain, swelling, and increased warmth of the right 2nd-3rd MCPs that occurs up to twice yearly for many years and is mild in severity and resolves spontaneously after 2 days.  Hx of plantar fasciitis involving both feet.  FHx of SpA.  Knee and back pain that is degenerative in nature.  No morning stiffness.  Overall suspect that she may have an underlying arthritis considering the unilateral MCP pain/swelling/stiffness, and the unilateral nature suggests SpA; asymptomatic between flares suggests possible pseudogout etiology.  MCP flares alone are infrequent and of mild severity so would not likely warrant a daily medication and she is in agreement; depending on results from workup below may consider trial of colchicine PRN.  Knee pain is degenerative in nature and would benefit from PT referral; workup as noted below first. Back pain is also degenerative in nature and may benefit from PT referral; depending on results from x-rays may also benefit from eval with MRI of the pelvis and will determine this based on x-rays.   Chronic illness, progressive.    - Labs: RF, CCP, Hepatitis B/C, Lyme, ESR, CRP, HLA-B27  - X-rays: SI joints, L-spine, bilateral hands/feet    2.  Pruritic rash: No psoriasis seen.  Excoriated areas of the gluteal cleft only at this time.  Reportedly the pruritic rashes that occur anywhere on her body started after she stopped going for her allergy shots.  Allergy shots were stopped in approximately March 2020, when COVID-19 pandemic restrictions started here.  She plans to follow-up with her allergist,  Dr. Clark.  Chronic illness, progressive.    - advised to follow-up with Dr. Clark and consider restarting allergy shots    3. Hair loss and pruritic rash: Suspect rash is related to stopping allergy shots.  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  Discussed option to see dermatology.   - Dermatology referral    Ms. Mares verbalized agreement with and understanding of the rational for the diagnosis and treatment plan.  All questions were answered to best of my ability and the patient's satisfaction. Ms. Mares was advised to contact the clinic with any questions that may arise after the clinic visit.      Thank you for involving me in the care of the patient    Return to clinic: 3 weeks      HPI   Nela Mares is a 61 year old female with a past medical history significant for allergic rhinitis, asthma, obstructive sleep apnea, depression, obesity, paroxysmal supraventricular tachycardia history, impaired fasting glucose, and hypertension who presents for initial rheumatology evaluation of elevated inflammatory markers and arthralgia.    12/2/2021 note by Dr. Beal documents pruritus, arthralgia, and fatigue.  Rash over most of her body that is very itchy.  Hair loss.  Muscle aches.  Generalized weakness.  Joint pain at the wrists, elbows, fingers, knees, ankles.  Brain fog.  Concerned about a thyroid disorder.  Of note, she had Covid in November 2020 with associated hair loss and rash after that.    Today, Ms. Mares reports that after COVID19 infection in 11/2020 she has had a pruritic rash all over her body that she attributes to multiple environmental allergies.  Joint aches started in mid-Oct 2021: knees and lower back are the most affected, but for years she says that she has had swelling and pain of the right 2-3rd MCPs.  MCP swelling/pain resolves within 2-3 days spontaneously.  Similar episode as the MCPs has occurred to the knees and one ankle.  Hx  of plantar fasciitis.  Back and knee pain worse in the PM; worse with activity.  Back pain is nonradiating.  Asthma controlled. Hair thinning worse after covid, and now just the central anterior portion.  Brain fog - trouble finding words - that started in fall 2021.     Denies fevers, chills, nausea, vomiting, constipation, diarrhea. No abdominal pain. No chest pain/pressure, palpitations, or shortness of breath. No LE swelling. No neck pain. No oral or nasal sores.  No sicca symptoms. No photosensitivity or photophobia. No eye pain or redness. No history of inflammatory eye or bowel disease.  No history of DVT, pulmonary embolism, or miscarriage.   No history of serositis.  No history of Raynaud's Phenomenon.  No known neurologic disorder.  No known renal disorder.  No dysphagia history.    Paternal Grandmother: RA  Niece: undifferentiated spondyloarthropathy tx'd with remicade  Father: PMR    Tobacco: none  EtOH: none  Drugs: none  Occupation: [employer]   14 point review of system was completed and negative except as noted in the HPI     Active Problem List     Patient Active Problem List   Diagnosis     Allergic rhinitis due to animals     Mild persistent asthma     Obstructive sleep apnea     Leiomyoma of uterus     Mild major depression (H)     CARDIOVASCULAR SCREENING; LDL GOAL LESS THAN 160     Hypertension goal BP (blood pressure) < 140/90     Pilar cyst     Hypertrophy of breast     IUD (intrauterine device) in place     Seasonal allergic rhinitis due to pollen     Allergic rhinitis due to dust mite     Allergic rhinitis due to mold     Overweight (H)     Impaired fasting glucose     Allergic conjunctivitis, bilateral     Paroxysmal supraventricular tachycardia (H)     Past Medical History     Past Medical History:   Diagnosis Date     Allergic rhinitis, cause unspecified      Cervical high risk HPV (human papillomavirus) test positive 03/21/2017    3/21/17 NIL pap/+ HR HPV (not 16 or 18).       Depressive disorder      Depressive disorder, not elsewhere classified     seasonal     Hypertension      Mild persistent asthma      Obstructive sleep apnea (adult) (pediatric)     uses CPAP     Scalp mass 7/2014     Past Surgical History     Past Surgical History:   Procedure Laterality Date     COLONOSCOPY  6/21/2012    Procedure: COLONOSCOPY;  COLONOSCOPY, SCREEN;  Surgeon: Bart You MD;  Location: [facility] OR     D & C       ENDOSCOPIC RETROGRADE CHOLANGIOPANCREATOGRAM  1/4/2014    Procedure: ENDOSCOPIC RETROGRADE CHOLANGIOPANCREATOGRAM;  ERCP biliary sphincterotomy, bile duct stone removal, epinepherine washing.;  Surgeon: Ba Meyers MD;  Location: [facility] OR     EP ABLATION SVT N/A 6/2/2021    Procedure: EP ABLATION SVT;  Surgeon: Cuca Magaña MD;  Location: [facility]     LAPAROSCOPIC CHOLECYSTECTOMY WITH CHOLANGIOGRAMS  1/4/2014    Procedure: LAPAROSCOPIC CHOLECYSTECTOMY WITH CHOLANGIOGRAMS;;  Surgeon: Haja Sage MD;  Location: [facility] OR     NO HISTORY OF SURGERY       OPERATIVE HYSTEROSCOPY  6/17/2014    Procedure: OPERATIVE HYSTEROSCOPY;  Surgeon: Paola Camara MD;  Location: [facility] OR     REMOVE INTRAUTERINE DEVICE  6/17/2014    Procedure: REMOVE INTRAUTERINE DEVICE;  Surgeon: Paola Camara MD;  Location: [facility] OR     Allergy   No Known Allergies  Current Medication List     Current Outpatient Medications   Medication Sig     albuterol (PROAIR HFA/PROVENTIL HFA/VENTOLIN HFA) 108 (90 Base) MCG/ACT inhaler Inhale 2 puffs into the lungs every 4 hours as needed for shortness of breath / dyspnea or wheezing     albuterol (PROVENTIL) (2.5 MG/3ML) 0.083% neb solution Take 1 vial (2.5 mg) by nebulization every 4 hours as needed for shortness of breath / dyspnea or wheezing     amLODIPine (NORVASC) 5 MG tablet Take 1 tablet (5 mg) by mouth daily     cetirizine (ZYRTEC) 10 MG tablet Take 1 tablet (10 mg) by mouth daily     fluticasone (FLONASE) 50 MCG/ACT  nasal spray Spray 1-2 sprays into both nostrils daily     fluticasone-salmeterol (ADVAIR DISKUS) 500-50 MCG/DOSE inhaler Inhale 1 puff into the lungs 2 times daily     lisinopril (ZESTRIL) 40 MG tablet Take 1 tablet (40 mg) by mouth daily     montelukast (SINGULAIR) 10 MG tablet Take 1 tablet (10 mg) by mouth daily     venlafaxine (EFFEXOR-XR) 150 MG 24 hr capsule Take 1 capsule (150 mg) by mouth daily     vitamin D2 (ERGOCALCIFEROL) 51855 units (1250 mcg) capsule Take 1 capsule (50,000 Units) by mouth once a week for 12 doses     No current facility-administered medications for this visit.     Social History   See HPI    Family History     Family History   Problem Relation Age of Onset     C.A.D. Paternal Grandmother      Cerebrovascular Disease Paternal Grandmother      Depression Paternal Grandmother      Obesity Paternal Grandmother      C.A.D. Paternal Grandfather      Cerebrovascular Disease Paternal Grandfather         unsure     Substance Abuse Paternal Grandfather      Hypertension Father      Asthma Father      Obesity Father      Skin Cancer Father      Diabetes Father      Neurologic Disorder Mother         Graves disease     Hyperlipidemia Mother      Depression Mother      Thyroid Disease Mother      Anxiety Disorder Mother      Other - See Comments Other      Skin Cancer Sister      Asthma Sister      Depression Maternal Grandmother      Cancer Sister         mild skin cancer,cured from excision     Depression Nephew      Anxiety Disorder Nephew      Substance Abuse Nephew      Asthma Nephew      Anxiety Disorder Nephew      Mental Illness Maternal Half-Brother      Mental Illness Paternal Half-Brother      Substance Abuse Nephew      Asthma Sister      Asthma Nephew      Thyroid Disease Maternal Half-Sister      Thyroid Disease Paternal Half-Sister      Paternal Grandmother: RA  Niece: undifferentiated spondyloarthropathy tx'd with remicade  Father: PMR    Physical Exam     Temp Readings from Last 3  "Encounters:   12/02/21 98.1  F (36.7  C) (Oral)   06/02/21 97.9  F (36.6  C) (Oral)   10/05/20 98.3  F (36.8  C)     BP Readings from Last 5 Encounters:   12/16/21 132/87   12/02/21 118/84   06/02/21 (!) 145/97   03/05/21 122/82   11/02/20 129/86     Pulse Readings from Last 1 Encounters:   12/16/21 84     Resp Readings from Last 1 Encounters:   06/02/21 16     Estimated body mass index is 36.92 kg/m  as calculated from the following:    Height as of this encounter: 1.753 m (5' 9\").    Weight as of 3/5/21: 113.4 kg (250 lb).    GEN: NAD.  Obese  HEENT:  Anicteric, noninjected sclera. No obvious external lesions of the ear and nose. Hearing intact.  CV: S1, S2. RRR. No m/r/g  PULM: No increased work of breathing. CTA bilaterally   MSK: MCPs, PIPs, DIPs without swelling or tenderness to palpation.  Wrists without swelling or tenderness to palpation.  Elbows and shoulders without swelling or tenderness to palpation.  Shoulders with normal range of motion.  Hips nontender to palpation; normal range of motion; negative straight leg test.  Spine nontender to palpation.  Normal Schober's.  Knees, ankles, and MTPs without swelling or tenderness to palpation.  No dactylitis.  SKIN: Erythematous excoriated areas at the gluteal cleft.  No nail pitting.  Hair thinning on the central anterior aspect of the scalp.  PSYCH: Alert. Appropriate.      Labs / Imaging (select studies)     NEGAR  Recent Labs   Lab Test 12/02/21  1314   LISBETH Negative     RNP/Sm/SSA/SSB  Recent Labs   Lab Test 03/14/14  0952   TREPAB Negative     CBC  Recent Labs   Lab Test 12/02/21  1314 06/02/21  1151 10/05/20  1028 01/04/14  0700 01/03/14  1703   WBC 6.2 4.7 4.5   < > 6.7   RBC 4.41 4.76 4.86   < > 4.47   HGB 13.1 14.3 14.9   < > 13.5   HCT 40.5 43.4 44.4   < > 41.2   MCV 92 91 91   < > 92   RDW 13.5 13.4 13.1   < > 14.3    220 231   < > 229   MCH 29.7 30.0 30.7   < > 30.2   MCHC 32.3 32.9 33.6   < > 32.8   NEUTROPHIL 52  --  47.6  --  62.6   LYMPH " 34  --  44.0  --  28.2   MONOCYTE 6  --  5.8  --  7.1   EOSINOPHIL 8  --  2.0  --  1.5   BASOPHIL 0  --  0.4  --  0.3   ANEU  --   --  2.1  --  4.2   ALYM  --   --  2.0  --  1.9   REESE  --   --  0.3  --  0.5   AEOS  --   --  0.1  --  0.1   ABAS  --   --  0.0  --  0.0   ANEUTAUTO 3.2  --   --   --   --    ALYMPAUTO 2.1  --   --   --   --    AMONOAUTO 0.4  --   --   --   --    AEOSAUTO 0.5  --   --   --   --    ABSBASO 0.0  --   --   --   --     < > = values in this interval not displayed.     CMP  Recent Labs   Lab Test 12/02/21  1314 06/02/21  1151 03/05/21  1318 11/02/20  1623 10/05/20  1028 07/20/20  0757 07/18/19  0840 04/26/18  0718 06/17/14  1037 03/14/14  0952 01/05/14  0647    139  --   --  141 140 141 141   < >  --  138   POTASSIUM 3.9 3.7 3.5 4.6 3.0* 4.1 3.8 3.8   < >  --  4.3   CHLORIDE 103 107  --   --  107 106 108 107   < >  --  105   CO2 28 28  --   --  24 28 30 25   < >  --  27   ANIONGAP 3 3  --   --  10 6 3 9   < >  --  6   * 100*  --   --  100* 119* 108*  110* 118*   < >  --  98   BUN 13 9  --   --  16 14 13 14   < >  --  7   CR 0.84 0.77  --   --  0.78 0.90 0.92 0.81   < >  --  0.79   GFRESTIMATED 75 83  --   --  82 70 68 73   < >  --  76   GFRESTBLACK  --  >90  --   --  >90 81 79 88   < >  --  >90   ASHLEY 9.3 9.6  --   --  9.6 9.2 8.8 9.3   < >  --  8.5   BILITOTAL 0.3  --   --   --  0.3  --   --   --   --  0.3 0.5   ALBUMIN 3.4  --   --   --  3.7  --   --   --   --  3.7* 3.2*   PROTTOTAL 7.8  --   --   --  7.7  --   --   --   --  7.1 6.3*   ALKPHOS 81  --   --   --  73  --   --   --   --  59 114   AST 54*  --   --   --  85*  --   --   --   --  24 102*   ALT 87*  --   --   --  120*  --   --   --   --  26 219*    < > = values in this interval not displayed.     HgA1c  Recent Labs   Lab Test 12/02/21  1314 03/05/21  1318 07/20/20  0757   A1C 6.0* 6.0* 5.7*     Iron Studies  Recent Labs   Lab Test 12/02/21  1314 01/07/15  1127    125   IRON 55  --      --    IRONSAT 16  --       Calcium/VitaminD  Recent Labs   Lab Test 12/02/21  1314 06/02/21  1151 10/05/20  1028   ASHLEY 9.3 9.6 9.6   VITDT 14*  --   --      ESR/CRP  Recent Labs   Lab Test 12/02/21  1314   SED 89*   CRP 22.0*     TSH/T4  Recent Labs   Lab Test 12/02/21  1314 10/05/20  1028 03/16/17  0751 10/11/16  1630 03/30/16  1830   TSH 2.89 4.84* 4.93*   < > 5.11*   T4  --   --  1.01  --  0.95    < > = values in this interval not displayed.     Hepatitis C  Recent Labs   Lab Test 10/11/16  1630   HCVAB Nonreactive   Assay performance characteristics have not been established for newborns,   infants, and children       HIV Screening  Recent Labs   Lab Test 03/14/14  0952   HIAGAB Nonreactive  HIV-1 p24 Ag & HIV-1/HIV-2 Ab Not Detected     UA  Recent Labs   Lab Test 01/03/14  1124   COLOR Yellow   APPEARANCE Clear   URINEGLC Negative   URINEBILI Negative   SG 1.020   URINEPH 7.5*   PROTEIN Trace*   UROBILINOGEN 0.2   NITRITE Negative   UBLD Negative   LEUKEST Negative   WBCU O - 2   RBCU O - 2   SQUAMOUSEPI Few     Urine Microscopic  Recent Labs   Lab Test 01/03/14  1124   WBCU O - 2   RBCU O - 2   SQUAMOUSEPI Few       Immunization History     Immunization History   Administered Date(s) Administered     COVID-19,PF,Moderna 03/05/2021, 11/04/2021     HepB 12/12/2007     HepB, Unspecified 09/12/2000, 10/10/2000, 03/13/2001     HepB-Adult 12/12/2007     Influenza (H1N1) 11/25/2009     Influenza (IIV3) PF 12/27/2000, 11/15/2006, 12/12/2007, 11/05/2008, 10/19/2009, 10/22/2010, 11/23/2011     Influenza Quad, Recombinant, pf(RIV4) (Flublok) 10/09/2018     Influenza Vaccine IM > 6 months Valent IIV4 (Alfuria,Fluzone) 10/18/2013, 10/01/2014, 10/03/2015, 10/14/2016     Pneumococcal 23 valent 09/16/2015     TDAP Vaccine (Adacel) 11/05/2008, 07/18/2019     Zoster vaccine recombinant adjuvanted (SHINGRIX) 03/18/2019, 07/01/2019          Chart documentation done in part with Dragon Voice recognition Software. Although reviewed after completion, some  word and grammatical error may remain.    Pradip Moore MD

## 2021-12-16 NOTE — PATIENT INSTRUCTIONS
RHEUMATOLOGY    Dr. Pradip Moore    [facility]  [address] [place] [address]  [place] [address]  Phone number: [phone]  Fax number: [phone]    Covid 19 vaccine scheduling phone number is [phone]    Thank you for choosing [facility]!    Mireille Hicks CMA Rheumatology

## 2021-12-17 LAB — RHEUMATOID FACT SER NEPH-ACNC: 9 IU/ML

## 2021-12-21 LAB
B LOCUS: NORMAL
B27TEST METHOD: NORMAL
CCP AB SER IA-ACNC: 1.6 U/ML

## 2022-01-01 ENCOUNTER — HEALTH MAINTENANCE LETTER (OUTPATIENT)
Age: 62
End: 2022-01-01

## 2022-01-07 ENCOUNTER — VIRTUAL VISIT (OUTPATIENT)
Dept: RHEUMATOLOGY | Facility: CLINIC | Age: 62
End: 2022-01-07
Payer: COMMERCIAL

## 2022-01-07 DIAGNOSIS — G89.29 CHRONIC MIDLINE LOW BACK PAIN WITHOUT SCIATICA: ICD-10-CM

## 2022-01-07 DIAGNOSIS — M17.0 PRIMARY OSTEOARTHRITIS OF BOTH KNEES: ICD-10-CM

## 2022-01-07 DIAGNOSIS — M54.50 CHRONIC MIDLINE LOW BACK PAIN WITHOUT SCIATICA: ICD-10-CM

## 2022-01-07 DIAGNOSIS — M19.90 ARTHRITIS: Primary | ICD-10-CM

## 2022-01-07 PROCEDURE — 99214 OFFICE O/P EST MOD 30 MIN: CPT | Mod: GT | Performed by: INTERNAL MEDICINE

## 2022-01-07 RX ORDER — COLCHICINE 0.6 MG/1
TABLET ORAL
Qty: 3 TABLET | Refills: 0 | Status: SHIPPED | OUTPATIENT
Start: 2022-01-07 | End: 2023-06-28

## 2022-01-07 RX ORDER — PREDNISONE 20 MG/1
TABLET ORAL
Qty: 1 TABLET | Refills: 0 | Status: SHIPPED | OUTPATIENT
Start: 2022-01-07 | End: 2023-02-20

## 2022-01-07 NOTE — PATIENT INSTRUCTIONS
RHEUMATOLOGY    Dr. Pradip Moore    [address] [place] [address]  Phone number: [phone]  Fax number: [phone]      Thank you for choosing [facility]!    Mireille Hicks CMA Rheumatology

## 2022-01-07 NOTE — PROGRESS NOTES
Nela Mares  is a 61 year old year old female who is being evaluated via a billable video visit.      How would you like to obtain your AVS? MyChart  If the video visit is dropped, the invitation should be resent by: Text to cell phone: [phone]  Will anyone else be joining your video visit? No    Rheumatology Video Visit      Nela Mares MRN# [id number]   YOB: 1960 Age: 61 year old      Date of visit: 1/07/22   Referring provider: Dr. Gena Beal   PCP: Dr. Padma Lozano    Chief Complaint   Patient presents with:  RECHECK    Assessment and Plan     1.  Elevated inflammatory markers ESR and CRP, polyarthralgia: Reportedly with intermittent pain, swelling, and increased warmth of the right 2nd-3rd MCPs that occurs up to twice yearly for many years and is mild in severity and resolves spontaneously after 2 days.  Hx of plantar fasciitis involving both feet.  FHx of SpA.  Knee and back pain that is degenerative in nature.  No morning stiffness.  Overall suspect that she may have an underlying arthritis considering the unilateral MCP pain/swelling/stiffness, and the unilateral nature suggests SpA; asymptomatic between flares suggests possible pseudogout etiology.  MCP flares alone are infrequent and of mild severity so would not likely warrant a daily medication and she is in agreement.  Therefore, for future flares will use colchicine that may be diagnostic and therapeutic; she is to notify me of results when used.  Colchicine is ineffective then okay to use steroids as noted below.  Knee and low back pain is degenerative in nature and would benefit from PT referral; referral given today.  Chronic illness, progressive.    - PRN arthritis flare: Colchicine 1.2 mg once, followed by 0.6 mg 1 hour later, then stop  - PRN arthritis flare not aborted with colchicine: prednisone 20mg once.  - Physical therapy referral    # Colchicine Risks and Benefits.  The risks and benefits of colchicine were  discussed in detail and the patient verbalized understanding.  The risks discussed include, but are not limited to, the risk for hypersensitivity, anaphylaxis, anaphylactoid reactions, diarrhea, vomiting, nausea, fatigue, headache, myelosuppression (suppressing the bone marrow), and neuromuscular toxicity.  It was explained that if nausea, vomiting, or diarrhea occur that the patient is to stop taking the medication immediately and notifying the rheumatology clinic.     # Prednisone Risks and Benefits: The risks and benefits of prednisone were discussed in detail and the patient verbalized understanding.  The risks discussed include, but are not limited to, weight gain, fluid retention, impaired wound healing, hyperglycemia, adrenal suppression, GI upset, peptic ulcer, hepatotoxicity, aseptic necrosis of the femoral and humeral heads, osteoporosis, myopathy, tendon rupture (particularly Achilles tendon), ocular changes including an increased intraocular pressure.  I encouraged reviewing the package insert and asking any questions about the medication.      2.  Pruritic rash: No psoriasis seen.  Excoriated areas of the gluteal cleft only at this time.  Reportedly the pruritic rashes that occur anywhere on her body started after she stopped going for her allergy shots.  Allergy shots were stopped in approximately March 2020, when COVID-19 pandemic restrictions started here.  She plans to follow-up with her allergist, Dr. Clark.  - previously advised to follow-up with Dr. Clark and consider restarting allergy shots    3. Hair loss and pruritic rash: Suspect rash is related to stopping allergy shots.  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  Discussed option to see dermatology previously  - Dermatology referral given previously    Ms. Mares verbalized agreement with and understanding of the rational for the diagnosis and treatment plan.  All questions were answered  to best of my ability and the patient's satisfaction. Ms. Mares was advised to contact the clinic with any questions that may arise after the clinic visit.      Thank you for involving me in the care of the patient    Return to clinic: 6 months      HPI   Nela Mares is a 61 year old female with a past medical history significant for allergic rhinitis, asthma, obstructive sleep apnea, depression, obesity, paroxysmal supraventricular tachycardia history, impaired fasting glucose, and hypertension who presents for rheumatology follow-up.     12/2/2021 note by Dr. Beal documents pruritus, arthralgia, and fatigue.  Rash over most of her body that is very itchy.  Hair loss.  Muscle aches.  Generalized weakness.  Joint pain at the wrists, elbows, fingers, knees, ankles.  Brain fog.  Concerned about a thyroid disorder.  Of note, she had Covid in November 2020 with associated hair loss and rash after that.    12/16/2021: Ms. Mares reports that after COVID19 infection in 11/2020 she has had a pruritic rash all over her body that she attributes to multiple environmental allergies.  Joint aches started in mid-Oct 2021: knees and lower back are the most affected, but for years she says that she has had swelling and pain of the right 2-3rd MCPs.  MCP swelling/pain resolves within 2-3 days spontaneously.  Similar episode as the MCPs has occurred to the knees and one ankle.  Hx of plantar fasciitis.  Back and knee pain worse in the PM; worse with activity.  Back pain is nonradiating.  Asthma controlled. Hair thinning worse after covid, and now just the central anterior portion.  Brain fog - trouble finding words - that started in fall 2021.     Denies fevers, chills, nausea, vomiting, constipation, diarrhea. No abdominal pain. No chest pain/pressure, palpitations, or shortness of breath. No LE swelling. No neck pain. No oral or nasal sores.  No sicca symptoms. No photosensitivity or photophobia. No eye pain or redness. No  history of inflammatory eye or bowel disease.  No history of DVT, pulmonary embolism, or miscarriage.   No history of serositis.  No history of Raynaud's Phenomenon.  No known neurologic disorder.  No known renal disorder.  No dysphagia history.    Today, 1/7/2022: No change in symptoms.  Here to review results and treatment plan.    Paternal Grandmother: RA  Niece: undifferentiated spondyloarthropathy tx'd with remicade  Father: PMR    Tobacco: none  EtOH: none  Drugs: none  Occupation: [employer]     ROS   14 point review of system was completed and negative except as noted in the HPI     Active Problem List     Patient Active Problem List   Diagnosis     Allergic rhinitis due to animals     Mild persistent asthma     Obstructive sleep apnea     Leiomyoma of uterus     Mild major depression (H)     CARDIOVASCULAR SCREENING; LDL GOAL LESS THAN 160     Hypertension goal BP (blood pressure) < 140/90     Pilar cyst     Hypertrophy of breast     IUD (intrauterine device) in place     Seasonal allergic rhinitis due to pollen     Allergic rhinitis due to dust mite     Allergic rhinitis due to mold     Overweight (H)     Impaired fasting glucose     Allergic conjunctivitis, bilateral     Paroxysmal supraventricular tachycardia (H)     Past Medical History     Past Medical History:   Diagnosis Date     Allergic rhinitis, cause unspecified      Cervical high risk HPV (human papillomavirus) test positive 03/21/2017    3/21/17 NIL pap/+ HR HPV (not 16 or 18).      Depressive disorder      Depressive disorder, not elsewhere classified     seasonal     Hypertension      Mild persistent asthma      Obstructive sleep apnea (adult) (pediatric)     uses CPAP     Scalp mass 7/2014     Past Surgical History     Past Surgical History:   Procedure Laterality Date     COLONOSCOPY  6/21/2012    Procedure: COLONOSCOPY;  COLONOSCOPY, SCREEN;  Surgeon: Bart You MD;  Location:  OR     D & C       ENDOSCOPIC RETROGRADE  CHOLANGIOPANCREATOGRAM  1/4/2014    Procedure: ENDOSCOPIC RETROGRADE CHOLANGIOPANCREATOGRAM;  ERCP biliary sphincterotomy, bile duct stone removal, epinepherine washing.;  Surgeon: Ba Meyers MD;  Location: [facility] OR     EP ABLATION SVT N/A 6/2/2021    Procedure: EP ABLATION SVT;  Surgeon: Cuca Magaña MD;  Location: [facility]     LAPAROSCOPIC CHOLECYSTECTOMY WITH CHOLANGIOGRAMS  1/4/2014    Procedure: LAPAROSCOPIC CHOLECYSTECTOMY WITH CHOLANGIOGRAMS;;  Surgeon: Haja Sage MD;  Location: [facility] OR     NO HISTORY OF SURGERY       OPERATIVE HYSTEROSCOPY  6/17/2014    Procedure: OPERATIVE HYSTEROSCOPY;  Surgeon: Paola Camara MD;  Location: [facility] OR     REMOVE INTRAUTERINE DEVICE  6/17/2014    Procedure: REMOVE INTRAUTERINE DEVICE;  Surgeon: Paola Camara MD;  Location: [facility] OR     Allergy   No Known Allergies  Current Medication List     Current Outpatient Medications   Medication Sig     albuterol (PROAIR HFA/PROVENTIL HFA/VENTOLIN HFA) 108 (90 Base) MCG/ACT inhaler Inhale 2 puffs into the lungs every 4 hours as needed for shortness of breath / dyspnea or wheezing     albuterol (PROVENTIL) (2.5 MG/3ML) 0.083% neb solution Take 1 vial (2.5 mg) by nebulization every 4 hours as needed for shortness of breath / dyspnea or wheezing     amLODIPine (NORVASC) 5 MG tablet Take 1 tablet (5 mg) by mouth daily     cetirizine (ZYRTEC) 10 MG tablet Take 1 tablet (10 mg) by mouth daily     fluticasone (FLONASE) 50 MCG/ACT nasal spray Spray 1-2 sprays into both nostrils daily     fluticasone-salmeterol (ADVAIR DISKUS) 500-50 MCG/DOSE inhaler Inhale 1 puff into the lungs 2 times daily     lisinopril (ZESTRIL) 40 MG tablet Take 1 tablet (40 mg) by mouth daily     montelukast (SINGULAIR) 10 MG tablet Take 1 tablet (10 mg) by mouth daily     venlafaxine (EFFEXOR-XR) 150 MG 24 hr capsule Take 1 capsule (150 mg) by mouth daily     vitamin D2 (ERGOCALCIFEROL) 08332 units (1250 mcg)  capsule Take 1 capsule (50,000 Units) by mouth once a week for 12 doses     No current facility-administered medications for this visit.     Social History   See HPI    Family History     Family History   Problem Relation Age of Onset     C.A.D. Paternal Grandmother      Cerebrovascular Disease Paternal Grandmother      Depression Paternal Grandmother      Obesity Paternal Grandmother      C.A.D. Paternal Grandfather      Cerebrovascular Disease Paternal Grandfather         unsure     Substance Abuse Paternal Grandfather      Hypertension Father      Asthma Father      Obesity Father      Skin Cancer Father      Diabetes Father      Neurologic Disorder Mother         Graves disease     Hyperlipidemia Mother      Depression Mother      Thyroid Disease Mother      Anxiety Disorder Mother      Other - See Comments Other      Skin Cancer Sister      Asthma Sister      Depression Maternal Grandmother      Cancer Sister         mild skin cancer,cured from excision     Depression Nephew      Anxiety Disorder Nephew      Substance Abuse Nephew      Asthma Nephew      Anxiety Disorder Nephew      Mental Illness Maternal Half-Brother      Mental Illness Paternal Half-Brother      Substance Abuse Nephew      Asthma Sister      Asthma Nephew      Thyroid Disease Maternal Half-Sister      Thyroid Disease Paternal Half-Sister      Paternal Grandmother: RA  Niece: undifferentiated spondyloarthropathy tx'd with remicade  Father: PMR    Physical Exam     Temp Readings from Last 3 Encounters:   12/02/21 98.1  F (36.7  C) (Oral)   06/02/21 97.9  F (36.6  C) (Oral)   10/05/20 98.3  F (36.8  C)     BP Readings from Last 5 Encounters:   12/16/21 132/87   12/02/21 118/84   06/02/21 (!) 145/97   03/05/21 122/82   11/02/20 129/86     Pulse Readings from Last 1 Encounters:   12/16/21 84     Resp Readings from Last 1 Encounters:   06/02/21 16     Estimated body mass index is 36.92 kg/m  as calculated from the following:    Height as of  "12/16/21: 1.753 m (5' 9\").    Weight as of 3/5/21: 113.4 kg (250 lb).      GEN: NAD.   HEENT: MMM.  Anicteric, noninjected sclera. No obvious external lesions of the ear and nose. Hearing intact.  PULM: No increased work of breathing  SKIN: No rash or jaundice seen  PSYCH: Alert. Appropriate.         Labs / Imaging (select studies)     NEGAR  Recent Labs   Lab Test 12/02/21  1314   LISBETH Negative     RNP/Sm/SSA/SSB  Recent Labs   Lab Test 03/14/14  0952   TREPAB Negative     CBC  Recent Labs   Lab Test 12/02/21  1314 06/02/21  1151 10/05/20  1028 01/04/14  0700 01/03/14  1703   WBC 6.2 4.7 4.5   < > 6.7   RBC 4.41 4.76 4.86   < > 4.47   HGB 13.1 14.3 14.9   < > 13.5   HCT 40.5 43.4 44.4   < > 41.2   MCV 92 91 91   < > 92   RDW 13.5 13.4 13.1   < > 14.3    220 231   < > 229   MCH 29.7 30.0 30.7   < > 30.2   MCHC 32.3 32.9 33.6   < > 32.8   NEUTROPHIL 52  --  47.6  --  62.6   LYMPH 34  --  44.0  --  28.2   MONOCYTE 6  --  5.8  --  7.1   EOSINOPHIL 8  --  2.0  --  1.5   BASOPHIL 0  --  0.4  --  0.3   ANEU  --   --  2.1  --  4.2   ALYM  --   --  2.0  --  1.9   REESE  --   --  0.3  --  0.5   AEOS  --   --  0.1  --  0.1   ABAS  --   --  0.0  --  0.0   ANEUTAUTO 3.2  --   --   --   --    ALYMPAUTO 2.1  --   --   --   --    AMONOAUTO 0.4  --   --   --   --    AEOSAUTO 0.5  --   --   --   --    ABSBASO 0.0  --   --   --   --     < > = values in this interval not displayed.     CMP  Recent Labs   Lab Test 12/02/21  1314 06/02/21  1151 03/05/21  1318 11/02/20  1623 10/05/20  1028 07/20/20  0757 07/18/19  0840 04/26/18  0718 06/17/14  1037 03/14/14  0952 01/05/14  0647    139  --   --  141 140 141 141   < >  --  138   POTASSIUM 3.9 3.7 3.5 4.6 3.0* 4.1 3.8 3.8   < >  --  4.3   CHLORIDE 103 107  --   --  107 106 108 107   < >  --  105   CO2 28 28  --   --  24 28 30 25   < >  --  27   ANIONGAP 3 3  --   --  10 6 3 9   < >  --  6   * 100*  --   --  100* 119* 108*  110* 118*   < >  --  98   BUN 13 9  --   --  16 14 " 13 14   < >  --  7   CR 0.84 0.77  --   --  0.78 0.90 0.92 0.81   < >  --  0.79   GFRESTIMATED 75 83  --   --  82 70 68 73   < >  --  76   GFRESTBLACK  --  >90  --   --  >90 81 79 88   < >  --  >90   ASHLEY 9.3 9.6  --   --  9.6 9.2 8.8 9.3   < >  --  8.5   BILITOTAL 0.3  --   --   --  0.3  --   --   --   --  0.3 0.5   ALBUMIN 3.4  --   --   --  3.7  --   --   --   --  3.7* 3.2*   PROTTOTAL 7.8  --   --   --  7.7  --   --   --   --  7.1 6.3*   ALKPHOS 81  --   --   --  73  --   --   --   --  59 114   AST 54*  --   --   --  85*  --   --   --   --  24 102*   ALT 87*  --   --   --  120*  --   --   --   --  26 219*    < > = values in this interval not displayed.     HgA1c  Recent Labs   Lab Test 12/02/21  1314 03/05/21  1318 07/20/20  0757   A1C 6.0* 6.0* 5.7*     Iron Studies  Recent Labs   Lab Test 12/02/21  1314 01/07/15  1127    125   IRON 55  --      --    IRONSAT 16  --      Calcium/VitaminD  Recent Labs   Lab Test 12/02/21  1314 06/02/21  1151 10/05/20  1028   ASHLEY 9.3 9.6 9.6   VITDT 14*  --   --      ESR/CRP  Recent Labs   Lab Test 12/02/21  1314   SED 89*   CRP 22.0*     TSH/T4  Recent Labs   Lab Test 12/02/21  1314 10/05/20  1028 03/16/17  0751 10/11/16  1630 03/30/16  1830   TSH 2.89 4.84* 4.93*   < > 5.11*   T4  --   --  1.01  --  0.95    < > = values in this interval not displayed.     Hepatitis C  Recent Labs   Lab Test 10/11/16  1630   HCVAB Nonreactive   Assay performance characteristics have not been established for newborns,   infants, and children       HIV Screening  Recent Labs   Lab Test 03/14/14  0952   HIAGAB Nonreactive  HIV-1 p24 Ag & HIV-1/HIV-2 Ab Not Detected     UA  Recent Labs   Lab Test 01/03/14  1124   COLOR Yellow   APPEARANCE Clear   URINEGLC Negative   URINEBILI Negative   SG 1.020   URINEPH 7.5*   PROTEIN Trace*   UROBILINOGEN 0.2   NITRITE Negative   UBLD Negative   LEUKEST Negative   WBCU O - 2   RBCU O - 2   SQUAMOUSEPI Few     Urine Microscopic  Recent Labs   Lab Test  01/03/14  1124   WBCU O - 2   RBCU O - 2   SQUAMOUSEPI Few       Immunization History     Immunization History   Administered Date(s) Administered     COVID-19,PF,Moderna 03/05/2021, 11/04/2021     HepB 12/12/2007     HepB, Unspecified 09/12/2000, 10/10/2000, 03/13/2001     HepB-Adult 12/12/2007     Influenza (H1N1) 11/25/2009     Influenza (IIV3) PF 12/27/2000, 11/15/2006, 12/12/2007, 11/05/2008, 10/19/2009, 10/22/2010, 11/23/2011     Influenza Quad, Recombinant, pf(RIV4) (Flublok) 10/09/2018     Influenza Vaccine IM > 6 months Valent IIV4 (Alfuria,Fluzone) 10/18/2013, 10/01/2014, 10/03/2015, 10/14/2016     Pneumococcal 23 valent 09/16/2015     TDAP Vaccine (Adacel) 11/05/2008, 07/18/2019     Zoster vaccine recombinant adjuvanted (SHINGRIX) 03/18/2019, 07/01/2019          Chart documentation done in part with Dragon Voice recognition Software. Although reviewed after completion, some word and grammatical error may remain.      Video-Visit Details    Type of service:  Video Visit    Video Start Time: 9:43 AM    Video End Time:9:51 AM    Originating Location (pt. Location): [place], [place]    Distant Location (provider location):  [facility] in [place], [place]    Platform used for Video Visit: Bernard Moore MD

## 2022-01-27 ENCOUNTER — THERAPY VISIT (OUTPATIENT)
Dept: PHYSICAL THERAPY | Facility: CLINIC | Age: 62
End: 2022-01-27
Attending: INTERNAL MEDICINE
Payer: COMMERCIAL

## 2022-01-27 DIAGNOSIS — G89.29 CHRONIC MIDLINE LOW BACK PAIN WITHOUT SCIATICA: Primary | ICD-10-CM

## 2022-01-27 DIAGNOSIS — M17.0 PRIMARY OSTEOARTHRITIS OF BOTH KNEES: ICD-10-CM

## 2022-01-27 DIAGNOSIS — M54.50 CHRONIC MIDLINE LOW BACK PAIN WITHOUT SCIATICA: Primary | ICD-10-CM

## 2022-01-27 PROCEDURE — 97112 NEUROMUSCULAR REEDUCATION: CPT | Mod: GP | Performed by: PHYSICAL THERAPIST

## 2022-01-27 PROCEDURE — 97110 THERAPEUTIC EXERCISES: CPT | Mod: GP | Performed by: PHYSICAL THERAPIST

## 2022-01-27 PROCEDURE — 97161 PT EVAL LOW COMPLEX 20 MIN: CPT | Mod: GP | Performed by: PHYSICAL THERAPIST

## 2022-01-27 ASSESSMENT — ACTIVITIES OF DAILY LIVING (ADL)
LIMPING: I HAVE THE SYMPTOM BUT IT DOES NOT AFFECT MY ACTIVITY
GIVING WAY, BUCKLING OR SHIFTING OF KNEE: I HAVE THE SYMPTOM BUT IT DOES NOT AFFECT MY ACTIVITY
KNEEL ON THE FRONT OF YOUR KNEE: I AM UNABLE TO DO THE ACTIVITY
WALK: ACTIVITY IS NOT DIFFICULT
KNEE_ACTIVITY_OF_DAILY_LIVING_SUM: 51
KNEE_ACTIVITY_OF_DAILY_LIVING_SCORE: 72.86
SIT WITH YOUR KNEE BENT: ACTIVITY IS MINIMALLY DIFFICULT
SQUAT: I AM UNABLE TO DO THE ACTIVITY
HOW_WOULD_YOU_RATE_THE_OVERALL_FUNCTION_OF_YOUR_KNEE_DURING_YOUR_USUAL_DAILY_ACTIVITIES?: ABNORMAL
RISE FROM A CHAIR: ACTIVITY IS NOT DIFFICULT
STIFFNESS: I HAVE THE SYMPTOM BUT IT DOES NOT AFFECT MY ACTIVITY
WEAKNESS: I DO NOT HAVE THE SYMPTOM
SWELLING: I DO NOT HAVE THE SYMPTOM
PAIN: I HAVE THE SYMPTOM BUT IT DOES NOT AFFECT MY ACTIVITY
GO UP STAIRS: ACTIVITY IS SOMEWHAT DIFFICULT
AS_A_RESULT_OF_YOUR_KNEE_INJURY,_HOW_WOULD_YOU_RATE_YOUR_CURRENT_LEVEL_OF_DAILY_ACTIVITY?: ABNORMAL
STAND: ACTIVITY IS NOT DIFFICULT
RAW_SCORE: 51
GO DOWN STAIRS: ACTIVITY IS SOMEWHAT DIFFICULT

## 2022-01-27 NOTE — PROGRESS NOTES
[facility] Initial Evaluation -- Lumbar    Date: January 27, 2022  Nela Mraes is a 61 year old female with a Lumbar and Bilat knees condition.   Referral: Rheumatology  Work mechanical stresses:   - Prolonged sitting (on ball)  Employment status:  Normal hours  Leisure mechanical stresses: Not a regular exerciser  Functional disability score (MELANI/STarT Back):  See Flowsheet  VAS score (0-10): Back 1/10 (ranges 0-6/10), Knees 7/10 (ranges 0-7/10)  Patient goals/expectations:  Decr pain ascending/descending stairs and decr episodes of LBP w/ walking    HISTORY:    Present symptoms: LB - Center LBP - Denies radiation into buttocks and legs at this time.  Knees - Ant medial knee bilat - no radiation  Pain quality (sharp/shooting/stabbing/aching/burning/cramping):  Knees stabbing, burning, LB cramping and aching  Paresthesia (yes/no):  no    Present since (onset date): Knees 2 yrs ago - felt pop R knee w/ residual pain, same thing L knee 1 yr ago.  LB - 2-3 yrs ago - with prolonged sitting.   MD referral 1-7-2022   Symptoms (improving/unchanging/worsening):  worsening.     Symptoms commenced as a result of: See Above.   Condition occurred in the following environment:   unknown     Symptoms at onset (back/thigh/leg): same  Constant symptoms (back/thigh/leg):   Intermittent symptoms (back/thigh/leg): back and knees    Symptoms are made worse with the following: Sometimes Sitting, Sometimes Rising, Sometimes Walking, Time of day - Always as the day progresses and Other - always stairs and always squatting/kneeling   Symptoms are made better with the following: Other - Ibuprofen, heat and ice, lying down to rest    Disturbed sleep (yes/no):  no Sleeping postures (prone/sup/side R/L):     Previous episodes (0/1-5/6-10/11+): 5+ Year of first episode: many yrs    Previous history: 1 episode of sciatica type pain otherwise pain has been in back, No prev hx of knee  pain  Previous treatments: none      Specific Questions:  Cough/Sneeze/Strain (pos/neg): neg  Bowel/Bladder (normal/abnormal): normal  Gait (normal/abnormal): normal  Medications (nil/NSAIDS/analg/steroids/anticoag/other):  Other - Anti-depressants and High blood pressure, allergies/asthma  Medical allergies:  NKA  General health (excellent/good/fair/poor):  good  Pertinent medical history:  Asthma, Depression, High blood pressure, Osteoarthritis, Overweight and Sleep disorder/apnea  Imaging (None/Xray/MRI/Other):  See Epic Chart - OA  Recent or major surgery (yes/no):  Cardiac Ablation  Night pain (yes/no): no  Accidents (yes/no): no  Unexplained weight loss (yes/no): no  Barriers at home: none  Other red flags: none    EXAMINATION    Posture:   Sitting (good/fair/poor): fair  Standing (good/fair/poor):fair - depressed shlds, Incr Kyphosis  Lordosis (red/acc/normal): normal  Correction of posture (better/worse/no effect): better    Lateral Shift (right/left/nil): slight shift L  Relevant (yes/no):  No  Other Observations:     Neurological:    Motor deficit:  SL Toe Raise slight pain L>R knee, slight decr strength L, SL Squat strength equal, Heel walk normal  Reflexes:  NA  Sensory deficit:  NA  Dural signs:  NA    Movement Loss:   [place] Mod Min Nil Pain   Flexion    X To toes   Extension    X    Side Gliding R    X    Side Gliding L    X      Test Movements:   During: produces, abolishes, increases, decreases, no effect, centralizing, peripheralizing   After: better, worse, no better, no worse, no effect, centralized, peripheralized    Pretest symptoms standing: No pain w/ stdg   Symptoms During Symptoms After ROM increased ROM decreased No Effect   FIS No Effect    No Effect         Rep FIS No Effect    No Effect      X   EIS No Effect    No Effect         Rep EIS No Effect    No Effect      X     Static Tests:  Sitting slouched:  NA   Sitting erect:  NA  Standing slouched: NA  Standing erect:  NA  Lying prone in  extension:  NA Long sitting:  NA    Other Tests:    FISit - x10 -NE, NE, Incr Lumbar ROM, Incr strength L SL Toe Raise    Provisional Classification:  Derangement - Bilateral, symmetrical, symptoms above knee    Principle of Management:  Education:  Posture - Neutral Spine, use of L-roll, affect of posture on spine/pain, no couch, recliner, or propping up on pillows in bed, specificity of exer, centralisation/peripheralisation, and HEP     Equipment provided:  None - Recommended using rolled towel for L-Roll whenever sitting.  Mechanical therapy (Y/N):  Y   Extension principle:    Lateral Principle:    Flexion principle:  FISit x10 6+ x/day  Other:      ASSESSMENT/PLAN:    Patient is a 61 year old female with lumbar and both sides knee complaints.    Patient has the following significant findings with corresponding treatment plan.                Diagnosis 1:  Chronic Midline LBP w/ Sciatica  Pain -  hot/cold therapy, manual therapy, self management, education, directional preference exercise and home program  Decreased ROM/flexibility - manual therapy, therapeutic exercise and home program  Decreased strength - therapeutic exercise, therapeutic activities and home program  Decreased function - therapeutic activities and home program  Impaired posture - neuro re-education and home program  Diagnosis 2:  Primary OA Bilat knees   Pain -  hot/cold therapy, manual therapy, self management, education, directional preference exercise and home program  Decreased ROM/flexibility - manual therapy, therapeutic exercise and home program  Decreased strength - therapeutic exercise, therapeutic activities and home program  Decreased function - therapeutic activities and home program    Therapy Evaluation Codes:   1) Clinical presentation characteristics are:   Stable/Uncomplicated.  2) Decision-Making    Low complexity using standardized patient assessment instrument and/or measureable assessment of functional outcome.  Cumulative  Therapy Evaluation is: Low complexity.    Previous and current functional limitations:  (See Goal Flow Sheet for this information)    Short term and Long term goals: (See Goal Flow Sheet for this information)     Communication ability:  Patient appears to be able to clearly communicate and understand verbal and written communication and follow directions correctly.  Treatment Explanation - The following has been discussed with the patient:   RX ordered/plan of care  Anticipated outcomes  Possible risks and side effects  This patient would benefit from PT intervention to resume normal activities.   Rehab potential is good.    Frequency:  1 X week, once daily  Duration:  for 8 weeks  Discharge Plan:  Achieve all LTG.  Independent in home treatment program.  Reach maximal therapeutic benefit.    Please refer to the daily flowsheet for treatment today, total treatment time and time spent performing 1:1 timed codes.

## 2022-01-28 PROBLEM — M54.50 CHRONIC MIDLINE LOW BACK PAIN WITHOUT SCIATICA: Status: ACTIVE | Noted: 2022-01-28

## 2022-01-28 PROBLEM — G89.29 CHRONIC MIDLINE LOW BACK PAIN WITHOUT SCIATICA: Status: ACTIVE | Noted: 2022-01-28

## 2022-01-28 PROBLEM — M17.0 PRIMARY OSTEOARTHRITIS OF BOTH KNEES: Status: ACTIVE | Noted: 2022-01-28

## 2022-02-03 ENCOUNTER — THERAPY VISIT (OUTPATIENT)
Dept: PHYSICAL THERAPY | Facility: CLINIC | Age: 62
End: 2022-02-03
Payer: COMMERCIAL

## 2022-02-03 DIAGNOSIS — G89.29 CHRONIC MIDLINE LOW BACK PAIN WITHOUT SCIATICA: ICD-10-CM

## 2022-02-03 DIAGNOSIS — M17.0 PRIMARY OSTEOARTHRITIS OF BOTH KNEES: Primary | ICD-10-CM

## 2022-02-03 DIAGNOSIS — M54.50 CHRONIC MIDLINE LOW BACK PAIN WITHOUT SCIATICA: ICD-10-CM

## 2022-02-03 PROCEDURE — 97112 NEUROMUSCULAR REEDUCATION: CPT | Mod: GP | Performed by: PHYSICAL THERAPIST

## 2022-02-03 PROCEDURE — 97530 THERAPEUTIC ACTIVITIES: CPT | Mod: GP | Performed by: PHYSICAL THERAPIST

## 2022-02-03 PROCEDURE — 97110 THERAPEUTIC EXERCISES: CPT | Mod: GP | Performed by: PHYSICAL THERAPIST

## 2022-02-10 ENCOUNTER — THERAPY VISIT (OUTPATIENT)
Dept: PHYSICAL THERAPY | Facility: CLINIC | Age: 62
End: 2022-02-10
Payer: COMMERCIAL

## 2022-02-10 ENCOUNTER — OFFICE VISIT (OUTPATIENT)
Dept: ALLERGY | Facility: CLINIC | Age: 62
End: 2022-02-10
Payer: COMMERCIAL

## 2022-02-10 VITALS — SYSTOLIC BLOOD PRESSURE: 135 MMHG | DIASTOLIC BLOOD PRESSURE: 88 MMHG | HEART RATE: 75 BPM | OXYGEN SATURATION: 98 %

## 2022-02-10 DIAGNOSIS — M17.0 PRIMARY OSTEOARTHRITIS OF BOTH KNEES: ICD-10-CM

## 2022-02-10 DIAGNOSIS — J45.30 MILD PERSISTENT ASTHMA WITHOUT COMPLICATION: ICD-10-CM

## 2022-02-10 DIAGNOSIS — G89.29 CHRONIC MIDLINE LOW BACK PAIN WITHOUT SCIATICA: Primary | ICD-10-CM

## 2022-02-10 DIAGNOSIS — L50.8 CHRONIC URTICARIA: Primary | ICD-10-CM

## 2022-02-10 DIAGNOSIS — M54.50 CHRONIC MIDLINE LOW BACK PAIN WITHOUT SCIATICA: Primary | ICD-10-CM

## 2022-02-10 PROCEDURE — 99214 OFFICE O/P EST MOD 30 MIN: CPT | Performed by: ALLERGY & IMMUNOLOGY

## 2022-02-10 PROCEDURE — 97110 THERAPEUTIC EXERCISES: CPT | Mod: GP | Performed by: PHYSICAL THERAPIST

## 2022-02-10 PROCEDURE — 97530 THERAPEUTIC ACTIVITIES: CPT | Mod: GP | Performed by: PHYSICAL THERAPIST

## 2022-02-10 RX ORDER — ALBUTEROL SULFATE 90 UG/1
2 AEROSOL, METERED RESPIRATORY (INHALATION) EVERY 4 HOURS PRN
Qty: 36 G | Refills: 1 | Status: SHIPPED | OUTPATIENT
Start: 2022-02-10 | End: 2023-05-15

## 2022-02-10 ASSESSMENT — ASTHMA QUESTIONNAIRES: ACT_TOTALSCORE: 25

## 2022-02-10 NOTE — LETTER
"    2/10/2022         RE: Nela Mares  [address] [id number]        Dear Colleague,    Thank you for referring your patient, Nela Mares, to the [facility]. Please see a copy of my visit note below.    Nela Mares was seen in the Allergy Clinic at [facility].      Nela Mares is a 61 year old Choose not to answer female who is seen today for a follow-up visit. She reports that her asthma is \"100% controlled.\" She has stopped Advair and montelukast since November. She has also not used her albuterol inhaler in the last few months. She reports no limitation in her activity and she has not had any nocturnal asthma symptoms. Historically her asthma tends to flare in the spring and summer months.    Sanam states that after she was diagnosed with COVID in 11/2020 she had many persistent symptoms including a rash, hair loss, and body aches. She was evaluated by her PCP and laboratory evaluation was notable for elevated inflammatory markers. She was referred to rheumatology for suspected rheumatoid arthritis but she was not found to have any underlying rheumatologic condition. Her inflammatory markers have since improved. In the last 2 weeks her rash has now also resolved. Sanam describes the rash as itchy, raised welts. She has pictures documenting raised welts and wheals. Over the last year she tried changing detergents and soaps, using hypoallergenic lotions, and removing dairy from her diet. Nothing seemed to improve the rash. Diphenhydramine didn't resolve her symptoms but would allow her to sleep. She has continued to take 10mg of cetirizine daily. She inquires today as to whether she needs to resume immunotherapy treatment to treat this rash and whether it may be due to her allergies.      Past Medical History:   Diagnosis Date     Allergic rhinitis, cause unspecified      Cervical high risk HPV (human papillomavirus) test " positive 03/21/2017    3/21/17 NIL pap/+ HR HPV (not 16 or 18).      Depressive disorder      Depressive disorder, not elsewhere classified     seasonal     Hypertension      Mild persistent asthma      Obstructive sleep apnea (adult) (pediatric)     uses CPAP     Primary osteoarthritis of both knees 1/28/2022     Scalp mass 7/2014     Family History   Problem Relation Age of Onset     C.A.D. Paternal Grandmother      Cerebrovascular Disease Paternal Grandmother      Depression Paternal Grandmother      Obesity Paternal Grandmother      C.A.D. Paternal Grandfather      Cerebrovascular Disease Paternal Grandfather         unsure     Substance Abuse Paternal Grandfather      Hypertension Father      Asthma Father      Obesity Father      Skin Cancer Father      Diabetes Father      Neurologic Disorder Mother         Graves disease     Hyperlipidemia Mother      Depression Mother      Thyroid Disease Mother      Anxiety Disorder Mother      Other - See Comments Other      Skin Cancer Sister      Asthma Sister      Depression Maternal Grandmother      Cancer Sister         mild skin cancer,cured from excision     Depression Nephew      Anxiety Disorder Nephew      Substance Abuse Nephew      Asthma Nephew      Anxiety Disorder Nephew      Mental Illness Maternal Half-Brother      Mental Illness Paternal Half-Brother      Substance Abuse Nephew      Asthma Sister      Asthma Nephew      Thyroid Disease Maternal Half-Sister      Thyroid Disease Paternal Half-Sister      Social History     Tobacco Use     Smoking status: Never Smoker     Smokeless tobacco: Never Used   Substance Use Topics     Alcohol use: No     Drug use: No     Social History     Social History Narrative    Dairy/d 3 servings/d.     Caffeine 2 servings/d    Exercise 0 x week    Sunscreen used - No    Seatbelts used - Yes    Working smoke/CO detectors in the home - Yes    Guns stored in the home - No    Self Breast Exams - no    Eye Exam up to date - No     Dental Exam up to date - Yes    Pap Smear up to date - Yes    Mammogram up to date - Yes    Dexa Scan up to date - NOT APPLICABLE    Flex Sig / Colonoscopy up to date - NOT APPLICABLE    Immunizations up to date - Yes    Abuse: Current or Past(Physical, Sexual or Emotional)- No    Do you feel safe in your environment - Yes    GÓMEZ Cummings    11/23/11                       Past medical, family, and social history were reviewed.    REVIEW OF SYSTEMS:  General: negative for weight gain. negative for weight loss. negative for changes in sleep.   Eyes: negative for itching. negative for redness. negative for tearing/watering. negative for vision changes  Ears: negative for fullness. negative for hearing loss. negative for dizziness.   Nose: negative for snoring.negative for changes in smell. negative for drainage.   Throat: negative for hoarseness. negative for sore throat. negative for trouble swallowing.   Lungs: negative for cough. negative for shortness of breath.negative for wheezing. negative for sputum production.   Cardiovascular: negative for chest pain. negative for swelling of ankles. negative for fast or irregular heartbeat.   Gastrointestinal: negative for nausea. negative for heartburn. negative for acid reflux.   Musculoskeletal: negative for joint pain. negative for joint stiffness. negative for joint swelling.   Neurologic: negative for seizures. negative for fainting. negative for weakness.   Psychiatric: negative for changes in mood. negative for anxiety.   Endocrine: negative for cold intolerance. negative for heat intolerance. negative for tremors.   Hematologic: negative for easy bruising. negative for easy bleeding.  Integumentary: negative for rash. negative for scaling. negative for nail changes.       Current Outpatient Medications:      albuterol (PROAIR HFA/PROVENTIL HFA/VENTOLIN HFA) 108 (90 Base) MCG/ACT inhaler, Inhale 2 puffs into the lungs every 4 hours as needed for shortness of breath /  dyspnea or wheezing, Disp: 36 g, Rfl: 1     albuterol (PROVENTIL) (2.5 MG/3ML) 0.083% neb solution, Take 1 vial (2.5 mg) by nebulization every 4 hours as needed for shortness of breath / dyspnea or wheezing, Disp: 30 mL, Rfl: 11     amLODIPine (NORVASC) 5 MG tablet, Take 1 tablet (5 mg) by mouth daily, Disp: 90 tablet, Rfl: 3     cetirizine (ZYRTEC) 10 MG tablet, Take 1 tablet (10 mg) by mouth daily, Disp: 90 tablet, Rfl: 3     colchicine (COLCYRS) 0.6 MG tablet, For arthritis flare only: Take 2 tablets (1.2 mg) at first sign of flare, then 0.6 mg 1 hour later if not resolved., Disp: 3 tablet, Rfl: 0     fluticasone (FLONASE) 50 MCG/ACT nasal spray, Spray 1-2 sprays into both nostrils daily, Disp: 48 g, Rfl: 3     fluticasone-salmeterol (ADVAIR DISKUS) 500-50 MCG/DOSE inhaler, Inhale 1 puff into the lungs 2 times daily, Disp: 3 each, Rfl: 1     lisinopril (ZESTRIL) 40 MG tablet, Take 1 tablet (40 mg) by mouth daily, Disp: 90 tablet, Rfl: 3     montelukast (SINGULAIR) 10 MG tablet, Take 1 tablet (10 mg) by mouth daily, Disp: 90 tablet, Rfl: 3     predniSONE (DELTASONE) 20 MG tablet, For arthritis flare not aborted with colchicine: prednisone 20mg once., Disp: 1 tablet, Rfl: 0     venlafaxine (EFFEXOR-XR) 150 MG 24 hr capsule, Take 1 capsule (150 mg) by mouth daily, Disp: 90 capsule, Rfl: 3     vitamin D2 (ERGOCALCIFEROL) 80433 units (1250 mcg) capsule, Take 1 capsule (50,000 Units) by mouth once a week for 12 doses, Disp: 12 capsule, Rfl: 0  No Known Allergies    EXAM:   /88 (BP Location: Left arm, Patient Position: Sitting, Cuff Size: Adult Large)   Pulse 75   LMP 12/21/2014   SpO2 98%   GENERAL APPEARANCE: alert, cooperative and not in distress  SKIN: no rashes, no lesions  HEAD: atraumatic, normocephalic  EYES: lids and lashes normal, conjunctivae and sclerae clear  ENT: no scars or lesions, tongue midline and normal, soft palate, uvula, and tonsils normal  NECK: no asymmetry, masses, or scars  LUNGS:  unlabored respirations, no intercostal retractions or accessory muscle use, clear to auscultation without rales or wheezes  HEART: regular rate and rhythm without murmurs and normal S1 and S2  MUSCULOSKELETAL: no musculoskeletal defects are noted  NEURO: no focal deficits noted  PSYCH: does not appear depressed or anxious      WORKUP:  None    ASSESSMENT/PLAN:  Nela Mares is a 61 year old female here for a follow-up visit.    1. Chronic urticaria - Sanam reports she had a persistent rash for over 1 year after being diagnosed with COVID. The description of her symptoms and pictures are consistent with urticaria. Her symptoms have now resolved over the last 2 weeks. We discussed that chronic urticaria is not associated with underlying food or environmental allergies. This is considered an auto-immune condition and in most cases a specific cause is not identified. However, symptoms can be precipitated by illness and may have been triggered by her COVID infection in 11/2020. As her symptoms have now resolved we will continue to monitor.    - increase cetirizine to 10mg twice daily or up to 20mg twice daily should hives return    2. Mild persistent asthma without complication - Sanam reports that her asthma has been well controlled. She has stopped all controller medications over the last several months. Historically her asthma flares in the spring and summer months. Her allergy symptoms have been well controlled as well. We discussed continued monitoring of her allergy and asthma symptoms and resuming maintenance medications and possibly immunotherapy if her symptoms worsen this spring.    - albuterol (PROAIR HFA/PROVENTIL HFA/VENTOLIN HFA) 108 (90 Base) MCG/ACT inhaler; Inhale 2 puffs into the lungs every 4 hours as needed for shortness of breath / dyspnea or wheezing  Dispense: 36 g; Refill: 1      Follow-up in 3-4 months, sooner if needed      Thank you for allowing me to participate in the care of Nela Arthur  Suzan.      Karen Clark MD, FAAAAI  Allergy/Immunology  [facility]      Chart documentation done in part with Dragon Voice Recognition Software. Although reviewed after completion, some word and grammatical errors may remain.      Again, thank you for allowing me to participate in the care of your patient.        Sincerely,        Karen Clark MD

## 2022-02-10 NOTE — PROGRESS NOTES
"Nela Mares was seen in the Allergy Clinic at [facility].      Nela Mares is a 61 year old Choose not to answer female who is seen today for a follow-up visit. She reports that her asthma is \"100% controlled.\" She has stopped Advair and montelukast since November. She has also not used her albuterol inhaler in the last few months. She reports no limitation in her activity and she has not had any nocturnal asthma symptoms. Historically her asthma tends to flare in the spring and summer months.    Sanam states that after she was diagnosed with COVID in 11/2020 she had many persistent symptoms including a rash, hair loss, and body aches. She was evaluated by her PCP and laboratory evaluation was notable for elevated inflammatory markers. She was referred to rheumatology for suspected rheumatoid arthritis but she was not found to have any underlying rheumatologic condition. Her inflammatory markers have since improved. In the last 2 weeks her rash has now also resolved. Sanam describes the rash as itchy, raised welts. She has pictures documenting raised welts and wheals. Over the last year she tried changing detergents and soaps, using hypoallergenic lotions, and removing dairy from her diet. Nothing seemed to improve the rash. Diphenhydramine didn't resolve her symptoms but would allow her to sleep. She has continued to take 10mg of cetirizine daily. She inquires today as to whether she needs to resume immunotherapy treatment to treat this rash and whether it may be due to her allergies.      Past Medical History:   Diagnosis Date     Allergic rhinitis, cause unspecified      Cervical high risk HPV (human papillomavirus) test positive 03/21/2017    3/21/17 NIL pap/+ HR HPV (not 16 or 18).      Depressive disorder      Depressive disorder, not elsewhere classified     seasonal     Hypertension      Mild persistent asthma      Obstructive sleep apnea (adult) (pediatric)     uses CPAP "     Primary osteoarthritis of both knees 1/28/2022     Scalp mass 7/2014     Family History   Problem Relation Age of Onset     C.A.D. Paternal Grandmother      Cerebrovascular Disease Paternal Grandmother      Depression Paternal Grandmother      Obesity Paternal Grandmother      C.A.D. Paternal Grandfather      Cerebrovascular Disease Paternal Grandfather         unsure     Substance Abuse Paternal Grandfather      Hypertension Father      Asthma Father      Obesity Father      Skin Cancer Father      Diabetes Father      Neurologic Disorder Mother         Graves disease     Hyperlipidemia Mother      Depression Mother      Thyroid Disease Mother      Anxiety Disorder Mother      Other - See Comments Other      Skin Cancer Sister      Asthma Sister      Depression Maternal Grandmother      Cancer Sister         mild skin cancer,cured from excision     Depression Nephew      Anxiety Disorder Nephew      Substance Abuse Nephew      Asthma Nephew      Anxiety Disorder Nephew      Mental Illness Maternal Half-Brother      Mental Illness Paternal Half-Brother      Substance Abuse Nephew      Asthma Sister      Asthma Nephew      Thyroid Disease Maternal Half-Sister      Thyroid Disease Paternal Half-Sister      Social History     Tobacco Use     Smoking status: Never Smoker     Smokeless tobacco: Never Used   Substance Use Topics     Alcohol use: No     Drug use: No     Social History     Social History Narrative    Dairy/d 3 servings/d.     Caffeine 2 servings/d    Exercise 0 x week    Sunscreen used - No    Seatbelts used - Yes    Working smoke/CO detectors in the home - Yes    Guns stored in the home - No    Self Breast Exams - no    Eye Exam up to date - No    Dental Exam up to date - Yes    Pap Smear up to date - Yes    Mammogram up to date - Yes    Dexa Scan up to date - NOT APPLICABLE    Flex Sig / Colonoscopy up to date - NOT APPLICABLE    Immunizations up to date - Yes    Abuse: Current or Past(Physical,  Sexual or Emotional)- No    Do you feel safe in your environment - Yes    GÓMEZ Cummings    11/23/11                       Past medical, family, and social history were reviewed.    REVIEW OF SYSTEMS:  General: negative for weight gain. negative for weight loss. negative for changes in sleep.   Eyes: negative for itching. negative for redness. negative for tearing/watering. negative for vision changes  Ears: negative for fullness. negative for hearing loss. negative for dizziness.   Nose: negative for snoring.negative for changes in smell. negative for drainage.   Throat: negative for hoarseness. negative for sore throat. negative for trouble swallowing.   Lungs: negative for cough. negative for shortness of breath.negative for wheezing. negative for sputum production.   Cardiovascular: negative for chest pain. negative for swelling of ankles. negative for fast or irregular heartbeat.   Gastrointestinal: negative for nausea. negative for heartburn. negative for acid reflux.   Musculoskeletal: negative for joint pain. negative for joint stiffness. negative for joint swelling.   Neurologic: negative for seizures. negative for fainting. negative for weakness.   Psychiatric: negative for changes in mood. negative for anxiety.   Endocrine: negative for cold intolerance. negative for heat intolerance. negative for tremors.   Hematologic: negative for easy bruising. negative for easy bleeding.  Integumentary: negative for rash. negative for scaling. negative for nail changes.       Current Outpatient Medications:      albuterol (PROAIR HFA/PROVENTIL HFA/VENTOLIN HFA) 108 (90 Base) MCG/ACT inhaler, Inhale 2 puffs into the lungs every 4 hours as needed for shortness of breath / dyspnea or wheezing, Disp: 36 g, Rfl: 1     albuterol (PROVENTIL) (2.5 MG/3ML) 0.083% neb solution, Take 1 vial (2.5 mg) by nebulization every 4 hours as needed for shortness of breath / dyspnea or wheezing, Disp: 30 mL, Rfl: 11     amLODIPine (NORVASC) 5  MG tablet, Take 1 tablet (5 mg) by mouth daily, Disp: 90 tablet, Rfl: 3     cetirizine (ZYRTEC) 10 MG tablet, Take 1 tablet (10 mg) by mouth daily, Disp: 90 tablet, Rfl: 3     colchicine (COLCYRS) 0.6 MG tablet, For arthritis flare only: Take 2 tablets (1.2 mg) at first sign of flare, then 0.6 mg 1 hour later if not resolved., Disp: 3 tablet, Rfl: 0     fluticasone (FLONASE) 50 MCG/ACT nasal spray, Spray 1-2 sprays into both nostrils daily, Disp: 48 g, Rfl: 3     fluticasone-salmeterol (ADVAIR DISKUS) 500-50 MCG/DOSE inhaler, Inhale 1 puff into the lungs 2 times daily, Disp: 3 each, Rfl: 1     lisinopril (ZESTRIL) 40 MG tablet, Take 1 tablet (40 mg) by mouth daily, Disp: 90 tablet, Rfl: 3     montelukast (SINGULAIR) 10 MG tablet, Take 1 tablet (10 mg) by mouth daily, Disp: 90 tablet, Rfl: 3     predniSONE (DELTASONE) 20 MG tablet, For arthritis flare not aborted with colchicine: prednisone 20mg once., Disp: 1 tablet, Rfl: 0     venlafaxine (EFFEXOR-XR) 150 MG 24 hr capsule, Take 1 capsule (150 mg) by mouth daily, Disp: 90 capsule, Rfl: 3     vitamin D2 (ERGOCALCIFEROL) 94333 units (1250 mcg) capsule, Take 1 capsule (50,000 Units) by mouth once a week for 12 doses, Disp: 12 capsule, Rfl: 0  No Known Allergies    EXAM:   /88 (BP Location: Left arm, Patient Position: Sitting, Cuff Size: Adult Large)   Pulse 75   LMP 12/21/2014   SpO2 98%   GENERAL APPEARANCE: alert, cooperative and not in distress  SKIN: no rashes, no lesions  HEAD: atraumatic, normocephalic  EYES: lids and lashes normal, conjunctivae and sclerae clear  ENT: no scars or lesions, tongue midline and normal, soft palate, uvula, and tonsils normal  NECK: no asymmetry, masses, or scars  LUNGS: unlabored respirations, no intercostal retractions or accessory muscle use, clear to auscultation without rales or wheezes  HEART: regular rate and rhythm without murmurs and normal S1 and S2  MUSCULOSKELETAL: no musculoskeletal defects are noted  NEURO: no  focal deficits noted  PSYCH: does not appear depressed or anxious      WORKUP:  None    ASSESSMENT/PLAN:  Nela Mares is a 61 year old female here for a follow-up visit.    1. Chronic urticaria - Sanam reports she had a persistent rash for over 1 year after being diagnosed with COVID. The description of her symptoms and pictures are consistent with urticaria. Her symptoms have now resolved over the last 2 weeks. We discussed that chronic urticaria is not associated with underlying food or environmental allergies. This is considered an auto-immune condition and in most cases a specific cause is not identified. However, symptoms can be precipitated by illness and may have been triggered by her COVID infection in 11/2020. As her symptoms have now resolved we will continue to monitor.    - increase cetirizine to 10mg twice daily or up to 20mg twice daily should hives return    2. Mild persistent asthma without complication - Sanam reports that her asthma has been well controlled. She has stopped all controller medications over the last several months. Historically her asthma flares in the spring and summer months. Her allergy symptoms have been well controlled as well. We discussed continued monitoring of her allergy and asthma symptoms and resuming maintenance medications and possibly immunotherapy if her symptoms worsen this spring.    - albuterol (PROAIR HFA/PROVENTIL HFA/VENTOLIN HFA) 108 (90 Base) MCG/ACT inhaler; Inhale 2 puffs into the lungs every 4 hours as needed for shortness of breath / dyspnea or wheezing  Dispense: 36 g; Refill: 1      Follow-up in 3-4 months, sooner if needed      Thank you for allowing me to participate in the care of Nela Mares.      Karen Clark MD, FAAAAI  Allergy/Immunology  [facility]      Chart documentation done in part with Dragon Voice Recognition Software. Although reviewed after  completion, some word and grammatical errors may remain.

## 2022-02-10 NOTE — PROGRESS NOTES
Daily Note:    SUBJECTIVE:  See Flowsheet    OBJECTIVE:  Stdg:  No pain  Lumbar AROM:   Flex WNL   Ext Mod decr - center LBP   SG R Min decr - slight LBP         L WNL    Seated SLR:  R (-)    L - L ant knee pan w/ knee ext, Pulling LB to calf w/ DF  Strength:   HF 5/5 5/5   Q 5/5(+) 4+/5(+)   HS 5/5 4/5   AT  5/5 5/5   Single leg Toe Raise:  R strong/no pain, L weaker/Painful med knee    ASSESSMENT/PLAN:  See Flowsheet        Please refer to the daily flowsheet for treatment today, total treatment time and time spent performing 1:1 timed codes.

## 2022-02-18 ENCOUNTER — OFFICE VISIT (OUTPATIENT)
Dept: FAMILY MEDICINE | Facility: CLINIC | Age: 62
End: 2022-02-18
Payer: COMMERCIAL

## 2022-02-18 VITALS
TEMPERATURE: 98.6 F | HEART RATE: 74 BPM | SYSTOLIC BLOOD PRESSURE: 122 MMHG | HEIGHT: 69 IN | DIASTOLIC BLOOD PRESSURE: 82 MMHG | BODY MASS INDEX: 36.92 KG/M2 | OXYGEN SATURATION: 96 %

## 2022-02-18 DIAGNOSIS — N95.8 GENITOURINARY SYNDROME OF MENOPAUSE: Primary | ICD-10-CM

## 2022-02-18 DIAGNOSIS — N89.8 VAGINAL ITCHING: ICD-10-CM

## 2022-02-18 LAB
CLUE CELLS: ABNORMAL
TRICHOMONAS, WET PREP: ABNORMAL
WBC'S/HIGH POWER FIELD, WET PREP: ABNORMAL
YEAST, WET PREP: ABNORMAL

## 2022-02-18 PROCEDURE — 99213 OFFICE O/P EST LOW 20 MIN: CPT | Performed by: STUDENT IN AN ORGANIZED HEALTH CARE EDUCATION/TRAINING PROGRAM

## 2022-02-18 PROCEDURE — 87210 SMEAR WET MOUNT SALINE/INK: CPT | Performed by: STUDENT IN AN ORGANIZED HEALTH CARE EDUCATION/TRAINING PROGRAM

## 2022-02-18 ASSESSMENT — ANXIETY QUESTIONNAIRES
IF YOU CHECKED OFF ANY PROBLEMS ON THIS QUESTIONNAIRE, HOW DIFFICULT HAVE THESE PROBLEMS MADE IT FOR YOU TO DO YOUR WORK, TAKE CARE OF THINGS AT HOME, OR GET ALONG WITH OTHER PEOPLE: NOT DIFFICULT AT ALL
3. WORRYING TOO MUCH ABOUT DIFFERENT THINGS: NOT AT ALL
5. BEING SO RESTLESS THAT IT IS HARD TO SIT STILL: NOT AT ALL
6. BECOMING EASILY ANNOYED OR IRRITABLE: NOT AT ALL
1. FEELING NERVOUS, ANXIOUS, OR ON EDGE: NOT AT ALL
7. FEELING AFRAID AS IF SOMETHING AWFUL MIGHT HAPPEN: NOT AT ALL
GAD7 TOTAL SCORE: 0
2. NOT BEING ABLE TO STOP OR CONTROL WORRYING: NOT AT ALL

## 2022-02-18 ASSESSMENT — PATIENT HEALTH QUESTIONNAIRE - PHQ9
SUM OF ALL RESPONSES TO PHQ QUESTIONS 1-9: 2
5. POOR APPETITE OR OVEREATING: NOT AT ALL

## 2022-02-18 NOTE — PATIENT INSTRUCTIONS
Start using the Premarin (vaginal estrogen) cream three times weekly, after 1 month then you can decrease to twice weekly use. Apply to the labia minora as well.  If not improving after 8-12 weeks,  Then recommend having you see Dr Salas again for evaluation.     I'll also be in touch soon with your swab results.       Patient Education     Atrophic Vaginitis    Atrophic vaginitis means the walls of your vagina have become thin. This happens when your body makes too little of the hormone estrogen. Menopause or surgical removal of the ovaries are the most common causes for a drop in estrogen. Breastfeeding can also cause the hormone level to drop.   Symptoms of atrophic vaginitis include:    Dryness, soreness, burning, or itching in the vagina    Vaginal discharge  Sex can be uncomfortable, even painful. After sex, you may have bleeding from your vagina. You may also have burning or pain when you urinate.   Home care  Your healthcare provider may recommend one or more of these as treatment:     Vaginal creams, lotions, and lubricants. These products help relieve vaginal dryness. They don t need a prescription. They can be found in the personal care section of most pharmacies. Creams and lotions are used daily to help keep the vagina moist. Lubricants help reduce dryness and pain during sex. Choose water-based lubricants. Don t use petroleum jelly, mineral oil, or other oils. These increase the chance of infection.    Hormone therapy (HT). HT increases the amount of estrogen in your body. This can help manage or relieve symptoms. HT can be given in pill form. It may be given as a lotion, cream, ring put into the vagina, or a patch on the skin. The risks and benefits of HT vary for each woman. For instance, your risk may be higher if you have had breast cancer. Discuss this treatment with your healthcare provider. Not every woman can use HT.  You don t need to stop having sex. In fact, regular sex can help keep  vaginal tissues healthy. Take steps to make sex more comfortable by using water-based lubricants.   Preventing infections  Atrophic vaginitis makes an infection of the vagina or the urinary tract more likely. To help reduce your risk:     Keep your genitals clean. When you bathe, wash the outside of your vagina with mild soap and water. Clean gently between the folds of your vagina.    Wipe from front to back after a bowel movement.    Don t douche unless your healthcare provider tells you to.    Don't use scented toilet paper, scented vaginal sprays, or scented tampons.    Don't wear clothes that are tight in the crotch. These include pantyhose, jeans, and leggings. Wear cotton underwear. Change it every day.  Follow-up care  Follow up with your healthcare provider, or as advised.  When to seek medical advice  Call your health care provider right away if any of these occur:    Fever of 100.4 F (38 C) or higher, or as directed by your healthcare provider    Symptoms don t go away or get worse even with treatment    Vaginal area swells or becomes painful    Vaginal area bleeds, but not because of your period    Bad-smelling discharge from the vagina    Pain or burning when you urinate or you have trouble passing urine    Open sores develop around vagina   [employer] last reviewed this educational content on 10/1/2019    1737-8298 [employer]. All rights reserved. This information is not intended as a substitute for professional medical care. Always follow your healthcare professional's instructions.

## 2022-02-18 NOTE — PROGRESS NOTES
Assessment & Plan     Genitourinary syndrome of menopause  Symptoms seem most consistent with severe vaginal atrophy however on differential: infectious vaginitis, lichen sclerosis, lichen simplex chronicus, other. Will obtain wet prep. Will treat with a vaginal estrogen cream and instructed to apply thin amount over labia. If no improvement in 2-3 months, then recommend following back up with GYN  - conjugated estrogens (PREMARIN) 0.625 MG/GM vaginal cream; Place 1 g vaginally three times a week    Vaginal itching  - Wet prep - Clinic Collect        Return in about 8 weeks (around 4/15/2022) for Follow up with OBGYN if not noting any improvement.    Slime Perry DO  [facility]    Delbert Marion is a 61 year old who presents for the following health issues       Vaginal Problem     History of Present Illness       She eats 2-3 servings of fruits and vegetables daily.She exercises with enough effort to increase her heart rate 9 or less minutes per day.    She is taking medications regularly.       Vaginal Symptoms  Onset/Duration: at least 1 year   Description: per patient she has been having troublke with dryness, itching, breaks of the skin, and burning   Vaginal Discharge: none   Itching (Pruritis): YES   Burning sensation:  YES  Odor: no  Accompanying Signs & Symptoms:  Urinary symptoms: no  Abdominal pain: no  Fever: no  History:   Sexually active: no  New Partner: no  Possibility of Pregnancy:  no  Recent antibiotic use: no  Precipitating or alleviating factors:   Therapies tried and outcome: estrogen tablets    Has tried many otc & home remedies with no results.    Normally if treat for a yeast infection then it helps for a few days then comes back. Has also had BV in the past.   Gets cracked open areas. Dry, itchy. Skin breaks open. Tries not to scratch     Saw gynecology for this about 1.5 years ago and was given estradiol tablets but she didn't notice any improvement  "after  A year and didn't follow up.   Has tried different soaps.   Has tried not using soap  Has tried going off of dairy    Follows with allergist.       Review of Systems   Genitourinary: Positive for vaginal discharge.      Constitutional, HEENT, cardiovascular, pulmonary, gi and gu systems are negative, except as otherwise noted.      Objective    /82 (BP Location: Right arm, Patient Position: Sitting, Cuff Size: Adult Large)   Pulse 74   Temp 98.6  F (37  C) (Oral)   Ht 1.753 m (5' 9\")   LMP 12/21/2014   SpO2 96%   BMI 36.92 kg/m    Body mass index is 36.92 kg/m .  Physical Exam   GENERAL: healthy, alert and no distress  ABDOMEN: soft, nontender, no hepatosplenomegaly, no masses and bowel sounds normal   (female): external vaginal mucosa thin and atrophied with areas of superficial ulcerations in the vaginal mucosa and labial minora, no dryness or hyperkeratin of the labia minor or perineum, faint erythema well-demarcated around the rectum. normal urethral meatus, vaginal mucosa, no abnormal discharge  PSYCH: mentation appears normal, affect normal/bright            "

## 2022-02-19 ASSESSMENT — ANXIETY QUESTIONNAIRES: GAD7 TOTAL SCORE: 0

## 2022-03-30 ENCOUNTER — OFFICE VISIT (OUTPATIENT)
Dept: DERMATOLOGY | Facility: CLINIC | Age: 62
End: 2022-03-30
Attending: INTERNAL MEDICINE
Payer: COMMERCIAL

## 2022-03-30 VITALS — HEART RATE: 85 BPM | DIASTOLIC BLOOD PRESSURE: 93 MMHG | OXYGEN SATURATION: 97 % | SYSTOLIC BLOOD PRESSURE: 138 MMHG

## 2022-03-30 DIAGNOSIS — L90.0 LICHEN SCLEROSUS: ICD-10-CM

## 2022-03-30 DIAGNOSIS — L64.9 ANDROGENETIC ALOPECIA: Primary | ICD-10-CM

## 2022-03-30 DIAGNOSIS — L65.9 HAIR LOSS: ICD-10-CM

## 2022-03-30 DIAGNOSIS — R21 RASH AND NONSPECIFIC SKIN ERUPTION: ICD-10-CM

## 2022-03-30 LAB
DEPRECATED CALCIDIOL+CALCIFEROL SERPL-MC: 17 UG/L (ref 20–75)
SHBG SERPL-SCNC: 38 NMOL/L (ref 30–135)

## 2022-03-30 PROCEDURE — 84270 ASSAY OF SEX HORMONE GLOBUL: CPT | Performed by: PHYSICIAN ASSISTANT

## 2022-03-30 PROCEDURE — 99203 OFFICE O/P NEW LOW 30 MIN: CPT | Performed by: PHYSICIAN ASSISTANT

## 2022-03-30 PROCEDURE — 82306 VITAMIN D 25 HYDROXY: CPT | Performed by: PHYSICIAN ASSISTANT

## 2022-03-30 PROCEDURE — 84403 ASSAY OF TOTAL TESTOSTERONE: CPT | Performed by: PHYSICIAN ASSISTANT

## 2022-03-30 PROCEDURE — 36415 COLL VENOUS BLD VENIPUNCTURE: CPT | Performed by: PHYSICIAN ASSISTANT

## 2022-03-30 PROCEDURE — 82627 DEHYDROEPIANDROSTERONE: CPT | Performed by: PHYSICIAN ASSISTANT

## 2022-03-30 RX ORDER — CLOBETASOL PROPIONATE 0.5 MG/G
OINTMENT TOPICAL
Qty: 60 G | Refills: 3 | Status: SHIPPED | OUTPATIENT
Start: 2022-03-30 | End: 2022-12-07

## 2022-03-30 RX ORDER — FINASTERIDE 5 MG/1
5 TABLET, FILM COATED ORAL DAILY
Qty: 90 TABLET | Refills: 3 | Status: SHIPPED | OUTPATIENT
Start: 2022-03-30 | End: 2022-12-07

## 2022-03-30 ASSESSMENT — PAIN SCALES - GENERAL: PAINLEVEL: NO PAIN (0)

## 2022-03-30 NOTE — PROGRESS NOTES
Nela Mares is an extremely pleasant 61 year old year old female patient here today for hair loss present since she had covid November 2020. She reports her hair has been shedding. She also has had chronic urticaria since she had covid, she notes that it has improved since it started. Benadryl does help her sleep at night. She also notes she had has issues with vaginal itching and premarin is not helping.  Patient has no other skin complaints today.  Remainder of the HPI, Meds, PMH, Allergies, FH, and SH was reviewed in chart.   Past Medical History:   Diagnosis Date     Allergic rhinitis, cause unspecified      Cervical high risk HPV (human papillomavirus) test positive 03/21/2017    3/21/17 NIL pap/+ HR HPV (not 16 or 18).      Depressive disorder      Depressive disorder, not elsewhere classified     seasonal     Hypertension      Mild persistent asthma      Obstructive sleep apnea (adult) (pediatric)     uses CPAP     Primary osteoarthritis of both knees 1/28/2022     Scalp mass 7/2014       Past Surgical History:   Procedure Laterality Date     COLONOSCOPY  6/21/2012    Procedure: COLONOSCOPY;  COLONOSCOPY, SCREEN;  Surgeon: Bart You MD;  Location: [facility] OR     D & C       ENDOSCOPIC RETROGRADE CHOLANGIOPANCREATOGRAM  1/4/2014    Procedure: ENDOSCOPIC RETROGRADE CHOLANGIOPANCREATOGRAM;  ERCP biliary sphincterotomy, bile duct stone removal, epinepherine washing.;  Surgeon: Ba Meyers MD;  Location: [facility] OR     EP ABLATION SVT N/A 6/2/2021    Procedure: EP ABLATION SVT;  Surgeon: Cuca Magaña MD;  Location: [facility]     LAPAROSCOPIC CHOLECYSTECTOMY WITH CHOLANGIOGRAMS  1/4/2014    Procedure: LAPAROSCOPIC CHOLECYSTECTOMY WITH CHOLANGIOGRAMS;;  Surgeon: Haja Sage MD;  Location: [facility] OR     NO HISTORY OF SURGERY       OPERATIVE HYSTEROSCOPY  6/17/2014    Procedure: OPERATIVE HYSTEROSCOPY;  Surgeon: Paola Camara MD;  Location: [facility] OR     REMOVE  INTRAUTERINE DEVICE  6/17/2014    Procedure: REMOVE INTRAUTERINE DEVICE;  Surgeon: Paola Camara MD;  Location: [facility] OR        Family History   Problem Relation Age of Onset     C.A.D. Paternal Grandmother      Cerebrovascular Disease Paternal Grandmother      Depression Paternal Grandmother      Obesity Paternal Grandmother      C.A.D. Paternal Grandfather      Cerebrovascular Disease Paternal Grandfather         unsure     Substance Abuse Paternal Grandfather      Hypertension Father      Asthma Father      Obesity Father      Skin Cancer Father      Diabetes Father      Neurologic Disorder Mother         Graves disease     Hyperlipidemia Mother      Depression Mother      Thyroid Disease Mother      Anxiety Disorder Mother      Other - See Comments Other      Skin Cancer Sister      Asthma Sister      Depression Maternal Grandmother      Cancer Sister         mild skin cancer,cured from excision     Depression Nephew      Anxiety Disorder Nephew      Substance Abuse Nephew      Asthma Nephew      Anxiety Disorder Nephew      Mental Illness Maternal Half-Brother      Mental Illness Paternal Half-Brother      Substance Abuse Nephew      Asthma Sister      Asthma Nephew      Thyroid Disease Maternal Half-Sister      Thyroid Disease Paternal Half-Sister        Social History     Socioeconomic History     Marital status: Single     Spouse name: Not on file     Number of children: 0     Years of education: 18     Highest education level: Not on file   Occupational History     Occupation:    Tobacco Use     Smoking status: Never Smoker     Smokeless tobacco: Never Used   Substance and Sexual Activity     Alcohol use: No     Drug use: No     Sexual activity: Not Currently     Partners: Male     Birth control/protection: Post-menopausal   Other Topics Concern     Parent/sibling w/ CABG, MI or angioplasty before 65F 55M? No   Social History Narrative    Dairy/d 3 servings/d.     Caffeine 2  servings/d    Exercise 0 x week    Sunscreen used - No    Seatbelts used - Yes    Working smoke/CO detectors in the home - Yes    Guns stored in the home - No    Self Breast Exams - no    Eye Exam up to date - No    Dental Exam up to date - Yes    Pap Smear up to date - Yes    Mammogram up to date - Yes    Dexa Scan up to date - NOT APPLICABLE    Flex Sig / Colonoscopy up to date - NOT APPLICABLE    Immunizations up to date - Yes    Abuse: Current or Past(Physical, Sexual or Emotional)- No    Do you feel safe in your environment - Yes    GÓMEZ Cummings    11/23/11                     Social Determinants of Health     Financial Resource Strain: Not on file   Food Insecurity: Not on file   Transportation Needs: Not on file   Physical Activity: Not on file   Stress: Not on file   Social Connections: Not on file   Intimate Partner Violence: Not on file   Housing Stability: Not on file       Outpatient Encounter Medications as of 3/30/2022   Medication Sig Dispense Refill     albuterol (PROAIR HFA/PROVENTIL HFA/VENTOLIN HFA) 108 (90 Base) MCG/ACT inhaler Inhale 2 puffs into the lungs every 4 hours as needed for shortness of breath / dyspnea or wheezing 36 g 1     albuterol (PROVENTIL) (2.5 MG/3ML) 0.083% neb solution Take 1 vial (2.5 mg) by nebulization every 4 hours as needed for shortness of breath / dyspnea or wheezing 30 mL 11     amLODIPine (NORVASC) 5 MG tablet Take 1 tablet (5 mg) by mouth daily 90 tablet 3     cetirizine (ZYRTEC) 10 MG tablet Take 1 tablet (10 mg) by mouth daily 90 tablet 3     clobetasol (TEMOVATE) 0.05 % external ointment Apply twice daily to affected area for 2 weeks then 2-3 times weekly. 60 g 3     colchicine (COLCYRS) 0.6 MG tablet For arthritis flare only: Take 2 tablets (1.2 mg) at first sign of flare, then 0.6 mg 1 hour later if not resolved. 3 tablet 0     conjugated estrogens (PREMARIN) 0.625 MG/GM vaginal cream Place 1 g vaginally three times a week 30 g 1     finasteride (PROSCAR) 5 MG  tablet Take 1 tablet (5 mg) by mouth daily 90 tablet 3     fluticasone (FLONASE) 50 MCG/ACT nasal spray Spray 1-2 sprays into both nostrils daily 48 g 3     fluticasone-salmeterol (ADVAIR DISKUS) 500-50 MCG/DOSE inhaler Inhale 1 puff into the lungs 2 times daily 3 each 1     lisinopril (ZESTRIL) 40 MG tablet Take 1 tablet (40 mg) by mouth daily 90 tablet 3     montelukast (SINGULAIR) 10 MG tablet Take 1 tablet (10 mg) by mouth daily 90 tablet 3     predniSONE (DELTASONE) 20 MG tablet For arthritis flare not aborted with colchicine: prednisone 20mg once. 1 tablet 0     venlafaxine (EFFEXOR-XR) 150 MG 24 hr capsule Take 1 capsule (150 mg) by mouth daily 90 capsule 3     No facility-administered encounter medications on file as of 3/30/2022.             O:   NAD, WDWN, Alert & Oriented, Mood & Affect wnl, Vitals stable   Here today alone   BP (!) 138/93 (BP Location: Left arm, Patient Position: Chair, Cuff Size: Adult Large)   Pulse 85   LMP 12/21/2014   SpO2 97%    General appearance normal   Vitals stable   Alert, oriented and in no acute distress     Hair thinning on frontal scalp, no scarring, no rash    No visible urticaria today  Atrophic patch with slight erosion       Eyes: Conjunctivae/lids:Normal     ENT: Lips: normal    MSK:Normal    Cardiovascular: peripheral edema none    Pulm: Breathing Normal    Neuro/Psych: Orientation:Alert and Orientedx3 ; Mood/Affect:normal     A/P:  1. Favor androgenetic alopecia over telogen effluvium  Discussed rogaine vs finasteride vs spironolactone.   She would like to start finasteride 5 mg daily.   Check dhea, testosterone, vit D   2. Chronic urticaria/dermatographism   Start 2 claritin in am and 2 zyrtec in the evening.   Skin care regimen reviewed with patient: Eliminate harsh soaps, i.e. Dial, zest, irsih spring; Mild soaps such as Cetaphil or Dove sensitive skin, avoid hot or cold showers, aggressive use of emollients including vanicream, cetaphil or cerave discussed  with patient.    3. Lichen sclerosus  Apply clobetasol twice daily for 2 week then 2-3 times weekly after

## 2022-03-30 NOTE — LETTER
3/30/2022         RE: Nela Mares  [address] [facility] [id number]        Dear Colleague,    Thank you for referring your patient, Nela Mares, to the [facility]. Please see a copy of my visit note below.    Nela Mares is an extremely pleasant 61 year old year old female patient here today for hair loss present since she had covid November 2020. She reports her hair has been shedding. She also has had chronic urticaria since she had covid, she notes that it has improved since it started. Benadryl does help her sleep at night. She also notes she had has issues with vaginal itching and premarin is not helping.  Patient has no other skin complaints today.  Remainder of the HPI, Meds, PMH, Allergies, FH, and SH was reviewed in chart.   Past Medical History:   Diagnosis Date     Allergic rhinitis, cause unspecified      Cervical high risk HPV (human papillomavirus) test positive 03/21/2017    3/21/17 NIL pap/+ HR HPV (not 16 or 18).      Depressive disorder      Depressive disorder, not elsewhere classified     seasonal     Hypertension      Mild persistent asthma      Obstructive sleep apnea (adult) (pediatric)     uses CPAP     Primary osteoarthritis of both knees 1/28/2022     Scalp mass 7/2014       Past Surgical History:   Procedure Laterality Date     COLONOSCOPY  6/21/2012    Procedure: COLONOSCOPY;  COLONOSCOPY, SCREEN;  Surgeon: Bart You MD;  Location:  OR     D & C       ENDOSCOPIC RETROGRADE CHOLANGIOPANCREATOGRAM  1/4/2014    Procedure: ENDOSCOPIC RETROGRADE CHOLANGIOPANCREATOGRAM;  ERCP biliary sphincterotomy, bile duct stone removal, epinepherine washing.;  Surgeon: Ba Meyers MD;  Location: [facility] OR     EP ABLATION SVT N/A 6/2/2021    Procedure: EP ABLATION SVT;  Surgeon: Cuca Magaña MD;  Location:  HEART CARDIAC CATH LAB     LAPAROSCOPIC CHOLECYSTECTOMY WITH CHOLANGIOGRAMS  1/4/2014    Procedure: LAPAROSCOPIC  CHOLECYSTECTOMY WITH CHOLANGIOGRAMS;;  Surgeon: Haja Sage MD;  Location: [facility] OR     NO HISTORY OF SURGERY       OPERATIVE HYSTEROSCOPY  6/17/2014    Procedure: OPERATIVE HYSTEROSCOPY;  Surgeon: Paola Camara MD;  [facility]: [facility]     REMOVE INTRAUTERINE DEVICE  6/17/2014    Procedure: REMOVE INTRAUTERINE DEVICE;  Surgeon: Paola Camara MD;  Location: [facility] OR        Family History   Problem Relation Age of Onset     C.A.D. Paternal Grandmother      Cerebrovascular Disease Paternal Grandmother      Depression Paternal Grandmother      Obesity Paternal Grandmother      C.A.D. Paternal Grandfather      Cerebrovascular Disease Paternal Grandfather         unsure     Substance Abuse Paternal Grandfather      Hypertension Father      Asthma Father      Obesity Father      Skin Cancer Father      Diabetes Father      Neurologic Disorder Mother         Graves disease     Hyperlipidemia Mother      Depression Mother      Thyroid Disease Mother      Anxiety Disorder Mother      Other - See Comments Other      Skin Cancer Sister      Asthma Sister      Depression Maternal Grandmother      Cancer Sister         mild skin cancer,cured from excision     Depression Nephew      Anxiety Disorder Nephew      Substance Abuse Nephew      Asthma Nephew      Anxiety Disorder Nephew      Mental Illness Maternal Half-Brother      Mental Illness Paternal Half-Brother      Substance Abuse Nephew      Asthma Sister      Asthma Nephew      Thyroid Disease Maternal Half-Sister      Thyroid Disease Paternal Half-Sister        Social History     Socioeconomic History     Marital status: Single     Spouse name: Not on file     Number of children: 0     Years of education: 18     Highest education level: Not on file   Occupational History     Occupation:    Tobacco Use     Smoking status: Never Smoker     Smokeless tobacco: Never Used   Substance and Sexual Activity     Alcohol use: No     Drug  use: No     Sexual activity: Not Currently     Partners: Male     Birth control/protection: Post-menopausal   Other Topics Concern     Parent/sibling w/ CABG, MI or angioplasty before 65F 55M? No   Social History Narrative    Dairy/d 3 servings/d.     Caffeine 2 servings/d    Exercise 0 x week    Sunscreen used - No    Seatbelts used - Yes    Working smoke/CO detectors in the home - Yes    Guns stored in the home - No    Self Breast Exams - no    Eye Exam up to date - No    Dental Exam up to date - Yes    Pap Smear up to date - Yes    Mammogram up to date - Yes    Dexa Scan up to date - NOT APPLICABLE    Flex Sig / Colonoscopy up to date - NOT APPLICABLE    Immunizations up to date - Yes    Abuse: Current or Past(Physical, Sexual or Emotional)- No    Do you feel safe in your environment - Yes    GÓMEZ Cummings    11/23/11                     Social Determinants of Health     Financial Resource Strain: Not on file   Food Insecurity: Not on file   Transportation Needs: Not on file   Physical Activity: Not on file   Stress: Not on file   Social Connections: Not on file   Intimate Partner Violence: Not on file   Housing Stability: Not on file       Outpatient Encounter Medications as of 3/30/2022   Medication Sig Dispense Refill     albuterol (PROAIR HFA/PROVENTIL HFA/VENTOLIN HFA) 108 (90 Base) MCG/ACT inhaler Inhale 2 puffs into the lungs every 4 hours as needed for shortness of breath / dyspnea or wheezing 36 g 1     albuterol (PROVENTIL) (2.5 MG/3ML) 0.083% neb solution Take 1 vial (2.5 mg) by nebulization every 4 hours as needed for shortness of breath / dyspnea or wheezing 30 mL 11     amLODIPine (NORVASC) 5 MG tablet Take 1 tablet (5 mg) by mouth daily 90 tablet 3     cetirizine (ZYRTEC) 10 MG tablet Take 1 tablet (10 mg) by mouth daily 90 tablet 3     clobetasol (TEMOVATE) 0.05 % external ointment Apply twice daily to affected area for 2 weeks then 2-3 times weekly. 60 g 3     colchicine (COLCYRS) 0.6 MG tablet For  arthritis flare only: Take 2 tablets (1.2 mg) at first sign of flare, then 0.6 mg 1 hour later if not resolved. 3 tablet 0     conjugated estrogens (PREMARIN) 0.625 MG/GM vaginal cream Place 1 g vaginally three times a week 30 g 1     finasteride (PROSCAR) 5 MG tablet Take 1 tablet (5 mg) by mouth daily 90 tablet 3     fluticasone (FLONASE) 50 MCG/ACT nasal spray Spray 1-2 sprays into both nostrils daily 48 g 3     fluticasone-salmeterol (ADVAIR DISKUS) 500-50 MCG/DOSE inhaler Inhale 1 puff into the lungs 2 times daily 3 each 1     lisinopril (ZESTRIL) 40 MG tablet Take 1 tablet (40 mg) by mouth daily 90 tablet 3     montelukast (SINGULAIR) 10 MG tablet Take 1 tablet (10 mg) by mouth daily 90 tablet 3     predniSONE (DELTASONE) 20 MG tablet For arthritis flare not aborted with colchicine: prednisone 20mg once. 1 tablet 0     venlafaxine (EFFEXOR-XR) 150 MG 24 hr capsule Take 1 capsule (150 mg) by mouth daily 90 capsule 3     No facility-administered encounter medications on file as of 3/30/2022.             O:   NAD, WDWN, Alert & Oriented, Mood & Affect wnl, Vitals stable   Here today alone   BP (!) 138/93 (BP Location: Left arm, Patient Position: Chair, Cuff Size: Adult Large)   Pulse 85   LMP 12/21/2014   SpO2 97%    General appearance normal   Vitals stable   Alert, oriented and in no acute distress     Hair thinning on frontal scalp, no scarring, no rash    No visible urticaria today  Atrophic patch with slight erosion       Eyes: Conjunctivae/lids:Normal     ENT: Lips: normal    MSK:Normal    Cardiovascular: peripheral edema none    Pulm: Breathing Normal    Neuro/Psych: Orientation:Alert and Orientedx3 ; Mood/Affect:normal     A/P:  1. Favor androgenetic alopecia over telogen effluvium  Discussed rogaine vs finasteride vs spironolactone.   She would like to start finasteride 5 mg daily.   Check dhea, testosterone, vit D   2. Chronic urticaria/dermatographism   Start 2 claritin in am and 2 zyrtec in the  evening.   Skin care regimen reviewed with patient: Eliminate harsh soaps, i.e. Dial, zest, irsih spring; Mild soaps such as Cetaphil or Dove sensitive skin, avoid hot or cold showers, aggressive use of emollients including vanicream, cetaphil or cerave discussed with patient.    3. Lichen sclerosus  Apply clobetasol twice daily for 2 week then 2-3 times weekly after       Again, thank you for allowing me to participate in the care of your patient.        Sincerely,        Shea Coulter PA-C

## 2022-03-31 LAB
DHEA-S SERPL-MCNC: 17 UG/DL (ref 35–430)
TESTOST FREE SERPL-MCNC: 0.28 NG/DL
TESTOST SERPL-MCNC: 17 NG/DL (ref 8–60)

## 2022-05-04 ENCOUNTER — MYC MEDICAL ADVICE (OUTPATIENT)
Dept: FAMILY MEDICINE | Facility: CLINIC | Age: 62
End: 2022-05-04
Payer: COMMERCIAL

## 2022-05-04 ASSESSMENT — ANXIETY QUESTIONNAIRES
5. BEING SO RESTLESS THAT IT IS HARD TO SIT STILL: NOT AT ALL
8. IF YOU CHECKED OFF ANY PROBLEMS, HOW DIFFICULT HAVE THESE MADE IT FOR YOU TO DO YOUR WORK, TAKE CARE OF THINGS AT HOME, OR GET ALONG WITH OTHER PEOPLE?: VERY DIFFICULT
2. NOT BEING ABLE TO STOP OR CONTROL WORRYING: MORE THAN HALF THE DAYS
7. FEELING AFRAID AS IF SOMETHING AWFUL MIGHT HAPPEN: MORE THAN HALF THE DAYS
4. TROUBLE RELAXING: SEVERAL DAYS
GAD7 TOTAL SCORE: 11
GAD7 TOTAL SCORE: 11
3. WORRYING TOO MUCH ABOUT DIFFERENT THINGS: SEVERAL DAYS
7. FEELING AFRAID AS IF SOMETHING AWFUL MIGHT HAPPEN: MORE THAN HALF THE DAYS
1. FEELING NERVOUS, ANXIOUS, OR ON EDGE: MORE THAN HALF THE DAYS
6. BECOMING EASILY ANNOYED OR IRRITABLE: NEARLY EVERY DAY
GAD7 TOTAL SCORE: 11

## 2022-05-04 ASSESSMENT — PATIENT HEALTH QUESTIONNAIRE - PHQ9
SUM OF ALL RESPONSES TO PHQ QUESTIONS 1-9: 9
SUM OF ALL RESPONSES TO PHQ QUESTIONS 1-9: 9
10. IF YOU CHECKED OFF ANY PROBLEMS, HOW DIFFICULT HAVE THESE PROBLEMS MADE IT FOR YOU TO DO YOUR WORK, TAKE CARE OF THINGS AT HOME, OR GET ALONG WITH OTHER PEOPLE: VERY DIFFICULT

## 2022-05-05 ENCOUNTER — VIRTUAL VISIT (OUTPATIENT)
Dept: FAMILY MEDICINE | Facility: CLINIC | Age: 62
End: 2022-05-05
Payer: COMMERCIAL

## 2022-05-05 DIAGNOSIS — F33.1 MODERATE EPISODE OF RECURRENT MAJOR DEPRESSIVE DISORDER (H): ICD-10-CM

## 2022-05-05 PROCEDURE — 99214 OFFICE O/P EST MOD 30 MIN: CPT | Mod: TEL | Performed by: STUDENT IN AN ORGANIZED HEALTH CARE EDUCATION/TRAINING PROGRAM

## 2022-05-05 PROCEDURE — 96127 BRIEF EMOTIONAL/BEHAV ASSMT: CPT | Mod: 59 | Performed by: STUDENT IN AN ORGANIZED HEALTH CARE EDUCATION/TRAINING PROGRAM

## 2022-05-05 RX ORDER — VENLAFAXINE HYDROCHLORIDE 75 MG/1
75 CAPSULE, EXTENDED RELEASE ORAL DAILY
Qty: 90 CAPSULE | Refills: 0 | Status: SHIPPED | OUTPATIENT
Start: 2022-05-05 | End: 2022-07-27

## 2022-05-05 ASSESSMENT — ANXIETY QUESTIONNAIRES: GAD7 TOTAL SCORE: 11

## 2022-05-05 ASSESSMENT — PATIENT HEALTH QUESTIONNAIRE - PHQ9
SUM OF ALL RESPONSES TO PHQ QUESTIONS 1-9: 9
10. IF YOU CHECKED OFF ANY PROBLEMS, HOW DIFFICULT HAVE THESE PROBLEMS MADE IT FOR YOU TO DO YOUR WORK, TAKE CARE OF THINGS AT HOME, OR GET ALONG WITH OTHER PEOPLE: VERY DIFFICULT

## 2022-05-05 NOTE — PROGRESS NOTES
Sanam is a 61 year old who is being evaluated via a billable telephone visit.      What phone number would you like to be contacted at? [phone]  How would you like to obtain your AVS? [place]    Assessment & Plan     Moderate episode of recurrent major depressive disorder (H)  Worsening depression in the past 2 weeks. No SI at this time, but discussed warning signs. She had been tolerating the Effexor 150mg well without side effects. She has been on multiple medications in the past. I would recommend increasing Effexor to 225mg XR daily (she just filled her script for 90 days so will add effexor 75mg for total of 225mg). Plans to start therapy through her work. Follow up in 4 weeks or sooner if worsening mood or side effects  - venlafaxine (EFFEXOR XR) 75 MG 24 hr capsule; Take 1 capsule (75 mg) by mouth daily With 150 mg capsule for a total of 225 mg daily      No follow-ups on file.    Slime Perry [facility]   Sanam is a 61 year old who presents for the following health issues     History of Present Illness       Mental Health Follow-up:  Patient presents to follow-up on Depression & Anxiety.Patient's depression since last visit has been:  Worse  The patient is not having other symptoms associated with depression.  Patient's anxiety since last visit has been:  Worse  The patient is not having other symptoms associated with anxiety.  Any significant life events: No  Patient is not feeling anxious or having panic attacks.  Patient has no concerns about alcohol or drug use.       Today's PHQ-9         PHQ-9 Total Score: 9  PHQ-9 Q9 Thoughts of better off dead/self-harm past 2 weeks :   (P) Not at all    How difficult have these problems made it for you to do your work, take care of things at home, or get along with other people: Very difficult    Today's PETE-7 Score: 11Shdrew consumes 0 sweetened beverage(s) daily. She exercises with enough effort to increase her  heart rate 3 or less days per week.   She is taking medications regularly.    Feels like it's time to make a medication adjustment.   Feels like slowing down, lack of motivation, more irritated, wanting to cry more. This has been going on for 2 weeks that it's been worse. Depression is worse, does have some anxiety. No SI. She would like to change her meds before she has self harm thoughts.   Has done therapy in the past, but hasn't yet. Is planning to establish with her work therapist.   Very good support system   No changes in life circumstances    She states she's been on multiple medications. And every 6-8 years she has had to increase dose or change medications.   In past had rage with wellbutrin  Has also been on other medications but found those to be ineffective with time, no major side effects.      PHQ 2/18/2022 5/4/2022 5/4/2022   PHQ-9 Total Score 2 5 9   Q9: Thoughts of better off dead/self-harm past 2 weeks Not at all Not at all Not at all   F/U: Thoughts of suicide or self-harm - - -   F/U: Safety concerns - - -     PHQ-9 ([employer]) 5/4/2022   1.  Little interest or pleasure in doing things 2   2.  Feeling down, depressed, or hopeless 2   3.  Trouble falling or staying asleep, or sleeping too much 0   4.  Feeling tired or having little energy 1   5.  Poor appetite or overeating 2   6.  Feeling bad about yourself 0   7.  Trouble concentrating 2   8.  Moving slowly or restless 0   9.  Suicidal or self-harm thoughts 0   PHQ-9 Total Score 9     PETE-7 SCORE 2/11/2021 2/18/2022 5/4/2022   Total Score - - 11 (moderate anxiety)   Total Score 0 0 11     PETE-7   [employer], 2002; Used with Permission) 5/4/2022   1. Feeling nervous, anxious, or on edge 2   2. Not being able to stop or control worrying 2   3. Worrying too much about different things 1   4. Trouble relaxing 1   5. Being so restless that it is hard to sit still 0   6. Becoming easily annoyed or irritable 3   7. Feeling afraid, as if something awful  might happen 2   PETE-7 Total Score 11             Review of Systems   Constitutional, HEENT, cardiovascular, pulmonary, gi and gu systems are negative, except as otherwise noted.      Objective           Vitals:  No vitals were obtained today due to virtual visit.    Physical Exam   healthy, alert and no distress  PSYCH: Alert and oriented times 3; coherent speech, normal   rate and volume, able to articulate logical thoughts, able   to abstract reason, no tangential thoughts, no hallucinations   or delusions  Her affect is normal  RESP: No cough, no audible wheezing, able to talk in full sentences  Remainder of exam unable to be completed due to telephone visits            Phone call duration: 11 minutes

## 2022-06-18 ENCOUNTER — HEALTH MAINTENANCE LETTER (OUTPATIENT)
Age: 62
End: 2022-06-18

## 2022-07-21 ENCOUNTER — MYC MEDICAL ADVICE (OUTPATIENT)
Dept: FAMILY MEDICINE | Facility: CLINIC | Age: 62
End: 2022-07-21

## 2022-07-21 NOTE — TELEPHONE ENCOUNTER
R scheduled patient    RN replied to patient via [employer]. See message for details.     Solomon Jones, RN, BSN, PHN  [facility] [place]

## 2022-07-21 NOTE — TELEPHONE ENCOUNTER
RN replied to patient via NetDragonhart. See message for details.     Will hold open for response %:30 virtual 7/27 currently held for patient    Solomon Jones RN, BSN, PHN  [facility]: [place]

## 2022-07-25 ASSESSMENT — ANXIETY QUESTIONNAIRES
GAD7 TOTAL SCORE: 0
4. TROUBLE RELAXING: NOT AT ALL
6. BECOMING EASILY ANNOYED OR IRRITABLE: NOT AT ALL
1. FEELING NERVOUS, ANXIOUS, OR ON EDGE: NOT AT ALL
7. FEELING AFRAID AS IF SOMETHING AWFUL MIGHT HAPPEN: NOT AT ALL
3. WORRYING TOO MUCH ABOUT DIFFERENT THINGS: NOT AT ALL
GAD7 TOTAL SCORE: 0
5. BEING SO RESTLESS THAT IT IS HARD TO SIT STILL: NOT AT ALL
7. FEELING AFRAID AS IF SOMETHING AWFUL MIGHT HAPPEN: NOT AT ALL
GAD7 TOTAL SCORE: 0
IF YOU CHECKED OFF ANY PROBLEMS ON THIS QUESTIONNAIRE, HOW DIFFICULT HAVE THESE PROBLEMS MADE IT FOR YOU TO DO YOUR WORK, TAKE CARE OF THINGS AT HOME, OR GET ALONG WITH OTHER PEOPLE: NOT DIFFICULT AT ALL
8. IF YOU CHECKED OFF ANY PROBLEMS, HOW DIFFICULT HAVE THESE MADE IT FOR YOU TO DO YOUR WORK, TAKE CARE OF THINGS AT HOME, OR GET ALONG WITH OTHER PEOPLE?: NOT DIFFICULT AT ALL
2. NOT BEING ABLE TO STOP OR CONTROL WORRYING: NOT AT ALL

## 2022-07-25 ASSESSMENT — PATIENT HEALTH QUESTIONNAIRE - PHQ9
10. IF YOU CHECKED OFF ANY PROBLEMS, HOW DIFFICULT HAVE THESE PROBLEMS MADE IT FOR YOU TO DO YOUR WORK, TAKE CARE OF THINGS AT HOME, OR GET ALONG WITH OTHER PEOPLE: NOT DIFFICULT AT ALL
SUM OF ALL RESPONSES TO PHQ QUESTIONS 1-9: 0
SUM OF ALL RESPONSES TO PHQ QUESTIONS 1-9: 0

## 2022-07-26 ENCOUNTER — VIRTUAL VISIT (OUTPATIENT)
Dept: RHEUMATOLOGY | Facility: CLINIC | Age: 62
End: 2022-07-26
Payer: COMMERCIAL

## 2022-07-26 DIAGNOSIS — M25.50 ARTHRALGIA, UNSPECIFIED JOINT: Primary | ICD-10-CM

## 2022-07-26 PROCEDURE — 99213 OFFICE O/P EST LOW 20 MIN: CPT | Mod: GT | Performed by: INTERNAL MEDICINE

## 2022-07-26 NOTE — PROGRESS NOTES
Nela Mares  is a 61 year old year old who is being evaluated via a billable video visit.      How would you like to obtain your AVS? MyChart  If the video visit is dropped, the invitation should be resent by: Text to cell phone: [phone]   Will anyone else be joining your video visit? No     Rheumatology Video Visit      Nela Mares MRN# [id number]   YOB: 1960 Age: 61 year old      Date of visit: 7/26/22   PCP: Dr. Padma Lozano    Chief Complaint   Patient presents with:  Arthritis    Assessment and Plan     1.  Elevated inflammatory markers ESR and CRP, polyarthralgia: Reportedly with intermittent pain, swelling, and increased warmth of the right 2nd-3rd MCPs that occurs up to twice yearly for many years and is mild in severity and resolves spontaneously after 2 days.  Hx of plantar fasciitis involving both feet.  FHx of SpA.  Knee and back pain that is degenerative in nature.  No morning stiffness.  Overall suspect that she may have an underlying arthritis considering the unilateral MCP pain/swelling/stiffness, and the unilateral nature suggests SpA; asymptomatic between flares suggests possible pseudogout etiology.  MCP flares alone are infrequent and of mild severity so would not likely warrant a daily medication and she is in agreement.  Therefore, for future flares will use colchicine that may be diagnostic and therapeutic; she is to notify me of results when used.  Since last seen she has not had a flare of this.  If colchicine is ineffective then okay to use steroids as noted below.  For the knee and low back pain that was degenerative in nature, she went to physical therapy and has been doing his exercises with great relief / near resolution of her symptoms as long she does the exercises.    Chronic illness, stable.    - PRN arthritis flare: Colchicine 1.2 mg once, followed by 0.6 mg 1 hour later, then stop  - PRN arthritis flare not aborted with colchicine: prednisone 20mg  once.    2.  Pruritic rash: Has seen dermatology and allergy.  Thought to be secondary to COVID-19 vaccination per patient and resolved with oral antihistamines; now taking only 1 oral antihistamine daily and doing well with no pruritic rash.    3.  Androgenic alopecia: Has been evaluated by dermatology.    Total minutes spent in evaluation with patient, documentation, , and review of pertinent studies and chart notes: 8    Ms. Mares verbalized agreement with and understanding of the rational for the diagnosis and treatment plan.  All questions were answered to best of my ability and the patient's satisfaction. Ms. Mares was advised to contact the clinic with any questions that may arise after the clinic visit.      Thank you for involving me in the care of the patient    Return to clinic: 1 year, sooner if needed      HPI   Nela Mares is a 61 year old female with a past medical history significant for allergic rhinitis, asthma, obstructive sleep apnea, depression, obesity, paroxysmal supraventricular tachycardia history, impaired fasting glucose, and hypertension who presents for rheumatology follow-up.     12/2/2021 note by Dr. Beal documents pruritus, arthralgia, and fatigue.  Rash over most of her body that is very itchy.  Hair loss.  Muscle aches.  Generalized weakness.  Joint pain at the wrists, elbows, fingers, knees, ankles.  Brain fog.  Concerned about a thyroid disorder.  Of note, she had Covid in November 2020 with associated hair loss and rash after that.    12/16/2021: Ms. Mares reports that after COVID19 infection in 11/2020 she has had a pruritic rash all over her body that she attributes to multiple environmental allergies.  Joint aches started in mid-Oct 2021: knees and lower back are the most affected, but for years she says that she has had swelling and pain of the right 2-3rd MCPs.  MCP swelling/pain resolves within 2-3 days spontaneously.  Similar episode as the MCPs has  occurred to the knees and one ankle.  Hx of plantar fasciitis.  Back and knee pain worse in the PM; worse with activity.  Back pain is nonradiating.  Asthma controlled. Hair thinning worse after covid, and now just the central anterior portion.  Brain fog - trouble finding words - that started in fall 2021. Denied fevers, chills, nausea, vomiting, constipation, diarrhea. No abdominal pain. No chest pain/pressure, palpitations, or shortness of breath. No LE swelling. No neck pain. No oral or nasal sores.  No sicca symptoms. No photosensitivity or photophobia. No eye pain or redness. No history of inflammatory eye or bowel disease.  No history of DVT, pulmonary embolism, or miscarriage.   No history of serositis.  No history of Raynaud's Phenomenon.  No known neurologic disorder.  No known renal disorder.  No dysphagia history.    Today, 7/26/2022: Currently doing well.  No joint pain or swelling.  No morning stiffness or gelling phenomenon.  Went to physical therapy for the low back and knee pain and as long she does exercises for her low back and her knees are fine.  Was ultimately found to have COVID-19 vaccination associated pruritic rash that resolved with oral antihistamines and now she takes 1 oral antihistamine daily.  Also found to have androgenic alopecia by dermatology.  States that she has not required colchicine or prednisone for joint issues, as the joint issues only occur once every 1-2 years.    Paternal Grandmother: RA  Niece: undifferentiated spondyloarthropathy tx'd with remicade  Father: PMR    Tobacco: none  EtOH: none  Drugs: none  Occupation: [employer]   14 point review of system was completed and negative except as noted in the HPI     Active Problem List     Patient Active Problem List   Diagnosis     Allergic rhinitis due to animals     Mild persistent asthma     Obstructive sleep apnea     Leiomyoma of uterus     Mild major depression (H)     CARDIOVASCULAR SCREENING; LDL GOAL LESS  THAN 160     Hypertension goal BP (blood pressure) < 140/90     Pilar cyst     Hypertrophy of breast     IUD (intrauterine device) in place     Seasonal allergic rhinitis due to pollen     Allergic rhinitis due to dust mite     Allergic rhinitis due to mold     Overweight (H)     Impaired fasting glucose     Allergic conjunctivitis, bilateral     Paroxysmal supraventricular tachycardia (H)     Chronic midline low back pain without sciatica     Primary osteoarthritis of both knees     Past Medical History     Past Medical History:   Diagnosis Date     Allergic rhinitis, cause unspecified      Cervical high risk HPV (human papillomavirus) test positive 03/21/2017    3/21/17 NIL pap/+ HR HPV (not 16 or 18).      Depressive disorder      Depressive disorder, not elsewhere classified     seasonal     Hypertension      Mild persistent asthma      Obstructive sleep apnea (adult) (pediatric)     uses CPAP     Primary osteoarthritis of both knees 1/28/2022     Scalp mass 7/2014     Past Surgical History     Past Surgical History:   Procedure Laterality Date     COLONOSCOPY  6/21/2012    Procedure: COLONOSCOPY;  COLONOSCOPY, SCREEN;  Surgeon: Bart You MD;  Location: [facility] OR     D & C       ENDOSCOPIC RETROGRADE CHOLANGIOPANCREATOGRAM  1/4/2014    Procedure: ENDOSCOPIC RETROGRADE CHOLANGIOPANCREATOGRAM;  ERCP biliary sphincterotomy, bile duct stone removal, epinepherine washing.;  Surgeon: Ba Meyers MD;  Location: [facility] OR     EP ABLATION SVT N/A 6/2/2021    Procedure: EP ABLATION SVT;  Surgeon: Cuca Magaña MD;  Location: [facility]     LAPAROSCOPIC CHOLECYSTECTOMY WITH CHOLANGIOGRAMS  1/4/2014    Procedure: LAPAROSCOPIC CHOLECYSTECTOMY WITH CHOLANGIOGRAMS;;  Surgeon: Haja Sage MD;  Location: [facility] OR     NO HISTORY OF SURGERY       OPERATIVE HYSTEROSCOPY  6/17/2014    Procedure: OPERATIVE HYSTEROSCOPY;  Surgeon: Paola Camara MD;  Location: [facility] OR     REMOVE  INTRAUTERINE DEVICE  6/17/2014    Procedure: REMOVE INTRAUTERINE DEVICE;  Surgeon: Paola Camara MD;  Location: [facility] OR     Allergy   No Known Allergies  Current Medication List     Current Outpatient Medications   Medication Sig     albuterol (PROAIR HFA/PROVENTIL HFA/VENTOLIN HFA) 108 (90 Base) MCG/ACT inhaler Inhale 2 puffs into the lungs every 4 hours as needed for shortness of breath / dyspnea or wheezing     albuterol (PROVENTIL) (2.5 MG/3ML) 0.083% neb solution Take 1 vial (2.5 mg) by nebulization every 4 hours as needed for shortness of breath / dyspnea or wheezing     amLODIPine (NORVASC) 5 MG tablet Take 1 tablet (5 mg) by mouth daily     cetirizine (ZYRTEC) 10 MG tablet Take 1 tablet (10 mg) by mouth daily     clobetasol (TEMOVATE) 0.05 % external ointment Apply twice daily to affected area for 2 weeks then 2-3 times weekly.     colchicine (COLCYRS) 0.6 MG tablet For arthritis flare only: Take 2 tablets (1.2 mg) at first sign of flare, then 0.6 mg 1 hour later if not resolved.     conjugated estrogens (PREMARIN) 0.625 MG/GM vaginal cream Place 1 g vaginally three times a week     finasteride (PROSCAR) 5 MG tablet Take 1 tablet (5 mg) by mouth daily     fluticasone (FLONASE) 50 MCG/ACT nasal spray Spray 1-2 sprays into both nostrils daily     fluticasone-salmeterol (ADVAIR DISKUS) 500-50 MCG/DOSE inhaler Inhale 1 puff into the lungs 2 times daily     lisinopril (ZESTRIL) 40 MG tablet Take 1 tablet (40 mg) by mouth daily     montelukast (SINGULAIR) 10 MG tablet Take 1 tablet (10 mg) by mouth daily     predniSONE (DELTASONE) 20 MG tablet For arthritis flare not aborted with colchicine: prednisone 20mg once.     venlafaxine (EFFEXOR XR) 75 MG 24 hr capsule Take 1 capsule (75 mg) by mouth daily With 150 mg capsule for a total of 225 mg daily     venlafaxine (EFFEXOR-XR) 150 MG 24 hr capsule Take 1 capsule (150 mg) by mouth daily     vitamin D2 (ERGOCALCIFEROL) 18646 units (1250 mcg) capsule Take 1  "capsule (50,000 Units) by mouth once a week     No current facility-administered medications for this visit.     Social History   See HPI    Family History     Family History   Problem Relation Age of Onset     C.A.D. Paternal Grandmother      Cerebrovascular Disease Paternal Grandmother      Depression Paternal Grandmother      Obesity Paternal Grandmother      C.A.D. Paternal Grandfather      Cerebrovascular Disease Paternal Grandfather      Substance Abuse Paternal Grandfather      Hypertension Father      Asthma Father      Obesity Father      Skin Cancer Father      Diabetes Father      Neurologic Disorder Mother         Graves disease     Hyperlipidemia Mother      Depression Mother      Thyroid Disease Mother      Anxiety Disorder Mother      Other - See Comments Other      Skin Cancer Sister      Asthma Sister      Depression Maternal Grandmother      Cancer Sister         mild skin cancer,cured from excision     Depression Nephew      Anxiety Disorder Nephew      Substance Abuse Nephew      Asthma Nephew      Anxiety Disorder Nephew      Mental Illness Maternal Half-Brother      Mental Illness Paternal Half-Brother      Substance Abuse Nephew      Asthma Sister      Asthma Nephew      Thyroid Disease Maternal Half-Sister      Thyroid Disease Paternal Half-Sister      Paternal Grandmother: RA  Niece: undifferentiated spondyloarthropathy tx'd with remicade  Father: PMR    Physical Exam     Temp Readings from Last 3 Encounters:   02/18/22 98.6  F (37  C) (Oral)   12/02/21 98.1  F (36.7  C) (Oral)   06/02/21 97.9  F (36.6  C) (Oral)     BP Readings from Last 5 Encounters:   03/30/22 (!) 138/93   02/18/22 122/82   02/10/22 135/88   12/16/21 132/87   12/02/21 118/84     Pulse Readings from Last 1 Encounters:   03/30/22 85     Resp Readings from Last 1 Encounters:   06/02/21 16     Estimated body mass index is 36.92 kg/m  as calculated from the following:    Height as of 2/18/22: 1.753 m (5' 9\").    Weight as of " 3/5/21: 113.4 kg (250 lb).      GEN: NAD.  HEENT:  Anicteric, noninjected sclera. No obvious external lesions of the ear and nose. Hearing intact.  PULM: No increased work of breathing  SKIN: No rash or jaundice seen  PSYCH: Alert. Appropriate.      Labs / Imaging (select studies)     RF/CCP  Recent Labs   Lab Test 12/16/21  1005   CCPIGG 1.6   RHF 9     CBC  Recent Labs   Lab Test 12/02/21  1314 06/02/21  1151 10/05/20  1028   WBC 6.2 4.7 4.5   RBC 4.41 4.76 4.86   HGB 13.1 14.3 14.9   HCT 40.5 43.4 44.4   MCV 92 91 91   RDW 13.5 13.4 13.1    220 231   MCH 29.7 30.0 30.7   MCHC 32.3 32.9 33.6   NEUTROPHIL 52  --  47.6   LYMPH 34  --  44.0   MONOCYTE 6  --  5.8   EOSINOPHIL 8  --  2.0   BASOPHIL 0  --  0.4   ANEU  --   --  2.1   ALYM  --   --  2.0   REESE  --   --  0.3   AEOS  --   --  0.1   ABAS  --   --  0.0   ANEUTAUTO 3.2  --   --    ALYMPAUTO 2.1  --   --    AMONOAUTO 0.4  --   --    AEOSAUTO 0.5  --   --    ABSBASO 0.0  --   --      CMP  Recent Labs   Lab Test 12/02/21  1314 06/02/21  1151 03/05/21  1318 11/02/20  1623 10/05/20  1028 07/20/20  0757    139  --   --  141 140   POTASSIUM 3.9 3.7 3.5   < > 3.0* 4.1   CHLORIDE 103 107  --   --  107 106   CO2 28 28  --   --  24 28   ANIONGAP 3 3  --   --  10 6   * 100*  --   --  100* 119*   BUN 13 9  --   --  16 14   CR 0.84 0.77  --   --  0.78 0.90   GFRESTIMATED 75 83  --   --  82 70   GFRESTBLACK  --  >90  --   --  >90 81   ASHLEY 9.3 9.6  --   --  9.6 9.2   BILITOTAL 0.3  --   --   --  0.3  --    ALBUMIN 3.4  --   --   --  3.7  --    PROTTOTAL 7.8  --   --   --  7.7  --    ALKPHOS 81  --   --   --  73  --    AST 54*  --   --   --  85*  --    ALT 87*  --   --   --  120*  --     < > = values in this interval not displayed.     Calcium/VitaminD  Recent Labs   Lab Test 03/30/22  1017 12/02/21  1314 06/02/21  1151 10/05/20  1028   ASHLEY  --  9.3 9.6 9.6   VITDT 17* 14*  --   --      ESR/CRP  Recent Labs   Lab Test 12/16/21  1005 12/02/21  1314   SED 26  89*   CRP 9.7* 22.0*     Lipid Panel  Recent Labs   Lab Test 07/20/20  0757 07/18/19  0840 04/26/18  0718   CHOL 225* 198 180   TRIG 146 123 113   HDL 47* 45* 42*   * 128* 115*   NHDL 178* 153* 138*     Hepatitis B  Recent Labs   Lab Test 12/16/21  1005   HBCAB Nonreactive   HEPBANG Nonreactive     Hepatitis C  Recent Labs   Lab Test 12/16/21  1005 10/11/16  1630   HCVAB Nonreactive Nonreactive   Assay performance characteristics have not been established for newborns,   infants, and children       Lyme ab screening  Recent Labs   Lab Test 12/16/21  1005   LYMEGM 0.07     Immunization History     Immunization History   Administered Date(s) Administered     COVID-19,PF,Moderna 02/05/2021, 03/05/2021, 11/04/2021     HepB 12/12/2007     HepB, Unspecified 09/12/2000, 10/10/2000, 03/13/2001     HepB-Adult 12/12/2007     Influenza (H1N1) 11/25/2009     Influenza (IIV3) PF 12/27/2000, 11/15/2006, 12/12/2007, 11/05/2008, 10/19/2009, 10/22/2010, 11/23/2011     Influenza Quad, Recombinant, pf(RIV4) (Flublok) 10/09/2018     Influenza Vaccine IM > 6 months Valent IIV4 (Alfuria,Fluzone) 10/18/2013, 10/01/2014, 10/03/2015, 10/14/2016     Pneumococcal 23 valent 09/16/2015     TDAP Vaccine (Adacel) 11/05/2008, 07/18/2019     Zoster vaccine recombinant adjuvanted (SHINGRIX) 03/18/2019, 07/01/2019          Chart documentation done in part with Dragon Voice recognition Software. Although reviewed after completion, some word and grammatical error may remain.      Video-Visit Details    Type of service:  Video Visit    Video Start Time: 10:14 AM    Video End Time: 10:19 AM    Originating Location (pt. Location): [place], [place]    Distant Location (provider location):  [place]    Platform used for Video Visit: Bernard Moore MD

## 2022-07-26 NOTE — PATIENT INSTRUCTIONS
RHEUMATOLOGY    Dr. Pradip Moore    [address] [place] [address]  Phone number: [phone]  Fax number: [phone]      Thank you for choosing [facility]!    Mireille Hicks CMA Rheumatology

## 2022-07-27 ENCOUNTER — VIRTUAL VISIT (OUTPATIENT)
Dept: FAMILY MEDICINE | Facility: CLINIC | Age: 62
End: 2022-07-27
Payer: COMMERCIAL

## 2022-07-27 DIAGNOSIS — Z12.31 VISIT FOR SCREENING MAMMOGRAM: ICD-10-CM

## 2022-07-27 DIAGNOSIS — Z12.11 SCREEN FOR COLON CANCER: ICD-10-CM

## 2022-07-27 DIAGNOSIS — F33.42 RECURRENT MAJOR DEPRESSIVE DISORDER, IN FULL REMISSION (H): ICD-10-CM

## 2022-07-27 PROCEDURE — 96127 BRIEF EMOTIONAL/BEHAV ASSMT: CPT | Performed by: STUDENT IN AN ORGANIZED HEALTH CARE EDUCATION/TRAINING PROGRAM

## 2022-07-27 PROCEDURE — 99213 OFFICE O/P EST LOW 20 MIN: CPT | Mod: GT | Performed by: STUDENT IN AN ORGANIZED HEALTH CARE EDUCATION/TRAINING PROGRAM

## 2022-07-27 RX ORDER — VENLAFAXINE HYDROCHLORIDE 75 MG/1
75 CAPSULE, EXTENDED RELEASE ORAL DAILY
Qty: 90 CAPSULE | Refills: 1 | Status: SHIPPED | OUTPATIENT
Start: 2022-07-27 | End: 2023-01-16

## 2022-07-27 ASSESSMENT — ANXIETY QUESTIONNAIRES: GAD7 TOTAL SCORE: 0

## 2022-07-27 ASSESSMENT — PATIENT HEALTH QUESTIONNAIRE - PHQ9
10. IF YOU CHECKED OFF ANY PROBLEMS, HOW DIFFICULT HAVE THESE PROBLEMS MADE IT FOR YOU TO DO YOUR WORK, TAKE CARE OF THINGS AT HOME, OR GET ALONG WITH OTHER PEOPLE: NOT DIFFICULT AT ALL
SUM OF ALL RESPONSES TO PHQ QUESTIONS 1-9: 0

## 2022-07-27 NOTE — PROGRESS NOTES
Sanam is a 61 year old who is being evaluated via a billable video visit.      How would you like to obtain your AVS? MyChart  If the video visit is dropped, the invitation should be resent by: Text to cell phone: see chart  Will anyone else be joining your video visit? No        Assessment & Plan       Recurrent major depressive disorder, in full remission (H)  Overall much improved since increasing dose of effexor to 225mg daily. She is also on summer break which may contribute to less stress and improvements in mental health. We will continue her on this for now. Consider decreasing dose again in future if remains well controlled  - venlafaxine (EFFEXOR XR) 75 MG 24 hr capsule; Take 1 capsule (75 mg) by mouth daily With 150 mg capsule for a total of 225 mg daily    Screen for colon cancer  - Colonscopy Screening  Referral; Future    Visit for screening mammogram  - MA SCREENING DIGITAL BILAT - Future  (s+30); Future    Return in about 6 months (around 1/27/2023) for Follow up.    Slime Perry DO  [facility]   Sanam is a 61 year old, presenting for the following health issues:   Follow Up      History of Present Illness       Mental Health Follow-up:  Patient presents to follow-up on Depression & Anxiety.Patient's depression since last visit has been:  Good  The patient is not having other symptoms associated with depression.  Patient's anxiety since last visit has been:  Good  The patient is not having other symptoms associated with anxiety.  Any significant life events: No  Patient is not feeling anxious or having panic attacks.  Patient has no concerns about alcohol or drug use.     Today's PHQ-9         PHQ-9 Total Score: 0    PHQ-9 Q9 Thoughts of better off dead/self-harm past 2 weeks :   Not at all    How difficult have these problems made it for you to do your work, take care of things at home, or get along with other people: Not difficult at  all  Today's PETE-7 Score: 0       Mood has been much better. Feels great   No worry/anxiety   No SI   Has been on summer vacation for 2 months so this  Has helped  Sleeping well     Review of Systems   Constitutional, HEENT, cardiovascular, pulmonary, gi and gu systems are negative, except as otherwise noted.      Objective           Vitals:  No vitals were obtained today due to virtual visit.    Physical Exam   GENERAL: Healthy, alert and no distress  EYES: Eyes grossly normal to inspection.  No discharge or erythema, or obvious scleral/conjunctival abnormalities.  RESP: No audible wheeze, cough, or visible cyanosis.  No visible retractions or increased work of breathing.    SKIN: Visible skin clear. No significant rash, abnormal pigmentation or lesions.  NEURO: Cranial nerves grossly intact.  Mentation and speech appropriate for age.  PSYCH: Mentation appears normal, affect normal/bright, judgement and insight intact, normal speech and appearance well-groomed.          Video-Visit Details    Video Start Time: 07/27/2022 05:29 pm    Type of service:  Video Visit    Video End Time:5:38 PM    Originating Location (pt. Location): Home    Distant Location (provider location):  [facility]     Platform used for Video Visit: Number 100  Christen

## 2022-07-28 ENCOUNTER — TELEPHONE (OUTPATIENT)
Dept: GASTROENTEROLOGY | Facility: CLINIC | Age: 62
End: 2022-07-28

## 2022-07-28 NOTE — TELEPHONE ENCOUNTER
Screening Questions    BlueKIND OF PREP RedLOCATION [review exclusion criteria] GreenSEDATION TYPE      1. Are you active on mychart? y    2. What insurance is in the chart? HP     3.   Ordering/Referring Provider:[place]    4. BMI   (If greater than 40 review exclusion criteria [PAC APPT IF [MAC] @ [facility])  35.4 [If yes, BMI OVER 40-EXTENDED PREP]      **(Sedation review/consideration needed)**  Do you have a legal guardian or Medical Power of    and/or are you able to give consent for your medical care?     y    5. Have you had a positive covid test in the last 90 days?   n -     6.  Are you currently on dialysis?   n [ If yes, G-PREP & HOSPITAL setting ONLY]     7.  Do you have chronic kidney disease?  n [ If yes, G-PREP ]    8.   Do you have a diagnosis of diabetes?   n   [ If yes, G-PREP ]    9.  On a regular basis do you go 3-5 days between bowel movements?   n   [ If yes, EXTENDED PREP]    10.  Are you taking any prescription pain medications on a routine schedule?    n -  [ If yes, EXTENDED PREP] [If yes, MAC]      11.   Do you have any chemical dependencies such as alcohol, street drugs, or methadone?    n [If yes, MAC]    12.   Do you have any history of post-traumatic stress syndrome, severe anxiety or history of psychosis?    n  [If yes, MAC]    13.  [FEMALES] Are you currently pregnant?     If yes, how many weeks?       Respiratory/Heart Screening:  [If yes to any of the following HOSPITAL setting only]     14. Do you have Pulmonary Hypertension [Lungs]?   n       15. Do you have UNCONTROLLED asthma?   n     16.  Do you use daily home oxygen?  n      17. Do you have mod to severe Obstructive Sleep Apnea?         (OKAY @ [facility]  [facility]  [facility]  [facility]  [facility]  [facility] - if pt is not on OXYGEN)  mod      18.   Have you had a heart or lung transplant?   n      19.   Have you had a stroke or Transient ischemic attack (TIA - aka  mini stroke ) within 6 months?  (If yes, please review exclusion criteria)  n     20.   In  the past 6 months, have you had any heart related issues including cardiomyopathy or heart attack?   n           If yes, did it require cardiac stenting or other implantable device?   n      21.   Do you have any implantable devices in your body (pacemaker, defib, LVAD)? (If yes, please review exclusion criteria)  n   22.  Do you take the medication Phentermine?     Yes-> Hold for 7 days before procedure.  Please consult your prescribing provider if you have questions about holding this medication.n     No-> Continue to next question.    23. Do you take nitroglycerin?   n           If yes, how often? n  (if yes, HOSPITAL setting ONLY)    24.  Are you currently taking any blood thinners?    [If yes, INFORM patient to follw up w/ ORDERING PROVIDER FOR BRIDGING INSTRUCTIONS]     n    25.   Do you transfer independently?                (If NO, please HOSPITAL setting ONLY)  y    26.   Preferred LOCAL Pharmacy for Pre Prescription:      [facility] [address]    Scheduling Details  (Please ask for phone number if not scheduled by patient)      Caller : Nela Mares    Date of Procedure: 10/20  Surgeon: Christian  Location: [facility]        Sedation Type: CS   Conscious Sedation- Needs  for 6 hours after the procedure  MAC/General-Needs  for 24 hours after procedure    n :[Pre-op Required] at [facility]  SH  MG and OR for MAC sedation   (advise patient they will need a pre-op WITH IN 30 DAYS of procedure date)     Type of Procedure Scheduled:   Lower Endoscopy [Colonoscopy]    Which Colonoscopy Prep was Sent?:   - Mpep    KHORUTS CF PATIENTS & GROEN'S PATIENTS NEEDS EXTENDED PREP       Informed patient they will need an adult  y  Cannot take any type of public or medical transportation alone    Pre-Procedure Covid test to be completed at [facility] or Externally: y  **INFORMED OF HOME TESTING & LAB OPTION**        Confirmed Nurse will call to complete assessment  y    Additional comments:

## 2022-07-29 ENCOUNTER — LAB (OUTPATIENT)
Dept: LAB | Facility: CLINIC | Age: 62
End: 2022-07-29
Payer: COMMERCIAL

## 2022-07-29 DIAGNOSIS — E55.9 VITAMIN D DEFICIENCY: ICD-10-CM

## 2022-07-29 PROCEDURE — 36415 COLL VENOUS BLD VENIPUNCTURE: CPT

## 2022-07-29 PROCEDURE — 82306 VITAMIN D 25 HYDROXY: CPT

## 2022-07-30 LAB — DEPRECATED CALCIDIOL+CALCIFEROL SERPL-MC: 19 UG/L (ref 20–75)

## 2022-08-01 ENCOUNTER — MYC MEDICAL ADVICE (OUTPATIENT)
Dept: FAMILY MEDICINE | Facility: CLINIC | Age: 62
End: 2022-08-01

## 2022-08-01 DIAGNOSIS — E55.9 VITAMIN D DEFICIENCY: Primary | ICD-10-CM

## 2022-08-01 RX ORDER — CHOLECALCIFEROL (VITAMIN D3) 125 MCG
1 CAPSULE ORAL DAILY
Qty: 90 CAPSULE | Refills: 3 | Status: SHIPPED | OUTPATIENT
Start: 2022-08-01

## 2022-08-01 NOTE — TELEPHONE ENCOUNTER
Routing My Chart message response to Arleth Kaba    Patient took the initial prescription of 12 tablets of Vitamin D.  Has not taken since beginning of June.    Solomon Jones, RN, BSN, PHN  [facility]

## 2022-08-11 ENCOUNTER — ANCILLARY PROCEDURE (OUTPATIENT)
Dept: MAMMOGRAPHY | Facility: CLINIC | Age: 62
End: 2022-08-11
Attending: STUDENT IN AN ORGANIZED HEALTH CARE EDUCATION/TRAINING PROGRAM
Payer: COMMERCIAL

## 2022-08-11 DIAGNOSIS — Z12.31 VISIT FOR SCREENING MAMMOGRAM: ICD-10-CM

## 2022-08-11 PROCEDURE — 77063 BREAST TOMOSYNTHESIS BI: CPT | Mod: TC | Performed by: RADIOLOGY

## 2022-08-11 PROCEDURE — 77067 SCR MAMMO BI INCL CAD: CPT | Mod: TC | Performed by: RADIOLOGY

## 2022-08-15 NOTE — RESULT ENCOUNTER NOTE
Mammo results managed by the breast center. Results communicated to the patient by their office.   Janett Childers D.O.

## 2022-09-12 ENCOUNTER — TELEPHONE (OUTPATIENT)
Dept: FAMILY MEDICINE | Facility: CLINIC | Age: 62
End: 2022-09-12

## 2022-09-12 DIAGNOSIS — J45.30 MILD PERSISTENT ASTHMA WITHOUT COMPLICATION: Primary | ICD-10-CM

## 2022-09-13 NOTE — TELEPHONE ENCOUNTER
I am reading Dr. Clark's note from February 2022.  According to that note, the patient stopped Advair and is on albuterol as needed only.  Please clarify if the patient is using the medication.    Rolf Duarte MD

## 2022-09-13 NOTE — TELEPHONE ENCOUNTER
LOV: 2- with Dr. Clark, Follow-up in 3-4 months    No pending appointments with allergy.    Unable to pend fluticasone-salmeterol (ADVAIR DISKUS) 500-50 MCG/DOSE inhaler prescription.     Tania OROZCO RN

## 2022-09-13 NOTE — TELEPHONE ENCOUNTER
"RN left message for patient to return call to [phone].    Dr. Clark note on 2- stated the following:  \"We discussed continued monitoring of her allergy and asthma symptoms and resuming maintenance medications and possibly immunotherapy if her symptoms worsen this spring\".    Advair was stopped by the patient.  Has the patient restarted her Advair?      Tania OROZCO RN        "

## 2022-09-15 RX ORDER — FLUTICASONE PROPIONATE AND SALMETEROL 500; 50 UG/1; UG/1
1 POWDER RESPIRATORY (INHALATION) EVERY 12 HOURS
Qty: 60 EACH | Refills: 3 | Status: SHIPPED | OUTPATIENT
Start: 2022-09-15 | End: 2023-05-15

## 2022-09-15 NOTE — TELEPHONE ENCOUNTER
I will send Advair 500/50 mcg 1 puff twice daily.  If she does not get better within several weeks, please encourage the patient to set up an appointment with us.    Rolf Duarte MD

## 2022-09-15 NOTE — TELEPHONE ENCOUNTER
My chart message sent to patient advising of Dr. Duarte's message below.    Tania OROZCO RN

## 2022-09-15 NOTE — TELEPHONE ENCOUNTER
Patient stated her symptoms started in May 2022.  She states her worst months are May and September.  She is usually better by mid to end October.      Patient states she is using her albuterol about one day (thourought the day) every two weeks    Patient denies having shortness of breath, chest tightness, cough, or wheezing.    Patient denies any recent illness or fever.    Routing to Dr. Duarte.    Tania OROZCO RN

## 2022-10-13 ENCOUNTER — TELEPHONE (OUTPATIENT)
Dept: GASTROENTEROLOGY | Facility: CLINIC | Age: 62
End: 2022-10-13

## 2022-10-13 DIAGNOSIS — Z12.11 ENCOUNTER FOR SCREENING COLONOSCOPY: Primary | ICD-10-CM

## 2022-10-13 RX ORDER — BISACODYL 5 MG/1
TABLET, DELAYED RELEASE ORAL
Qty: 4 TABLET | Refills: 0 | Status: SHIPPED | OUTPATIENT
Start: 2022-10-13 | End: 2023-02-20

## 2022-10-13 NOTE — TELEPHONE ENCOUNTER
Called the Pt to discuss below. No answer, LM.     Patient scheduled for Colonoscopy on 10/20/22.     Discuss at home test option.     Arrival time: 0700    Facility location: [facility]    Sedation type: CS    Indication for procedure: Screening    Anticoagulations? None     Bowel prep recommendation: GoLytely     GoLytely prep sent to [facility] [address] [place] [address]  pharmacy. Prep instructions sent via [employer]    Pre visit planning completed.    Ara Jones RN

## 2022-10-13 NOTE — TELEPHONE ENCOUNTER
Patient returned call.     Pre assessment questions completed for upcoming colonoscopy  procedure scheduled on 10/20/22    COVID policy reviewed. Patient to complete rapid antigen test one to two days before their scheduled procedure. Patient to bring photo of the results when they come in for their procedure.    Reviewed procedural arrival time 0700 and facility location [facility].    Designated  policy reviewed. Instructed to have someone stay 6 hours post procedure.     Reviewed Golytely prep instructions. No fiber/iron supplements or foods that contain nuts/seeds 7 days prior to procedure.     Patient verbalized understanding and had no questions or concerns at this time.    Dana Bob RN

## 2022-10-20 ENCOUNTER — HOSPITAL ENCOUNTER (OUTPATIENT)
Facility: CLINIC | Age: 62
Discharge: HOME | End: 2022-10-20
Attending: INTERNAL MEDICINE | Admitting: INTERNAL MEDICINE
Payer: COMMERCIAL

## 2022-10-20 VITALS
TEMPERATURE: 98 F | OXYGEN SATURATION: 98 % | SYSTOLIC BLOOD PRESSURE: 134 MMHG | DIASTOLIC BLOOD PRESSURE: 85 MMHG | HEART RATE: 75 BPM | RESPIRATION RATE: 16 BRPM

## 2022-10-20 LAB — COLONOSCOPY: NORMAL

## 2022-10-20 PROCEDURE — 45381 COLONOSCOPY SUBMUCOUS NJX: CPT | Mod: PT | Performed by: INTERNAL MEDICINE

## 2022-10-20 PROCEDURE — 45380 COLONOSCOPY AND BIOPSY: CPT | Performed by: INTERNAL MEDICINE

## 2022-10-20 PROCEDURE — 88305 TISSUE EXAM BY PATHOLOGIST: CPT | Mod: 26 | Performed by: PATHOLOGY

## 2022-10-20 PROCEDURE — 45385 COLONOSCOPY W/LESION REMOVAL: CPT | Mod: PT | Performed by: INTERNAL MEDICINE

## 2022-10-20 PROCEDURE — G0500 MOD SEDAT ENDO SERVICE >5YRS: HCPCS | Performed by: INTERNAL MEDICINE

## 2022-10-20 PROCEDURE — 45382 COLONOSCOPY W/CONTROL BLEED: CPT | Performed by: INTERNAL MEDICINE

## 2022-10-20 PROCEDURE — 88305 TISSUE EXAM BY PATHOLOGIST: CPT | Mod: TC | Performed by: INTERNAL MEDICINE

## 2022-10-20 PROCEDURE — 999N000099 HC STATISTIC MODERATE SEDATION < 10 MIN: Performed by: INTERNAL MEDICINE

## 2022-10-20 PROCEDURE — 250N000011 HC RX IP 250 OP 636: Performed by: INTERNAL MEDICINE

## 2022-10-20 PROCEDURE — 99153 MOD SED SAME PHYS/QHP EA: CPT | Performed by: INTERNAL MEDICINE

## 2022-10-20 RX ORDER — NALOXONE HYDROCHLORIDE 0.4 MG/ML
0.2 INJECTION, SOLUTION INTRAMUSCULAR; INTRAVENOUS; SUBCUTANEOUS
Status: DISCONTINUED | OUTPATIENT
Start: 2022-10-20 | End: 2022-10-20 | Stop reason: HOSPADM

## 2022-10-20 RX ORDER — EPINEPHRINE IN SOD CHLOR,ISO 1 MG/10 ML
SYRINGE (ML) INTRAVENOUS PRN
Status: DISCONTINUED | OUTPATIENT
Start: 2022-10-20 | End: 2022-10-20 | Stop reason: HOSPADM

## 2022-10-20 RX ORDER — NALOXONE HYDROCHLORIDE 0.4 MG/ML
0.4 INJECTION, SOLUTION INTRAMUSCULAR; INTRAVENOUS; SUBCUTANEOUS
Status: DISCONTINUED | OUTPATIENT
Start: 2022-10-20 | End: 2022-10-20 | Stop reason: HOSPADM

## 2022-10-20 RX ORDER — ONDANSETRON 4 MG/1
4 TABLET, ORALLY DISINTEGRATING ORAL EVERY 6 HOURS PRN
Status: DISCONTINUED | OUTPATIENT
Start: 2022-10-20 | End: 2022-10-20 | Stop reason: HOSPADM

## 2022-10-20 RX ORDER — ONDANSETRON 2 MG/ML
4 INJECTION INTRAMUSCULAR; INTRAVENOUS EVERY 6 HOURS PRN
Status: DISCONTINUED | OUTPATIENT
Start: 2022-10-20 | End: 2022-10-20 | Stop reason: HOSPADM

## 2022-10-20 RX ORDER — PROCHLORPERAZINE MALEATE 10 MG
10 TABLET ORAL EVERY 6 HOURS PRN
Status: DISCONTINUED | OUTPATIENT
Start: 2022-10-20 | End: 2022-10-20 | Stop reason: HOSPADM

## 2022-10-20 RX ORDER — FENTANYL CITRATE 50 UG/ML
INJECTION, SOLUTION INTRAMUSCULAR; INTRAVENOUS PRN
Status: DISCONTINUED | OUTPATIENT
Start: 2022-10-20 | End: 2022-10-20 | Stop reason: HOSPADM

## 2022-10-20 RX ORDER — FLUMAZENIL 0.1 MG/ML
0.2 INJECTION, SOLUTION INTRAVENOUS
Status: DISCONTINUED | OUTPATIENT
Start: 2022-10-20 | End: 2022-10-20 | Stop reason: HOSPADM

## 2022-10-20 ASSESSMENT — ACTIVITIES OF DAILY LIVING (ADL)
ADLS_ACUITY_SCORE: 35
ADLS_ACUITY_SCORE: 35

## 2022-10-20 NOTE — OR NURSING
Colonoscopy with polypectomy of rectal polyp, 2 assurance clips placed, 2 mL epi injected with sclero needle. Performed under moderate sedation, patient tolerated well. Poor prep. Transferred to  and report given to recovery RN.

## 2022-10-20 NOTE — H&P
Nela [facility]  [id number]  female  62 year old      Reason for procedure/surgery: screening colonoscopy    Patient Active Problem List   Diagnosis     Allergic rhinitis due to animals     Mild persistent asthma     Obstructive sleep apnea     Leiomyoma of uterus     Mild major depression (H)     CARDIOVASCULAR SCREENING; LDL GOAL LESS THAN 160     Hypertension goal BP (blood pressure) < 140/90     Pilar cyst     Hypertrophy of breast     IUD (intrauterine device) in place     Seasonal allergic rhinitis due to pollen     Allergic rhinitis due to dust mite     Allergic rhinitis due to mold     Overweight (H)     Impaired fasting glucose     Allergic conjunctivitis, bilateral     Paroxysmal supraventricular tachycardia (H)     Chronic midline low back pain without sciatica     Primary osteoarthritis of both knees       Past Surgical History:    Past Surgical History:   Procedure Laterality Date     COLONOSCOPY  6/21/2012    Procedure: COLONOSCOPY;  COLONOSCOPY, SCREEN;  Surgeon: Bart You MD;  Location:  OR     D & C       ENDOSCOPIC RETROGRADE CHOLANGIOPANCREATOGRAM  1/4/2014    Procedure: ENDOSCOPIC RETROGRADE CHOLANGIOPANCREATOGRAM;  ERCP biliary sphincterotomy, bile duct stone removal, epinepherine washing.;  Surgeon: Ba Meyers MD;  Location:  OR     EP ABLATION SVT N/A 6/2/2021    Procedure: EP ABLATION SVT;  Surgeon: Cuca Magaña MD;  Location:  HEART CARDIAC CATH LAB     LAPAROSCOPIC CHOLECYSTECTOMY WITH CHOLANGIOGRAMS  1/4/2014    Procedure: LAPAROSCOPIC CHOLECYSTECTOMY WITH CHOLANGIOGRAMS;;  Surgeon: Haja Sage MD;  Location:  OR     NO HISTORY OF SURGERY       OPERATIVE HYSTEROSCOPY  6/17/2014    Procedure: OPERATIVE HYSTEROSCOPY;  Surgeon: Paola Camara MD;  Location: [facility] OR     REMOVE INTRAUTERINE DEVICE  6/17/2014    Procedure: REMOVE INTRAUTERINE DEVICE;  Surgeon: Paola Camara MD;  Location:  OR       Past Medical History:    Past Medical History:   Diagnosis Date     Allergic rhinitis, cause unspecified      Cervical high risk HPV (human papillomavirus) test positive 03/21/2017    3/21/17 NIL pap/+ HR HPV (not 16 or 18).      Depressive disorder      Depressive disorder, not elsewhere classified     seasonal     Hypertension      Mild persistent asthma      Obstructive sleep apnea (adult) (pediatric)     uses CPAP     Primary osteoarthritis of both knees 1/28/2022     Scalp mass 7/2014       Social History:   Social History     Tobacco Use     Smoking status: Never     Smokeless tobacco: Never   Substance Use Topics     Alcohol use: No       Family History:   Family History   Problem Relation Age of Onset     C.A.D. Paternal Grandmother      Cerebrovascular Disease Paternal Grandmother      Depression Paternal Grandmother      Obesity Paternal Grandmother      C.A.D. Paternal Grandfather      Cerebrovascular Disease Paternal Grandfather      Substance Abuse Paternal Grandfather      Hypertension Father      Asthma Father      Obesity Father      Skin Cancer Father      Diabetes Father      Neurologic Disorder Mother         Graves disease     Hyperlipidemia Mother      Depression Mother      Thyroid Disease Mother      Anxiety Disorder Mother      Other - See Comments Other      Skin Cancer Sister      Asthma Sister      Depression Maternal Grandmother      Cancer Sister         mild skin cancer,cured from excision     Depression Nephew      Anxiety Disorder Nephew      Substance Abuse Nephew      Asthma Nephew      Anxiety Disorder Nephew      Mental Illness Maternal Half-Brother      Mental Illness Paternal Half-Brother      Substance Abuse Nephew      Asthma Sister      Asthma Nephew      Thyroid Disease Maternal Half-Sister      Thyroid Disease Paternal Half-Sister        Allergies: No Known Allergies    Active Medications:   No current outpatient medications on file.       Systemic Review:   CONSTITUTIONAL: NEGATIVE for fever,  chills, change in weight  ENT/MOUTH: NEGATIVE for ear, mouth and throat problems  RESP: NEGATIVE for significant cough or SOB  CV: NEGATIVE for chest pain, palpitations or peripheral edema    Physical Examination:   Vital Signs: /85   Pulse 75   Temp 98  F (36.7  C)   Resp 16   LMP 12/21/2014   SpO2 98%   GENERAL: healthy, alert and no distress  NECK: no adenopathy, no asymmetry, masses, or scars  RESP: lungs clear to auscultation - no rales, rhonchi or wheezes  CV: regular rate and rhythm, normal S1 S2, no S3 or S4, no murmur, click or rub, no peripheral edema and peripheral pulses strong  ABDOMEN: soft, nontender, no hepatosplenomegaly, no masses and bowel sounds normal  MS: no gross musculoskeletal defects noted, no edema    Plan: Appropriate to proceed as scheduled.      Chago Hong DO  10/20/2022    PCP:  Padma Lozano

## 2022-10-24 LAB
PATH REPORT.COMMENTS IMP SPEC: NORMAL
PATH REPORT.COMMENTS IMP SPEC: NORMAL
PATH REPORT.FINAL DX SPEC: NORMAL
PATH REPORT.GROSS SPEC: NORMAL
PATH REPORT.MICROSCOPIC SPEC OTHER STN: NORMAL
PATH REPORT.RELEVANT HX SPEC: NORMAL
PHOTO IMAGE: NORMAL

## 2022-11-02 ENCOUNTER — LAB (OUTPATIENT)
Dept: URGENT CARE | Facility: URGENT CARE | Age: 62
End: 2022-11-02
Attending: PHYSICIAN ASSISTANT
Payer: COMMERCIAL

## 2022-11-02 ENCOUNTER — VIRTUAL VISIT (OUTPATIENT)
Dept: URGENT CARE | Facility: CLINIC | Age: 62
End: 2022-11-02
Payer: COMMERCIAL

## 2022-11-02 ENCOUNTER — TELEPHONE (OUTPATIENT)
Dept: URGENT CARE | Facility: CLINIC | Age: 62
End: 2022-11-02

## 2022-11-02 DIAGNOSIS — J11.1 INFLUENZA-LIKE ILLNESS: ICD-10-CM

## 2022-11-02 DIAGNOSIS — J11.1 INFLUENZA-LIKE ILLNESS: Primary | ICD-10-CM

## 2022-11-02 DIAGNOSIS — J10.1 INFLUENZA A: Primary | ICD-10-CM

## 2022-11-02 LAB
FLUAV AG SPEC QL IA: POSITIVE
FLUBV AG SPEC QL IA: NEGATIVE

## 2022-11-02 PROCEDURE — 87804 INFLUENZA ASSAY W/OPTIC: CPT

## 2022-11-02 PROCEDURE — 99213 OFFICE O/P EST LOW 20 MIN: CPT | Mod: GT

## 2022-11-02 RX ORDER — OSELTAMIVIR PHOSPHATE 75 MG/1
75 CAPSULE ORAL 2 TIMES DAILY
Qty: 10 CAPSULE | Refills: 0 | Status: SHIPPED | OUTPATIENT
Start: 2022-11-02 | End: 2022-11-07

## 2022-11-02 NOTE — PATIENT INSTRUCTIONS
Adult Self-Care for Colds  Colds are caused by viruses. They can t be cured with antibiotics. However, you can relieve symptoms and support your body s efforts to heal itself. No matter which symptoms you have, be sure to drink plenty of fluids (water or clear soup); stop smoking and drinking alcohol; and get plenty of rest.      Understand a fever  Take your temperature several times a day. If your fever is 100.4 F (38.0 C) for more than a day, call your doctor.  Relax, lie down. Go to bed if you want. Just get off your feet and rest. Also, drink plenty of fluids to avoid dehydration.  Take acetaminophen or a nonsteroidal anti-inflammatory agent (NSAID), such as ibuprofen.  Treat a troubled nose kindly  Breathe steam or heated humidified air to open blocked nasal passages.  a hot shower or use a vaporizer. Be careful not to get burned by the steam.  Saline nasal sprays and Mucinex help open a stuffy nose. Antihistamines can also help, but they can cause side effects such as drowsiness and drying of the eyes, nose, and mouth.  Soothe a sore throat and cough  Gargle every 2 hours with 1/4 teaspoon of salt dissolved in 1/2 cup of warm water. Suck on throat lozenges and cough drops to moisten your throat (those containing menthol work best).  Cough medicines are available but it is unclear how effective they actually are.  Try honey for cough  Take acetaminophen or an NSAID, such as ibuprofen to ease throat pain  Ease digestive problems  Put fluid back into your body. Take frequent sips of clear liquids such as water or broth. Do not drink beverages with a lot of sugar in them, such as juices and sodas. These can make diarrhea worse. Older children and adults can drink sports drinks.  As your appetite returns, you can resume your normal diet. Ask your doctor whether there are any foods you should avoid.  When to seek medical care  When you first notice symptoms, ask your health care provider if antiviral  medications are appropriate. Antibiotics should not be taken for colds or flu. Also, call your doctor if you have any of the following symptoms or if you aren t feeling better after 7 days:  Shortness of breath  Pain or pressure in the chest or abdomen  Worsening symptoms, especially after a period of improvement  Fever of 100.4 F  (38.0 C) or higher, or fever that doesn t go down with medication  Sudden dizziness or confusion  Severe or continued vomiting  Signs of dehydration, including extreme thirst, dark urine, infrequent urination, dry mouth  Spotted, red, or very sore throat      [id number] [employer]. [address] [place] [id number]. All rights reserved. This information is not intended as a substitute for professional medical care. Always follow your healthcare professional's instructions.

## 2022-11-02 NOTE — PROGRESS NOTES
Nela Mares is being evaluated via a billable video visit.      Assessment & Plan:     Suspect viral illness. Influenza test ordered as pt is at higher risk of complications from this due to her asthma history, and would Rx Tamiflu if she was positive (even though it has been > 48 hrs since symptom onset, as she is high risk). If this is negative, continue ibuprofen PRN and be re-evaluated for new/worsening symptoms.    See patient instructions below.  At the end of the encounter, I discussed results, diagnosis, medications. Discussed red flags for being seen in person at clinic/ER, as well as indications for follow up if no improvement. Patient understood and agreed to plan.       ICD-10-CM    1. Influenza-like illness  J11.1 Influenza A/B antigen            Return in about 5 days (around 11/7/2022) for Follow up w/ primary care provider if not better.    Video-Visit Details    Type of service:  Video Visit    Video Start Time: 2:31pm  Video End Time: 2:38pm    Originating Location (pt. Location): Home    Distant Location (provider location):  [facility]     Mode of Communication:  Video Conference via [facility]    RAMAKRISHNA Hennessy, PACherylC  [employer] UNSCHEDULED CARE    Subjective:   Nela Mares is a 62 year old female with hx of asthma who is contacted via telephone thru scheduled urgent care virtual visit to discuss:   Chief Complaint   Patient presents with     URI     Dry cough, rhinorrhea, myalgias, and fever onset 3 days ago. Tmax 100.8 F. She is taking ibuprofen. She has not needed to use her albuterol inhaler. Patient reports no headache, sinus pressure/facial pain, chest pain, shortness of breath, wheezing, abdominal pain, nausea, vomiting, diarrhea, rash, or any other symptoms. She had a negative COVID PCR test yesterday.     Past Medical History:   Diagnosis Date     Allergic rhinitis, cause unspecified      Cervical high risk HPV (human papillomavirus) test  positive 03/21/2017    3/21/17 NIL pap/+ HR HPV (not 16 or 18).      Depressive disorder      Depressive disorder, not elsewhere classified     seasonal     Hypertension      Mild persistent asthma      Obstructive sleep apnea (adult) (pediatric)     uses CPAP     Primary osteoarthritis of both knees 1/28/2022     Scalp mass 7/2014       Objective:   Gen:  NAD  Pulm: non-labored work of breathing    No results found for any visits on 11/02/22.    There are no Patient Instructions on file for this visit.

## 2022-11-17 ENCOUNTER — MYC MEDICAL ADVICE (OUTPATIENT)
Dept: FAMILY MEDICINE | Facility: CLINIC | Age: 62
End: 2022-11-17

## 2022-11-27 DIAGNOSIS — I10 ESSENTIAL HYPERTENSION WITH GOAL BLOOD PRESSURE LESS THAN 140/90: ICD-10-CM

## 2022-11-28 RX ORDER — AMLODIPINE BESYLATE 5 MG/1
5 TABLET ORAL DAILY
Qty: 90 TABLET | Refills: 0 | Status: SHIPPED | OUTPATIENT
Start: 2022-11-28 | End: 2023-02-20

## 2022-11-29 DIAGNOSIS — Z86.0100 HX OF COLONOSCOPY WITH POLYPECTOMY: Primary | ICD-10-CM

## 2022-11-29 DIAGNOSIS — Z98.890 HX OF COLONOSCOPY WITH POLYPECTOMY: Primary | ICD-10-CM

## 2022-12-07 ENCOUNTER — OFFICE VISIT (OUTPATIENT)
Dept: DERMATOLOGY | Facility: CLINIC | Age: 62
End: 2022-12-07
Payer: COMMERCIAL

## 2022-12-07 DIAGNOSIS — D48.5 NEOPLASM OF UNCERTAIN BEHAVIOR OF SKIN: ICD-10-CM

## 2022-12-07 DIAGNOSIS — L91.8 INFLAMED SKIN TAG: Primary | ICD-10-CM

## 2022-12-07 DIAGNOSIS — L64.9 ANDROGENETIC ALOPECIA: ICD-10-CM

## 2022-12-07 DIAGNOSIS — L90.0 LICHEN SCLEROSUS: ICD-10-CM

## 2022-12-07 PROCEDURE — 99213 OFFICE O/P EST LOW 20 MIN: CPT | Mod: 25 | Performed by: PHYSICIAN ASSISTANT

## 2022-12-07 PROCEDURE — 11200 RMVL SKIN TAGS UP TO&INC 15: CPT | Mod: 59 | Performed by: PHYSICIAN ASSISTANT

## 2022-12-07 PROCEDURE — 11104 PUNCH BX SKIN SINGLE LESION: CPT | Performed by: PHYSICIAN ASSISTANT

## 2022-12-07 PROCEDURE — 88304 TISSUE EXAM BY PATHOLOGIST: CPT | Performed by: DERMATOLOGY

## 2022-12-07 PROCEDURE — 11105 PUNCH BX SKIN EA SEP/ADDL: CPT | Performed by: PHYSICIAN ASSISTANT

## 2022-12-07 RX ORDER — CLOBETASOL PROPIONATE 0.5 MG/G
OINTMENT TOPICAL
Qty: 60 G | Refills: 3 | Status: SHIPPED | OUTPATIENT
Start: 2022-12-07 | End: 2023-12-20

## 2022-12-07 RX ORDER — FINASTERIDE 5 MG/1
5 TABLET, FILM COATED ORAL DAILY
Qty: 90 TABLET | Refills: 3 | Status: SHIPPED | OUTPATIENT
Start: 2022-12-07 | End: 2023-12-20

## 2022-12-07 NOTE — LETTER
12/7/2022         RE: Nela Mares  [address] [id number]        Dear Colleague,    Thank you for referring your patient, Nela Mares, to the [facility]. Please see a copy of my visit note below.    Nela Mares is an extremely pleasant 61 year old year old female patient here today for hair loss present since she had covid November 2020. She reports her hair has been shedding. She also has had chronic urticaria since she had covid, she notes that it has improved since it started. Benadryl does help her sleep at night. She also notes she had has issues with vaginal itching and premarin is not helping.  Patient has no other skin complaints today.  Remainder of the HPI, Meds, PMH, Allergies, FH, and SH was reviewed in chart.   Past Medical History:   Diagnosis Date     Allergic rhinitis, cause unspecified      Cervical high risk HPV (human papillomavirus) test positive 03/21/2017    3/21/17 NIL pap/+ HR HPV (not 16 or 18).      Depressive disorder      Depressive disorder, not elsewhere classified     seasonal     Hypertension      Mild persistent asthma      Obstructive sleep apnea (adult) (pediatric)     uses CPAP     Primary osteoarthritis of both knees 1/28/2022     Scalp mass 7/2014       Past Surgical History:   Procedure Laterality Date     COLONOSCOPY  6/21/2012    Procedure: COLONOSCOPY;  COLONOSCOPY, SCREEN;  Surgeon: Bart You MD;  Location: [facility] OR     COLONOSCOPY N/A 10/20/2022    Procedure: COLONOSCOPY, WITH POLYPECTOMY AND BIOPSY;  Surgeon: Chago Hong DO;  Location:  GI     D & C       ENDOSCOPIC RETROGRADE CHOLANGIOPANCREATOGRAM  1/4/2014    Procedure: ENDOSCOPIC RETROGRADE CHOLANGIOPANCREATOGRAM;  ERCP biliary sphincterotomy, bile duct stone removal, epinepherine washing.;  Surgeon: Ba Meyers MD;  Location: [facility] OR     EP ABLATION SVT N/A 6/2/2021    Procedure: EP ABLATION SVT;  Surgeon: Cuca Magaña,  MD;  [facility] CARDIAC CATH LAB     LAPAROSCOPIC CHOLECYSTECTOMY WITH CHOLANGIOGRAMS  1/4/2014    Procedure: LAPAROSCOPIC CHOLECYSTECTOMY WITH CHOLANGIOGRAMS;;  Surgeon: Haja Sage MD;  Location: [facility] OR     NO HISTORY OF SURGERY       OPERATIVE HYSTEROSCOPY  6/17/2014    Procedure: OPERATIVE HYSTEROSCOPY;  Surgeon: Paola Camara MD;  Location: [facility] OR     REMOVE INTRAUTERINE DEVICE  6/17/2014    Procedure: REMOVE INTRAUTERINE DEVICE;  Surgeon: Paola Camara MD;  Location: [facility] OR        Family History   Problem Relation Age of Onset     C.A.D. Paternal Grandmother      Cerebrovascular Disease Paternal Grandmother      Depression Paternal Grandmother      Obesity Paternal Grandmother      C.A.D. Paternal Grandfather      Cerebrovascular Disease Paternal Grandfather      Substance Abuse Paternal Grandfather      Hypertension Father      Asthma Father      Obesity Father      Skin Cancer Father      Diabetes Father      Neurologic Disorder Mother         Graves disease     Hyperlipidemia Mother      Depression Mother      Thyroid Disease Mother      Anxiety Disorder Mother      Other - See Comments Other      Skin Cancer Sister      Asthma Sister      Depression Maternal Grandmother      Cancer Sister         mild skin cancer,cured from excision     Depression Nephew      Anxiety Disorder Nephew      Substance Abuse Nephew      Asthma Nephew      Anxiety Disorder Nephew      Mental Illness Maternal Half-Brother      Mental Illness Paternal Half-Brother      Substance Abuse Nephew      Asthma Sister      Asthma Nephew      Thyroid Disease Maternal Half-Sister      Thyroid Disease Paternal Half-Sister        Social History     Socioeconomic History     Marital status: Single     Spouse name: Not on file     Number of children: 0     Years of education: 18     Highest education level: Not on file   Occupational History     Occupation:    Tobacco Use      Smoking status: Never     Smokeless tobacco: Never   Vaping Use     Vaping Use: Never used   Substance and Sexual Activity     Alcohol use: No     Drug use: No     Sexual activity: Not Currently     Partners: Male     Birth control/protection: Post-menopausal   Other Topics Concern     Parent/sibling w/ CABG, MI or angioplasty before 65F 55M? No   Social History Narrative    Dairy/d 3 servings/d.     Caffeine 2 servings/d    Exercise 0 x week    Sunscreen used - No    Seatbelts used - Yes    Working smoke/CO detectors in the home - Yes    Guns stored in the home - No    Self Breast Exams - no    Eye Exam up to date - No    Dental Exam up to date - Yes    Pap Smear up to date - Yes    Mammogram up to date - Yes    Dexa Scan up to date - NOT APPLICABLE    Flex Sig / Colonoscopy up to date - NOT APPLICABLE    Immunizations up to date - Yes    Abuse: Current or Past(Physical, Sexual or Emotional)- No    Do you feel safe in your environment - Yes    GÓMEZ Cummings    11/23/11                     Social Determinants of Health     Financial Resource Strain: Not on file   Food Insecurity: Not on file   Transportation Needs: Not on file   Physical Activity: Not on file   Stress: Not on file   Social Connections: Not on file   Intimate Partner Violence: Not on file   Housing Stability: Not on file       Outpatient Encounter Medications as of 12/7/2022   Medication Sig Dispense Refill     albuterol (PROAIR HFA/PROVENTIL HFA/VENTOLIN HFA) 108 (90 Base) MCG/ACT inhaler Inhale 2 puffs into the lungs every 4 hours as needed for shortness of breath / dyspnea or wheezing 36 g 1     albuterol (PROVENTIL) (2.5 MG/3ML) 0.083% neb solution Take 1 vial (2.5 mg) by nebulization every 4 hours as needed for shortness of breath / dyspnea or wheezing 30 mL 11     amLODIPine (NORVASC) 5 MG tablet Take 1 tablet (5 mg) by mouth daily 90 tablet 0     bisacodyl (DULCOLAX) 5 MG EC tablet Take as directed. One day before exam take 2 tablets at 3 PM. Take  2 tablets at 11 PM. 4 tablet 0     cetirizine (ZYRTEC) 10 MG tablet Take 1 tablet (10 mg) by mouth daily 90 tablet 3     Cholecalciferol (VITAMIN D) 125 MCG (5000 UT) capsule Take 1 capsule (5,000 Units) by mouth daily 90 capsule 3     clobetasol (TEMOVATE) 0.05 % external ointment Apply twice daily to affected area for 2 weeks then 2-3 times weekly. 60 g 3     colchicine (COLCYRS) 0.6 MG tablet For arthritis flare only: Take 2 tablets (1.2 mg) at first sign of flare, then 0.6 mg 1 hour later if not resolved. 3 tablet 0     conjugated estrogens (PREMARIN) 0.625 MG/GM vaginal cream Place 1 g vaginally three times a week 30 g 1     finasteride (PROSCAR) 5 MG tablet Take 1 tablet (5 mg) by mouth daily 90 tablet 3     fluticasone (FLONASE) 50 MCG/ACT nasal spray Spray 1-2 sprays into both nostrils daily 48 g 3     fluticasone-salmeterol (ADVAIR DISKUS) 500-50 MCG/DOSE inhaler Inhale 1 puff into the lungs 2 times daily 3 each 1     fluticasone-salmeterol (ADVAIR) 500-50 MCG/ACT inhaler Inhale 1 puff into the lungs every 12 hours 60 each 3     lisinopril (ZESTRIL) 40 MG tablet Take 1 tablet (40 mg) by mouth daily 90 tablet 3     montelukast (SINGULAIR) 10 MG tablet Take 1 tablet (10 mg) by mouth daily 90 tablet 3     polyethylene glycol (GOLYTELY) 236 g suspension Take as directed. One day before exam fill the jug with water. Cover and shake until well mixed. At 6 PM start drinking an 8oz glass of mixture every 15 minutes until jug is 1/2 empty. Store remainder in the refrigerator.  At 11 PM Start drinking the other half of the Golytely jug. Drink one 8-ounce glass every 15 minutes until the jug is empty. 4000 mL 0     predniSONE (DELTASONE) 20 MG tablet For arthritis flare not aborted with colchicine: prednisone 20mg once. 1 tablet 0     venlafaxine (EFFEXOR XR) 75 MG 24 hr capsule Take 1 capsule (75 mg) by mouth daily With 150 mg capsule for a total of 225 mg daily 90 capsule 1     venlafaxine (EFFEXOR-XR) 150 MG 24 hr  capsule Take 1 capsule (150 mg) by mouth daily 90 capsule 3     vitamin D2 (ERGOCALCIFEROL) 96513 units (1250 mcg) capsule Take 1 capsule (50,000 Units) by mouth once a week 12 capsule 0     No facility-administered encounter medications on file as of 12/7/2022.             O:   NAD, WDWN, Alert & Oriented, Mood & Affect wnl, Vitals stable   Here today alone   LMP 12/21/2014    General appearance normal   Vitals stable   Alert, oriented and in no acute distress     Hair thinning on frontal scalp, no scarring, no rash    No visible urticaria today  Atrophic patch with slight erosion       Eyes: Conjunctivae/lids:Normal     ENT: Lips: normal    MSK:Normal    Cardiovascular: peripheral edema none    Pulm: Breathing Normal    Neuro/Psych: Orientation:Alert and Orientedx3 ; Mood/Affect:normal     A/P:  1. Androgenetic alopecia  Controlled with finasteride, refilled.   2. R/O pilar cyst on right frontal scalp, right occipital scalp, left occipital scalp  4 mm punch BIOPSIES SENT OUT:  After consent, anesthesia with LEC and prep, punch excision performed, closed with 4-0 ethilon suture, 2 sutures were placed.  and specimen sent out for permanent section histology.  No complications and routine wound care. Patient told to call our office in 1-2 weeks for result.      3. Inflamed skin tags on axilla on lower chest x 8  After consent, anesthesia with LEC and prep, tangential excision performed.  No complications and routine wound care.  3. Lichen sclerosus- controlled  Refilled clobetasol     2. Chronic urticaria/dermatographism   Start 2 claritin in am and 2 zyrtec in the evening.   Skin care regimen reviewed with patient: Eliminate harsh soaps, i.e. Dial, zest, irsih spring; Mild soaps such as Cetaphil or Dove sensitive skin, avoid hot or cold showers, aggressive use of emollients including vanicream, cetaphil or cerave discussed with patient.    3. Lichen sclerosus  Apply clobetasol twice daily for 2 week then 2-3 times  weekly after       Again, thank you for allowing me to participate in the care of your patient.        Sincerely,        Shea Coulter PA-C

## 2022-12-08 DIAGNOSIS — Z91.09 OTHER ALLERGY STATUS, OTHER THAN TO DRUGS AND BIOLOGICAL SUBSTANCES: ICD-10-CM

## 2022-12-08 RX ORDER — FLUTICASONE PROPIONATE 50 MCG
SPRAY, SUSPENSION (ML) NASAL
Qty: 48 G | Refills: 0 | Status: SHIPPED | OUTPATIENT
Start: 2022-12-08 | End: 2023-05-15

## 2022-12-08 NOTE — PROGRESS NOTES
Nela Mares is an extremely pleasant 61 year old year old female patient here today for hair loss present since she had covid November 2020. She reports her hair has been shedding. She also has had chronic urticaria since she had covid, she notes that it has improved since it started. Benadryl does help her sleep at night. She also notes she had has issues with vaginal itching and premarin is not helping.  Patient has no other skin complaints today.  Remainder of the HPI, Meds, PMH, Allergies, FH, and SH was reviewed in chart.   Past Medical History:   Diagnosis Date     Allergic rhinitis, cause unspecified      Cervical high risk HPV (human papillomavirus) test positive 03/21/2017    3/21/17 NIL pap/+ HR HPV (not 16 or 18).      Depressive disorder      Depressive disorder, not elsewhere classified     seasonal     Hypertension      Mild persistent asthma      Obstructive sleep apnea (adult) (pediatric)     uses CPAP     Primary osteoarthritis of both knees 1/28/2022     Scalp mass 7/2014       Past Surgical History:   Procedure Laterality Date     COLONOSCOPY  6/21/2012    Procedure: COLONOSCOPY;  COLONOSCOPY, SCREEN;  Surgeon: Bart You MD;  Location: [facility] OR     COLONOSCOPY N/A 10/20/2022    Procedure: COLONOSCOPY, WITH POLYPECTOMY AND BIOPSY;  Surgeon: Chago Hong DO;  Location:  GI     D & C       ENDOSCOPIC RETROGRADE CHOLANGIOPANCREATOGRAM  1/4/2014    Procedure: ENDOSCOPIC RETROGRADE CHOLANGIOPANCREATOGRAM;  ERCP biliary sphincterotomy, bile duct stone removal, epinepherine washing.;  Surgeon: Ba Meyers MD;  Location:  OR     EP ABLATION SVT N/A 6/2/2021    Procedure: EP ABLATION SVT;  Surgeon: Cuca Magaña MD;  Location:  [facility]     LAPAROSCOPIC CHOLECYSTECTOMY WITH CHOLANGIOGRAMS  1/4/2014    Procedure: LAPAROSCOPIC CHOLECYSTECTOMY WITH CHOLANGIOGRAMS;;  Surgeon: Haja Sage MD;  Location:  OR     NO HISTORY OF SURGERY       OPERATIVE  HYSTEROSCOPY  6/17/2014    Procedure: OPERATIVE HYSTEROSCOPY;  Surgeon: Paola Camara MD;  Location: [facility] OR     REMOVE INTRAUTERINE DEVICE  6/17/2014    Procedure: REMOVE INTRAUTERINE DEVICE;  Surgeon: Paola Camara MD;  Location: [facility] OR        Family History   Problem Relation Age of Onset     C.A.D. Paternal Grandmother      Cerebrovascular Disease Paternal Grandmother      Depression Paternal Grandmother      Obesity Paternal Grandmother      C.A.D. Paternal Grandfather      Cerebrovascular Disease Paternal Grandfather      Substance Abuse Paternal Grandfather      Hypertension Father      Asthma Father      Obesity Father      Skin Cancer Father      Diabetes Father      Neurologic Disorder Mother         Graves disease     Hyperlipidemia Mother      Depression Mother      Thyroid Disease Mother      Anxiety Disorder Mother      Other - See Comments Other      Skin Cancer Sister      Asthma Sister      Depression Maternal Grandmother      Cancer Sister         mild skin cancer,cured from excision     Depression Nephew      Anxiety Disorder Nephew      Substance Abuse Nephew      Asthma Nephew      Anxiety Disorder Nephew      Mental Illness Maternal Half-Brother      Mental Illness Paternal Half-Brother      Substance Abuse Nephew      Asthma Sister      Asthma Nephew      Thyroid Disease Maternal Half-Sister      Thyroid Disease Paternal Half-Sister        Social History     Socioeconomic History     Marital status: Single     Spouse name: Not on file     Number of children: 0     Years of education: 18     Highest education level: Not on file   Occupational History     Occupation:    Tobacco Use     Smoking status: Never     Smokeless tobacco: Never   Vaping Use     Vaping Use: Never used   Substance and Sexual Activity     Alcohol use: No     Drug use: No     Sexual activity: Not Currently     Partners: Male     Birth control/protection: Post-menopausal   Other  Topics Concern     Parent/sibling w/ CABG, MI or angioplasty before 65F 55M? No   Social History Narrative    Dairy/d 3 servings/d.     Caffeine 2 servings/d    Exercise 0 x week    Sunscreen used - No    Seatbelts used - Yes    Working smoke/CO detectors in the home - Yes    Guns stored in the home - No    Self Breast Exams - no    Eye Exam up to date - No    Dental Exam up to date - Yes    Pap Smear up to date - Yes    Mammogram up to date - Yes    Dexa Scan up to date - NOT APPLICABLE    Flex Sig / Colonoscopy up to date - NOT APPLICABLE    Immunizations up to date - Yes    Abuse: Current or Past(Physical, Sexual or Emotional)- No    Do you feel safe in your environment - Yes    GÓMEZ Cummings    11/23/11                     Social Determinants of Health     Financial Resource Strain: Not on file   Food Insecurity: Not on file   Transportation Needs: Not on file   Physical Activity: Not on file   Stress: Not on file   Social Connections: Not on file   Intimate Partner Violence: Not on file   Housing Stability: Not on file       Outpatient Encounter Medications as of 12/7/2022   Medication Sig Dispense Refill     albuterol (PROAIR HFA/PROVENTIL HFA/VENTOLIN HFA) 108 (90 Base) MCG/ACT inhaler Inhale 2 puffs into the lungs every 4 hours as needed for shortness of breath / dyspnea or wheezing 36 g 1     albuterol (PROVENTIL) (2.5 MG/3ML) 0.083% neb solution Take 1 vial (2.5 mg) by nebulization every 4 hours as needed for shortness of breath / dyspnea or wheezing 30 mL 11     amLODIPine (NORVASC) 5 MG tablet Take 1 tablet (5 mg) by mouth daily 90 tablet 0     bisacodyl (DULCOLAX) 5 MG EC tablet Take as directed. One day before exam take 2 tablets at 3 PM. Take 2 tablets at 11 PM. 4 tablet 0     cetirizine (ZYRTEC) 10 MG tablet Take 1 tablet (10 mg) by mouth daily 90 tablet 3     Cholecalciferol (VITAMIN D) 125 MCG (5000 UT) capsule Take 1 capsule (5,000 Units) by mouth daily 90 capsule 3     clobetasol (TEMOVATE) 0.05 %  external ointment Apply twice daily to affected area for 2 weeks then 2-3 times weekly. 60 g 3     colchicine (COLCYRS) 0.6 MG tablet For arthritis flare only: Take 2 tablets (1.2 mg) at first sign of flare, then 0.6 mg 1 hour later if not resolved. 3 tablet 0     conjugated estrogens (PREMARIN) 0.625 MG/GM vaginal cream Place 1 g vaginally three times a week 30 g 1     finasteride (PROSCAR) 5 MG tablet Take 1 tablet (5 mg) by mouth daily 90 tablet 3     fluticasone (FLONASE) 50 MCG/ACT nasal spray Spray 1-2 sprays into both nostrils daily 48 g 3     fluticasone-salmeterol (ADVAIR DISKUS) 500-50 MCG/DOSE inhaler Inhale 1 puff into the lungs 2 times daily 3 each 1     fluticasone-salmeterol (ADVAIR) 500-50 MCG/ACT inhaler Inhale 1 puff into the lungs every 12 hours 60 each 3     lisinopril (ZESTRIL) 40 MG tablet Take 1 tablet (40 mg) by mouth daily 90 tablet 3     montelukast (SINGULAIR) 10 MG tablet Take 1 tablet (10 mg) by mouth daily 90 tablet 3     polyethylene glycol (GOLYTELY) 236 g suspension Take as directed. One day before exam fill the jug with water. Cover and shake until well mixed. At 6 PM start drinking an 8oz glass of mixture every 15 minutes until jug is 1/2 empty. Store remainder in the refrigerator.  At 11 PM Start drinking the other half of the Golytely jug. Drink one 8-ounce glass every 15 minutes until the jug is empty. 4000 mL 0     predniSONE (DELTASONE) 20 MG tablet For arthritis flare not aborted with colchicine: prednisone 20mg once. 1 tablet 0     venlafaxine (EFFEXOR XR) 75 MG 24 hr capsule Take 1 capsule (75 mg) by mouth daily With 150 mg capsule for a total of 225 mg daily 90 capsule 1     venlafaxine (EFFEXOR-XR) 150 MG 24 hr capsule Take 1 capsule (150 mg) by mouth daily 90 capsule 3     vitamin D2 (ERGOCALCIFEROL) 26940 units (1250 mcg) capsule Take 1 capsule (50,000 Units) by mouth once a week 12 capsule 0     No facility-administered encounter medications on file as of 12/7/2022.              O:   NAD, WDWN, Alert & Oriented, Mood & Affect wnl, Vitals stable   Here today alone   LMP 12/21/2014    General appearance normal   Vitals stable   Alert, oriented and in no acute distress     Hair thinning on frontal scalp, no scarring, no rash    No visible urticaria today  Atrophic patch with slight erosion       Eyes: Conjunctivae/lids:Normal     ENT: Lips: normal    MSK:Normal    Cardiovascular: peripheral edema none    Pulm: Breathing Normal    Neuro/Psych: Orientation:Alert and Orientedx3 ; Mood/Affect:normal     A/P:  1. Androgenetic alopecia  Controlled with finasteride, refilled.   2. R/O pilar cyst on right frontal scalp, right occipital scalp, left occipital scalp  4 mm punch BIOPSIES SENT OUT:  After consent, anesthesia with LEC and prep, punch excision performed, closed with 4-0 ethilon suture, 2 sutures were placed.  and specimen sent out for permanent section histology.  No complications and routine wound care. Patient told to call our office in 1-2 weeks for result.      3. Inflamed skin tags on axilla on lower chest x 8  After consent, anesthesia with LEC and prep, tangential excision performed.  No complications and routine wound care.  3. Lichen sclerosus- controlled  Refilled clobetasol     2. Chronic urticaria/dermatographism   Start 2 claritin in am and 2 zyrtec in the evening.   Skin care regimen reviewed with patient: Eliminate harsh soaps, i.e. Dial, zest, irsih spring; Mild soaps such as Cetaphil or Dove sensitive skin, avoid hot or cold showers, aggressive use of emollients including vanicream, cetaphil or cerave discussed with patient.    3. Lichen sclerosus  Apply clobetasol twice daily for 2 week then 2-3 times weekly after

## 2022-12-09 LAB
PATH REPORT.COMMENTS IMP SPEC: NORMAL
PATH REPORT.COMMENTS IMP SPEC: NORMAL
PATH REPORT.FINAL DX SPEC: NORMAL
PATH REPORT.GROSS SPEC: NORMAL
PATH REPORT.MICROSCOPIC SPEC OTHER STN: NORMAL
PATH REPORT.RELEVANT HX SPEC: NORMAL

## 2022-12-14 ENCOUNTER — ALLIED HEALTH/NURSE VISIT (OUTPATIENT)
Dept: DERMATOLOGY | Facility: CLINIC | Age: 62
End: 2022-12-14
Payer: COMMERCIAL

## 2022-12-14 DIAGNOSIS — Z48.02 VISIT FOR SUTURE REMOVAL: Primary | ICD-10-CM

## 2022-12-14 PROCEDURE — 99207 PR NO CHARGE NURSE ONLY: CPT

## 2022-12-14 NOTE — NURSING NOTE
Patient presents to the clinic for removal of sutures. The patient has had sutures in place for 7 days. There has been no patient reported signs or symptoms of infection or drainage. 6 sutures are seen and located as follows: 2 on right frontal scalp, 2 on right occipital scalp and 2 on left occipital scalp. Sutures were easily removed today. Routine wound care discussed by the RN or provider. The patient will follow up as needed.    Linda PINEDO

## 2022-12-20 ENCOUNTER — TELEPHONE (OUTPATIENT)
Dept: GASTROENTEROLOGY | Facility: CLINIC | Age: 62
End: 2022-12-20

## 2022-12-20 NOTE — TELEPHONE ENCOUNTER
Screening Questions  BLUE  KIND OF PREP RED  LOCATION [review exclusion criteria] GREEN  SEDATION TYPE        y Are you active on mychart?       Hong Ordering/Referring Provider?        HP What type of coverage do you have?      n Have you had a positive covid test in the last 14 days?     35.4 1. BMI  [BMI 40+ - review exclusion criteria]    y  2. Are you able to give consent for your medical care? [IF NO,RN REVIEW]        n  3. Are you taking any prescription pain medications on a routine schedule?      n  3a. EXTENDED PREP What kind of prescription?     n 4. Do you have any chemical dependencies such as alcohol, street drugs, or methadone?    n 5. Do you have any history of post-traumatic stress syndrome, severe anxiety or history of psychosis?      **If yes 3- 5 , please schedule with MAC sedation.**          IF YES TO ANY 6 - 10 - HOSPITAL SETTING ONLY.     n 6.   Do you need assistance transferring?     n 7.   Have you had a heart or lung transplant?    n 8.   Are you currently on dialysis?   n 9.   Do you use daily home oxygen?   n 10. Do you take nitroglycerin?   10a. n If yes, how often?     11. [FEMALES]  n Are you currently pregnant?    11a. n If yes, how many weeks? [ Greater than 12 weeks, OR NEEDED]    n 12. Do you have Pulmonary Hypertension? *NEED PAC APPT AT [facility]*     n 13. [review exclusion criteria]  Do you have any implantable devices in your body (pacemaker, defib, LVAD)?    n 14. In the past 6 months, have you had any heart related issues including cardiomyopathy or heart attack?     14a. n If yes, did it require cardiac stenting if so when?     n 15. Have you had a stroke or Transient ischemic attack (TIA - aka  mini stroke ) within 6 months?      y 16. Do you have mod to severe Obstructive Sleep Apnea?  [Hospital only - Ok at [place]]    n 17. Do you have SEVERE AND UNCONTROLLED asthma? *NEED PAC APPT AT [facility]*     n 18. Are you currently taking any blood thinners?     18a. If yes,  "inform patient to \"follow up w/ ordering provider for bridging instructions.\"    n 19. Do you take the medication Phentermine?    19a. If yes, \"Hold for 7 days before procedure.  Please consult your prescribing provider if you have questions about holding this medication.\"     n  20. Do you have chronic kidney disease?      n  21. Do you have a diagnosis of diabetes?     n  22. On a regular basis do you go 3-5 days between bowel movements?      23. Preferred LOCAL Pharmacy for Pre Prescription    [ LIST ONLY ONE PHARMACY]     [facility] [address]        - CLOSING REMINDERS -    Informed patient they will need an adult    Cannot take any type of public or medical transportation alone    Conscious Sedation- Needs  for 6 hours after the procedure       MAC/General-Needs  for 24 hours after procedure    Pre-Procedure Covid test to be completed [[facility] PCR Testing Required]    Confirmed Nurse will call to complete assessment       - SCHEDULING DETAILS -  y Hospital Setting Required? If yes, what is the exclusion?: MACIE Hong  Surgeon    3/14/23  Date of Procedure  Lower Endoscopy [Colonoscopy]  Type of Procedure Scheduled  [facility]-If you answer yes to questions #8, #20, #21Which Colonoscopy Prep was Sent?     CS Sedation Type     n PAC / Pre-op Required                 "

## 2023-01-15 DIAGNOSIS — F33.42 RECURRENT MAJOR DEPRESSIVE DISORDER, IN FULL REMISSION (H): ICD-10-CM

## 2023-01-15 DIAGNOSIS — F32.0 MILD MAJOR DEPRESSION (H): ICD-10-CM

## 2023-01-16 RX ORDER — VENLAFAXINE HYDROCHLORIDE 150 MG/1
150 CAPSULE, EXTENDED RELEASE ORAL DAILY
Qty: 90 CAPSULE | Refills: 0 | Status: SHIPPED | OUTPATIENT
Start: 2023-01-16 | End: 2023-02-20

## 2023-01-16 RX ORDER — VENLAFAXINE HYDROCHLORIDE 150 MG/1
150 CAPSULE, EXTENDED RELEASE ORAL DAILY
Qty: 90 CAPSULE | Refills: 0 | OUTPATIENT
Start: 2023-01-16

## 2023-01-16 RX ORDER — VENLAFAXINE HYDROCHLORIDE 75 MG/1
75 CAPSULE, EXTENDED RELEASE ORAL DAILY
Qty: 90 CAPSULE | Refills: 0 | Status: SHIPPED | OUTPATIENT
Start: 2023-01-16 | End: 2023-02-20

## 2023-02-09 ENCOUNTER — TELEPHONE (OUTPATIENT)
Dept: FAMILY MEDICINE | Facility: CLINIC | Age: 63
End: 2023-02-09
Payer: COMMERCIAL

## 2023-02-09 DIAGNOSIS — I10 ESSENTIAL HYPERTENSION WITH GOAL BLOOD PRESSURE LESS THAN 140/90: ICD-10-CM

## 2023-02-09 RX ORDER — LISINOPRIL 40 MG/1
40 TABLET ORAL DAILY
Qty: 90 TABLET | Refills: 0 | Status: SHIPPED | OUTPATIENT
Start: 2023-02-09 | End: 2023-02-20

## 2023-02-10 NOTE — TELEPHONE ENCOUNTER
Called patient and left a voicemail message to call the clinic and schedule an Annual physical/ blood pressure f/u - med check appointment.      Chelsea Khoury

## 2023-02-15 ENCOUNTER — TELEPHONE (OUTPATIENT)
Dept: GASTROENTEROLOGY | Facility: CLINIC | Age: 63
End: 2023-02-15
Payer: COMMERCIAL

## 2023-02-15 NOTE — TELEPHONE ENCOUNTER
Reason for Reschedule/Cancellation (please be detailed, any staff messages or encounters to note?): Provider out      Prior to reschedule please review:    Ordering Provider:Chago Hong, DO    Sedation per order:Moderate    Does patient have any ASC Exclusions, please identify?: MACIE      Notes on Cancelled Procedure:    Procedure:Lower Endoscopy [Colonoscopy]     Date: 03/14    Location:[facility]; [address] [place] [address], [address] Floor, [place] [address]    Surgeon: Hal        Rescheduled: No - [facility] [place]. Left voicemail for patient informing procedure canceled and to return call.  [employer] message sent.      ** Patient can be scheduled with any provider per  **

## 2023-02-15 NOTE — TELEPHONE ENCOUNTER
Caller: Nela Mares    Reason for Reschedule/Cancellation (please be detailed, any staff messages or encounters to note?): provider out       Prior to reschedule please review:    Ordering Provider:gilda    Sedation per order:mod    Does patient have any ASC Exclusions, please identify?: yes kumar      Notes on Cancelled Procedure:    Procedure:Lower Endoscopy [Colonoscopy]     Date: 03/14/2023    Location:[facility]; [address] [place] [address]    Surgeon: gilda        Rescheduled: Yes    Procedure: Lower Endoscopy [Colonoscopy]     Date: 03/20/2023    Location:[facility]; [address] [place] [address]    Surgeon: Alexander Verma    Sedation Level Scheduled  mod,  Reason for Sedation Level screening    Prep/Instructions updated and sent: yuri

## 2023-02-15 NOTE — TELEPHONE ENCOUNTER
Called patient and scheduled physical/ blood pressure f/u/ med check.    DORIS Duenas  [facility]

## 2023-02-19 ASSESSMENT — ENCOUNTER SYMPTOMS: BREAST MASS: 0

## 2023-02-19 ASSESSMENT — ASTHMA QUESTIONNAIRES
QUESTION_2 LAST FOUR WEEKS HOW OFTEN HAVE YOU HAD SHORTNESS OF BREATH: NOT AT ALL
QUESTION_4 LAST FOUR WEEKS HOW OFTEN HAVE YOU USED YOUR RESCUE INHALER OR NEBULIZER MEDICATION (SUCH AS ALBUTEROL): NOT AT ALL
QUESTION_5 LAST FOUR WEEKS HOW WOULD YOU RATE YOUR ASTHMA CONTROL: WELL CONTROLLED
ACT_TOTALSCORE: 24
ACT_TOTALSCORE: 24
QUESTION_3 LAST FOUR WEEKS HOW OFTEN DID YOUR ASTHMA SYMPTOMS (WHEEZING, COUGHING, SHORTNESS OF BREATH, CHEST TIGHTNESS OR PAIN) WAKE YOU UP AT NIGHT OR EARLIER THAN USUAL IN THE MORNING: NOT AT ALL
QUESTION_1 LAST FOUR WEEKS HOW MUCH OF THE TIME DID YOUR ASTHMA KEEP YOU FROM GETTING AS MUCH DONE AT WORK, SCHOOL OR AT HOME: NONE OF THE TIME

## 2023-02-20 ENCOUNTER — OFFICE VISIT (OUTPATIENT)
Dept: FAMILY MEDICINE | Facility: CLINIC | Age: 63
End: 2023-02-20
Payer: COMMERCIAL

## 2023-02-20 VITALS
OXYGEN SATURATION: 98 % | BODY MASS INDEX: 38.01 KG/M2 | TEMPERATURE: 98.2 F | HEART RATE: 73 BPM | SYSTOLIC BLOOD PRESSURE: 128 MMHG | DIASTOLIC BLOOD PRESSURE: 82 MMHG | HEIGHT: 68 IN

## 2023-02-20 DIAGNOSIS — N62 HYPERTROPHY OF BREAST: ICD-10-CM

## 2023-02-20 DIAGNOSIS — F32.0 MILD MAJOR DEPRESSION (H): ICD-10-CM

## 2023-02-20 DIAGNOSIS — Z13.220 SCREENING FOR HYPERLIPIDEMIA: ICD-10-CM

## 2023-02-20 DIAGNOSIS — I10 ESSENTIAL HYPERTENSION WITH GOAL BLOOD PRESSURE LESS THAN 140/90: ICD-10-CM

## 2023-02-20 DIAGNOSIS — Z00.00 ROUTINE GENERAL MEDICAL EXAMINATION AT A HEALTH CARE FACILITY: Primary | ICD-10-CM

## 2023-02-20 DIAGNOSIS — J45.30 MILD PERSISTENT ASTHMA WITHOUT COMPLICATION: ICD-10-CM

## 2023-02-20 DIAGNOSIS — N62 LARGE BREASTS: ICD-10-CM

## 2023-02-20 DIAGNOSIS — R73.01 IMPAIRED FASTING GLUCOSE: ICD-10-CM

## 2023-02-20 DIAGNOSIS — L90.0 LICHEN SCLEROSUS: ICD-10-CM

## 2023-02-20 DIAGNOSIS — I10 HYPERTENSION GOAL BP (BLOOD PRESSURE) < 140/90: ICD-10-CM

## 2023-02-20 DIAGNOSIS — H91.92 HEARING LOSS OF LEFT EAR, UNSPECIFIED HEARING LOSS TYPE: ICD-10-CM

## 2023-02-20 PROBLEM — E11.9 DIABETES MELLITUS, TYPE 2 (H): Status: ACTIVE | Noted: 2023-02-20

## 2023-02-20 LAB
ANION GAP SERPL CALCULATED.3IONS-SCNC: 14 MMOL/L (ref 7–15)
BUN SERPL-MCNC: 13.7 MG/DL (ref 8–23)
CALCIUM SERPL-MCNC: 9.6 MG/DL (ref 8.8–10.2)
CHLORIDE SERPL-SCNC: 106 MMOL/L (ref 98–107)
CHOLEST SERPL-MCNC: 239 MG/DL
CREAT SERPL-MCNC: 0.95 MG/DL (ref 0.51–0.95)
DEPRECATED HCO3 PLAS-SCNC: 26 MMOL/L (ref 22–29)
GFR SERPL CREATININE-BSD FRML MDRD: 67 ML/MIN/1.73M2
GLUCOSE SERPL-MCNC: 120 MG/DL (ref 70–99)
HBA1C MFR BLD: 6.5 % (ref 0–5.6)
HDLC SERPL-MCNC: 56 MG/DL
HOLD SPECIMEN: NORMAL
LDLC SERPL CALC-MCNC: 158 MG/DL
NONHDLC SERPL-MCNC: 183 MG/DL
POTASSIUM SERPL-SCNC: 4.3 MMOL/L (ref 3.4–5.3)
SODIUM SERPL-SCNC: 146 MMOL/L (ref 136–145)
TRIGL SERPL-MCNC: 125 MG/DL

## 2023-02-20 PROCEDURE — 80061 LIPID PANEL: CPT | Performed by: STUDENT IN AN ORGANIZED HEALTH CARE EDUCATION/TRAINING PROGRAM

## 2023-02-20 PROCEDURE — 83036 HEMOGLOBIN GLYCOSYLATED A1C: CPT | Performed by: STUDENT IN AN ORGANIZED HEALTH CARE EDUCATION/TRAINING PROGRAM

## 2023-02-20 PROCEDURE — 36415 COLL VENOUS BLD VENIPUNCTURE: CPT | Performed by: STUDENT IN AN ORGANIZED HEALTH CARE EDUCATION/TRAINING PROGRAM

## 2023-02-20 PROCEDURE — 99396 PREV VISIT EST AGE 40-64: CPT | Mod: 25 | Performed by: STUDENT IN AN ORGANIZED HEALTH CARE EDUCATION/TRAINING PROGRAM

## 2023-02-20 PROCEDURE — 99214 OFFICE O/P EST MOD 30 MIN: CPT | Mod: 25 | Performed by: STUDENT IN AN ORGANIZED HEALTH CARE EDUCATION/TRAINING PROGRAM

## 2023-02-20 PROCEDURE — 90677 PCV20 VACCINE IM: CPT | Performed by: STUDENT IN AN ORGANIZED HEALTH CARE EDUCATION/TRAINING PROGRAM

## 2023-02-20 PROCEDURE — 80048 BASIC METABOLIC PNL TOTAL CA: CPT | Performed by: STUDENT IN AN ORGANIZED HEALTH CARE EDUCATION/TRAINING PROGRAM

## 2023-02-20 PROCEDURE — 90471 IMMUNIZATION ADMIN: CPT | Performed by: STUDENT IN AN ORGANIZED HEALTH CARE EDUCATION/TRAINING PROGRAM

## 2023-02-20 RX ORDER — VENLAFAXINE HYDROCHLORIDE 75 MG/1
75 CAPSULE, EXTENDED RELEASE ORAL DAILY
Qty: 90 CAPSULE | Refills: 3 | Status: SHIPPED | OUTPATIENT
Start: 2023-02-20 | End: 2023-09-28 | Stop reason: DRUGHIGH

## 2023-02-20 RX ORDER — AMLODIPINE BESYLATE 5 MG/1
5 TABLET ORAL DAILY
Qty: 90 TABLET | Refills: 3 | Status: SHIPPED | OUTPATIENT
Start: 2023-02-20 | End: 2023-12-14

## 2023-02-20 RX ORDER — LISINOPRIL 40 MG/1
40 TABLET ORAL DAILY
Qty: 90 TABLET | Refills: 3 | Status: SHIPPED | OUTPATIENT
Start: 2023-02-20 | End: 2024-03-22

## 2023-02-20 RX ORDER — MONTELUKAST SODIUM 10 MG/1
10 TABLET ORAL DAILY
Qty: 90 TABLET | Refills: 3 | Status: SHIPPED | OUTPATIENT
Start: 2023-02-20 | End: 2023-05-15

## 2023-02-20 RX ORDER — VENLAFAXINE HYDROCHLORIDE 150 MG/1
150 CAPSULE, EXTENDED RELEASE ORAL DAILY
Qty: 90 CAPSULE | Refills: 3 | Status: SHIPPED | OUTPATIENT
Start: 2023-02-20 | End: 2023-09-28 | Stop reason: DRUGHIGH

## 2023-02-20 ASSESSMENT — ANXIETY QUESTIONNAIRES
GAD7 TOTAL SCORE: 0
7. FEELING AFRAID AS IF SOMETHING AWFUL MIGHT HAPPEN: NOT AT ALL
GAD7 TOTAL SCORE: 0
IF YOU CHECKED OFF ANY PROBLEMS ON THIS QUESTIONNAIRE, HOW DIFFICULT HAVE THESE PROBLEMS MADE IT FOR YOU TO DO YOUR WORK, TAKE CARE OF THINGS AT HOME, OR GET ALONG WITH OTHER PEOPLE: NOT DIFFICULT AT ALL
7. FEELING AFRAID AS IF SOMETHING AWFUL MIGHT HAPPEN: NOT AT ALL
4. TROUBLE RELAXING: NOT AT ALL
8. IF YOU CHECKED OFF ANY PROBLEMS, HOW DIFFICULT HAVE THESE MADE IT FOR YOU TO DO YOUR WORK, TAKE CARE OF THINGS AT HOME, OR GET ALONG WITH OTHER PEOPLE?: NOT DIFFICULT AT ALL
3. WORRYING TOO MUCH ABOUT DIFFERENT THINGS: NOT AT ALL
6. BECOMING EASILY ANNOYED OR IRRITABLE: NOT AT ALL
2. NOT BEING ABLE TO STOP OR CONTROL WORRYING: NOT AT ALL
5. BEING SO RESTLESS THAT IT IS HARD TO SIT STILL: NOT AT ALL
GAD7 TOTAL SCORE: 0
1. FEELING NERVOUS, ANXIOUS, OR ON EDGE: NOT AT ALL

## 2023-02-20 ASSESSMENT — PATIENT HEALTH QUESTIONNAIRE - PHQ9
SUM OF ALL RESPONSES TO PHQ QUESTIONS 1-9: 0
10. IF YOU CHECKED OFF ANY PROBLEMS, HOW DIFFICULT HAVE THESE PROBLEMS MADE IT FOR YOU TO DO YOUR WORK, TAKE CARE OF THINGS AT HOME, OR GET ALONG WITH OTHER PEOPLE: NOT DIFFICULT AT ALL
SUM OF ALL RESPONSES TO PHQ QUESTIONS 1-9: 0

## 2023-02-20 ASSESSMENT — ENCOUNTER SYMPTOMS: BREAST MASS: 0

## 2023-02-20 NOTE — PROGRESS NOTES
Answers for HPI/ROS submitted by the patient on 2/20/2023  If you checked off any problems, how difficult have these problems made it for you to do your work, take care of things at home, or get along with other people?: Not difficult at all  PHQ9 TOTAL SCORE: 0  PETE 7 TOTAL SCORE: 0   SUBJECTIVE:   CC: Sanam is an 62 year old who presents for preventive health visit.   Patient has been advised of split billing requirements and indicates understanding: Yes  Healthy Habits:     Getting at least 3 servings of Calcium per day:  Yes    Bi-annual eye exam:  Yes    Dental care twice a year:  Yes    Sleep apnea or symptoms of sleep apnea:  Sleep apnea    Diet:  Regular (no restrictions)    Frequency of exercise:  None    Taking medications regularly:  Yes    Medication side effects:  None    PHQ-2 Total Score: 0    Additional concerns today:  No        Mammogram done 8/11/2022 -normal  Colonoscopy scheduled 3/20/23    Interested in breast reduction. Bra size 40 H. Upper back pain. Have grown after menopause. Always have been big.     Left hearing impaired. No tinnitus. Chronic.     Hasn't been exercising. Doesn't walk in winter.   Eating poorly     Mental health has been really good.     Last eye appt July 2022 - [facility]     Never smoker   No alcohol or drug use     Has lichen sclerosis - clobetasol daily has been very helpful. Tried premarin cream previously without improvement.      Lab Results   Component Value Date    A1C 6.5 02/20/2023    A1C 6.0 12/02/2021    A1C 6.0 03/05/2021    A1C 5.7 07/20/2020    A1C 5.9 07/18/2019    A1C 5.7 03/16/2017         Today's PHQ-2 Score:   PHQ-2 ( 1999 Pf[employer]) 2/19/2023   Q1: Little interest or pleasure in doing things 0   Q2: Feeling down, depressed or hopeless 0   PHQ-2 Score 0   Q1: Little interest or pleasure in doing things Not at all   Q2: Feeling down, depressed or hopeless Not at all   PHQ-2 Score 0       Have you ever done Advance Care Planning? (For  example, a Health Directive, POLST, or a discussion with a medical provider or your loved ones about your wishes): No, advance care planning information given to patient to review.  Patient declined advance care planning discussion at this time.    Social History     Tobacco Use     Smoking status: Never     Smokeless tobacco: Never   Substance Use Topics     Alcohol use: No     If you drink alcohol do you typically have >3 drinks per day or >7 drinks per week? No    Alcohol Use 2/20/2023   Prescreen: >3 drinks/day or >7 drinks/week? -   Prescreen: >3 drinks/day or >7 drinks/week? No       Reviewed orders with patient.  Reviewed health maintenance and updated orders accordingly - Yes  Lab work is in process  Labs reviewed in EPIC  BP Readings from Last 3 Encounters:   02/20/23 128/82   10/20/22 134/85   03/30/22 (!) 138/93    Wt Readings from Last 3 Encounters:   03/05/21 113.4 kg (250 lb)   11/02/20 117 kg (258 lb)   07/21/14 117 kg (258 lb)                  Patient Active Problem List   Diagnosis     Allergic rhinitis due to animals     Mild persistent asthma     Obstructive sleep apnea     Leiomyoma of uterus     Mild major depression (H)     CARDIOVASCULAR SCREENING; LDL GOAL LESS THAN 160     Hypertension goal BP (blood pressure) < 140/90     Pilar cyst     Hypertrophy of breast     IUD (intrauterine device) in place     Seasonal allergic rhinitis due to pollen     Allergic rhinitis due to dust mite     Allergic rhinitis due to mold     Overweight (H)     Impaired fasting glucose     Allergic conjunctivitis, bilateral     Paroxysmal supraventricular tachycardia (H)     Chronic midline low back pain without sciatica     Primary osteoarthritis of both knees     Past Surgical History:   Procedure Laterality Date     COLONOSCOPY  6/21/2012    Procedure: COLONOSCOPY;  COLONOSCOPY, SCREEN;  Surgeon: Bart You MD;  [facility]     COLONOSCOPY N/A 10/20/2022    Procedure: COLONOSCOPY, WITH POLYPECTOMY  AND BIOPSY;  Surgeon: Chago Hong DO;  Location: [facility]     D & C       ENDOSCOPIC RETROGRADE CHOLANGIOPANCREATOGRAM  1/4/2014    Procedure: ENDOSCOPIC RETROGRADE CHOLANGIOPANCREATOGRAM;  ERCP biliary sphincterotomy, bile duct stone removal, epinepherine washing.;  Surgeon: Ba Meyers MD;  Location: [facility]     EP ABLATION SVT N/A 6/2/2021    Procedure: EP ABLATION SVT;  Surgeon: Cuca Magaña MD;  [facility]     LAPAROSCOPIC CHOLECYSTECTOMY WITH CHOLANGIOGRAMS  1/4/2014    Procedure: LAPAROSCOPIC CHOLECYSTECTOMY WITH CHOLANGIOGRAMS;;  Surgeon: Haja Sage MD;  [facility]     NO HISTORY OF SURGERY       OPERATIVE HYSTEROSCOPY  6/17/2014    Procedure: OPERATIVE HYSTEROSCOPY;  Surgeon: Paola Camara MD;  [facility]     REMOVE INTRAUTERINE DEVICE  6/17/2014    Procedure: REMOVE INTRAUTERINE DEVICE;  Surgeon: Paola Camara MD;  [facility]       Social History     Tobacco Use     Smoking status: Never     Smokeless tobacco: Never   Substance Use Topics     Alcohol use: No     Family History   Problem Relation Age of Onset     C.A.D. Paternal Grandmother      Cerebrovascular Disease Paternal Grandmother      Depression Paternal Grandmother      Obesity Paternal Grandmother      C.A.D. Paternal Grandfather      Cerebrovascular Disease Paternal Grandfather      Substance Abuse Paternal Grandfather      Hypertension Father      Asthma Father      Obesity Father      Skin Cancer Father      Diabetes Father      Neurologic Disorder Mother         Graves disease     Hyperlipidemia Mother      Depression Mother      Thyroid Disease Mother      Anxiety Disorder Mother      Other - See Comments Other      Skin Cancer Sister      Asthma Sister      Depression Maternal Grandmother      Cancer Sister         mild skin cancer,cured from excision     Depression Nephew      Anxiety Disorder Nephew      Substance Abuse Nephew      Asthma Nephew       Anxiety Disorder Nephew      Mental Illness Maternal Half-Brother      Mental Illness Paternal Half-Brother      Substance Abuse Nephew      Asthma Sister      Asthma Nephew      Thyroid Disease Maternal Half-Sister      Thyroid Disease Paternal Half-Sister          Current Outpatient Medications   Medication Sig Dispense Refill     albuterol (PROAIR HFA/PROVENTIL HFA/VENTOLIN HFA) 108 (90 Base) MCG/ACT inhaler Inhale 2 puffs into the lungs every 4 hours as needed for shortness of breath / dyspnea or wheezing 36 g 1     albuterol (PROVENTIL) (2.5 MG/3ML) 0.083% neb solution Take 1 vial (2.5 mg) by nebulization every 4 hours as needed for shortness of breath / dyspnea or wheezing 30 mL 11     amLODIPine (NORVASC) 5 MG tablet Take 1 tablet (5 mg) by mouth daily 90 tablet 0     cetirizine (ZYRTEC) 10 MG tablet Take 1 tablet (10 mg) by mouth daily 90 tablet 3     Cholecalciferol (VITAMIN D) 125 MCG (5000 UT) capsule Take 1 capsule (5,000 Units) by mouth daily 90 capsule 3     clobetasol (TEMOVATE) 0.05 % external ointment Apply twice daily to affected area for 2 weeks then 2-3 times weekly. 60 g 3     colchicine (COLCYRS) 0.6 MG tablet For arthritis flare only: Take 2 tablets (1.2 mg) at first sign of flare, then 0.6 mg 1 hour later if not resolved. 3 tablet 0     finasteride (PROSCAR) 5 MG tablet Take 1 tablet (5 mg) by mouth daily 90 tablet 3     fluticasone (FLONASE) 50 MCG/ACT nasal spray instill 1-2 sprays into both nostrils daily 48 g 0     fluticasone-salmeterol (ADVAIR DISKUS) 500-50 MCG/DOSE inhaler Inhale 1 puff into the lungs 2 times daily 3 each 1     lisinopril (ZESTRIL) 40 MG tablet Take 1 tablet (40 mg) by mouth daily 90 tablet 0     montelukast (SINGULAIR) 10 MG tablet Take 1 tablet (10 mg) by mouth daily 90 tablet 3     venlafaxine (EFFEXOR XR) 150 MG 24 hr capsule Take 1 capsule (150 mg) by mouth daily 90 capsule 0     venlafaxine (EFFEXOR XR) 75 MG 24 hr capsule Take 1 capsule (75 mg) by mouth daily  With 150 mg capsule for a total of 225 mg daily 90 capsule 0     conjugated estrogens (PREMARIN) 0.625 MG/GM vaginal cream Place 1 g vaginally three times a week 30 g 1     fluticasone-salmeterol (ADVAIR) 500-50 MCG/ACT inhaler Inhale 1 puff into the lungs every 12 hours 60 each 3     No Known Allergies    Breast Cancer Screening:    Breast CA Risk Assessment (FHS-7) 2/19/2023   Do you have a family history of breast, colon, or ovarian cancer? No / Unknown       click delete button to remove this line now  Mammogram Screening: Recommended mammography every 1-2 years with patient discussion and risk factor consideration  Pertinent mammograms are reviewed under the imaging tab.    History of abnormal Pap smear:   NO - age 30-65 PAP every 5 years with negative HPV co-testing recommended  Last 3 Pap and HPV Results:   PAP / HPV Latest Ref Rng & Units 8/5/2020 5/2/2018 3/21/2017   PAP (Historical) - NIL NIL NIL   HPV16 NEG:Negative Negative Negative Negative   HPV18 NEG:Negative Negative Negative Negative   HRHPV NEG:Negative Negative Negative Positive(A)     PAP / HPV Latest Ref Rng & Units 8/5/2020 5/2/2018 3/21/2017   PAP (Historical) - NIL NIL NIL   HPV16 NEG:Negative Negative Negative Negative   HPV18 NEG:Negative Negative Negative Negative   HRHPV NEG:Negative Negative Negative Positive(A)     Reviewed and updated as needed this visit by clinical staff   Tobacco  Allergies  Meds              Reviewed and updated as needed this visit by Provider                 Past Medical History:   Diagnosis Date     Allergic rhinitis, cause unspecified      Cervical high risk HPV (human papillomavirus) test positive 03/21/2017    3/21/17 NIL pap/+ HR HPV (not 16 or 18).      Depressive disorder      Depressive disorder, not elsewhere classified     seasonal     Hypertension      Mild persistent asthma      Obstructive sleep apnea (adult) (pediatric)     uses CPAP     Primary osteoarthritis of both knees 1/28/2022     Scalp  "mass 2014      Past Surgical History:   Procedure Laterality Date     COLONOSCOPY  2012    Procedure: COLONOSCOPY;  COLONOSCOPY, SCREEN;  Surgeon: Bart You MD;  [facility]     COLONOSCOPY N/A 10/20/2022    Procedure: COLONOSCOPY, WITH POLYPECTOMY AND BIOPSY;  Surgeon: Chago Hong DO;  Location:  GI     D & C       ENDOSCOPIC RETROGRADE CHOLANGIOPANCREATOGRAM  2014    Procedure: ENDOSCOPIC RETROGRADE CHOLANGIOPANCREATOGRAM;  ERCP biliary sphincterotomy, bile duct stone removal, epinepherine washing.;  Surgeon: Ba Meyers MD;  [facility]     EP ABLATION SVT N/A 2021    Procedure: EP ABLATION SVT;  Surgeon: Cuca Magaña MD;  Location:  [facility]ARD[facility] C[facility] LAB     LAPAROSCOPIC CHOLECYSTECTOMY WITH CHOLANGIOGRAMS  2014    Procedure: LAPAROSCOPIC CHOLECYSTECTOMY WITH CHOLANGIOGRAMS;;  Surgeon: Haja Sage MD;  [facility]     NO HISTORY OF SURGERY       OPERATIVE HYSTEROSCOPY  2014    Procedure: OPERATIVE HYSTEROSCOPY;  Surgeon: Paola Camara MD;  [facility]     REMOVE INTRAUTERINE DEVICE  2014    Procedure: REMOVE INTRAUTERINE DEVICE;  Surgeon: Paola Camara MD;  [facility]     OB History    Para Term  AB Living   0 0 0 0 0 0   SAB IAB Ectopic Multiple Live Births   0 0 0 0 0       Review of Systems   HENT: Positive for hearing loss.    Breasts:  Negative for tenderness, breast mass and discharge.   Genitourinary: Negative for pelvic pain, vaginal bleeding and vaginal discharge.        OBJECTIVE:   /82 (BP Location: Right arm, Patient Position: Sitting, Cuff Size: Adult Large)   Pulse 73   Temp 98.2  F (36.8  C) (Oral)   Ht 1.727 m (5' 8\")   LMP 2014   SpO2 98%   BMI 38.01 kg/m    Physical Exam  GENERAL APPEARANCE: healthy, alert and no distress  EYES: Eyes grossly normal to inspection, PERRL and conjunctivae and sclerae normal  HENT: ear canals and TM's normal, nose " and mouth without ulcers or lesions, oropharynx clear and oral mucous membranes moist  NECK: no adenopathy, no asymmetry, masses, or scars and thyroid normal to palpation  RESP: lungs clear to auscultation - no rales, rhonchi or wheezes  CV: regular rate and rhythm, normal S1 S2, no S3 or S4, no murmur, click or rub, no peripheral edema and peripheral pulses strong  ABDOMEN: soft, nontender, no hepatosplenomegaly, no masses and bowel sounds normal  MS: no musculoskeletal defects are noted and gait is age appropriate without ataxia, tight trapezius and rhomboids b/l  BREAST large breast mass, declines breast exam today.  SKIN: no suspicious lesions or rashes  NEURO: Normal strength and tone, sensory exam grossly normal, mentation intact and speech normal  PSYCH: mentation appears normal and affect normal/bright    Diagnostic Test Results:  Labs reviewed in Epic    ASSESSMENT/PLAN:   (Z00.00) Routine general medical examination at a health care facility  (primary encounter diagnosis)  Comment: Colonoscopy scheduled in 1 month.  Mammogram done 8/11/2020    (Z13.220) Screening for hyperlipidemia  Plan: Lipid panel reflex to direct LDL Non-fasting            (R73.01) Impaired fasting glucose  Comment: Reviewed A1c with the patient, 6.5%.  She had been in a prediabetic state and discussed that this now is consistent with diabetes.  We will work on dietary lifestyle modifications her A1c is 6 months.  Offered diabetes educator, declines for now.  Recommend annual eye screening.  We will check lipids.  Clinic BP to start a statin. On ACEI  Plan: HEMOGLOBIN A1C            (I10) Essential hypertension with goal blood pressure less than 140/90  Comment: Blood pressure adequately controlled on current regimen.  We will check BMP and continue amlodipine and lisinopril.  Plan: amLODIPine (NORVASC) 5 MG tablet, BASIC METABOLIC PANEL    (J45.30) Mild persistent asthma without complication  Comment: Follows with Dr. Clark.   Adequately controlled on Advair 500  Plan: montelukast (SINGULAIR) 10 MG tablet             Mild recurrent major depression (H)  Comment: Depression has been very well managed on Effexor to 225 mg daily.  No side effects at this dose.  We will continue continue to monitor  Plan: venlafaxine (EFFEXOR XR) 150 MG 24 hr capsule,         venlafaxine (EFFEXOR XR) 75 MG 24 hr capsule            (H91.92) Hearing loss of left ear, unspecified hearing loss type  Comment: He has noticed left-sided hearing impairment that has been chronic but progressive.  She has had an audiology evaluation in the past but has been many years.  We will refer to audiology for formal testing.  No history of trauma to the ear, no tympanostomy tubes in the past.  Plan: Adult Audiology  Referral            (N62) Large breasts  (N62) Hypertrophy of breast  Comment: Size 40 H breast, with associated back pain.  Will refer to plastic surgery for consult regarding breast reduction.  Did discuss she may need PT, she will wait and see what her insurance requires after consult  Plan: Adult Plastic Surgery  Referral              Patient has been advised of split billing requirements and indicates understanding: Yes      COUNSELING:  Reviewed preventive health counseling, as reflected in patient instructions       Regular exercise       Healthy diet/nutrition       Colorectal Cancer Screening        She reports that she has never smoked. She has never used smokeless tobacco.        Slime Perry DO  [facility]

## 2023-02-21 NOTE — TELEPHONE ENCOUNTER
FUTURE VISIT INFORMATION      FUTURE VISIT INFORMATION:    Date: 5/12/23    Time: 8:00am    [place] [facility] [place]FORMATION:    Referring provider:  Slime Perry DO    Referring providers clinic:  [facility]    Reason for visit/diagnosis  breast reduction consult    RECORDS REQUESTED FROM:       Clinic name Comments Records Status Imaging Status   [facility] OV/referral 2/20/23 EPIC

## 2023-03-07 ENCOUNTER — TELEPHONE (OUTPATIENT)
Dept: GASTROENTEROLOGY | Facility: CLINIC | Age: 63
End: 2023-03-07
Payer: COMMERCIAL

## 2023-03-07 DIAGNOSIS — Z86.0100 HISTORY OF COLONOSCOPY WITH POLYPECTOMY: Primary | ICD-10-CM

## 2023-03-07 DIAGNOSIS — Z98.890 HISTORY OF COLONOSCOPY WITH POLYPECTOMY: Primary | ICD-10-CM

## 2023-03-07 RX ORDER — BISACODYL 5 MG
TABLET, DELAYED RELEASE (ENTERIC COATED) ORAL
Qty: 4 TABLET | Refills: 0 | Status: SHIPPED | OUTPATIENT
Start: 2023-03-07 | End: 2023-05-12

## 2023-03-07 NOTE — TELEPHONE ENCOUNTER
Attempted to contact patient regarding upcoming Colonoscopy  procedure on 3/20/23 for pre assessment questions. No answer.     Left message to return call to [phone] #4    Discuss Covid policy and designated  policy.    Pre op exam? N/A    Arrival time: 1330. Procedure time: 1430    Facility location: [facility]; [address] [place] [address], [address] Floor, [place] [id number]    Sedation type: Conscious sedation     Anticoagulants: No    Electronic implanted devices? No    Diabetic? Yes - Patient to hold oral diabetic medications day of procedure - No oral diabetic medications noted in chart.    Indication for procedure: history of colonoscopy with piecemeal polypectomy    Bowel prep recommendation: Extended prep Golytely  per previous colonoscopy order.     Prep instructions sent via [employer]. Bowel prep script sent to [facility] [address] [place] [address]      Alissa Bryan RN  Endoscopy Procedure Pre Assessment RN

## 2023-03-07 NOTE — TELEPHONE ENCOUNTER
Pt returned call.    Pre assessment questions completed for upcoming Colonoscopy  procedure scheduled on 3/20/23    COVID policy reviewed.     Reviewed procedural arrival time 1330, procedure time 1430 and facility location [facility]; [address], [address], [place] [address]    Designated  policy reviewed. Instructed to have someone stay 6 hours post procedure.     Diabetic? Yes - No meds per pt    Reviewed procedure prep instructions.     Pt aware scheduling previously sent Standard prep instructions via [employer] and pt was instructed to follow most recent instructions sent by RN (Extended Golytely).    Bowel prep script sent to   [facility] [address] [place] [address].     Patient verbalized understanding and had no questions or concerns at this time.      Mile Mcgrath RN  Endoscopy Procedure Pre Assessment RN

## 2023-03-20 ENCOUNTER — HOSPITAL ENCOUNTER (OUTPATIENT)
Facility: CLINIC | Age: 63
Discharge: HOME | End: 2023-03-20
Attending: INTERNAL MEDICINE | Admitting: INTERNAL MEDICINE
Payer: COMMERCIAL

## 2023-03-20 VITALS
DIASTOLIC BLOOD PRESSURE: 83 MMHG | SYSTOLIC BLOOD PRESSURE: 129 MMHG | HEART RATE: 62 BPM | RESPIRATION RATE: 16 BRPM | OXYGEN SATURATION: 98 %

## 2023-03-20 LAB — COLONOSCOPY: NORMAL

## 2023-03-20 PROCEDURE — G0500 MOD SEDAT ENDO SERVICE >5YRS: HCPCS | Performed by: INTERNAL MEDICINE

## 2023-03-20 PROCEDURE — 250N000011 HC RX IP 250 OP 636: Performed by: INTERNAL MEDICINE

## 2023-03-20 PROCEDURE — 99153 MOD SED SAME PHYS/QHP EA: CPT | Performed by: INTERNAL MEDICINE

## 2023-03-20 PROCEDURE — 88305 TISSUE EXAM BY PATHOLOGIST: CPT | Mod: 26 | Performed by: STUDENT IN AN ORGANIZED HEALTH CARE EDUCATION/TRAINING PROGRAM

## 2023-03-20 PROCEDURE — 45385 COLONOSCOPY W/LESION REMOVAL: CPT | Mod: PT | Performed by: INTERNAL MEDICINE

## 2023-03-20 PROCEDURE — 45380 COLONOSCOPY AND BIOPSY: CPT | Performed by: INTERNAL MEDICINE

## 2023-03-20 PROCEDURE — 88305 TISSUE EXAM BY PATHOLOGIST: CPT | Mod: TC | Performed by: INTERNAL MEDICINE

## 2023-03-20 RX ORDER — FENTANYL CITRATE 50 UG/ML
INJECTION, SOLUTION INTRAMUSCULAR; INTRAVENOUS PRN
Status: DISCONTINUED | OUTPATIENT
Start: 2023-03-20 | End: 2023-03-20 | Stop reason: HOSPADM

## 2023-03-20 RX ORDER — LIDOCAINE 40 MG/G
CREAM TOPICAL
Status: DISCONTINUED | OUTPATIENT
Start: 2023-03-20 | End: 2023-03-20 | Stop reason: HOSPADM

## 2023-03-20 RX ORDER — ONDANSETRON 2 MG/ML
4 INJECTION INTRAMUSCULAR; INTRAVENOUS
Status: DISCONTINUED | OUTPATIENT
Start: 2023-03-20 | End: 2023-03-20 | Stop reason: HOSPADM

## 2023-03-20 ASSESSMENT — ACTIVITIES OF DAILY LIVING (ADL): ADLS_ACUITY_SCORE: 35

## 2023-03-20 NOTE — H&P
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          Active problem list:     Patient Active Problem List    Diagnosis Date Noted     Diabetes mellitus, type 2 (H) 02/20/2023     Priority: Medium     Lichen sclerosus 02/20/2023     Priority: Medium     Chronic midline low back pain without sciatica 01/28/2022     Priority: Medium     Primary osteoarthritis of both knees 01/28/2022     Priority: Medium     Paroxysmal supraventricular tachycardia (H) 05/10/2021     Priority: Medium     Added automatically from request for surgery [id number]       Allergic conjunctivitis, bilateral 08/27/2019     Priority: Medium     Overweight (H) 07/18/2019     Priority: Medium     Impaired fasting glucose 07/18/2019     Priority: Medium     Seasonal allergic rhinitis due to pollen 10/18/2018     Priority: Medium     Allergic rhinitis due to dust mite 10/18/2018     Priority: Medium     Allergic rhinitis due to mold 10/18/2018     Priority: Medium     IUD (intrauterine device) in place 03/21/2017     Priority: Medium     mirena place in 2015       Hypertrophy of breast 11/24/2015     Priority: Medium     Pilar cyst 07/14/2014     Priority: Medium     Hypertension goal BP (blood pressure) < 140/90 10/25/2010     Priority: Medium     CARDIOVASCULAR SCREENING; LDL GOAL LESS THAN 160 05/09/2010     Priority: Medium     Mild major depression (H)      Priority: Medium     Has used paxil, celexa, lexapro, and zoloft in the past.  Zoloft worked well although spring of 2017 symptoms worsened  despite max dose.  Switched to wellbutrin - but wellbutrin triggered anxiety and rage.    Will plan to start Effexor in the fall before school year starts.       Leiomyoma of uterus 08/14/2007     Priority: Medium     Problem list name updated by automated process. Provider to review       Obstructive sleep apnea      Priority: Medium     uses CPAP           Mild persistent asthma 10/11/2005     Priority: Medium     Typically needs steroid burst about once per year for symptoms uncontrolled with continued advair and maxair q 4 hours.  Peak flow generally runs 400. With illness goes down to 350.    12/09 - given one extra burst of prednisone.  Instructed to call if she starts it so we can help her monitor her symptoms.       Allergic rhinitis due to animals 08/31/2004     Priority: Medium            Medications (include herbals and vitamins):   Any Plavix use in the last 7 days?  No     Current Facility-Administered Medications   Medication     fentaNYL (PF) (SUBLIMAZE) injection     midazolam (VERSED) injection             Allergies:    No Known Allergies  Allergy to Latex?  No  Allergy to tape?    No  Intolerances:             Physical Exam:   All vitals have been reviewed  Patient Vitals for the past 8 hrs:   BP Pulse Resp SpO2   03/20/23 1455 (!) 150/93 77 16 98 %   03/20/23 1450 (!) 135/91 74 16 98 %   03/20/23 1445 135/89 69 -- 97 %   03/20/23 1440 (!) 151/119 66 10 94 %   03/20/23 1435 (!) 153/85 61 17 97 %   03/20/23 1430 (!) 142/95 62 18 98 %   03/20/23 1425 123/78 65 17 97 %   03/20/23 1420 123/76 61 13 95 %   03/20/23 1415 127/84 60 15 98 %   03/20/23 1410 (!) 119/104 62 16 97 %   03/20/23 1405 138/87 64 16 94 %   03/20/23 1400 (!) 122/90 -- -- --   03/20/23 1348 (!) 130/92 68 16 --     No intake/output data recorded.  Airway assessment:   Patient is able to open mouth wide  Patient is able to stick out tongue  Mallampatti classification: Class II (visualization of the soft palate, fauces, and uvula)}     Eyes: Sclera anicteric/injected  Ears/nose/mouth/throat: Normal oropharynx without ulcers or exudate, mucus membranes moist, hearing intact  Neck: supple, thyroid normal size  CV: No edema  Respiratory: Unlabored breathing  Lymph: No axillary, submandibular, supraclavicular or inguinal lymphadenopathy  Abd:  Nondistended, +bs, no hepatosplenomegaly, nontender, no peritoneal signs  Skin: warm, perfused, no jaundice  Psych: Normal affect  MSK: Normal gait   .       Lab / Radiology Results:         Anesthetic risk and/or ASA classification:       Eligio Donaldson MD

## 2023-03-20 NOTE — DISCHARGE INSTRUCTIONS
Discharge Instructions after Colonoscopy  or Sigmoidoscopy    Today you had a ____ Colonoscopy ____ Sigmoidoscopy    Activity and Diet  You were given medicine for pain. You may be dizzy or sleepy.  For 24 hours:   Do not drive or use heavy equipment.   Do not make important decisions.   Do not drink any alcohol.  You may return to your normal diet and medicines.    Discomfort   Air was placed in your colon during the exam in order to see it. Walking helps to pass the air.   You may take Tylenol (acetaminophen) for pain unless your doctor has told you not to.  Do not take aspirin or ibuprofen (Advil, Motrin, or other anti-inflammatory  drugs) for _____ days.    Follow-up  ____ We took small tissue samples or polyps to study. Your doctor will call you with the results  within two weeks.    When to call:    Call right away if you have:   Unusual pain in belly or chest pain not relieved with passing air.   More than 1 to 2 Tablespoons of bleeding from your rectum.   Fever above 100.6  F (37.5  C).    If you have severe pain, bleeding, or shortness of breath, go to an emergency room.    If you have questions, call:  Monday to Friday, 8 a.m. to 4:30 p.m.  Central Scheduling Department: [phone]    After hours  [facility]: [phone] (Ask for the GI fellow on call)

## 2023-03-20 NOTE — OR NURSING
Patient had colonoscopy with polypectomies.  Patient tolerated procedure under conscious sedation and 2 liters nasal cannula

## 2023-05-10 NOTE — PROGRESS NOTES
PLASTIC SURGERY HISTORY AND PHYSICAL    Chief Complaint:Hypertrophy of breasts   Referring Provider: Slime Perry      HPI:Sanam is a 62 year old female who presents with symptomatic macromastia. She currently wears a 40I, would prefer to be smaller but proportional. She complains of back pain, neck pain and shoulder pain that has been present for decades but seems to be worse the past 2 years. Constantly has to wear a bra because there is too much pain without it. She has tried the following without successful resolution of pain symptoms: weight loss and breasts stay the same, buying specialty bras. Denies family history of breast cancer. No personal history of breast concerns.     Recent mammogram: 8/2023, negative  Rashes: yes for 2 years. Has used multiple OTC creams and ointments.     Recent diagnosis of DM just 2 months ago with Hbg A1c of 6.5, so is starting with diet and activity modifications and is going to recheck in 6 months.     PMH:   Past Medical History:   Diagnosis Date     Allergic rhinitis, cause unspecified      Cervical high risk HPV (human papillomavirus) test positive 03/21/2017    3/21/17 NIL pap/+ HR HPV (not 16 or 18).      Depressive disorder      Depressive disorder, not elsewhere classified     seasonal     Diabetes mellitus, type 2 (H) 2/20/2023     Hypertension      Mild persistent asthma      Obstructive sleep apnea (adult) (pediatric)     uses CPAP     Primary osteoarthritis of both knees 1/28/2022     Scalp mass 7/2014       PSH:   Past Surgical History:   Procedure Laterality Date     COLONOSCOPY  6/21/2012     COLONOSCOPY N/A 10/20/2022     COLONOSCOPY N/A 3/20/2023     D & C       ENDOSCOPIC RETROGRADE CHOLANGIOPANCREATOGRAM  1/4/2014     EP ABLATION SVT N/A 6/2/2021     LAPAROSCOPIC CHOLECYSTECTOMY WITH CHOLANGIOGRAMS  1/4/2014     NO HISTORY OF SURGERY       OPERATIVE HYSTEROSCOPY  6/17/2014     REMOVE INTRAUTERINE DEVICE  6/17/2014       FH:   Family History    Problem Relation Age of Onset     C.A.D. Paternal Grandmother      Cerebrovascular Disease Paternal Grandmother      Depression Paternal Grandmother      Obesity Paternal Grandmother      C.A.D. Paternal Grandfather      Cerebrovascular Disease Paternal Grandfather      Substance Abuse Paternal Grandfather      Hypertension Father      Asthma Father      Obesity Father      Skin Cancer Father      Diabetes Father      Neurologic Disorder Mother         Graves disease     Hyperlipidemia Mother      Depression Mother      Thyroid Disease Mother      Anxiety Disorder Mother      Other - See Comments Other      Skin Cancer Sister      Asthma Sister      Depression Maternal Grandmother      Cancer Sister         mild skin cancer,cured from excision     Depression Nephew      Anxiety Disorder Nephew      Substance Abuse Nephew      Asthma Nephew      Anxiety Disorder Nephew      Mental Illness Maternal Half-Brother      Mental Illness Paternal Half-Brother      Substance Abuse Nephew      Asthma Sister      Asthma Nephew      Thyroid Disease Maternal Half-Sister      Thyroid Disease Paternal Half-Sister         SH:   Social History     Tobacco Use     Smoking status: Never     Smokeless tobacco: Never   Vaping Use     Vaping status: Never Used   Substance Use Topics     Alcohol use: No     Drug use: No      Work/school: special -occasional heavy lifting.    MEDS:     Current Outpatient Medications:      albuterol (PROAIR HFA/PROVENTIL HFA/VENTOLIN HFA) 108 (90 Base) MCG/ACT inhaler, Inhale 2 puffs into the lungs every 4 hours as needed for shortness of breath / dyspnea or wheezing, Disp: 36 g, Rfl: 1     albuterol (PROVENTIL) (2.5 MG/3ML) 0.083% neb solution, Take 1 vial (2.5 mg) by nebulization every 4 hours as needed for shortness of breath / dyspnea or wheezing, Disp: 30 mL, Rfl: 11     amLODIPine (NORVASC) 5 MG tablet, Take 1 tablet (5 mg) by mouth daily, Disp: 90 tablet, Rfl: 3     cetirizine (ZYRTEC) 10  "MG tablet, Take 1 tablet (10 mg) by mouth daily, Disp: 90 tablet, Rfl: 3     Cholecalciferol (VITAMIN D) 125 MCG (5000 UT) capsule, Take 1 capsule (5,000 Units) by mouth daily, Disp: 90 capsule, Rfl: 3     clobetasol (TEMOVATE) 0.05 % external ointment, Apply twice daily to affected area for 2 weeks then 2-3 times weekly., Disp: 60 g, Rfl: 3     colchicine (COLCYRS) 0.6 MG tablet, For arthritis flare only: Take 2 tablets (1.2 mg) at first sign of flare, then 0.6 mg 1 hour later if not resolved., Disp: 3 tablet, Rfl: 0     finasteride (PROSCAR) 5 MG tablet, Take 1 tablet (5 mg) by mouth daily, Disp: 90 tablet, Rfl: 3     fluticasone (FLONASE) 50 MCG/ACT nasal spray, instill 1-2 sprays into both nostrils daily, Disp: 48 g, Rfl: 0     lisinopril (ZESTRIL) 40 MG tablet, Take 1 tablet (40 mg) by mouth daily, Disp: 90 tablet, Rfl: 3     montelukast (SINGULAIR) 10 MG tablet, Take 1 tablet (10 mg) by mouth daily, Disp: 90 tablet, Rfl: 3     venlafaxine (EFFEXOR XR) 150 MG 24 hr capsule, Take 1 capsule (150 mg) by mouth daily, Disp: 90 capsule, Rfl: 3     venlafaxine (EFFEXOR XR) 75 MG 24 hr capsule, Take 1 capsule (75 mg) by mouth daily With 150 mg capsule for a total of 225 mg daily, Disp: 90 capsule, Rfl: 3     fluticasone-salmeterol (ADVAIR) 500-50 MCG/ACT inhaler, Inhale 1 puff into the lungs every 12 hours, Disp: 60 each, Rfl: 3       ALLERGIES:   No Known Allergies     ROS: Denies chest pain, shortness of breath, MI, CVA, DVT, PE, and bleeding disorders.     PHYSICAL EXAMINATION:   /88   Pulse 68   Ht 1.753 m (5' 9\")   Wt 112.1 kg (247 lb 3.2 oz)   LMP 12/21/2014   SpO2 98%   BMI 36.51 kg/m     BMI: Body mass index is 36.51 kg/m .  Body surface area is 2.34 meters squared.   General: NAD  Chest: bilateral macromastia with grade II ptosis. L breast is ~300g larger than R breast. +dense breast tissue. Nipple to notch 36 on L, 34 on R. L breast with small lesion to L upper outer quadrant.   +moderate shoulder " grooving. + asymmetry of shoulders.    1068g     ASSESSMENT: 61 yo F PMH HTN, DM, MACIE on CPAP, depression who presents with symptomatic macromastia desiring breast reduction.     PLAN:   I believe she is a good candidate for a breast reduction. Prior to surgery she may need need an up to date mammogram if scheduled after 8/2023. According to Schnur scale 1068g will need to be removed, this will be approximately 70% reduction from R and 50% reduction from L her current size. She understands that this amount will need to be removed to be covered by insurance.     Risks and benefits of the procedure were discussed, including but not limited to bleeding, infection, scarring, wound healing complications, asymmetry, loss of nipple sensation, nipple necrosis, inability to breast feed and change in size of breasts in the future. She understands the risks and would like to proceed with surgery.    We discussed working on her newly diagnosed DM in terms of risks with post op wound healing.    I explained the surgical scheduling process, including picking a surgical date and then our team submitting for prior auth 6 weeks prior to this date. We discussed that [facility] may not prior auth but I believe we can remove the amount of tissue required without issue.     I will place orders today.    She would like L breast lesion, likely accessory nipple, to be removed as well. This can likely be accommodated.     Li oByle PA-C  Plastic and Reconstructive Surgery     50 minutes spent on the date of the encounter doing chart review, history and physical, documentation and further activity as noted above.

## 2023-05-12 ENCOUNTER — OFFICE VISIT (OUTPATIENT)
Dept: PLASTIC SURGERY | Facility: CLINIC | Age: 63
End: 2023-05-12
Payer: COMMERCIAL

## 2023-05-12 ENCOUNTER — PRE VISIT (OUTPATIENT)
Dept: PLASTIC SURGERY | Facility: CLINIC | Age: 63
End: 2023-05-12

## 2023-05-12 VITALS
HEART RATE: 68 BPM | OXYGEN SATURATION: 98 % | BODY MASS INDEX: 36.61 KG/M2 | SYSTOLIC BLOOD PRESSURE: 137 MMHG | HEIGHT: 69 IN | WEIGHT: 247.2 LBS | DIASTOLIC BLOOD PRESSURE: 88 MMHG

## 2023-05-12 DIAGNOSIS — N62 LARGE BREASTS: ICD-10-CM

## 2023-05-12 PROCEDURE — 99204 OFFICE O/P NEW MOD 45 MIN: CPT | Performed by: PHYSICIAN ASSISTANT

## 2023-05-12 RX ORDER — CEFAZOLIN SODIUM 2 G/50ML
2 SOLUTION INTRAVENOUS SEE ADMIN INSTRUCTIONS
Status: CANCELLED | OUTPATIENT
Start: 2023-05-12

## 2023-05-12 RX ORDER — CEFAZOLIN SODIUM 2 G/50ML
2 SOLUTION INTRAVENOUS
Status: CANCELLED | OUTPATIENT
Start: 2023-05-12

## 2023-05-12 ASSESSMENT — PAIN SCALES - GENERAL: PAINLEVEL: NO PAIN (0)

## 2023-05-12 NOTE — NURSING NOTE
"Chief Complaint   Patient presents with     Consult     Breast reduction consult       Vitals:    05/12/23 0805   BP: 137/88   Pulse: 68   SpO2: 98%   Weight: 112.1 kg (247 lb 3.2 oz)   Height: 1.753 m (5' 9\")       Body mass index is 36.51 kg/m .          CONTRERAS TRAVIS EMT    "

## 2023-05-12 NOTE — LETTER
5/12/2023       RE: Nela Mares  [address] [id number]     Dear Colleague,    Thank you for referring your patient, Nela Mares, to the [facility] [place] at [facility]. Please see a copy of my visit note below.    PLASTIC SURGERY HISTORY AND PHYSICAL    Chief Complaint:Hypertrophy of breasts   Referring Provider: Slime Perry      HPI:Sanam is a 62 year old female who presents with symptomatic macromastia. She currently wears a 40I, would prefer to be smaller but proportional. She complains of back pain, neck pain and shoulder pain that has been present for decades but seems to be worse the past 2 years. Constantly has to wear a bra because there is too much pain without it. She has tried the following without successful resolution of pain symptoms: weight loss and breasts stay the same, buying specialty bras. Denies family history of breast cancer. No personal history of breast concerns.     Recent mammogram: 8/2023, negative  Rashes: yes for 2 years. Has used multiple OTC creams and ointments.     Recent diagnosis of DM just 2 months ago with Hbg A1c of 6.5, so is starting with diet and activity modifications and is going to recheck in 6 months.     PMH:   Past Medical History:   Diagnosis Date    Allergic rhinitis, cause unspecified     Cervical high risk HPV (human papillomavirus) test positive 03/21/2017    3/21/17 NIL pap/+ HR HPV (not 16 or 18).     Depressive disorder     Depressive disorder, not elsewhere classified     seasonal    Diabetes mellitus, type 2 (H) 2/20/2023    Hypertension     Mild persistent asthma     Obstructive sleep apnea (adult) (pediatric)     uses CPAP    Primary osteoarthritis of both knees 1/28/2022    Scalp mass 7/2014       PSH:   Past Surgical History:   Procedure Laterality Date    COLONOSCOPY  6/21/2012    COLONOSCOPY N/A 10/20/2022     COLONOSCOPY N/A 3/20/2023    D & C      ENDOSCOPIC RETROGRADE CHOLANGIOPANCREATOGRAM  1/4/2014    EP ABLATION SVT N/A 6/2/2021    LAPAROSCOPIC CHOLECYSTECTOMY WITH CHOLANGIOGRAMS  1/4/2014    NO HISTORY OF SURGERY      OPERATIVE HYSTEROSCOPY  6/17/2014    REMOVE INTRAUTERINE DEVICE  6/17/2014       FH:   Family History   Problem Relation Age of Onset    C.A.D. Paternal Grandmother     Cerebrovascular Disease Paternal Grandmother     Depression Paternal Grandmother     Obesity Paternal Grandmother     C.A.D. Paternal Grandfather     Cerebrovascular Disease Paternal Grandfather     Substance Abuse Paternal Grandfather     Hypertension Father     Asthma Father     Obesity Father     Skin Cancer Father     Diabetes Father     Neurologic Disorder Mother         Graves disease    Hyperlipidemia Mother     Depression Mother     Thyroid Disease Mother     Anxiety Disorder Mother     Other - See Comments Other     Skin Cancer Sister     Asthma Sister     Depression Maternal Grandmother     Cancer Sister         mild skin cancer,cured from excision    Depression Nephew     Anxiety Disorder Nephew     Substance Abuse Nephew     Asthma Nephew     Anxiety Disorder Nephew     Mental Illness Maternal Half-Brother     Mental Illness Paternal Half-Brother     Substance Abuse Nephew     Asthma Sister     Asthma Nephew     Thyroid Disease Maternal Half-Sister     Thyroid Disease Paternal Half-Sister         SH:   Social History     Tobacco Use    Smoking status: Never    Smokeless tobacco: Never   Vaping Use    Vaping status: Never Used   Substance Use Topics    Alcohol use: No    Drug use: No      Work/school: special -occasional heavy lifting.    MEDS:     Current Outpatient Medications:     albuterol (PROAIR HFA/PROVENTIL HFA/VENTOLIN HFA) 108 (90 Base) MCG/ACT inhaler, Inhale 2 puffs into the lungs every 4 hours as needed for shortness of breath / dyspnea or wheezing, Disp: 36 g, Rfl: 1    albuterol (PROVENTIL) (2.5  "MG/3ML) 0.083% neb solution, Take 1 vial (2.5 mg) by nebulization every 4 hours as needed for shortness of breath / dyspnea or wheezing, Disp: 30 mL, Rfl: 11    amLODIPine (NORVASC) 5 MG tablet, Take 1 tablet (5 mg) by mouth daily, Disp: 90 tablet, Rfl: 3    cetirizine (ZYRTEC) 10 MG tablet, Take 1 tablet (10 mg) by mouth daily, Disp: 90 tablet, Rfl: 3    Cholecalciferol (VITAMIN D) 125 MCG (5000 UT) capsule, Take 1 capsule (5,000 Units) by mouth daily, Disp: 90 capsule, Rfl: 3    clobetasol (TEMOVATE) 0.05 % external ointment, Apply twice daily to affected area for 2 weeks then 2-3 times weekly., Disp: 60 g, Rfl: 3    colchicine (COLCYRS) 0.6 MG tablet, For arthritis flare only: Take 2 tablets (1.2 mg) at first sign of flare, then 0.6 mg 1 hour later if not resolved., Disp: 3 tablet, Rfl: 0    finasteride (PROSCAR) 5 MG tablet, Take 1 tablet (5 mg) by mouth daily, Disp: 90 tablet, Rfl: 3    fluticasone (FLONASE) 50 MCG/ACT nasal spray, instill 1-2 sprays into both nostrils daily, Disp: 48 g, Rfl: 0    lisinopril (ZESTRIL) 40 MG tablet, Take 1 tablet (40 mg) by mouth daily, Disp: 90 tablet, Rfl: 3    montelukast (SINGULAIR) 10 MG tablet, Take 1 tablet (10 mg) by mouth daily, Disp: 90 tablet, Rfl: 3    venlafaxine (EFFEXOR XR) 150 MG 24 hr capsule, Take 1 capsule (150 mg) by mouth daily, Disp: 90 capsule, Rfl: 3    venlafaxine (EFFEXOR XR) 75 MG 24 hr capsule, Take 1 capsule (75 mg) by mouth daily With 150 mg capsule for a total of 225 mg daily, Disp: 90 capsule, Rfl: 3    fluticasone-salmeterol (ADVAIR) 500-50 MCG/ACT inhaler, Inhale 1 puff into the lungs every 12 hours, Disp: 60 each, Rfl: 3       ALLERGIES:   No Known Allergies     ROS: Denies chest pain, shortness of breath, MI, CVA, DVT, PE, and bleeding disorders.     PHYSICAL EXAMINATION:   /88   Pulse 68   Ht 1.753 m (5' 9\")   Wt 112.1 kg (247 lb 3.2 oz)   LMP 12/21/2014   SpO2 98%   BMI 36.51 kg/m     BMI: Body mass index is 36.51 kg/m .  Body " surface area is 2.34 meters squared.   General: NAD  Chest: bilateral macromastia with grade II ptosis. L breast is ~300g larger than R breast. +dense breast tissue. Nipple to notch 36 on L, 34 on R. L breast with small lesion to L upper outer quadrant.   +moderate shoulder grooving. + asymmetry of shoulders.    1068g     ASSESSMENT: 63 yo F PMH HTN, DM, MACIE on CPAP, depression who presents with symptomatic macromastia desiring breast reduction.     PLAN:   I believe she is a good candidate for a breast reduction. Prior to surgery she may need need an up to date mammogram if scheduled after 8/2023. According to Schnur scale 1068g will need to be removed, this will be approximately 70% reduction from R and 50% reduction from L her current size. She understands that this amount will need to be removed to be covered by insurance.     Risks and benefits of the procedure were discussed, including but not limited to bleeding, infection, scarring, wound healing complications, asymmetry, loss of nipple sensation, nipple necrosis, inability to breast feed and change in size of breasts in the future. She understands the risks and would like to proceed with surgery.    We discussed working on her newly diagnosed DM in terms of risks with post op wound healing.    I explained the surgical scheduling process, including picking a surgical date and then our team submitting for prior auth 6 weeks prior to this date. We discussed that [facility] may not prior auth but I believe we can remove the amount of tissue required without issue.     I will place orders today.    She would like L breast lesion, likely accessory nipple, to be removed as well. This can likely be accommodated.     50 minutes spent on the date of the encounter doing chart review, history and physical, documentation and further activity as noted above.        Again, thank you for allowing me to participate in the care of your patient.      Sincerely,    Li  PATEL Boyle

## 2023-05-14 ENCOUNTER — MYC MEDICAL ADVICE (OUTPATIENT)
Dept: ALLERGY | Facility: CLINIC | Age: 63
End: 2023-05-14
Payer: COMMERCIAL

## 2023-05-15 ENCOUNTER — OFFICE VISIT (OUTPATIENT)
Dept: ALLERGY | Facility: CLINIC | Age: 63
End: 2023-05-15
Payer: COMMERCIAL

## 2023-05-15 VITALS — OXYGEN SATURATION: 98 % | DIASTOLIC BLOOD PRESSURE: 82 MMHG | HEART RATE: 84 BPM | SYSTOLIC BLOOD PRESSURE: 118 MMHG

## 2023-05-15 DIAGNOSIS — J30.81 ALLERGIC RHINITIS DUE TO ANIMALS: ICD-10-CM

## 2023-05-15 DIAGNOSIS — J45.30 MILD PERSISTENT ASTHMA WITHOUT COMPLICATION: ICD-10-CM

## 2023-05-15 DIAGNOSIS — J30.1 SEASONAL ALLERGIC RHINITIS DUE TO POLLEN: ICD-10-CM

## 2023-05-15 DIAGNOSIS — J30.89 ALLERGIC RHINITIS DUE TO DUST MITE: ICD-10-CM

## 2023-05-15 DIAGNOSIS — J45.31 MILD PERSISTENT ASTHMA WITH ACUTE EXACERBATION: Primary | ICD-10-CM

## 2023-05-15 DIAGNOSIS — J30.89 ALLERGIC RHINITIS DUE TO MOLD: ICD-10-CM

## 2023-05-15 PROCEDURE — 99214 OFFICE O/P EST MOD 30 MIN: CPT | Performed by: ALLERGY & IMMUNOLOGY

## 2023-05-15 RX ORDER — ALBUTEROL SULFATE 90 UG/1
2 AEROSOL, METERED RESPIRATORY (INHALATION) EVERY 4 HOURS PRN
Qty: 36 G | Refills: 1 | Status: SHIPPED | OUTPATIENT
Start: 2023-05-15 | End: 2024-08-06

## 2023-05-15 RX ORDER — FLUTICASONE PROPIONATE AND SALMETEROL 500; 50 UG/1; UG/1
1 POWDER RESPIRATORY (INHALATION) EVERY 12 HOURS
Qty: 3 EACH | Refills: 1 | Status: SHIPPED | OUTPATIENT
Start: 2023-05-15 | End: 2024-01-08

## 2023-05-15 RX ORDER — MONTELUKAST SODIUM 10 MG/1
10 TABLET ORAL DAILY
Qty: 90 TABLET | Refills: 3 | Status: SHIPPED | OUTPATIENT
Start: 2023-05-15 | End: 2024-08-06

## 2023-05-15 RX ORDER — PREDNISONE 20 MG/1
TABLET ORAL
Qty: 15 TABLET | Refills: 0 | Status: SHIPPED | OUTPATIENT
Start: 2023-05-15 | End: 2023-05-25

## 2023-05-15 RX ORDER — FLUTICASONE PROPIONATE 50 MCG
2 SPRAY, SUSPENSION (ML) NASAL DAILY
Qty: 48 G | Refills: 3 | Status: SHIPPED | OUTPATIENT
Start: 2023-05-15 | End: 2024-02-05

## 2023-05-15 ASSESSMENT — ENCOUNTER SYMPTOMS
HEADACHES: 0
EYE DISCHARGE: 0
RHINORRHEA: 1
SHORTNESS OF BREATH: 1
VOMITING: 0
EYE REDNESS: 0
EYE ITCHING: 0
SINUS PRESSURE: 0
FACIAL SWELLING: 0
JOINT SWELLING: 0
FEVER: 0
WHEEZING: 0
ACTIVITY CHANGE: 0
DIARRHEA: 0
COUGH: 1
CHILLS: 0
ARTHRALGIAS: 0
MYALGIAS: 0
CHEST TIGHTNESS: 0
NAUSEA: 0
ADENOPATHY: 0

## 2023-05-15 NOTE — PROGRESS NOTES
Nela Mares was seen in the Allergy Clinic at [facility].      Nela Mares is a 62 year old Choose not to answer female who is seen today for a follow-up visit.    Had an allergy flare about 10 days ago and she needed to increase her dose of cetirizine. By the middle of last week her asthma was starting to flare and yesterday she needed to use her nebs every 2 hours instead of 4. Symptoms include shortness of breath, cough, and chest tightness. In general her asthma had been well controlled with Advair and montelukast. She has not had an asthma exacerbation that required prednisone in the past 2-3 years.      Past Medical History:   Diagnosis Date     Allergic rhinitis, cause unspecified      Cervical high risk HPV (human papillomavirus) test positive 03/21/2017    3/21/17 NIL pap/+ HR HPV (not 16 or 18).      Depressive disorder      Depressive disorder, not elsewhere classified     seasonal     Diabetes mellitus, type 2 (H) 2/20/2023     Hypertension      Mild persistent asthma      Obstructive sleep apnea (adult) (pediatric)     uses CPAP     Primary osteoarthritis of both knees 1/28/2022     Scalp mass 7/2014     Family History   Problem Relation Age of Onset     C.A.D. Paternal Grandmother      Cerebrovascular Disease Paternal Grandmother      Depression Paternal Grandmother      Obesity Paternal Grandmother      C.A.D. Paternal Grandfather      Cerebrovascular Disease Paternal Grandfather      Substance Abuse Paternal Grandfather      Hypertension Father      Asthma Father      Obesity Father      Skin Cancer Father      Diabetes Father      Neurologic Disorder Mother         Graves disease     Hyperlipidemia Mother      Depression Mother      Thyroid Disease Mother      Anxiety Disorder Mother      Other - See Comments Other      Skin Cancer Sister      Asthma Sister      Depression Maternal Grandmother      Cancer Sister         mild skin cancer,cured from excision      Depression Nephew      Anxiety Disorder Nephew      Substance Abuse Nephew      Asthma Nephew      Anxiety Disorder Nephew      Mental Illness Maternal Half-Brother      Mental Illness Paternal Half-Brother      Substance Abuse Nephew      Asthma Sister      Asthma Nephew      Thyroid Disease Maternal Half-Sister      Thyroid Disease Paternal Half-Sister      Social History     Tobacco Use     Smoking status: Never     Smokeless tobacco: Never   Vaping Use     Vaping status: Never Used   Substance Use Topics     Alcohol use: No     Drug use: No     Social History     Social History Narrative    Dairy/d 3 servings/d.     Caffeine 2 servings/d    Exercise 0 x week    Sunscreen used - No    Seatbelts used - Yes    Working smoke/CO detectors in the home - Yes    Guns stored in the home - No    Self Breast Exams - no    Eye Exam up to date - No    Dental Exam up to date - Yes    Pap Smear up to date - Yes    Mammogram up to date - Yes    Dexa Scan up to date - NOT APPLICABLE    Flex Sig / Colonoscopy up to date - NOT APPLICABLE    Immunizations up to date - Yes    Abuse: Current or Past(Physical, Sexual or Emotional)- No    Do you feel safe in your environment - Yes    GÓMEZ Cummings    11/[birth year]                       Past medical, family, and social history were reviewed.    Review of Systems   Constitutional: Negative for activity change, chills and fever.   HENT: Positive for rhinorrhea. Negative for congestion, dental problem, ear pain, facial swelling, nosebleeds, postnasal drip, sinus pressure and sneezing.    Eyes: Negative for discharge, redness and itching.   Respiratory: Positive for cough and shortness of breath. Negative for chest tightness and wheezing.    Cardiovascular: Negative for chest pain.   Gastrointestinal: Negative for diarrhea, nausea and vomiting.   Musculoskeletal: Negative for arthralgias, joint swelling and myalgias.   Skin: Negative for rash.   Neurological: Negative for headaches.   Hematological:  Negative for adenopathy.   Psychiatric/Behavioral: Negative for behavioral problems and self-injury.         Current Outpatient Medications:      albuterol (PROAIR HFA/PROVENTIL HFA/VENTOLIN HFA) 108 (90 Base) MCG/ACT inhaler, Inhale 2 puffs into the lungs every 4 hours as needed for shortness of breath or wheezing, Disp: 36 g, Rfl: 1     albuterol (PROVENTIL) (2.5 MG/3ML) 0.083% neb solution, Take 1 vial (2.5 mg) by nebulization every 4 hours as needed for shortness of breath / dyspnea or wheezing, Disp: 30 mL, Rfl: 11     amLODIPine (NORVASC) 5 MG tablet, Take 1 tablet (5 mg) by mouth daily, Disp: 90 tablet, Rfl: 3     cetirizine (ZYRTEC) 10 MG tablet, Take 1 tablet (10 mg) by mouth daily, Disp: 90 tablet, Rfl: 3     Cholecalciferol (VITAMIN D) 125 MCG (5000 UT) capsule, Take 1 capsule (5,000 Units) by mouth daily, Disp: 90 capsule, Rfl: 3     clobetasol (TEMOVATE) 0.05 % external ointment, Apply twice daily to affected area for 2 weeks then 2-3 times weekly., Disp: 60 g, Rfl: 3     colchicine (COLCYRS) 0.6 MG tablet, For arthritis flare only: Take 2 tablets (1.2 mg) at first sign of flare, then 0.6 mg 1 hour later if not resolved., Disp: 3 tablet, Rfl: 0     finasteride (PROSCAR) 5 MG tablet, Take 1 tablet (5 mg) by mouth daily, Disp: 90 tablet, Rfl: 3     fluticasone (FLONASE) 50 MCG/ACT nasal spray, Spray 2 sprays into both nostrils daily, Disp: 48 g, Rfl: 3     fluticasone-salmeterol (ADVAIR) 500-50 MCG/ACT inhaler, Inhale 1 puff into the lungs every 12 hours, Disp: 3 each, Rfl: 1     lisinopril (ZESTRIL) 40 MG tablet, Take 1 tablet (40 mg) by mouth daily, Disp: 90 tablet, Rfl: 3     montelukast (SINGULAIR) 10 MG tablet, Take 1 tablet (10 mg) by mouth daily, Disp: 90 tablet, Rfl: 3     predniSONE (DELTASONE) 20 MG tablet, Take 2 tablets (40 mg) by mouth daily for 5 days, THEN 1 tablet (20 mg) daily for 5 days., Disp: 15 tablet, Rfl: 0     venlafaxine (EFFEXOR XR) 150 MG 24 hr capsule, Take 1 capsule (150 mg)  by mouth daily, Disp: 90 capsule, Rfl: 3     venlafaxine (EFFEXOR XR) 75 MG 24 hr capsule, Take 1 capsule (75 mg) by mouth daily With 150 mg capsule for a total of 225 mg daily, Disp: 90 capsule, Rfl: 3  No Known Allergies    EXAM:   /82 (BP Location: Left arm, Patient Position: Sitting, Cuff Size: Adult Large)   Pulse 84   LMP 12/21/2014   SpO2 98%   Physical Exam  Vitals and nursing note reviewed.   Constitutional:       Appearance: Normal appearance.   HENT:      Head: Normocephalic and atraumatic.      Right Ear: External ear normal.      Left Ear: External ear normal.      Nose: No mucosal edema or rhinorrhea.      Mouth/Throat:      Mouth: Mucous membranes are moist. No oral lesions.      Pharynx: Oropharynx is clear. Uvula midline. No posterior oropharyngeal erythema.   Eyes:      General: Lids are normal.      Extraocular Movements: Extraocular movements intact.      Conjunctiva/sclera: Conjunctivae normal.   Neck:      Comments: No asymmetry, masses, or scars  Cardiovascular:      Rate and Rhythm: Normal rate and regular rhythm.      Heart sounds: Normal heart sounds, S1 normal and S2 normal.   Pulmonary:      Effort: Pulmonary effort is normal. No respiratory distress.      Breath sounds: Normal breath sounds and air entry.      Comments: Occasional cough  Musculoskeletal:      Comments: No musculoskeletal defects appreciated   Skin:     General: Skin is warm and dry.      Findings: No lesion or rash.   Neurological:      General: No focal deficit present.      Mental Status: She is alert.   Psychiatric:         Mood and Affect: Mood and affect normal.           WORKUP:  None    ASSESSMENT/PLAN:  Nela Mares is a 62 year old female here for a follow-up visit.    1. Mild persistent asthma with acute exacerbation - Asthma symptoms worsening over the past 7-10 days. Likely triggered by her seasonal allergies. Needing albuterol frequently for relief.    - predniSONE (DELTASONE) 20 MG tablet;  Take 2 tablets (40 mg) by mouth daily for 5 days, THEN 1 tablet (20 mg) daily for 5 days.  Dispense: 15 tablet; Refill: 0    2. Mild persistent asthma without complication    - fluticasone-salmeterol (ADVAIR) 500-50 MCG/ACT inhaler; Inhale 1 puff into the lungs every 12 hours  Dispense: 3 each; Refill: 1  - montelukast (SINGULAIR) 10 MG tablet; Take 1 tablet (10 mg) by mouth daily  Dispense: 90 tablet; Refill: 3  - albuterol (PROAIR HFA/PROVENTIL HFA/VENTOLIN HFA) 108 (90 Base) MCG/ACT inhaler; Inhale 2 puffs into the lungs every 4 hours as needed for shortness of breath or wheezing  Dispense: 36 g; Refill: 1    3. Seasonal allergic rhinitis due to pollen    - fluticasone (FLONASE) 50 MCG/ACT nasal spray; Spray 2 sprays into both nostrils daily  Dispense: 48 g; Refill: 3    4. Allergic rhinitis due to animals    - fluticasone (FLONASE) 50 MCG/ACT nasal spray; Spray 2 sprays into both nostrils daily  Dispense: 48 g; Refill: 3    5. Allergic rhinitis due to dust mite    - fluticasone (FLONASE) 50 MCG/ACT nasal spray; Spray 2 sprays into both nostrils daily  Dispense: 48 g; Refill: 3    6. Allergic rhinitis due to mold    - fluticasone (FLONASE) 50 MCG/ACT nasal spray; Spray 2 sprays into both nostrils daily  Dispense: 48 g; Refill: 3      Follow-up in 3-6 months, sooner if needed      Thank you for allowing me to participate in the care of Nela Mares.      Karen Clark MD, FAAAAI  Allergy/Immunology  [facility]      Chart documentation done in part with Dragon Voice Recognition Software. Although reviewed after completion, some word and grammatical errors may remain.

## 2023-05-15 NOTE — LETTER
5/15/2023         RE: Nela Mares  [address] [id number]        Dear Colleague,    Thank you for referring your patient, Nela Mares, to the [facility]. Please see a copy of my visit note below.    Nela Mares was seen in the Allergy Clinic at [facility].      Nela Mares is a 62 year old Choose not to answer female who is seen today for a follow-up visit.    Had an allergy flare about 10 days ago and she needed to increase her dose of cetirizine. By the middle of last week her asthma was starting to flare and yesterday she needed to use her nebs every 2 hours instead of 4. Symptoms include shortness of breath, cough, and chest tightness. In general her asthma had been well controlled with Advair and montelukast. She has not had an asthma exacerbation that required prednisone in the past 2-3 years.      Past Medical History:   Diagnosis Date     Allergic rhinitis, cause unspecified      Cervical high risk HPV (human papillomavirus) test positive 03/21/2017    3/21/17 NIL pap/+ HR HPV (not 16 or 18).      Depressive disorder      Depressive disorder, not elsewhere classified     seasonal     Diabetes mellitus, type 2 (H) 2/20/2023     Hypertension      Mild persistent asthma      Obstructive sleep apnea (adult) (pediatric)     uses CPAP     Primary osteoarthritis of both knees 1/28/2022     Scalp mass 7/2014     Family History   Problem Relation Age of Onset     C.A.D. Paternal Grandmother      Cerebrovascular Disease Paternal Grandmother      Depression Paternal Grandmother      Obesity Paternal Grandmother      C.A.D. Paternal Grandfather      Cerebrovascular Disease Paternal Grandfather      Substance Abuse Paternal Grandfather      Hypertension Father      Asthma Father      Obesity Father      Skin Cancer Father      Diabetes Father      Neurologic Disorder Mother         Graves disease     Hyperlipidemia Mother       Depression Mother      Thyroid Disease Mother      Anxiety Disorder Mother      Other - See Comments Other      Skin Cancer Sister      Asthma Sister      Depression Maternal Grandmother      Cancer Sister         mild skin cancer,cured from excision     Depression Nephew      Anxiety Disorder Nephew      Substance Abuse Nephew      Asthma Nephew      Anxiety Disorder Nephew      Mental Illness Maternal Half-Brother      Mental Illness Paternal Half-Brother      Substance Abuse Nephew      Asthma Sister      Asthma Nephew      Thyroid Disease Maternal Half-Sister      Thyroid Disease Paternal Half-Sister      Social History     Tobacco Use     Smoking status: Never     Smokeless tobacco: Never   Vaping Use     Vaping status: Never Used   Substance Use Topics     Alcohol use: No     Drug use: No     Social History     Social History Narrative    Dairy/d 3 servings/d.     Caffeine 2 servings/d    Exercise 0 x week    Sunscreen used - No    Seatbelts used - Yes    Working smoke/CO detectors in the home - Yes    Guns stored in the home - No    Self Breast Exams - no    Eye Exam up to date - No    Dental Exam up to date - Yes    Pap Smear up to date - Yes    Mammogram up to date - Yes    Dexa Scan up to date - NOT APPLICABLE    Flex Sig / Colonoscopy up to date - NOT APPLICABLE    Immunizations up to date - Yes    Abuse: Current or Past(Physical, Sexual or Emotional)- No    Do you feel safe in your environment - Yes    GÓMEZ Cummings    11/23/11                       Past medical, family, and social history were reviewed.    Review of Systems   Constitutional: Negative for activity change, chills and fever.   HENT: Positive for rhinorrhea. Negative for congestion, dental problem, ear pain, facial swelling, nosebleeds, postnasal drip, sinus pressure and sneezing.    Eyes: Negative for discharge, redness and itching.   Respiratory: Positive for cough and shortness of breath. Negative for chest tightness and wheezing.     Cardiovascular: Negative for chest pain.   Gastrointestinal: Negative for diarrhea, nausea and vomiting.   Musculoskeletal: Negative for arthralgias, joint swelling and myalgias.   Skin: Negative for rash.   Neurological: Negative for headaches.   Hematological: Negative for adenopathy.   Psychiatric/Behavioral: Negative for behavioral problems and self-injury.         Current Outpatient Medications:      albuterol (PROAIR HFA/PROVENTIL HFA/VENTOLIN HFA) 108 (90 Base) MCG/ACT inhaler, Inhale 2 puffs into the lungs every 4 hours as needed for shortness of breath or wheezing, Disp: 36 g, Rfl: 1     albuterol (PROVENTIL) (2.5 MG/3ML) 0.083% neb solution, Take 1 vial (2.5 mg) by nebulization every 4 hours as needed for shortness of breath / dyspnea or wheezing, Disp: 30 mL, Rfl: 11     amLODIPine (NORVASC) 5 MG tablet, Take 1 tablet (5 mg) by mouth daily, Disp: 90 tablet, Rfl: 3     cetirizine (ZYRTEC) 10 MG tablet, Take 1 tablet (10 mg) by mouth daily, Disp: 90 tablet, Rfl: 3     Cholecalciferol (VITAMIN D) 125 MCG (5000 UT) capsule, Take 1 capsule (5,000 Units) by mouth daily, Disp: 90 capsule, Rfl: 3     clobetasol (TEMOVATE) 0.05 % external ointment, Apply twice daily to affected area for 2 weeks then 2-3 times weekly., Disp: 60 g, Rfl: 3     colchicine (COLCYRS) 0.6 MG tablet, For arthritis flare only: Take 2 tablets (1.2 mg) at first sign of flare, then 0.6 mg 1 hour later if not resolved., Disp: 3 tablet, Rfl: 0     finasteride (PROSCAR) 5 MG tablet, Take 1 tablet (5 mg) by mouth daily, Disp: 90 tablet, Rfl: 3     fluticasone (FLONASE) 50 MCG/ACT nasal spray, Spray 2 sprays into both nostrils daily, Disp: 48 g, Rfl: 3     fluticasone-salmeterol (ADVAIR) 500-50 MCG/ACT inhaler, Inhale 1 puff into the lungs every 12 hours, Disp: 3 each, Rfl: 1     lisinopril (ZESTRIL) 40 MG tablet, Take 1 tablet (40 mg) by mouth daily, Disp: 90 tablet, Rfl: 3     montelukast (SINGULAIR) 10 MG tablet, Take 1 tablet (10 mg) by  mouth daily, Disp: 90 tablet, Rfl: 3     predniSONE (DELTASONE) 20 MG tablet, Take 2 tablets (40 mg) by mouth daily for 5 days, THEN 1 tablet (20 mg) daily for 5 days., Disp: 15 tablet, Rfl: 0     venlafaxine (EFFEXOR XR) 150 MG 24 hr capsule, Take 1 capsule (150 mg) by mouth daily, Disp: 90 capsule, Rfl: 3     venlafaxine (EFFEXOR XR) 75 MG 24 hr capsule, Take 1 capsule (75 mg) by mouth daily With 150 mg capsule for a total of 225 mg daily, Disp: 90 capsule, Rfl: 3  No Known Allergies    EXAM:   /82 (BP Location: Left arm, Patient Position: Sitting, Cuff Size: Adult Large)   Pulse 84   LMP 12/21/2014   SpO2 98%   Physical Exam  Vitals and nursing note reviewed.   Constitutional:       Appearance: Normal appearance.   HENT:      Head: Normocephalic and atraumatic.      Right Ear: External ear normal.      Left Ear: External ear normal.      Nose: No mucosal edema or rhinorrhea.      Mouth/Throat:      Mouth: Mucous membranes are moist. No oral lesions.      Pharynx: Oropharynx is clear. Uvula midline. No posterior oropharyngeal erythema.   Eyes:      General: Lids are normal.      Extraocular Movements: Extraocular movements intact.      Conjunctiva/sclera: Conjunctivae normal.   Neck:      Comments: No asymmetry, masses, or scars  Cardiovascular:      Rate and Rhythm: Normal rate and regular rhythm.      Heart sounds: Normal heart sounds, S1 normal and S2 normal.   Pulmonary:      Effort: Pulmonary effort is normal. No respiratory distress.      Breath sounds: Normal breath sounds and air entry.      Comments: Occasional cough  Musculoskeletal:      Comments: No musculoskeletal defects appreciated   Skin:     General: Skin is warm and dry.      Findings: No lesion or rash.   Neurological:      General: No focal deficit present.      Mental Status: She is alert.   Psychiatric:         Mood and Affect: Mood and affect normal.           WORKUP:  None    ASSESSMENT/PLAN:  Nela Mares is a 62 year old  female here for a follow-up visit.    1. Mild persistent asthma with acute exacerbation - Asthma symptoms worsening over the past 7-10 days. Likely triggered by her seasonal allergies. Needing albuterol frequently for relief.    - predniSONE (DELTASONE) 20 MG tablet; Take 2 tablets (40 mg) by mouth daily for 5 days, THEN 1 tablet (20 mg) daily for 5 days.  Dispense: 15 tablet; Refill: 0    2. Mild persistent asthma without complication    - fluticasone-salmeterol (ADVAIR) 500-50 MCG/ACT inhaler; Inhale 1 puff into the lungs every 12 hours  Dispense: 3 each; Refill: 1  - montelukast (SINGULAIR) 10 MG tablet; Take 1 tablet (10 mg) by mouth daily  Dispense: 90 tablet; Refill: 3  - albuterol (PROAIR HFA/PROVENTIL HFA/VENTOLIN HFA) 108 (90 Base) MCG/ACT inhaler; Inhale 2 puffs into the lungs every 4 hours as needed for shortness of breath or wheezing  Dispense: 36 g; Refill: 1    3. Seasonal allergic rhinitis due to pollen    - fluticasone (FLONASE) 50 MCG/ACT nasal spray; Spray 2 sprays into both nostrils daily  Dispense: 48 g; Refill: 3    4. Allergic rhinitis due to animals    - fluticasone (FLONASE) 50 MCG/ACT nasal spray; Spray 2 sprays into both nostrils daily  Dispense: 48 g; Refill: 3    5. Allergic rhinitis due to dust mite    - fluticasone (FLONASE) 50 MCG/ACT nasal spray; Spray 2 sprays into both nostrils daily  Dispense: 48 g; Refill: 3    6. Allergic rhinitis due to mold    - fluticasone (FLONASE) 50 MCG/ACT nasal spray; Spray 2 sprays into both nostrils daily  Dispense: 48 g; Refill: 3      Follow-up in 3-6 months, sooner if needed      Thank you for allowing me to participate in the care of Nela Arthur Suzan.      Karen Clark MD, FAAAAI  Allergy/Immunology  [facility]      Chart documentation done in part with Dragon Voice Recognition Software. Although reviewed after completion, some word and grammatical errors may  remain.      Again, thank you for allowing me to participate in the care of your patient.        Sincerely,        Karen Clark MD

## 2023-05-23 ENCOUNTER — MYC MEDICAL ADVICE (OUTPATIENT)
Dept: PLASTIC SURGERY | Facility: CLINIC | Age: 63
End: 2023-05-23
Payer: COMMERCIAL

## 2023-05-25 NOTE — TELEPHONE ENCOUNTER
RN Care Coordinator: Jesenia Hicks; [phone]    Surgery is scheduled with Dr. Knutson   Date: 7/13   [facility]  Scheduled per: next available date    H&P to be completed by: Primary Care team; patient to schedule    Surgical consult: 6/14     Post-op: 7/28    Patient will receive a phone call from pre-admission nurses 1-2 days prior to surgery with arrival and start time.       Confirmed with patient via [employer].      Patient questions/concerns: N/A       Surgery packet: to be sent via US mail or via [employer], pending patient decision    __    Claudine Capps, Senior Perioperative Coordinator, on 5/25/2023 at 2:08 PM  P: [phone]

## 2023-05-30 ENCOUNTER — TELEPHONE (OUTPATIENT)
Dept: NEUROSURGERY | Facility: CLINIC | Age: 63
End: 2023-05-30
Payer: COMMERCIAL

## 2023-05-30 NOTE — TELEPHONE ENCOUNTER
[facility] Call Center    Phone Message    May a detailed message be left on voicemail: yes     Reason for Call: Other: Fiona RN with [employer] called regarding prior authorization for surgery on 6/5/2023 at [facility] with Dr. Fernandez.  Writer unable to find any notes regarding procedure.   Please call [phone] to discuss.     Action Taken: Message routed to:  [facility]surgery    Travel Screening: Not Applicable

## 2023-05-30 NOTE — TELEPHONE ENCOUNTER
Phone call to [employer] PA rep regarding surgery at [facility].  No answer at listed number.  Left voice mail message noting the following.  Dr Fernandez does not have privileges at [facility].  This call has no record of Dr Fernandez seeing Ms Suzan in Neurosurgery.

## 2023-05-31 NOTE — TELEPHONE ENCOUNTER
Surgery is scheduled with Dr. Knutson   Date: 6/28   [facility]  Scheduled per: next available date    H&P to be completed by: Primary Care team; on 6/16    Surgical consult: 6/14     Post-op: 7/11    Patient will receive a phone call from pre-admission nurses 1-2 days prior to surgery with arrival and start time.       Confirmed with patient via MyChart.      Patient questions/concerns: N/A       Surgery packet: N/A        __    Claudine Capps Senior Perioperative Coordinator, on 5/31/2023 at 11:36 AM  P: [phone]

## 2023-06-09 ENCOUNTER — OFFICE VISIT (OUTPATIENT)
Dept: AUDIOLOGY | Facility: CLINIC | Age: 63
End: 2023-06-09
Attending: STUDENT IN AN ORGANIZED HEALTH CARE EDUCATION/TRAINING PROGRAM
Payer: COMMERCIAL

## 2023-06-09 DIAGNOSIS — H91.92 HEARING LOSS OF LEFT EAR, UNSPECIFIED HEARING LOSS TYPE: ICD-10-CM

## 2023-06-09 DIAGNOSIS — H90.3 ASYMMETRICAL SENSORINEURAL HEARING LOSS: Primary | ICD-10-CM

## 2023-06-09 PROCEDURE — 92557 COMPREHENSIVE HEARING TEST: CPT

## 2023-06-09 PROCEDURE — 92550 TYMPANOMETRY & REFLEX THRESH: CPT

## 2023-06-09 NOTE — PROGRESS NOTES
AUDIOLOGY REPORT    SUBJECTIVE:  Nela Mares is a 62 year old female who was seen in the Audiology Clinic at the [facility] for audiologic evaluation, referred by Slime Perry D.O. The patient reports a gradual decline in hearing and would like a baseline audiogram. She feels the left ear is worse than right which she attributes to 20 + years of wearing a walkie talkie on the left side for work. Sanam also reports constant, everyday dizziness that has occurred for as long as she can remember; she attributes this to allergies and medications she takes. The patient denies history of ear surgeries, bilateral otalgia, bilateral drainage and bilateral aural fullness. Sanam notes a family history of early onset of presbycusis and mentions her cousin had an acoustic neuroma. The patient notes difficulty with communication in a variety of listening situations. She was not accompanied to today's appointment.     OBJECTIVE:  Abuse Screening:  Do you feel unsafe at home or work/school? No  Do you feel threatened by someone? No  Does anyone try to keep you from having contact with others, or doing things outside of your home? No  Physical signs of abuse present? No     Fall Risk Screen:  1. Have you fallen two or more times in the past year? No  2. Have you fallen and had an injury in the past year? No    Otoscopic exam indicates ears are clear of cerumen bilaterally     Pure Tone Thresholds assessed using conventional audiometry with good  reliability from 250-8000 Hz bilaterally using insert earphones and circumaural headphones     RIGHT:  normal sloping to mild sensorineural hearing loss    LEFT:    borderline-normal sloping to moderate sensorineural hearing loss    Tympanogram:    RIGHT: normal eardrum mobility    LEFT:   normal eardrum mobility    Reflexes (reported by stimulus ear):  RIGHT: Ipsilateral is absent at frequencies tested  RIGHT: Contralateral is absent at frequencies  tested  LEFT:   Ipsilateral is absent at frequencies tested  LEFT:   Contralateral is absent at frequencies tested      Speech Reception Threshold:    RIGHT: 10 dB HL    LEFT:   20 dB HL  Word Recognition Score:     RIGHT: 100% at 50 dB HL using NU-6 recorded word list.    LEFT:   100% at 60 dB HL using NU-6 recorded word list.      ASSESSMENT:   Asymmetrical sensorineural hearing loss. Today s results were discussed with the patient in detail.     PLAN:  Patient was counseled regarding hearing loss and impact on communication. It is recommended that the patient follow-up with ENT regarding asymmetry between ears. It is also recommended she return every 1-2 years for an updated hearing evaluation, sooner if changes/concerns arise. Please call this clinic with questions regarding these results or recommendations.        Amara Hernandez, CCC-A  Licensed Audiologist  [place] #[id number]      06/09/23

## 2023-06-14 ENCOUNTER — OFFICE VISIT (OUTPATIENT)
Dept: PLASTIC SURGERY | Facility: CLINIC | Age: 63
End: 2023-06-14
Payer: COMMERCIAL

## 2023-06-14 VITALS — HEART RATE: 77 BPM | DIASTOLIC BLOOD PRESSURE: 87 MMHG | OXYGEN SATURATION: 98 % | SYSTOLIC BLOOD PRESSURE: 134 MMHG

## 2023-06-14 DIAGNOSIS — N62 LARGE BREASTS: Primary | ICD-10-CM

## 2023-06-14 PROCEDURE — 99214 OFFICE O/P EST MOD 30 MIN: CPT | Performed by: PLASTIC SURGERY

## 2023-06-14 ASSESSMENT — PAIN SCALES - GENERAL: PAINLEVEL: NO PAIN (0)

## 2023-06-14 NOTE — LETTER
6/14/2023       RE: Nela aMres  [address] [address]     Dear Colleague,    Thank you for referring your patient, Nela Mares, to the [facility] [place] at [facility]. Please see a copy of my visit note below.    PREOPERATIVE VISIT NOTE    PRESENTING COMPLAINT:  Preoperative visit for upcoming bilateral breast reduction scheduled for 06/28/2023.    HISTORY OF PRESENTING COMPLAINT:  Ms. Mares is 62 years old.  She is scheduled for a breast reduction in coming weeks.  No change otherwise in her history and physical exam.  She saw my PA, Li, back in 05/2023.  Please see her notes for details.  The patient wears an I cup.  Wants to be around a C to D cup.  Last mammogram was 08/2022, was negative.  Does not smoke.  Schnur scale is 1068 grams.    ASSESSMENT AND PLAN:  Based on above findings, a diagnosis of symptomatic bilateral breast hypertrophy, here for preoperative visit for upcoming bilateral breast reduction was made.  Went over the planned procedure with the patient in detail in presence of my nurse, Jesenia Hicks, who was present from beginning to end.  Discussed with the patient the concept behind breast reduction, where the scars would be, what to expect in terms of asymmetries, inability to promise a cup size as well as location and permanence of scars.  Numbness of the nipples was also explained.  Given the size of her breasts and the reduction that will be done, the real risk of a free nipple graft was also explained.  All risks, benefits and alternatives including pain, infection, bleeding, scarring, asymmetry, seromas, hematomas, wound breakdown, wound dehiscence, prominent scar, hypertrophic scar, keloid scar, skin necrosis, fat necrosis, nipple necrosis, T-junction site necrosis, requirement of free nipple graft, DVT, PE, MI, CVA, pneumonia, renal failure and  death were all explained.  She understood them all and wants to proceed.  I look forward to helping her out in the near future.  All her questions were answered.  She was happy with the visit.    Total time spent in the encounter today including chart review, visit itself and post-visit paperwork was 30 minutes.          Again, thank you for allowing me to participate in the care of your patient.      Sincerely,    DARYL Knutson MD

## 2023-06-14 NOTE — NURSING NOTE
Pre Op Teaching Note:       Pre and Post op Patient Education    Relevant Diagnosis:  macromastia  Surgical procedure:  BRM    Patient demonstrates understanding of the following:  Date of surgery:  6/28/23  Location of surgery:  [facility] - 5th Floor  History and Physical and any other testing necessary prior to surgery: Yes, discussed with pt need for pre-op within 30 days of surgery with PCP.    Patient demonstrates understanding of the following:    Pre-op showering/scrub information with PCMX Soap: Yes, soap given in clinic today  Blood thinner medications discussed and when to stop (if applicable):  Per PCP at pre-op.     Post-op follow-up:  Discussed how to contact the hospital, nurse, and clinic scheduling staff if necessary. (See packet information)    Instructional materials used/given/mailed:  [facility] Packet per surgery scheduler, post op teaching sheet, Soap.  Pt advised that final arrival information to come closer to the surgery date by [facility] nurses.

## 2023-06-15 NOTE — PROGRESS NOTES
PREOPERATIVE VISIT NOTE    PRESENTING COMPLAINT:  Preoperative visit for upcoming bilateral breast reduction scheduled for 06/28/2023.    HISTORY OF PRESENTING COMPLAINT:  Ms. Mares is 62 years old.  She is scheduled for a breast reduction in coming weeks.  No change otherwise in her history and physical exam.  She saw my PA, Li, back in 05/2023.  Please see her notes for details.  The patient wears an I cup.  Wants to be around a C to D cup.  Last mammogram was 08/2022, was negative.  Does not smoke.  Schnur scale is 1068 grams.    ASSESSMENT AND PLAN:  Based on above findings, a diagnosis of symptomatic bilateral breast hypertrophy, here for preoperative visit for upcoming bilateral breast reduction was made.  Went over the planned procedure with the patient in detail in presence of my nurse, Jesenia Hicks, who was present from beginning to end.  Discussed with the patient the concept behind breast reduction, where the scars would be, what to expect in terms of asymmetries, inability to promise a cup size as well as location and permanence of scars.  Numbness of the nipples was also explained.  Given the size of her breasts and the reduction that will be done, the real risk of a free nipple graft was also explained.  All risks, benefits and alternatives including pain, infection, bleeding, scarring, asymmetry, seromas, hematomas, wound breakdown, wound dehiscence, prominent scar, hypertrophic scar, keloid scar, skin necrosis, fat necrosis, nipple necrosis, T-junction site necrosis, requirement of free nipple graft, DVT, PE, MI, CVA, pneumonia, renal failure and death were all explained.  She understood them all and wants to proceed.  I look forward to helping her out in the near future.  All her questions were answered.  She was happy with the visit.    Total time spent in the encounter today including chart review, visit itself and post-visit paperwork was 30 minutes.

## 2023-06-16 ENCOUNTER — OFFICE VISIT (OUTPATIENT)
Dept: FAMILY MEDICINE | Facility: CLINIC | Age: 63
End: 2023-06-16
Payer: COMMERCIAL

## 2023-06-16 VITALS
OXYGEN SATURATION: 97 % | HEART RATE: 78 BPM | TEMPERATURE: 98.5 F | DIASTOLIC BLOOD PRESSURE: 86 MMHG | SYSTOLIC BLOOD PRESSURE: 126 MMHG | BODY MASS INDEX: 36.51 KG/M2 | HEIGHT: 69 IN

## 2023-06-16 DIAGNOSIS — E78.5 HYPERLIPIDEMIA LDL GOAL <130: ICD-10-CM

## 2023-06-16 DIAGNOSIS — F32.0 MILD MAJOR DEPRESSION (H): ICD-10-CM

## 2023-06-16 DIAGNOSIS — Z01.818 PREOP GENERAL PHYSICAL EXAM: Primary | ICD-10-CM

## 2023-06-16 DIAGNOSIS — N62 HYPERTROPHY OF BREAST: ICD-10-CM

## 2023-06-16 DIAGNOSIS — J45.30 MILD PERSISTENT ASTHMA WITHOUT COMPLICATION: ICD-10-CM

## 2023-06-16 DIAGNOSIS — I10 HYPERTENSION GOAL BP (BLOOD PRESSURE) < 140/90: ICD-10-CM

## 2023-06-16 DIAGNOSIS — R73.01 IMPAIRED FASTING GLUCOSE: ICD-10-CM

## 2023-06-16 DIAGNOSIS — G47.33 OBSTRUCTIVE SLEEP APNEA: ICD-10-CM

## 2023-06-16 PROCEDURE — 99214 OFFICE O/P EST MOD 30 MIN: CPT | Performed by: STUDENT IN AN ORGANIZED HEALTH CARE EDUCATION/TRAINING PROGRAM

## 2023-06-16 NOTE — H&P (VIEW-ONLY)
[address] [address]  Phone: [phone]  Primary Provider: Padma Lozano  Pre-op Performing Provider: BERNABE KAMARA      PREOPERATIVE EVALUATION:  Today's date: 6/16/2023    Nela Mares is a 62 year old female who presents for a preoperative evaluation.      6/16/2023     8:27 AM   Additional Questions   Accompanied by na         6/16/2023     8:27 AM   Patient Reported Additional Medications   Patient reports taking the following new medications none     Surgical Information:  Surgery/Procedure: MAMMOPLASTY, REDUCTION, BILATERAL  Surgery Location: [facility] [place]  Surgeon: DARYL Knutson MD  Surgery Date: 06/28/2023  Time of Surgery:   Where patient plans to recover: At home with family  Fax number for surgical facility: Note does not need to be faxed, will be available electronically in Epic.    Assessment & Plan     The proposed surgical procedure is considered INTERMEDIATE risk.    Preop general physical exam  Approval given to proceed with surgery.  Labs up-to-date nonconcerning.  No EKG indicated.  Reviewed medications.    Hypertrophy of breast  Scheduled for bilateral breast reduction.    Mild major depression (H)  mental health has been stable on Effexor, continue on day of surgery     Hypertension goal BP (blood pressure) < 140/90  Blood pressure well controlled on current medications, hold ACE on surgery today, continue Norvasc    Mild persistent asthma without complication  Worsening asthma this spring, saw Dr. Eduardo ludwig and did receive a 10-day course of prednisone.  Not in acute exacerbation currently no adjustments to inhalers needed at this time.    Obstructive sleep apnea  Continue nightly CPAP use    Impaired fasting glucose  Last A1c 6.5% 3 months ago, we will recheck A1c 6 months from that date.  In the meantime she is going to work on diet and  exercise, lifestyle modifications to improve this.  Prior to the last check she was only in the prediabetic range.  - Hemoglobin A1c; Future    Hyperlipidemia LDL goal <130  Recheck cholesterol level in 3 months as well.  With new increase of A1c to 6.5%, she would qualify for Treatment with a statin based on her ASCVD risk score, she would like to wait to recheck cholesterol after lifestyle modifications.  - Lipid panel reflex to direct LDL Fasting; Future            - No identified additional risk factors other than previously addressed    Antiplatelet or Anticoagulation Medication Instructions:   - Patient is on no antiplatelet or anticoagulation medications.    Additional Medication Instructions:  HOLD finasteride on day of surgery    - ACE/ARB: HOLD on day of surgery (minimum 11 hours for general anesthesia).   - Calcium Channel Blockers: May be continued on the day of surgery.   - SSRIs, SNRIs, TCAs, Antipsychotics: Continue without modification.    - LABA, inhaled corticosteroid, long-acting anticholinergics: Continue without modification.   - rescue Inhaler: Continue PRN. Bring to hospital on the day of surgery.    RECOMMENDATION:  APPROVAL GIVEN to proceed with proposed procedure, without further diagnostic evaluation.        Subjective       HPI related to upcoming procedure:     Currently breasts are I cup and having a breast reduction.   She is eager to have surgery for back relief.     Asthma has been worse this year.   Took a 10 day course of prednisone in May. Sees Dr Clark.   Not in acute exacerbation  Watching air quality and avoiding being outside.     Activity - gardening 2-3 hours at a time.     Lab Results   Component Value Date    A1C 6.5 02/20/2023    A1C 6.0 12/02/2021    A1C 6.0 03/05/2021    A1C 5.7 07/20/2020    A1C 5.9 07/18/2019    A1C 5.7 03/16/2017         The 10-year ASCVD risk score (Araceli LEÓN, et al., 2019) is: 11.1%    Values used to calculate the score:      Age: 62 years       Sex: Female      Is Non- : No      Diabetic: Yes      Tobacco smoker: No      Systolic Blood Pressure: 126 mmHg      Is BP treated: Yes      HDL Cholesterol: 56 mg/dL      Total Cholesterol: 239 mg/dL                6/16/2023     8:33 AM   Preop Questions   1. Have you ever had a heart attack or stroke? No   2. Have you ever had surgery on your heart or blood vessels, such as a stent placement, a coronary artery bypass, or surgery on an artery in your head, neck, heart, or legs? No   3. Do you have chest pain with activity? No   4. Do you have a history of  heart failure? No   5. Do you currently have a cold, bronchitis or symptoms of other infection? No   6. Do you have a cough, shortness of breath, or wheezing? No   7. Do you or anyone in your family have previous history of blood clots? No   8. Do you or does anyone in your family have a serious bleeding problem such as prolonged bleeding following surgeries or cuts? No   9. Have you ever had problems with anemia or been told to take iron pills? No   10. Have you had any abnormal blood loss such as black, tarry or bloody stools, or abnormal vaginal bleeding? No   11. Have you ever had a blood transfusion? No   12. Are you willing to have a blood transfusion if it is medically needed before, during, or after your surgery? Yes   13. Have you or any of your relatives ever had problems with anesthesia? No   14. Do you have sleep apnea, excessive snoring or daytime drowsiness? YES - MACIE   14a. Do you have a CPAP machine? Yes   15. Do you have any artifical heart valves or other implanted medical devices like a pacemaker, defibrillator, or continuous glucose monitor? No   16. Do you have artificial joints? No   17. Are you allergic to latex? No     Health Care Directive:  Patient does not have a Health Care Directive or Living Will: Patient states has Advance Directive and will bring in a copy to clinic.    Preoperative Review of :    reviewed - no record of controlled substances prescribed.        Status of Chronic Conditions:  HYPERLIPIDEMIA - Patient has a long history of significant Hyperlipidemia requiring medication for treatment with recent good control.    HYPERTENSION - Patient has longstanding history of HTN , currently denies any symptoms referable to elevated blood pressure. Specifically denies chest pain, palpitations, dyspnea, orthopnea, PND or peripheral edema. Blood pressure readings have been in normal range. Current medication regimen is as listed below. Patient denies any side effects of medication.       Review of Systems  Constitutional, neuro, ENT, endocrine, pulmonary, cardiac, gastrointestinal, genitourinary, musculoskeletal, integument and psychiatric systems are negative, except as otherwise noted.    Patient Active Problem List    Diagnosis Date Noted     Diabetes mellitus, type 2 (H) 02/20/2023     Priority: Medium     Lichen sclerosus 02/20/2023     Priority: Medium     Chronic midline low back pain without sciatica 01/28/2022     Priority: Medium     Primary osteoarthritis of both knees 01/28/2022     Priority: Medium     Paroxysmal supraventricular tachycardia (H) 05/10/2021     Priority: Medium     Added automatically from request for surgery [id number]       Allergic conjunctivitis, bilateral 08/27/2019     Priority: Medium     Overweight (H) 07/18/2019     Priority: Medium     Impaired fasting glucose 07/18/2019     Priority: Medium     Seasonal allergic rhinitis due to pollen 10/18/2018     Priority: Medium     Allergic rhinitis due to dust mite 10/18/2018     Priority: Medium     Allergic rhinitis due to mold 10/18/2018     Priority: Medium     IUD (intrauterine device) in place 03/21/2017     Priority: Medium     mirena place in 2015       Hypertrophy of breast 11/24/2015     Priority: Medium     Pilar cyst 07/14/2014     Priority: Medium     Hypertension goal BP (blood pressure) < 140/90 10/25/2010     Priority:  Medium     CARDIOVASCULAR SCREENING; LDL GOAL LESS THAN 160 05/09/2010     Priority: Medium     Mild major depression (H)      Priority: Medium     Has used paxil, celexa, lexapro, and zoloft in the past.  Zoloft worked well although spring of 2017 symptoms worsened despite max dose.  Switched to wellbutrin - but wellbutrin triggered anxiety and rage.    Will plan to start Effexor in the fall before school year starts.       Leiomyoma of uterus 08/14/2007     Priority: Medium     Problem list name updated by automated process. Provider to review       Obstructive sleep apnea      Priority: Medium     uses CPAP           Mild persistent asthma 10/11/2005     Priority: Medium     Typically needs steroid burst about once per year for symptoms uncontrolled with continued advair and maxair q 4 hours.  Peak flow generally runs 400. With illness goes down to 350.    12/09 - given one extra burst of prednisone.  Instructed to call if she starts it so we can help her monitor her symptoms.       Allergic rhinitis due to animals 08/31/2004     Priority: Medium      Past Medical History:   Diagnosis Date     Allergic rhinitis, cause unspecified      Cervical high risk HPV (human papillomavirus) test positive 03/21/2017    3/21/17 NIL pap/+ HR HPV (not 16 or 18).      Depressive disorder      Depressive disorder, not elsewhere classified     seasonal     Diabetes mellitus, type 2 (H) 2/20/2023     Hypertension      Mild persistent asthma      Obstructive sleep apnea (adult) (pediatric)     uses CPAP     Primary osteoarthritis of both knees 1/28/2022     Scalp mass 7/2014     Past Surgical History:   Procedure Laterality Date     COLONOSCOPY  6/21/2012    Procedure: COLONOSCOPY;  COLONOSCOPY, SCREEN;  Surgeon: Bart You MD;  Location: [facility]     COLONOSCOPY N/A 10/20/2022    Procedure: COLONOSCOPY, WITH POLYPECTOMY AND BIOPSY;  Surgeon: Chago Hong DO;  [facility]: [facility]     COLONOSCOPY N/A 3/20/2023    Procedure:  COLONOSCOPY, WITH POLYPECTOMY AND BIOPSY;  Surgeon: Lion Vidal MD;  Location: [facility]     D & C       ENDOSCOPIC RETROGRADE CHOLANGIOPANCREATOGRAM  1/4/2014    Procedure: ENDOSCOPIC RETROGRADE CHOLANGIOPANCREATOGRAM;  ERCP biliary sphincterotomy, bile duct stone removal, epinepherine washing.;  Surgeon: Ba Meyers MD;  Location: [facility] OR     EP ABLATION SVT N/A 6/2/2021    Procedure: EP ABLATION SVT;  Surgeon: Cuca Magaña MD;  Location:  [facility]     LAPAROSCOPIC CHOLECYSTECTOMY WITH CHOLANGIOGRAMS  1/4/2014    Procedure: LAPAROSCOPIC CHOLECYSTECTOMY WITH CHOLANGIOGRAMS;;  Surgeon: Haja Sage MD;  Location: [facility] OR     NO HISTORY OF SURGERY       OPERATIVE HYSTEROSCOPY  6/17/2014    Procedure: OPERATIVE HYSTEROSCOPY;  Surgeon: Paola Camara MD;  Location: [facility]     REMOVE INTRAUTERINE DEVICE  6/17/2014    Procedure: REMOVE INTRAUTERINE DEVICE;  Surgeon: Paola Camara MD;  Location: [facility] OR     Current Outpatient Medications   Medication Sig Dispense Refill     albuterol (PROAIR HFA/PROVENTIL HFA/VENTOLIN HFA) 108 (90 Base) MCG/ACT inhaler Inhale 2 puffs into the lungs every 4 hours as needed for shortness of breath or wheezing 36 g 1     albuterol (PROVENTIL) (2.5 MG/3ML) 0.083% neb solution Take 1 vial (2.5 mg) by nebulization every 4 hours as needed for shortness of breath / dyspnea or wheezing 30 mL 11     amLODIPine (NORVASC) 5 MG tablet Take 1 tablet (5 mg) by mouth daily 90 tablet 3     cetirizine (ZYRTEC) 10 MG tablet Take 1 tablet (10 mg) by mouth daily 90 tablet 3     Cholecalciferol (VITAMIN D) 125 MCG (5000 UT) capsule Take 1 capsule (5,000 Units) by mouth daily 90 capsule 3     clobetasol (TEMOVATE) 0.05 % external ointment Apply twice daily to affected area for 2 weeks then 2-3 times weekly. 60 g 3     colchicine (COLCYRS) 0.6 MG tablet For arthritis flare only: Take 2 tablets (1.2 mg) at first sign of flare, then 0.6 mg 1 hour later if not  resolved. 3 tablet 0     finasteride (PROSCAR) 5 MG tablet Take 1 tablet (5 mg) by mouth daily 90 tablet 3     fluticasone (FLONASE) 50 MCG/ACT nasal spray Spray 2 sprays into both nostrils daily 48 g 3     fluticasone-salmeterol (ADVAIR) 500-50 MCG/ACT inhaler Inhale 1 puff into the lungs every 12 hours 3 each 1     lisinopril (ZESTRIL) 40 MG tablet Take 1 tablet (40 mg) by mouth daily 90 tablet 3     montelukast (SINGULAIR) 10 MG tablet Take 1 tablet (10 mg) by mouth daily 90 tablet 3     venlafaxine (EFFEXOR XR) 150 MG 24 hr capsule Take 1 capsule (150 mg) by mouth daily 90 capsule 3     venlafaxine (EFFEXOR XR) 75 MG 24 hr capsule Take 1 capsule (75 mg) by mouth daily With 150 mg capsule for a total of 225 mg daily 90 capsule 3       No Known Allergies     Social History     Tobacco Use     Smoking status: Never     Smokeless tobacco: Never   Vaping Use     Vaping status: Never Used   Substance Use Topics     Alcohol use: No     Family History   Problem Relation Age of Onset     C.A.D. Paternal Grandmother      Cerebrovascular Disease Paternal Grandmother      Depression Paternal Grandmother      Obesity Paternal Grandmother      C.A.D. Paternal Grandfather      Cerebrovascular Disease Paternal Grandfather      Substance Abuse Paternal Grandfather      Hypertension Father      Asthma Father      Obesity Father      Skin Cancer Father      Diabetes Father      Neurologic Disorder Mother         Graves disease     Hyperlipidemia Mother      Depression Mother      Thyroid Disease Mother      Anxiety Disorder Mother      Other - See Comments Other      Skin Cancer Sister      Asthma Sister      Depression Maternal Grandmother      Cancer Sister         mild skin cancer,cured from excision     Depression Nephew      Anxiety Disorder Nephew      Substance Abuse Nephew      Asthma Nephew      Anxiety Disorder Nephew      Mental Illness Maternal Half-Brother      Mental Illness Paternal Half-Brother      Substance Abuse  "Nephew      Asthma Sister      Asthma Nephew      Thyroid Disease Maternal Half-Sister      Thyroid Disease Paternal Half-Sister      History   Drug Use No         Objective     /86   Pulse 78   Temp 98.5  F (36.9  C) (Oral)   Ht 1.753 m (5' 9\")   LMP 12/21/2014   SpO2 97%   BMI 36.51 kg/m      Physical Exam    GENERAL APPEARANCE: healthy, alert and no distress     EYES: EOMI, PERRL     HENT: ear canals and TM's normal and nose and mouth without ulcers or lesions     NECK: no adenopathy, no asymmetry, masses, or scars and thyroid normal to palpation     RESP: lungs clear to auscultation - no rales, rhonchi or wheezes     CV: regular rates and rhythm, normal S1 S2, no S3 or S4 and no murmur, click or rub     ABDOMEN:  soft, nontender, no HSM or masses and bowel sounds normal     MS: extremities normal- no gross deformities noted, no evidence of inflammation in joints, FROM in all extremities.     SKIN: no suspicious lesions or rashes     NEURO: Normal strength and tone, sensory exam grossly normal, mentation intact and speech normal     PSYCH: mentation appears normal. and affect normal/bright     LYMPHATICS: No cervical adenopathy    Recent Labs   Lab Test 02/20/23  1013 12/02/21  1314   HGB  --  13.1   PLT  --  252   * 134   POTASSIUM 4.3 3.9   CR 0.95 0.84   A1C 6.5* 6.0*        Diagnostics:  No labs were ordered during this visit.   No EKG required, no history of coronary heart disease, significant arrhythmia, peripheral arterial disease or other structural heart disease.    Revised Cardiac Risk Index (RCRI):  The patient has the following serious cardiovascular risks for perioperative complications:   - No serious cardiac risks = 0 points     RCRI Interpretation: 0 points: Class I (very low risk - 0.4% complication rate)           Signed Electronically by: Slime Perry DO  Copy of this evaluation report is provided to requesting physician.        "

## 2023-06-16 NOTE — PROGRESS NOTES
[address] [address]  Phone: [phone]  Primary Provider: Padma Lozano  Pre-op Performing Provider: BERNABE KAMARA      PREOPERATIVE EVALUATION:  Today's date: 6/16/2023    Nela Mares is a 62 year old female who presents for a preoperative evaluation.      6/16/2023     8:27 AM   Additional Questions   Accompanied by na         6/16/2023     8:27 AM   Patient Reported Additional Medications   Patient reports taking the following new medications none     Surgical Information:  Surgery/Procedure: MAMMOPLASTY, REDUCTION, BILATERAL  Surgery Location: [facility]  Surgeon: DARYL Knutson MD  Surgery Date: 06/28/2023  Time of Surgery:   Where patient plans to recover: At home with family  Fax number for surgical facility: Note does not need to be faxed, will be available electronically in Epic.    Assessment & Plan     The proposed surgical procedure is considered INTERMEDIATE risk.    Preop general physical exam  Approval given to proceed with surgery.  Labs up-to-date nonconcerning.  No EKG indicated.  Reviewed medications.    Hypertrophy of breast  Scheduled for bilateral breast reduction.    Mild major depression (H)  mental health has been stable on Effexor, continue on day of surgery     Hypertension goal BP (blood pressure) < 140/90  Blood pressure well controlled on current medications, hold ACE on surgery today, continue Norvasc    Mild persistent asthma without complication  Worsening asthma this spring, saw Dr. Eduardo ludwig and did receive a 10-day course of prednisone.  Not in acute exacerbation currently no adjustments to inhalers needed at this time.    Obstructive sleep apnea  Continue nightly CPAP use    Impaired fasting glucose  Last A1c 6.5% 3 months ago, we will recheck A1c 6 months from that date.  In the meantime she is going to work on diet and  exercise, lifestyle modifications to improve this.  Prior to the last check she was only in the prediabetic range.  - Hemoglobin A1c; Future    Hyperlipidemia LDL goal <130  Recheck cholesterol level in 3 months as well.  With new increase of A1c to 6.5%, she would qualify for Treatment with a statin based on her ASCVD risk score, she would like to wait to recheck cholesterol after lifestyle modifications.  - Lipid panel reflex to direct LDL Fasting; Future            - No identified additional risk factors other than previously addressed    Antiplatelet or Anticoagulation Medication Instructions:   - Patient is on no antiplatelet or anticoagulation medications.    Additional Medication Instructions:  HOLD finasteride on day of surgery    - ACE/ARB: HOLD on day of surgery (minimum 11 hours for general anesthesia).   - Calcium Channel Blockers: May be continued on the day of surgery.   - SSRIs, SNRIs, TCAs, Antipsychotics: Continue without modification.    - LABA, inhaled corticosteroid, long-acting anticholinergics: Continue without modification.   - rescue Inhaler: Continue PRN. Bring to hospital on the day of surgery.    RECOMMENDATION:  APPROVAL GIVEN to proceed with proposed procedure, without further diagnostic evaluation.        Subjective       HPI related to upcoming procedure:     Currently breasts are I cup and having a breast reduction.   She is eager to have surgery for back relief.     Asthma has been worse this year.   Took a 10 day course of prednisone in May. Sees Dr Clark.   Not in acute exacerbation  Watching air quality and avoiding being outside.     Activity - gardening 2-3 hours at a time.     Lab Results   Component Value Date    A1C 6.5 02/20/2023    A1C 6.0 12/02/2021    A1C 6.0 03/05/2021    A1C 5.7 07/20/2020    A1C 5.9 07/18/2019    A1C 5.7 03/16/2017         The 10-year ASCVD risk score (Araceli LEÓN, et al., 2019) is: 11.1%    Values used to calculate the score:      Age: 62 years       Sex: Female      Is Non- : No      Diabetic: Yes      Tobacco smoker: No      Systolic Blood Pressure: 126 mmHg      Is BP treated: Yes      HDL Cholesterol: 56 mg/dL      Total Cholesterol: 239 mg/dL                6/16/2023     8:33 AM   Preop Questions   1. Have you ever had a heart attack or stroke? No   2. Have you ever had surgery on your heart or blood vessels, such as a stent placement, a coronary artery bypass, or surgery on an artery in your head, neck, heart, or legs? No   3. Do you have chest pain with activity? No   4. Do you have a history of  heart failure? No   5. Do you currently have a cold, bronchitis or symptoms of other infection? No   6. Do you have a cough, shortness of breath, or wheezing? No   7. Do you or anyone in your family have previous history of blood clots? No   8. Do you or does anyone in your family have a serious bleeding problem such as prolonged bleeding following surgeries or cuts? No   9. Have you ever had problems with anemia or been told to take iron pills? No   10. Have you had any abnormal blood loss such as black, tarry or bloody stools, or abnormal vaginal bleeding? No   11. Have you ever had a blood transfusion? No   12. Are you willing to have a blood transfusion if it is medically needed before, during, or after your surgery? Yes   13. Have you or any of your relatives ever had problems with anesthesia? No   14. Do you have sleep apnea, excessive snoring or daytime drowsiness? YES - MACIE   14a. Do you have a CPAP machine? Yes   15. Do you have any artifical heart valves or other implanted medical devices like a pacemaker, defibrillator, or continuous glucose monitor? No   16. Do you have artificial joints? No   17. Are you allergic to latex? No     Health Care Directive:  Patient does not have a Health Care Directive or Living Will: Patient states has Advance Directive and will bring in a copy to clinic.    Preoperative Review of :    reviewed - no record of controlled substances prescribed.        Status of Chronic Conditions:  HYPERLIPIDEMIA - Patient has a long history of significant Hyperlipidemia requiring medication for treatment with recent good control.    HYPERTENSION - Patient has longstanding history of HTN , currently denies any symptoms referable to elevated blood pressure. Specifically denies chest pain, palpitations, dyspnea, orthopnea, PND or peripheral edema. Blood pressure readings have been in normal range. Current medication regimen is as listed below. Patient denies any side effects of medication.       Review of Systems  Constitutional, neuro, ENT, endocrine, pulmonary, cardiac, gastrointestinal, genitourinary, musculoskeletal, integument and psychiatric systems are negative, except as otherwise noted.    Patient Active Problem List    Diagnosis Date Noted     Diabetes mellitus, type 2 (H) 02/20/2023     Priority: Medium     Lichen sclerosus 02/20/2023     Priority: Medium     Chronic midline low back pain without sciatica 01/28/2022     Priority: Medium     Primary osteoarthritis of both knees 01/28/2022     Priority: Medium     Paroxysmal supraventricular tachycardia (H) 05/10/2021     Priority: Medium     Added automatically from request for surgery [id number]       Allergic conjunctivitis, bilateral 08/27/2019     Priority: Medium     Overweight (H) 07/18/2019     Priority: Medium     Impaired fasting glucose 07/18/2019     Priority: Medium     Seasonal allergic rhinitis due to pollen 10/18/2018     Priority: Medium     Allergic rhinitis due to dust mite 10/18/2018     Priority: Medium     Allergic rhinitis due to mold 10/18/2018     Priority: Medium     IUD (intrauterine device) in place 03/21/2017     Priority: Medium     mirena place in 2015       Hypertrophy of breast 11/24/2015     Priority: Medium     Pilar cyst 07/14/2014     Priority: Medium     Hypertension goal BP (blood pressure) < 140/90 10/25/2010     Priority:  Medium     CARDIOVASCULAR SCREENING; LDL GOAL LESS THAN 160 05/09/2010     Priority: Medium     Mild major depression (H)      Priority: Medium     Has used paxil, celexa, lexapro, and zoloft in the past.  Zoloft worked well although spring of 2017 symptoms worsened despite max dose.  Switched to wellbutrin - but wellbutrin triggered anxiety and rage.    Will plan to start Effexor in the fall before school year starts.       Leiomyoma of uterus 08/14/2007     Priority: Medium     Problem list name updated by automated process. Provider to review       Obstructive sleep apnea      Priority: Medium     uses CPAP           Mild persistent asthma 10/11/2005     Priority: Medium     Typically needs steroid burst about once per year for symptoms uncontrolled with continued advair and maxair q 4 hours.  Peak flow generally runs 400. With illness goes down to 350.    12/09 - given one extra burst of prednisone.  Instructed to call if she starts it so we can help her monitor her symptoms.       Allergic rhinitis due to animals 08/31/2004     Priority: Medium      Past Medical History:   Diagnosis Date     Allergic rhinitis, cause unspecified      Cervical high risk HPV (human papillomavirus) test positive 03/21/2017    3/21/17 NIL pap/+ HR HPV (not 16 or 18).      Depressive disorder      Depressive disorder, not elsewhere classified     seasonal     Diabetes mellitus, type 2 (H) 2/20/2023     Hypertension      Mild persistent asthma      Obstructive sleep apnea (adult) (pediatric)     uses CPAP     Primary osteoarthritis of both knees 1/28/2022     Scalp mass 7/2014     Past Surgical History:   Procedure Laterality Date     COLONOSCOPY  6/21/2012    Procedure: COLONOSCOPY;  COLONOSCOPY, SCREEN;  Surgeon: Bart You MD;  Location: [facility]     COLONOSCOPY N/A 10/20/2022    Procedure: COLONOSCOPY, WITH POLYPECTOMY AND BIOPSY;  Surgeon: Chago Hong DO;  [facility]     COLONOSCOPY N/A 3/20/2023    Procedure:  COLONOSCOPY, WITH POLYPECTOMY AND BIOPSY;  Surgeon: Lion Vidal MD;  [facility]: [facility]     D & C       ENDOSCOPIC RETROGRADE CHOLANGIOPANCREATOGRAM  1/4/2014    Procedure: ENDOSCOPIC RETROGRADE CHOLANGIOPANCREATOGRAM;  ERCP biliary sphincterotomy, bile duct stone removal, epinepherine washing.;  Surgeon: Ba Meyers MD;  Location: [facility]     EP ABLATION SVT N/A 6/2/2021    Procedure: EP ABLATION SVT;  Surgeon: Cuca Magaña MD;  [facility]     LAPAROSCOPIC CHOLECYSTECTOMY WITH CHOLANGIOGRAMS  1/4/2014    Procedure: LAPAROSCOPIC CHOLECYSTECTOMY WITH CHOLANGIOGRAMS;;  Surgeon: Haja Sage MD;  Location: [facility]     NO HISTORY OF SURGERY       OPERATIVE HYSTEROSCOPY  6/17/2014    Procedure: OPERATIVE HYSTEROSCOPY;  Surgeon: Paola Camara MD;  Location: [facility]     REMOVE INTRAUTERINE DEVICE  6/17/2014    Procedure: REMOVE INTRAUTERINE DEVICE;  Surgeon: Paola Camara MD;  Location: [facility]     Current Outpatient Medications   Medication Sig Dispense Refill     albuterol (PROAIR HFA/PROVENTIL HFA/VENTOLIN HFA) 108 (90 Base) MCG/ACT inhaler Inhale 2 puffs into the lungs every 4 hours as needed for shortness of breath or wheezing 36 g 1     albuterol (PROVENTIL) (2.5 MG/3ML) 0.083% neb solution Take 1 vial (2.5 mg) by nebulization every 4 hours as needed for shortness of breath / dyspnea or wheezing 30 mL 11     amLODIPine (NORVASC) 5 MG tablet Take 1 tablet (5 mg) by mouth daily 90 tablet 3     cetirizine (ZYRTEC) 10 MG tablet Take 1 tablet (10 mg) by mouth daily 90 tablet 3     Cholecalciferol (VITAMIN D) 125 MCG (5000 UT) capsule Take 1 capsule (5,000 Units) by mouth daily 90 capsule 3     clobetasol (TEMOVATE) 0.05 % external ointment Apply twice daily to affected area for 2 weeks then 2-3 times weekly. 60 g 3     colchicine (COLCYRS) 0.6 MG tablet For arthritis flare only: Take 2 tablets (1.2 mg) at first sign of flare, then 0.6 mg 1 hour later if not  resolved. 3 tablet 0     finasteride (PROSCAR) 5 MG tablet Take 1 tablet (5 mg) by mouth daily 90 tablet 3     fluticasone (FLONASE) 50 MCG/ACT nasal spray Spray 2 sprays into both nostrils daily 48 g 3     fluticasone-salmeterol (ADVAIR) 500-50 MCG/ACT inhaler Inhale 1 puff into the lungs every 12 hours 3 each 1     lisinopril (ZESTRIL) 40 MG tablet Take 1 tablet (40 mg) by mouth daily 90 tablet 3     montelukast (SINGULAIR) 10 MG tablet Take 1 tablet (10 mg) by mouth daily 90 tablet 3     venlafaxine (EFFEXOR XR) 150 MG 24 hr capsule Take 1 capsule (150 mg) by mouth daily 90 capsule 3     venlafaxine (EFFEXOR XR) 75 MG 24 hr capsule Take 1 capsule (75 mg) by mouth daily With 150 mg capsule for a total of 225 mg daily 90 capsule 3       No Known Allergies     Social History     Tobacco Use     Smoking status: Never     Smokeless tobacco: Never   Vaping Use     Vaping status: Never Used   Substance Use Topics     Alcohol use: No     Family History   Problem Relation Age of Onset     C.A.D. Paternal Grandmother      Cerebrovascular Disease Paternal Grandmother      Depression Paternal Grandmother      Obesity Paternal Grandmother      C.A.D. Paternal Grandfather      Cerebrovascular Disease Paternal Grandfather      Substance Abuse Paternal Grandfather      Hypertension Father      Asthma Father      Obesity Father      Skin Cancer Father      Diabetes Father      Neurologic Disorder Mother         Graves disease     Hyperlipidemia Mother      Depression Mother      Thyroid Disease Mother      Anxiety Disorder Mother      Other - See Comments Other      Skin Cancer Sister      Asthma Sister      Depression Maternal Grandmother      Cancer Sister         mild skin cancer,cured from excision     Depression Nephew      Anxiety Disorder Nephew      Substance Abuse Nephew      Asthma Nephew      Anxiety Disorder Nephew      Mental Illness Maternal Half-Brother      Mental Illness Paternal Half-Brother      Substance Abuse  "Nephew      Asthma Sister      Asthma Nephew      Thyroid Disease Maternal Half-Sister      Thyroid Disease Paternal Half-Sister      History   Drug Use No         Objective     /86   Pulse 78   Temp 98.5  F (36.9  C) (Oral)   Ht 1.753 m (5' 9\")   LMP 12/21/2014   SpO2 97%   BMI 36.51 kg/m      Physical Exam    GENERAL APPEARANCE: healthy, alert and no distress     EYES: EOMI, PERRL     HENT: ear canals and TM's normal and nose and mouth without ulcers or lesions     NECK: no adenopathy, no asymmetry, masses, or scars and thyroid normal to palpation     RESP: lungs clear to auscultation - no rales, rhonchi or wheezes     CV: regular rates and rhythm, normal S1 S2, no S3 or S4 and no murmur, click or rub     ABDOMEN:  soft, nontender, no HSM or masses and bowel sounds normal     MS: extremities normal- no gross deformities noted, no evidence of inflammation in joints, FROM in all extremities.     SKIN: no suspicious lesions or rashes     NEURO: Normal strength and tone, sensory exam grossly normal, mentation intact and speech normal     PSYCH: mentation appears normal. and affect normal/bright     LYMPHATICS: No cervical adenopathy    Recent Labs   Lab Test 02/20/23  1013 12/02/21  1314   HGB  --  13.1   PLT  --  252   * 134   POTASSIUM 4.3 3.9   CR 0.95 0.84   A1C 6.5* 6.0*        Diagnostics:  No labs were ordered during this visit.   No EKG required, no history of coronary heart disease, significant arrhythmia, peripheral arterial disease or other structural heart disease.    Revised Cardiac Risk Index (RCRI):  The patient has the following serious cardiovascular risks for perioperative complications:   - No serious cardiac risks = 0 points     RCRI Interpretation: 0 points: Class I (very low risk - 0.4% complication rate)           Signed Electronically by: Slime Perry DO  Copy of this evaluation report is provided to requesting physician.        "

## 2023-06-16 NOTE — PATIENT INSTRUCTIONS
Medication instructions:   - HOLD finasteride and supplements on day of surgery    - Lisnopril: HOLD on day of surgery (minimum 11 hours for general anesthesia).   - Lisinopril:  May be continued on the day of surgery.   - Effexor: Continue without modification.    - Advair: Continue without modification.   - advair: Continue PRN. Bring to hospital on the day of surgery.

## 2023-06-27 ENCOUNTER — ANESTHESIA EVENT (OUTPATIENT)
Dept: SURGERY | Facility: AMBULATORY SURGERY CENTER | Age: 63
End: 2023-06-27
Payer: COMMERCIAL

## 2023-06-28 ENCOUNTER — ANESTHESIA (OUTPATIENT)
Dept: SURGERY | Facility: AMBULATORY SURGERY CENTER | Age: 63
End: 2023-06-28
Payer: COMMERCIAL

## 2023-06-28 ENCOUNTER — HOSPITAL ENCOUNTER (OUTPATIENT)
Facility: AMBULATORY SURGERY CENTER | Age: 63
Discharge: HOME | End: 2023-06-28
Attending: PLASTIC SURGERY
Payer: COMMERCIAL

## 2023-06-28 VITALS
SYSTOLIC BLOOD PRESSURE: 114 MMHG | TEMPERATURE: 96.7 F | BODY MASS INDEX: 35.55 KG/M2 | RESPIRATION RATE: 18 BRPM | HEART RATE: 72 BPM | OXYGEN SATURATION: 95 % | DIASTOLIC BLOOD PRESSURE: 79 MMHG | HEIGHT: 69 IN | WEIGHT: 240 LBS

## 2023-06-28 DIAGNOSIS — N62 HYPERTROPHY OF BREAST: Primary | ICD-10-CM

## 2023-06-28 LAB — GLUCOSE BLDC GLUCOMTR-MCNC: 142 MG/DL (ref 70–99)

## 2023-06-28 PROCEDURE — 82962 GLUCOSE BLOOD TEST: CPT | Performed by: PATHOLOGY

## 2023-06-28 PROCEDURE — 19318 BREAST REDUCTION: CPT | Mod: RT

## 2023-06-28 PROCEDURE — 88305 TISSUE EXAM BY PATHOLOGIST: CPT | Mod: TC | Performed by: PLASTIC SURGERY

## 2023-06-28 PROCEDURE — 19318 BREAST REDUCTION: CPT | Mod: 50 | Performed by: PLASTIC SURGERY

## 2023-06-28 PROCEDURE — 88305 TISSUE EXAM BY PATHOLOGIST: CPT | Mod: 26 | Performed by: PATHOLOGY

## 2023-06-28 RX ORDER — FENTANYL CITRATE 50 UG/ML
INJECTION, SOLUTION INTRAMUSCULAR; INTRAVENOUS PRN
Status: DISCONTINUED | OUTPATIENT
Start: 2023-06-28 | End: 2023-06-28

## 2023-06-28 RX ORDER — HYDROMORPHONE HYDROCHLORIDE 1 MG/ML
0.4 INJECTION, SOLUTION INTRAMUSCULAR; INTRAVENOUS; SUBCUTANEOUS EVERY 5 MIN PRN
Status: DISCONTINUED | OUTPATIENT
Start: 2023-06-28 | End: 2023-06-28 | Stop reason: HOSPADM

## 2023-06-28 RX ORDER — BUPIVACAINE HYDROCHLORIDE 2.5 MG/ML
INJECTION, SOLUTION EPIDURAL; INFILTRATION; INTRACAUDAL
Status: COMPLETED | OUTPATIENT
Start: 2023-06-28 | End: 2023-06-28

## 2023-06-28 RX ORDER — ONDANSETRON 4 MG/1
4 TABLET, ORALLY DISINTEGRATING ORAL EVERY 8 HOURS PRN
Qty: 4 TABLET | Refills: 0 | Status: SHIPPED | OUTPATIENT
Start: 2023-06-28 | End: 2023-07-20

## 2023-06-28 RX ORDER — FENTANYL CITRATE 50 UG/ML
50 INJECTION, SOLUTION INTRAMUSCULAR; INTRAVENOUS EVERY 5 MIN PRN
Status: DISCONTINUED | OUTPATIENT
Start: 2023-06-28 | End: 2023-06-28 | Stop reason: HOSPADM

## 2023-06-28 RX ORDER — LIDOCAINE 40 MG/G
CREAM TOPICAL
Status: DISCONTINUED | OUTPATIENT
Start: 2023-06-28 | End: 2023-06-28 | Stop reason: HOSPADM

## 2023-06-28 RX ORDER — AMOXICILLIN 250 MG
1-2 CAPSULE ORAL 2 TIMES DAILY
Qty: 30 TABLET | Refills: 0 | Status: SHIPPED | OUTPATIENT
Start: 2023-06-28 | End: 2023-07-20

## 2023-06-28 RX ORDER — PROPOFOL 10 MG/ML
INJECTION, EMULSION INTRAVENOUS PRN
Status: DISCONTINUED | OUTPATIENT
Start: 2023-06-28 | End: 2023-06-28

## 2023-06-28 RX ORDER — FENTANYL CITRATE 50 UG/ML
25-50 INJECTION, SOLUTION INTRAMUSCULAR; INTRAVENOUS
Status: DISCONTINUED | OUTPATIENT
Start: 2023-06-28 | End: 2023-06-28 | Stop reason: HOSPADM

## 2023-06-28 RX ORDER — ONDANSETRON 4 MG/1
4 TABLET, ORALLY DISINTEGRATING ORAL EVERY 30 MIN PRN
Status: DISCONTINUED | OUTPATIENT
Start: 2023-06-28 | End: 2023-06-30 | Stop reason: HOSPADM

## 2023-06-28 RX ORDER — ONDANSETRON 2 MG/ML
4 INJECTION INTRAMUSCULAR; INTRAVENOUS EVERY 30 MIN PRN
Status: DISCONTINUED | OUTPATIENT
Start: 2023-06-28 | End: 2023-06-28 | Stop reason: HOSPADM

## 2023-06-28 RX ORDER — CEFAZOLIN SODIUM 2 G/50ML
2 SOLUTION INTRAVENOUS
Status: COMPLETED | OUTPATIENT
Start: 2023-06-28 | End: 2023-06-28

## 2023-06-28 RX ORDER — LIDOCAINE HYDROCHLORIDE 20 MG/ML
INJECTION, SOLUTION INFILTRATION; PERINEURAL PRN
Status: DISCONTINUED | OUTPATIENT
Start: 2023-06-28 | End: 2023-06-28

## 2023-06-28 RX ORDER — NALOXONE HYDROCHLORIDE 0.4 MG/ML
0.2 INJECTION, SOLUTION INTRAMUSCULAR; INTRAVENOUS; SUBCUTANEOUS
Status: DISCONTINUED | OUTPATIENT
Start: 2023-06-28 | End: 2023-06-28 | Stop reason: HOSPADM

## 2023-06-28 RX ORDER — OXYCODONE HYDROCHLORIDE 5 MG/1
5 TABLET ORAL
Status: COMPLETED | OUTPATIENT
Start: 2023-06-28 | End: 2023-06-28

## 2023-06-28 RX ORDER — CEFAZOLIN SODIUM 2 G/50ML
2 SOLUTION INTRAVENOUS SEE ADMIN INSTRUCTIONS
Status: DISCONTINUED | OUTPATIENT
Start: 2023-06-28 | End: 2023-06-28 | Stop reason: HOSPADM

## 2023-06-28 RX ORDER — NALOXONE HYDROCHLORIDE 0.4 MG/ML
0.4 INJECTION, SOLUTION INTRAMUSCULAR; INTRAVENOUS; SUBCUTANEOUS
Status: DISCONTINUED | OUTPATIENT
Start: 2023-06-28 | End: 2023-06-28 | Stop reason: HOSPADM

## 2023-06-28 RX ORDER — HYDROMORPHONE HYDROCHLORIDE 1 MG/ML
0.2 INJECTION, SOLUTION INTRAMUSCULAR; INTRAVENOUS; SUBCUTANEOUS EVERY 5 MIN PRN
Status: DISCONTINUED | OUTPATIENT
Start: 2023-06-28 | End: 2023-06-28 | Stop reason: HOSPADM

## 2023-06-28 RX ORDER — SODIUM CHLORIDE, SODIUM LACTATE, POTASSIUM CHLORIDE, CALCIUM CHLORIDE 600; 310; 30; 20 MG/100ML; MG/100ML; MG/100ML; MG/100ML
INJECTION, SOLUTION INTRAVENOUS CONTINUOUS
Status: DISCONTINUED | OUTPATIENT
Start: 2023-06-28 | End: 2023-06-28 | Stop reason: HOSPADM

## 2023-06-28 RX ORDER — ONDANSETRON 2 MG/ML
INJECTION INTRAMUSCULAR; INTRAVENOUS PRN
Status: DISCONTINUED | OUTPATIENT
Start: 2023-06-28 | End: 2023-06-28

## 2023-06-28 RX ORDER — ACETAMINOPHEN 325 MG/1
975 TABLET ORAL ONCE
Status: COMPLETED | OUTPATIENT
Start: 2023-06-28 | End: 2023-06-28

## 2023-06-28 RX ORDER — DEXAMETHASONE SODIUM PHOSPHATE 4 MG/ML
INJECTION, SOLUTION INTRA-ARTICULAR; INTRALESIONAL; INTRAMUSCULAR; INTRAVENOUS; SOFT TISSUE PRN
Status: DISCONTINUED | OUTPATIENT
Start: 2023-06-28 | End: 2023-06-28

## 2023-06-28 RX ORDER — ONDANSETRON 2 MG/ML
4 INJECTION INTRAMUSCULAR; INTRAVENOUS EVERY 30 MIN PRN
Status: DISCONTINUED | OUTPATIENT
Start: 2023-06-28 | End: 2023-06-30 | Stop reason: HOSPADM

## 2023-06-28 RX ORDER — ACETAMINOPHEN 325 MG/1
325-650 TABLET ORAL EVERY 6 HOURS PRN
COMMUNITY
End: 2023-07-20

## 2023-06-28 RX ORDER — PROPOFOL 10 MG/ML
INJECTION, EMULSION INTRAVENOUS CONTINUOUS PRN
Status: DISCONTINUED | OUTPATIENT
Start: 2023-06-28 | End: 2023-06-28

## 2023-06-28 RX ORDER — FENTANYL CITRATE 50 UG/ML
25 INJECTION, SOLUTION INTRAMUSCULAR; INTRAVENOUS EVERY 5 MIN PRN
Status: DISCONTINUED | OUTPATIENT
Start: 2023-06-28 | End: 2023-06-28 | Stop reason: HOSPADM

## 2023-06-28 RX ORDER — FLUMAZENIL 0.1 MG/ML
0.2 INJECTION, SOLUTION INTRAVENOUS
Status: DISCONTINUED | OUTPATIENT
Start: 2023-06-28 | End: 2023-06-28 | Stop reason: HOSPADM

## 2023-06-28 RX ORDER — ONDANSETRON 4 MG/1
4 TABLET, ORALLY DISINTEGRATING ORAL EVERY 30 MIN PRN
Status: DISCONTINUED | OUTPATIENT
Start: 2023-06-28 | End: 2023-06-28 | Stop reason: HOSPADM

## 2023-06-28 RX ORDER — OXYCODONE HYDROCHLORIDE 5 MG/1
5-10 TABLET ORAL EVERY 6 HOURS PRN
Qty: 20 TABLET | Refills: 0 | Status: SHIPPED | OUTPATIENT
Start: 2023-06-28 | End: 2023-07-20

## 2023-06-28 RX ORDER — GLYCOPYRROLATE 0.2 MG/ML
INJECTION, SOLUTION INTRAMUSCULAR; INTRAVENOUS PRN
Status: DISCONTINUED | OUTPATIENT
Start: 2023-06-28 | End: 2023-06-28

## 2023-06-28 RX ADMIN — GLYCOPYRROLATE 0.2 MG: 0.2 INJECTION, SOLUTION INTRAMUSCULAR; INTRAVENOUS at 12:08

## 2023-06-28 RX ADMIN — GLYCOPYRROLATE 0.2 MG: 0.2 INJECTION, SOLUTION INTRAMUSCULAR; INTRAVENOUS at 13:16

## 2023-06-28 RX ADMIN — PROPOFOL 150 MCG/KG/MIN: 10 INJECTION, EMULSION INTRAVENOUS at 12:47

## 2023-06-28 RX ADMIN — FENTANYL CITRATE 50 MCG: 50 INJECTION, SOLUTION INTRAMUSCULAR; INTRAVENOUS at 13:02

## 2023-06-28 RX ADMIN — FENTANYL CITRATE 50 MCG: 50 INJECTION, SOLUTION INTRAMUSCULAR; INTRAVENOUS at 12:17

## 2023-06-28 RX ADMIN — Medication 0.5 MG: at 14:17

## 2023-06-28 RX ADMIN — LIDOCAINE HYDROCHLORIDE 60 MG: 20 INJECTION, SOLUTION INFILTRATION; PERINEURAL at 12:17

## 2023-06-28 RX ADMIN — CEFAZOLIN SODIUM 2 G: 2 SOLUTION INTRAVENOUS at 12:09

## 2023-06-28 RX ADMIN — FENTANYL CITRATE 50 MCG: 50 INJECTION, SOLUTION INTRAMUSCULAR; INTRAVENOUS at 11:16

## 2023-06-28 RX ADMIN — OXYCODONE HYDROCHLORIDE 5 MG: 5 TABLET ORAL at 16:23

## 2023-06-28 RX ADMIN — DEXAMETHASONE SODIUM PHOSPHATE 4 MG: 4 INJECTION, SOLUTION INTRA-ARTICULAR; INTRALESIONAL; INTRAMUSCULAR; INTRAVENOUS; SOFT TISSUE at 12:21

## 2023-06-28 RX ADMIN — PROPOFOL 100 MCG/KG/MIN: 10 INJECTION, EMULSION INTRAVENOUS at 14:11

## 2023-06-28 RX ADMIN — PROPOFOL 50 MG: 10 INJECTION, EMULSION INTRAVENOUS at 12:19

## 2023-06-28 RX ADMIN — ACETAMINOPHEN 975 MG: 325 TABLET ORAL at 10:59

## 2023-06-28 RX ADMIN — ONDANSETRON 4 MG: 2 INJECTION INTRAMUSCULAR; INTRAVENOUS at 12:21

## 2023-06-28 RX ADMIN — Medication 100 MCG: at 13:20

## 2023-06-28 RX ADMIN — BUPIVACAINE HYDROCHLORIDE 20 ML: 2.5 INJECTION, SOLUTION EPIDURAL; INFILTRATION; INTRACAUDAL at 11:15

## 2023-06-28 RX ADMIN — PROPOFOL 150 MG: 10 INJECTION, EMULSION INTRAVENOUS at 12:17

## 2023-06-28 RX ADMIN — PROPOFOL 150 MCG/KG/MIN: 10 INJECTION, EMULSION INTRAVENOUS at 12:18

## 2023-06-28 RX ADMIN — Medication 100 MCG: at 13:24

## 2023-06-28 RX ADMIN — SODIUM CHLORIDE, SODIUM LACTATE, POTASSIUM CHLORIDE, CALCIUM CHLORIDE: 600; 310; 30; 20 INJECTION, SOLUTION INTRAVENOUS at 10:59

## 2023-06-28 RX ADMIN — Medication 100 MCG: at 13:16

## 2023-06-28 NOTE — ANESTHESIA CARE TRANSFER NOTE
Patient: Nela Mares    Procedure: Procedure(s):  MAMMOPLASTY, REDUCTION, BILATERAL       Diagnosis: Large breasts [N62]  Diagnosis Additional Information: No value filed.    Anesthesia Type:   No value filed.     Note:    Oropharynx: oropharynx clear of all foreign objects  Level of Consciousness: awake  Oxygen Supplementation: face mask  Level of Supplemental Oxygen (L/min / FiO2): 6  Independent Airway: airway patency satisfactory and stable  Dentition: dentition unchanged  Vital Signs Stable: post-procedure vital signs reviewed and stable  Report to RN Given: handoff report given  Patient transferred to: PACU  Comments: VSS and WNL, comfortable, no PONV, report to Harini MAYA  Handoff Report: Identifed the Patient, Identified the Reponsible Provider, Reviewed the pertinent medical history, Discussed the surgical course, Reviewed Intra-OP anesthesia mangement and issues during anesthesia, Set expectations for post-procedure period and Allowed opportunity for questions and acknowledgement of understanding      Vitals:  Vitals Value Taken Time   BP     Temp     Pulse     Resp 19 06/28/23 1440   SpO2 96 % 06/28/23 1440   Vitals shown include unvalidated device data.    Electronically Signed By: RAYMOND Figueroa CRNA  June 28, 2023  2:42 PM

## 2023-06-28 NOTE — BRIEF OP NOTE
[facility]    Brief Operative Note    Pre-operative diagnosis: Large breasts [N62]  Post-operative diagnosis Same as pre-operative diagnosis    Procedure: Procedure(s):  MAMMOPLASTY, REDUCTION, BILATERAL  Surgeon: Surgeon(s) and Role:     * DARYL Knutson MD - Primary  Anesthesia: General with Block   Estimated Blood Loss: 300 mL from 6/28/2023 12:08 PM to 6/28/2023  2:37 PM      Drains: None  Specimens:   ID Type Source Tests Collected by Time Destination   1 : Right Breast and skin. 1622 grams Tissue Breast, Right SURGICAL PATHOLOGY EXAM, RESEARCH SPECIMEN FOR BIONET TESTING DARYL Knutson MD 6/28/2023  1:38 PM    2 : Left Breast and skin. 2062 grams Tissue Breast, Left SURGICAL PATHOLOGY EXAM, RESEARCH SPECIMEN FOR BIONET TESTING DARYL Knutson MD 6/28/2023  1:41 PM      Findings:   None.  Complications: None.  Implants: * No implants in log *

## 2023-06-28 NOTE — DISCHARGE INSTRUCTIONS
BREAST REDUCTION POST-OPERATIVE INSTRUCTIONS    Instructions      Have someone drive you home after surgery and help you at home for 1-2      days.     Get plenty of rest.     Follow balanced diet.     Decreased activity may promote constipation, so you may want to add      more raw fruit to your diet, and be sure to increase fluid intake. Your doctor       may also order a stool softener.     Do not drink alcohol when taking pain medications.     If you are taking vitamins with iron, resume these as tolerated.     Do not smoke, as smoking delays healing and increases the risk of      Complications.    Medications      Please take stool softeners while taking narcotic medications to prevent constipation       You may take tylenol or ibuprofen (eg other NSAIDS) after surgery instead of or in addition to the prescribed narcotic pain medications.     Activities     Do not drive while taking narcotic pain medications and while experiencing any pain.      Do not drive until you have full range of motion with your arms and can stop the car       or swerve in an emergency.     Start walking the evening of surgery, this helps to reduce swelling and       lowers the chance of blood clots.     Refrain from vigorous activities for 2-6 weeks.  Increase activity gradually as tolerated.      After 2 weeks, you may perform lower body exercise, but must wait the full 6 weeks       prior to performing upper body exercise     Avoid lifting anything over 5 pounds for 2 weeks.     Resume social and employment activities in about 2 weeks (if not too strenuous).    Incision Care     You may shower the next day after surgery. The ACE wrap (if used) may be rewrapped as needed (if too tight or loose). Use it for support and may subtitute with a sports bra if preferred.      Avoid exposing scars to sun for at least 12 months.     Always use a strong sunblock, if sun exposure is unavoidable (SPF 50 or      greater).     Keep the tape on  "incisions until it starts to curl up on the ends, and then gently remove them.        You may use moisturizing cream (eg Aquaphor or Vaseline) at 10 days to help the tape come off. By two weeks,      you may pull off the tape off the incisions if still in place.     Wear your surgical bra/wrap 24/7 as directed for 4 weeks.     Avoid bras with stays and underwires for 4-6 weeks.     You may pad the incisions with gauze for comfort (panty liners also work well as they        are absorbent and inexpensive).     Inspect daily for signs of infection.     No tub soaking, bathing, or swimming until wounds are healed.     If your breast skin is dry after surgery, you can apply a moisturizer several times a       day.     What to Expect     Despite layered closing of your incisions, there will be some oozing       of tissue fluid from incision sites. This is expected, especially for the first 2 days or so.  This will soak up on the gauze and the bra       to look like it is more than it really is. If the draining continues past 2 days, with pain, discomfort, and large volumes, please call the clinic.      Most of the higher discomfort will subside after the first few days.     You may experience temporary soreness, bruising, swelling and tightness in the       breasts as well as discomfort in the incision area.     You may have have normal sensation in the nipples. This may be more or less than usual, and usually returns over a couple of months.     Your first menstruation following surgery may cause your breasts to swell and hurt.     You may have random shooting pains, tingling, or other strange sensations in the skin for a few months. These will subside.    Appearance     Most of the discoloration and swelling will subside in 2-4 weeks.     Your breasts will feel firm to the touch initially, but will soften with time.     A more natural shape will occur as the breasts \"settle\" in a slightly lower position over the first " few months.     Scars may be red and thick for 6-12 months (longer in lighter-skinned patients).  In time, these usually soften and fade.    Follow-Up Care     Typically, you will have a post op check at 1-2 weeks, and again with your surgeon in another month.    When to Call     If you have increased swelling or bruising, particular one side greater than the other.     If swelling and redness persist after a few days.     If you have increased redness along the incision.     If you have severe or increased pain not relieved by medication.     If you have any side effects to medications; such as, rash, nausea,      headache, vomiting, or constipation.     If you have an oral temperature over 100.4 degrees.     If you have any yellowish or greenish drainage from the incisions or      notice a foul odor.     If you have bleeding from the incisions that is difficult to control with      light pressure.     If you have loss of feeling or motion.     Any unanswered concern.    For Medical Questions, Please Call:     [phone], Monday - Friday, 8 a.m. - 4:30 p.m.     After hours and on weekends, call Hospital Paging at [phone] and      ask for the Plastic Surgeon on call.        [facility]  Home Care Following Anesthesia  For 24 hours after surgery:  Get plenty of rest.  A responsible adult must stay with you for at least 24 hours after you leave the surgery center.  Do not drive or use heavy equipment.  If you have weakness or tingling, don't drive or use heavy equipment until this feeling goes away.   Do not drink alcohol.   Avoid strenuous or risky activities.  Ask for help when climbing stairs.  You may feel lightheaded.  IF so, sit for a few minutes before standing.  Have someone help you get up.   If you have nausea (feel sick to your stomach): Drink only clear liquids such as apple juice, ginger ale, broth or 7-Up.  Rest may also help.  Be sure to drink enough fluids.   "Move to a regular diet as you feel able.   You may have a slight fever.  Call the doctor if your fever is over 100 F (37.7 C) (taken under the tongue) or lasts longer than 24 hours.  You may have a dry mouth, a sore throat, muscle aches or trouble sleeping. These should go away after 24 hours.  Do not make important or legal decisions.   It is recommended to avoid smoking.        Today you received an Exparel block to numb the nerves near your surgery site.  This is a block using local anesthetic or \"numbing\" medication injected around the nerves to anesthetize or \"numb\" the area supplied by those nerves.  This block is injected into the muscle layer near your surgical site.  This medication may numb the location where you had surgery up to 72 hours.  If your surgical site is an arm or leg you should be careful with your affected limb, since it is possible to injure your limb without being aware of it due to the numbing.  Until full feeling returns, you should guard against bumping or hitting your limb, and avoid extreme hot or cold temperatures on the skin.  As the block wears off, the feeling will return as a tingling or prickly sensation near your surgical site.  You will experince more discomfort from your incision as the feeling returns.  You may want to take a pain pill (a narcotic or Tylenol if this was prescribed by your surgeon) when you start to experience mild pain before the pain beomes more severe.  If your pain medications do not control your pain, you should notify your surgeon.    Tips for taking pain medications  To get the best pain relief possible, remember these points:  Take pain medications as directed, before pain becomes severe.  Pain medication can upset your stomach: taking it with food may help.  Constipation is a common side effect of pain medication. Drink plenty of  fluids.  Eat foods high in fiber. Take a stool softener if recommended by your doctor or pharmacist.  Do not drink " alcohol, drive or operate machinery while taking pain medications.  Ask about other ways to control pain, such as with heat, ice or relaxation.    Tylenol/Acetaminophen Consumption    If you feel your pain relief is insufficient, you may take Tylenol/Acetaminophen in addition to your narcotic pain medication.   Be careful not to exceed 4,000 mg of Tylenol/Acetaminophen in a 24 hour period from all sources.  If you are taking extra strength Tylenol/acetaminophen (500 mg), the maximum dose is 8 tablets in 24 hours.  If you are taking regular strength acetaminophen (325 mg), the maximum dose is 12 tablets in 24 hours.    Call a doctor for any of the following:  Signs of infection (fever, growing tenderness at the surgery site, a large amount of drainage or bleeding, severe pain, foul-smelling drainage, redness, swelling).  It has been over 8 to 10 hours since surgery and you are still not able to urinate (pass water).  Headache for over 24 hours.  Numbness, tingling or weakness the day after surgery (if you had spinal anesthesia).  Signs of Covid-19 infection (temperature over 100 degrees, shortness of breath, cough, loss of taste/smell, generalized body aches, persistent headache, chills, sore throat, nausea/vomiting/diarrhea)  Your doctor is:  Dr. Inés Knutson, Plastic Surgery: [phone]                    Or dial [phone] and ask for the resident on call for:  Plastics  For emergency care, call the:  [facility]:  [phone] (TTY for hearing impaired: [phone])

## 2023-06-28 NOTE — OP NOTE
PREOPERATIVE DIAGNOSIS: Symptomatic bilateral breast hypertrophy.     POSTOPERATIVE DIAGNOSIS: Symptomatic bilateral breast hypertrophy.     PROCEDURES: Bilateral superomedial pedicle inverted T skin closure breast reduction.     SURGEON: Inés Knutson MD.     RESIDENT: Jazmine Renteria MD.    ANESTHESIA: General anesthesia with endotracheal intubation.     COMPLICATIONS: Nil.     DRAINS: Nil.     Blood Loss: 300 mL    SPECIMENS: Skin and breast tissue from right breast measuring about 1622 g, left breast measuring about 2062 g.     DESCRIPTION OF PROCEDURE: After informed consent was taken, the proper site and procedure was ascertained with the patient and was appropriately marked and taken to in the operating room.  She was placed in supine position with the knees comfortably flexed with pillows underneath them, and pneumoboots placed and running prior to induction of anesthesia. Preoperative antibiotics given in the OR. All pressure points were appropriately padded. General anesthesia was administered without any complications. She was placed in such a position that she could be flexed to about 50 degrees. Her arms were padded and abducted to about 50 degrees. She was prepped and draped in a standard surgical fashion. I began by first remarking the preop markings and marking a 42 mm areola on each side. I then marked out a superior medial pedicle on each side. I then de-epithelialized the pedicle on each side. I then dissected out the pedicle on each side without actually seeing the deep fascia and also released the pedicle such that it could rotate into its new nipple position without any tension. I then went ahead and on each side excised the inferomedial, inferior, inferolateral and lateral aspects of the breast according to a vertical pattern reduction. Strict hemostasis was ensured during this entire part of the case. Once this was done, I then temporarily closed the patient's breast in an inverted T fashion,  sat the patient up ensured symmetry and then marked out the new areolar opening symmetrically on each side. I then went ahead and closed the horizontal incision using 2-0 Monocryl suture in a deep dermal layer. Then I made sure that the nipple areolar complex could be retrieved without any tension, which is could on each side. I then checked that they were both pink and viable, which they were. I then went ahead and excised the skin of the new areolar opening and de-epithelialized epithelialized the portion that was involved in the pedicle on each side. I then sutured in the nipple areolar complex using 2-0 Monocryl suture in a deep dermal fashion circumferentially. I then closed the vertical incision using 2-0 Monocryl suture to approximate the medial and lateral pillars, and then the deep dermis. I then ran all the incisions with 3-0 Strattafix suture in a running intracuticular manner followed by placement of Prineo, and then followed by an ACE wrap. At the end of the case, the patient's breasts were soft, nipples were pink, and breasts were symmetric. The patient tolerated the procedure well. All counts correct at the end of the case. The patient was extubated and sent to recovery room in a stable condition.

## 2023-06-28 NOTE — INTERVAL H&P NOTE
"I have reviewed the surgical (or preoperative) H&P that is linked to this encounter, and examined the patient. There are no significant changes    Clinical Conditions Present on Arrival:  Clinically Significant Risk Factors Present on Admission                  # DMII: A1C = N/A within past 6 months  # Obesity: Estimated body mass index is 35.43 kg/m  as calculated from the following:    Height as of this encounter: 1.753 m (5' 9.02\").    Weight as of this encounter: 108.9 kg (240 lb).       "

## 2023-06-28 NOTE — ANESTHESIA PREPROCEDURE EVALUATION
Anesthesia Pre-Procedure Evaluation    Patient: Nela Mares   MRN: [id number] : 1960        Procedure : Procedure(s):  MAMMOPLASTY, REDUCTION, BILATERAL          Past Medical History:   Diagnosis Date     Allergic rhinitis, cause unspecified      Cervical high risk HPV (human papillomavirus) test positive 2017    3/21/17 NIL pap/+ HR HPV (not 16 or 18).      Depressive disorder      Depressive disorder, not elsewhere classified     seasonal     Diabetes mellitus, type 2 (H) 2023     Hypertension      Mild persistent asthma      Obstructive sleep apnea (adult) (pediatric)     uses CPAP     Primary osteoarthritis of both knees 2022     Scalp mass 2014      Past Surgical History:   Procedure Laterality Date     COLONOSCOPY  2012    Procedure: COLONOSCOPY;  COLONOSCOPY, SCREEN;  Surgeon: Bart You MD;  Location: [facility]     COLONOSCOPY N/A 10/20/2022    Procedure: COLONOSCOPY, WITH POLYPECTOMY AND BIOPSY;  Surgeon: Chago Hong DO;  Location: [facility]     COLONOSCOPY N/A 3/20/2023    Procedure: COLONOSCOPY, WITH POLYPECTOMY AND BIOPSY;  Surgeon: Lion Vidal MD;  Location:  GI     D & C       ENDOSCOPIC RETROGRADE CHOLANGIOPANCREATOGRAM  2014    Procedure: ENDOSCOPIC RETROGRADE CHOLANGIOPANCREATOGRAM;  ERCP biliary sphincterotomy, bile duct stone removal, epinepherine washing.;  Surgeon: Ba Meyers MD;  Location: [facility] OR     EP ABLATION SVT N/A 2021    Procedure: EP ABLATION SVT;  Surgeon: Cuca Magaña MD;  Location:  [facility]     LAPAROSCOPIC CHOLECYSTECTOMY WITH CHOLANGIOGRAMS  2014    Procedure: LAPAROSCOPIC CHOLECYSTECTOMY WITH CHOLANGIOGRAMS;;  Surgeon: Haja Sage MD;  Location: [facility] OR     NO HISTORY OF SURGERY       OPERATIVE HYSTEROSCOPY  2014    Procedure: OPERATIVE HYSTEROSCOPY;  Surgeon: Paola Camara MD;  Location: [facility]     REMOVE INTRAUTERINE DEVICE  2014    Procedure: REMOVE  INTRAUTERINE DEVICE;  Surgeon: Paola Camara MD;  Location: [facility] OR      No Known Allergies   Social History     Tobacco Use     Smoking status: Never     Smokeless tobacco: Never   Substance Use Topics     Alcohol use: No      Wt Readings from Last 1 Encounters:   06/28/23 108.9 kg (240 lb)        Anesthesia Evaluation   Pt has had prior anesthetic.         ROS/MED HX  ENT/Pulmonary:     (+) sleep apnea, allergic rhinitis, Intermittent, asthma     Neurologic:       Cardiovascular:     (+) hypertension-----    METS/Exercise Tolerance:     Hematologic:       Musculoskeletal:       GI/Hepatic:       Renal/Genitourinary:       Endo:     (+) type II DM,     Psychiatric/Substance Use:       Infectious Disease:       Malignancy:       Other:            Physical Exam    Airway  airway exam normal           Respiratory Devices and Support         Dental       (+) Completely normal teeth      Cardiovascular   cardiovascular exam normal          Pulmonary   pulmonary exam normal                OUTSIDE LABS:  CBC:   Lab Results   Component Value Date    WBC 6.2 12/02/2021    WBC 4.7 06/02/2021    HGB 13.1 12/02/2021    HGB 14.3 06/02/2021    HCT 40.5 12/02/2021    HCT 43.4 06/02/2021     12/02/2021     06/02/2021     BMP:   Lab Results   Component Value Date     (H) 02/20/2023     12/02/2021    POTASSIUM 4.3 02/20/2023    POTASSIUM 3.9 12/02/2021    CHLORIDE 106 02/20/2023    CHLORIDE 103 12/02/2021    CO2 26 02/20/2023    CO2 28 12/02/2021    BUN 13.7 02/20/2023    BUN 13 12/02/2021    CR 0.95 02/20/2023    CR 0.84 12/02/2021     (H) 02/20/2023     (H) 12/02/2021     COAGS:   Lab Results   Component Value Date    INR 1.03 10/05/2020     POC:   Lab Results   Component Value Date     (H) 06/02/2021    HCG Negative 06/17/2014     HEPATIC:   Lab Results   Component Value Date    ALBUMIN 3.4 12/02/2021    PROTTOTAL 7.8 12/02/2021    ALT 87 (H) 12/02/2021    AST 54 (H)  12/02/2021    ALKPHOS 81 12/02/2021    BILITOTAL 0.3 12/02/2021     OTHER:   Lab Results   Component Value Date    A1C 6.5 (H) 02/20/2023    ASHLEY 9.6 02/20/2023    LIPASE 63 01/05/2014    AMYLASE 49 01/05/2014    TSH 2.89 12/02/2021    T4 1.01 03/16/2017    T3 104 08/06/2009    CRP 9.7 (H) 12/16/2021    SED 26 12/16/2021       Anesthesia Plan    ASA Status:  2   NPO Status:  NPO Appropriate    Anesthesia Type: General.     - Airway: LMA   Induction: Intravenous.   Maintenance: Balanced.        Consents    Anesthesia Plan(s) and associated risks, benefits, and realistic alternatives discussed. Questions answered and patient/representative(s) expressed understanding.    - Discussed:     - Discussed with:  Patient      - Extended Intubation/Ventilatory Support Discussed: No.      - Patient is DNR/DNI Status: No    Use of blood products discussed: No .     Postoperative Care    Pain management: Multi-modal analgesia, Peripheral nerve block (Single Shot), IV analgesics.   PONV prophylaxis: Ondansetron (or other 5HT-3), Dexamethasone or Solumedrol     Comments:                Jose Scott MD

## 2023-06-28 NOTE — ANESTHESIA PROCEDURE NOTES
Pectoralis Procedure Note    Pre-Procedure   Staff -        Anesthesiologist:  Jose Scott MD       Performed By: anesthesiologist       Location: pre-op       Procedure Start/Stop Times: 6/28/2023 11:15 AM and 6/28/2023 11:20 AM       Pre-Anesthestic Checklist: patient identified, IV checked, site marked, risks and benefits discussed, informed consent, monitors and equipment checked, pre-op evaluation, at physician/surgeon's request and post-op pain management  Timeout:       Correct Patient: Yes        Correct Procedure: Yes        Correct Site: Yes        Correct Position: Yes        Correct Laterality: Yes        Site Marked: Yes  Procedure Documentation  Procedure: Pectoralis             Pectoralis II and Pectoralis I       Laterality: bilateral       Patient Position: sitting       Skin prep: Chloraprep       Needle Type: insulated       Needle Gauge: 21.        Needle Length (millimeters): 100        Ultrasound guided       1. Ultrasound was used to identify targeted nerve, plexus, vascular marker, or fascial plane and place a needle adjacent to it in real-time.       2. Ultrasound was used to visualize the spread of anesthetic in close proximity to the above referenced structure.       3. A permanent image is entered into the patient's record.       4. The visualized anatomic structures appeared normal.       5. There were no apparent abnormal pathologic findings.    Assessment/Narrative         The placement was negative for: blood aspirated, painful injection and site bleeding       Paresthesias: No.       Bolus given via needle..        Secured via.        Insertion/Infusion Method: Single Shot       Complications: none    Medication(s) Administered   Bupivacaine 0.25% PF (Infiltration) - Infiltration   20 mL - 6/28/2023 11:15:00 AM  Bupivacaine liposome (Exparel) 1.3% LA inj susp (Infiltration) - Infiltration   20 mL - 6/28/2023 11:15:00 AM  Medication Administration Time: 6/28/2023 11:15 AM    "  Comments:  R/b/a for block discussed with patient and agrees to proceed.  Pertinent anatomy, spread of local, needle all visualized throughout on US. Pt tolerated procedure without incident.      FOR [facility] ([facility]) ONLY:   Pain Team Contact information: please page the Pain Team Via [employer]. Search \"Pain\". During daytime hours, please page the attending first. At night please page the resident first.      "

## 2023-06-28 NOTE — ANESTHESIA POSTPROCEDURE EVALUATION
Patient: Nela Mares    Procedure: Procedure(s):  MAMMOPLASTY, REDUCTION, BILATERAL       Anesthesia Type:  General    Note:  Disposition: Outpatient   Postop Pain Control: Uneventful            Sign Out: Well controlled pain   PONV: No   Neuro/Psych: Uneventful            Sign Out: Acceptable/Baseline neuro status   Airway/Respiratory: Uneventful            Sign Out: Acceptable/Baseline resp. status   CV/Hemodynamics: Uneventful            Sign Out: Acceptable CV status; No obvious hypovolemia; No obvious fluid overload   Other NRE: NONE   DID A NON-ROUTINE EVENT OCCUR? No           Last vitals:  Vitals Value Taken Time   /61 06/28/23 1515   Temp 35.9  C (96.7  F) 06/28/23 1515   Pulse 75 06/28/23 1515   Resp 19 06/28/23 1515   SpO2 98 % 06/28/23 1515       Electronically Signed By: Jose Scott MD  June 28, 2023  5:11 PM

## 2023-06-29 NOTE — PROGRESS NOTES
"Plastic Surgery Outpatient Visit    ID: Nela Mares is a 62 year old female s/p bilateral breast reduction 6/28/2023 with Dr. Knutson     S: had issue with R breast draining sanginous fluid after surgery. This has now stopped. Feels pretty good. Had a rash that is resolving, attributes to oxyclean detergent she was using. No fevers.     O:  /81   Pulse 62   Ht 1.753 m (5' 9\")   Wt 110.1 kg (242 lb 11.2 oz)   LMP 12/21/2014   SpO2 99%   BMI 35.84 kg/m     General: NAD  Chest: R breast incision c/d/i. Mild swelling to lower pole with ecchymosis and mild pink hue, mild warmth. L breast incisions c/d/i. Moderate swelling to lower pole, appears cellulitic and warm. Macular excoriated rash to mid chest and upper breasts bilaterally.     PATH:   A. RIGHT BREAST, REDUCTION MAMMOPLASTY (1622 GRAMS):  -Benign skin and breast tissue with fibrocystic changes.     B. LEFT BREAST, REDUCTION MAMMOPLASTY (2062 GRAMS):  -Benign skin and breast tissue with fibrocystic changes and focal duct ectasia.    A/P:  -healing but with residual swelling/mild erythema. Will start bactrim x7 days for presumed early cellulitis  -ok to moisturize with aquafor/vaseline  -soft bra and lifting restrictions until 6 weeks post op  -RTC next week    Li Boyle PA-C  Plastic and Reconstructive Surgery    10 minutes spent on the date of the encounter doing chart review, history and physical, dressing changes, documentation and further activity as noted above.    "

## 2023-06-30 ENCOUNTER — OFFICE VISIT (OUTPATIENT)
Dept: PLASTIC SURGERY | Facility: CLINIC | Age: 63
End: 2023-06-30
Payer: COMMERCIAL

## 2023-06-30 ENCOUNTER — MYC MEDICAL ADVICE (OUTPATIENT)
Dept: PLASTIC SURGERY | Facility: CLINIC | Age: 63
End: 2023-06-30

## 2023-06-30 VITALS
DIASTOLIC BLOOD PRESSURE: 80 MMHG | BODY MASS INDEX: 35.42 KG/M2 | TEMPERATURE: 98.4 F | OXYGEN SATURATION: 99 % | SYSTOLIC BLOOD PRESSURE: 112 MMHG | HEIGHT: 69 IN | HEART RATE: 69 BPM

## 2023-06-30 DIAGNOSIS — N62 LARGE BREASTS: Primary | ICD-10-CM

## 2023-06-30 PROCEDURE — 99024 POSTOP FOLLOW-UP VISIT: CPT | Performed by: PHYSICIAN ASSISTANT

## 2023-06-30 ASSESSMENT — PAIN SCALES - GENERAL: PAINLEVEL: MODERATE PAIN (4)

## 2023-06-30 NOTE — PROGRESS NOTES
"Plastic and Reconstructive Surgery Note  6/30/2023  Attending: Dr. Knutson     S: Sanam presents with incision drainage after breast reduction on Wednesday. No lightheadedness. No pain. No increase in bruising or swelling. Drainage is tan-slightly red. No bleeding.     O:  /80 (BP Location: Left arm, Patient Position: Chair, Cuff Size: Adult Large)   Pulse 69   Temp 98.4  F (36.9  C) (Oral)   Ht 1.753 m (5' 9.02\")   LMP 12/21/2014   SpO2 99%   BMI 35.42 kg/m    Gen: well appearing, NAD  Chest: left breast with khadar-incisional ecchymosis that is nontender. Right breast with khadar-incisional ecchymosis, less significant than left breast. Nontender. Bilateral breasts with brisk cap refill and nipple viable. No drainage on exam to right lateral chest. Incisions with bruising but without firmness, palpable fluid collections or sign of hematoma.     Assessment: 62 year old female s/p bilateral mammoplasty reduction     Plan:   ACE for compression  ABD as needed to area of draining  No heavy lifting  Follow up on Wednesday  If developing more drainage, tenderness, expanding bruising, lightheadedness or any new or worsening symptoms, return to clinic or present to ER at [place]   Discussed with resident on staff and Dr. Knutson   "

## 2023-06-30 NOTE — LETTER
"6/30/2023       RE: Nela Mares  [address] [facility] [address]     Dear Colleague,    Thank you for referring your patient, Nela Mares, to the [facility] PLASTIC AND RECONSTRUCTIVE SURGERY CLINIC [place] at [facility]. Please see a copy of my visit note below.    Plastic and Reconstructive Surgery Note  6/30/2023  Attending: Dr. Knutson     S: Sanam presents with incision drainage after breast reduction on Wednesday. No lightheadedness. No pain. No increase in bruising or swelling. Drainage is tan-slightly red. No bleeding.     O:  /80 (BP Location: Left arm, Patient Position: Chair, Cuff Size: Adult Large)   Pulse 69   Temp 98.4  F (36.9  C) (Oral)   Ht 1.753 m (5' 9.02\")   LMP 12/21/2014   SpO2 99%   BMI 35.42 kg/m    Gen: well appearing, NAD  Chest: left breast with khadar-incisional ecchymosis that is nontender. Right breast with khadar-incisional ecchymosis, less significant than left breast. Nontender. Bilateral breasts with brisk cap refill and nipple viable. No drainage on exam to right lateral chest. Incisions with bruising but without firmness, palpable fluid collections or sign of hematoma.     Assessment: 62 year old female s/p bilateral mammoplasty reduction     Plan:   ACE for compression  ABD as needed to area of draining  No heavy lifting  Follow up on Wednesday  If developing more drainage, tenderness, expanding bruising, lightheadedness or any new or worsening symptoms, return to clinic or present to ER at [place]   Discussed with resident on staff and Dr. Knutson       Again, thank you for allowing me to participate in the care of your patient.      Sincerely,    Mile Sullivan PA-C      "

## 2023-06-30 NOTE — NURSING NOTE
"Chief Complaint   Patient presents with     RECHECK     Sanam, is being seen today for a follow up regarding bleeding right side of chest DOS 6/28/23.       Vitals:    06/30/23 0938   BP: 112/80   BP Location: Left arm   Patient Position: Chair   Cuff Size: Adult Large   Pulse: 69   Temp: 98.4  F (36.9  C)   TempSrc: Oral   SpO2: 99%   Height: 1.753 m (5' 9.02\")       Body mass index is 35.42 kg/m .      Lin Rubin LPN    "

## 2023-07-11 ENCOUNTER — OFFICE VISIT (OUTPATIENT)
Dept: PLASTIC SURGERY | Facility: CLINIC | Age: 63
End: 2023-07-11
Payer: COMMERCIAL

## 2023-07-11 VITALS
WEIGHT: 242.7 LBS | OXYGEN SATURATION: 99 % | DIASTOLIC BLOOD PRESSURE: 81 MMHG | HEART RATE: 62 BPM | SYSTOLIC BLOOD PRESSURE: 116 MMHG | BODY MASS INDEX: 35.95 KG/M2 | HEIGHT: 69 IN

## 2023-07-11 DIAGNOSIS — L03.313 CELLULITIS OF CHEST WALL: Primary | ICD-10-CM

## 2023-07-11 DIAGNOSIS — N62 LARGE BREASTS: ICD-10-CM

## 2023-07-11 PROCEDURE — 99024 POSTOP FOLLOW-UP VISIT: CPT | Performed by: PHYSICIAN ASSISTANT

## 2023-07-11 RX ORDER — SULFAMETHOXAZOLE/TRIMETHOPRIM 800-160 MG
1 TABLET ORAL 2 TIMES DAILY
Qty: 14 TABLET | Refills: 0 | Status: SHIPPED | OUTPATIENT
Start: 2023-07-11 | End: 2023-07-18

## 2023-07-11 ASSESSMENT — PAIN SCALES - GENERAL: PAINLEVEL: NO PAIN (0)

## 2023-07-11 NOTE — LETTER
"7/11/2023       RE: Nela Mares  [address] [address]       Dear Colleague,    Thank you for referring your patient, Nela Mares, to the [facility] PLASTIC AND RECONSTRUCTIVE SURGERY CLINIC [place] at [facility]. Please see a copy of my visit note below.    Plastic Surgery Outpatient Visit    ID: Nela Mares is a 62 year old female s/p bilateral breast reduction 6/28/2023 with Dr. Knutson     S: had issue with R breast draining sanginous fluid after surgery. This has now stopped. Feels pretty good. Had a rash that is resolving, attributes to oxyclean detergent she was using. No fevers.     O:  /81   Pulse 62   Ht 1.753 m (5' 9\")   Wt 110.1 kg (242 lb 11.2 oz)   LMP 12/21/2014   SpO2 99%   BMI 35.84 kg/m     General: NAD  Chest: R breast incision c/d/i. Mild swelling to lower pole with ecchymosis and mild pink hue, mild warmth. L breast incisions c/d/i. Moderate swelling to lower pole, appears cellulitic and warm. Macular excoriated rash to mid chest and upper breasts bilaterally.     PATH:   A. RIGHT BREAST, REDUCTION MAMMOPLASTY (1622 GRAMS):  -Benign skin and breast tissue with fibrocystic changes.     B. LEFT BREAST, REDUCTION MAMMOPLASTY (2062 GRAMS):  -Benign skin and breast tissue with fibrocystic changes and focal duct ectasia.    A/P:  -healing but with residual swelling/mild erythema. Will start bactrim x7 days for presumed early cellulitis  -ok to moisturize with aquafor/vaseline  -soft bra and lifting restrictions until 6 weeks post op  -RTC next week      10 minutes spent on the date of the encounter doing chart review, history and physical, dressing changes, documentation and further activity as noted above.        Again, thank you for allowing me to participate in the care of your patient.      Sincerely,    Li Boyle PA-C      "

## 2023-07-11 NOTE — NURSING NOTE
"Chief Complaint   Patient presents with     Surgical Followup     2 week post op       Vitals:    07/11/23 0945   BP: 116/81   Pulse: 62   SpO2: 99%   Weight: 110.1 kg (242 lb 11.2 oz)   Height: 1.753 m (5' 9\")       Body mass index is 35.84 kg/m .    Chelsea Lawrence RN    "

## 2023-07-12 ENCOUNTER — PATIENT OUTREACH (OUTPATIENT)
Dept: CARE COORDINATION | Facility: CLINIC | Age: 63
End: 2023-07-12
Payer: COMMERCIAL

## 2023-07-18 ENCOUNTER — OFFICE VISIT (OUTPATIENT)
Dept: SURGERY | Facility: CLINIC | Age: 63
End: 2023-07-18
Payer: COMMERCIAL

## 2023-07-18 DIAGNOSIS — N62 HYPERTROPHY OF BREAST: Primary | ICD-10-CM

## 2023-07-18 PROCEDURE — 99024 POSTOP FOLLOW-UP VISIT: CPT | Performed by: PHYSICIAN ASSISTANT

## 2023-07-18 RX ORDER — TRIAMCINOLONE ACETONIDE 1 MG/G
CREAM TOPICAL 2 TIMES DAILY
Qty: 60 G | Refills: 1 | Status: SHIPPED | OUTPATIENT
Start: 2023-07-18 | End: 2023-11-01

## 2023-07-18 RX ORDER — SULFAMETHOXAZOLE/TRIMETHOPRIM 800-160 MG
1 TABLET ORAL 2 TIMES DAILY
Qty: 14 TABLET | Refills: 0 | Status: SHIPPED | OUTPATIENT
Start: 2023-07-18 | End: 2023-07-25

## 2023-07-18 NOTE — PROGRESS NOTES
Plastic and Reconstructive Surgery Note  7/18/2023  Attending: Dr. Knutson     S: Doing better. She continues to have blanching redness to her left breast. She does not have fevers or chills. She does not have drainage from the left breast.     O:  LMP 12/21/2014   Gen: well appearing, NAD  Chest: bilateral breast incisions intact, soft and nontender to bilateral breasts. Left breast with redness to inferior aspect of breast, blanches with palpation. Bilateral nipples pink, warm, well perfused.     Assessment: 62 year old female s/p bilateral mammoplasty reduction     Plan:   Patient given 7 more days of bactrim  She will follow up on 7/25 with Dr. Knutson next Tuesday.   If she has new or worsening symptoms, she will follow up on Friday in clinic  All questions were answered  She will also follow up at 6 weeks from surgery with Dr. Knutson   She was given triamcinolone cream for her itching to her chest, this is something she has been dealing with since she had COVID. We will have her use steroid cream 2x daily to ensure she is not itching and causing issue to her incisions.

## 2023-07-18 NOTE — LETTER
7/18/2023         RE: Nela Mares  [address] [address]        Dear Colleague,    Thank you for referring your patient, Nela Mares, to the [facility]. Please see a copy of my visit note below.    Plastic and Reconstructive Surgery Note  7/18/2023  Attending: Dr. Knutson     S: Doing better. She continues to have blanching redness to her left breast. She does not have fevers or chills. She does not have drainage from the left breast.     O:  LMP 12/21/2014   Gen: well appearing, NAD  Chest: bilateral breast incisions intact, soft and nontender to bilateral breasts. Left breast with redness to inferior aspect of breast, blanches with palpation. Bilateral nipples pink, warm, well perfused.     Assessment: 62 year old female s/p bilateral mammoplasty reduction     Plan:   Patient given 7 more days of bactrim  She will follow up on 7/25 with Dr. Knutson next Tuesday.   If she has new or worsening symptoms, she will follow up on Friday in clinic  All questions were answered  She will also follow up at 6 weeks from surgery with Dr. Knutson   She was given triamcinolone cream for her itching to her chest, this is something she has been dealing with since she had COVID. We will have her use steroid cream 2x daily to ensure she is not itching and causing issue to her incisions.       Again, thank you for allowing me to participate in the care of your patient.        Sincerely,        Mile Sullivan PA-C

## 2023-07-18 NOTE — NURSING NOTE
Nela Mares's goals for this visit include:   Chief Complaint   Patient presents with     RECHECK     Right breast cellulitis post breast reduction       She requests these members of her care team be copied on today's visit information: no    PCP: Padma Lozano    Referring Provider:  No referring provider defined for this encounter.    LMP 12/21/2014     Do you need any medication refills at today's visit? No    Beth Delgado LPN

## 2023-07-21 ENCOUNTER — MYC MEDICAL ADVICE (OUTPATIENT)
Dept: FAMILY MEDICINE | Facility: CLINIC | Age: 63
End: 2023-07-21
Payer: COMMERCIAL

## 2023-07-21 DIAGNOSIS — E55.9 VITAMIN D DEFICIENCY: Primary | ICD-10-CM

## 2023-07-21 NOTE — TELEPHONE ENCOUNTER
Routing to provider    Willing to add vitamin D to pt lab order. Last completed 7/29/22 and was 19.    Nichelle Gonsales RN

## 2023-07-25 ENCOUNTER — OFFICE VISIT (OUTPATIENT)
Dept: PLASTIC SURGERY | Facility: CLINIC | Age: 63
End: 2023-07-25
Attending: PLASTIC SURGERY
Payer: COMMERCIAL

## 2023-07-25 VITALS
DIASTOLIC BLOOD PRESSURE: 74 MMHG | TEMPERATURE: 98.8 F | OXYGEN SATURATION: 97 % | HEART RATE: 78 BPM | SYSTOLIC BLOOD PRESSURE: 107 MMHG

## 2023-07-25 DIAGNOSIS — N62 HYPERTROPHY OF BREAST: Primary | ICD-10-CM

## 2023-07-25 PROCEDURE — G0463 HOSPITAL OUTPT CLINIC VISIT: HCPCS | Performed by: PLASTIC SURGERY

## 2023-07-25 PROCEDURE — 99024 POSTOP FOLLOW-UP VISIT: CPT | Performed by: PLASTIC SURGERY

## 2023-07-25 ASSESSMENT — PAIN SCALES - GENERAL: PAINLEVEL: NO PAIN (0)

## 2023-07-25 NOTE — NURSING NOTE
"Oncology Rooming Note    July 25, 2023 9:16 AM   Nela Mares is a 62 year old female who presents for:    Chief Complaint   Patient presents with    Oncology Clinic Visit     Plastic surgery consult      Initial Vitals: /74 (BP Location: Right arm, Patient Position: Sitting, Cuff Size: Adult Large)   Pulse 78   Temp 98.8  F (37.1  C) (Oral)   LMP 12/21/2014   SpO2 97%  Estimated body mass index is 35.84 kg/m  as calculated from the following:    Height as of 7/11/23: 1.753 m (5' 9\").    Weight as of 7/11/23: 110.1 kg (242 lb 11.2 oz). There is no height or weight on file to calculate BSA.  No Pain (0) Comment: Data Unavailable   Patient's last menstrual period was 12/21/2014.  Allergies reviewed: Yes  Medications reviewed: Yes    Medications: Medication refills not needed today.  Pharmacy name entered into [facility]:    [facility] [address] [place] [address] - [address] [place] [address] PHARMACY [place], [place] - [phone]  Clinical concerns: none     Note best practice advisory     Declined weight        Jose A Mercado             "

## 2023-07-25 NOTE — LETTER
7/25/2023         RE: Nela Mares  [address] [address]        Dear Colleague,    Thank you for referring your patient, Nela Mares, to the [facility] BREAST [facility]. Please see a copy of my visit note below.    PRESENTING COMPLAINT:  Postoperative visit status post bilateral breast reduction done on 06/28/2023.    HISTORY OF PRESENTING COMPLAINT:  Ms. Mares is 62 years old.  She underwent the above named procedure.  She has done extremely well, happy with the results.  Pathology was all benign.    PHYSICAL EXAMINATION:  Vital signs stable.  Temperature is afebrile, in no obvious distress.  Both breasts are healed, aesthetic, symmetric.  No evidence of infection, seroma, hematoma.    ASSESSMENT AND PLAN:  Based on the above findings, a diagnosis of bilateral breast reduction was made.  She has healed well.  Advised aggressive moisturization, get to know her breasts well.  Continue with yearly mammogram.  I will see her back as needed.  All exam and discussion done in the presence of my nurse, Jesenia Hicks.        Sincerely,        DARYL Knutson MD

## 2023-07-25 NOTE — PROGRESS NOTES
PRESENTING COMPLAINT:  Postoperative visit status post bilateral breast reduction done on 06/28/2023.    HISTORY OF PRESENTING COMPLAINT:  Ms. Mares is 62 years old.  She underwent the above named procedure.  She has done extremely well, happy with the results.  Pathology was all benign.    PHYSICAL EXAMINATION:  Vital signs stable.  Temperature is afebrile, in no obvious distress.  Both breasts are healed, aesthetic, symmetric.  No evidence of infection, seroma, hematoma.    ASSESSMENT AND PLAN:  Based on the above findings, a diagnosis of bilateral breast reduction was made.  She has healed well.  Advised aggressive moisturization, get to know her breasts well.  Continue with yearly mammogram.  I will see her back as needed.  All exam and discussion done in the presence of my nurse, Jesenia Hicks.

## 2023-08-09 ENCOUNTER — PATIENT OUTREACH (OUTPATIENT)
Dept: CARE COORDINATION | Facility: CLINIC | Age: 63
End: 2023-08-09
Payer: COMMERCIAL

## 2023-08-30 ENCOUNTER — ALLIED HEALTH/NURSE VISIT (OUTPATIENT)
Dept: FAMILY MEDICINE | Facility: CLINIC | Age: 63
End: 2023-08-30
Payer: COMMERCIAL

## 2023-08-30 ENCOUNTER — LAB (OUTPATIENT)
Dept: LAB | Facility: CLINIC | Age: 63
End: 2023-08-30
Payer: COMMERCIAL

## 2023-08-30 VITALS — SYSTOLIC BLOOD PRESSURE: 116 MMHG | DIASTOLIC BLOOD PRESSURE: 88 MMHG

## 2023-08-30 DIAGNOSIS — E78.5 HYPERLIPIDEMIA LDL GOAL <130: ICD-10-CM

## 2023-08-30 DIAGNOSIS — E55.9 VITAMIN D DEFICIENCY: ICD-10-CM

## 2023-08-30 DIAGNOSIS — R73.01 IMPAIRED FASTING GLUCOSE: ICD-10-CM

## 2023-08-30 DIAGNOSIS — E11.9 DIABETES MELLITUS, TYPE 2 (H): Primary | ICD-10-CM

## 2023-08-30 DIAGNOSIS — Z01.30 BLOOD PRESSURE CHECK: Primary | ICD-10-CM

## 2023-08-30 LAB
CHOLEST SERPL-MCNC: 187 MG/DL
CREAT UR-MCNC: 141 MG/DL
DEPRECATED CALCIDIOL+CALCIFEROL SERPL-MC: 57 UG/L (ref 20–75)
HBA1C MFR BLD: 6 % (ref 0–5.6)
HDLC SERPL-MCNC: 50 MG/DL
LDLC SERPL CALC-MCNC: 116 MG/DL
MICROALBUMIN UR-MCNC: 16.6 MG/L
MICROALBUMIN/CREAT UR: 11.77 MG/G CR (ref 0–25)
NONHDLC SERPL-MCNC: 137 MG/DL
TRIGL SERPL-MCNC: 105 MG/DL

## 2023-08-30 PROCEDURE — 36415 COLL VENOUS BLD VENIPUNCTURE: CPT

## 2023-08-30 PROCEDURE — 83036 HEMOGLOBIN GLYCOSYLATED A1C: CPT

## 2023-08-30 PROCEDURE — 82306 VITAMIN D 25 HYDROXY: CPT

## 2023-08-30 PROCEDURE — 99207 PR NO CHARGE NURSE ONLY: CPT

## 2023-08-30 PROCEDURE — 82043 UR ALBUMIN QUANTITATIVE: CPT

## 2023-08-30 PROCEDURE — 80061 LIPID PANEL: CPT

## 2023-08-30 PROCEDURE — 82570 ASSAY OF URINE CREATININE: CPT

## 2023-08-30 NOTE — NURSING NOTE
Nela Mares is a 63 year old patient who comes in today for a Blood Pressure check.  Initial BP:  /88   LMP 12/21/2014      Data Unavailable  Disposition: follow-up as previously indicated by provider    [id number], MEDICAL ASSISTANT

## 2023-09-27 ASSESSMENT — ANXIETY QUESTIONNAIRES
1. FEELING NERVOUS, ANXIOUS, OR ON EDGE: NEARLY EVERY DAY
GAD7 TOTAL SCORE: 16
2. NOT BEING ABLE TO STOP OR CONTROL WORRYING: NEARLY EVERY DAY
5. BEING SO RESTLESS THAT IT IS HARD TO SIT STILL: SEVERAL DAYS
GAD7 TOTAL SCORE: 16
IF YOU CHECKED OFF ANY PROBLEMS ON THIS QUESTIONNAIRE, HOW DIFFICULT HAVE THESE PROBLEMS MADE IT FOR YOU TO DO YOUR WORK, TAKE CARE OF THINGS AT HOME, OR GET ALONG WITH OTHER PEOPLE: EXTREMELY DIFFICULT
7. FEELING AFRAID AS IF SOMETHING AWFUL MIGHT HAPPEN: NEARLY EVERY DAY
4. TROUBLE RELAXING: SEVERAL DAYS
3. WORRYING TOO MUCH ABOUT DIFFERENT THINGS: MORE THAN HALF THE DAYS
6. BECOMING EASILY ANNOYED OR IRRITABLE: NEARLY EVERY DAY

## 2023-09-27 ASSESSMENT — PATIENT HEALTH QUESTIONNAIRE - PHQ9
SUM OF ALL RESPONSES TO PHQ QUESTIONS 1-9: 13
10. IF YOU CHECKED OFF ANY PROBLEMS, HOW DIFFICULT HAVE THESE PROBLEMS MADE IT FOR YOU TO DO YOUR WORK, TAKE CARE OF THINGS AT HOME, OR GET ALONG WITH OTHER PEOPLE: EXTREMELY DIFFICULT
SUM OF ALL RESPONSES TO PHQ QUESTIONS 1-9: 13

## 2023-09-27 ASSESSMENT — ASTHMA QUESTIONNAIRES: ACT_TOTALSCORE: 21

## 2023-09-27 NOTE — COMMUNITY RESOURCES LIST (ENGLISH)
09/27/2023   [facility]  N/A  For questions about this resource list or additional care needs, please contact your primary care clinic or care manager.  Phone: [phone]   Email: N/A   Address: [facility] [place] [address]   Hours: N/A        Hotlines and Helplines       Hotline - Housing crisis  1  [facility] Distance: 4.47 miles      Phone/Virtual   [address] [place] [address]  Language: English  Hours: Mon - Sun Open 24 Hours  Fees: Free   Phone: (122) 540-7367 Email: info@[employer].org Website: http://www.[employer].org/     2  [facility] Distance: 5.24 miles      Phone/Virtual   [address] [place] [address]  Language: English  Hours: Mon - Sun Open 24 Hours   Phone: (687) 982-4930 Email: communications@[facility].org Website: https://[facility].org/oursaviourshousing/          Housing       Coordinated Entry access point  3  [facility] - Adult retirement [facility] Distance: 4.47 miles      In-Person   [address] [place] [address]  Language: English  Hours: Mon - Sat 10:00 PM - 5:00 PM  Fees: Free   Phone: (127) 449-5616 Email: info@[employer].org Website: http://www.[employer].org/     4  [facility] Distance: 4.54 miles      In-Person, Phone/Virtual   [address] [place] [address]  Language: English, [place]cair[place] Islander  Hours: Mon - Fri 10:00 AM - 5:30 PM , Mon - Sun 7:30 PM - 10:20 PM , Sat - Sun 1:00 PM - 5:30 PM  Fees: Free   Phone: (355) 456-6598 Email: info@[employer].zw Website: https://www.[employer].org/our-programs/adult-shelter-connect-frazier-shelter/     Drop-in center or day shelter  5  [facility] Distance: 4.25 miles      In-Person   [address] [place] [address]  Language: English, Hmong, Martiniquais, [place] Islander  Hours: Mon - Thu 8:30 AM - 4:30 PM , Sat - Sun 9:00 AM - 12:00 PM  Fees: Free   Phone: (911) 245-7076 Email:  info@[facility].org Website: https://[facility].org/     6  [facility] Distance: 4.84 miles      In-Person   [address] [place] [address]  Language: English, North Korean, Lebanese  Hours: Mon - Sat 7:00 AM - 3:00 PM  Fees: Free, Self Pay   Phone: (835) 494-8632 Email: info@[employer]ii Website: https://www.[employer]/locations/opportunity-center/     Housing search assistance  7  [facility] - Low-income Public Housing Distance: 3.82 miles      In-Person   [address] [place] [address]  Language: English  Hours: Mon 8:00 AM - 3:30 PM , Wed 8:00 AM - 3:30 PM , Fri 8:00 AM - 3:30 PM  Fees: Sliding Fee   Phone: (230) 750-1718 Email: contactmpha@[employer]ii Website: http://www.[employer].GOQii/     8  [facility]ordable [employer] [facility] - https://[employer] Distance: 4.18 miles      Phone/Virtual   [address] [place] [address]  Language: English  Hours: Mon - Sun Open 24 Hours   Email: info@[employer] Website: https://[employer])     Shelter for families  9  [facility] Distance: 14.46 miles      In-Person   [address] [place] [address]  Language: English  Hours: Mon - Fri 3:00 PM - 9:00 AM , Sat - Sun Open 24 Hours  Fees: Free   Phone: (477) 191-3388 Ext.1 Website: https://www.[employer].org/2020/07/03/emergency-family-shelter/     Shelter for individuals  10  [facility] Distance: 4.46 miles      In-Person   [address] [place] [address]  Language: English  Hours: Mon - Fri 4:00 PM - 9:00 AM  Fees: Free   Phone: (869) 430-6041 Email: whit@[facility].org Website: https://[facility].salvationarmy.org/northern/[facility]/     11  [facility] Distance: 5.24 miles      In-Person   [address] [place] [address]  Language: English  Hours:  Mon - Sun Open 24 Hours  Fees: Free   Phone: (291) 383-1297 Email: communications@[facility].org Website: https://[facility].org/oursaviourshousing/          Important Numbers & Websites       Emergency Services   911  [facility] Services   311  Poison Control   (725) 797-6587  Suicide Prevention Lifeline   (868) 877-3649 (TALK)  Child Abuse Hotline   (322) 448-6553 (4-A-Child)  Sexual Assault Hotline   (646) 598-7330 (HOPE)  National Runaway Safeline   (771) 231-7240 (RUNAWAY)  All-Options Talkline   (276) 619-7524  Substance Abuse Referral   (929) 126-3519 (HELP)

## 2023-09-28 ENCOUNTER — VIRTUAL VISIT (OUTPATIENT)
Dept: FAMILY MEDICINE | Facility: CLINIC | Age: 63
End: 2023-09-28
Payer: COMMERCIAL

## 2023-09-28 DIAGNOSIS — F33.2 SEVERE EPISODE OF RECURRENT MAJOR DEPRESSIVE DISORDER, WITHOUT PSYCHOTIC FEATURES (H): Primary | ICD-10-CM

## 2023-09-28 DIAGNOSIS — F41.1 GAD (GENERALIZED ANXIETY DISORDER): ICD-10-CM

## 2023-09-28 PROCEDURE — 99214 OFFICE O/P EST MOD 30 MIN: CPT | Mod: VID | Performed by: STUDENT IN AN ORGANIZED HEALTH CARE EDUCATION/TRAINING PROGRAM

## 2023-09-28 RX ORDER — VENLAFAXINE HYDROCHLORIDE 37.5 MG/1
37.5 CAPSULE, EXTENDED RELEASE ORAL DAILY
Qty: 30 CAPSULE | Refills: 1 | Status: SHIPPED | OUTPATIENT
Start: 2023-09-28 | End: 2023-11-09 | Stop reason: DRUGHIGH

## 2023-09-28 RX ORDER — VENLAFAXINE HYDROCHLORIDE 225 MG/1
225 TABLET, EXTENDED RELEASE ORAL DAILY
Qty: 30 TABLET | Refills: 1 | Status: SHIPPED | OUTPATIENT
Start: 2023-09-28 | End: 2023-11-09

## 2023-09-28 NOTE — PROGRESS NOTES
Sanam is a 63 year old who is being evaluated via a billable video visit.      How would you like to obtain your AVS? {AVS Preference:487975}  If the video visit is dropped, the invitation should be resent by: {video visit invitation (Optional) :072603}  Will anyone else be joining your video visit? {:711863}  {If patient encounters technical issues they should call [phone] :765383}        {PROVIDER CHARTING PREFERENCE:476172}    Subjective   Sanam is a 63 year old, presenting for the following health issues:  MH Follow Up  {(!) Visit Details have not yet been documented.  Please enter Visit Details and then use this list to pull in documentation. (Optional):142978}    History of Present Illness       Mental Health Follow-up:  Patient presents to follow-up on Depression & Anxiety.Patient's depression since last visit has been:  Bad  The patient is having other symptoms associated with depression.  Patient's anxiety since last visit has been:  Bad  The patient is having other symptoms associated with anxiety.  Any significant life events: No  Patient is feeling anxious or having panic attacks.  Patient has no concerns about alcohol or drug use.She consumes 0 sweetened beverage(s) daily.She exercises with enough effort to increase her heart rate 10 to 19 minutes per day.  She exercises with enough effort to increase her heart rate 3 or less days per week.   She is taking medications regularly.       {SUPERLIST (Optional):118534}  {additonal problems for provider to add (Optional):924820}      Review of Systems   {ROS COMP (Optional):004657}      Objective           Vitals:  No vitals were obtained today due to virtual visit.    Physical Exam   {video visit exam brief selected:915913}    {Diagnostic Test Results (Optional):513345}    {AMBULATORY ATTESTATION (Optional):873400}        Video-Visit Details    Type of service:  Video Visit     Originating Location (pt. Location): {video visit patient  "location:064399::\"Home\"}  {PROVIDER LOCATION On-site should be selected for visits conducted from your clinic location or adjoining [facility] hospital, academic office, or other nearby [facility] building. Off-site should be selected for all other provider locations, including home:702162}  Distant Location (provider location):  {virtual location provider:863582}  Platform used for Video Visit: {Virtual Visit Platforms:737173::\"VFA\"}      "

## 2023-09-28 NOTE — PROGRESS NOTES
Sanam is a 63 year old who is being evaluated via a billable video visit.      How would you like to obtain your AVS? MyChart  If the video visit is dropped, the invitation should be resent by: Text to cell phone: [phone]. *Informed patient that provider will conduct visit at scheduled time and that we are calling to go over pre-visit check-in. Patient upset that we are contacting her when she had already done the E-checkin. We are unable to go over any of the rooming process with patient; declined*.   Will anyone else be joining your video visit? No          Assessment & Plan     Severe episode of recurrent major depressive disorder, without psychotic features (H)  PETE (generalized anxiety disorder)  Worsening depression and anxiety a couple weeks ago, was able to identify decline in mental health earlier due to friends/family. Has a therapy appointment set up for Monday. Discussed options for treatment. She is on max dose of Effexor 225mg. She has been on multiple medications before (list below) so we discussed option to do genetic testing - referral sent to . In the meantime we discussed option to increase effexor dose off label vs adding adjunct medication (antipsychotic). We will wait on full medication switch until after testing returns if able. She'd prefer to stay on same medication, we will increase by 37.5mg daily. Follow up in a few weeks.   - venlafaxine (EFFEXOR-ER) 225 MG 24 hr tablet; Take 1 tablet (225 mg) by mouth daily With 1 capsule (37.5mg) for total 262.5 mg daily  - venlafaxine (EFFEXOR XR) 37.5 MG 24 hr capsule; Take 1 capsule (37.5 mg) by mouth daily With 1 capsule (225mg) for total of 262.5 mg daily  - Adult Genetics & Metabolism Referral; Future    Slime Perry DO  [facility]    Subjective   Sanam is a 63 year old, presenting for the following health issues:  Depression      History of Present Illness       Mental Health Follow-up:  Patient  presents to follow-up on Depression & Anxiety.Patient's depression since last visit has been:  Bad  The patient is having other symptoms associated with depression.  Patient's anxiety since last visit has been:  Bad  The patient is having other symptoms associated with anxiety.  Any significant life events: No  Patient is feeling anxious or having panic attacks.  Patient has no concerns about alcohol or drug use.She consumes 0 sweetened beverage(s) daily.She exercises with enough effort to increase her heart rate 10 to 19 minutes per day.  She exercises with enough effort to increase her heart rate 3 or less days per week.   She is taking medications regularly.       Mental health has not been doing well. On average every 7-8 years has had to have medications adjusted with either increase dose or change in medications. Seeing a therapist next week. Not sleeping well. Trying not to cry all day. This all worsened over the last 2-3 weeks. More anxiety than depression. Has good support system of friends that have been able to identify her  Either not eating or overeating. Lost a lot of weight since surgery.     Has previously tried: Lexparo, celexa, wellbutrin, prozac, paxil     Hasn't been on zoloft, cymbalta, pristiq, buspar nor any antipsychotic medications or mood stabilizers.           Review of Systems   Constitutional, HEENT, cardiovascular, pulmonary, gi and gu systems are negative, except as otherwise noted.      Objective           Vitals:  No vitals were obtained today due to virtual visit.    Physical Exam   GENERAL: Healthy, alert and no distress  EYES: Eyes grossly normal to inspection.  No discharge or erythema, or obvious scleral/conjunctival abnormalities.  RESP: No audible wheeze, cough, or visible cyanosis.  No visible retractions or increased work of breathing.    SKIN: Visible skin clear. No significant rash, abnormal pigmentation or lesions.  NEURO: Cranial nerves grossly intact.  Mentation and  speech appropriate for age.  PSYCH: Mentation appears normal, affect normal/bright, judgement and insight intact, normal speech and appearance well-groomed.                Video-Visit Details    Type of service:  Video Visit     Originating Location (pt. Location): Home    Distant Location (provider location):  On-site  Platform used for Video Visit: Squirrly

## 2023-10-02 ENCOUNTER — VIRTUAL VISIT (OUTPATIENT)
Dept: CONSULT | Facility: CLINIC | Age: 63
End: 2023-10-02
Attending: STUDENT IN AN ORGANIZED HEALTH CARE EDUCATION/TRAINING PROGRAM
Payer: COMMERCIAL

## 2023-10-02 DIAGNOSIS — Z78.9 LACK OF DRUG ACTION (PROPERLY PRESCRIBED AND ADMINISTERED): ICD-10-CM

## 2023-10-02 DIAGNOSIS — Z71.83 ENCOUNTER FOR NONPROCREATIVE GENETIC COUNSELING AND TESTING: Primary | ICD-10-CM

## 2023-10-02 DIAGNOSIS — Z13.71 ENCOUNTER FOR NONPROCREATIVE GENETIC COUNSELING AND TESTING: Primary | ICD-10-CM

## 2023-10-02 DIAGNOSIS — F33.2 SEVERE EPISODE OF RECURRENT MAJOR DEPRESSIVE DISORDER, WITHOUT PSYCHOTIC FEATURES (H): ICD-10-CM

## 2023-10-02 DIAGNOSIS — T50.905S ADVERSE EFFECT OF DRUG, SEQUELA: ICD-10-CM

## 2023-10-02 DIAGNOSIS — F41.1 GAD (GENERALIZED ANXIETY DISORDER): ICD-10-CM

## 2023-10-02 PROCEDURE — 96040 HC GENETIC COUNSELING, EACH 30 MINUTES: CPT | Mod: GT,95

## 2023-10-02 ASSESSMENT — PAIN SCALES - GENERAL: PAINLEVEL: NO PAIN (0)

## 2023-10-02 NOTE — PROGRESS NOTES
"Date of visit: Oct 2, 2023    Presenting Information   Sanam Mares is a 63 year old female referred to the [facility] at the request of Slime Perry DO today. Sanam was seen for a genetic counseling appointment to discuss the details of pharmacogenomic testing, including her personal medical history, personal medication history including adverse medication responses, her family history of relevant medication responses, and testing logistics. History is obtained from Sanam and review of the medical record.     This patient has a relevant history of the following:  Bouts of depression that appear to occur every 7-8 years  Generalized anxiety disorder    They are currently taking:   Current Outpatient Medications   Medication    albuterol (PROAIR HFA/PROVENTIL HFA/VENTOLIN HFA) 108 (90 Base) MCG/ACT inhaler    albuterol (PROVENTIL) (2.5 MG/3ML) 0.083% neb solution    amLODIPine (NORVASC) 5 MG tablet    cetirizine (ZYRTEC) 10 MG tablet    Cholecalciferol (VITAMIN D) 125 MCG (5000 UT) capsule    clobetasol (TEMOVATE) 0.05 % external ointment    finasteride (PROSCAR) 5 MG tablet    fluticasone (FLONASE) 50 MCG/ACT nasal spray    fluticasone-salmeterol (ADVAIR) 500-50 MCG/ACT inhaler    lisinopril (ZESTRIL) 40 MG tablet    montelukast (SINGULAIR) 10 MG tablet    venlafaxine (EFFEXOR XR) 37.5 MG 24 hr capsule    venlafaxine (EFFEXOR-ER) 225 MG 24 hr tablet    triamcinolone (KENALOG) 0.1 % external cream     No current facility-administered medications for this visit.       She has also previously tried the following medications:  Paxil - first medication tried, not a good fit  Wellbutrin - caused extreme irritability, rage    She is pursuing pharmacogenetic testing because she and her doctor hope to \"narrow the field\" of possible medications to try.    Family History  A three generation pedigree was obtained and scanned into the electronic medical record. The relevant portions are described " "below:    Siblings- Three sisters, one brother. All are well.   One sister has a daughter who is a carrier for a genetic form of arthritis. Sanam is not a carrier of this condition.   Another sister has a son with severe asthma/allergies  All other nieces/nephews are well.  Parents- Sanam's father is living at 83 with a history of heart attack at 81, diabetes, hypertension, and hearing loss beginning in his 40s (a known familial trait). Sanam's mother is living at 82 with a history of hypertension, high cholesterol, and medication reactions. She is \"highly sensitive\" to medication.  Maternal Relatives- Two living uncles - one is healthy at age 96. The other with medical concerns unknown to Sanam. One uncle passed due to kidney or liver failure. One uncle passed due to Lewy body dementia. One sister passed due to lung cancer; Sanam reports that aunt was a smoker.   Grandmother:  at 96. She was healthy and well to end of life.   Grandfather:  at 72 due to a heart attack. Reported to have had rheumatic fever as a child that damaged his heart.  Paternal Relatives- One aunt, living and well at 90. One uncle,  in his late 40s due to a heart attack.   Grandmother:  at 66 due to stroke. Had been in poor health for years leading up to her death.  Grandfather:  at 66 due to heart attack.     There is no other reported family history of major allergic reactions, adverse effects of medication, difficulty with general anesthesia, or treatment-resistant conditions. Family history is largely non-contributory. Maternal ancestry is [place]ian and paternal ancestry is [place] and [place]. Consanguinity was denied.     Genetic Counseling Discussion  For review, our bodies are made of cells that contain our chromosomes which are made up of long stretches of DNA containing our genes. Our genes serve as the instructions for our bodies to grow and function. There are several genes in our body that " provide instructions for breaking down the medications we take. We have two copies of each gene, one inherited from our mother and one inherited from our father. When one or both copies of those genes are altered, the way that gene's product functions may change. You may have heard this alterations called mutations, variants, or single-nucelotide polymorphisms (SNPs). Gene products like enzymes, transporters, and receptors are extremely important in determining how our body responds to medications and other outside influences. Changes in the instructions for these gene products may alter the way a medication works within your body.     For example, a change in a gene can slow down the process of medication breakdown. That can lead to too much of that medication/drug building up in the body which can cause side effects. On the other hand, a change in a gene can speed up the breakdown of a medication so a therapeutic level is not reached. When this happens, the medication may not work well at the standard doses. Identifying changes in these genes via pharmacogenomic testing can help guide which medications might work best for an individual, what dose of a medication may be best, or if a certain medication has a high risk for causing serious side effects.    The pharmacogenomic testing offered at [facility] analyzes several different polymorphisms in different genes associated with drug metabolism. These variants have been determined to be medically actionable by practice guidelines including CPIC (Clinical Pharmacogenetics Implementation Consortium), PharmGKB and/or FDA gene-drug interaction guidelines. Therefore, prescribing recommendations can be made by the results of this test, so this testing for Nela Mares is DIAGNOSTIC and is NOT investigational.     The results of this test can provide guidance for several different types of drugs such as antiinflammatories, antithrombotics, lipid-lowering  medication, acid-lowering medications, antiemetics, immunosuppressants, antivirals, antifungals, antidepressants, ADHD medications, antimetabolites, and/or hormone antagonists. This means that, while this testing was recommended for a specific reason right now (Sanam's mental health management), it may provide guidance for future medication use.     As previously mentioned, we inherit our genes from our parents. This means that some of the pharmacogenomic variants found on this test may be shared by other relatives. These results could be helpful to share with other relatives, but it is important to remember that there are many factors that can contribute to how an individual responds to medications. Relatives should consider their own pharmacogenomics testing to better determine their recommendations and they should consult with their health care providers before making any changes.     What can't pharmacogenomic testing tell me?   There are several other factors that can determine how an individual responds to medications. Some of these factors include environmental factors such as lifestyle choices (diet, alcohol and/or tobacco use) or other medications an individual might be taking, and other factors can include a person's age, sex, ethnic background, or disease state. Identifying genetic changes involved in medication processing is important, but cannot tell us everything about a person. Therefore, this can be an excellent tool but is NOT a guaranteed way to find one perfect solution for a patient.     This pharmacogenomic testing is not looking at every known pharmacogenomic polymorphism and therefore the results cannot tell us about every possible gene-drug interaction. It is also not looking at genes outside of the scope of pharmacogenomics, and therefore, the results will not tell us if Nela is at risk for other genetic conditions. If Nela has questions about a possible underlying genetic condition,  she should talk with her primary care provider about seeking an appointment with a general genetics provider.      Testing logistics  [facility] will try to obtain insurance coverage for the pharmacogenomic testing; however, this is not always a covered service. A cost waiver form (non-Medicare non-coverage) is required, but cost to the patient is not expected to exceed $350.     A blood or buccal sample will be collected for DNA analysis. The results of this test take 1-2 weeks. An appointment will be made with the pharmacist to discuss these results in detail and to discuss the medication recommendations based on these results. These test results will be accessible in [employer] and may be viewable prior to the appointment with the pharmacist, but NO CHANGES SHOULD BE MADE TO MEDICATIONS BEFORE TALKING TO THE PHARMACIST OR HEALTHCARE PROVIDER.     Nela Mares consented to pharmacogenomic testing today. The insurance and billing were explained and she agreed to the billing plan. Due to the fact that this was a virtual visit, we reviewed the content of the consent forms and Nela Mares gave verbal authorization to proceed.    It was a pleasure meeting with Nela Mares today. She vocalized understanding of the information we discussed today and all her questions and concerns were addressed. She has been provided with my contact information should any questions arise regarding our visit or plan moving forward. In total, 26 minutes were spent in televideo counseling with this patient.     Bar Marion will arrange a lab appointment at a [place] laboratory to have a blood sample drawn for the [facility] Pharmacogenomic Test.   Nela will be scheduled with one of our [facility] pharmacists 1-2 weeks after her sample is collected to review the results of the Pharmacogenomics Profile. This appointment can be scheduled by calling [phone] after the sample is collected.     Cece Singh MS,  [facility]  Genetic Counselor  [facility]   Email: [email or web address]

## 2023-10-02 NOTE — NURSING NOTE
Is the patient currently in the state of [place]? YES    Visit mode:VIDEO    If the visit is dropped, the patient can be reconnected by: VIDEO VISIT: Text to cell phone:   Telephone Information:   Mobile [phone]       Will anyone else be joining the visit? NO  (If patient encounters technical issues they should call [phone]    How would you like to obtain your AVS? MyChart    Are changes needed to the allergy or medication list? No    Reason for visit: Consult    Shelby Kocher VVF

## 2023-10-02 NOTE — LETTER
10/2/2023      RE: Nela Mares  [address] [address]     Dear Colleague,    Thank you for the opportunity to participate in the care of your patient, Nela Mares, at the [facility] at [facility]. Please see a copy of my visit note below.    Date of visit: Oct 2, 2023    Presenting Information   Sanam Mares is a 63 year old female referred to the [facility] at the request of Slime Perry DO today. Sanam was seen for a genetic counseling appointment to discuss the details of pharmacogenomic testing, including her personal medical history, personal medication history including adverse medication responses, her family history of relevant medication responses, and testing logistics. History is obtained from Sanam and review of the medical record.     This patient has a relevant history of the following:  Bouts of depression that appear to occur every 7-8 years  Generalized anxiety disorder    They are currently taking:   Current Outpatient Medications   Medication    albuterol (PROAIR HFA/PROVENTIL HFA/VENTOLIN HFA) 108 (90 Base) MCG/ACT inhaler    albuterol (PROVENTIL) (2.5 MG/3ML) 0.083% neb solution    amLODIPine (NORVASC) 5 MG tablet    cetirizine (ZYRTEC) 10 MG tablet    Cholecalciferol (VITAMIN D) 125 MCG (5000 UT) capsule    clobetasol (TEMOVATE) 0.05 % external ointment    finasteride (PROSCAR) 5 MG tablet    fluticasone (FLONASE) 50 MCG/ACT nasal spray    fluticasone-salmeterol (ADVAIR) 500-50 MCG/ACT inhaler    lisinopril (ZESTRIL) 40 MG tablet    montelukast (SINGULAIR) 10 MG tablet    venlafaxine (EFFEXOR XR) 37.5 MG 24 hr capsule    venlafaxine (EFFEXOR-ER) 225 MG 24 hr tablet    triamcinolone (KENALOG) 0.1 % external cream     No current facility-administered medications for this visit.       She has also previously tried the following  "medications:  Paxil - first medication tried, not a good fit  Wellbutrin - caused extreme irritability, rage    She is pursuing pharmacogenetic testing because she and her doctor hope to \"narrow the field\" of possible medications to try.    Family History  A three generation pedigree was obtained and scanned into the electronic medical record. The relevant portions are described below:    Siblings- Three sisters, one brother. All are well.   One sister has a daughter who is a carrier for a genetic form of arthritis. Sanam is not a carrier of this condition.   Another sister has a son with severe asthma/allergies  All other nieces/nephews are well.  Parents- Sanam's father is living at 83 with a history of heart attack at 81, diabetes, hypertension, and hearing loss beginning in his 40s (a known familial trait). Sanam's mother is living at 82 with a history of hypertension, high cholesterol, and medication reactions. She is \"highly sensitive\" to medication.  Maternal Relatives- Two living uncles - one is healthy at age 96. The other with medical concerns unknown to Sanam. One uncle passed due to kidney or liver failure. One uncle passed due to Lewy body dementia. One sister passed due to lung cancer; Sanam reports that aunt was a smoker.   Grandmother:  at 96. She was healthy and well to end of life.   Grandfather:  at 72 due to a heart attack. Reported to have had rheumatic fever as a child that damaged his heart.  Paternal Relatives- One aunt, living and well at 90. One uncle,  in his late 40s due to a heart attack.   Grandmother:  at 66 due to stroke. Had been in poor health for years leading up to her death.  Grandfather:  at 66 due to heart attack.     There is no other reported family history of major allergic reactions, adverse effects of medication, difficulty with general anesthesia, or treatment-resistant conditions. Family history is largely non-contributory. " Maternal ancestry is [place] and paternal ancestry is [place] and [place]. Consanguinity was denied.     Genetic Counseling Discussion  For review, our bodies are made of cells that contain our chromosomes which are made up of long stretches of DNA containing our genes. Our genes serve as the instructions for our bodies to grow and function. There are several genes in our body that provide instructions for breaking down the medications we take. We have two copies of each gene, one inherited from our mother and one inherited from our father. When one or both copies of those genes are altered, the way that gene's product functions may change. You may have heard this alterations called mutations, variants, or single-nucelotide polymorphisms (SNPs). Gene products like enzymes, transporters, and receptors are extremely important in determining how our body responds to medications and other outside influences. Changes in the instructions for these gene products may alter the way a medication works within your body.     For example, a change in a gene can slow down the process of medication breakdown. That can lead to too much of that medication/drug building up in the body which can cause side effects. On the other hand, a change in a gene can speed up the breakdown of a medication so a therapeutic level is not reached. When this happens, the medication may not work well at the standard doses. Identifying changes in these genes via pharmacogenomic testing can help guide which medications might work best for an individual, what dose of a medication may be best, or if a certain medication has a high risk for causing serious side effects.    The pharmacogenomic testing offered at [facility] analyzes several different polymorphisms in different genes associated with drug metabolism. These variants have been determined to be medically actionable by practice guidelines including CPIC (Clinical Pharmacogenetics  Implementation Consortium), PharmGKB and/or FDA gene-drug interaction guidelines. Therefore, prescribing recommendations can be made by the results of this test, so this testing for Nela Mares is DIAGNOSTIC and is NOT investigational.     The results of this test can provide guidance for several different types of drugs such as antiinflammatories, antithrombotics, lipid-lowering medication, acid-lowering medications, antiemetics, immunosuppressants, antivirals, antifungals, antidepressants, ADHD medications, antimetabolites, and/or hormone antagonists. This means that, while this testing was recommended for a specific reason right now (Sanam's mental health management), it may provide guidance for future medication use.     As previously mentioned, we inherit our genes from our parents. This means that some of the pharmacogenomic variants found on this test may be shared by other relatives. These results could be helpful to share with other relatives, but it is important to remember that there are many factors that can contribute to how an individual responds to medications. Relatives should consider their own pharmacogenomics testing to better determine their recommendations and they should consult with their health care providers before making any changes.     What can't pharmacogenomic testing tell me?   There are several other factors that can determine how an individual responds to medications. Some of these factors include environmental factors such as lifestyle choices (diet, alcohol and/or tobacco use) or other medications an individual might be taking, and other factors can include a person's age, sex, ethnic background, or disease state. Identifying genetic changes involved in medication processing is important, but cannot tell us everything about a person. Therefore, this can be an excellent tool but is NOT a guaranteed way to find one perfect solution for a patient.     This pharmacogenomic  testing is not looking at every known pharmacogenomic polymorphism and therefore the results cannot tell us about every possible gene-drug interaction. It is also not looking at genes outside of the scope of pharmacogenomics, and therefore, the results will not tell us if Nela is at risk for other genetic conditions. If Nela has questions about a possible underlying genetic condition, she should talk with her primary care provider about seeking an appointment with a general genetics provider.      Testing logistics  [facility] will try to obtain insurance coverage for the pharmacogenomic testing; however, this is not always a covered service. A cost waiver form (non-Medicare non-coverage) is required, but cost to the patient is not expected to exceed $350.     A blood or buccal sample will be collected for DNA analysis. The results of this test take 1-2 weeks. An appointment will be made with the pharmacist to discuss these results in detail and to discuss the medication recommendations based on these results. These test results will be accessible in [employer] and may be viewable prior to the appointment with the pharmacist, but NO CHANGES SHOULD BE MADE TO MEDICATIONS BEFORE TALKING TO THE PHARMACIST OR HEALTHCARE PROVIDER.     Nela Mares consented to pharmacogenomic testing today. The insurance and billing were explained and she agreed to the billing plan. Due to the fact that this was a virtual visit, we reviewed the content of the consent forms and Nela Mares gave verbal authorization to proceed.    It was a pleasure meeting with Nela Mares today. She vocalized understanding of the information we discussed today and all her questions and concerns were addressed. She has been provided with my contact information should any questions arise regarding our visit or plan moving forward. In total, 26 minutes were spent in televideo counseling with this patient.     Bar overton  arrange a lab appointment at a [place] laboratory to have a blood sample drawn for the [facility] Pharmacogenomic Test.   Nela will be scheduled with one of our [facility] pharmacists 1-2 weeks after her sample is collected to review the results of the Pharmacogenomics Profile. This appointment can be scheduled by calling [phone] after the sample is collected.     Cece Singh MS, [facility]  Genetic Counselor  [facility]   Email: [email or web address]

## 2023-10-03 ENCOUNTER — LAB (OUTPATIENT)
Dept: LAB | Facility: CLINIC | Age: 63
End: 2023-10-03
Payer: COMMERCIAL

## 2023-10-03 DIAGNOSIS — T50.905S ADVERSE EFFECT OF DRUG, SEQUELA: ICD-10-CM

## 2023-10-03 DIAGNOSIS — F33.2 SEVERE EPISODE OF RECURRENT MAJOR DEPRESSIVE DISORDER, WITHOUT PSYCHOTIC FEATURES (H): ICD-10-CM

## 2023-10-03 DIAGNOSIS — F41.1 GAD (GENERALIZED ANXIETY DISORDER): ICD-10-CM

## 2023-10-03 DIAGNOSIS — Z78.9 LACK OF DRUG ACTION (PROPERLY PRESCRIBED AND ADMINISTERED): ICD-10-CM

## 2023-10-03 LAB — LAB DIRECTOR RESULTS: NORMAL

## 2023-10-03 PROCEDURE — G0452 MOLECULAR PATHOLOGY INTERPR: HCPCS | Mod: 26 | Performed by: PATHOLOGY

## 2023-10-03 PROCEDURE — 81418 RX METAB GEN SEQ ALYS PNL 6: CPT | Performed by: STUDENT IN AN ORGANIZED HEALTH CARE EDUCATION/TRAINING PROGRAM

## 2023-10-03 PROCEDURE — 36415 COLL VENOUS BLD VENIPUNCTURE: CPT

## 2023-10-05 ENCOUNTER — MYC MEDICAL ADVICE (OUTPATIENT)
Dept: FAMILY MEDICINE | Facility: CLINIC | Age: 63
End: 2023-10-05
Payer: COMMERCIAL

## 2023-10-05 DIAGNOSIS — F41.1 GAD (GENERALIZED ANXIETY DISORDER): Primary | ICD-10-CM

## 2023-10-05 NOTE — LETTER
October 5, 2023      Nela Mares  [address] [address]        To Whom It May Concern:    This letter is in regards to my patient Sanam Mares. She was last seen by me on 09/28/2023. Due to acute medical concerns, please allow for a reduced workload over the next two months to accommodate for this change in her health. Please let me know if you have any questions.          Sincerely,        Slime Perry, DO

## 2023-10-05 NOTE — TELEPHONE ENCOUNTER
Patient got her copy through [employer] she will call back if she needs it emailed     Closing encounter

## 2023-10-05 NOTE — TELEPHONE ENCOUNTER
TC called and spoke with patient to confirm exactly what is needed.    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    Patient states that she has missed work all this week so far due to not feeling well,(feeling overwhelmed,stressed).Has been advised to not come back to work until this get resolved.    Patient is not interested in reducing her work hrs just work load which sounds like her job is willing to do so with this letter.    Please let me know if willing to write out     I did ask why the [place] reps aren't faxing any kind of  paperwork for you to fill out,per them the letter will work     Thanks,    Deisy Molina    [facility]    Include Location In Plan?: Yes Detail Level: Generalized Hide Include Location In Plan Question?: No

## 2023-10-18 ENCOUNTER — TELEPHONE (OUTPATIENT)
Dept: PHARMACY | Facility: OTHER | Age: 63
End: 2023-10-18
Payer: COMMERCIAL

## 2023-10-18 NOTE — TELEPHONE ENCOUNTER
Patient was scheduled for MTM appointment to review her Go4PGx tests.  Per [facility] there is a supply chain issue that has delayed the processing of these tests.  Informed patient that we would reach out to reschedule appointment once lab results are final.    Joe Lew, DanaD, [facility]  Medication Therapy Management Pharmacist  Pager: [phone]

## 2023-10-19 ENCOUNTER — MYC MEDICAL ADVICE (OUTPATIENT)
Dept: FAMILY MEDICINE | Facility: CLINIC | Age: 63
End: 2023-10-19
Payer: COMMERCIAL

## 2023-10-19 RX ORDER — BUSPIRONE HYDROCHLORIDE 10 MG/1
10 TABLET ORAL 2 TIMES DAILY
Qty: 60 TABLET | Refills: 1 | Status: SHIPPED | OUTPATIENT
Start: 2023-10-19 | End: 2023-11-09 | Stop reason: ALTCHOICE

## 2023-10-19 NOTE — TELEPHONE ENCOUNTER
Routing to provider     Willing to increase effexor?      Seen 9/28 and was increased at that time.    Review/advised?    Nichelle Gonsales RN

## 2023-10-23 ENCOUNTER — TELEPHONE (OUTPATIENT)
Dept: FAMILY MEDICINE | Facility: CLINIC | Age: 63
End: 2023-10-23
Payer: COMMERCIAL

## 2023-10-23 NOTE — TELEPHONE ENCOUNTER
Forms/Letter Request    Type of form/letter: FMLA - Unknown    Have you been seen for this request: Yes Spoke with Dr Perry about this.    Do we have the form/letter: Yes: [employer]    Who is the form from? Patient    Where did/will the form come from? Patient or family brought in       When is form/letter needed by: asap    How would you like the form/letter returned: Fax : [phone]  Att: Leida Carranza    Patient Notified form requests are processed in 3-5 business days:Yes    Could we send this information to you in [employer] or would you prefer to receive a phone call?:   No preference   Okay to leave a detailed message?: Yes at Cell number on file:    Telephone Information:   Mobile [phone]

## 2023-10-26 ENCOUNTER — MYC MEDICAL ADVICE (OUTPATIENT)
Dept: FAMILY MEDICINE | Facility: CLINIC | Age: 63
End: 2023-10-26
Payer: COMMERCIAL

## 2023-10-26 DIAGNOSIS — F33.2 SEVERE EPISODE OF RECURRENT MAJOR DEPRESSIVE DISORDER, WITHOUT PSYCHOTIC FEATURES (H): Primary | ICD-10-CM

## 2023-10-26 DIAGNOSIS — F41.1 GAD (GENERALIZED ANXIETY DISORDER): ICD-10-CM

## 2023-10-26 ASSESSMENT — ANXIETY QUESTIONNAIRES
GAD7 TOTAL SCORE: 15
3. WORRYING TOO MUCH ABOUT DIFFERENT THINGS: NEARLY EVERY DAY
6. BECOMING EASILY ANNOYED OR IRRITABLE: MORE THAN HALF THE DAYS
1. FEELING NERVOUS, ANXIOUS, OR ON EDGE: NEARLY EVERY DAY
5. BEING SO RESTLESS THAT IT IS HARD TO SIT STILL: NOT AT ALL
IF YOU CHECKED OFF ANY PROBLEMS ON THIS QUESTIONNAIRE, HOW DIFFICULT HAVE THESE PROBLEMS MADE IT FOR YOU TO DO YOUR WORK, TAKE CARE OF THINGS AT HOME, OR GET ALONG WITH OTHER PEOPLE: EXTREMELY DIFFICULT
4. TROUBLE RELAXING: MORE THAN HALF THE DAYS
GAD7 TOTAL SCORE: 15
7. FEELING AFRAID AS IF SOMETHING AWFUL MIGHT HAPPEN: MORE THAN HALF THE DAYS
2. NOT BEING ABLE TO STOP OR CONTROL WORRYING: NEARLY EVERY DAY

## 2023-10-26 NOTE — TELEPHONE ENCOUNTER
Routing My Chart message to Dr Perry    CAN WAIT FOR HER RETURN.      Patient asking about genetic testing and Mental health referral    Pended if agreeable.      RN replied to patient via [employer]. See message for details.     Solomon Jones RN, BSN, PHN  [facility]: [place]

## 2023-10-27 ENCOUNTER — PATIENT OUTREACH (OUTPATIENT)
Dept: GASTROENTEROLOGY | Facility: CLINIC | Age: 63
End: 2023-10-27
Payer: COMMERCIAL

## 2023-11-01 ENCOUNTER — VIRTUAL VISIT (OUTPATIENT)
Dept: PHARMACY | Facility: CLINIC | Age: 63
End: 2023-11-01
Payer: COMMERCIAL

## 2023-11-01 DIAGNOSIS — F41.9 ANXIETY: ICD-10-CM

## 2023-11-01 DIAGNOSIS — J45.30 MILD PERSISTENT ASTHMA WITHOUT COMPLICATION: ICD-10-CM

## 2023-11-01 DIAGNOSIS — I10 HYPERTENSION GOAL BP (BLOOD PRESSURE) < 140/90: ICD-10-CM

## 2023-11-01 DIAGNOSIS — Z78.9 TAKES DIETARY SUPPLEMENTS: ICD-10-CM

## 2023-11-01 DIAGNOSIS — L90.0 LICHEN SCLEROSUS: ICD-10-CM

## 2023-11-01 DIAGNOSIS — J30.81 ALLERGIC RHINITIS DUE TO ANIMALS: ICD-10-CM

## 2023-11-01 DIAGNOSIS — L65.9 ALOPECIA: ICD-10-CM

## 2023-11-01 DIAGNOSIS — F32.0 MILD MAJOR DEPRESSION (H): ICD-10-CM

## 2023-11-01 DIAGNOSIS — Z13.79 ENCOUNTER FOR PHARMACOGENETIC TESTING: Primary | ICD-10-CM

## 2023-11-01 PROCEDURE — 99607 MTMS BY PHARM ADDL 15 MIN: CPT | Mod: VID | Performed by: PHARMACIST

## 2023-11-01 PROCEDURE — 99605 MTMS BY PHARM NP 15 MIN: CPT | Mod: VID | Performed by: PHARMACIST

## 2023-11-01 NOTE — Clinical Note
Abhinav Perry,  Went through Sanam's genetic results with her today. Medications that are metabolized by [id number] be avoided such as escitalopram, citalopram, tertiary TCAs, and sertraline. Otherwise she can try other antidepressants she has not tried. It did not sound like she had tried duloxetine. Alternatively, buspirone could be increased to help with anxiety or a second generation antipsychotic could be added on as augmentation for depression.  Jose Mcclure, Pharm. D., [facility] Medication Therapy Management Pharmacist

## 2023-11-01 NOTE — PROGRESS NOTES
Medication Therapy Management (MTM) Encounter    ASSESSMENT:                            Medication Adherence/Access: No issues identified    Hypertension:   Patient is meeting blood pressure goal of < 130/80mmHg.      Pharmacogenetic Assessment and Recommendations: Variability in CYP2D6, BOS7F97, CYP2C9 and other genes can impact the effectiveness and risk toxicity of certain medications used. Based on this patient's pharmacogenetic test results and clinical assessment, consider the following:     RBU3U79 Rapid Metabolizer  a. Expected to have decreased levels of medications metabolized by CAG1A35 (e.g., tertiary TCAs, sertraline, escitalopram, citalopram, PPIs, voriconazole ) and may be at increased risk for lack of effiacy. Expected to have somewhat increased levels of medications activated by YDP0O03 and may be at increased risk for side effects.       CYP2C9 Intermediate Metabolizer  a. Expected to have somewhat increased levels of medications metabolized by CYP2C9 (e.g., celecoxib, ibuprofen, meloxicam, piroxicam, flurbiprofen, fluvastatin, fosphenytoin, phenytoin, siponimod, warfarin ) and may be at increased risk of side effects.    CY Poor Metabolizer  a. Most standard drug dosing is based off of CY poor metabolizers therefore there is no need to adjust medications with this phenotype. Normal levels of medications are expected in CY poor metabolizers (e.g., tacrolimus).    Depression/Anxiety:   Would benefit form switching antidepressant, increase buspirone, or adding on a low dose antipsychotic for augmentation.    Asthma:   Stable.     Alopecia:  Stable.     Allergies:  Stable.     Lichen Sclerosis:  Stable.     Supplements:  Due to history of low vitamin D she should continue to take supplementation to keep her in a normal range.    PLAN:                            Could consider medications she has yet to try such as duloxetine. Also can consider increasing buspirone for anxiey.  Take vitamin  "D daily.     Follow-up: Return if symptoms worsen or fail to improve.    SUBJECTIVE/OBJECTIVE:                          Sanam Mares is a 63 year old female contacted via secure video for an initial visit. She was referred to me from Dr. Perry.      Reason for visit: Pgx results.    Allergies/ADRs: Reviewed in chart  Past Medical History: Reviewed in chart  Tobacco: She reports that she has never smoked. She has never used smokeless tobacco.  Alcohol: none    Medication Adherence/Access: no issues reported    Hypertension     Amlodipine 5 mg daily  Lisinopril 40 mg daily    Patient reports no current medication side effects  Patient does not self-monitor blood pressure.       BP Readings from Last 3 Encounters:   08/30/23 116/88   07/25/23 107/74   07/11/23 116/81     Pulse Readings from Last 3 Encounters:   07/25/23 78   07/11/23 62   06/30/23 69       Pharmacogenetic (PGx) Results: Pharmacogenetic testing was ordered for Nela Mares to assist in the treatment of Anxiety and depression. See \"Pharmacogenomics Profile\" in lab result tab for complete list of pharmacogenetic results.     Results relevant for PGx consult reason:          Depression/Anxiety:   Venlafaxine 262.5 mg daily  Buspirone 10 mg twice a day     Reports every 7-8 years she needs to switch antidepressants because what she is on stops working. Reports that right now things are not going well so recently increased the venlafaxine. Reports that she isnt sure that the increase in venlafaxine helped. Felt these issues coming on las tmonth. The last 2 weeks she feels as \"far down\" as she has with depression. Mix of both anxiety and depression. Addition of buspirone has helped a little with anxiety.     Has done both direct switches and cross tapers.     Previous medication trials:  Paxil - \"not a good fit\"  Bupropion - caused extreme irritability, rage  Imipramine - reports this was aweful in terms of side effects  Fluoxetine - felt like it didn't " work originally.   Lexapro - stopped working  Celexa - stopped working  Zoloft      Has not tried duloxetine    Asthma:   ICS/LABA - Advair Diskus/Wixela Inhub 500/50 mcg - one puff twice daily  Albuterol (ProAir/Ventolin/Proventil) - less than once a month for both HFA and nebulizer  Albuterol Nebs  montelukast (Singulair) 10 mg once daily     Patient reports no current medication side effects.      Patient reports the following symptoms: none.    Alopecia:  Finasteride 5 mg daily - not sure if this helpful. Reports hair loss ins david worse.     Allergies:  Cetirizine 10 mg daily  Flonase 2 sprays into both nostrils daily    Work well. No concerns or questions at this time.     Lichen Sclerosis:  Clobetasol 0.05 cream    No concerns or questions at this time.     Supplements:  Vitamin D 5000 units - stopped on her own after last result in range.      Today's Vitals: LMP 12/21/2014   ----------------      I spent 22 minutes with this patient today. I offer these suggestions for consideration by Dr. Perry. A copy of the visit note was provided to the patient's provider(s).    A summary of these recommendations was sent via [employer].    Jose Mcclure, Pharm. D., [facility]  Medication Therapy Management Pharmacist      Telemedicine Visit Details  Type of service:  Video Conference via [employer]  Start Time:  833 am  End Time:  855 am       Medication Therapy Recommendations  Mild major depression (H24)    Current Medication: venlafaxine (EFFEXOR-ER) 225 MG 24 hr tablet   Rationale: Condition refractory to medication - Ineffective medication - Effectiveness   Recommendation: Change Medication - DULOXETINE HCL PO   Status: Contact Provider - Awaiting Response         Takes dietary supplements    Current Medication: Cholecalciferol (VITAMIN D) 125 MCG (5000 UT) capsule   Rationale: Does not understand instructions - Adherence - Adherence   Recommendation: Provide Education   Status: Patient Agreed - Adherence/Education

## 2023-11-01 NOTE — PATIENT INSTRUCTIONS
"Recommendations from today's MTM visit:                                                    MTM (medication therapy management) is a service provided by a clinical pharmacist designed to help you get the most of out of your medicines.   Today we reviewed what your medicines are for, how to know if they are working, that your medicines are safe and how to make your medicine regimen as easy as possible.    Could consider medications she has yet to try such as duloxetine. Also can consider increasing buspirone for anxiey.  Take vitamin D daily.     Follow-up: Return if symptoms worsen or fail to improve.    It was great speaking with you today.  I value your experience and would be very thankful for your time in providing feedback in our clinic survey. In the next few days, you may receive an email or text message from [employer] with a link to a survey related to your  clinical pharmacist.\"     To schedule another MTM appointment, please call the clinic directly or you may call the MTM scheduling line at [phone] or toll-free at [phone].     My Clinical Pharmacist's contact information:                                                      Please feel free to contact me with any questions or concerns you have.      Jose Mcclure, Pharm. D., BCACP  Medication Therapy Management Pharmacist    "

## 2023-11-02 ENCOUNTER — MYC MEDICAL ADVICE (OUTPATIENT)
Dept: FAMILY MEDICINE | Facility: CLINIC | Age: 63
End: 2023-11-02

## 2023-11-02 ENCOUNTER — VIRTUAL VISIT (OUTPATIENT)
Dept: FAMILY MEDICINE | Facility: CLINIC | Age: 63
End: 2023-11-02
Payer: COMMERCIAL

## 2023-11-02 DIAGNOSIS — F41.1 GAD (GENERALIZED ANXIETY DISORDER): ICD-10-CM

## 2023-11-02 DIAGNOSIS — F33.3 SEVERE EPISODE OF RECURRENT MAJOR DEPRESSIVE DISORDER, WITH PSYCHOTIC FEATURES (H): Primary | ICD-10-CM

## 2023-11-02 PROCEDURE — 99213 OFFICE O/P EST LOW 20 MIN: CPT | Mod: VID | Performed by: STUDENT IN AN ORGANIZED HEALTH CARE EDUCATION/TRAINING PROGRAM

## 2023-11-02 RX ORDER — ARIPIPRAZOLE 5 MG/1
5 TABLET ORAL DAILY
Qty: 14 TABLET | Refills: 0 | Status: SHIPPED | OUTPATIENT
Start: 2023-11-02 | End: 2023-11-09

## 2023-11-02 NOTE — LETTER
November 2, 2023      Nela Mares  [address] [address]        To Whom It May Concern:    This letter is in regards to Nela Mares, who was last seen in our clinic on November 2, 2023.  She remains unable to return to work at this time, but she does have the ability to return temporarily to be part of a meeting. Please contact my office if you have any questions.     Sincerely,      Slime Perry, DO

## 2023-11-02 NOTE — PROGRESS NOTES
Sanam is a 63 year old who is being evaluated via a billable video visit.      How would you like to obtain your AVS? MyChart  If the video visit is dropped, the invitation should be resent by: Text to cell phone: [phone]  Will anyone else be joining your video visit? No          Assessment & Plan     Severe episode of recurrent major depressive disorder, with psychotic features (H)  PETE (generalized anxiety disorder)  Continue on current dose of effexor (discussed at last visit off label higher dose, but since she was doing well on effexor for a long timer previously we remained on same medication). See previous note for other medicatiosn that she has failed previously. Did pharmacogenetic testing - reviewed MTM results from yesterday, consider switching to duloxetine or adding antipsychotic. She has a consult with psychiatry next week so I would advise keeping on same effexor and buspar dose and adding low dose abilify 5 mg daily as adjunct. In therapy, appt tomorrow. Has suicidal ideation but no active plans. Has safety plan and support with sisters. At this time not an active risk to self. Needs close follow up.   - ARIPiprazole (ABILIFY) 5 MG tablet; Take 1 tablet (5 mg) by mouth daily            Slime Perry, [facility]        Subjective   Sanam is a 63 year old, presenting for the following health issues:  Recheck Medication        11/2/2023     3:45 PM   Additional Questions   Roomed by Shayan AGUILAR MA       History of Present Illness       Mental Health Follow-up:  Patient presents to follow-up on Depression & Anxiety.Patient's depression since last visit has been:  Medium  The patient is not having other symptoms associated with depression.  Patient's anxiety since last visit has been:  Better  The patient is not having other symptoms associated with anxiety.  Any significant life events: No  Patient is not feeling anxious or having panic attacks.  Patient has no  concerns about alcohol or drug use.    She eats 2-3 servings of fruits and vegetables daily.She consumes 0 sweetened beverage(s) daily.She exercises with enough effort to increase her heart rate 9 or less minutes per day.  She exercises with enough effort to increase her heart rate 3 or less days per week.   She is taking medications regularly.     Not doing well since I saw her last.   On a scale of 1-10 , her days are 1-3 /10.  Un-showered, crying very easily right now.   Having some paranoia at work and about sister (sister gave her a look) - 'they're out to get me'   Endorses being obsessed with suicide. Spending time looking at methods on the computer. She denies having a plan, but finds comfort in knowing there is an out.   Her sister who lives 1.5 miles from her is her safety plan. Her other sister is also in communication with her constantly.   Feels irritational - aware of this but doesn't care.   Sometimes taking benadryl to sleep, last night couldn't fall asleep until 4 am. Tough falling asleep but stays asleep when does.   Work was going bad. Has been off of work since October 3, 2023. Had asked for help at work  multiple times. Endorses also sending letters to  and [employer] about how this was handled. Was accused of harassment complaint at work about hostile work environment. She is unsure if she is able to go back to same placement.     Started on buspar augmentation and this helped initially, but now not noticing.     Has therapy tomorrow.     Has psychiatry appointment on 11/9/23 for medication consult.         Review of Systems   Constitutional, HEENT, cardiovascular, pulmonary, gi and gu systems are negative, except as otherwise noted.      Objective           Vitals:  No vitals were obtained today due to virtual visit.    Physical Exam   GENERAL: Healthy, alert and no distress  EYES: Eyes grossly normal to inspection.  No discharge or erythema, or obvious scleral/conjunctival abnormalities.  RESP:  No audible wheeze, cough, or visible cyanosis.  No visible retractions or increased work of breathing.    SKIN: Visible skin clear. No significant rash, abnormal pigmentation or lesions.  NEURO: Cranial nerves grossly intact.  Mentation and speech appropriate for age.  PSYCH: Mentation appears normal, affect normal/bright, judgement and insight intact, normal speech and appearance well-groomed.                Video-Visit Details    Type of service:  Video Visit     Originating Location (pt. Location): Home    Distant Location (provider location):  On-site  Platform used for Video Visit: Bernard

## 2023-11-02 NOTE — TELEPHONE ENCOUNTER
Letter discussed during visit today and placed in team gold box to be faxed.     Thanks,   Slime Perry DO

## 2023-11-07 NOTE — TELEPHONE ENCOUNTER
Form completed and fax  11/02    Copy made for chart       Deisy Molina    [facility]

## 2023-11-09 ENCOUNTER — VIRTUAL VISIT (OUTPATIENT)
Dept: PSYCHIATRY | Facility: CLINIC | Age: 63
End: 2023-11-09
Attending: STUDENT IN AN ORGANIZED HEALTH CARE EDUCATION/TRAINING PROGRAM
Payer: COMMERCIAL

## 2023-11-09 DIAGNOSIS — Z79.899 ENCOUNTER FOR LONG-TERM (CURRENT) USE OF MEDICATIONS: ICD-10-CM

## 2023-11-09 DIAGNOSIS — F41.1 GAD (GENERALIZED ANXIETY DISORDER): ICD-10-CM

## 2023-11-09 DIAGNOSIS — F33.2 SEVERE EPISODE OF RECURRENT MAJOR DEPRESSIVE DISORDER, WITHOUT PSYCHOTIC FEATURES (H): ICD-10-CM

## 2023-11-09 DIAGNOSIS — F41.9 ANXIETY: ICD-10-CM

## 2023-11-09 DIAGNOSIS — F33.41 RECURRENT MAJOR DEPRESSIVE DISORDER, IN PARTIAL REMISSION (H): Primary | ICD-10-CM

## 2023-11-09 DIAGNOSIS — F33.3 SEVERE EPISODE OF RECURRENT MAJOR DEPRESSIVE DISORDER, WITH PSYCHOTIC FEATURES (H): ICD-10-CM

## 2023-11-09 PROCEDURE — 99204 OFFICE O/P NEW MOD 45 MIN: CPT | Mod: VID | Performed by: PSYCHIATRY & NEUROLOGY

## 2023-11-09 RX ORDER — ARIPIPRAZOLE 5 MG/1
5 TABLET ORAL DAILY
Qty: 90 TABLET | Refills: 0 | Status: SHIPPED | OUTPATIENT
Start: 2023-11-09 | End: 2023-12-07

## 2023-11-09 RX ORDER — VENLAFAXINE HYDROCHLORIDE 225 MG/1
225 TABLET, EXTENDED RELEASE ORAL DAILY
Qty: 90 TABLET | Refills: 0 | Status: SHIPPED | OUTPATIENT
Start: 2023-11-09 | End: 2023-12-14

## 2023-11-09 ASSESSMENT — PATIENT HEALTH QUESTIONNAIRE - PHQ9
SUM OF ALL RESPONSES TO PHQ QUESTIONS 1-9: 14
10. IF YOU CHECKED OFF ANY PROBLEMS, HOW DIFFICULT HAVE THESE PROBLEMS MADE IT FOR YOU TO DO YOUR WORK, TAKE CARE OF THINGS AT HOME, OR GET ALONG WITH OTHER PEOPLE: EXTREMELY DIFFICULT
SUM OF ALL RESPONSES TO PHQ QUESTIONS 1-9: 14

## 2023-11-09 ASSESSMENT — PAIN SCALES - GENERAL: PAINLEVEL: NO PAIN (0)

## 2023-11-09 NOTE — NURSING NOTE
Is the patient currently in the state of [place]? YES    Visit mode:VIDEO    If the visit is dropped, the patient can be reconnected by: VIDEO VISIT: Send to e-mail at: [email or web address]    Will anyone else be joining the visit? NO  (If patient encounters technical issues they should call [phone]    How would you like to obtain your AVS? MyChart    Are changes needed to the allergy or medication list? Pt stated no changes to allergies and Pt stated no med changes    Reason for visit: Consult    Hoa Isbell [facility]    Care team has reviewed attendance agreement with patient. Patient advised that two failed appointments within 6 months may lead to termination of current episode of care.

## 2023-11-09 NOTE — PATIENT INSTRUCTIONS
Treatment Plan:  Continue venlafaxine .5 mg daily for depression and anxiety.  If he remain stable with improved symptoms after tapering off buspirone, can decrease venlafaxine ER dose to 225 mg daily.  Discontinue/taper buspirone/BuSpar: Take 5 mg twice daily for 10 days and then decrease to 5 mg once daily for 5 days and then discontinue the medication.  Continue Abilify/aripiprazole 5 mg daily for depression and to augment venlafaxine ER.  Please reach out if your symptoms start to worsen again.  Have fasting labs completed in about 3 months at any [facility] lab. Need to call your clinic ahead of time to schedule an appointment for lab due to limited hours and no walk-ins due to Covid-19 restrictions/changes. Labs have been ordered.   Continue therapy as planned.  Recommend individual psychotherapy for additional support and ongoing development of nonpharmacologic coping skills and strategies.  Continue all other cares per primary care provider.   Continue all other medications as reviewed per electronic medical record today.   Safety plan reviewed. To the Emergency Department as needed or call after hours crisis line at [phone] or [phone]. [place] Crisis Text Line: Text [place] to 923712  or  Suicide LifeLine Chat: suicidepreventionlifeline.org/chat  Schedule an appointment with me in 4-6 weeks or sooner as needed.  Call [facility] at [phone] to schedule.  Follow up with primary care provider as planned or sooner if needed for acute medical concerns.  Call the psychiatric nurse line with medication questions or concerns at [phone].  Radiust may be used to communicate with your provider, but this is not intended to be used for emergencies.    Patient Education:  Get Out of Your Mind & Into Your Life   By Joss Qiu    The Happiness Trap: How to Stop Struggling and Start Living  By Alex Ag    The Reality Slap: Finding Peace & Fulfillment When Life Hurts  By  "Alex Ag    Things Might Go Terribly, Horribly Wrong: A Guide to Life Liberated from Anxiety  By Yeni Clinton, PhD    Stress Less, Live More: How Acceptance and Commitment Therapy Can Help You Live a Busy Yet Balanced Life  By Rk Will    Finding Life Beyond Trauma: Using Acceptance and Commitment Therapy to Heal From Post-Traumatic Stress and Trauma-Related Problems  By Stephanie Ann and Belgica Logan    Other ACT Skills References     Alex Ag on YouTube - Demonstrates several different skills to deal with difficult thoughts and feelings     Book:  \"A Liberated Mind: How to Pivot Toward What Matters\" by Joss Qiu     Psychological flexibility: How love turns pain into purpose  Tedx Talk by Dr. Qiu discussing his struggle with anxiety and panic disorder which motivated him to develop ACT therapy (Acceptance and Commitment Therapy)     You Are Not Your Thoughts      Care team has reviewed attendance agreement with patient. Patient advised that two failed appointments within 6 months may lead to termination of current episode of care.     Community Resources:    National Suicide Prevention Lifeline: Text or call anytime 24/7 for help: 988  (https://ViewReple8Whois.org/)  National [place] on Mental Illness (www.inocencia.org): 685-731-0799 or 009-383-1292.   Mental Health Association (www.mentalhealth.org): 248.708.4301 or 056-681-4860.  [place] Crisis Text Line: Text [place] to 821551  Suicide LifeLine Chat: suicidepreventionlifeline.org/chat  EmPATH (Psychiatric emergency room) at [facility] in [place]    Patient Education   Collaborative Care Psychiatry Service  What to Expect  Here's what to expect from your Collaborative Care Psychiatry Service (CCPS).   About CCPS  CCPS means 2 people work together to help you get better. You'll meet with a behavioral health clinician and a psychiatric doctor. A behavioral health clinician helps people with mental health problems by talking " "with them. A psychiatric doctor helps people by giving them medicine.  How it works  At every visit, you'll see the behavioral health clinician (BHC) first. They'll talk with you about how you're doing and teach you how to feel better.   Then you'll see the psychiatric doctor. This doctor can help you deal with troubling thoughts and feelings by giving you medicine. They'll make sure you know the plan for your care.   CCPS usually takes 3 to 6 visits. If you need more visits, we may have you start seeing a different psychiatric doctor for ongoing care.  If you have any questions or concerns, we'll be glad to talk with you.  About visits  Be open  At your visits, please talk openly about your problems. It may feel hard, but it's the best way for us to help you.  Cancelling visits  If you can't come to your visit, please call us right away at [phone]. If you don't cancel at least 24 hours (1 full day) before your visit, that's \"late cancellation.\"  Being late to visits  Being very late is the same as not showing up. You will be a \"no show\" if:  Your appointment starts with a BHC, and you're more than 15 minutes late for a 30-minute (half hour) visit. This will also cancel your appointment with the psychiatric doctor.  Your appointment is with a psychiatric doctor only, and you're more than 15 minutes late for a 30-minute (half hour) visit.  Your appointment is with a psychiatric doctor only, and you're more than 30 minutes late for a 60-minute (full hour) visit.  If you cancel late or don't show up 2 times within 6 months, we may end your care.   Getting help between visits  If you need help between visits, you can call us Monday to Friday from 8 a.m. to 4:30 p.m. at [phone].  Emergency care  Call 911 or go to the nearest emergency department if your life or someone else's life is in danger.  Call 488 anytime to reach the national Suicide and Crisis hotline.  Medicine refills  To refill your medicine, " call your pharmacy. You can also call [facility]'s Behavioral Access at [phone], Monday to Friday, 8 a.m. to 4:30 p.m. It can take 1 to 3 business days to get a refill.   Forms, letters, and tests  You may have papers to fill out, like FMLA, short-term disability, and workability. We can help you with these forms at your visits, but you must have an appointment. You may need more than 1 visit for this, to be in an intensive therapy program, or both.  Before we can give you medicine for ADHD, we may refer you to get tested for it or confirm it another way.  We may not be able to give you an emotional support animal letter.  We don't do mental health checks ordered by the court.   We don't do mental health testing, but we can refer you to get tested.   Thank you for choosing us for your care.  For informational purposes only. Not to replace the advice of your health care provider. Copyright   2022 [facility]. All rights reserved. [employer] [id number] - 12/22.

## 2023-11-09 NOTE — PROGRESS NOTES
"Virtual Visit Details    Type of service:  Video Visit     Originating Location (pt. Location): {video visit patient location:143098::\"Home\"}  {PROVIDER LOCATION On-site should be selected for visits conducted from your clinic location or adjoining [facility] hospital, academic office, or other nearby [facility] building. Off-site should be selected for all other provider locations, including home:768702}  Distant Location (provider location):  {virtual location provider:581530}  Platform used for Video Visit: {Virtual Visit Platforms:361205::\"Mamaherb\"}  "

## 2023-11-09 NOTE — Clinical Note
Great choice with Abilify augmentation! She is doing great today! I will still see her back soon and follow for a bit before returning care. -Madison

## 2023-11-09 NOTE — PROGRESS NOTES
"Telemedicine Visit: The patient's condition can be safely assessed and treated via synchronous audio and visual telemedicine encounter.      Reason for Telemedicine Visit: Patient has requested telehealth visit    Originating Site (Patient Location): Patient's home    Distant Location (provider location):  Off-site    Consent:  The patient/guardian has verbally consented to: the potential risks and benefits of telemedicine (video visit) versus in person care; bill my insurance or make self-payment for services provided; and responsibility for payment of non-covered services.     Mode of Communication:  Video Conference via [facility]    As the provider I attest to compliance with applicable laws and regulations related to telemedicine.                                               Outpatient Psychiatric Evaluation- Standard  Adult    Name:  Nela Mares  : 1960    Source of Referral:  Primary Care Provider: Padma Ray MD   Current Psychotherapist: outside therapist     Identifying Data:  Patient is a 63 year old, single  White Choose not to answer who presents for initial visit with me.  Patient is currently on medical leave from work . Patient attended the phone/video session alone. Patient prefers to be called: \"Sanam\"    Chief Complaint:  Patient presents with:  Consult      HPI:  Nela Mares is a 63 year old with past history including depression and anxiety who presents today for psychiatric assessment.     Patient presents at recommendation of primary care provider for episode of severe depression.  First started experiencing depression at age 17 years old. Maybe even with psychotic symptoms. Significant family history of depression with a couple completed suicides within the family.  Patient reports having a pretty severe episode every several years.  She feels like her medications will stop working every several years leading to relapse.  She does report history of " "hallucinations in the past with her depression symptoms.  This most recent episode started in September.  Patient started to withdraw, isolate, lose interest in things she normally enjoys, and become severely depressed.  She also reports changes in her sleep, appetite.  She was unable to function at work.  She reports obsessions on suicide, along with researching methods.  She denies ever having plan or intent.  Her dogs mean a lot to her, along with her sisters and family.  She does not have access to firearms.  No past suicide attempts.  She does report starting to feel a little bit paranoid with this recent episode, thinking people might be purposefully working against her, including people at work and also her sister.  She was irritable at times and also feeling hopeless, helpless, and worthless.  Her sisters remained entirely supportive during this time and called every day.  Patient sought help from primary care provider and they started to make medication changes.  Venlafaxine was increased from 225 mg to 267.5 mg.  This was not very helpful and so buspirone was added to augment venlafaxine.  Buspirone was mildly helpful at first but affect was very short-lived.  Patient then started aripiprazole at the beginning of this month.  Patient reports her symptoms have improved drastically.  She is sleeping well again and feels almost back to normal.  She is looking forward to decorating for [place].  Obsessions on suicide \"are completely gone.\"  She is amazed at how well she feels and how quickly she felt better.  The last several weeks her mood has been a 1-3/10.  Recently she reports her mood has been 8/10.  She is tolerating Abilify well with no distressing negative side effects.  No acute safety concerns today.  No SI.  No problematic drug or alcohol use.    Psych Meds at Intake:  Abilify 5 mg daily   BusPar 10 mg twice daily  Effexor-.5 mg daily     Past Psych Meds:  Per PharmD note 11/1/23:  Paxil - " "\"not a good fit\"  Bupropion - caused extreme irritability, rage  Imipramine - reports this was aweful in terms of side effects  Fluoxetine - felt like it didn't work originally.   Lexapro - stopped working  Celexa - stopped working  Zoloft    Per recent primary care provider visit 11/2/2023:  Not doing well since I saw her last.   On a scale of 1-10 , her days are 1-3 /10.  Un-showered, crying very easily right now.   Having some paranoia at work and about sister (sister gave her a look) - 'they're out to get me'   Endorses being obsessed with suicide. Spending time looking at methods on the computer. She denies having a plan, but finds comfort in knowing there is an out.   Her sister who lives 1.5 miles from her is her safety plan. Her other sister is also in communication with her constantly.   Feels irritational - aware of this but doesn't care.   Sometimes taking benadryl to sleep, last night couldn't fall asleep until 4 am. Tough falling asleep but stays asleep when does.   Work was going bad. Has been off of work since October 3, 2023. Had asked for help at work  multiple times. Endorses also sending letters to  and [employer] about how this was handled. Was accused of harassment complaint at work about hostile work environment. She is unsure if she is able to go back to same placement.      Started on buspar augmentation and this helped initially, but now not noticing.      Has therapy tomorrow.      Has psychiatry appointment on 11/9/23 for medication consult.     Severe episode of recurrent major depressive disorder, with psychotic features (H)  PETE (generalized anxiety disorder)  Continue on current dose of effexor (discussed at last visit off label higher dose, but since she was doing well on effexor for a long timer previously we remained on same medication). See previous note for other medicatiosn that she has failed previously. Did pharmacogenetic testing - reviewed MTM results from yesterday, consider " switching to duloxetine or adding antipsychotic. She has a consult with psychiatry next week so I would advise keeping on same effexor and buspar dose and adding low dose abilify 5 mg daily as adjunct. In therapy, appt tomorrow. Has suicidal ideation but no active plans. Has safety plan and support with sisters. At this time not an active risk to self. Needs close follow up.   - ARIPiprazole (ABILIFY) 5 MG tablet; Take 1 tablet (5 mg) by mouth daily    [facility]:  Pharmacogenetic Assessment and Recommendations: Variability in CYP2D6, SLF9E34, CYP2C9 and other genes can impact the effectiveness and risk toxicity of certain medications used. Based on this patient's pharmacogenetic test results and clinical assessment, consider the following:      [id number] Rapid Metabolizer  a. Expected to have decreased levels of medications metabolized by KAF2N93 (e.g., tertiary TCAs, sertraline, escitalopram, citalopram, PPIs, voriconazole ) and may be at increased risk for lack of effiacy. Expected to have somewhat increased levels of medications activated by DQW0K90 and may be at increased risk for side effects.         CYP2C9 Intermediate Metabolizer  a. Expected to have somewhat increased levels of medications metabolized by CYP2C9 (e.g., celecoxib, ibuprofen, meloxicam, piroxicam, flurbiprofen, fluvastatin, fosphenytoin, phenytoin, siponimod, warfarin ) and may be at increased risk of side effects.     CY Poor Metabolizer  a. Most standard drug dosing is based off of CY poor metabolizers therefore there is no need to adjust medications with this phenotype. Normal levels of medications are expected in CY poor metabolizers (e.g., tacrolimus).     Depression/Anxiety:   Would benefit form switching antidepressant, increase buspirone, or adding on a low dose antipsychotic for augmentation.    Reports every 7-8 years she needs to switch antidepressants because what she is on stops working. Reports  "that right now things are not going well so recently increased the venlafaxine. Reports that she isnt sure that the increase in venlafaxine helped. Felt these issues coming on las tmonth. The last 2 weeks she feels as \"far down\" as she has with depression. Mix of both anxiety and depression. Addition of buspirone has helped a little with anxiety.      Has done both direct switches and cross tapers.      Previous medication trials:  Paxil - \"not a good fit\"  Bupropion - caused extreme irritability, rage  Imipramine - reports this was aweful in terms of side effects  Fluoxetine - felt like it didn't work originally.   Lexapro - stopped working  Celexa - stopped working  Zoloft    Past diagnoses include: Depression and anxiety, depression with psychotic features in the past  Current medications include: has a current medication list which includes the following prescription(s): albuterol, albuterol, amlodipine, aripiprazole, buspirone, cetirizine, vitamin d, clobetasol, finasteride, fluticasone, fluticasone-salmeterol, lisinopril, montelukast, venlafaxine, and venlafaxine.   Medication side effects: Denies  Current stressors include: Symptoms and see HPI above  Coping mechanisms and supports include: Therapy, Family, Hobbies, and pets    Psychiatric Review of Symptoms:  Depression: Change in sleep, Lack of interest, Excessive or inappropriate guilt, Change in energy level, Difficulties concentrating, Change in appetite, Psychomotor slowing or agitation, Suicidal ideation, Feelings of hopelessness, Feelings of helplessness, Low self-worth, Ruminations, Irritability, Feeling sad, down, or depressed, Withdrawn, Poor hygeine, and Frequent crying -all now improving  Olivia:  No Symptoms  Psychosis: Paranoia  Anxiety: Excessive worry, Nervousness, Ruminations, and Irritability  Panic:  No symptoms  Post Traumatic Stress Disorder:  No Symptoms   Eating Disorder: No Symptoms  ADD / ADHD:  No symptoms  Conduct Disorder: No " "symptoms  Autism Spectrum Disorder: No symptoms  Obsessive Compulsive Disorder: No Symptoms    Sleep: Drastically improved since starting Abilify      All other ROS negative.     PHQ-9 scores:       2/20/2023    10:12 AM 9/27/2023     9:43 AM 11/9/2023     8:49 AM   PHQ-9 SCORE   PHQ-9 Total Score MyChart 0 13 (Moderate depression) 14 (Moderate depression)   PHQ-9 Total Score 0 13 14       PETE-7 scores:        2/20/2023    10:12 AM 9/27/2023     9:46 AM 10/26/2023    10:12 AM   PETE-7 SCORE   Total Score 0 (minimal anxiety) 16 (severe anxiety) 15 (severe anxiety)   Total Score 0 16 15       Medical Review of Systems:  10 systems (general, cardiovascular, respiratory, eyes, ENT, endocrine, GI, , M/S, neurological) were reviewed. Most pertinent finding(s) is/are: denies fever, cough, persistent headaches, shortness of breath, chest pain, severe GI symptoms, trouble urinating, severe pain.  The remaining systems are all unremarkable.    A 12-item WHODAS 2.0 assessment was not completed.    Psychiatric History:   Hospitalizations: None  History of Commitment? No   Past Treatment: counseling, medication(s) from physician / PCP, and psychiatry  Suicide Attempts: No   Current Suicide Risk: Suicide Assessment Completed Today.  Self-injurious Behavior: Denies  Electroconvulsive Therapy (ECT) or Transcranial Magnetic Stimulation (TMS): No   [employer] Genetic Testing: Yes     Past medication trials include but are not limited to:   Psych Meds at Intake:  Abilify 5 mg daily   BusPar 10 mg twice daily  Effexor-.5 mg daily     Past Psych Meds:  Per PharmD note 11/1/23:  Paxil - \"not a good fit\"  Bupropion - caused extreme irritability, rage  Imipramine - reports this was aweful in terms of side effects  Fluoxetine - felt like it didn't work originally.   Lexapro - stopped working  Celexa - stopped working  Zoloft    Substance Use History:  Current Use of Drugs/Alcohol: None  Past Use of Drugs/Alcohol: No historical " problematic drug or alcohol use  Patient reports no problems as a result of their drinking / drug use.   Patient has not received chemical dependency treatment in the past  Recovery Programming Involvement: None    Tobacco use: No    Based on the clinical interview, there  are not indications of drug or alcohol abuse. Continue to monitor.   Discussed effect of substance use on overall health.     Past Medical History:  Past Medical History:   Diagnosis Date    Allergic rhinitis, cause unspecified     Cervical high risk HPV (human papillomavirus) test positive 03/21/2017    3/21/17 NIL pap/+ HR HPV (not 16 or 18).     Depressive disorder     Depressive disorder, not elsewhere classified     seasonal    Diabetes mellitus, type 2 (H) 2/20/2023    Hypertension     Mild persistent asthma     Obstructive sleep apnea (adult) (pediatric)     uses CPAP    Primary osteoarthritis of both knees 1/28/2022    Scalp mass 7/2014      Surgery:   Past Surgical History:   Procedure Laterality Date    COLONOSCOPY  6/21/2012    Procedure: COLONOSCOPY;  COLONOSCOPY, SCREEN;  Surgeon: Bart You MD;  Location: [facility] OR    COLONOSCOPY N/A 10/20/2022    Procedure: COLONOSCOPY, WITH POLYPECTOMY AND BIOPSY;  Surgeon: Chago Hong DO;  Location:  GI    COLONOSCOPY N/A 3/20/2023    Procedure: COLONOSCOPY, WITH POLYPECTOMY AND BIOPSY;  Surgeon: Lion Vidal MD;  Location:  GI    D & C      ENDOSCOPIC RETROGRADE CHOLANGIOPANCREATOGRAM  1/4/2014    Procedure: ENDOSCOPIC RETROGRADE CHOLANGIOPANCREATOGRAM;  ERCP biliary sphincterotomy, bile duct stone removal, epinepherine washing.;  Surgeon: Ba Meyers MD;  Location: [facility] OR    EP ABLATION SVT N/A 6/2/2021    Procedure: EP ABLATION SVT;  Surgeon: Cuca Magaña MD;  Location:  [facility]    LAPAROSCOPIC CHOLECYSTECTOMY WITH CHOLANGIOGRAMS  1/4/2014    Procedure: LAPAROSCOPIC CHOLECYSTECTOMY WITH CHOLANGIOGRAMS;;  Surgeon: Haja Sage MD;   Location: [facility] OR    MAMMOPLASTY REDUCTION BILATERAL Bilateral 6/28/2023    Procedure: MAMMOPLASTY, REDUCTION, BILATERAL;  Surgeon: DARYL Knutson MD;  Location: [facility] OR    NO HISTORY OF SURGERY      OPERATIVE HYSTEROSCOPY  6/17/2014    Procedure: OPERATIVE HYSTEROSCOPY;  Surgeon: Paola Camara MD;  Location: [facility]    REMOVE INTRAUTERINE DEVICE  6/17/2014    Procedure: REMOVE INTRAUTERINE DEVICE;  Surgeon: Paola Camara MD;  Location: [facility] OR     Food and Medicine Allergies:   No Known Allergies  Seizures or Head Injury: No  Diet: No Restrictions  Exercise: No regular exercise program  Supplements: Reviewed per Electronic Medical Record Today    Current Medications:    Current Outpatient Medications:     albuterol (PROAIR HFA/PROVENTIL HFA/VENTOLIN HFA) 108 (90 Base) MCG/ACT inhaler, Inhale 2 puffs into the lungs every 4 hours as needed for shortness of breath or wheezing, Disp: 36 g, Rfl: 1    albuterol (PROVENTIL) (2.5 MG/3ML) 0.083% neb solution, Take 1 vial (2.5 mg) by nebulization every 4 hours as needed for shortness of breath / dyspnea or wheezing, Disp: 30 mL, Rfl: 11    amLODIPine (NORVASC) 5 MG tablet, Take 1 tablet (5 mg) by mouth daily, Disp: 90 tablet, Rfl: 3    ARIPiprazole (ABILIFY) 5 MG tablet, Take 1 tablet (5 mg) by mouth daily, Disp: 14 tablet, Rfl: 0    busPIRone (BUSPAR) 10 MG tablet, Take 1 tablet (10 mg) by mouth 2 times daily, Disp: 60 tablet, Rfl: 1    cetirizine (ZYRTEC) 10 MG tablet, Take 1 tablet (10 mg) by mouth daily, Disp: 90 tablet, Rfl: 3    Cholecalciferol (VITAMIN D) 125 MCG (5000 UT) capsule, Take 1 capsule (5,000 Units) by mouth daily, Disp: 90 capsule, Rfl: 3    clobetasol (TEMOVATE) 0.05 % external ointment, Apply twice daily to affected area for 2 weeks then 2-3 times weekly., Disp: 60 g, Rfl: 3    finasteride (PROSCAR) 5 MG tablet, Take 1 tablet (5 mg) by mouth daily, Disp: 90 tablet, Rfl: 3    fluticasone (FLONASE) 50 MCG/ACT nasal spray, Spray 2  sprays into both nostrils daily, Disp: 48 g, Rfl: 3    fluticasone-salmeterol (ADVAIR) 500-50 MCG/ACT inhaler, Inhale 1 puff into the lungs every 12 hours, Disp: 3 each, Rfl: 1    lisinopril (ZESTRIL) 40 MG tablet, Take 1 tablet (40 mg) by mouth daily, Disp: 90 tablet, Rfl: 3    montelukast (SINGULAIR) 10 MG tablet, Take 1 tablet (10 mg) by mouth daily, Disp: 90 tablet, Rfl: 3    venlafaxine (EFFEXOR XR) 37.5 MG 24 hr capsule, Take 1 capsule (37.5 mg) by mouth daily With 1 capsule (225mg) for total of 262.5 mg daily, Disp: 30 capsule, Rfl: 1    venlafaxine (EFFEXOR-ER) 225 MG 24 hr tablet, Take 1 tablet (225 mg) by mouth daily With 1 capsule (37.5mg) for total 262.5 mg daily, Disp: 30 tablet, Rfl: 1      Vital Signs:  None since this is a phone/video visit.     Labs:  Most recent laboratory results reviewed and the pertinent results include:   Lab on 10/03/2023   Component Date Value Ref Range Status    RESULTS 10/03/2023    Final                    Value:This result contains rich text formatting which cannot be displayed here.   Lab on 08/30/2023   Component Date Value Ref Range Status    Hemoglobin A1C 08/30/2023 6.0 (H)  0.0 - 5.6 % Final    Normal <5.7%   Prediabetes 5.7-6.4%    Diabetes 6.5% or higher     Note: Adopted from ADA consensus guidelines.    Cholesterol 08/30/2023 187  <200 mg/dL Final    Triglycerides 08/30/2023 105  <150 mg/dL Final    Direct Measure HDL 08/30/2023 50  >=50 mg/dL Final    LDL Cholesterol Calculated 08/30/2023 116 (H)  <=100 mg/dL Final    Non HDL Cholesterol 08/30/2023 137 (H)  <130 mg/dL Final    Vitamin D, Total (25-Hydroxy) 08/30/2023 57  20 - 75 ug/L Final    Creatinine Urine mg/dL 08/30/2023 141.0  mg/dL Final    The reference ranges have not been established in urine creatinine. The results should be integrated into the clinical context for interpretation.    Albumin Urine mg/L 08/30/2023 16.6  mg/L Final    The reference ranges have not been established in urine albumin. The  results should be integrated into the clinical context for interpretation.    Albumin Urine mg/g Cr 08/30/2023 11.77  0.00 - 25.00 mg/g Cr Final    Microalbuminuria is defined as an albumin:creatinine ratio of 17 to 299 for males and 25 to 299 for females. A ratio of albumin:creatinine of 300 or higher is indicative of overt proteinuria.  Due to biologic variability, positive results should be confirmed by a second, first-morning random or 24-hour timed urine specimen. If there is discrepancy, a third specimen is recommended. When 2 out of 3 results are in the microalbuminuria range, this is evidence for incipient nephropathy and warrants increased efforts at glucose control, blood pressure control, and institution of therapy with an angiotensin-converting-enzyme (ACE) inhibitor (if the patient can tolerate it).     Office Visit on 02/20/2023   Component Date Value Ref Range Status    Hemoglobin A1C 02/20/2023 6.5 (H)  0.0 - 5.6 % Final    Normal <5.7%   Prediabetes 5.7-6.4%    Diabetes 6.5% or higher     Note: Adopted from ADA consensus guidelines.    Hold Specimen 02/20/2023 JIC   Final    Hold Specimen 02/20/2023 JIC   Final    Hold Specimen 02/20/2023 JI   Final    Sodium 02/20/2023 146 (H)  136 - 145 mmol/L Final    Potassium 02/20/2023 4.3  3.4 - 5.3 mmol/L Final    Chloride 02/20/2023 106  98 - 107 mmol/L Final    Carbon Dioxide (CO2) 02/20/2023 26  22 - 29 mmol/L Final    Anion Gap 02/20/2023 14  7 - 15 mmol/L Final    Urea Nitrogen 02/20/2023 13.7  8.0 - 23.0 mg/dL Final    Creatinine 02/20/2023 0.95  0.51 - 0.95 mg/dL Final    Calcium 02/20/2023 9.6  8.8 - 10.2 mg/dL Final    Glucose 02/20/2023 120 (H)  70 - 99 mg/dL Final    GFR Estimate 02/20/2023 67  >60 mL/min/1.73m2 Final    eGFR calculated using 2021 CKD-EPI equation.    Cholesterol 02/20/2023 239 (H)  <200 mg/dL Final    Triglycerides 02/20/2023 125  <150 mg/dL Final    Direct Measure HDL 02/20/2023 56  >=50 mg/dL Final    LDL Cholesterol  Calculated 02/20/2023 158 (H)  <=100 mg/dL Final    Non HDL Cholesterol 02/20/2023 183 (H)  <130 mg/dL Final   Office Visit on 12/07/2022   Component Date Value Ref Range Status    Case Report 12/07/2022    Final                    Value:Surgical Pathology Report                         Case: [id number]                                  Authorizing Provider:  Shea Em,    Collected:           12/07/2022 04:35 PM                                 PATEL                                                                         Ordering Location:     [facility]   Received:            12/07/2022 04:35 PM                                 Yu                                                                      Pathologist:           Neftali Mccormick MD                                                         Specimens:   A) - Skin, right frontal scalp                                                                      B) - Skin, right occipital scalp                                                                    C) - Skin, left occipital scalp                                                            Final Diagnosis 12/07/2022    Final                    Value:This result contains rich text formatting which cannot be displayed here.    Clinical Information 12/07/2022    Final                    Value:This result contains rich text formatting which cannot be displayed here.    Gross Description 12/07/2022    Final                    Value:This result contains rich text formatting which cannot be displayed here.    Microscopic Description 12/07/2022    Final                    Value:This result contains rich text formatting which cannot be displayed here.    Performing Labs 12/07/2022    Final                    Value:This result contains rich text formatting which cannot be displayed here.     Most recent EKG from 6/2/2021 reviewed. QTc interval 444.      Family History:   Patient reported  family history includes:   Family History   Problem Relation Age of Onset    C.A.D. Paternal Grandmother     Cerebrovascular Disease Paternal Grandmother     Depression Paternal Grandmother     Obesity Paternal Grandmother     C.A.D. Paternal Grandfather     Cerebrovascular Disease Paternal Grandfather     Substance Abuse Paternal Grandfather     Hypertension Father     Asthma Father     Obesity Father     Skin Cancer Father     Diabetes Father     Neurologic Disorder Mother         Graves disease    Hyperlipidemia Mother     Depression Mother     Thyroid Disease Mother     Anxiety Disorder Mother     Other - See Comments Other     Skin Cancer Sister     Asthma Sister     Depression Maternal Grandmother     Cancer Sister         mild skin cancer,cured from excision    Depression Nephew     Anxiety Disorder Nephew     Substance Abuse Nephew     Asthma Nephew     Anxiety Disorder Nephew     Mental Illness Maternal Half-Brother     Mental Illness Paternal Half-Brother     Substance Abuse Nephew     Asthma Sister     Asthma Nephew     Thyroid Disease Maternal Half-Sister     Thyroid Disease Paternal Half-Sister      Mental Illness History: Yes: sisters and nieces and nephews; maternal and paternal sides with depression ; also see record above  Substance Abuse History: Yes: Per EPIC Electronic Medical Record  Suicide History: Yes: great grandfather and nephew 8 years ago  Medications: Unknown    Social History:    Social History                [per patient report]   Financial- What are your current financial sources?: employment, Does your finances cause stress?: does not  Employment- What is your employment status?: employed fulltime, Able to function?: no, If you work in a paying job or as a volunteer, describe the job and how long you have held it: :  with [employer] for 23 years.  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 Did you serve in the ?: did  not  Living situation- What is your housing situation?: staying in own home/apartment  Feels safe at home- Yes  Household / family- Name: Fiona Mares,   Jazmine Chance, Age: 62,    60, Relationship: Sisters, Living in same house?: no  Relationships- What is your current relationship status? : single, What is your sexual orientation?: heterosexual  Children- Do you have children?: no  Social/spiritual support- Who are the most supportive people in your life?  : mother;father;siblings;pets;friends;therapist  Cultural- What is your cultural background? : , What are ethnic, cultural, or Faith influences that may be useful to know about you (for example history of experiencing discrimination, growing up rural/urban, valuing culturally specific treatments)?  : None, What is your preferred language?  : English  Education- What is your highest education? : graduate school  Early history- Where did you grow up?: [facility], Who took care of you as a child?: biological parents  Raised by- How would you describe your parent's relationships?: were always together  Siblings- Do you have siblings?: yes, How many full siblings do you have?: 4  Quality of family relationships- How would you describe your current family relationships?: stable and meaningful  Legal- Have you been involved with the legal system (child custody, order for protection, DWI, etc.)?: have not, Do you have a ?  : does not      Firearms/Weapons Access: No: Patient denies   Service: No    Legal History:  No: Patient denies any legal history    Significant Losses / Trauma / Abuse / Neglect Issues:  There are indications or report of significant loss, trauma, abuse or neglect issues related to: See HPI or social history above for any pertinent information .   Issues of possible neglect are not present.       Comprehensive Examination (limited due to virtual visit format, phone/video):  Vital Signs:  Vitals: There were  no vitals taken for this visit.  General/Constitutional:  Appearance: awake, alert, adequately groomed, appeared stated age and no apparent distress  Attitude:  cooperative   Eye Contact:  good  Musculoskeletal:  Muscle Strength and Tone: no gross abnormalities by observation  Psychomotor Behavior:  no evidence of tardive dyskinesia, dystonia, or tics  Gait and Station: normal, no gross abnormalities noted by observation  Psychiatric:  Speech:  clear, coherent, regular rate, rhythm, and volume  Associations:  no loose associations  Thought Process:  logical, linear and goal oriented  Thought Content:  no evidence of suicidal ideation or homicidal ideation, no evidence of psychotic thought, no auditory hallucinations present and no visual hallucinations present  Mood:  much better  Affect:  appropriate and in normal range and mood congruent  Insight:  good  Judgment:  intact, adequate for safety  Impulse Control:  intact  Neurological:  Oriented to:  person, place, time, and situation  Attention Span and Concentration:  normal  Language: intact  Recent and Remote Memory:  Intact to interview. Not formally assessed. No amnesia.  Fund of Knowledge: appropriate    Strengths and Opportunities:  Nela Mares identified the following strengths or resources that may help she succeed in counseling: commitment to health and well being, family support, and motivation. Things that may interfere with the patient's success include:  none noted at this time.    Suicide Risk Assessment:  Today Nela Mares reports no suicidal ideation. Based on all available evidence including the factors cited above, Nela Mares does not appear to be at imminent risk for self-harm, does not meet criteria for a 72-hr hold, and therefore remains appropriate for ongoing outpatient level of care.  A thorough assessment of risk factors related to suicide and self-harm have been reviewed and are noted above. The patient convincingly  denies acute suicidality on several occasions. Local community safety resources were provided for patient to use if needed. There was no deceit detected, and the patient presented in a manner that was believable.     Patient with recent suicidal ideation that has completely subsided at this time.  Patient relies on her sisters as a safety plan and spoke with one of her sisters every day during her depression.  Patient's parents are also good support.  Patient's dogs and family are incredibly protective against suicide/self-harm.    DSM5  Diagnosis:  296.35 (F33.41)  Major Depressive Disorder, Recurrent Episode, In partial remission _  Anxiety, unspecified    Medical Comorbidities Include:   Patient Active Problem List    Diagnosis Date Noted    Encounter for pharmacogenetic testing 11/01/2023     Priority: Medium     [id number] rapid metabolizer  CYP2C9 intermediate metabolizer      Diabetes mellitus, type 2 (H) 02/20/2023     Priority: Medium    Lichen sclerosus 02/20/2023     Priority: Medium    Chronic midline low back pain without sciatica 01/28/2022     Priority: Medium    Primary osteoarthritis of both knees 01/28/2022     Priority: Medium    Paroxysmal supraventricular tachycardia 05/10/2021     Priority: Medium     Added automatically from request for surgery [id number]      Allergic conjunctivitis, bilateral 08/27/2019     Priority: Medium    Overweight (H) 07/18/2019     Priority: Medium    Impaired fasting glucose 07/18/2019     Priority: Medium    Seasonal allergic rhinitis due to pollen 10/18/2018     Priority: Medium    Allergic rhinitis due to dust mite 10/18/2018     Priority: Medium    Allergic rhinitis due to mold 10/18/2018     Priority: Medium    IUD (intrauterine device) in place 03/21/2017     Priority: Medium     mirena place in 2015      Hypertrophy of breast 11/24/2015     Priority: Medium    Pilar cyst 07/14/2014     Priority: Medium    Hypertension goal BP (blood pressure) < 140/90 10/25/2010      Priority: Medium    CARDIOVASCULAR SCREENING; LDL GOAL LESS THAN 160 05/09/2010     Priority: Medium    Mild major depression (H)      Priority: Medium     Has used paxil, celexa, lexapro, and zoloft in the past.  Zoloft worked well although spring of 2017 symptoms worsened despite max dose.  Switched to wellbutrin - but wellbutrin triggered anxiety and rage.    Will plan to start Effexor in the fall before school year starts.      Leiomyoma of uterus 08/14/2007     Priority: Medium     Problem list name updated by automated process. Provider to review      Obstructive sleep apnea      Priority: Medium     uses CPAP          Mild persistent asthma 10/11/2005     Priority: Medium     Typically needs steroid burst about once per year for symptoms uncontrolled with continued advair and maxair q 4 hours.  Peak flow generally runs 400. With illness goes down to 350.    12/09 - given one extra burst of prednisone.  Instructed to call if she starts it so we can help her monitor her symptoms.      Allergic rhinitis due to animals 08/31/2004     Priority: Medium       Impression:  Nela Mares is a 63-year-old female with past psychiatric history including depression and anxiety who presents today for psychiatric evaluation.  Patient with long history of struggling with intermittent episodes of depression, severe at times.  Depression dates back to adolescence.  Strong family history of depression.  Has a history of several medication trials due to tachyphylaxis after around 7 years of use.  Patient most recently with very severe depressive episode lasting for several weeks.  Patient was seeing primary care provider for medication changes and has most recently been on venlafaxine and buspirone.  Aripiprazole was added about 10 days ago and patient has experienced significant relief and improvement of her severe depression symptoms.  Patient taking aripiprazole 5 mg daily to augment her routine with venlafaxine and  buspirone.  Venlafaxine had been increased by adding 37.5 mg capsule to her 225 mg dose for a total of 262.5 mg daily.  That had not been very helpful.  Buspirone was added to her regimen which was helpful briefly but effects did not last at all.  Since aripiprazole is suspected to be so helpful for her symptoms, patient will start slow taper off of buspirone.  If remains stable, patient will consider dropping venlafaxine back to 225 mg daily.  Patient will reach out if there is any regression/worsening of symptoms.  We did discuss ECT as a possible option in the future if her symptoms get as bad as they were this last episode.  Patient was open to this suggestion.  Encouraged to continue in therapy.  No acute safety concerns.  No longer having any suicidal ideation.  Family is very supportive.  Her dogs are very protective.  No problematic drug or alcohol use.    Discussed metabolic risks and movement disorder risks, including risk for permanent movement related symptoms, when using Abilify/aripiprazole.  Labs ordered for 3-month lipid panel and hemoglobin A1c check.    Medication side effects and alternatives reviewed. Health promotion activities recommended and reviewed today. All questions addressed. Education and counseling completed regarding risks and benefits of medications and psychotherapy options. Recommend therapy for additional support.     Treatment Plan:  Continue venlafaxine .5 mg daily for depression and anxiety.  If he remain stable with improved symptoms after tapering off buspirone, can decrease venlafaxine ER dose to 225 mg daily.  Discontinue/taper buspirone/BuSpar: Take 5 mg twice daily for 10 days and then decrease to 5 mg once daily for 5 days and then discontinue the medication.  Continue Abilify/aripiprazole 5 mg daily for depression and to augment venlafaxine ER.  Please reach out if your symptoms start to worsen again.  Have fasting labs completed in about 3 months at any [facility]  clinic lab. Need to call your clinic ahead of time to schedule an appointment for lab due to limited hours and no walk-ins due to Covid-19 restrictions/changes. Labs have been ordered.   Continue therapy as planned.  Recommend individual psychotherapy for additional support and ongoing development of nonpharmacologic coping skills and strategies.  Continue all other cares per primary care provider.   Continue all other medications as reviewed per electronic medical record today.   Safety plan reviewed. To the Emergency Department as needed or call after hours crisis line at [phone] or [phone]. [place] Crisis Text Line: Text [place] to 201187  or  Suicide LifeLine Chat: suicidepreventionLongfan Medialine.org/chat  Schedule an appointment with me in 4-6 weeks or sooner as needed.  Call [facility] at [phone] to schedule.  Follow up with primary care provider as planned or sooner if needed for acute medical concerns.  Call the psychiatric nurse line with medication questions or concerns at [phone].  Wedding Realityt may be used to communicate with your provider, but this is not intended to be used for emergencies.    Patient Education:  Get Out of Your Mind & Into Your Life   By Joss Qiu    The Happiness Trap: How to Stop Struggling and Start Living  By Alex Ag    The Reality Slap: Finding Peace & Fulfillment When Life Hurts  By Alex Ag    Things Might Go Terribly, Horribly Wrong: A Guide to Life Liberated from Anxiety  By Yeni Clinton, PhD    Stress Less, Live More: How Acceptance and Commitment Therapy Can Help You Live a Busy Yet Balanced Life  By Rk Will    Finding Life Beyond Trauma: Using Acceptance and Commitment Therapy to Heal From Post-Traumatic Stress and Trauma-Related Problems  By Stephanie Ann and Belgica Logan    Other ACT Skills References     Alex Ag on YouTube - Demonstrates several different skills to deal with difficult thoughts and feelings    "  Book:  \"A Liberated Mind: How to Pivot Toward What Matters\" by Joss Qiu     Psychological flexibility: How love turns pain into purpose  Tedx Talk by Dr. Qiu discussing his struggle with anxiety and panic disorder which motivated him to develop ACT therapy (Acceptance and Commitment Therapy)     You Are Not Your Thoughts      Care team has reviewed attendance agreement with patient. Patient advised that two failed appointments within 6 months may lead to termination of current episode of care.     Community Resources:    National Suicide Prevention Lifeline: Text or call anytime 24/7 for help: 988  (https://Uploadcare/)  National [place] on Mental Illness (www.inocencia.org): 776-528-8589 or 668-337-0028.   Mental Health Association (www.mentalhealth.org): 527-872-6918 or 745-317-0437.  [place] Crisis Text Line: Text [place] to 214718  Suicide LifeLine Chat: suicidepreventionlifeline.org/chat  EmPATH (Psychiatric emergency room) at [facility] in [place]    Administrative Billing:   Phone Call/Video Duration: 28 Minutes  Start: 9:06a  Stop: 9:34a    Patient Status:  Patient will continue to be seen for ongoing consultation and stabilization.    Signed:   Madison Vazquez DO  [facility]    Disclaimer: This note consists of symbols derived from keyboarding, dictation and/or voice recognition software. As a result, there may be errors in the script that have gone undetected. Please consider this when interpreting information found in this chart.   "

## 2023-11-10 ENCOUNTER — MYC MEDICAL ADVICE (OUTPATIENT)
Dept: PSYCHIATRY | Facility: CLINIC | Age: 63
End: 2023-11-10
Payer: COMMERCIAL

## 2023-11-10 ENCOUNTER — MYC MEDICAL ADVICE (OUTPATIENT)
Dept: FAMILY MEDICINE | Facility: CLINIC | Age: 63
End: 2023-11-10
Payer: COMMERCIAL

## 2023-11-10 ENCOUNTER — TELEPHONE (OUTPATIENT)
Dept: FAMILY MEDICINE | Facility: CLINIC | Age: 63
End: 2023-11-10
Payer: COMMERCIAL

## 2023-11-10 NOTE — TELEPHONE ENCOUNTER
Routing to RN.  Can you please figure out the last paragraph where the patient is asking about prescription for Vitamin D?    DORIS Duenas  [facility]

## 2023-11-10 NOTE — TELEPHONE ENCOUNTER
1.RN reviewed patient MyChart note regarding FMLA paperwork needing to be filled out, refill of Abilify and prescription for Vitam D permanently      2. RN called [facility] [address] [place] [address], phone [phone] to discuss the refill of   ARIPiprazole (ABILIFY) 5 MG tablet    90 tablet, no refills sent on 11/9/23 at 12:33 PM  Take 1 tablet (5 mg) by mouth daily  RN spoke to Jose after waiting on hold for 20 minutes.  The Abilify is set to refill on 11/12/23 per insurance requirements as to when the medication can be refilled.    3. RN informed OBC staff in [place] to watch for the [place] paperwork to be dropped off today and to get it to Dr. George MONSON as it must be faxed over to the district office during the week of November 13-17. [employer] are closed the week of November 20-24 for Thanksgiving break. The patient is planning to return to work on November 27.     4. Dr. Vazquez to review and approve Vitamin D prescription for the patient.     5. RN responded to patient via MyChart regarding Abilify refill.     Carey Dougherty RN on 11/10/2023 at 11:28 AM           HR=93 bpm, HUKO=452/67 mmhg, SpO2=95.0 %, Resp=10 B/min, EtCO2=29 mmHg, Apnea=0 Seconds

## 2023-11-10 NOTE — TELEPHONE ENCOUNTER
Forms/Letter Request    Type of form/letter: Work    Have you been seen for this request: Yes 11/02/2023    Do we have the form/letter: Yes: In TEAM GOLD BOX    Who is the form from? Patient    Where did/will the form come from? Patient or family brought in       When is form/letter needed by: Needs to fax during the week of Nov. 13-17.    How would you like the form/letter returned: Fax : [phone] to Ana María Robertson    Patient Notified form requests are processed in 3-5 business days:Yes    Could we send this information to you in HDF or would you prefer to receive a phone call?:   No preference   Okay to leave a detailed message?: Yes at Home number on file [phone] (home)

## 2023-11-10 NOTE — TELEPHONE ENCOUNTER
"RN reviewed next [place] [employer]vet message from patient indicating:  \"Sorry, this message should have gone to Dr. Perry.  I dropped the paperwork off at [place].  I will reach it to her regarding the vitamin D as well.\"    RN notified OBC staff in [facility] that the patient will not be dropping off paperwork today and no need to watch for it.    RN notified Dr. Vazquez to disregard paperwork completion request and new Vitamin D prescription     Carey Dougherty RN on 11/10/2023 at 11:44 AM    "

## 2023-11-12 ENCOUNTER — HEALTH MAINTENANCE LETTER (OUTPATIENT)
Age: 63
End: 2023-11-12

## 2023-11-13 ENCOUNTER — TELEPHONE (OUTPATIENT)
Dept: PSYCHIATRY | Facility: CLINIC | Age: 63
End: 2023-11-13

## 2023-11-13 NOTE — TELEPHONE ENCOUNTER
Prior Authorization Retail Medication Request    Medication/Dose: Venlafaxine 225 mg  Diagnosis and ICD code (if different than what is on RX):  NA  New/renewal/insurance change [place]/secondary ins. [place]:  Previously Tried and Failed:  NA  Rationale:  NA    Insurance   Primary: [employer]   Insurance ID:  [id number]     Secondary (if applicable):MARTA  Insurance ID:  MARTA     Pharmacy Information (if different than what is on RX)  Name:  [facility]  Phone:  [facility]  Fax:NA

## 2023-11-16 NOTE — TELEPHONE ENCOUNTER
Central Prior Authorization Team - Phone: [phone]     PA Initiation    Medication: VENLAFAXINE HCL  MG PO TB24  Insurance Company: [employer] - Phone [phone] Fax [phone]  Pharmacy Filling the Rx: [facility] [address] [place] [address]  Filling Pharmacy Phone: [phone]  Filling Pharmacy Fax:    Start Date: 11/16/2023

## 2023-11-16 NOTE — TELEPHONE ENCOUNTER
Called patient because fax number provider was not working.  Patient asked me to contact [facility] to get correct fax number.  I called and spoke to Human Resources and they told me to fax to [phone].    Faxed the form to [phone].    DORIS Duenas  [facility]

## 2023-11-20 NOTE — TELEPHONE ENCOUNTER
Central Prior Authorization Team - Phone: [phone]     Prior Authorization Approval    Medication: VENLAFAXINE HCL  MG PO TB24  Authorization Effective Date: 10/18/2023  Authorization Expiration Date: 11/18/2024  Approved Dose/Quantity: 90  Reference #:     Insurance Company: [employer] - Phone [phone] Fax [phone]  Expected CoPay: $    CoPay Card Available:      Financial Assistance Needed:   Which Pharmacy is filling the prescription: [facility] [address] [place] [address]  Pharmacy Notified: YES  Patient Notified: YES PHARMACY WILL NOTIFY WHEN READY

## 2023-12-02 ENCOUNTER — MYC MEDICAL ADVICE (OUTPATIENT)
Dept: FAMILY MEDICINE | Facility: CLINIC | Age: 63
End: 2023-12-02
Payer: COMMERCIAL

## 2023-12-04 ENCOUNTER — MYC MEDICAL ADVICE (OUTPATIENT)
Dept: PSYCHIATRY | Facility: CLINIC | Age: 63
End: 2023-12-04
Payer: COMMERCIAL

## 2023-12-04 DIAGNOSIS — F41.9 ANXIETY: Primary | ICD-10-CM

## 2023-12-04 DIAGNOSIS — F41.1 GAD (GENERALIZED ANXIETY DISORDER): ICD-10-CM

## 2023-12-04 NOTE — TELEPHONE ENCOUNTER
Patient requesting venlafaxine 225 mg but was sent on 11/9/23 for a 90 day supply. Confirmed with pharmacy.   Patient is still on buspar 5 mg and would like to continue while she goes back to work for a short time.     Date of Last Office Visit: 11/9/23  Date of Next Office Visit: 12/14/23  No shows since last visit: 0  Cancellations since last visit: 0    Medication requested: busPIRone (BUSPAR) 10 MG tablet  Date last ordered: 10/19/23 Qty: 60 Refills: 1 Wants refill of 5 mg.      Review of [place] ?: NA    Lapse in medication adherence greater than 5 days?: No  If yes, call patient and gather details: na  Medication refill request verified as identical to current order?: No wants 5 mg.   Result of Last DAM, VPA, Li+ Level, CBC, or Carbamazepine Level (at or since last visit): N/A    Last visit treatment plan:  Patient was seeing primary care provider for medication changes and has most recently been on venlafaxine and buspirone.  Aripiprazole was added about 10 days ago and patient has experienced significant relief and improvement of her severe depression symptoms.  Patient taking aripiprazole 5 mg daily to augment her routine with venlafaxine and buspirone.  Venlafaxine had been increased by adding 37.5 mg capsule to her 225 mg dose for a total of 262.5 mg daily.  That had not been very helpful.  Buspirone was added to her regimen which was helpful briefly but effects did not last at all.  Since aripiprazole is suspected to be so helpful for her symptoms, patient will start slow taper off of buspirone.  If remains stable, patient will consider dropping venlafaxine back to 225 mg daily.   Discontinue/taper buspirone/BuSpar: Take 5 mg twice daily for 10 days and then decrease to 5 mg once daily for 5 days and then discontinue the medication.     []Medication refilled per  Medication Refill in Ambulatory Care  policy.  [x]Medication unable to be refilled by RN due to criteria not met as indicated below:     []Eligibility - not seen in the last year   []Supervision - no future appointment   []Compliance - no shows, cancellations or lapse in therapy   [x]Verification - order discrepancy   []Controlled medication   []Medication not included in policy   []90-day supply request   []Other

## 2023-12-05 RX ORDER — BUSPIRONE HYDROCHLORIDE 5 MG/1
5 TABLET ORAL DAILY
Qty: 30 TABLET | Refills: 0 | Status: SHIPPED | OUTPATIENT
Start: 2023-12-05 | End: 2023-12-14 | Stop reason: ALTCHOICE

## 2023-12-07 ENCOUNTER — MYC MEDICAL ADVICE (OUTPATIENT)
Dept: PSYCHIATRY | Facility: CLINIC | Age: 63
End: 2023-12-07
Payer: COMMERCIAL

## 2023-12-07 DIAGNOSIS — F33.3 SEVERE EPISODE OF RECURRENT MAJOR DEPRESSIVE DISORDER, WITH PSYCHOTIC FEATURES (H): ICD-10-CM

## 2023-12-07 RX ORDER — ARIPIPRAZOLE 10 MG/1
10 TABLET ORAL DAILY
Qty: 30 TABLET | Refills: 1 | Status: SHIPPED | OUTPATIENT
Start: 2023-12-07 | End: 2023-12-14

## 2023-12-14 ENCOUNTER — VIRTUAL VISIT (OUTPATIENT)
Dept: PSYCHIATRY | Facility: CLINIC | Age: 63
End: 2023-12-14
Payer: COMMERCIAL

## 2023-12-14 DIAGNOSIS — F41.9 ANXIETY: ICD-10-CM

## 2023-12-14 DIAGNOSIS — F33.41 RECURRENT MAJOR DEPRESSIVE DISORDER, IN PARTIAL REMISSION (H): Primary | ICD-10-CM

## 2023-12-14 PROCEDURE — 99214 OFFICE O/P EST MOD 30 MIN: CPT | Mod: VID | Performed by: PSYCHIATRY & NEUROLOGY

## 2023-12-14 RX ORDER — ARIPIPRAZOLE 10 MG/1
10 TABLET ORAL DAILY
Qty: 30 TABLET | Refills: 1 | Status: SHIPPED | OUTPATIENT
Start: 2023-12-14 | End: 2024-01-18

## 2023-12-14 RX ORDER — VENLAFAXINE HYDROCHLORIDE 225 MG/1
225 TABLET, EXTENDED RELEASE ORAL DAILY
Qty: 90 TABLET | Refills: 1 | Status: SHIPPED | OUTPATIENT
Start: 2023-12-14 | End: 2024-01-18

## 2023-12-14 RX ORDER — ARIPIPRAZOLE 10 MG/1
10 TABLET ORAL DAILY
Qty: 30 TABLET | Refills: 1 | Status: SHIPPED | OUTPATIENT
Start: 2023-12-14 | End: 2023-12-14

## 2023-12-14 ASSESSMENT — PAIN SCALES - GENERAL: PAINLEVEL: NO PAIN (0)

## 2023-12-14 NOTE — PATIENT INSTRUCTIONS
Treatment Plan:  Continue venlafaxine  mg daily for depression and anxiety.  Continue Abilify/aripiprazole 10 mg daily for depression/to augment venlafaxine ER.  Discontinue buspirone  Have fasting labs completed in about 6 weeks at any [facility] lab. Need to call your clinic ahead of time to schedule an appointment for lab due to limited hours and no walk-ins due to Covid-19 restrictions/changes.   Continue all other cares per primary care provider.   Continue all other medications as reviewed per electronic medical record today.   Safety plan reviewed. To the Emergency Department as needed or call after hours crisis line at [phone] or [phone]. [place] Crisis Text Line. Text [place] to 882344 or Suicide LifeLine Chat: suicidepreventionXoom Corporationline.org/chat  Continue therapy as planned.   Schedule an appointment with me in 6 weeks or sooner as needed. Call [facility] at [phone] to schedule.  Follow up with primary care provider as planned or for acute medical concerns.  Call the psychiatric nurse line with medication questions or concerns at [phone].  [employer] may be used to communicate with your provider, but this is not intended to be used for emergencies.      Get Out of Your Mind & Into Your Life   By Joss Qiu    The Happiness Trap: How to Stop Struggling and Start Living  By Alex Ag    The Reality Slap: Finding Peace & Fulfillment When Life Hurts  By Alex Ag    Things Might Go Terribly, Horribly Wrong: A Guide to Life Liberated from Anxiety  By Yeni Clinton, PhD    Stress Less, Live More: How Acceptance and Commitment Therapy Can Help You Live a Busy Yet Balanced Life  By Rk Will    Finding Life Beyond Trauma: Using Acceptance and Commitment Therapy to Heal From Post-Traumatic Stress and Trauma-Related Problems  By Stephanie Ann and Belgica Logan    Other ACT Skills References     Alex Ag on YouTube - Demonstrates several  "different skills to deal with difficult thoughts and feelings     Book:  \"A Liberated Mind: How to Pivot Toward What Matters\" by Joss Qiu     Psychological flexibility: How love turns pain into purpose  Tedx Talk by Dr. Qiu discussing his struggle with anxiety and panic disorder which motivated him to develop ACT therapy (Acceptance and Commitment Therapy)     You Are Not Your Thoughts      Patient Education   [facility] Psychiatry Service  What to Expect  Here's what to expect from your Collaborative Care Psychiatry Service (CCPS).   About CCPS  CCPS means 2 people work together to help you get better. You'll meet with a behavioral health clinician and a psychiatric doctor. A behavioral health clinician helps people with mental health problems by talking with them. A psychiatric doctor helps people by giving them medicine.  How it works  At every visit, you'll see the behavioral health clinician (BHC) first. They'll talk with you about how you're doing and teach you how to feel better.   Then you'll see the psychiatric doctor. This doctor can help you deal with troubling thoughts and feelings by giving you medicine. They'll make sure you know the plan for your care.   CCPS usually takes 3 to 6 visits. If you need more visits, we may have you start seeing a different psychiatric doctor for ongoing care.  If you have any questions or concerns, we'll be glad to talk with you.  About visits  Be open  At your visits, please talk openly about your problems. It may feel hard, but it's the best way for us to help you.  Cancelling visits  If you can't come to your visit, please call us right away at [phone]. If you don't cancel at least 24 hours (1 full day) before your visit, that's \"late cancellation.\"  Being late to visits  Being very late is the same as not showing up. You will be a \"no show\" if:  Your appointment starts with a BHC, and you're more than 15 minutes late for a 30-minute (half hour) " visit. This will also cancel your appointment with the psychiatric doctor.  Your appointment is with a psychiatric doctor only, and you're more than 15 minutes late for a 30-minute (half hour) visit.  Your appointment is with a psychiatric doctor only, and you're more than 30 minutes late for a 60-minute (full hour) visit.  If you cancel late or don't show up 2 times within 6 months, we may end your care.   Getting help between visits  If you need help between visits, you can call us Monday to Friday from 8 a.m. to 4:30 p.m. at [phone].  Emergency care  Call 911 or go to the nearest emergency department if your life or someone else's life is in danger.  Call 778 anytime to reach the national Suicide and Crisis hotline.  Medicine refills  To refill your medicine, call your pharmacy. You can also call [facility]'s Behavioral Access at [phone], Monday to Friday, 8 a.m. to 4:30 p.m. It can take 1 to 3 business days to get a refill.   Forms, letters, and tests  You may have papers to fill out, like FMLA, short-term disability, and workability. We can help you with these forms at your visits, but you must have an appointment. You may need more than 1 visit for this, to be in an intensive therapy program, or both.  Before we can give you medicine for ADHD, we may refer you to get tested for it or confirm it another way.  We may not be able to give you an emotional support animal letter.  We don't do mental health checks ordered by the court.   We don't do mental health testing, but we can refer you to get tested.   Thank you for choosing us for your care.  For informational purposes only. Not to replace the advice of your health care provider. Copyright   2022 [facility]. All rights reserved. [employer] [id number] - 12/22.

## 2023-12-14 NOTE — PROGRESS NOTES
"Virtual Visit Details    Type of service:  Video Visit     Originating Location (pt. Location): {video visit patient location:319576::\"Home\"}  {PROVIDER LOCATION On-site should be selected for visits conducted from your clinic location or adjoining [facility] hospital, academic office, or other nearby [facility] building. Off-site should be selected for all other provider locations, including home:037651}  Distant Location (provider location):  {virtual location provider:132949}  Platform used for Video Visit: {Virtual Visit Platforms:186168::\"[employer]\"}  "

## 2023-12-14 NOTE — PROGRESS NOTES
"Telemedicine Visit: The patient's condition can be safely assessed and treated via synchronous audio and visual telemedicine encounter.      Reason for Telemedicine Visit: Patient has requested telehealth visit    Originating Site (Patient Location): Patient's home    Distant Location (provider location):  Off-site    Consent:  The patient/guardian has verbally consented to: the potential risks and benefits of telemedicine (video visit) versus in person care; bill my insurance or make self-payment for services provided; and responsibility for payment of non-covered services.     Mode of Communication:  Video Conference via [employer]    As the provider I attest to compliance with applicable laws and regulations related to telemedicine.          Outpatient Psychiatric Progress Note    Name: Nela Mares   : 1960                    Primary Care Provider: Padma Ray MD   Therapist: outside therapist     PHQ-9 scores:      2023    10:12 AM 2023     9:43 AM 2023     8:49 AM   PHQ-9 SCORE   PHQ-9 Total Score MyChart 0 13 (Moderate depression) 14 (Moderate depression)   PHQ-9 Total Score 0 13 14       PETE-7 scores:      2023    10:12 AM 2023     9:46 AM 10/26/2023    10:12 AM   PETE-7 SCORE   Total Score 0 (minimal anxiety) 16 (severe anxiety) 15 (severe anxiety)   Total Score 0 16 15       Patient Identification:  Patient is a 63 year old, single  White Choose not to answer female  who presents for return visit with me.  Patient is currently retired. Patient attended the phone/video session alone. Patient prefers to be called: \"Sanam\".    Interim History:  I last saw Nela Mares for outpatient psychiatry Consultation on 2023. During that appointment, we:    Continue venlafaxine .5 mg daily for depression and anxiety.  If he remain stable with improved symptoms after tapering off buspirone, can decrease venlafaxine ER dose to 225 mg daily.  Discontinue/taper " "buspirone/BuSpar: Take 5 mg twice daily for 10 days and then decrease to 5 mg once daily for 5 days and then discontinue the medication.  Continue Abilify/aripiprazole 5 mg daily for depression and to augment venlafaxine ER.  Please reach out if your symptoms start to worsen again.  Have fasting labs completed in about 3 months at any [facility] lab. Need to call your clinic ahead of time to schedule an appointment for lab due to limited hours and no walk-ins due to Covid-19 restrictions/changes. Labs have been ordered.   Continue therapy as planned.  Recommend individual psychotherapy for additional support and ongoing development of nonpharmacologic coping skills and strategies.  Continue all other cares per primary care provider.     12/14: Since last visit, Abilify was increased to 10 mg daily to help with some worsening mental health symptoms.  Patient has had ongoing psychosocial stressors involving work situation.  Hoping to have it resolved soon in the form of paid salary and benefits without working until 65 years old.  Her mental health case was reportedly handled poorly and she has legal advocates involved.  Continues to be quite triggered and have worsening symptoms if needs to be around her colleagues who did not treat her case and mental health symptoms appropriately.  Has joined a group called [facility] and is trying to attend at least 1 event weekly.  Has also started using a light therapy box.  Has started supplementation with vitamin D.  No acute safety concerns today.  No SI.  No problematic drug or alcohol use.    Past Psychiatric Med Trials:  Psych Meds at Intake:  Abilify 5 mg daily   BusPar 10 mg twice daily  Effexor-.5 mg daily      Past Psych Meds:  Per PharmD note 11/1/23:  Paxil - \"not a good fit\"  Bupropion - caused extreme irritability, rage  Imipramine - reports this was aweful in terms of side effects  Fluoxetine - felt like it didn't work originally.   Lexapro - stopped " working  Celexa - stopped working  [employer]    Psychiatric ROS:  Nela Mares reports mood has been: See HPI above  Anxiety has been: See HPI above  Sleep has been: Good/stable  Olivia sxs: None  Psychosis sxs: None  ADHD/ADD sxs: NA  PTSD sxs: NA  PHQ9 and GAD7 scores were reviewed today if completed.   Medication side effects: Denies  Current stressors include: And see HPI above  Coping mechanisms and supports include: Therapy, Family, Hobbies, and Friends    Current medications include:   Current Outpatient Medications   Medication Sig    albuterol (PROAIR HFA/PROVENTIL HFA/VENTOLIN HFA) 108 (90 Base) MCG/ACT inhaler Inhale 2 puffs into the lungs every 4 hours as needed for shortness of breath or wheezing    albuterol (PROVENTIL) (2.5 MG/3ML) 0.083% neb solution Take 1 vial (2.5 mg) by nebulization every 4 hours as needed for shortness of breath / dyspnea or wheezing    amLODIPine (NORVASC) 5 MG tablet Take 1 tablet (5 mg) by mouth daily    ARIPiprazole (ABILIFY) 10 MG tablet Take 1 tablet (10 mg) by mouth daily    busPIRone (BUSPAR) 5 MG tablet Take 1 tablet (5 mg) by mouth daily    cetirizine (ZYRTEC) 10 MG tablet Take 1 tablet (10 mg) by mouth daily    Cholecalciferol (VITAMIN D) 125 MCG (5000 UT) capsule Take 1 capsule (5,000 Units) by mouth daily    clobetasol (TEMOVATE) 0.05 % external ointment Apply twice daily to affected area for 2 weeks then 2-3 times weekly.    finasteride (PROSCAR) 5 MG tablet Take 1 tablet (5 mg) by mouth daily    fluticasone (FLONASE) 50 MCG/ACT nasal spray Spray 2 sprays into both nostrils daily    fluticasone-salmeterol (ADVAIR) 500-50 MCG/ACT inhaler Inhale 1 puff into the lungs every 12 hours    lisinopril (ZESTRIL) 40 MG tablet Take 1 tablet (40 mg) by mouth daily    montelukast (SINGULAIR) 10 MG tablet Take 1 tablet (10 mg) by mouth daily    venlafaxine (EFFEXOR-ER) 225 MG 24 hr tablet Take 1 tablet (225 mg) by mouth daily     No current facility-administered medications for  this visit.         Past Medical/Surgical History:  Past Medical History:   Diagnosis Date    Allergic rhinitis, cause unspecified     Cervical high risk HPV (human papillomavirus) test positive 03/21/2017    3/21/17 NIL pap/+ HR HPV (not 16 or 18).     Depressive disorder     Depressive disorder, not elsewhere classified     seasonal    Diabetes mellitus, type 2 (H) 2/20/2023    Hypertension     Mild persistent asthma     Obstructive sleep apnea (adult) (pediatric)     uses CPAP    Primary osteoarthritis of both knees 1/28/2022    Scalp mass 7/2014      has a past medical history of Allergic rhinitis, cause unspecified, Cervical high risk HPV (human papillomavirus) test positive (03/21/2017), Depressive disorder, Depressive disorder, not elsewhere classified, Diabetes mellitus, type 2 (H) (2/20/2023), Hypertension, Mild persistent asthma, Obstructive sleep apnea (adult) (pediatric), Primary osteoarthritis of both knees (1/28/2022), and Scalp mass (7/2014).    She has no past medical history of Basal cell carcinoma, Cancer (H), Cerebral infarction (H), Congestive heart failure (H), COPD (chronic obstructive pulmonary disease) (H), History of blood transfusion, Malignant melanoma (H), Squamous cell carcinoma, or Thyroid disease.    Social History:  Reviewed. No changes to social history except as noted above in HPI.    Vital Signs:   None. This is phone/video visit.     Labs:  Most recent laboratory results reviewed and no new labs.     Review of Systems:  10 systems (general, cardiovascular, respiratory, eyes, ENT, endocrine, GI, , M/S, neurological) were reviewed. Most pertinent finding(s) is/are:  denies fever, cough, persistent headaches, shortness of breath, chest pain, severe GI symptoms, trouble urinating, severe pain.  The remaining systems are all unremarkable.    Mental Status Examination (limited as this is by phone/video):  Appearance: Awake, alert, appears stated age, no acute distress, well-groomed    Attitude:  cooperative, pleasant   Motor: No gross abnormalities observed via video, not formally tested   Oriented to:  person, place, time, and situation  Attention Span and Concentration:  normal  Speech:  clear, coherent, regular rate, rhythm, and volume  Language: intact  Mood:  better  Affect:  appropriate and in normal range and mood congruent  Associations:  no loose associations  Thought Process:  logical, linear and goal oriented  Thought Content:  no evidence of suicidal ideation or homicidal ideation, no evidence of psychotic thought, no auditory hallucinations present and no visual hallucinations present  Recent and Remote Memory:  Intact to interview. Not formally assessed. No amnesia.  Fund of Knowledge: appropriate  Insight:  good  Judgment:  intact, adequate for safety  Impulse Control:  intact    Suicide Risk Assessment:  Today Nela Mares reports no suicidal ideation today. Based on all available evidence including the factors cited above, Nela Mares does not appear to be at imminent risk for self-harm, does not meet criteria for a 72-hr hold, and therefore remains appropriate for ongoing outpatient level of care.  A thorough assessment of risk factors related to suicide and self-harm have been reviewed and are noted above. The patient convincingly denies suicidality on several occasions. Local community safety resources reviewed for patient to use if needed. There was no deceit detected, and the patient presented in a manner that was believable.     DSM5 Diagnosis:  296.35 (F33.41)  Major Depressive Disorder, Recurrent Episode, In partial remission _  Anxiety, unspecified    Medical comorbidities include:   Patient Active Problem List    Diagnosis Date Noted    Encounter for pharmacogenetic testing 11/01/2023     Priority: Medium     [id number] rapid metabolizer  CYP2C9 intermediate metabolizer      Diabetes mellitus, type 2 (H) 02/20/2023     Priority: Medium    Lichen sclerosus  02/20/2023     Priority: Medium    Chronic midline low back pain without sciatica 01/28/2022     Priority: Medium    Primary osteoarthritis of both knees 01/28/2022     Priority: Medium    Paroxysmal supraventricular tachycardia 05/10/2021     Priority: Medium     Added automatically from request for surgery [id number]      Allergic conjunctivitis, bilateral 08/27/2019     Priority: Medium    Overweight (H) 07/18/2019     Priority: Medium    Impaired fasting glucose 07/18/2019     Priority: Medium    Seasonal allergic rhinitis due to pollen 10/18/2018     Priority: Medium    Allergic rhinitis due to dust mite 10/18/2018     Priority: Medium    Allergic rhinitis due to mold 10/18/2018     Priority: Medium    IUD (intrauterine device) in place 03/21/2017     Priority: Medium     mirena place in 2015      Hypertrophy of breast 11/24/2015     Priority: Medium    Pilar cyst 07/14/2014     Priority: Medium    Hypertension goal BP (blood pressure) < 140/90 10/25/2010     Priority: Medium    CARDIOVASCULAR SCREENING; LDL GOAL LESS THAN 160 05/09/2010     Priority: Medium    Mild major depression (H)      Priority: Medium     Has used paxil, celexa, lexapro, and zoloft in the past.  Zoloft worked well although spring of 2017 symptoms worsened despite max dose.  Switched to wellbutrin - but wellbutrin triggered anxiety and rage.    Will plan to start Effexor in the fall before school year starts.      Leiomyoma of uterus 08/14/2007     Priority: Medium     Problem list name updated by automated process. Provider to review      Obstructive sleep apnea      Priority: Medium     uses CPAP          Mild persistent asthma 10/11/2005     Priority: Medium     Typically needs steroid burst about once per year for symptoms uncontrolled with continued advair and maxair q 4 hours.  Peak flow generally runs 400. With illness goes down to 350.    12/09 - given one extra burst of prednisone.  Instructed to call if she starts it so we can help  her monitor her symptoms.      Allergic rhinitis due to animals 08/31/2004     Priority: Medium       Psychosocial & Contextual Factors: see HPI above    Assessment:  From Intake, 11/09/2023:  Nela Mares is a 63-year-old female with past psychiatric history including depression and anxiety who presents today for psychiatric evaluation.  Patient with long history of struggling with intermittent episodes of depression, severe at times.  Depression dates back to adolescence.  Strong family history of depression.  Has a history of several medication trials due to tachyphylaxis after around 7 years of use.  Patient most recently with very severe depressive episode lasting for several weeks.  Patient was seeing primary care provider for medication changes and has most recently been on venlafaxine and buspirone.  Aripiprazole was added about 10 days ago and patient has experienced significant relief and improvement of her severe depression symptoms.  Patient taking aripiprazole 5 mg daily to augment her routine with venlafaxine and buspirone.  Venlafaxine had been increased by adding 37.5 mg capsule to her 225 mg dose for a total of 262.5 mg daily.  That had not been very helpful.  Buspirone was added to her regimen which was helpful briefly but effects did not last at all.  Since aripiprazole is suspected to be so helpful for her symptoms, patient will start slow taper off of buspirone.  If remains stable, patient will consider dropping venlafaxine back to 225 mg daily.  Patient will reach out if there is any regression/worsening of symptoms.  We did discuss ECT as a possible option in the future if her symptoms get as bad as they were this last episode.  Patient was open to this suggestion.  Encouraged to continue in therapy.  No acute safety concerns.  No longer having any suicidal ideation.  Family is very supportive.  Her dogs are very protective.  No problematic drug or alcohol use.     Discussed metabolic  risks and movement disorder risks, including risk for permanent movement related symptoms, when using Abilify/aripiprazole.  Labs ordered for 3-month lipid panel and hemoglobin A1c check.    12/14/2023:  Patient overall doing relatively okay.  Continues to work through some increased psychosocial stressors.  Hoping things will be settling soon.  Encouraged to continue to stay active.  Encouraged to continue light therapy box and supplements.  No medication changes today except for trial of discontinuation of buspirone.  No acute safety concerns.  No SI today.  No problematic drug or alcohol use.    Medication side effects and alternatives were reviewed. Health promotion activities recommended and reviewed today. All questions addressed. Education and counseling completed regarding risks and benefits of medications and psychotherapy options. Recommend therapy for additional support.     Treatment Plan:  Continue venlafaxine  mg daily for depression and anxiety.  Continue Abilify/aripiprazole 10 mg daily for depression/to augment venlafaxine ER.  Discontinue buspirone  Have fasting labs completed in about 6 weeks at any [facility] lab. Need to call your clinic ahead of time to schedule an appointment for lab due to limited hours and no walk-ins due to Covid-19 restrictions/changes.   Continue all other cares per primary care provider.   Continue all other medications as reviewed per electronic medical record today.   Safety plan reviewed. To the Emergency Department as needed or call after hours crisis line at [phone] or [phone]. [place] Crisis Text Line. Text [place] to 147629 or Suicide LifeLine Chat: suicidepreventionlifeline.org/chat  Continue therapy as planned.   Schedule an appointment with me in 6 weeks or sooner as needed. Call [facility] at [phone] to schedule.  Follow up with primary care provider as planned or for acute medical concerns.  Call the psychiatric nurse  line with medication questions or concerns at [phone].  MyChart may be used to communicate with your provider, but this is not intended to be used for emergencies.    Administrative Billing:   Phone Call/Video Duration: 21 Minutes  Start: 9:00a  Stop: 9:21a    Patient Status:  Patient will continue to be seen for ongoing consultation and stabilization.    Signed:   Madison Vazquez DO  [facility]    Disclaimer: This note consists of symbols derived from keyboarding, dictation and/or voice recognition software. As a result, there may be errors in the script that have gone undetected. Please consider this when interpreting information found in this chart.

## 2023-12-20 DIAGNOSIS — L64.9 ANDROGENETIC ALOPECIA: ICD-10-CM

## 2023-12-20 DIAGNOSIS — L90.0 LICHEN SCLEROSUS: ICD-10-CM

## 2023-12-20 RX ORDER — FINASTERIDE 5 MG/1
5 TABLET, FILM COATED ORAL DAILY
Qty: 90 TABLET | Refills: 0 | Status: SHIPPED | OUTPATIENT
Start: 2023-12-20 | End: 2024-02-22

## 2023-12-20 RX ORDER — CLOBETASOL PROPIONATE 0.5 MG/G
OINTMENT TOPICAL
Qty: 60 G | Refills: 0 | Status: SHIPPED | OUTPATIENT
Start: 2023-12-20 | End: 2024-03-28

## 2023-12-20 NOTE — TELEPHONE ENCOUNTER
Requested Prescriptions   Pending Prescriptions Disp Refills    clobetasol (TEMOVATE) 0.05 % external ointment 60 g 3     Sig: Apply twice daily to affected area for 2 weeks then 2-3 times weekly.       There is no refill protocol information for this order       finasteride (PROSCAR) 5 MG tablet 90 tablet 3     Sig: Take 1 tablet (5 mg) by mouth daily       There is no refill protocol information for this order        Clobetasol (TEMOVATE) 0.05 % external ointment  Last Written Prescription Date:  12/7/22  Last Fill Quantity: 60 g,  # refills: 3   Last office visit: 12/7/2022 ; last virtual visit: Visit date not found with prescribing provider:  Shea Em PA-C      Future Office Visit:  none    finasteride (PROSCAR) 5 MG tablet  Last Written Prescription Date:  12/7/22  Last Fill Quantity: 90 tablet,  # refills: 3   Last office visit: 12/7/2022 ; last virtual visit: Visit date not found with prescribing provider:  Shea Em PA-C      Future Office Visit:  none

## 2023-12-20 NOTE — TELEPHONE ENCOUNTER
Routing refill request to provider for review/approval because:  Drug not on the [facility] refill protocol   Last seen 12/2022 and no appt scheduled.    Britany TIPTON RN  [facility]  [phone]

## 2023-12-21 NOTE — TELEPHONE ENCOUNTER
Sent MyChart for patient to schedule follow up appt     Serenity RAMSAY RN BSN  [facility]  [phone]

## 2024-01-08 DIAGNOSIS — J45.30 MILD PERSISTENT ASTHMA WITHOUT COMPLICATION: ICD-10-CM

## 2024-01-08 NOTE — TELEPHONE ENCOUNTER
Pending Prescriptions:                       Disp   Refills    fluticasone-salmeterol (ADVAIR) 500-50 MC*3 each 1            Sig: Inhale 1 puff into the lungs every 12 hours    Routing refill request to provider for review/approval because:  LOV: 5/15/23  No upcoming appointment scheduled.     Sarah Allen, CMN, RN

## 2024-01-08 NOTE — TELEPHONE ENCOUNTER
Pending Prescriptions:                       Disp   Refills    fluticasone-salmeterol (ADVAIR) 500-50 MC*3 each 1            Sig: Inhale 1 puff into the lungs every 12 hours

## 2024-01-09 RX ORDER — FLUTICASONE PROPIONATE AND SALMETEROL 500; 50 UG/1; UG/1
1 POWDER RESPIRATORY (INHALATION) EVERY 12 HOURS
Qty: 3 EACH | Refills: 0 | Status: SHIPPED | OUTPATIENT
Start: 2024-01-09

## 2024-01-09 NOTE — TELEPHONE ENCOUNTER
Rx filled x 90 day supply. Patient is overdue for a follow-up visit and will need to be seen for additional refills.

## 2024-01-18 ENCOUNTER — VIRTUAL VISIT (OUTPATIENT)
Dept: PSYCHIATRY | Facility: CLINIC | Age: 64
End: 2024-01-18
Payer: COMMERCIAL

## 2024-01-18 ENCOUNTER — TRANSFERRED RECORDS (OUTPATIENT)
Dept: MULTI SPECIALTY CLINIC | Facility: CLINIC | Age: 64
End: 2024-01-18

## 2024-01-18 DIAGNOSIS — F33.41 RECURRENT MAJOR DEPRESSIVE DISORDER, IN PARTIAL REMISSION (H): ICD-10-CM

## 2024-01-18 DIAGNOSIS — F33.42 RECURRENT MAJOR DEPRESSIVE DISORDER, IN FULL REMISSION (H): Primary | ICD-10-CM

## 2024-01-18 DIAGNOSIS — F41.9 ANXIETY: ICD-10-CM

## 2024-01-18 LAB — RETINOPATHY: NORMAL

## 2024-01-18 PROCEDURE — 99214 OFFICE O/P EST MOD 30 MIN: CPT | Mod: 95 | Performed by: PSYCHIATRY & NEUROLOGY

## 2024-01-18 RX ORDER — ARIPIPRAZOLE 10 MG/1
10 TABLET ORAL DAILY
Qty: 90 TABLET | Refills: 0 | Status: SHIPPED | OUTPATIENT
Start: 2024-01-18 | End: 2024-03-22

## 2024-01-18 RX ORDER — VENLAFAXINE HYDROCHLORIDE 225 MG/1
225 TABLET, EXTENDED RELEASE ORAL DAILY
Qty: 90 TABLET | Refills: 0 | Status: SHIPPED | OUTPATIENT
Start: 2024-01-18 | End: 2024-03-22

## 2024-01-18 NOTE — PATIENT INSTRUCTIONS
Treatment Plan:  Continue venlafaxine  mg daily for depression and anxiety.  Continue Abilify/aripiprazole 10 mg daily for depression/to augment venlafaxine ER. Continue to monitor for abnormal involuntary movements. Continue to monitor lipid panel and A1c at least every 6-12 months. Could consider metformin XR if weight gain or metabolic abnormalities related to use of Abilify (evidence exists to support use).   Have fasting labs completed within next couple weeks at any [facility] lab. Need to call your clinic ahead of time to schedule an appointment for lab due to limited hours and no walk-ins due to Covid-19 restrictions/changes.   Your psychiatric care is being returned back to your primary care provider for ongoing management.  Please reach out to your primary care provider with any questions or concerns about your mental health or mental health medications.  Continue all other cares per primary care provider.   Continue all other medications as reviewed per electronic medical record today.   Safety plan reviewed. To the Emergency Department as needed or call after hours crisis line at [phone] or [phone]. [place] Crisis Text Line. Text [place] to 168701 or Suicide LifeLine Chat: suicidepreventionlifeline.org/chat  Continue therapy as planned.   Follow up with primary care provider as planned or for acute medical concerns.  MyChart may be used to communicate with your provider, but this is not intended to be used for emergencies.    Thank you for our work together in the Psychiatry Collaborative Care Model at [facility]. This is our last visit and I am returning your care back to your Primary Care Provider Padma Ray MD . If you are not doing well, please contact your Primary Care Provider office.     Moving from: Collaborative Care Psychiatry Service (CCPS)    Moving to: Referring Provider        We are returning your care back to your Referring Provider.      We will  update your Referring Provider/Clinic that you've completed your care with [facility]. This way, they can help you build on the progress you've made in your mental health.        Here's what happens next:    Within the next 3 months: Please set up a visit with your referring provider. Ask for a mental health check-in.  Stay on your current medications--do not change your doses. Your symptoms could get worse if you quickly stop or decrease your medicine.  We've refilled your mental health medications for the next 3 months (90 days). Future refills or dose changes should come from your Referring Provider Clinic.    If you're in therapy, keep it up! If you're not but would like to start, ask your Referring Provider clinic for a referral to therapy, or call [facility] ([phone]) to set up a visit with a therapist.        It's been a pleasure to work with you! If you and your clinic decide that you should return to [facility] in the future, we remain ready to serve you. Ask your clinic for a new referral if needed.

## 2024-01-18 NOTE — PROGRESS NOTES
RETURN TO REFERRING PROVIDER FINAL TREATMENT PLAN  The Psychiatric provider and/or Behavioral health clinician ([facility]) have completed consultation with the patient and are returning to referring provider care for continued care. For worsening symptoms/condition recommendations include:    Medication Recommendations   Continue venlafaxine  mg daily for depression and anxiety.  Continue Abilify/aripiprazole 10 mg daily for depression/to augment venlafaxine ER. Continue to monitor for abnormal involuntary movements. Continue to monitor lipid panel and A1c at least every 6-12 months. Could consider metformin XR if weight gain or metabolic abnormalities related to use of Abilify (evidence exists to support use).   Pt to have fasting labs completed within next couple weeks at any [facility] lab.     Behavioral Recommendations  -Continue in psychotherapy as needed.       Consider pharmacologic and nonpharmacologic recommendations, if the patient has followed through on recommendations/referrals and any other helpful pertinent information (e.g., recent stressors, med adherence, enough time on the medication or high enough dose etc.)      If post consult recommendations fail, use any of the following options   Reach out to the [facility] provider through Epic staff message for guidance   Order an Adult Behavioral Health eConsult ([id number]) or Pediatric eConsult ([id number])   Refer for another [facility] consultation (after 6 months) or refer to Psychiatry/Long-term management (Mental Health Referral 9044, [facility]/Med management and Long-term management)       Madison Vazquez,   Collaborative Care Psychiatry  [facility]

## 2024-01-18 NOTE — NURSING NOTE
Is the patient currently in the state of [place]? YES    Visit mode:VIDEO    If the visit is dropped, the patient can be reconnected by: VIDEO VISIT: Text to cell phone:   Telephone Information:   Mobile [phone]       Will anyone else be joining the visit? NO  (If patient encounters technical issues they should call [phone])    How would you like to obtain your AVS? MyChart    Are changes needed to the allergy or medication list? Pt stated no changes to allergies and Pt stated no med changes    Reason for visit: LETICIA MONACO

## 2024-01-18 NOTE — PROGRESS NOTES
"Telemedicine Visit: The patient's condition can be safely assessed and treated via synchronous audio and visual telemedicine encounter.      Reason for Telemedicine Visit: Patient has requested telehealth visit    Originating Site (Patient Location): Patient's home    Distant Location (provider location):  Off-site    Consent:  The patient/guardian has verbally consented to: the potential risks and benefits of telemedicine (video visit) versus in person care; bill my insurance or make self-payment for services provided; and responsibility for payment of non-covered services.     Mode of Communication:  Video Conference via [employer]kar[employer]    As the provider I attest to compliance with applicable laws and regulations related to telemedicine.          Outpatient Psychiatric Progress Note    Name: Nela Mares   : 1960                    Primary Care Provider: Padma Ray MD   Therapist: outside therapist     PHQ-9 scores:      2023    10:12 AM 2023     9:43 AM 2023     8:49 AM   PHQ-9 SCORE   PHQ-9 Total Score MyChart 0 13 (Moderate depression) 14 (Moderate depression)   PHQ-9 Total Score 0 13 14       PETE-7 scores:      2023    10:12 AM 2023     9:46 AM 10/26/2023    10:12 AM   PETE-7 SCORE   Total Score 0 (minimal anxiety) 16 (severe anxiety) 15 (severe anxiety)   Total Score 0 16 15       Patient Identification:  Patient is a 63 year old, single  White Choose not to answer female  who presents for return visit with me.  Patient is currently retired. Patient attended the phone/video session alone. Patient prefers to be called: \"Sanam\".    Interim History:  I last saw Nela Mares for outpatient psychiatry return visit on 2023. During that appointment, we:    Continue venlafaxine  mg daily for depression and anxiety.  Continue Abilify/aripiprazole 10 mg daily for depression/to augment venlafaxine ER.  Discontinue buspirone  Have fasting labs completed in " "about 6 weeks at any [facility] lab. Need to call your clinic ahead of time to schedule an appointment for lab due to limited hours and no walk-ins due to Covid-19 restrictions/changes.     1/18: Patient reports feeling \"great.\"  Patient denies any noticeable depression symptoms.  Things have been finalized with work and she is now getting paid from her settlement.  She feels pleased with the outcome.  Tolerating Abilify well with no negative side effects.  No abnormal involuntary movements noted.  Has been more active and able to do more.  Very much looking forward to gardening.  Planning her garden now.  Patient feels ready to have care returned back to primary care provider for ongoing management.  No acute safety concerns.  No SI.  No problematic drug or alcohol use.    Past Psychiatric Med Trials:  Psych Meds at Intake:  Abilify 5 mg daily   BusPar 10 mg twice daily  Effexor-.5 mg daily      Past Psych Meds:  Per PharmD note 11/1/23:  Paxil - \"not a good fit\"  Bupropion - caused extreme irritability, rage  Imipramine - reports this was aweful in terms of side effects  Fluoxetine - felt like it didn't work originally.   Lexapro - stopped working  Celexa - stopped working  Zoloft    Psychiatric ROS:  Nela Mares reports mood has been: See HPI above  Anxiety has been: See HPI above  Sleep has been: Good/stable  Olivia sxs: None  Psychosis sxs: None  ADHD/ADD sxs: NA  PTSD sxs: NA  PHQ9 and GAD7 scores were reviewed today if completed.   Medication side effects: Denies  Current stressors include: And see HPI above  Coping mechanisms and supports include: Therapy, Family, Hobbies, and Friends    Current medications include:   Current Outpatient Medications   Medication Sig    albuterol (PROAIR HFA/PROVENTIL HFA/VENTOLIN HFA) 108 (90 Base) MCG/ACT inhaler Inhale 2 puffs into the lungs every 4 hours as needed for shortness of breath or wheezing    albuterol (PROVENTIL) (2.5 MG/3ML) 0.083% neb solution " Take 1 vial (2.5 mg) by nebulization every 4 hours as needed for shortness of breath / dyspnea or wheezing    ARIPiprazole (ABILIFY) 10 MG tablet Take 1 tablet (10 mg) by mouth daily    cetirizine (ZYRTEC) 10 MG tablet Take 1 tablet (10 mg) by mouth daily    Cholecalciferol (VITAMIN D) 125 MCG (5000 UT) capsule Take 1 capsule (5,000 Units) by mouth daily    clobetasol (TEMOVATE) 0.05 % external ointment Apply twice daily to affected area for 2 weeks then 2-3 times weekly.    finasteride (PROSCAR) 5 MG tablet Take 1 tablet (5 mg) by mouth daily    fluticasone (FLONASE) 50 MCG/ACT nasal spray Spray 2 sprays into both nostrils daily    fluticasone-salmeterol (ADVAIR) 500-50 MCG/ACT inhaler Inhale 1 puff into the lungs every 12 hours Needs appointment for additional refills    lisinopril (ZESTRIL) 40 MG tablet Take 1 tablet (40 mg) by mouth daily    montelukast (SINGULAIR) 10 MG tablet Take 1 tablet (10 mg) by mouth daily    venlafaxine (EFFEXOR-ER) 225 MG 24 hr tablet Take 1 tablet (225 mg) by mouth daily     No current facility-administered medications for this visit.         Past Medical/Surgical History:  Past Medical History:   Diagnosis Date    Allergic rhinitis, cause unspecified     Cervical high risk HPV (human papillomavirus) test positive 03/21/2017    3/21/17 NIL pap/+ HR HPV (not 16 or 18).     Depressive disorder     Depressive disorder, not elsewhere classified     seasonal    Diabetes mellitus, type 2 (H) 2/20/2023    Hypertension     Mild persistent asthma     Obstructive sleep apnea (adult) (pediatric)     uses CPAP    Primary osteoarthritis of both knees 1/28/2022    Scalp mass 7/2014      has a past medical history of Allergic rhinitis, cause unspecified, Cervical high risk HPV (human papillomavirus) test positive (03/21/2017), Depressive disorder, Depressive disorder, not elsewhere classified, Diabetes mellitus, type 2 (H) (2/20/2023), Hypertension, Mild persistent asthma, Obstructive sleep apnea  "(adult) (pediatric), Primary osteoarthritis of both knees (1/28/2022), and Scalp mass (7/2014).    She has no past medical history of Basal cell carcinoma, Cancer (H), Cerebral infarction (H), Congestive heart failure (H), COPD (chronic obstructive pulmonary disease) (H), History of blood transfusion, Malignant melanoma (H), Squamous cell carcinoma, or Thyroid disease.    Social History:  Reviewed. No changes to social history except as noted above in HPI.    Vital Signs:   None. This is phone/video visit.     Labs:  Most recent laboratory results reviewed and no new labs.     Review of Systems:  10 systems (general, cardiovascular, respiratory, eyes, ENT, endocrine, GI, , M/S, neurological) were reviewed. Most pertinent finding(s) is/are:  denies fever, cough, persistent headaches, shortness of breath, chest pain, severe GI symptoms, trouble urinating, severe pain.  The remaining systems are all unremarkable.    Mental Status Examination (limited as this is by phone/video):  Appearance: Awake, alert, appears stated age, no acute distress, well-groomed   Attitude:  cooperative, pleasant   Motor: No gross abnormalities observed via video, not formally tested   Oriented to:  person, place, time, and situation  Attention Span and Concentration:  normal  Speech:  clear, coherent, regular rate, rhythm, and volume  Language: intact  Mood:  \"really good\"  Affect:  appropriate and in normal range and mood congruent  Associations:  no loose associations  Thought Process:  logical, linear and goal oriented  Thought Content:  no evidence of suicidal ideation or homicidal ideation, no evidence of psychotic thought, no auditory hallucinations present and no visual hallucinations present  Recent and Remote Memory:  Intact to interview. Not formally assessed. No amnesia.  Fund of Knowledge: appropriate  Insight:  good  Judgment:  intact, adequate for safety  Impulse Control:  intact    Suicide Risk Assessment:  Today Nela Arthur " Suzan reports no suicidal ideation today. Based on all available evidence including the factors cited above, Nela Mares does not appear to be at imminent risk for self-harm, does not meet criteria for a 72-hr hold, and therefore remains appropriate for ongoing outpatient level of care.  A thorough assessment of risk factors related to suicide and self-harm have been reviewed and are noted above. The patient convincingly denies suicidality on several occasions. Local community safety resources reviewed for patient to use if needed. There was no deceit detected, and the patient presented in a manner that was believable.     DSM5 Diagnosis:  Major Depressive Disorder, Recurrent Episode, In Full remission _  Anxiety, unspecified    Medical comorbidities include:   Patient Active Problem List    Diagnosis Date Noted    Encounter for pharmacogenetic testing 11/01/2023     Priority: Medium     [id number] rapid metabolizer  CYP2C9 intermediate metabolizer      Diabetes mellitus, type 2 (H) 02/20/2023     Priority: Medium    Lichen sclerosus 02/20/2023     Priority: Medium    Chronic midline low back pain without sciatica 01/28/2022     Priority: Medium    Primary osteoarthritis of both knees 01/28/2022     Priority: Medium    Paroxysmal supraventricular tachycardia 05/10/2021     Priority: Medium     Added automatically from request for surgery [id number]      Allergic conjunctivitis, bilateral 08/27/2019     Priority: Medium    Overweight (H) 07/18/2019     Priority: Medium    Impaired fasting glucose 07/18/2019     Priority: Medium    Seasonal allergic rhinitis due to pollen 10/18/2018     Priority: Medium    Allergic rhinitis due to dust mite 10/18/2018     Priority: Medium    Allergic rhinitis due to mold 10/18/2018     Priority: Medium    IUD (intrauterine device) in place 03/21/2017     Priority: Medium     mirena place in 2015      Hypertrophy of breast 11/24/2015     Priority: Medium    Pilar cyst 07/14/2014      Priority: Medium    Hypertension goal BP (blood pressure) < 140/90 10/25/2010     Priority: Medium    CARDIOVASCULAR SCREENING; LDL GOAL LESS THAN 160 05/09/2010     Priority: Medium    Mild major depression (H)      Priority: Medium     Has used paxil, celexa, lexapro, and zoloft in the past.  Zoloft worked well although spring of 2017 symptoms worsened despite max dose.  Switched to wellbutrin - but wellbutrin triggered anxiety and rage.    Will plan to start Effexor in the fall before school year starts.      Leiomyoma of uterus 08/14/2007     Priority: Medium     Problem list name updated by automated process. Provider to review      Obstructive sleep apnea      Priority: Medium     uses CPAP          Mild persistent asthma 10/11/2005     Priority: Medium     Typically needs steroid burst about once per year for symptoms uncontrolled with continued advair and maxair q 4 hours.  Peak flow generally runs 400. With illness goes down to 350.    12/09 - given one extra burst of prednisone.  Instructed to call if she starts it so we can help her monitor her symptoms.      Allergic rhinitis due to animals 08/31/2004     Priority: Medium       Psychosocial & Contextual Factors: see HPI above    Assessment:  From Intake, 11/09/2023:  Nela Mares is a 63-year-old female with past psychiatric history including depression and anxiety who presents today for psychiatric evaluation.  Patient with long history of struggling with intermittent episodes of depression, severe at times.  Depression dates back to adolescence.  Strong family history of depression.  Has a history of several medication trials due to tachyphylaxis after around 7 years of use.  Patient most recently with very severe depressive episode lasting for several weeks.  Patient was seeing primary care provider for medication changes and has most recently been on venlafaxine and buspirone.  Aripiprazole was added about 10 days ago and patient has experienced  significant relief and improvement of her severe depression symptoms.  Patient taking aripiprazole 5 mg daily to augment her routine with venlafaxine and buspirone.  Venlafaxine had been increased by adding 37.5 mg capsule to her 225 mg dose for a total of 262.5 mg daily.  That had not been very helpful.  Buspirone was added to her regimen which was helpful briefly but effects did not last at all.  Since aripiprazole is suspected to be so helpful for her symptoms, patient will start slow taper off of buspirone.  If remains stable, patient will consider dropping venlafaxine back to 225 mg daily.  Patient will reach out if there is any regression/worsening of symptoms.  We did discuss ECT as a possible option in the future if her symptoms get as bad as they were this last episode.  Patient was open to this suggestion.  Encouraged to continue in therapy.  No acute safety concerns.  No longer having any suicidal ideation.  Family is very supportive.  Her dogs are very protective.  No problematic drug or alcohol use.     Discussed metabolic risks and movement disorder risks, including risk for permanent movement related symptoms, when using Abilify/aripiprazole.  Labs ordered for 3-month lipid panel and hemoglobin A1c check.    12/14/2023:  Patient overall doing relatively okay.  Continues to work through some increased psychosocial stressors.  Hoping things will be settling soon.  Encouraged to continue to stay active.  Encouraged to continue light therapy box and supplements.  No medication changes today except for trial of discontinuation of buspirone.  No acute safety concerns.  No SI today.  No problematic drug or alcohol use.    1/18/2024:  Patient overall with much improved symptoms that have been sustained/maintained on Abilify 10 mg daily.  No abnormal involuntary movements.  Patient encouraged to have labs completed to monitor/measure at lipid panel and A1c.  Could consider addition of metformin if metabolic  derangements or if starts to gain weight.  Metformin can also be used prophylactically for such.  No acute safety concerns.  No SI.  No problematic drug or alcohol use.  Due to ongoing stability of mental health on current regimen, patient's mental health care will be returned back to primary care provider for ongoing management.    Medication side effects and alternatives were reviewed. Health promotion activities recommended and reviewed today. All questions addressed. Education and counseling completed regarding risks and benefits of medications and psychotherapy options. Recommend therapy for additional support.     Treatment Plan:  Continue venlafaxine  mg daily for depression and anxiety.  Continue Abilify/aripiprazole 10 mg daily for depression/to augment venlafaxine ER. Continue to monitor for abnormal involuntary movements. Continue to monitor lipid panel and A1c at least every 6-12 months. Could consider metformin XR if weight gain or metabolic abnormalities related to use of Abilify (evidence exists to support use).   Have fasting labs completed within next couple weeks at any [facility] lab. Need to call your clinic ahead of time to schedule an appointment for lab due to limited hours and no walk-ins due to Covid-19 restrictions/changes.   Your psychiatric care is being returned back to your primary care provider for ongoing management.  Please reach out to your primary care provider with any questions or concerns about your mental health or mental health medications.  Continue all other cares per primary care provider.   Continue all other medications as reviewed per electronic medical record today.   Safety plan reviewed. To the Emergency Department as needed or call after hours crisis line at [phone] or [phone]. [place] Crisis Text Line. Text [place] to 701850 or Suicide LifeLine Chat: suicidepreventionlifeline.org/chat  Continue therapy as planned.   Follow up with primary care  provider as planned or for acute medical concerns.  MyChart may be used to communicate with your provider, but this is not intended to be used for emergencies.    Thank you for our work together in the Psychiatry Collaborative Care Model at [facility]. This is our last visit and I am returning your care back to your Primary Care Provider Padma Ray MD . If you are not doing well, please contact your Primary Care Provider office.     Administrative Billing:   Phone Call/Video Duration: 11 Minutes  Start: 8:09a  Stop: 8:20a    Patient Status:  The patient is being returned to the referring provider for ongoing care and medication prescribing.  The patient can be re-referred back to this service for further consultation if needed in the future.    Signed:   Madison Vazquez DO  [facility] Psychiatry    Disclaimer: This note consists of symbols derived from keyboarding, dictation and/or voice recognition software. As a result, there may be errors in the script that have gone undetected. Please consider this when interpreting information found in this chart.

## 2024-01-25 ENCOUNTER — ANCILLARY PROCEDURE (OUTPATIENT)
Dept: MAMMOGRAPHY | Facility: CLINIC | Age: 64
End: 2024-01-25
Attending: FAMILY MEDICINE
Payer: COMMERCIAL

## 2024-01-25 DIAGNOSIS — Z12.31 VISIT FOR SCREENING MAMMOGRAM: ICD-10-CM

## 2024-01-25 PROCEDURE — 77063 BREAST TOMOSYNTHESIS BI: CPT | Mod: TC | Performed by: STUDENT IN AN ORGANIZED HEALTH CARE EDUCATION/TRAINING PROGRAM

## 2024-01-25 PROCEDURE — 77067 SCR MAMMO BI INCL CAD: CPT | Mod: TC | Performed by: STUDENT IN AN ORGANIZED HEALTH CARE EDUCATION/TRAINING PROGRAM

## 2024-02-05 ENCOUNTER — TRANSFERRED RECORDS (OUTPATIENT)
Dept: ALLERGY | Facility: CLINIC | Age: 64
End: 2024-02-05

## 2024-02-05 ENCOUNTER — OFFICE VISIT (OUTPATIENT)
Dept: ALLERGY | Facility: CLINIC | Age: 64
End: 2024-02-05
Attending: ALLERGY & IMMUNOLOGY
Payer: COMMERCIAL

## 2024-02-05 VITALS — SYSTOLIC BLOOD PRESSURE: 124 MMHG | OXYGEN SATURATION: 98 % | HEART RATE: 67 BPM | DIASTOLIC BLOOD PRESSURE: 80 MMHG

## 2024-02-05 DIAGNOSIS — J30.89 ALLERGIC RHINITIS DUE TO DUST MITE: ICD-10-CM

## 2024-02-05 DIAGNOSIS — J30.81 ALLERGIC RHINITIS DUE TO ANIMALS: ICD-10-CM

## 2024-02-05 DIAGNOSIS — J30.1 SEASONAL ALLERGIC RHINITIS DUE TO POLLEN: ICD-10-CM

## 2024-02-05 DIAGNOSIS — J30.89 ALLERGIC RHINITIS DUE TO MOLD: ICD-10-CM

## 2024-02-05 DIAGNOSIS — J45.30 MILD PERSISTENT ASTHMA WITHOUT COMPLICATION: ICD-10-CM

## 2024-02-05 LAB
FEF 25/75: NORMAL
FEV-1: NORMAL
FEV1/FVC: NORMAL
FVC: NORMAL

## 2024-02-05 PROCEDURE — 94010 BREATHING CAPACITY TEST: CPT | Performed by: ALLERGY & IMMUNOLOGY

## 2024-02-05 PROCEDURE — 99213 OFFICE O/P EST LOW 20 MIN: CPT | Mod: 25 | Performed by: ALLERGY & IMMUNOLOGY

## 2024-02-05 RX ORDER — FLUTICASONE PROPIONATE 50 MCG
2 SPRAY, SUSPENSION (ML) NASAL DAILY
Qty: 48 G | Refills: 3 | Status: SHIPPED | OUTPATIENT
Start: 2024-02-05

## 2024-02-05 ASSESSMENT — ASTHMA QUESTIONNAIRES: ACT_TOTALSCORE: 25

## 2024-02-05 NOTE — PROGRESS NOTES
Nela Mares was seen in the Allergy Clinic at [facility].      Nela Mares is a 63 year old Choose not to answer female who is seen today for a follow-up visit.    Taking Advair once daily along with montelukasts. Doesn't recall the last time she used albuterol. Last exacerbation was in 5/2023 - treated with prednisone. She had some increased symptoms over the summer and last fall as well. Did not need prednisone. Reports no side effects from Advair and montelukast.    Past Medical History:   Diagnosis Date    Allergic rhinitis, cause unspecified     Cervical high risk HPV (human papillomavirus) test positive 03/21/2017    3/21/17 NIL pap/+ HR HPV (not 16 or 18).     Depressive disorder     Depressive disorder, not elsewhere classified     seasonal    Diabetes mellitus, type 2 (H) 2/20/2023    Hypertension     Mild persistent asthma     Obstructive sleep apnea (adult) (pediatric)     uses CPAP    Primary osteoarthritis of both knees 1/28/2022    Scalp mass 7/2014     Family History   Problem Relation Age of Onset    C.A.D. Paternal Grandmother     Cerebrovascular Disease Paternal Grandmother     Depression Paternal Grandmother     Obesity Paternal Grandmother     C.A.D. Paternal Grandfather     Cerebrovascular Disease Paternal Grandfather     Substance Abuse Paternal Grandfather     Hypertension Father     Asthma Father     Obesity Father     Skin Cancer Father     Diabetes Father     Neurologic Disorder Mother         Graves disease    Hyperlipidemia Mother     Depression Mother     Thyroid Disease Mother     Anxiety Disorder Mother     Other - See Comments Other     Skin Cancer Sister     Asthma Sister     Depression Maternal Grandmother     Cancer Sister         mild skin cancer,cured from excision    Depression Nephew     Anxiety Disorder Nephew     Substance Abuse Nephew     Asthma Nephew     Anxiety Disorder Nephew     Mental Illness Maternal Half-Brother     Mental Illness  Paternal Half-Brother     Substance Abuse Nephew     Asthma Sister     Asthma Nephew     Thyroid Disease Maternal Half-Sister     Thyroid Disease Paternal Half-Sister      Social History     Tobacco Use    Smoking status: Never    Smokeless tobacco: Never   Vaping Use    Vaping Use: Never used   Substance Use Topics    Alcohol use: No    Drug use: No     Social History     Social History Narrative    Dairy/d 3 servings/d.     Caffeine 2 servings/d    Exercise 0 x week    Sunscreen used - No    Seatbelts used - Yes    Working smoke/CO detectors in the home - Yes    Guns stored in the home - No    Self Breast Exams - no    Eye Exam up to date - No    Dental Exam up to date - Yes    Pap Smear up to date - Yes    Mammogram up to date - Yes    Dexa Scan up to date - NOT APPLICABLE    Flex Sig / Colonoscopy up to date - NOT APPLICABLE    Immunizations up to date - Yes    Abuse: Current or Past(Physical, Sexual or Emotional)- No    Do you feel safe in your environment - Yes    GÓMEZ Cummings    [birth year]                       Past medical, family, and social history were reviewed.        Current Outpatient Medications:     albuterol (PROVENTIL) (2.5 MG/3ML) 0.083% neb solution, Take 1 vial (2.5 mg) by nebulization every 4 hours as needed for shortness of breath / dyspnea or wheezing, Disp: 30 mL, Rfl: 11    ARIPiprazole (ABILIFY) 10 MG tablet, Take 1 tablet (10 mg) by mouth daily, Disp: 90 tablet, Rfl: 0    cetirizine (ZYRTEC) 10 MG tablet, Take 1 tablet (10 mg) by mouth daily, Disp: 90 tablet, Rfl: 3    Cholecalciferol (VITAMIN D) 125 MCG (5000 UT) capsule, Take 1 capsule (5,000 Units) by mouth daily, Disp: 90 capsule, Rfl: 3    clobetasol (TEMOVATE) 0.05 % external ointment, Apply twice daily to affected area for 2 weeks then 2-3 times weekly., Disp: 60 g, Rfl: 0    finasteride (PROSCAR) 5 MG tablet, Take 1 tablet (5 mg) by mouth daily, Disp: 90 tablet, Rfl: 0    fluticasone (FLONASE) 50 MCG/ACT nasal spray, Spray 2 sprays into  both nostrils daily, Disp: 48 g, Rfl: 3    fluticasone-salmeterol (ADVAIR) 500-50 MCG/ACT inhaler, Inhale 1 puff into the lungs every 12 hours Needs appointment for additional refills, Disp: 3 each, Rfl: 0    lisinopril (ZESTRIL) 40 MG tablet, Take 1 tablet (40 mg) by mouth daily, Disp: 90 tablet, Rfl: 3    montelukast (SINGULAIR) 10 MG tablet, Take 1 tablet (10 mg) by mouth daily, Disp: 90 tablet, Rfl: 3    venlafaxine (EFFEXOR-ER) 225 MG 24 hr tablet, Take 1 tablet (225 mg) by mouth daily, Disp: 90 tablet, Rfl: 0    albuterol (PROAIR HFA/PROVENTIL HFA/VENTOLIN HFA) 108 (90 Base) MCG/ACT inhaler, Inhale 2 puffs into the lungs every 4 hours as needed for shortness of breath or wheezing (Patient not taking: Reported on 2/5/2024), Disp: 36 g, Rfl: 1  No Known Allergies    EXAM:   /80 (BP Location: Right arm, Patient Position: Sitting, Cuff Size: Adult Large)   Pulse 67   LMP 12/21/2014 (Exact Date)   SpO2 98%   Physical Exam  Vitals and nursing note reviewed.   Constitutional:       Appearance: Normal appearance.   HENT:      Head: Normocephalic and atraumatic.      Right Ear: External ear normal.      Left Ear: External ear normal.      Nose: No mucosal edema or rhinorrhea.      Mouth/Throat:      Mouth: Mucous membranes are moist. No oral lesions.      Pharynx: Oropharynx is clear. Uvula midline. No posterior oropharyngeal erythema.   Eyes:      General: Lids are normal.      Extraocular Movements: Extraocular movements intact.      Conjunctiva/sclera: Conjunctivae normal.   Neck:      Comments: No asymmetry, masses, or scars  Cardiovascular:      Rate and Rhythm: Normal rate and regular rhythm.      Heart sounds: Normal heart sounds, S1 normal and S2 normal.   Pulmonary:      Effort: Pulmonary effort is normal. No respiratory distress.      Breath sounds: Normal breath sounds and air entry.   Musculoskeletal:      Comments: No musculoskeletal defects appreciated   Skin:     General: Skin is warm and dry.       Findings: No lesion or rash.   Neurological:      General: No focal deficit present.      Mental Status: She is alert.   Psychiatric:         Mood and Affect: Mood and affect normal.           WORKUP:  Spirometry    SPIROMETRY       FVC 3.87L (100% of predicted).     FEV1 3.24L (109% of predicted).     FEV1/FVC 84%      I have reviewed and interpreted these results. Testing meets criteria for acceptability and reproducibility. Values are consistent with normal lung function.    Asthma Control Test (ACT) total score: 25       ASSESSMENT/PLAN:  Nela Mares is a 63 year old female here for a follow-up visit.    1. Mild persistent asthma without complication - Controlled, spirometry today is normal. Doing well with her current medications. Taking Advair only once daily and maintaining good symptom control.    - continue Advair 500-50mcg - 1 puff daily  - continue montelukast - 10mg daily  - take 2 to 4 puffs of albuterol HFA every 4 hours as needed  - Adult Allergy Clinic Follow-Up Order  - Spirometry, Breathing Capacity: Normal Order, Clinic Performed    2. Seasonal allergic rhinitis due to pollen    - fluticasone (FLONASE) 50 MCG/ACT nasal spray; Spray 2 sprays into both nostrils daily  Dispense: 48 g; Refill: 3    3. Allergic rhinitis due to animals    - fluticasone (FLONASE) 50 MCG/ACT nasal spray; Spray 2 sprays into both nostrils daily  Dispense: 48 g; Refill: 3    4. Allergic rhinitis due to dust mite    - fluticasone (FLONASE) 50 MCG/ACT nasal spray; Spray 2 sprays into both nostrils daily  Dispense: 48 g; Refill: 3    5. Allergic rhinitis due to mold    - fluticasone (FLONASE) 50 MCG/ACT nasal spray; Spray 2 sprays into both nostrils daily  Dispense: 48 g; Refill: 3      Follow-up in 6 months, sooner if needed      Thank you for allowing me to participate in the care of Nela Mares.      Karen Clark MD, FAAAAI  Allergy/Immunology  [facility]  [facility]      Chart documentation done in part with Dragon Voice Recognition Software. Although reviewed after completion, some word and grammatical errors may remain.

## 2024-02-05 NOTE — LETTER
2/5/2024         RE: Nela Mares  [address] [id number]        Dear Colleague,    Thank you for referring your patient, Nela Mares, to the [facility]. Please see a copy of my visit note below.    Nela Mares was seen in the Allergy Clinic at [facility].      Nela Mares is a 63 year old Choose not to answer female who is seen today for a follow-up visit.    Taking Advair once daily along with montelukasts. Doesn't recall the last time she used albuterol. Last exacerbation was in 5/2023 - treated with prednisone. She had some increased symptoms over the summer and last fall as well. Did not need prednisone. Reports no side effects from Advair and montelukast.    Past Medical History:   Diagnosis Date     Allergic rhinitis, cause unspecified      Cervical high risk HPV (human papillomavirus) test positive 03/21/2017    3/21/17 NIL pap/+ HR HPV (not 16 or 18).      Depressive disorder      Depressive disorder, not elsewhere classified     seasonal     Diabetes mellitus, type 2 (H) 2/20/2023     Hypertension      Mild persistent asthma      Obstructive sleep apnea (adult) (pediatric)     uses CPAP     Primary osteoarthritis of both knees 1/28/2022     Scalp mass 7/2014     Family History   Problem Relation Age of Onset     C.A.D. Paternal Grandmother      Cerebrovascular Disease Paternal Grandmother      Depression Paternal Grandmother      Obesity Paternal Grandmother      C.A.D. Paternal Grandfather      Cerebrovascular Disease Paternal Grandfather      Substance Abuse Paternal Grandfather      Hypertension Father      Asthma Father      Obesity Father      Skin Cancer Father      Diabetes Father      Neurologic Disorder Mother         Graves disease     Hyperlipidemia Mother      Depression Mother      Thyroid Disease Mother      Anxiety Disorder Mother      Other - See Comments Other      Skin Cancer Sister       Asthma Sister      Depression Maternal Grandmother      Cancer Sister         mild skin cancer,cured from excision     Depression Nephew      Anxiety Disorder Nephew      Substance Abuse Nephew      Asthma Nephew      Anxiety Disorder Nephew      Mental Illness Maternal Half-Brother      Mental Illness Paternal Half-Brother      Substance Abuse Nephew      Asthma Sister      Asthma Nephew      Thyroid Disease Maternal Half-Sister      Thyroid Disease Paternal Half-Sister      Social History     Tobacco Use     Smoking status: Never     Smokeless tobacco: Never   Vaping Use     Vaping Use: Never used   Substance Use Topics     Alcohol use: No     Drug use: No     Social History     Social History Narrative    Dairy/d 3 servings/d.     Caffeine 2 servings/d    Exercise 0 x week    Sunscreen used - No    Seatbelts used - Yes    Working smoke/CO detectors in the home - Yes    Guns stored in the home - No    Self Breast Exams - no    Eye Exam up to date - No    Dental Exam up to date - Yes    Pap Smear up to date - Yes    Mammogram up to date - Yes    Dexa Scan up to date - NOT APPLICABLE    Flex Sig / Colonoscopy up to date - NOT APPLICABLE    Immunizations up to date - Yes    Abuse: Current or Past(Physical, Sexual or Emotional)- No    Do you feel safe in your environment - Yes    GÓMEZ Cummings    11/23/11                       Past medical, family, and social history were reviewed.        Current Outpatient Medications:      albuterol (PROVENTIL) (2.5 MG/3ML) 0.083% neb solution, Take 1 vial (2.5 mg) by nebulization every 4 hours as needed for shortness of breath / dyspnea or wheezing, Disp: 30 mL, Rfl: 11     ARIPiprazole (ABILIFY) 10 MG tablet, Take 1 tablet (10 mg) by mouth daily, Disp: 90 tablet, Rfl: 0     cetirizine (ZYRTEC) 10 MG tablet, Take 1 tablet (10 mg) by mouth daily, Disp: 90 tablet, Rfl: 3     Cholecalciferol (VITAMIN D) 125 MCG (5000 UT) capsule, Take 1 capsule (5,000 Units) by mouth daily, Disp: 90  capsule, Rfl: 3     clobetasol (TEMOVATE) 0.05 % external ointment, Apply twice daily to affected area for 2 weeks then 2-3 times weekly., Disp: 60 g, Rfl: 0     finasteride (PROSCAR) 5 MG tablet, Take 1 tablet (5 mg) by mouth daily, Disp: 90 tablet, Rfl: 0     fluticasone (FLONASE) 50 MCG/ACT nasal spray, Spray 2 sprays into both nostrils daily, Disp: 48 g, Rfl: 3     fluticasone-salmeterol (ADVAIR) 500-50 MCG/ACT inhaler, Inhale 1 puff into the lungs every 12 hours Needs appointment for additional refills, Disp: 3 each, Rfl: 0     lisinopril (ZESTRIL) 40 MG tablet, Take 1 tablet (40 mg) by mouth daily, Disp: 90 tablet, Rfl: 3     montelukast (SINGULAIR) 10 MG tablet, Take 1 tablet (10 mg) by mouth daily, Disp: 90 tablet, Rfl: 3     venlafaxine (EFFEXOR-ER) 225 MG 24 hr tablet, Take 1 tablet (225 mg) by mouth daily, Disp: 90 tablet, Rfl: 0     albuterol (PROAIR HFA/PROVENTIL HFA/VENTOLIN HFA) 108 (90 Base) MCG/ACT inhaler, Inhale 2 puffs into the lungs every 4 hours as needed for shortness of breath or wheezing (Patient not taking: Reported on 2/5/2024), Disp: 36 g, Rfl: 1  No Known Allergies    EXAM:   /80 (BP Location: Right arm, Patient Position: Sitting, Cuff Size: Adult Large)   Pulse 67   LMP 12/21/2014 (Exact Date)   SpO2 98%   Physical Exam  Vitals and nursing note reviewed.   Constitutional:       Appearance: Normal appearance.   HENT:      Head: Normocephalic and atraumatic.      Right Ear: External ear normal.      Left Ear: External ear normal.      Nose: No mucosal edema or rhinorrhea.      Mouth/Throat:      Mouth: Mucous membranes are moist. No oral lesions.      Pharynx: Oropharynx is clear. Uvula midline. No posterior oropharyngeal erythema.   Eyes:      General: Lids are normal.      Extraocular Movements: Extraocular movements intact.      Conjunctiva/sclera: Conjunctivae normal.   Neck:      Comments: No asymmetry, masses, or scars  Cardiovascular:      Rate and Rhythm: Normal rate and  regular rhythm.      Heart sounds: Normal heart sounds, S1 normal and S2 normal.   Pulmonary:      Effort: Pulmonary effort is normal. No respiratory distress.      Breath sounds: Normal breath sounds and air entry.   Musculoskeletal:      Comments: No musculoskeletal defects appreciated   Skin:     General: Skin is warm and dry.      Findings: No lesion or rash.   Neurological:      General: No focal deficit present.      Mental Status: She is alert.   Psychiatric:         Mood and Affect: Mood and affect normal.           WORKUP:  Spirometry    SPIROMETRY       FVC 3.87L (100% of predicted).     FEV1 3.24L (109% of predicted).     FEV1/FVC 84%      I have reviewed and interpreted these results. Testing meets criteria for acceptability and reproducibility. Values are consistent with normal lung function.    Asthma Control Test (ACT) total score: 25       ASSESSMENT/PLAN:  Nela Mares is a 63 year old female here for a follow-up visit.    1. Mild persistent asthma without complication - Controlled, spirometry today is normal. Doing well with her current medications. Taking Advair only once daily and maintaining good symptom control.    - continue Advair 500-50mcg - 1 puff daily  - continue montelukast - 10mg daily  - take 2 to 4 puffs of albuterol HFA every 4 hours as needed  - Adult Allergy Clinic Follow-Up Order  - Spirometry, Breathing Capacity: Normal Order, Clinic Performed    2. Seasonal allergic rhinitis due to pollen    - fluticasone (FLONASE) 50 MCG/ACT nasal spray; Spray 2 sprays into both nostrils daily  Dispense: 48 g; Refill: 3    3. Allergic rhinitis due to animals    - fluticasone (FLONASE) 50 MCG/ACT nasal spray; Spray 2 sprays into both nostrils daily  Dispense: 48 g; Refill: 3    4. Allergic rhinitis due to dust mite    - fluticasone (FLONASE) 50 MCG/ACT nasal spray; Spray 2 sprays into both nostrils daily  Dispense: 48 g; Refill: 3    5. Allergic rhinitis due to mold    - fluticasone  (FLONASE) 50 MCG/ACT nasal spray; Spray 2 sprays into both nostrils daily  Dispense: 48 g; Refill: 3      Follow-up in 6 months, sooner if needed      Thank you for allowing me to participate in the care of Nela Mares.      Karen Clark MD, [facility]  Allergy/Immunology  [facility]      Chart documentation done in part with Dragon Voice Recognition Software. Although reviewed after completion, some word and grammatical errors may remain.      Again, thank you for allowing me to participate in the care of your patient.        Sincerely,        Karen Clark MD

## 2024-02-22 ENCOUNTER — OFFICE VISIT (OUTPATIENT)
Dept: DERMATOLOGY | Facility: CLINIC | Age: 64
End: 2024-02-22
Payer: COMMERCIAL

## 2024-02-22 ENCOUNTER — ANCILLARY PROCEDURE (OUTPATIENT)
Dept: GENERAL RADIOLOGY | Facility: CLINIC | Age: 64
End: 2024-02-22
Attending: FAMILY MEDICINE
Payer: COMMERCIAL

## 2024-02-22 ENCOUNTER — OFFICE VISIT (OUTPATIENT)
Dept: ORTHOPEDICS | Facility: CLINIC | Age: 64
End: 2024-02-22
Payer: COMMERCIAL

## 2024-02-22 VITALS
HEART RATE: 65 BPM | BODY MASS INDEX: 35.74 KG/M2 | DIASTOLIC BLOOD PRESSURE: 84 MMHG | SYSTOLIC BLOOD PRESSURE: 138 MMHG | WEIGHT: 242 LBS

## 2024-02-22 DIAGNOSIS — L64.9 ANDROGENETIC ALOPECIA: ICD-10-CM

## 2024-02-22 DIAGNOSIS — S52.502A CLOSED TRAUMATIC DISPLACED FRACTURE OF DISTAL END OF LEFT RADIUS, INITIAL ENCOUNTER: ICD-10-CM

## 2024-02-22 DIAGNOSIS — M25.531 RIGHT WRIST PAIN: ICD-10-CM
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
## 2024-02-22 DIAGNOSIS — M25.531 PAIN, WRIST, RIGHT: Primary | ICD-10-CM

## 2024-02-22 DIAGNOSIS — S52.502A CLOSED TRAUMATIC DISPLACED FRACTURE OF DISTAL END OF LEFT RADIUS, INITIAL ENCOUNTER: Primary | ICD-10-CM

## 2024-02-22 PROCEDURE — 73110 X-RAY EXAM OF WRIST: CPT | Mod: TC | Performed by: RADIOLOGY

## 2024-02-22 PROCEDURE — 99204 OFFICE O/P NEW MOD 45 MIN: CPT | Mod: 57 | Performed by: FAMILY MEDICINE

## 2024-02-22 PROCEDURE — G2211 COMPLEX E/M VISIT ADD ON: HCPCS | Performed by: NURSE PRACTITIONER

## 2024-02-22 PROCEDURE — 25605 CLTX DST RDL FX/EPHYS SEP W/: CPT | Mod: LT | Performed by: FAMILY MEDICINE

## 2024-02-22 PROCEDURE — 73130 X-RAY EXAM OF HAND: CPT | Mod: TC | Performed by: RADIOLOGY

## 2024-02-22 PROCEDURE — 99213 OFFICE O/P EST LOW 20 MIN: CPT | Performed by: NURSE PRACTITIONER

## 2024-02-22 RX ORDER — FINASTERIDE 5 MG/1
5 TABLET, FILM COATED ORAL DAILY
Qty: 90 TABLET | Refills: 3 | Status: SHIPPED | OUTPATIENT
Start: 2024-02-22 | End: 2024-02-23

## 2024-02-22 NOTE — NURSING NOTE
Patient here to see [facility] and following rooming process at 2:30pm, writer exited room and heard a loud crashing sound from inside the room. Found patient sitting on the floor facing the door on bottom with legs outstretched on front of self. Stated that she fell due to getting tripped up while taking off her pants. Patient denied hitting her head but having pain to right wrist. A & O x 3 no apparent injury to extremities or torso, ROM per baseline.  Provider Zoila Bailey CNP in room to assist, noted slight irregularity to right wrist, ice applied. Patient assisted off floor with assist of one and to dermatology table. Patient became diaphoretic with dizziness, head lowered and ice packs applied to neck and back with feet elevated. After approximately 30 minuets, resident able to sit upright and take in sips of apple juice. Attempted x 3 to obtain BP with electric monitor without success, Systolic 150, P 66, R 16, O2 95%, rated pain at 0-1 on pain scale of 1-10. Sister Fiona called to transport patient to [facility] in [place]. Assited via wheelchair to lobby and sister then took over.     Linda PINEDO

## 2024-02-22 NOTE — PROGRESS NOTES
ASSESSMENT & PLAN    Sanam was seen today for pain.    Diagnoses and all orders for this visit:    Closed traumatic displaced fracture of distal end of left radius, initial encounter  -     XR Wrist Right G/E 3 Views; Future  -     Orthopedic  Referral; Future  -     Cast/splint application    Right wrist pain  -     Cancel: XR Wrist Right G/E 3 Views; Future  -     XR Hand Right G/E 3 Views; Future  -     XR Wrist Right G/E 3 Views; Future      This issue is acute and Worsening.    # Right Displaced Distal Radius Fracture: Nela Mares  was seen today for right wrist fracture. Symptoms had been going on since 1-2 hours before presentation today 2/22/2024 after a fall from standing. On examination there are positive findings of deformity of the distal radius with associated swelling and tenderness to palpation. Imaging findings showed dorsally angulated displaced distal radius fracture. Likely cause of patient's condition due to wrist fracture.  There is a concern that this may require operative interventions to maintain alignment.  Given this will attempt a closed reduction today with hematoma block and have her follow-up with orthopedic surgery next Wednesday, 2/28/2024.  Plan otherwise as below     Image Findings: Dorsally angulated displaced distal radius fracture, improved alignment after reduction  Treatment: Reverse sugar-tong splint placed today, improved alignment postreduction  Medications/Injections: Limited tylenol/ibuprofen for pain for 1-2 weeks, can message for a small prescription of Norco if pain intolerable, ultrasound-guided hematoma block completed today prior to reduction  Follow-up: Wednesday, 2/20/2024 with orthopedic surgery, order placed today    Carlos Flores MD  [facility]    -----  Chief Complaint   Patient presents with    Right Wrist - Pain       SUBJECTIVE  Nela Mares is a/an 63 year old female who is seen as a self  referral for evaluation of right wrist pain.     The patient is seen by themselves.  The patient is Right handed    Onset: few hour(s) ago. Patient describes injury as she was taking off her pants and tripped and fell.   Location of Pain: top of the wrist   Worsened by: moving the hand, bumping it   Better with: rest   Treatments tried: no treatment tried to date  Associated symptoms: swollen     Orthopedic/Surgical history: NO  Social History/Occupation: retired     No family history pertinent to patient's problem today.     REVIEW OF SYSTEMS:  Review of Systems  Constitutional, HEENT, cardiovascular, pulmonary, gi and gu systems are negative, except as otherwise noted.    OBJECTIVE:  /84   Pulse 65   Wt 109.8 kg (242 lb)   LMP 12/21/2014 (Exact Date)   BMI 35.74 kg/m     General: healthy, alert and in no distress  HEENT: no scleral icterus or conjunctival erythema  Skin: no suspicious lesions or rash. No jaundice.  CV: distal perfusion intact    Resp: normal respiratory effort without conversational dyspnea   Psych: normal mood and affect  Gait: normal steady gait with appropriate coordination and balance    Neuro: Normal light sensory exam of right upper extremity     Ortho Exam   RIGHT WRIST  Inspection:  Distal radius deformity with associated swelling   Palpation:    Tender about the distal radius. Remainder of bony and ligamentous line marks are nontender.     Metacarpals: normal    Thumb: normal    Fingers: normal  Range of Motion:    ROM (active and passive) limited in all planes secondary to pain  Strength:    Can fire m/r/ulnar groups in right hand but limited 2/2 known fracture.         RADIOLOGY:  I independently ordered, visualized and reviewed these images with the patient  Impacted, dorsally angulated, comminuted distal radius fracture      Review of external notes as documented elsewhere in note  Review of the result(s) of each unique test - right wrist x-rays       Disclaimer: This note  consists of symbols derived from keyboarding, dictation and/or voice recognition software. As a result, there may be errors in the script that have gone undetected. Please consider this when interpreting information found in this chart.    Cast/splint application    Date/Time: 2/22/2024 5:51 PM    Performed by: Carlos Flores MD  Authorized by: Carlos Flores MD    Consent:     Consent obtained:  Verbal    Consent given by:  Patient  Pre-procedure details:     Sensation:  Normal  Procedure details:     Laterality:  Right    Location:  Wrist    Splint type: reverse sugar tong.    Supplies:  Fiberglass  Post-procedure details:     Pain:  Improved    Sensation:  Normal    Patient tolerance of procedure:  Tolerated well, no immediate complications    Reduction:     After obtaining verbal and written consent after discussing risks and benefits of the procedure including improvement alignment, possible damage to blood vessel/tendon/nerves, hematoma block was completed using ultrasound guidance and 10 cc of lidocaine into the fracture site of the distal radius.  Patient was placed in finger traps for 15 minutes.  Afterwards a closed reduction attempt was completed with repeat x-ray showing improved alignment.  Patient was then splinted in a sugar-tong splint.  No complications.  Patient tolerated the procedure well with able to move fingers after the procedure.  Plan otherwise as above.

## 2024-02-22 NOTE — PATIENT INSTRUCTIONS
# Right Displaced Distal Radius Fracture: Nela Mares  was seen today for right wrist fracture. Symptoms had been going on since 1-2 hours before presentation today 2/22/2024 after a fall from standing. On examination there are positive findings of deformity of the distal radius with associated swelling and tenderness to palpation. Imaging findings showed dorsally angulated displaced distal radius fracture. Likely cause of patient's condition due to wrist fracture.  There is a concern that this may require operative interventions to maintain alignment.  Given this will attempt a closed reduction today with hematoma block and have her follow-up with orthopedic surgery next Wednesday, 2/28/2024.  Plan otherwise as below     Image Findings: Dorsally angulated displaced distal radius fracture, improved alignment after reduction  Treatment: Reverse sugar-tong splint placed today, improved alignment postreduction  Medications/Injections: Limited tylenol/ibuprofen for pain for 1-2 weeks, can message for a small prescription of Norco if pain intolerable, ultrasound-guided hematoma block completed today prior to reduction  Follow-up: Wednesday, 2/20/2024 with orthopedic surgery, order placed today    Please call [phone]   Ask for my team if you have any questions or concerns    If you have not yet received the influenza vaccine but would like to get one, please call  [phone] or you can schedule via [employer]    It was great seeing you today!    Carlos Folres MD, [facility]

## 2024-02-22 NOTE — LETTER
2/22/2024         RE: Nela Mares  [address] [address]        Dear Colleague,    Thank you for referring your patient, Nela Mares, to the [facility]. Please see a copy of my visit note below.    [facility] Dermatology Note  Encounter Date: Feb 22, 2024  Office Visit     Reviewed patients past medical history and pertinent chart review prior to patients visit today.     Dermatology Problem List:  Patient denies personal history of skin cancer or dysplastic nevi.    Positive history of skin cancer in her father, sister, and mother.  Types unknown but thinks they were nonmelanoma  Androgenetic alopecia, takes finasteride 5 mg daily  Lichen sclerosus, uses clobetasol  ____________________________________________    Assessment & Plan:     # Androgenetic alopecia.  Patient requesting refills of finasteride.  Patient takes 5 mg daily and has had no side effects. refills sent to pharmacy.    # Wrist deformity, no open fracture. Patient advised to seek care at orthopedic urgent care or emergency department.  Patient declined ambulance.  Apple juice and ice packs and assistance with dressing given.    # Benign skin findings including: seborrheic keratoses, cherry angioma, lentigines and benign nevi.   - No further intervention required. Patient to report changes.   - Patient reassured of the benign nature of these lesions.    #Signs and Symptoms of non-melanoma skin cancer and ABCDEs of melanoma reviewed with patient. Patient encouraged to perform monthly self skin exams and educated on how to perform them. UV precautions reviewed with patient. Patient was asked about new or changing moles/lesions on body.     #Reviewed Sunscreen: Apply 20 minutes prior to going outdoors and reapply every two hours, when wet or sweating. We recommend using an SPF 30 or higher, and to use one that is water resistant.       Follow-up: Follow-up in the next 3  months for lichen sclerosus examination and further treatment, 1-2 years for follow up full body skin exam, prn for new or changing lesions or new concerns    Zoila Bailey, [facility]UI  Dermatology     ____________________________________________    CC: Skin Check (Refill on Clobetasol and finasteride /Cyst on left hairline that would liked evaluated/)    HPI:  Ms. Nela Mares is a(n) 63 year old female who presents today as a return patient for a full body skin cancer screening. Patient has no specific concerns today.     She would like refills of the finasteride for her genetic hair loss.  She states that this is going well and she has never had any side effects.  Denies any cardiac problems or hypertension.    While undressing for her skin exam patient fell in the exam room and landed on her wrist.  Her wrist appeared to have internal deformity but there was no open fracture.  Patient was warm and clammy and given an ice pack for her wrist as well as on the back of her neck..  Apple juice was given to patient and she was laid flat on the exam table.  I called patients sister at patients request and she was going to come pick her up.  Patient declined an ambulance.  Her plan was to go to the [facility] urgent care which is open till 6 PM.  Assistance and redressing after skin exam by nurse. Nurse transported patient to the lobby in a wheelchair. Patient refused wheelchair to the parking lot and was handed off to her sister in the lobby at patients request.     Patient is otherwise feeling well, without additional skin concerns.     Physical Exam:  Vitals: LMP 12/21/2014 (Exact Date)   SKIN: Total skin excluding the genitalia areas was performed. The exam included the head/face, neck, both arms, chest, back, abdomen, both legs, digits, mons pubis, buttock and nails.   -right wrist deformity, no breakage of skin  -hair thinning but no erythema or scale of the scalp  -several 1-2mm red dome shaped  symmetric papules scattered on the trunk  -multiple tan/brown flat round macules and raised papules scattered throughout trunk, extremities and head. No worrisome features for malignancy noted on examination.  -scattered tan, homogenous macules scattered on sun exposed areas of trunk, extremities and face.   -scattered waxy, stuck on tan/brown papules and patches on the trunk     - No other lesions of concern on areas examined.     Medications:  Current Outpatient Medications   Medication     clobetasol (TEMOVATE) 0.05 % external ointment     finasteride (PROSCAR) 5 MG tablet     albuterol (PROAIR HFA/PROVENTIL HFA/VENTOLIN HFA) 108 (90 Base) MCG/ACT inhaler     albuterol (PROVENTIL) (2.5 MG/3ML) 0.083% neb solution     ARIPiprazole (ABILIFY) 10 MG tablet     cetirizine (ZYRTEC) 10 MG tablet     Cholecalciferol (VITAMIN D) 125 MCG (5000 UT) capsule     fluticasone (FLONASE) 50 MCG/ACT nasal spray     fluticasone-salmeterol (ADVAIR) 500-50 MCG/ACT inhaler     lisinopril (ZESTRIL) 40 MG tablet     montelukast (SINGULAIR) 10 MG tablet     venlafaxine (EFFEXOR-ER) 225 MG 24 hr tablet     No current facility-administered medications for this visit.      Past Medical History:   Patient Active Problem List   Diagnosis     Allergic rhinitis due to animals     Mild persistent asthma     Obstructive sleep apnea     Leiomyoma of uterus     Mild major depression (H)     CARDIOVASCULAR SCREENING; LDL GOAL LESS THAN 160     Hypertension goal BP (blood pressure) < 140/90     Pilar cyst     Hypertrophy of breast     IUD (intrauterine device) in place     Seasonal allergic rhinitis due to pollen     Allergic rhinitis due to dust mite     Allergic rhinitis due to mold     Overweight (H)     Impaired fasting glucose     Allergic conjunctivitis, bilateral     Paroxysmal supraventricular tachycardia     Chronic midline low back pain without sciatica     Primary osteoarthritis of both knees     Diabetes mellitus, type 2 (H)     Lichen  sclerosus     Encounter for pharmacogenetic testing     Past Medical History:   Diagnosis Date     Allergic rhinitis, cause unspecified      Cervical high risk HPV (human papillomavirus) test positive 03/21/2017    3/21/17 NIL pap/+ HR HPV (not 16 or 18).      Depressive disorder      Depressive disorder, not elsewhere classified     seasonal     Diabetes mellitus, type 2 (H) 2/20/2023     Hypertension      Mild persistent asthma      Obstructive sleep apnea (adult) (pediatric)     uses CPAP     Primary osteoarthritis of both knees 1/28/2022     Scalp mass 7/2014       CC Referred Self, MD  No address on file on close of this encounter.      Again, thank you for allowing me to participate in the care of your patient.        Sincerely,        RAYMOND Gautam CNP

## 2024-02-22 NOTE — PROGRESS NOTES
[facility] Dermatology Note  Encounter Date: Feb 22, 2024  Office Visit     Reviewed patients past medical history and pertinent chart review prior to patients visit today.     Dermatology Problem List:  Patient denies personal history of skin cancer or dysplastic nevi.    Positive history of skin cancer in her father, sister, and mother.  Types unknown but thinks they were nonmelanoma  Androgenetic alopecia, takes finasteride 5 mg daily  Lichen sclerosus, uses clobetasol  ____________________________________________    Assessment & Plan:     # Androgenetic alopecia.  Patient requesting refills of finasteride.  Patient takes 5 mg daily and has had no side effects. refills sent to pharmacy.    # Wrist deformity, no open fracture. Patient advised to seek care at orthopedic urgent care or emergency department.  Patient declined ambulance.  Apple juice and ice packs and assistance with dressing given.    # Benign skin findings including: seborrheic keratoses, cherry angioma, lentigines and benign nevi.   - No further intervention required. Patient to report changes.   - Patient reassured of the benign nature of these lesions.    #Signs and Symptoms of non-melanoma skin cancer and ABCDEs of melanoma reviewed with patient. Patient encouraged to perform monthly self skin exams and educated on how to perform them. UV precautions reviewed with patient. Patient was asked about new or changing moles/lesions on body.     #Reviewed Sunscreen: Apply 20 minutes prior to going outdoors and reapply every two hours, when wet or sweating. We recommend using an SPF 30 or higher, and to use one that is water resistant.       Follow-up: Follow-up in the next 3 months for lichen sclerosus examination and further treatment, 1-2 years for follow up full body skin exam, prn for new or changing lesions or new concerns    Zoila Bailey CNP  Dermatology     ____________________________________________    CC: Skin Check (Refill  on Clobetasol and finasteride /Cyst on left hairline that would liked evaluated/)    HPI:  Ms. Nela Mares is a(n) 63 year old female who presents today as a return patient for a full body skin cancer screening. Patient has no specific concerns today.     She would like refills of the finasteride for her genetic hair loss.  She states that this is going well and she has never had any side effects.  Denies any cardiac problems or hypertension.    While undressing for her skin exam patient fell in the exam room and landed on her wrist.  Her wrist appeared to have internal deformity but there was no open fracture.  Patient was warm and clammy and given an ice pack for her wrist as well as on the back of her neck..  Apple juice was given to patient and she was laid flat on the exam table.  I called patients sister at patients request and she was going to come pick her up.  Patient declined an ambulance.  Her plan was to go to the [facility] orthopedic urgent care which is open till 6 PM.  Assistance and redressing after skin exam by nurse. Nurse transported patient to the lobby in a wheelchair. Patient refused wheelchair to the parking lot and was handed off to her sister in the lobby at patients request.     Patient is otherwise feeling well, without additional skin concerns.     Physical Exam:  Vitals: LMP 12/21/2014 (Exact Date)   SKIN: Total skin excluding the genitalia areas was performed. The exam included the head/face, neck, both arms, chest, back, abdomen, both legs, digits, mons pubis, buttock and nails.   -right wrist deformity, no breakage of skin  -hair thinning but no erythema or scale of the scalp  -several 1-2mm red dome shaped symmetric papules scattered on the trunk  -multiple tan/brown flat round macules and raised papules scattered throughout trunk, extremities and head. No worrisome features for malignancy noted on examination.  -scattered tan, homogenous macules scattered on sun exposed  areas of trunk, extremities and face.   -scattered waxy, stuck on tan/brown papules and patches on the trunk     - No other lesions of concern on areas examined.     Medications:  Current Outpatient Medications   Medication    clobetasol (TEMOVATE) 0.05 % external ointment    finasteride (PROSCAR) 5 MG tablet    albuterol (PROAIR HFA/PROVENTIL HFA/VENTOLIN HFA) 108 (90 Base) MCG/ACT inhaler    albuterol (PROVENTIL) (2.5 MG/3ML) 0.083% neb solution    ARIPiprazole (ABILIFY) 10 MG tablet    cetirizine (ZYRTEC) 10 MG tablet    Cholecalciferol (VITAMIN D) 125 MCG (5000 UT) capsule    fluticasone (FLONASE) 50 MCG/ACT nasal spray    fluticasone-salmeterol (ADVAIR) 500-50 MCG/ACT inhaler    lisinopril (ZESTRIL) 40 MG tablet    montelukast (SINGULAIR) 10 MG tablet    venlafaxine (EFFEXOR-ER) 225 MG 24 hr tablet     No current facility-administered medications for this visit.      Past Medical History:   Patient Active Problem List   Diagnosis    Allergic rhinitis due to animals    Mild persistent asthma    Obstructive sleep apnea    Leiomyoma of uterus    Mild major depression (H)    CARDIOVASCULAR SCREENING; LDL GOAL LESS THAN 160    Hypertension goal BP (blood pressure) < 140/90    Pilar cyst    Hypertrophy of breast    IUD (intrauterine device) in place    Seasonal allergic rhinitis due to pollen    Allergic rhinitis due to dust mite    Allergic rhinitis due to mold    Overweight (H)    Impaired fasting glucose    Allergic conjunctivitis, bilateral    Paroxysmal supraventricular tachycardia    Chronic midline low back pain without sciatica    Primary osteoarthritis of both knees    Diabetes mellitus, type 2 (H)    Lichen sclerosus    Encounter for pharmacogenetic testing     Past Medical History:   Diagnosis Date    Allergic rhinitis, cause unspecified     Cervical high risk HPV (human papillomavirus) test positive 03/21/2017    3/21/17 NIL pap/+ HR HPV (not 16 or 18).     Depressive disorder     Depressive disorder, not  elsewhere classified     seasonal    Diabetes mellitus, type 2 (H) 2/20/2023    Hypertension     Mild persistent asthma     Obstructive sleep apnea (adult) (pediatric)     uses CPAP    Primary osteoarthritis of both knees 1/28/2022    Scalp mass 7/2014       CC Referred Self, MD  No address on file on close of this encounter.   No

## 2024-02-22 NOTE — Clinical Note
2/22/2024         RE: Nela Mares  [address] [address]        Dear Colleague,    Thank you for referring your patient, Nela Mares, to the [facility]. Please see a copy of my visit note below.    ASSESSMENT & PLAN    There are no diagnoses linked to this encounter.  This issue is acute and Worsening.        Carlos Flores MD  [facility]    -----  Chief Complaint   Patient presents with    Right Wrist - Pain       SUBJECTIVE  Nela Mares is a/an 63 year old female who is seen as a self referral for evaluation of right wrist pain.     The patient is seen by themselves.  The patient is Right handed    Onset: few hour(s) ago. Patient describes injury as she was taking off her pants and tripped and fell.   Location of Pain: top of the wrist   Worsened by: moving the hand, bumping it   Better with: rest   Treatments tried: no treatment tried to date  Associated symptoms: swollen     Orthopedic/Surgical history: NO  Social History/Occupation: retired     No family history pertinent to patient's problem today.     REVIEW OF SYSTEMS:  Review of Systems  {ROS COMP (Optional):523421}    OBJECTIVE:  Wt 109.8 kg (242 lb)   LMP 12/21/2014 (Exact Date)   BMI 35.74 kg/m     General: healthy, alert and in no distress  HEENT: no scleral icterus or conjunctival erythema  Skin: no suspicious lesions or rash. No jaundice.  CV: distal perfusion intact    Resp: normal respiratory effort without conversational dyspnea   Psych: normal mood and affect  Gait: normal steady gait with appropriate coordination and balance    Neuro: Normal light sensory exam of right upper extremity     Ortho Exam   RIGHT WRIST  Inspection:    No swelling or obvious deformity or asymmetry  Palpation:    Tender about the {FSOC WRIST PALPATION:468482:a}. Remainder of bony and ligamentous line marks are nontender.     Metacarpals: {FSOC  Cardiac diet     "METACARPALS REV:472877}    Thumb: {FSOC THUMB REV:795789}    Fingers: {FSOC FINGER REV:131810}  Range of Motion:    {FSOC WRIST ROM REV:317239}  Strength:    {FSOC WRIST STRENGTH REV:978709::\"No deficits in flexion, extension, ulnar/radial deviation, or  strength.\"}. Normal pinch strength.  Special Tests:    Positive: {FSOC WRIST SPECIAL TESTS REV:286278}    Negative: {FSOC WRIST SPECIAL TESTS REV:438533}.     RADIOLOGY:  I independently ordered, visualized and reviewed these images with the patient  Impacted, dorsally angulated, comminuted distal radius fracture      Review of external notes as documented elsewhere in note  Review of the result(s) of each unique test - right wrist x-rays       Disclaimer: This note consists of symbols derived from keyboarding, dictation and/or voice recognition software. As a result, there may be errors in the script that have gone undetected. Please consider this when interpreting information found in this chart.        Again, thank you for allowing me to participate in the care of your patient.        Sincerely,        Carlos Flores MD  "

## 2024-02-23 RX ORDER — FINASTERIDE 5 MG/1
5 TABLET, FILM COATED ORAL DAILY
Qty: 90 TABLET | Refills: 3 | Status: SHIPPED | OUTPATIENT
Start: 2024-02-23

## 2024-02-26 NOTE — PROGRESS NOTES
Chief Complaint:   Chief Complaint   Patient presents with    Right Wrist - Pain     Right distal radius fracture. DOI: 2/22/24. Fell at dermatologist office. RHD. Denies any right hand injuries in the past.       INJURY: comminuted, displaced intra-articular right distal radius fracture, ulnar styloid fracture  DATE of INJURY: 2/22/2024        Nela Mares is seen today in the [facility] for evaluation of right wrist fracture  at the request of Dr. Carlos Flores       HPI: Nela Mares is a 63 year old female , right -hand dominant, who presents for evaluation and management of a right wrist injury. She injured her hand on 2/22/2024 while falling at the dermatologist office. She was seen in Sports Medicine, noting a comminuted, displaced wrist fracture. Was reduced and splinted. Presents today for management. Continued but manageable pain with tylenol. Denies other injury. Denies numbness and tingling.      It has been 6 days since the initial injury.      Sanam is diabetic, A1c=6.0 on 8/30/2023.      She reports having moderate  pain/discomfort around the injury site. She denies associated numbness or tingling. She denies any other injuries to her upper extremity.   Symptoms: pain, swelling.  Location of symptoms: right wrist.  Pain severity: 5/10  Pain quality: dull and aching  Frequency of symptoms: are constant  Aggravating factors: movement, pressure.  Relieving factors: splint, tylenol, elevation..    Previous treatment: closed reduction, splinting.    Past medical history:  has a past medical history of Allergic rhinitis, cause unspecified, Cervical high risk HPV (human papillomavirus) test positive (03/21/2017), Depressive disorder, Depressive disorder, not elsewhere classified, Diabetes mellitus, type 2 (H) (2/20/2023), Hypertension, Mild persistent asthma, Obstructive sleep apnea (adult) (pediatric), Primary osteoarthritis of both knees (1/28/2022), and Scalp  mass (7/2014).    She has no past medical history of Basal cell carcinoma, Cancer (H), Cerebral infarction (H), Congestive heart failure (H), COPD (chronic obstructive pulmonary disease) (H), History of blood transfusion, Malignant melanoma (H), Squamous cell carcinoma, or Thyroid disease.   Patient Active Problem List    Diagnosis Date Noted    Encounter for pharmacogenetic testing 11/01/2023     Priority: Medium     [id number] rapid metabolizer  CYP2C9 intermediate metabolizer      Diabetes mellitus, type 2 (H) 02/20/2023     Priority: Medium    Lichen sclerosus 02/20/2023     Priority: Medium    Chronic midline low back pain without sciatica 01/28/2022     Priority: Medium    Primary osteoarthritis of both knees 01/28/2022     Priority: Medium    Paroxysmal supraventricular tachycardia 05/10/2021     Priority: Medium     Added automatically from request for surgery [id number]      Allergic conjunctivitis, bilateral 08/27/2019     Priority: Medium    Overweight (H) 07/18/2019     Priority: Medium    Impaired fasting glucose 07/18/2019     Priority: Medium    Seasonal allergic rhinitis due to pollen 10/18/2018     Priority: Medium    Allergic rhinitis due to dust mite 10/18/2018     Priority: Medium    Allergic rhinitis due to mold 10/18/2018     Priority: Medium    IUD (intrauterine device) in place 03/21/2017     Priority: Medium     mirena place in 2015      Hypertrophy of breast 11/24/2015     Priority: Medium    Pilar cyst 07/14/2014     Priority: Medium    Hypertension goal BP (blood pressure) < 140/90 10/25/2010     Priority: Medium    CARDIOVASCULAR SCREENING; LDL GOAL LESS THAN 160 05/09/2010     Priority: Medium    Mild major depression (H)      Priority: Medium     Has used paxil, celexa, lexapro, and zoloft in the past.  Zoloft worked well although spring of 2017 symptoms worsened despite max dose.  Switched to wellbutrin - but wellbutrin triggered anxiety and rage.    Will plan to start Effexor in the fall  before school year starts.      Leiomyoma of uterus 08/14/2007     Priority: Medium     Problem list name updated by automated process. Provider to review      Obstructive sleep apnea      Priority: Medium     uses CPAP          Mild persistent asthma 10/11/2005     Priority: Medium     Typically needs steroid burst about once per year for symptoms uncontrolled with continued advair and maxair q 4 hours.  Peak flow generally runs 400. With illness goes down to 350.    12/09 - given one extra burst of prednisone.  Instructed to call if she starts it so we can help her monitor her symptoms.      Allergic rhinitis due to animals 08/31/2004     Priority: Medium       Past surgical history:  has a past surgical history that includes no history of surgery; d & c; Colonoscopy (6/21/2012); Laparoscopic cholecystectomy with cholangiograms (1/4/2014); Endoscopic retrograde cholangiopancreatogram (1/4/2014); Operative hysteroscopy (6/17/2014); Remove intrauterine device (6/17/2014); Ablation Supraventricular Tachycardia (N/A, 6/2/2021); Colonoscopy (N/A, 10/20/2022); Colonoscopy (N/A, 3/20/2023); and Mammoplasty reduction bilateral (Bilateral, 6/28/2023).     Medications:    Current Outpatient Medications   Medication Sig Dispense Refill    albuterol (PROAIR HFA/PROVENTIL HFA/VENTOLIN HFA) 108 (90 Base) MCG/ACT inhaler Inhale 2 puffs into the lungs every 4 hours as needed for shortness of breath or wheezing (Patient not taking: Reported on 2/5/2024) 36 g 1    albuterol (PROVENTIL) (2.5 MG/3ML) 0.083% neb solution Take 1 vial (2.5 mg) by nebulization every 4 hours as needed for shortness of breath / dyspnea or wheezing 30 mL 11    ARIPiprazole (ABILIFY) 10 MG tablet Take 1 tablet (10 mg) by mouth daily 90 tablet 0    cetirizine (ZYRTEC) 10 MG tablet Take 1 tablet (10 mg) by mouth daily 90 tablet 3    Cholecalciferol (VITAMIN D) 125 MCG (5000 UT) capsule Take 1 capsule (5,000 Units) by mouth daily 90 capsule 3    clobetasol  (TEMOVATE) 0.05 % external ointment Apply twice daily to affected area for 2 weeks then 2-3 times weekly. (Patient taking differently: Apply twice daily to affected area for 2 weeks then 2-3 times weekly.  Using daily per pt on visit 2/22/24) 60 g 0    finasteride (PROSCAR) 5 MG tablet Take 1 tablet (5 mg) by mouth daily 90 tablet 3    fluticasone (FLONASE) 50 MCG/ACT nasal spray Spray 2 sprays into both nostrils daily 48 g 3    fluticasone-salmeterol (ADVAIR) 500-50 MCG/ACT inhaler Inhale 1 puff into the lungs every 12 hours Needs appointment for additional refills 3 each 0    lisinopril (ZESTRIL) 40 MG tablet Take 1 tablet (40 mg) by mouth daily 90 tablet 3    montelukast (SINGULAIR) 10 MG tablet Take 1 tablet (10 mg) by mouth daily 90 tablet 3    venlafaxine (EFFEXOR-ER) 225 MG 24 hr tablet Take 1 tablet (225 mg) by mouth daily 90 tablet 0        Allergies:   No Known Allergies     Family History: family history includes Anxiety Disorder in her mother, nephew, and nephew; Asthma in her father, nephew, nephew, sister, and sister; C.A.D. in her paternal grandfather and paternal grandmother; Cancer in her sister; Cerebrovascular Disease in her paternal grandfather and paternal grandmother; Depression in her maternal grandmother, mother, nephew, and paternal grandmother; Diabetes in her father; Hyperlipidemia in her mother; Hypertension in her father; Mental Illness in her maternal half-brother and paternal half-brother; Neurologic Disorder in her mother; Obesity in her father and paternal grandmother; Other - See Comments in an other family member; Skin Cancer in her father, mother, and sister; Substance Abuse in her nephew, nephew, and paternal grandfather; Thyroid Disease in her maternal half-sister, mother, and paternal half-sister.     Social History:  reports that she has never smoked. She has never used smokeless tobacco. She reports that she does not drink alcohol and does not use drugs.    Review of Systems:  " ROS: 10 point ROS neg other than the symptoms noted above in the HPI and past medical history.    Physical Exam  GENERAL APPEARANCE: healthy, alert, no distress. Accompanied by her sister.  SKIN: no suspicious lesions or rashes  NEURO: Normal strength and tone, mentation intact and speech normal  PSYCH:  mentation appears normal and affect normal. Not anxious.  RESPIRATORY: No increased work of breathing.    Ht 1.753 m (5' 9\")   Wt 109.8 kg (242 lb)   LMP 12/21/2014 (Exact Date)   BMI 35.74 kg/m       right WRIST/HAND EXAM:    The splint was removed    Skin intact. Mild patchy ecchymosis.   Normal wear pattern, color and tone.    There is mild swelling in the hand, fingers, wrist of the right upper extremity .  There is moderate tenderness in the distal radius, distal ulna of the wrist.  There is mild ecchymosis.  There is no erythema of the surrounding skin.  There is no maceration of the skin.  There is no gross deformity in the area.    Intact sensation light touch median, radial, ulnar nerves of the hand  Intact sensation to the radial and ulnar digital nerves of the fingers, as well as the finger tips.  Intact epl fpl fdp edc wrist flexion/extension biceps/triceps deltoid  Brisk capillary refill to all fingers.   Palpable radial pulse, 2+.    X-rays:  3 views right wrist from 2/28/2024 were reviewed in clinic today. On my review, there is an impacted, displaced, comminuted, intra-articular fracture of the right distal radius (somewhat inverted \"Y\" type pattern).  There is shortening. There is articular stepoff with slight gapping. There is displaced ulnar styloid fracture. There is improved alignment from injury images 2/22/2024, at which time there was dorsal angulation 20 degrees from neutral and impaction.    Assessment: 62yo RHD female with comminuted, displaced, impacted, intra-articular distal radius fracture status post fall.    Plan:  * I discussed injury, images with the patient and based on the " amount of displacement and/or angulation. Recommend open-reduction, internal fixation to improve alignment, with long-term concerns of malunion and functional limitation given current alignment if treated nonoperatively.  * risks and benefits of both surgical (orif) and nonsurgical (cast) were discussed. In particular, risks of nonop included, but not limited to, nonunion, malunion, joint stiffness, decr range of motion, post-traumatic arthritis and pain and possible functional limitations. Risks of surgery include, but not limited to: bleeding, infection, pain, scar, damage to adjacent structures (e.g. Nerves, blood vessels, bone, cartilage), temporary or permanent nerve damage, recurrence of symptoms, non-union, malunion, implant failure, stiffness, post-traumatic arthritis, need for further surgery, blood clots, pulmonary embolism, risks of anesthesia, death.  * after reviewing the risks and perceived benefits of each, patient would like to proceed with surgical stabilization  * will plan open-reduction, internal fixation of RIGHT wrist fracture on 3/1/2024, outpatient procedure, [facility] [place] .  * will need H+P prior to surgery from pcp  * will see patient 2 weeks postoperative with xrays of right wrist out of splint  * patient to call if any questions or concerns in the meantime.  * strict elevation of right upper extremity  *  Non weight bearing right upper extremity.  * immobilization in splint at all times.       Andrea Escalante M.D., M.S.  Dept. of Orthopaedic Surgery  [facility]

## 2024-02-28 ENCOUNTER — ANCILLARY PROCEDURE (OUTPATIENT)
Dept: GENERAL RADIOLOGY | Facility: CLINIC | Age: 64
End: 2024-02-28
Attending: PHYSICIAN ASSISTANT
Payer: COMMERCIAL

## 2024-02-28 ENCOUNTER — OFFICE VISIT (OUTPATIENT)
Dept: ORTHOPEDICS | Facility: CLINIC | Age: 64
End: 2024-02-28
Payer: COMMERCIAL

## 2024-02-28 ENCOUNTER — TELEPHONE (OUTPATIENT)
Dept: SURGERY | Facility: CLINIC | Age: 64
End: 2024-02-28

## 2024-02-28 ENCOUNTER — ANESTHESIA EVENT (OUTPATIENT)
Dept: SURGERY | Facility: AMBULATORY SURGERY CENTER | Age: 64
End: 2024-02-28
Payer: COMMERCIAL

## 2024-02-28 VITALS — WEIGHT: 242 LBS | BODY MASS INDEX: 35.84 KG/M2 | HEIGHT: 69 IN

## 2024-02-28 DIAGNOSIS — S52.502A CLOSED TRAUMATIC DISPLACED FRACTURE OF DISTAL END OF LEFT RADIUS, INITIAL ENCOUNTER: Primary | ICD-10-CM

## 2024-02-28 DIAGNOSIS — S52.502A CLOSED TRAUMATIC DISPLACED FRACTURE OF DISTAL END OF LEFT RADIUS, INITIAL ENCOUNTER: ICD-10-CM

## 2024-02-28 PROCEDURE — 73110 X-RAY EXAM OF WRIST: CPT | Mod: TC | Performed by: RADIOLOGY

## 2024-02-28 PROCEDURE — 99204 OFFICE O/P NEW MOD 45 MIN: CPT | Mod: 57 | Performed by: ORTHOPAEDIC SURGERY

## 2024-02-28 ASSESSMENT — PAIN SCALES - GENERAL: PAINLEVEL: MODERATE PAIN (5)

## 2024-02-28 ASSESSMENT — PATIENT HEALTH QUESTIONNAIRE - PHQ9
SUM OF ALL RESPONSES TO PHQ QUESTIONS 1-9: 0
10. IF YOU CHECKED OFF ANY PROBLEMS, HOW DIFFICULT HAVE THESE PROBLEMS MADE IT FOR YOU TO DO YOUR WORK, TAKE CARE OF THINGS AT HOME, OR GET ALONG WITH OTHER PEOPLE: NOT DIFFICULT AT ALL

## 2024-02-28 NOTE — TELEPHONE ENCOUNTER
Type of surgery: OPEN REDUCTION INTERNAL FIXATION, FRACTURE, RIGHT WRIST (Right)   Location of surgery: [facility]  Date and time of surgery: 03/01/2024  Surgeon: SHANTI   Pre-Op Appt Date: 02/29/2024  Post-Op Appt Date: 03/18/2024   Packet sent out: Yes  Pre-cert/Authorization completed:  No  Date:

## 2024-02-28 NOTE — LETTER
2/28/2024         RE: Nela Mares  [address] [id number]        Dear Colleague,    Thank you for referring your patient, Nela Mares, to the [facility] [place]. Please see a copy of my visit note below.    Chief Complaint:   Chief Complaint   Patient presents with     Right Wrist - Pain     Right distal radius fracture. DOI: 2/22/24. Fell at dermatologist office. RHD. Denies any right hand injuries in the past.       INJURY: comminuted, displaced intra-articular right distal radius fracture, ulnar styloid fracture  DATE of INJURY: 2/22/2024        Nela Mares is seen today in the [facility] for evaluation of right wrist fracture  at the request of Dr. Carlos Flores       HPI: Nela Mares is a 63 year old female , right -hand dominant, who presents for evaluation and management of a right wrist injury. She injured her hand on 2/22/2024 while falling at the dermatologist office. She was seen in Sports Medicine, noting a comminuted, displaced wrist fracture. Was reduced and splinted. Presents today for management. Continued but manageable pain with tylenol. Denies other injury. Denies numbness and tingling.      It has been 6 days since the initial injury.      Sanam is diabetic, A1c=6.0 on 8/30/2023.      She reports having moderate  pain/discomfort around the injury site. She denies associated numbness or tingling. She denies any other injuries to her upper extremity.   Symptoms: pain, swelling.  Location of symptoms: right wrist.  Pain severity: 5/10  Pain quality: dull and aching  Frequency of symptoms: are constant  Aggravating factors: movement, pressure.  Relieving factors: splint, tylenol, elevation..    Previous treatment: closed reduction, splinting.    Past medical history:  has a past medical history of Allergic rhinitis, cause unspecified, Cervical high risk HPV (human papillomavirus) test  positive (03/21/2017), Depressive disorder, Depressive disorder, not elsewhere classified, Diabetes mellitus, type 2 (H) (2/20/2023), Hypertension, Mild persistent asthma, Obstructive sleep apnea (adult) (pediatric), Primary osteoarthritis of both knees (1/28/2022), and Scalp mass (7/2014).    She has no past medical history of Basal cell carcinoma, Cancer (H), Cerebral infarction (H), Congestive heart failure (H), COPD (chronic obstructive pulmonary disease) (H), History of blood transfusion, Malignant melanoma (H), Squamous cell carcinoma, or Thyroid disease.   Patient Active Problem List    Diagnosis Date Noted     Encounter for pharmacogenetic testing 11/01/2023     Priority: Medium     [id number] rapid metabolizer  CYP2C9 intermediate metabolizer       Diabetes mellitus, type 2 (H) 02/20/2023     Priority: Medium     Lichen sclerosus 02/20/2023     Priority: Medium     Chronic midline low back pain without sciatica 01/28/2022     Priority: Medium     Primary osteoarthritis of both knees 01/28/2022     Priority: Medium     Paroxysmal supraventricular tachycardia 05/10/2021     Priority: Medium     Added automatically from request for surgery [id number]       Allergic conjunctivitis, bilateral 08/27/2019     Priority: Medium     Overweight (H) 07/18/2019     Priority: Medium     Impaired fasting glucose 07/18/2019     Priority: Medium     Seasonal allergic rhinitis due to pollen 10/18/2018     Priority: Medium     Allergic rhinitis due to dust mite 10/18/2018     Priority: Medium     Allergic rhinitis due to mold 10/18/2018     Priority: Medium     IUD (intrauterine device) in place 03/21/2017     Priority: Medium     mirena place in 2015       Hypertrophy of breast 11/24/2015     Priority: Medium     Pilar cyst 07/14/2014     Priority: Medium     Hypertension goal BP (blood pressure) < 140/90 10/25/2010     Priority: Medium     CARDIOVASCULAR SCREENING; LDL GOAL LESS THAN 160 05/09/2010     Priority: Medium     Mild  major depression (H)      Priority: Medium     Has used paxil, celexa, lexapro, and zoloft in the past.  Zoloft worked well although spring of 2017 symptoms worsened despite max dose.  Switched to wellbutrin - but wellbutrin triggered anxiety and rage.    Will plan to start Effexor in the fall before school year starts.       Leiomyoma of uterus 08/14/2007     Priority: Medium     Problem list name updated by automated process. Provider to review       Obstructive sleep apnea      Priority: Medium     uses CPAP           Mild persistent asthma 10/11/2005     Priority: Medium     Typically needs steroid burst about once per year for symptoms uncontrolled with continued advair and maxair q 4 hours.  Peak flow generally runs 400. With illness goes down to 350.    12/09 - given one extra burst of prednisone.  Instructed to call if she starts it so we can help her monitor her symptoms.       Allergic rhinitis due to animals 08/31/2004     Priority: Medium       Past surgical history:  has a past surgical history that includes no history of surgery; d & c; Colonoscopy (6/21/2012); Laparoscopic cholecystectomy with cholangiograms (1/4/2014); Endoscopic retrograde cholangiopancreatogram (1/4/2014); Operative hysteroscopy (6/17/2014); Remove intrauterine device (6/17/2014); Ablation Supraventricular Tachycardia (N/A, 6/2/2021); Colonoscopy (N/A, 10/20/2022); Colonoscopy (N/A, 3/20/2023); and Mammoplasty reduction bilateral (Bilateral, 6/28/2023).     Medications:    Current Outpatient Medications   Medication Sig Dispense Refill     albuterol (PROAIR HFA/PROVENTIL HFA/VENTOLIN HFA) 108 (90 Base) MCG/ACT inhaler Inhale 2 puffs into the lungs every 4 hours as needed for shortness of breath or wheezing (Patient not taking: Reported on 2/5/2024) 36 g 1     albuterol (PROVENTIL) (2.5 MG/3ML) 0.083% neb solution Take 1 vial (2.5 mg) by nebulization every 4 hours as needed for shortness of breath / dyspnea or wheezing 30 mL 11      ARIPiprazole (ABILIFY) 10 MG tablet Take 1 tablet (10 mg) by mouth daily 90 tablet 0     cetirizine (ZYRTEC) 10 MG tablet Take 1 tablet (10 mg) by mouth daily 90 tablet 3     Cholecalciferol (VITAMIN D) 125 MCG (5000 UT) capsule Take 1 capsule (5,000 Units) by mouth daily 90 capsule 3     clobetasol (TEMOVATE) 0.05 % external ointment Apply twice daily to affected area for 2 weeks then 2-3 times weekly. (Patient taking differently: Apply twice daily to affected area for 2 weeks then 2-3 times weekly.  Using daily per pt on visit 2/22/24) 60 g 0     finasteride (PROSCAR) 5 MG tablet Take 1 tablet (5 mg) by mouth daily 90 tablet 3     fluticasone (FLONASE) 50 MCG/ACT nasal spray Spray 2 sprays into both nostrils daily 48 g 3     fluticasone-salmeterol (ADVAIR) 500-50 MCG/ACT inhaler Inhale 1 puff into the lungs every 12 hours Needs appointment for additional refills 3 each 0     lisinopril (ZESTRIL) 40 MG tablet Take 1 tablet (40 mg) by mouth daily 90 tablet 3     montelukast (SINGULAIR) 10 MG tablet Take 1 tablet (10 mg) by mouth daily 90 tablet 3     venlafaxine (EFFEXOR-ER) 225 MG 24 hr tablet Take 1 tablet (225 mg) by mouth daily 90 tablet 0        Allergies:   No Known Allergies     Family History: family history includes Anxiety Disorder in her mother, nephew, and nephew; Asthma in her father, nephew, nephew, sister, and sister; C.A.D. in her paternal grandfather and paternal grandmother; Cancer in her sister; Cerebrovascular Disease in her paternal grandfather and paternal grandmother; Depression in her maternal grandmother, mother, nephew, and paternal grandmother; Diabetes in her father; Hyperlipidemia in her mother; Hypertension in her father; Mental Illness in her maternal half-brother and paternal half-brother; Neurologic Disorder in her mother; Obesity in her father and paternal grandmother; Other - See Comments in an other family member; Skin Cancer in her father, mother, and sister; Substance Abuse in  "her nephew, nephew, and paternal grandfather; Thyroid Disease in her maternal half-sister, mother, and paternal half-sister.     Social History:  reports that she has never smoked. She has never used smokeless tobacco. She reports that she does not drink alcohol and does not use drugs.    Review of Systems:  ROS: 10 point ROS neg other than the symptoms noted above in the HPI and past medical history.    Physical Exam  GENERAL APPEARANCE: healthy, alert, no distress. Accompanied by her sister.  SKIN: no suspicious lesions or rashes  NEURO: Normal strength and tone, mentation intact and speech normal  PSYCH:  mentation appears normal and affect normal. Not anxious.  RESPIRATORY: No increased work of breathing.    Ht 1.753 m (5' 9\")   Wt 109.8 kg (242 lb)   LMP 12/21/2014 (Exact Date)   BMI 35.74 kg/m       right WRIST/HAND EXAM:    The splint was removed    Skin intact. Mild patchy ecchymosis.   Normal wear pattern, color and tone.    There is mild swelling in the hand, fingers, wrist of the right upper extremity .  There is moderate tenderness in the distal radius, distal ulna of the wrist.  There is mild ecchymosis.  There is no erythema of the surrounding skin.  There is no maceration of the skin.  There is no gross deformity in the area.    Intact sensation light touch median, radial, ulnar nerves of the hand  Intact sensation to the radial and ulnar digital nerves of the fingers, as well as the finger tips.  Intact epl fpl fdp edc wrist flexion/extension biceps/triceps deltoid  Brisk capillary refill to all fingers.   Palpable radial pulse, 2+.    X-rays:  3 views right wrist from 2/28/2024 were reviewed in clinic today. On my review, there is an impacted, displaced, comminuted, intra-articular fracture of the right distal radius (somewhat inverted \"Y\" type pattern).  There is shortening. There is articular stepoff with slight gapping. There is displaced ulnar styloid fracture. There is improved alignment " from injury images 2/22/2024, at which time there was dorsal angulation 20 degrees from neutral and impaction.    Assessment: 64yo RHD female with comminuted, displaced, impacted, intra-articular distal radius fracture status post fall.    Plan:  * I discussed injury, images with the patient and based on the amount of displacement and/or angulation. Recommend open-reduction, internal fixation to improve alignment, with long-term concerns of malunion and functional limitation given current alignment if treated nonoperatively.  * risks and benefits of both surgical (orif) and nonsurgical (cast) were discussed. In particular, risks of nonop included, but not limited to, nonunion, malunion, joint stiffness, decr range of motion, post-traumatic arthritis and pain and possible functional limitations. Risks of surgery include, but not limited to: bleeding, infection, pain, scar, damage to adjacent structures (e.g. Nerves, blood vessels, bone, cartilage), temporary or permanent nerve damage, recurrence of symptoms, non-union, malunion, implant failure, stiffness, post-traumatic arthritis, need for further surgery, blood clots, pulmonary embolism, risks of anesthesia, death.  * after reviewing the risks and perceived benefits of each, patient would like to proceed with surgical stabilization  * will plan open-reduction, internal fixation of RIGHT wrist fracture on 3/1/2024, outpatient procedure, [facility] [place] .  * will need H+P prior to surgery from pcp  * will see patient 2 weeks postoperative with xrays of right wrist out of splint  * patient to call if any questions or concerns in the meantime.  * strict elevation of right upper extremity  *  Non weight bearing right upper extremity.  * immobilization in splint at all times.       Andrea Escalante M.D., M.S.  Dept. of Orthopaedic Surgery  [facility]         Again, thank you for allowing me to participate in the care of  your patient.        Sincerely,        Andrea Escalante MD

## 2024-02-29 ENCOUNTER — OFFICE VISIT (OUTPATIENT)
Dept: FAMILY MEDICINE | Facility: CLINIC | Age: 64
End: 2024-02-29
Payer: COMMERCIAL

## 2024-02-29 VITALS
HEART RATE: 65 BPM | DIASTOLIC BLOOD PRESSURE: 80 MMHG | BODY MASS INDEX: 36.86 KG/M2 | RESPIRATION RATE: 16 BRPM | SYSTOLIC BLOOD PRESSURE: 131 MMHG | OXYGEN SATURATION: 98 % | HEIGHT: 68 IN | WEIGHT: 243.2 LBS | TEMPERATURE: 97.6 F

## 2024-02-29 DIAGNOSIS — Z01.818 PREOP GENERAL PHYSICAL EXAM: Primary | ICD-10-CM

## 2024-02-29 DIAGNOSIS — E11.9 TYPE 2 DIABETES MELLITUS WITHOUT COMPLICATION, WITHOUT LONG-TERM CURRENT USE OF INSULIN (H): ICD-10-CM

## 2024-02-29 DIAGNOSIS — G47.33 OBSTRUCTIVE SLEEP APNEA: ICD-10-CM

## 2024-02-29 DIAGNOSIS — I47.10 PAROXYSMAL SUPRAVENTRICULAR TACHYCARDIA (H): ICD-10-CM

## 2024-02-29 DIAGNOSIS — J45.30 MILD PERSISTENT ASTHMA WITHOUT COMPLICATION: ICD-10-CM

## 2024-02-29 DIAGNOSIS — I10 HYPERTENSION GOAL BP (BLOOD PRESSURE) < 140/90: ICD-10-CM

## 2024-02-29 DIAGNOSIS — F32.0 MILD MAJOR DEPRESSION (H): ICD-10-CM

## 2024-02-29 DIAGNOSIS — S52.501D CLOSED TRAUMATIC DISPLACED FRACTURE OF DISTAL END OF RIGHT RADIUS WITH ROUTINE HEALING: ICD-10-CM

## 2024-02-29 LAB
ANION GAP SERPL CALCULATED.3IONS-SCNC: 12 MMOL/L (ref 7–15)
BUN SERPL-MCNC: 15.2 MG/DL (ref 8–23)
CALCIUM SERPL-MCNC: 9.5 MG/DL (ref 8.8–10.2)
CHLORIDE SERPL-SCNC: 102 MMOL/L (ref 98–107)
CREAT SERPL-MCNC: 0.88 MG/DL (ref 0.51–0.95)
DEPRECATED HCO3 PLAS-SCNC: 25 MMOL/L (ref 22–29)
EGFRCR SERPLBLD CKD-EPI 2021: 73 ML/MIN/1.73M2
ERYTHROCYTE [DISTWIDTH] IN BLOOD BY AUTOMATED COUNT: 13.9 % (ref 10–15)
GLUCOSE SERPL-MCNC: 90 MG/DL (ref 70–99)
HBA1C MFR BLD: 6 % (ref 0–5.6)
HCT VFR BLD AUTO: 41.4 % (ref 35–47)
HGB BLD-MCNC: 13.1 G/DL (ref 11.7–15.7)
MCH RBC QN AUTO: 28.7 PG (ref 26.5–33)
MCHC RBC AUTO-ENTMCNC: 31.6 G/DL (ref 31.5–36.5)
MCV RBC AUTO: 91 FL (ref 78–100)
PLATELET # BLD AUTO: 229 10E3/UL (ref 150–450)
POTASSIUM SERPL-SCNC: 3.6 MMOL/L (ref 3.4–5.3)
RBC # BLD AUTO: 4.56 10E6/UL (ref 3.8–5.2)
SODIUM SERPL-SCNC: 139 MMOL/L (ref 135–145)
WBC # BLD AUTO: 5.7 10E3/UL (ref 4–11)

## 2024-02-29 PROCEDURE — 36415 COLL VENOUS BLD VENIPUNCTURE: CPT | Performed by: PHYSICIAN ASSISTANT

## 2024-02-29 PROCEDURE — 93000 ELECTROCARDIOGRAM COMPLETE: CPT | Performed by: PHYSICIAN ASSISTANT

## 2024-02-29 PROCEDURE — 83036 HEMOGLOBIN GLYCOSYLATED A1C: CPT | Performed by: PHYSICIAN ASSISTANT

## 2024-02-29 PROCEDURE — 99214 OFFICE O/P EST MOD 30 MIN: CPT | Mod: 25 | Performed by: PHYSICIAN ASSISTANT

## 2024-02-29 PROCEDURE — 85027 COMPLETE CBC AUTOMATED: CPT | Performed by: PHYSICIAN ASSISTANT

## 2024-02-29 PROCEDURE — 96127 BRIEF EMOTIONAL/BEHAV ASSMT: CPT | Performed by: PHYSICIAN ASSISTANT

## 2024-02-29 PROCEDURE — 80048 BASIC METABOLIC PNL TOTAL CA: CPT | Performed by: PHYSICIAN ASSISTANT

## 2024-02-29 NOTE — ANESTHESIA PREPROCEDURE EVALUATION
Anesthesia Pre-Procedure Evaluation    Patient: Nela Mares   MRN: [id number] : 1960        Procedure : Procedure(s):  OPEN REDUCTION INTERNAL FIXATION, FRACTURE, RIGHT WRIST          Past Medical History:   Diagnosis Date    Allergic rhinitis, cause unspecified     Cervical high risk HPV (human papillomavirus) test positive 2017    3/21/17 NIL pap/+ HR HPV (not 16 or 18).     Depressive disorder     Depressive disorder, not elsewhere classified     seasonal    Diabetes mellitus, type 2 (H) 2023    Hypertension     Mild persistent asthma     Obstructive sleep apnea (adult) (pediatric)     uses CPAP    Primary osteoarthritis of both knees 2022    Scalp mass 2014      Past Surgical History:   Procedure Laterality Date    COLONOSCOPY  2012    Procedure: COLONOSCOPY;  COLONOSCOPY, SCREEN;  Surgeon: Bart You MD;  Location: [facility]    COLONOSCOPY N/A 10/20/2022    Procedure: COLONOSCOPY, WITH POLYPECTOMY AND BIOPSY;  Surgeon: Chago Hong DO;  Location: [facility]    COLONOSCOPY N/A 3/20/2023    Procedure: COLONOSCOPY, WITH POLYPECTOMY AND BIOPSY;  Surgeon: Lion Vidal MD;  [facility]    D & C      ENDOSCOPIC RETROGRADE CHOLANGIOPANCREATOGRAM  2014    Procedure: ENDOSCOPIC RETROGRADE CHOLANGIOPANCREATOGRAM;  ERCP biliary sphincterotomy, bile duct stone removal, epinepherine washing.;  Surgeon: Ba Meyers MD;  [facility]    EP ABLATION SVT N/A 2021    Procedure: EP ABLATION SVT;  Surgeon: Cuca Magaña MD;  [facility]    LAPAROSCOPIC CHOLECYSTECTOMY WITH CHOLANGIOGRAMS  2014    Procedure: LAPAROSCOPIC CHOLECYSTECTOMY WITH CHOLANGIOGRAMS;;  Surgeon: Haja Sage MD;  [facility]    MAMMOPLASTY REDUCTION BILATERAL Bilateral 2023    Procedure: MAMMOPLASTY, REDUCTION, BILATERAL;  Surgeon: DARYL Knutson MD;  Location: [facility]    NO HISTORY OF SURGERY      OPERATIVE HYSTEROSCOPY  2014     Procedure: OPERATIVE HYSTEROSCOPY;  Surgeon: Paola Camara MD;  [facility]    REMOVE INTRAUTERINE DEVICE  6/17/2014    Procedure: REMOVE INTRAUTERINE DEVICE;  Surgeon: Paola Camara MD;  [facility]      No Known Allergies   Social History     Tobacco Use    Smoking status: Never    Smokeless tobacco: Never   Substance Use Topics    Alcohol use: No      Wt Readings from Last 1 Encounters:   02/28/24 109.8 kg (242 lb)        Anesthesia Evaluation            ROS/MED HX  ENT/Pulmonary:     (+) sleep apnea, uses CPAP,         allergic rhinitis,           asthma                  Neurologic:       Cardiovascular:     (+)  hypertension- -   -  - -                                   (-) murmur   METS/Exercise Tolerance: >4 METS    Hematologic:       Musculoskeletal:       GI/Hepatic:       Renal/Genitourinary:       Endo:     (+)  type II DM,             Obesity,       Psychiatric/Substance Use:     (+) psychiatric history depression       Infectious Disease:       Malignancy:       Other:            Physical Exam    Airway        Mallampati: II   TM distance: > 3 FB   Neck ROM: full   Mouth opening: > 3 cm    Respiratory Devices and Support         Dental     Comment: Teeth examined without any significant abnormality, patient denies loose, missing or chipped teeth or anything removable.         Cardiovascular          Rhythm and rate: regular and normal (-) no murmur    Pulmonary   pulmonary exam normal        breath sounds clear to auscultation           OUTSIDE LABS:  CBC:   Lab Results   Component Value Date    WBC 6.2 12/02/2021    WBC 4.7 06/02/2021    HGB 13.1 12/02/2021    HGB 14.3 06/02/2021    HCT 40.5 12/02/2021    HCT 43.4 06/02/2021     12/02/2021     06/02/2021     BMP:   Lab Results   Component Value Date     (H) 02/20/2023     12/02/2021    POTASSIUM 4.3 02/20/2023    POTASSIUM 3.9 12/02/2021    CHLORIDE 106 02/20/2023    CHLORIDE 103 12/02/2021     CO2 26 02/20/2023    CO2 28 12/02/2021    BUN 13.7 02/20/2023    BUN 13 12/02/2021    CR 0.95 02/20/2023    CR 0.84 12/02/2021     (H) 06/28/2023     (H) 02/20/2023     COAGS:   Lab Results   Component Value Date    INR 1.03 10/05/2020     POC:   Lab Results   Component Value Date     (H) 06/02/2021    HCG Negative 06/17/2014     HEPATIC:   Lab Results   Component Value Date    ALBUMIN 3.4 12/02/2021    PROTTOTAL 7.8 12/02/2021    ALT 87 (H) 12/02/2021    AST 54 (H) 12/02/2021    ALKPHOS 81 12/02/2021    BILITOTAL 0.3 12/02/2021     OTHER:   Lab Results   Component Value Date    A1C 6.0 (H) 08/30/2023    ASHLEY 9.6 02/20/2023    LIPASE 63 01/05/2014    AMYLASE 49 01/05/2014    TSH 2.89 12/02/2021    T4 1.01 03/16/2017    T3 104 08/06/2009    CRP 9.7 (H) 12/16/2021    SED 26 12/16/2021       Anesthesia Plan    ASA Status:  3    NPO Status:  NPO Appropriate    Anesthesia Type: MAC.     - Reason for MAC: immobility needed   Induction: Intravenous, Propofol.   Maintenance: TIVA.        Consents    Anesthesia Plan(s) and associated risks, benefits, and realistic alternatives discussed. Questions answered and patient/representative(s) expressed understanding.     - Discussed:     - Discussed with:  Patient      - Extended Intubation/Ventilatory Support Discussed: No.      - Patient is DNR/DNI Status: No     Use of blood products discussed: No .     Postoperative Care    Pain management: IV analgesics, Peripheral nerve block (Single Shot).   PONV prophylaxis: Ondansetron (or other 5HT-3), Background Propofol Infusion     Comments:    Other Comments: Discussed plan for IV anesthetic with native airway. Discussed potential need for conversion to general anesthetic with airway management and potential for recall.  Discussed plan for supraclavicular nerve block with risks of block failure, bleeding, infection, damage to surrounding structures (muscles, nerve, blood vessels, other structures), persistent  "numbness/weakness, medication reactions.  Discussed recommendation to use CPAP for sleep including naps next 24 hours or while on pain medications.            Chan Gomez MD    I have reviewed the pertinent notes and labs in the chart from the past 30 days and (re)examined the patient.  Any updates or changes from those notes are reflected in this note.              # Obesity: Estimated body mass index is 35.74 kg/m  as calculated from the following:    Height as of 2/28/24: 1.753 m (5' 9\").    Weight as of 2/28/24: 109.8 kg (242 lb).      "

## 2024-02-29 NOTE — H&P (VIEW-ONLY)
Preoperative Evaluation  [facility]  [id number] [facility] [address]  Phone: [phone]  Primary Provider: Padma Lozano  Pre-op Performing Provider: LIZZ VALENTINE  Feb 29, 2024       Sanam is a 63 year old, presenting for the following:  Pre-Op Exam (OPEN REDUCTION INTERNAL FIXATION, FRACTURE, RIGHT WRIST)      Surgical Information  Surgery/Procedure: OPEN REDUCTION INTERNAL FIXATION, FRACTURE, RIGHT WRIST  Surgery Location:  OR  Surgeon: Andrea Escalante MD   Surgery Date: 3/1/2024  Time of Surgery: 12:10 PM  Where patient plans to recover: At home with family  Fax number for surgical facility: Note does not need to be faxed, will be available electronically in Epic.    Assessment & Plan     The proposed surgical procedure is considered INTERMEDIATE risk.    (Z01.818) Preop general physical exam  (primary encounter diagnosis)  Comment: Here for preop physical.   Plan: CBC with platelets, EKG 12-lead complete w/read        - Clinics           (I10) Hypertension goal BP (blood pressure) < 140/90  Comment: History of hypertension.  Blood pressure within goal.  Patient will hold losartan day of surgery.  Plan: BASIC METABOLIC PANEL          (G47.33) Obstructive sleep apnea  Comment: History of sleep apnea.  Uses CPAP.      (J45.30) Mild persistent asthma without complication  Comment: History of mild persistent asthma.  On inhalers.  Will bring albuterol inhaler day of surgery     (F32.0) Mild major depression (H)  Comment: history of depression on Abilify as well as venlafaxine.  Continue with medications.    (I47.10) Paroxysmal supraventricular tachycardia  Comment: History of paroxysmal supraventricular tachycardia.  EKG here without any ischemic findings.  Normal sinus rhythm.  Plan: EKG 12-lead complete w/read - Clinics           (E11.9) Type 2 diabetes mellitus without complication, without long-term current use of insulin (H)  Comment: History of type  2 diabetes is diet controlled.  Plan: Hemoglobin A1c           (S52.502D) Closed traumatic displaced fracture of distal end of right radius with routine healing, subsequent encounter  Comment: History of traumatic displaced fracture distal right radius.  Patient is right-hand dominant.  Plans for surgical correction.                - No identified additional risk factors other than previously addressed    Antiplatelet or Anticoagulation Medication Instructions   - Patient is on no antiplatelet or anticoagulation medications.    Additional Medication Instructions   - ACE/ARB: HOLD on day of surgery (lisinopril) (minimum 11 hours for general anesthesia).   - ibuprofen (Advil, Motrin): HOLD 1 day before surgery.    - SSRIs, SNRIs, TCAs, Antipsychotics: Continue without modification. (Abilify and venlafaxine)    - Continue with finasteride   - Continue with inhalers    - No over the counter supplements for 2 weeks prior to surgery.    - No topical medications day of surgery     Recommendation  APPROVAL GIVEN to proceed with proposed procedure pending review of diagnostic evaluation.          Subjective       Via the Health Maintenance questionnaire, the patient has reported the following services have been completed -Eye Exam, this information has been sent to the abstraction team.  HPI related to upcoming procedure:     2/22/24  Had a fall at the dermatologist with worsening right wrist pain. Right hand dominate. Followed up with orthopedics found to have right displaced distal radius fracture. Plans for surgical management.             2/28/2024    11:52 AM   Preop Questions   1. Have you ever had a heart attack or stroke? No   2. Have you ever had surgery on your heart or blood vessels, such as a stent placement, a coronary artery bypass, or surgery on an artery in your head, neck, heart, or legs? No   3. Do you have chest pain with activity? No   4. Do you have a history of  heart failure? No   5. Do you currently  have a cold, bronchitis or symptoms of other infection? No   6. Do you have a cough, shortness of breath, or wheezing? No   7. Do you or anyone in your family have previous history of blood clots? No   8. Do you or does anyone in your family have a serious bleeding problem such as prolonged bleeding following surgeries or cuts? No   9. Have you ever had problems with anemia or been told to take iron pills? No   10. Have you had any abnormal blood loss such as black, tarry or bloody stools, or abnormal vaginal bleeding? No   11. Have you ever had a blood transfusion? No   12. Are you willing to have a blood transfusion if it is medically needed before, during, or after your surgery? Yes   13. Have you or any of your relatives ever had problems with anesthesia? No   14. Do you have sleep apnea, excessive snoring or daytime drowsiness? YES -    14a. Do you have a CPAP machine? Yes   15. Do you have any artifical heart valves or other implanted medical devices like a pacemaker, defibrillator, or continuous glucose monitor? No   16. Do you have artificial joints? No   17. Are you allergic to latex? No       Health Care Directive  Patient does not have a Health Care Directive or Living Will: Discussed advance care planning with patient; however, patient declined at this time.    Preoperative Review of    reviewed - June of 23 short course of oxycodone following breast reduction.       Status of Chronic Conditions:  See problem list for active medical problems.  Problems all longstanding and stable, except as noted/documented.  See ROS for pertinent symptoms related to these conditions.    Patient Active Problem List    Diagnosis Date Noted    Closed traumatic displaced fracture of distal end of left radius, initial encounter 02/28/2024     Priority: Medium    Encounter for pharmacogenetic testing 11/01/2023     Priority: Medium     [id number] rapid metabolizer  CYP2C9 intermediate metabolizer      Diabetes mellitus,  type 2 (H) 02/20/2023     Priority: Medium    Lichen sclerosus 02/20/2023     Priority: Medium    Chronic midline low back pain without sciatica 01/28/2022     Priority: Medium    Primary osteoarthritis of both knees 01/28/2022     Priority: Medium    Paroxysmal supraventricular tachycardia 05/10/2021     Priority: Medium     Added automatically from request for surgery [id number]      Allergic conjunctivitis, bilateral 08/27/2019     Priority: Medium    Overweight (H) 07/18/2019     Priority: Medium    Impaired fasting glucose 07/18/2019     Priority: Medium    Seasonal allergic rhinitis due to pollen 10/18/2018     Priority: Medium    Allergic rhinitis due to dust mite 10/18/2018     Priority: Medium    Allergic rhinitis due to mold 10/18/2018     Priority: Medium    IUD (intrauterine device) in place 03/21/2017     Priority: Medium     mirena place in 2015      Hypertrophy of breast 11/24/2015     Priority: Medium    Pilar cyst 07/14/2014     Priority: Medium    Hypertension goal BP (blood pressure) < 140/90 10/25/2010     Priority: Medium    CARDIOVASCULAR SCREENING; LDL GOAL LESS THAN 160 05/09/2010     Priority: Medium    Mild major depression (H)      Priority: Medium     Has used paxil, celexa, lexapro, and zoloft in the past.  Zoloft worked well although spring of 2017 symptoms worsened despite max dose.  Switched to wellbutrin - but wellbutrin triggered anxiety and rage.    Will plan to start Effexor in the fall before school year starts.      Leiomyoma of uterus 08/14/2007     Priority: Medium     Problem list name updated by automated process. Provider to review      Obstructive sleep apnea      Priority: Medium     uses CPAP          Mild persistent asthma 10/11/2005     Priority: Medium     Typically needs steroid burst about once per year for symptoms uncontrolled with continued advair and maxair q 4 hours.  Peak flow generally runs 400. With illness goes down to 350.    12/09 - given one extra burst of  prednisone.  Instructed to call if she starts it so we can help her monitor her symptoms.      Allergic rhinitis due to animals 08/31/2004     Priority: Medium      Past Medical History:   Diagnosis Date    Allergic rhinitis, cause unspecified     Cervical high risk HPV (human papillomavirus) test positive 03/21/2017    3/21/17 NIL pap/+ HR HPV (not 16 or 18).     Depressive disorder     Depressive disorder, not elsewhere classified     seasonal    Diabetes mellitus, type 2 (H) 02/20/2023    Hypertension     Mild persistent asthma     Obstructive sleep apnea (adult) (pediatric)     uses CPAP    Primary osteoarthritis of both knees 01/28/2022    Scalp mass 07/2014     Past Surgical History:   Procedure Laterality Date    COLONOSCOPY  6/21/2012    Procedure: COLONOSCOPY;  COLONOSCOPY, SCREEN;  Surgeon: Bart You MD;  [facility]    COLONOSCOPY N/A 10/20/2022    Procedure: COLONOSCOPY, WITH POLYPECTOMY AND BIOPSY;  Surgeon: Chago Hong DO;  [facility]    COLONOSCOPY N/A 3/20/2023    Procedure: COLONOSCOPY, WITH POLYPECTOMY AND BIOPSY;  Surgeon: Lion Vidal MD;  Location:  [facility]    D & C      ENDOSCOPIC RETROGRADE CHOLANGIOPANCREATOGRAM  1/4/2014    Procedure: ENDOSCOPIC RETROGRADE CHOLANGIOPANCREATOGRAM;  ERCP biliary sphincterotomy, bile duct stone removal, epinepherine washing.;  Surgeon: Ba Meyers MD;  [facility]    EP ABLATION SVT N/A 6/2/2021    Procedure: EP ABLATION SVT;  Surgeon: Cuca Magaña MD;  [facility]    LAPAROSCOPIC CHOLECYSTECTOMY WITH CHOLANGIOGRAMS  1/4/2014    Procedure: LAPAROSCOPIC CHOLECYSTECTOMY WITH CHOLANGIOGRAMS;;  Surgeon: Haja Sage MD;  [facility]    MAMMOPLASTY REDUCTION BILATERAL Bilateral 6/28/2023    Procedure: MAMMOPLASTY, REDUCTION, BILATERAL;  Surgeon: DARYL Knutson MD;  Location: [facility]    NO HISTORY OF SURGERY      OPERATIVE HYSTEROSCOPY  6/17/2014    Procedure: OPERATIVE  HYSTEROSCOPY;  Surgeon: Paola Camara MD;  [facility]    REMOVE INTRAUTERINE DEVICE  6/17/2014    Procedure: REMOVE INTRAUTERINE DEVICE;  Surgeon: Paola Camara MD;  [facility]     Current Outpatient Medications   Medication Sig Dispense Refill    albuterol (PROAIR HFA/PROVENTIL HFA/VENTOLIN HFA) 108 (90 Base) MCG/ACT inhaler Inhale 2 puffs into the lungs every 4 hours as needed for shortness of breath or wheezing 36 g 1    albuterol (PROVENTIL) (2.5 MG/3ML) 0.083% neb solution Take 1 vial (2.5 mg) by nebulization every 4 hours as needed for shortness of breath / dyspnea or wheezing 30 mL 11    ARIPiprazole (ABILIFY) 10 MG tablet Take 1 tablet (10 mg) by mouth daily 90 tablet 0    cetirizine (ZYRTEC) 10 MG tablet Take 1 tablet (10 mg) by mouth daily 90 tablet 3    Cholecalciferol (VITAMIN D) 125 MCG (5000 UT) capsule Take 1 capsule (5,000 Units) by mouth daily 90 capsule 3    clobetasol (TEMOVATE) 0.05 % external ointment Apply twice daily to affected area for 2 weeks then 2-3 times weekly. (Patient taking differently: Apply twice daily to affected area for 2 weeks then 2-3 times weekly.  Using daily per pt on visit 2/22/24) 60 g 0    finasteride (PROSCAR) 5 MG tablet Take 1 tablet (5 mg) by mouth daily 90 tablet 3    fluticasone (FLONASE) 50 MCG/ACT nasal spray Spray 2 sprays into both nostrils daily 48 g 3    fluticasone-salmeterol (ADVAIR) 500-50 MCG/ACT inhaler Inhale 1 puff into the lungs every 12 hours Needs appointment for additional refills 3 each 0    lisinopril (ZESTRIL) 40 MG tablet Take 1 tablet (40 mg) by mouth daily 90 tablet 3    montelukast (SINGULAIR) 10 MG tablet Take 1 tablet (10 mg) by mouth daily 90 tablet 3    venlafaxine (EFFEXOR-ER) 225 MG 24 hr tablet Take 1 tablet (225 mg) by mouth daily 90 tablet 0       No Known Allergies     Social History     Tobacco Use    Smoking status: Never    Smokeless tobacco: Never   Substance Use Topics    Alcohol use: No      Family History   Problem Relation Age of Onset    Neurologic Disorder Mother         Graves disease    Hyperlipidemia Mother     Depression Mother     Thyroid Disease Mother     Anxiety Disorder Mother     Skin Cancer Mother     Hypertension Father     Asthma Father     Obesity Father     Skin Cancer Father     Diabetes Father     Coronary Artery Disease Father     Skin Cancer Sister     Asthma Sister     Cancer Sister         mild skin cancer,cured from excision    Asthma Sister     Depression Maternal Grandmother     C.A.D. Paternal Grandmother     Cerebrovascular Disease Paternal Grandmother     Depression Paternal Grandmother     Obesity Paternal Grandmother     C.A.D. Paternal Grandfather     Cerebrovascular Disease Paternal Grandfather     Substance Abuse Paternal Grandfather     Mental Illness Maternal Half-Brother     Thyroid Disease Maternal Half-Sister     Mental Illness Paternal Half-Brother     Thyroid Disease Paternal Half-Sister     Depression Nephew     Anxiety Disorder Nephew     Substance Abuse Nephew     Asthma Nephew     Anxiety Disorder Nephew     Substance Abuse Nephew     Asthma Nephew     Asthma Nephew     Other - See Comments Other      History   Drug Use No         Review of Systems    Review of Systems  CONSTITUTIONAL: NEGATIVE for fever, chills, change in weight  INTEGUMENTARY/SKIN: NEGATIVE for worrisome rashes, moles or lesions  EYES: NEGATIVE for vision changes or irritation  ENT/MOUTH: NEGATIVE for ear, mouth and throat problems  RESP: NEGATIVE for significant cough or SOB  CV: NEGATIVE for chest pain, palpitations or peripheral edema  GI: NEGATIVE for nausea, abdominal pain, heartburn, or change in bowel habits  : NEGATIVE for frequency, dysuria, or hematuria  MUSCULOSKELETAL:right wrist pain history of fracture   NEURO: NEGATIVE for weakness, dizziness or paresthesias  ENDOCRINE: NEGATIVE for temperature intolerance, skin/hair changes  HEME: NEGATIVE for bleeding  "problems  PSYCHIATRIC: NEGATIVE for changes in mood or affect    Objective    /80   Pulse 65   Temp 97.6  F (36.4  C) (Tympanic)   Resp 16   Ht 1.715 m (5' 7.5\")   Wt 110.3 kg (243 lb 3.2 oz)   LMP 12/21/2014 (Exact Date)   SpO2 98%   BMI 37.53 kg/m     Estimated body mass index is 37.53 kg/m  as calculated from the following:    Height as of this encounter: 1.715 m (5' 7.5\").    Weight as of this encounter: 110.3 kg (243 lb 3.2 oz).  Physical Exam  GENERAL: alert and no distress  EYES: Eyes grossly normal to inspection, PERRL and conjunctivae and sclerae normal  HENT: normal cephalic/atraumatic, nose and mouth without ulcers or lesions, oropharynx clear, and oral mucous membranes moist  NECK: no adenopathy, no asymmetry, masses, or scars  RESP: lungs clear to auscultation - no rales, rhonchi or wheezes  CV: regular rate and rhythm, normal S1 S2, no S3 or S4, no murmur, click or rub, no peripheral edema  ABDOMEN: soft, nontender, no hepatosplenomegaly, no masses and bowel sounds normal  MS: Right wrist with splint in place. Able to move all fingers.   SKIN: no suspicious lesions or rashes  NEURO: Normal strength and tone, mentation intact and speech normal  PSYCH: mentation appears normal, affect normal/bright    Recent Labs   Lab Test 08/30/23  0834 02/20/23  1013   NA  --  146*   POTASSIUM  --  4.3   CR  --  0.95   A1C 6.0* 6.5*        Diagnostics  Recent Results (from the past 24 hour(s))   Hemoglobin A1c    Collection Time: 02/29/24  9:50 AM   Result Value Ref Range    Hemoglobin A1C 6.0 (H) 0.0 - 5.6 %   CBC with platelets    Collection Time: 02/29/24  9:50 AM   Result Value Ref Range    WBC Count 5.7 4.0 - 11.0 10e3/uL    RBC Count 4.56 3.80 - 5.20 10e6/uL    Hemoglobin 13.1 11.7 - 15.7 g/dL    Hematocrit 41.4 35.0 - 47.0 %    MCV 91 78 - 100 fL    MCH 28.7 26.5 - 33.0 pg    MCHC 31.6 31.5 - 36.5 g/dL    RDW 13.9 10.0 - 15.0 %    Platelet Count 229 150 - 450 10e3/uL      EKG: appears normal, NSR, " no obvious ischemic findings no significant changes from prior. .    Revised Cardiac Risk Index (RCRI)  The patient has the following serious cardiovascular risks for perioperative complications:   - No serious cardiac risks = 0 points     RCRI Interpretation: 0 points: Class I (very low risk - 0.4% complication rate)         Signed Electronically by: Karlo Bryan PA-C  Copy of this evaluation report is provided to requesting physician.         Answers submitted by the patient for this visit:  Patient Health Questionnaire (Submitted on 2/28/2024)  If you checked off any problems, how difficult have these problems made it for you to do your work, take care of things at home, or get along with other people?: Not difficult at all  PHQ9 TOTAL SCORE: 0

## 2024-02-29 NOTE — PATIENT INSTRUCTIONS
Preparing for Your Surgery  Getting started  A nurse will call you to review your health history and instructions. They will give you an arrival time based on your scheduled surgery time. Please be ready to share:  Your doctor's clinic name and phone number  Your medical, surgical, and anesthesia history  A list of allergies and sensitivities  A list of medicines, including herbal treatments and over-the-counter drugs  Whether the patient has a legal guardian (ask how to send us the papers in advance)  Please tell us if you're pregnant--or if there's any chance you might be pregnant. Some surgeries may injure a fetus (unborn baby), so they require a pregnancy test. Surgeries that are safe for a fetus don't always need a test, and you can choose whether to have one.   If you have a child who's having surgery, please ask for a copy of Preparing for Your Child's Surgery.    Preparing for surgery  Within 10 to 30 days of surgery: Have a pre-op exam (sometimes called an H&P, or History and Physical). This can be done at a clinic or pre-operative center.  If you're having a , you may not need this exam. Talk to your care team.  At your pre-op exam, talk to your care team about all medicines you take. If you need to stop any medicines before surgery, ask when to start taking them again.  We do this for your safety. Many medicines can make you bleed too much during surgery. Some change how well surgery (anesthesia) drugs work.  Call your insurance company to let them know you're having surgery. (If you don't have insurance, call [phone].)  Call your clinic if there's any change in your health. This includes signs of a cold or flu (sore throat, runny nose, cough, rash, fever). It also includes a scrape or scratch near the surgery site.  If you have questions on the day of surgery, call your hospital or surgery center.  Eating and drinking guidelines  For your safety: Unless your surgeon tells you otherwise,  follow the guidelines below.  Eat and drink as usual until 8 hours before you arrive for surgery. After that, no food or milk.  Drink clear liquids until 2 hours before you arrive. These are liquids you can see through, like water, Gatorade, and Propel Water. They also include plain black coffee and tea (no cream or milk), candy, and breath mints. You can spit out gum when you arrive.  If you drink alcohol: Stop drinking it the night before surgery.  If your care team tells you to take medicine on the morning of surgery, it's okay to take it with a sip of water.  Preventing infection  Shower or bathe the night before and morning of your surgery. Follow the instructions your clinic gave you. (If no instructions, use regular soap.)  Don't shave or clip hair near your surgery site. We'll remove the hair if needed.  Don't smoke or vape the morning of surgery. You may chew nicotine gum up to 2 hours before surgery. A nicotine patch is okay.  Note: Some surgeries require you to completely quit smoking and nicotine. Check with your surgeon.  Your care team will make every effort to keep you safe from infection. We will:  Clean our hands often with soap and water (or an alcohol-based hand rub).  Clean the skin at your surgery site with a special soap that kills germs.  Give you a special gown to keep you warm. (Cold raises the risk of infection.)  Wear special hair covers, masks, gowns and gloves during surgery.  Give antibiotic medicine, if prescribed. Not all surgeries need antibiotics.  What to bring on the day of surgery  Photo ID and insurance card  Copy of your health care directive, if you have one  Glasses and hearing aids (bring cases)  You can't wear contacts during surgery  Inhaler and eye drops, if you use them (tell us about these when you arrive)  CPAP machine or breathing device, if you use them  A few personal items, if spending the night  If you have . . .  A pacemaker, ICD (cardiac defibrillator) or other  implant: Bring the ID card.  An implanted stimulator: Bring the remote control.  A legal guardian: Bring a copy of the certified (court-stamped) guardianship papers.  Please remove any jewelry, including body piercings. Leave jewelry and other valuables at home.  If you're going home the day of surgery  You must have a responsible adult drive you home. They should stay with you overnight as well.  If you don't have someone to stay with you, and you aren't safe to go home alone, we may keep you overnight. Insurance often won't pay for this.  After surgery  If it's hard to control your pain or you need more pain medicine, please call your surgeon's office.  Questions?   If you have any questions for your care team, list them here: _________________________________________________________________________________________________________________________________________________________________________ ____________________________________ ____________________________________ ____________________________________  For informational purposes only. Not to replace the advice of your health care provider. Copyright   2003, 2019 [employer]. All rights reserved. Clinically reviewed by Patricia Rascon MD. [employer] 753355 - REV 12/22.    How to Take Your Medication Before Surgery   - ACE/ARB: HOLD on day of surgery (lisinopril) (minimum 11 hours for general anesthesia).   - ibuprofen (Advil, Motrin): HOLD 1 day before surgery.    - SSRIs, SNRIs, TCAs, Antipsychotics: Continue without modification. (Abilify and venlafaxine)    - Continue with finasteride   - Continue with inhalers    - No over the counter supplements for 2 weeks prior to surgery.    - No topical medications day of surgery

## 2024-02-29 NOTE — PROGRESS NOTES
Preoperative Evaluation  [facility]  [address] [facility] [place] [address]  Phone: [phone]  Primary Provider: Padma Lozano  Pre-op Performing Provider: LIZZ VALENTINE  Feb 29, 2024       Sanam is a 63 year old, presenting for the following:  Pre-Op Exam (OPEN REDUCTION INTERNAL FIXATION, FRACTURE, RIGHT WRIST)      Surgical Information  Surgery/Procedure: OPEN REDUCTION INTERNAL FIXATION, FRACTURE, RIGHT WRIST  Surgery Location:  OR  Surgeon: Andrea Escalante MD   Surgery Date: 3/1/2024  Time of Surgery: 12:10 PM  Where patient plans to recover: At home with family  Fax number for surgical facility: Note does not need to be faxed, will be available electronically in Epic.    Assessment & Plan     The proposed surgical procedure is considered INTERMEDIATE risk.    (Z01.818) Preop general physical exam  (primary encounter diagnosis)  Comment: Here for preop physical.   Plan: CBC with platelets, EKG 12-lead complete w/read        - Clinics           (I10) Hypertension goal BP (blood pressure) < 140/90  Comment: History of hypertension.  Blood pressure within goal.  Patient will hold losartan day of surgery.  Plan: BASIC METABOLIC PANEL          (G47.33) Obstructive sleep apnea  Comment: History of sleep apnea.  Uses CPAP.      (J45.30) Mild persistent asthma without complication  Comment: History of mild persistent asthma.  On inhalers.  Will bring albuterol inhaler day of surgery     (F32.0) Mild major depression (H)  Comment: history of depression on Abilify as well as venlafaxine.  Continue with medications.    (I47.10) Paroxysmal supraventricular tachycardia  Comment: History of paroxysmal supraventricular tachycardia.  EKG here without any ischemic findings.  Normal sinus rhythm.  Plan: EKG 12-lead complete w/read - Clinics           (E11.9) Type 2 diabetes mellitus without complication, without long-term current use of insulin (H)  Comment: History of type  2 diabetes is diet controlled.  Plan: Hemoglobin A1c           (S52.502D) Closed traumatic displaced fracture of distal end of right radius with routine healing, subsequent encounter  Comment: History of traumatic displaced fracture distal right radius.  Patient is right-hand dominant.  Plans for surgical correction.                - No identified additional risk factors other than previously addressed    Antiplatelet or Anticoagulation Medication Instructions   - Patient is on no antiplatelet or anticoagulation medications.    Additional Medication Instructions   - ACE/ARB: HOLD on day of surgery (lisinopril) (minimum 11 hours for general anesthesia).   - ibuprofen (Advil, Motrin): HOLD 1 day before surgery.    - SSRIs, SNRIs, TCAs, Antipsychotics: Continue without modification. (Abilify and venlafaxine)    - Continue with finasteride   - Continue with inhalers    - No over the counter supplements for 2 weeks prior to surgery.    - No topical medications day of surgery     Recommendation  APPROVAL GIVEN to proceed with proposed procedure pending review of diagnostic evaluation.          Subjective       Via the Health Maintenance questionnaire, the patient has reported the following services have been completed -Eye Exam, this information has been sent to the abstraction team.  HPI related to upcoming procedure:     2/22/24  Had a fall at the dermatologist with worsening right wrist pain. Right hand dominate. Followed up with orthopedics found to have right displaced distal radius fracture. Plans for surgical management.             2/28/2024    11:52 AM   Preop Questions   1. Have you ever had a heart attack or stroke? No   2. Have you ever had surgery on your heart or blood vessels, such as a stent placement, a coronary artery bypass, or surgery on an artery in your head, neck, heart, or legs? No   3. Do you have chest pain with activity? No   4. Do you have a history of  heart failure? No   5. Do you currently  have a cold, bronchitis or symptoms of other infection? No   6. Do you have a cough, shortness of breath, or wheezing? No   7. Do you or anyone in your family have previous history of blood clots? No   8. Do you or does anyone in your family have a serious bleeding problem such as prolonged bleeding following surgeries or cuts? No   9. Have you ever had problems with anemia or been told to take iron pills? No   10. Have you had any abnormal blood loss such as black, tarry or bloody stools, or abnormal vaginal bleeding? No   11. Have you ever had a blood transfusion? No   12. Are you willing to have a blood transfusion if it is medically needed before, during, or after your surgery? Yes   13. Have you or any of your relatives ever had problems with anesthesia? No   14. Do you have sleep apnea, excessive snoring or daytime drowsiness? YES -    14a. Do you have a CPAP machine? Yes   15. Do you have any artifical heart valves or other implanted medical devices like a pacemaker, defibrillator, or continuous glucose monitor? No   16. Do you have artificial joints? No   17. Are you allergic to latex? No       Health Care Directive  Patient does not have a Health Care Directive or Living Will: Discussed advance care planning with patient; however, patient declined at this time.    Preoperative Review of    reviewed - June of 23 short course of oxycodone following breast reduction.       Status of Chronic Conditions:  See problem list for active medical problems.  Problems all longstanding and stable, except as noted/documented.  See ROS for pertinent symptoms related to these conditions.    Patient Active Problem List    Diagnosis Date Noted    Closed traumatic displaced fracture of distal end of left radius, initial encounter 02/28/2024     Priority: Medium    Encounter for pharmacogenetic testing 11/01/2023     Priority: Medium     [id number] rapid metabolizer  CYP2C9 intermediate metabolizer      Diabetes mellitus,  type 2 (H) 02/20/2023     Priority: Medium    Lichen sclerosus 02/20/2023     Priority: Medium    Chronic midline low back pain without sciatica 01/28/2022     Priority: Medium    Primary osteoarthritis of both knees 01/28/2022     Priority: Medium    Paroxysmal supraventricular tachycardia 05/10/2021     Priority: Medium     Added automatically from request for surgery [id number]      Allergic conjunctivitis, bilateral 08/27/2019     Priority: Medium    Overweight (H) 07/18/2019     Priority: Medium    Impaired fasting glucose 07/18/2019     Priority: Medium    Seasonal allergic rhinitis due to pollen 10/18/2018     Priority: Medium    Allergic rhinitis due to dust mite 10/18/2018     Priority: Medium    Allergic rhinitis due to mold 10/18/2018     Priority: Medium    IUD (intrauterine device) in place 03/21/2017     Priority: Medium     mirena place in 2015      Hypertrophy of breast 11/24/2015     Priority: Medium    Pilar cyst 07/14/2014     Priority: Medium    Hypertension goal BP (blood pressure) < 140/90 10/25/2010     Priority: Medium    CARDIOVASCULAR SCREENING; LDL GOAL LESS THAN 160 05/09/2010     Priority: Medium    Mild major depression (H)      Priority: Medium     Has used paxil, celexa, lexapro, and zoloft in the past.  Zoloft worked well although spring of 2017 symptoms worsened despite max dose.  Switched to wellbutrin - but wellbutrin triggered anxiety and rage.    Will plan to start Effexor in the fall before school year starts.      Leiomyoma of uterus 08/14/2007     Priority: Medium     Problem list name updated by automated process. Provider to review      Obstructive sleep apnea      Priority: Medium     uses CPAP          Mild persistent asthma 10/11/2005     Priority: Medium     Typically needs steroid burst about once per year for symptoms uncontrolled with continued advair and maxair q 4 hours.  Peak flow generally runs 400. With illness goes down to 350.    12/09 - given one extra burst of  prednisone.  Instructed to call if she starts it so we can help her monitor her symptoms.      Allergic rhinitis due to animals 08/31/2004     Priority: Medium      Past Medical History:   Diagnosis Date    Allergic rhinitis, cause unspecified     Cervical high risk HPV (human papillomavirus) test positive 03/21/2017    3/21/17 NIL pap/+ HR HPV (not 16 or 18).     Depressive disorder     Depressive disorder, not elsewhere classified     seasonal    Diabetes mellitus, type 2 (H) 02/20/2023    Hypertension     Mild persistent asthma     Obstructive sleep apnea (adult) (pediatric)     uses CPAP    Primary osteoarthritis of both knees 01/28/2022    Scalp mass 07/2014     Past Surgical History:   Procedure Laterality Date    COLONOSCOPY  6/21/2012    Procedure: COLONOSCOPY;  COLONOSCOPY, SCREEN;  Surgeon: Bart You MD;  Location: [facility]    COLONOSCOPY N/A 10/20/2022    Procedure: COLONOSCOPY, WITH POLYPECTOMY AND BIOPSY;  Surgeon: Chago Hong DO;  [facility]: [facility]    COLONOSCOPY N/A 3/20/2023    Procedure: COLONOSCOPY, WITH POLYPECTOMY AND BIOPSY;  Surgeon: Lion Vidal MD;  Location:  GI    D & C      ENDOSCOPIC RETROGRADE CHOLANGIOPANCREATOGRAM  1/4/2014    Procedure: ENDOSCOPIC RETROGRADE CHOLANGIOPANCREATOGRAM;  ERCP biliary sphincterotomy, bile duct stone removal, epinepherine washing.;  Surgeon: Ba Meyers MD;  Location: [facility]    EP ABLATION SVT N/A 6/2/2021    Procedure: EP ABLATION SVT;  Surgeon: Cuca Magaña MD;  [facility]    LAPAROSCOPIC CHOLECYSTECTOMY WITH CHOLANGIOGRAMS  1/4/2014    Procedure: LAPAROSCOPIC CHOLECYSTECTOMY WITH CHOLANGIOGRAMS;;  Surgeon: Haja Sage MD;  Location: [facility]    MAMMOPLASTY REDUCTION BILATERAL Bilateral 6/28/2023    Procedure: MAMMOPLASTY, REDUCTION, BILATERAL;  Surgeon: DARYL Knutson MD;  Location: [facility] OR    NO HISTORY OF SURGERY      OPERATIVE HYSTEROSCOPY  6/17/2014    Procedure: OPERATIVE  HYSTEROSCOPY;  Surgeon: Paola Camara MD;  Location: [facility]    REMOVE INTRAUTERINE DEVICE  6/17/2014    Procedure: REMOVE INTRAUTERINE DEVICE;  Surgeon: Paola Camara MD;  Location: [facility] OR     Current Outpatient Medications   Medication Sig Dispense Refill    albuterol (PROAIR HFA/PROVENTIL HFA/VENTOLIN HFA) 108 (90 Base) MCG/ACT inhaler Inhale 2 puffs into the lungs every 4 hours as needed for shortness of breath or wheezing 36 g 1    albuterol (PROVENTIL) (2.5 MG/3ML) 0.083% neb solution Take 1 vial (2.5 mg) by nebulization every 4 hours as needed for shortness of breath / dyspnea or wheezing 30 mL 11    ARIPiprazole (ABILIFY) 10 MG tablet Take 1 tablet (10 mg) by mouth daily 90 tablet 0    cetirizine (ZYRTEC) 10 MG tablet Take 1 tablet (10 mg) by mouth daily 90 tablet 3    Cholecalciferol (VITAMIN D) 125 MCG (5000 UT) capsule Take 1 capsule (5,000 Units) by mouth daily 90 capsule 3    clobetasol (TEMOVATE) 0.05 % external ointment Apply twice daily to affected area for 2 weeks then 2-3 times weekly. (Patient taking differently: Apply twice daily to affected area for 2 weeks then 2-3 times weekly.  Using daily per pt on visit 2/22/24) 60 g 0    finasteride (PROSCAR) 5 MG tablet Take 1 tablet (5 mg) by mouth daily 90 tablet 3    fluticasone (FLONASE) 50 MCG/ACT nasal spray Spray 2 sprays into both nostrils daily 48 g 3    fluticasone-salmeterol (ADVAIR) 500-50 MCG/ACT inhaler Inhale 1 puff into the lungs every 12 hours Needs appointment for additional refills 3 each 0    lisinopril (ZESTRIL) 40 MG tablet Take 1 tablet (40 mg) by mouth daily 90 tablet 3    montelukast (SINGULAIR) 10 MG tablet Take 1 tablet (10 mg) by mouth daily 90 tablet 3    venlafaxine (EFFEXOR-ER) 225 MG 24 hr tablet Take 1 tablet (225 mg) by mouth daily 90 tablet 0       No Known Allergies     Social History     Tobacco Use    Smoking status: Never    Smokeless tobacco: Never   Substance Use Topics    Alcohol use: No      Family History   Problem Relation Age of Onset    Neurologic Disorder Mother         Graves disease    Hyperlipidemia Mother     Depression Mother     Thyroid Disease Mother     Anxiety Disorder Mother     Skin Cancer Mother     Hypertension Father     Asthma Father     Obesity Father     Skin Cancer Father     Diabetes Father     Coronary Artery Disease Father     Skin Cancer Sister     Asthma Sister     Cancer Sister         mild skin cancer,cured from excision    Asthma Sister     Depression Maternal Grandmother     C.A.D. Paternal Grandmother     Cerebrovascular Disease Paternal Grandmother     Depression Paternal Grandmother     Obesity Paternal Grandmother     C.A.D. Paternal Grandfather     Cerebrovascular Disease Paternal Grandfather     Substance Abuse Paternal Grandfather     Mental Illness Maternal Half-Brother     Thyroid Disease Maternal Half-Sister     Mental Illness Paternal Half-Brother     Thyroid Disease Paternal Half-Sister     Depression Nephew     Anxiety Disorder Nephew     Substance Abuse Nephew     Asthma Nephew     Anxiety Disorder Nephew     Substance Abuse Nephew     Asthma Nephew     Asthma Nephew     Other - See Comments Other      History   Drug Use No         Review of Systems    Review of Systems  CONSTITUTIONAL: NEGATIVE for fever, chills, change in weight  INTEGUMENTARY/SKIN: NEGATIVE for worrisome rashes, moles or lesions  EYES: NEGATIVE for vision changes or irritation  ENT/MOUTH: NEGATIVE for ear, mouth and throat problems  RESP: NEGATIVE for significant cough or SOB  CV: NEGATIVE for chest pain, palpitations or peripheral edema  GI: NEGATIVE for nausea, abdominal pain, heartburn, or change in bowel habits  : NEGATIVE for frequency, dysuria, or hematuria  MUSCULOSKELETAL:right wrist pain history of fracture   NEURO: NEGATIVE for weakness, dizziness or paresthesias  ENDOCRINE: NEGATIVE for temperature intolerance, skin/hair changes  HEME: NEGATIVE for bleeding  "problems  PSYCHIATRIC: NEGATIVE for changes in mood or affect    Objective    /80   Pulse 65   Temp 97.6  F (36.4  C) (Tympanic)   Resp 16   Ht 1.715 m (5' 7.5\")   Wt 110.3 kg (243 lb 3.2 oz)   LMP 12/21/2014 (Exact Date)   SpO2 98%   BMI 37.53 kg/m     Estimated body mass index is 37.53 kg/m  as calculated from the following:    Height as of this encounter: 1.715 m (5' 7.5\").    Weight as of this encounter: 110.3 kg (243 lb 3.2 oz).  Physical Exam  GENERAL: alert and no distress  EYES: Eyes grossly normal to inspection, PERRL and conjunctivae and sclerae normal  HENT: normal cephalic/atraumatic, nose and mouth without ulcers or lesions, oropharynx clear, and oral mucous membranes moist  NECK: no adenopathy, no asymmetry, masses, or scars  RESP: lungs clear to auscultation - no rales, rhonchi or wheezes  CV: regular rate and rhythm, normal S1 S2, no S3 or S4, no murmur, click or rub, no peripheral edema  ABDOMEN: soft, nontender, no hepatosplenomegaly, no masses and bowel sounds normal  MS: Right wrist with splint in place. Able to move all fingers.   SKIN: no suspicious lesions or rashes  NEURO: Normal strength and tone, mentation intact and speech normal  PSYCH: mentation appears normal, affect normal/bright    Recent Labs   Lab Test 08/30/23  0834 02/20/23  1013   NA  --  146*   POTASSIUM  --  4.3   CR  --  0.95   A1C 6.0* 6.5*        Diagnostics  Recent Results (from the past 24 hour(s))   Hemoglobin A1c    Collection Time: 02/29/24  9:50 AM   Result Value Ref Range    Hemoglobin A1C 6.0 (H) 0.0 - 5.6 %   CBC with platelets    Collection Time: 02/29/24  9:50 AM   Result Value Ref Range    WBC Count 5.7 4.0 - 11.0 10e3/uL    RBC Count 4.56 3.80 - 5.20 10e6/uL    Hemoglobin 13.1 11.7 - 15.7 g/dL    Hematocrit 41.4 35.0 - 47.0 %    MCV 91 78 - 100 fL    MCH 28.7 26.5 - 33.0 pg    MCHC 31.6 31.5 - 36.5 g/dL    RDW 13.9 10.0 - 15.0 %    Platelet Count 229 150 - 450 10e3/uL      EKG: appears normal, NSR, " no obvious ischemic findings no significant changes from prior. .    Revised Cardiac Risk Index (RCRI)  The patient has the following serious cardiovascular risks for perioperative complications:   - No serious cardiac risks = 0 points     RCRI Interpretation: 0 points: Class I (very low risk - 0.4% complication rate)         Signed Electronically by: Karlo Bryan PA-C  Copy of this evaluation report is provided to requesting physician.         Answers submitted by the patient for this visit:  Patient Health Questionnaire (Submitted on 2/28/2024)  If you checked off any problems, how difficult have these problems made it for you to do your work, take care of things at home, or get along with other people?: Not difficult at all  PHQ9 TOTAL SCORE: 0

## 2024-03-01 ENCOUNTER — ANCILLARY PROCEDURE (OUTPATIENT)
Dept: GENERAL RADIOLOGY | Facility: CLINIC | Age: 64
End: 2024-03-01
Attending: ORTHOPAEDIC SURGERY
Payer: COMMERCIAL

## 2024-03-01 ENCOUNTER — HOSPITAL ENCOUNTER (OUTPATIENT)
Facility: AMBULATORY SURGERY CENTER | Age: 64
Discharge: HOME | End: 2024-03-01
Attending: ORTHOPAEDIC SURGERY | Admitting: ORTHOPAEDIC SURGERY
Payer: COMMERCIAL

## 2024-03-01 ENCOUNTER — ANESTHESIA (OUTPATIENT)
Dept: SURGERY | Facility: AMBULATORY SURGERY CENTER | Age: 64
End: 2024-03-01
Payer: COMMERCIAL

## 2024-03-01 VITALS
SYSTOLIC BLOOD PRESSURE: 112 MMHG | DIASTOLIC BLOOD PRESSURE: 71 MMHG | HEIGHT: 69 IN | RESPIRATION RATE: 16 BRPM | TEMPERATURE: 98.4 F | BODY MASS INDEX: 35.55 KG/M2 | OXYGEN SATURATION: 95 % | HEART RATE: 61 BPM | WEIGHT: 240 LBS

## 2024-03-01 DIAGNOSIS — S52.502A CLOSED TRAUMATIC DISPLACED FRACTURE OF DISTAL END OF LEFT RADIUS, INITIAL ENCOUNTER: Primary | ICD-10-CM

## 2024-03-01 DIAGNOSIS — R52 PAIN: ICD-10-CM

## 2024-03-01 LAB — GLUCOSE BLDC GLUCOMTR-MCNC: 95 MG/DL (ref 70–99)

## 2024-03-01 PROCEDURE — 64415 NJX AA&/STRD BRCH PLXS IMG: CPT | Mod: 59 | Performed by: ANESTHESIOLOGY

## 2024-03-01 PROCEDURE — 25652 OPTX ULNAR STYLOID FRACTURE: CPT | Mod: RT

## 2024-03-01 PROCEDURE — G8907 PT DOC NO EVENTS ON DISCHARG: HCPCS

## 2024-03-01 PROCEDURE — 25607 OPTX DST RD XARTC FX/EPI SEP: CPT | Performed by: ANESTHESIOLOGY

## 2024-03-01 PROCEDURE — 25609 OPTX DST RD XART FX/EP SEP3+: CPT | Mod: AS | Performed by: PHYSICIAN ASSISTANT

## 2024-03-01 PROCEDURE — 25609 OPTX DST RD XART FX/EP SEP3+: CPT | Mod: LT | Performed by: ORTHOPAEDIC SURGERY

## 2024-03-01 PROCEDURE — 25607 OPTX DST RD XARTC FX/EPI SEP: CPT | Performed by: NURSE ANESTHETIST, CERTIFIED REGISTERED

## 2024-03-01 PROCEDURE — C1713 ANCHOR/SCREW BN/BN,TIS/BN: HCPCS

## 2024-03-01 PROCEDURE — 25609 OPTX DST RD XART FX/EP SEP3+: CPT | Mod: RT

## 2024-03-01 PROCEDURE — G8916 PT W IV AB GIVEN ON TIME: HCPCS

## 2024-03-01 DEVICE — IMP PEG FIX HANDINN 2.5X20MM THRD FP20: Type: IMPLANTABLE DEVICE | Site: WRIST | Status: FUNCTIONAL

## 2024-03-01 DEVICE — IMP PEG HANDINN SUBCHONDRAL 2.0X18MM P18000: Type: IMPLANTABLE DEVICE | Site: WRIST | Status: FUNCTIONAL

## 2024-03-01 DEVICE — IMP PLATE HANDINN VOLAR STANDARD RT DVRAR: Type: IMPLANTABLE DEVICE | Site: WRIST | Status: FUNCTIONAL

## 2024-03-01 DEVICE — IMP PEG HANDINN SUBCHONDRAL 2.0X20MM P20000: Type: IMPLANTABLE DEVICE | Site: WRIST | Status: FUNCTIONAL

## 2024-03-01 DEVICE — IMP SCR CORTICAL HANDINN 3.5X13MM CS13000: Type: IMPLANTABLE DEVICE | Site: WRIST | Status: FUNCTIONAL

## 2024-03-01 DEVICE — IMP SCR CORTICAL HANDINN 3.5X12MM CS12000: Type: IMPLANTABLE DEVICE | Site: WRIST | Status: FUNCTIONAL

## 2024-03-01 RX ORDER — OXYCODONE HYDROCHLORIDE 5 MG/1
10 TABLET ORAL
Status: DISCONTINUED | OUTPATIENT
Start: 2024-03-01 | End: 2024-03-02 | Stop reason: HOSPADM

## 2024-03-01 RX ORDER — ACETAMINOPHEN 325 MG/1
975 TABLET ORAL ONCE
Status: COMPLETED | OUTPATIENT
Start: 2024-03-01 | End: 2024-03-01

## 2024-03-01 RX ORDER — CEFAZOLIN SODIUM 2 G/50ML
2 SOLUTION INTRAVENOUS SEE ADMIN INSTRUCTIONS
Status: DISCONTINUED | OUTPATIENT
Start: 2024-03-01 | End: 2024-03-02 | Stop reason: HOSPADM

## 2024-03-01 RX ORDER — NALOXONE HYDROCHLORIDE 0.4 MG/ML
0.1 INJECTION, SOLUTION INTRAMUSCULAR; INTRAVENOUS; SUBCUTANEOUS
Status: DISCONTINUED | OUTPATIENT
Start: 2024-03-01 | End: 2024-03-02 | Stop reason: HOSPADM

## 2024-03-01 RX ORDER — ONDANSETRON 2 MG/ML
4 INJECTION INTRAMUSCULAR; INTRAVENOUS EVERY 30 MIN PRN
Status: DISCONTINUED | OUTPATIENT
Start: 2024-03-01 | End: 2024-03-02 | Stop reason: HOSPADM

## 2024-03-01 RX ORDER — ONDANSETRON 4 MG/1
4 TABLET, ORALLY DISINTEGRATING ORAL
Status: DISCONTINUED | OUTPATIENT
Start: 2024-03-01 | End: 2024-03-02 | Stop reason: HOSPADM

## 2024-03-01 RX ORDER — ACETAMINOPHEN 500 MG
1000 TABLET ORAL EVERY 8 HOURS
Qty: 42 TABLET | Refills: 0 | Status: SHIPPED | OUTPATIENT
Start: 2024-03-01 | End: 2024-03-08

## 2024-03-01 RX ORDER — LIDOCAINE HYDROCHLORIDE 20 MG/ML
INJECTION, SOLUTION INFILTRATION; PERINEURAL PRN
Status: DISCONTINUED | OUTPATIENT
Start: 2024-03-01 | End: 2024-03-01

## 2024-03-01 RX ORDER — ONDANSETRON 4 MG/1
4 TABLET, ORALLY DISINTEGRATING ORAL EVERY 30 MIN PRN
Status: DISCONTINUED | OUTPATIENT
Start: 2024-03-01 | End: 2024-03-02 | Stop reason: HOSPADM

## 2024-03-01 RX ORDER — FENTANYL CITRATE 50 UG/ML
50 INJECTION, SOLUTION INTRAMUSCULAR; INTRAVENOUS EVERY 5 MIN PRN
Status: DISCONTINUED | OUTPATIENT
Start: 2024-03-01 | End: 2024-03-02 | Stop reason: HOSPADM

## 2024-03-01 RX ORDER — OXYCODONE HYDROCHLORIDE 5 MG/1
5 TABLET ORAL
Status: DISCONTINUED | OUTPATIENT
Start: 2024-03-01 | End: 2024-03-02 | Stop reason: HOSPADM

## 2024-03-01 RX ORDER — HYDROXYZINE HYDROCHLORIDE 25 MG/1
25 TABLET, FILM COATED ORAL EVERY 6 HOURS PRN
Qty: 20 TABLET | Refills: 0 | Status: SHIPPED | OUTPATIENT
Start: 2024-03-01 | End: 2024-03-18

## 2024-03-01 RX ORDER — BUPIVACAINE HYDROCHLORIDE 5 MG/ML
INJECTION, SOLUTION EPIDURAL; INTRACAUDAL
Status: COMPLETED | OUTPATIENT
Start: 2024-03-01 | End: 2024-03-01

## 2024-03-01 RX ORDER — FENTANYL CITRATE 50 UG/ML
25-50 INJECTION, SOLUTION INTRAMUSCULAR; INTRAVENOUS
Status: DISCONTINUED | OUTPATIENT
Start: 2024-03-01 | End: 2024-03-02 | Stop reason: HOSPADM

## 2024-03-01 RX ORDER — SODIUM CHLORIDE, SODIUM LACTATE, POTASSIUM CHLORIDE, CALCIUM CHLORIDE 600; 310; 30; 20 MG/100ML; MG/100ML; MG/100ML; MG/100ML
INJECTION, SOLUTION INTRAVENOUS CONTINUOUS
Status: DISCONTINUED | OUTPATIENT
Start: 2024-03-01 | End: 2024-03-02 | Stop reason: HOSPADM

## 2024-03-01 RX ORDER — NALOXONE HYDROCHLORIDE 0.4 MG/ML
0.2 INJECTION, SOLUTION INTRAMUSCULAR; INTRAVENOUS; SUBCUTANEOUS
Status: DISCONTINUED | OUTPATIENT
Start: 2024-03-01 | End: 2024-03-02 | Stop reason: HOSPADM

## 2024-03-01 RX ORDER — FENTANYL CITRATE 50 UG/ML
25 INJECTION, SOLUTION INTRAMUSCULAR; INTRAVENOUS
Status: DISCONTINUED | OUTPATIENT
Start: 2024-03-01 | End: 2024-03-02 | Stop reason: HOSPADM

## 2024-03-01 RX ORDER — LIDOCAINE 40 MG/G
CREAM TOPICAL
Status: DISCONTINUED | OUTPATIENT
Start: 2024-03-01 | End: 2024-03-02 | Stop reason: HOSPADM

## 2024-03-01 RX ORDER — HYDROXYZINE HYDROCHLORIDE 25 MG/1
25 TABLET, FILM COATED ORAL
Status: DISCONTINUED | OUTPATIENT
Start: 2024-03-01 | End: 2024-03-02 | Stop reason: HOSPADM

## 2024-03-01 RX ORDER — PROPOFOL 10 MG/ML
INJECTION, EMULSION INTRAVENOUS PRN
Status: DISCONTINUED | OUTPATIENT
Start: 2024-03-01 | End: 2024-03-01

## 2024-03-01 RX ORDER — OXYCODONE HYDROCHLORIDE 5 MG/1
5-10 TABLET ORAL EVERY 6 HOURS PRN
Qty: 10 TABLET | Refills: 0 | Status: SHIPPED | OUTPATIENT
Start: 2024-03-01 | End: 2024-03-18

## 2024-03-01 RX ORDER — DEXAMETHASONE SODIUM PHOSPHATE 4 MG/ML
4 INJECTION, SOLUTION INTRA-ARTICULAR; INTRALESIONAL; INTRAMUSCULAR; INTRAVENOUS; SOFT TISSUE
Status: DISCONTINUED | OUTPATIENT
Start: 2024-03-01 | End: 2024-03-02 | Stop reason: HOSPADM

## 2024-03-01 RX ORDER — FENTANYL CITRATE 50 UG/ML
25 INJECTION, SOLUTION INTRAMUSCULAR; INTRAVENOUS EVERY 5 MIN PRN
Status: DISCONTINUED | OUTPATIENT
Start: 2024-03-01 | End: 2024-03-02 | Stop reason: HOSPADM

## 2024-03-01 RX ORDER — CEFAZOLIN SODIUM 2 G/50ML
2 SOLUTION INTRAVENOUS
Status: COMPLETED | OUTPATIENT
Start: 2024-03-01 | End: 2024-03-01

## 2024-03-01 RX ORDER — AMOXICILLIN 250 MG
1-2 CAPSULE ORAL 2 TIMES DAILY
Qty: 30 TABLET | Refills: 0 | Status: SHIPPED | OUTPATIENT
Start: 2024-03-01 | End: 2024-03-18

## 2024-03-01 RX ORDER — PROPOFOL 10 MG/ML
INJECTION, EMULSION INTRAVENOUS CONTINUOUS PRN
Status: DISCONTINUED | OUTPATIENT
Start: 2024-03-01 | End: 2024-03-01

## 2024-03-01 RX ORDER — NALOXONE HYDROCHLORIDE 0.4 MG/ML
0.4 INJECTION, SOLUTION INTRAMUSCULAR; INTRAVENOUS; SUBCUTANEOUS
Status: DISCONTINUED | OUTPATIENT
Start: 2024-03-01 | End: 2024-03-02 | Stop reason: HOSPADM

## 2024-03-01 RX ORDER — FLUMAZENIL 0.1 MG/ML
0.2 INJECTION, SOLUTION INTRAVENOUS
Status: DISCONTINUED | OUTPATIENT
Start: 2024-03-01 | End: 2024-03-02 | Stop reason: HOSPADM

## 2024-03-01 RX ADMIN — PROPOFOL 150 MCG/KG/MIN: 10 INJECTION, EMULSION INTRAVENOUS at 12:02

## 2024-03-01 RX ADMIN — PROPOFOL 70 MG: 10 INJECTION, EMULSION INTRAVENOUS at 12:02

## 2024-03-01 RX ADMIN — BUPIVACAINE HYDROCHLORIDE 20 ML: 5 INJECTION, SOLUTION EPIDURAL; INTRACAUDAL at 11:25

## 2024-03-01 RX ADMIN — SODIUM CHLORIDE, SODIUM LACTATE, POTASSIUM CHLORIDE, CALCIUM CHLORIDE: 600; 310; 30; 20 INJECTION, SOLUTION INTRAVENOUS at 11:25

## 2024-03-01 RX ADMIN — ACETAMINOPHEN 975 MG: 325 TABLET ORAL at 11:00

## 2024-03-01 RX ADMIN — LIDOCAINE HYDROCHLORIDE 60 MG: 20 INJECTION, SOLUTION INFILTRATION; PERINEURAL at 12:02

## 2024-03-01 RX ADMIN — CEFAZOLIN SODIUM 2 G: 2 SOLUTION INTRAVENOUS at 11:54

## 2024-03-01 RX ADMIN — FENTANYL CITRATE 25 MCG: 50 INJECTION, SOLUTION INTRAMUSCULAR; INTRAVENOUS at 12:00

## 2024-03-01 RX ADMIN — FENTANYL CITRATE 50 MCG: 50 INJECTION, SOLUTION INTRAMUSCULAR; INTRAVENOUS at 11:25

## 2024-03-01 NOTE — ANESTHESIA POSTPROCEDURE EVALUATION
Patient: Nela Mares    Procedure: Procedure(s):  OPEN REDUCTION INTERNAL FIXATION, FRACTURE, RIGHT WRIST       Anesthesia Type:  MAC    Note:  Disposition: Outpatient   Postop Pain Control: Uneventful            Sign Out: Well controlled pain   PONV: No   Neuro/Psych: Uneventful            Sign Out: Acceptable/Baseline neuro status   Airway/Respiratory: Uneventful            Sign Out: Acceptable/Baseline resp. status   CV/Hemodynamics: Uneventful            Sign Out: Acceptable CV status; No obvious hypovolemia; No obvious fluid overload   Other NRE:    DID A NON-ROUTINE EVENT OCCUR? No           Last vitals:  Vitals Value Taken Time   /71 03/01/24 1400   Temp 98.4  F (36.9  C) 03/01/24 1400   Pulse 61 03/01/24 1400   Resp 16 03/01/24 1400   SpO2 95 % 03/01/24 1400       Electronically Signed By: Chan Gomez MD  March 1, 2024  2:19 PM

## 2024-03-01 NOTE — INTERVAL H&P NOTE
"I have reviewed the surgical (or preoperative) H&P that is linked to this encounter, and examined the patient. There are no significant changes    Clinical Conditions Present on Arrival:  Clinically Significant Risk Factors Present on Admission                  # Obesity: Estimated body mass index is 35.44 kg/m  as calculated from the following:    Height as of this encounter: 1.753 m (5' 9\").    Weight as of this encounter: 108.9 kg (240 lb).     The History and Physical on patient's chart was personally reviewed today with the patient. there have been no interval changes in patient's history since H+P performed.    History:  Nela Mares is a 63 year old female , right -hand dominant, with  a right wrist injury. She injured her hand on 2/22/2024 while falling at the dermatologist office. She was seen in Sports Medicine, noting a comminuted, displaced wrist fracture. Was reduced and splinted. Presents today for management. Continued but manageable pain with tylenol. Denies other injury. Denies numbness and tingling.     3 views right wrist from 2/28/2024 -- there is an impacted, displaced, comminuted, intra-articular fracture of the right distal radius (somewhat inverted \"Y\" type pattern).  There is shortening. There is articular stepoff with slight gapping. There is displaced ulnar styloid fracture. There is improved alignment from injury images 2/22/2024, at which time there was dorsal angulation 20 degrees from neutral and impaction.     Assessment: 64yo RHD female with comminuted, displaced, impacted, intra-articular distal radius fracture status post fall.     Plan:  * I discussed injury, images with the patient and based on the amount of displacement and/or angulation. Recommend open-reduction, internal fixation to improve alignment, with long-term concerns of malunion and functional limitation given current alignment if treated nonoperatively.  * risks and benefits of both surgical (orif) and " nonsurgical (cast) were discussed. In particular, risks of nonop included, but not limited to, nonunion, malunion, joint stiffness, decr range of motion, post-traumatic arthritis and pain and possible functional limitations. Risks of surgery include, but not limited to: bleeding, infection, pain, scar, damage to adjacent structures (e.g. Nerves, blood vessels, bone, cartilage), temporary or permanent nerve damage, recurrence of symptoms, non-union, malunion, implant failure, stiffness, post-traumatic arthritis, need for further surgery, blood clots, pulmonary embolism, risks of anesthesia, death.  * after reviewing the risks and perceived benefits of each, patient would like to proceed with surgical stabilization  * will plan open-reduction, internal fixation of RIGHT wrist fracture on 3/1/2024, outpatient procedure, [facility] [place] .    Patient elects to proceed with planned procedure. Open-reduction, internal fixation right distal radius fracture.    Risks and perceived benefits of surgery again discussed with patient. Patient's questions addressed and answered. Written informed consent obtained and reviewed. Surgical site marked with indelible marker with patient's participation after confirming site with patient.      Andrea Escalante M.D., M.S.  Dept. of Orthopaedic Surgery  [facility]

## 2024-03-01 NOTE — DISCHARGE INSTRUCTIONS
You had 975 mg of Tylenol at 11 AM. You may repeat this after 5 PM. Maximum amount of Tylenol/Acetaminophen in a 24 hour period is 4,000 mg.

## 2024-03-01 NOTE — OP NOTE
"DATE OF SERVICE:  3/1/2024    PREOPERATIVE DIAGNOSIS:  right intra-articular, comminuted, displaced, impacted and shortened distal radius fracture.    POSTOPERATIVE DIAGNOSIS: right intra-articular, comminuted, displaced, impacted and shortened distal radius fracture.    OPERATION PERFORMED:  1. Open reduction internal fixation right intra-articular, comminuted, displaced, impacted and shortened distal radius fracture with fixation of three or more fragments.    ATTENDING SURGEON: Andrea Escalante M.D., M.S.    ASSISTANT(S): Hiro Baumann PA-C   The PA's assistance was medically necessary given the technical complexity of the case; with assistance with patient positioning, fracture exposure, obtaining and maintaining fracture reduction, implant placement, wound closure.      ANESTHESIA:  MAC with preop block, in the supine position.    IV FLUIDS:  700 mL LR.    EBL:   5mL.    URINE OUTPUT: no joe    TOURNIQUET TIME: 51 minutes at 250mmHg.    IMPLANTS / GRAFTS:     [employer] volar DVR plate, 3-hole standard, right with 3.5 cortical screws proximally and smooth pegs and a threaded styloid screw distally    COMPLICATIONS:  NONE    ANTIBIOTICS:  Cefazolin 2 gm intravenously prior to tourniquet inflation    SPECIMENS: none    FINDINGS: ntra-articular, comminuted, displaced, impacted and shortened distal radius fracture. Die-punch type fracture with an inverted \"Y\" type pattern, with significant dorsal comminution, radial styloid comminution, distinct radial and ulnar fragments.    DRAINS: none.    INDICATIONS FOR PROCEDURE: Nela Mares is a 63 year old female , right -hand dominant, with  a right wrist injury. She injured her hand on 2/22/2024 while falling at the dermatologist office. She was seen in Sports Medicine, noting a comminuted, displaced wrist fracture. Was reduced and splinted. Presents today for management. Continued but manageable pain with tylenol. Denies other injury. Denies numbness and " "tingling.      3 views right wrist from 2/28/2024 -- there is an impacted, displaced, comminuted, intra-articular fracture of the right distal radius (somewhat inverted \"Y\" type pattern).  There is shortening. There is articular stepoff with slight gapping. There is displaced ulnar styloid fracture.        Discussed imaging findings and injury patient. Treamtent options discussed, both nonsurgical versus surgical with open reduction internal fixation. The risks and benefits of both surgical (orif) and nonsurgical (cast) were discussed. In particular, risks of nonop included, but not limited to, nonunion, malunion, joint stiffness, decr range of motion, post-traumatic arthritis and pain and possible functional limitations. Risks of surgery include, but not limited to: bleeding, infection, pain, scar, damage to adjacent structures (e.g. Nerves, blood vessels, bone, cartilage), temporary or permanent nerve damage, recurrence of symptoms, non-union, malunion, implant failure, stiffness, post-traumatic arthritis, need for further surgery, blood clots, pulmonary embolism, risks of anesthesia, death.     Given the amount of dorsal comminution and angulation, recommended open reduction internal fixation. After in depth discussion, patient elects for surgical fixation.        PROCEDURE AND FINDINGS:    The patient was identified in the preoperative holding area. The correct surgical procedure and procedureal site was confirmed with the patient. Again, the risks of surgery were reviewed. The patient elected to proceed understanding the risks. Written informed consent was obtained. The correct surgical site was marked with an indelible marker by myself, Andrea Escalante MD, the surgeon.      The patient was then taken to the operating room and placed supine on the operating table. After adequate anesthesia was obtained, a non-sterile tourniquet was placed to the right upper arm. All bony prominences were well padded. The " contralateral arm was well positioned and padded to be comfortable. The right upper extremity was prepped and draped in the usual sterile fashion.     A time-out was completed confirming the correct patient, the correct surgery and surgical site, positioning, the availability of instruments and implants, the administration of prophylactic antibiotics, and review of known allergies by all surgical staff.     At that time, a volar incision was outlined with the marking pen over the FCR tendon in usual volar approach to the distal radius. The right upper extremity was then elevated, exsanguinated with an esmarch and tourniquet inflated. Longitudinal incision was made over the FCR tendon, sharply through skin. Hemostasis achieved with electrocautery. The FCR tendon was retracted and the volar fascia was incised. The volar contents herniated out and were retracted ulnarly. This exposed the underlying pronator quadratus which was disrupted distally at the fracture site. There was a fair amount of edematous fluid throughout. The pronator quadratus was elevated off radial to ulnar exposing the fracture. The fracture did extend to the articular surface with gap and step-off The wound was irrigated and soft tissue was removed from within the fracture. The ulnar fracture fragment was reduced. The radial styloid was radially displaced. I released the extensors off the styloid to free it up and aid in reduction. There was notable comminution of the styloid. I was able to get this reduced with gentle traction and using a narrow jennifer retractor to keep it ulnarly. At that time, a kwire was placed to hold the styloid in place. At that time a volar plate was sized and provisionally fashioned to the distal radius using kwires. Fracture reduction and implant placement was confirmed with c-arm. We then proceeded to place a 3.5 cortical screw in the dynamic slot of the proximal portion of the plate. We then proceeded to place smooth  pegs in the ulnar fragment, followed by pegs into the radial styloid. Given the comminution, I did place the longer styloid fixation with a threaded screw with good fixation. This was then followed by fixation of   the distal row. Using c-arm to confirm fracture reduction and implant placement, the plate fit well and all distal pegs were confirmed to not extend to the articular surface in the subchondral bone. We then proceeded to fixation proximally with two more 3.5 cortical screws. Final images were obtained. The wound was irrigated and tourniquet deflated. Hemostasis achieved with electrocautery. The volar contents were placed back into position and the wound was closed in a layered fashion with buried 3- monocryls and a 3-0 running subcuticular prolene with tails. Steri strips. Betadine soaked adaptec gauze and a well padded and molded volar plaster splint. Once the splint was hardened, the patient was awaken and taken to recovery in stable condition. No apparent complications.        The postoperative plan will be strict non-weight bearing right upper extremity. Elevation. Finger range of motion. Splint at all times. Pain control with oxycodone, acetaminophen and hydroxyzine. Return to clinic in 2 weeks.  We look forward to following the patient through the perioperative time period.     Andrea Escalante MD, MS  Orthopaedic Surgery  [facility]

## 2024-03-01 NOTE — ANESTHESIA CARE TRANSFER NOTE
Patient: Nela Mares    Procedure: Procedure(s):  OPEN REDUCTION INTERNAL FIXATION, FRACTURE, RIGHT WRIST       Diagnosis: Closed traumatic displaced fracture of distal end of left radius, initial encounter [S52.502A]  Diagnosis Additional Information: No value filed.    Anesthesia Type:   MAC     Note:    Oropharynx: oropharynx clear of all foreign objects and spontaneously breathing  Level of Consciousness: awake  Oxygen Supplementation: room air    Independent Airway: airway patency satisfactory and stable  Dentition: dentition unchanged  Vital Signs Stable: post-procedure vital signs reviewed and stable  Report to RN Given: handoff report given  Patient transferred to: PACU    Handoff Report: Identifed the Patient, Identified the Reponsible Provider, Reviewed the pertinent medical history, Discussed the surgical course, Reviewed Intra-OP anesthesia mangement and issues during anesthesia, Set expectations for post-procedure period and Allowed opportunity for questions and acknowledgement of understanding  Vitals:  Vitals Value Taken Time   BP     Temp     Pulse     Resp     SpO2         Electronically Signed By: RAYMOND Leung CRNA  March 1, 2024  1:30 PM

## 2024-03-05 ENCOUNTER — DOCUMENTATION ONLY (OUTPATIENT)
Dept: FAMILY MEDICINE | Facility: CLINIC | Age: 64
End: 2024-03-05
Payer: COMMERCIAL

## 2024-03-05 DIAGNOSIS — E78.5 HYPERLIPIDEMIA LDL GOAL <130: Primary | ICD-10-CM

## 2024-03-05 DIAGNOSIS — I10 ESSENTIAL HYPERTENSION WITH GOAL BLOOD PRESSURE LESS THAN 140/90: ICD-10-CM

## 2024-03-05 RX ORDER — AMLODIPINE BESYLATE 5 MG/1
5 TABLET ORAL DAILY
Qty: 90 TABLET | Refills: 3 | Status: SHIPPED | OUTPATIENT
Start: 2024-03-05

## 2024-03-05 NOTE — PROGRESS NOTES
This patient has a future lab only appointment 03/07/2024 and needs orders. Please send orders. Thanks [facility]

## 2024-03-05 NOTE — PROGRESS NOTES
CHIEF COMPLAINT:   Chief Complaint   Patient presents with    Right Wrist - Surgical Followup     Open reduction internal fixation. DOS 3/1/24, 2 wk s/p. Patient notes her wrist is doing fine. She has remained in splint. Able to move fingers without pain. She has some numbness in the thumb.          SURGICAL PROCEDURE: open-reduction, internal fixation right distal radius fracture   DATE OF PROCEDURE: 3/1/2024      HISTORY OF PRESENT ILLNESS    Nela Mares is a 63 year old female seen for postoperative follow-up of a right distal radius fracture open-reduction, internal fixation  that occurred 2.5 weeks ago. Since surgery, pain has been improving. Denies fevers, chills, night sweats. No wound problems. Compliant with weight bearing restrictions and elevation. There have been no issues since surgery. A little numbness in the thumb, inner aspect where the splint was wrapped.      Present symptoms: mild pain, mild swelling.    Pain severity: 0/10  Pain quality: dull and aching    Other PMH:  has a past medical history of Allergic rhinitis, cause unspecified, Cervical high risk HPV (human papillomavirus) test positive (03/21/2017), Depressive disorder, Depressive disorder, not elsewhere classified, Diabetes mellitus, type 2 (H) (02/20/2023), Hypertension, Mild persistent asthma, Obstructive sleep apnea (adult) (pediatric), Primary osteoarthritis of both knees (01/28/2022), and Scalp mass (07/2014).    She has no past medical history of Basal cell carcinoma, Cancer (H), Cerebral infarction (H), Congestive heart failure (H), COPD (chronic obstructive pulmonary disease) (H), Heart disease, History of blood transfusion, Malignant melanoma (H), Squamous cell carcinoma, or Thyroid disease.  Patient Active Problem List   Diagnosis    Allergic rhinitis due to animals    Mild persistent asthma    Obstructive sleep apnea    Leiomyoma of uterus    Mild major depression (H)    CARDIOVASCULAR SCREENING; LDL GOAL LESS THAN 160     Hypertension goal BP (blood pressure) < 140/90    Pilar cyst    Hypertrophy of breast    IUD (intrauterine device) in place    Seasonal allergic rhinitis due to pollen    Allergic rhinitis due to dust mite    Allergic rhinitis due to mold    Overweight (H)    Impaired fasting glucose    Allergic conjunctivitis, bilateral    Paroxysmal supraventricular tachycardia    Chronic midline low back pain without sciatica    Primary osteoarthritis of both knees    Diabetes mellitus, type 2 (H)    Lichen sclerosus    Encounter for pharmacogenetic testing    Closed traumatic displaced fracture of distal end of left radius, initial encounter       Past surgical History:  has a past surgical history that includes no history of surgery; d & c; Colonoscopy (6/21/2012); Laparoscopic cholecystectomy with cholangiograms (1/4/2014); Endoscopic retrograde cholangiopancreatogram (1/4/2014); Operative hysteroscopy (6/17/2014); Remove intrauterine device (6/17/2014); Ablation Supraventricular Tachycardia (N/A, 6/2/2021); Colonoscopy (N/A, 10/20/2022); Colonoscopy (N/A, 3/20/2023); Mammoplasty reduction bilateral (Bilateral, 6/28/2023); and Open reduction internal fixation wrist (Right, 3/1/2024).    Medications:   Current Outpatient Medications:     acetaminophen (TYLENOL) 500 MG tablet, Take 2 tablets (1,000 mg) by mouth every 8 hours for 7 days, Disp: 42 tablet, Rfl: 0    albuterol (PROAIR HFA/PROVENTIL HFA/VENTOLIN HFA) 108 (90 Base) MCG/ACT inhaler, Inhale 2 puffs into the lungs every 4 hours as needed for shortness of breath or wheezing, Disp: 36 g, Rfl: 1    albuterol (PROVENTIL) (2.5 MG/3ML) 0.083% neb solution, Take 1 vial (2.5 mg) by nebulization every 4 hours as needed for shortness of breath / dyspnea or wheezing, Disp: 30 mL, Rfl: 11    ARIPiprazole (ABILIFY) 10 MG tablet, Take 1 tablet (10 mg) by mouth daily, Disp: 90 tablet, Rfl: 0    cetirizine (ZYRTEC) 10 MG tablet, Take 1 tablet (10 mg) by mouth daily, Disp: 90 tablet,  "Rfl: 3    Cholecalciferol (VITAMIN D) 125 MCG (5000 UT) capsule, Take 1 capsule (5,000 Units) by mouth daily, Disp: 90 capsule, Rfl: 3    clobetasol (TEMOVATE) 0.05 % external ointment, Apply twice daily to affected area for 2 weeks then 2-3 times weekly. (Patient taking differently: Apply twice daily to affected area for 2 weeks then 2-3 times weekly. Using daily per pt on visit 2/22/24), Disp: 60 g, Rfl: 0    finasteride (PROSCAR) 5 MG tablet, Take 1 tablet (5 mg) by mouth daily, Disp: 90 tablet, Rfl: 3    fluticasone (FLONASE) 50 MCG/ACT nasal spray, Spray 2 sprays into both nostrils daily, Disp: 48 g, Rfl: 3    fluticasone-salmeterol (ADVAIR) 500-50 MCG/ACT inhaler, Inhale 1 puff into the lungs every 12 hours Needs appointment for additional refills, Disp: 3 each, Rfl: 0    hydrOXYzine HCl (ATARAX) 25 MG tablet, Take 1 tablet (25 mg) by mouth every 6 hours as needed for itching, anxiety or other (pain, muscle spasms.), Disp: 20 tablet, Rfl: 0    lisinopril (ZESTRIL) 40 MG tablet, Take 1 tablet (40 mg) by mouth daily, Disp: 90 tablet, Rfl: 3    montelukast (SINGULAIR) 10 MG tablet, Take 1 tablet (10 mg) by mouth daily, Disp: 90 tablet, Rfl: 3    oxyCODONE (ROXICODONE) 5 MG tablet, Take 1-2 tablets (5-10 mg) by mouth every 6 hours as needed for moderate to severe pain, Disp: 10 tablet, Rfl: 0    senna-docusate (SENOKOT-S/PERICOLACE) 8.6-50 MG tablet, Take 1-2 tablets by mouth 2 times daily, Disp: 30 tablet, Rfl: 0    venlafaxine (EFFEXOR-ER) 225 MG 24 hr tablet, Take 1 tablet (225 mg) by mouth daily, Disp: 90 tablet, Rfl: 0    Allergies: No Known Allergies    REVIEW OF SYSTEMS:  CONSTITUTIONAL:NEGATIVE for fever, chills, change in weight  INTEGUMENTARY/SKIN: NEGATIVE for worrisome rashes, moles or lesions  MUSCULOSKELETAL:See HPI above  NEURO: NEGATIVE for weakness, dizziness or paresthesias      PHYSICAL EXAM:  /81   Pulse 69   Ht 1.753 m (5' 9\")   LMP 12/21/2014 (Exact Date)   BMI 35.44 kg/m   "   GENERAL APPEARANCE: healthy, alert, no distress   SKIN: no suspicious lesions or rashes  NEURO: Normal strength and tone, mentation intact and speech normal  PSYCH:  mentation appears normal and affect normal  RESPIRATORY: No increased work of breathing.      right UPPER EXTREMITY:  Wound / Incision: volar incision healing well.  Inspection: swelling, resolving ecchymosis  Palpation: diffuse wrist, incision.  There is no erythema of the surrounding skin.  There is no maceration of the skin.  There is no deformity in the area.    Sensation intact to light touch in median, radial, ulnar and axillary nerve distributions  Palpable 2+ radial pulse, brisk capillary refill to all fingers, wwp  Intact epl fpl fdp edc wrist flexion/extension biceps triceps deltoid      X-RAY:  3 views right wrist from 3/18/2024 were reviewed in clinic today. Volar screw and plate fixation across a healing comminuted mildly displaced fracture of the distal radius. No hardware loosening. Moderately displaced fracture of the ulnar styloid process. Osteopenia..          Impression: 63 year old female  2.5 weeks  postoperative open-reduction, internal fixation  right distal radius fracture , doing well.    Plan:   Images reviewed.  Suture removal.  Weight bearing status: non weight bearing. Gentle finger and wrist range of motion ok.  Pain control: over the counter as needed.  Immobilization: wrist brace, off at rest, hygiene, gentle range of motion.  Elevation of affected extremity  Images: right wrist  Return to clinic in 4 weeks, or sooner as needed. Plan therapy at that time.      Andrea Escalante M.D., M.S.  Dept. of Orthopaedic Surgery  [facility]

## 2024-03-07 ENCOUNTER — LAB (OUTPATIENT)
Dept: LAB | Facility: CLINIC | Age: 64
End: 2024-03-07
Payer: COMMERCIAL

## 2024-03-07 DIAGNOSIS — E78.5 HYPERLIPIDEMIA LDL GOAL <130: ICD-10-CM

## 2024-03-07 PROCEDURE — 36415 COLL VENOUS BLD VENIPUNCTURE: CPT

## 2024-03-07 PROCEDURE — 80076 HEPATIC FUNCTION PANEL: CPT

## 2024-03-07 PROCEDURE — 80061 LIPID PANEL: CPT

## 2024-03-08 LAB
ALBUMIN SERPL BCG-MCNC: 4.2 G/DL (ref 3.5–5.2)
ALP SERPL-CCNC: 78 U/L (ref 40–150)
ALT SERPL W P-5'-P-CCNC: 53 U/L (ref 0–50)
AST SERPL W P-5'-P-CCNC: 52 U/L (ref 0–45)
BILIRUB DIRECT SERPL-MCNC: <0.2 MG/DL (ref 0–0.3)
BILIRUB SERPL-MCNC: 0.3 MG/DL
CHOLEST SERPL-MCNC: 187 MG/DL
FASTING STATUS PATIENT QL REPORTED: YES
HDLC SERPL-MCNC: 39 MG/DL
LDLC SERPL CALC-MCNC: 112 MG/DL
NONHDLC SERPL-MCNC: 148 MG/DL
PROT SERPL-MCNC: 7.4 G/DL (ref 6.4–8.3)
TRIGL SERPL-MCNC: 178 MG/DL

## 2024-03-15 SDOH — HEALTH STABILITY: PHYSICAL HEALTH: ON AVERAGE, HOW MANY MINUTES DO YOU ENGAGE IN EXERCISE AT THIS LEVEL?: 0 MIN

## 2024-03-15 SDOH — HEALTH STABILITY: PHYSICAL HEALTH: ON AVERAGE, HOW MANY DAYS PER WEEK DO YOU ENGAGE IN MODERATE TO STRENUOUS EXERCISE (LIKE A BRISK WALK)?: 0 DAYS

## 2024-03-15 ASSESSMENT — SOCIAL DETERMINANTS OF HEALTH (SDOH): HOW OFTEN DO YOU GET TOGETHER WITH FRIENDS OR RELATIVES?: ONCE A WEEK

## 2024-03-15 NOTE — COMMUNITY RESOURCES LIST (ENGLISH)
March 15, 2024           YOUR PERSONALIZED LIST OF SERVICES & PROGRAMS           & RECREATION    Sports      Park & Recreation Board - Sports clubs and recreational activities - [place] [employer] [facility] [place]  [address] [place] [address] (Distance: 1.5 miles)  Phone: [phone]  Language: English, Norwegian  Fee: Free, Self pay  Accessibility: [place] accessible      Park & Recreation Board - Sports clubs and recreational activities - [place] [employer] [facility]  [address] [place] [address] (Distance: 2.5 miles)  Language: English  Fee: Free, Self pay      LEAGUE - LITTLE LEAGUE BASEBALL AND SOFTBALL  Website: http://www.littleleague.org    Classes/Groups      in [employer] [place] [address] (Distance: 3.9 miles)  Phone: [phone]  Website: http://[email or web address].mn/  Language: English  Fee: Free      [facility] - Group fitness classes  [address] [place] [address] (Distance: 1.4 miles)  Phone: [phone]  Website: http://www.[facility]/departments/recreation/senior_citizens.php  Language: English  Fee: Free, Self pay  Accessibility: [place] accessible      Workouts - Exercise Class/At Home Workouts  Website: https://homeworkouts.org/  Language: English            Bill Payment Assistance      [facility] - Utility payment assistance  [address] [place] [address] (Distance: 4.5 miles)  Phone: [phone]  Website: http://www.[facility].[employer]/  Language: English  Fee: Free  Accessibility: [place] accessible      [employer] - Energized  Phone: [phone]) [phone]  Website: https://www.esa36-fskjnsjssfeccl.org/programs.html  Language: English  Hours: Mon 7:00 AM - 7:00 PM Tue 7:00 AM - 7:00 PM Wed 7:00 AM - 7:00 PM Thu 7:00 AM - 7:00 PM Fri 7:00 AM - 7:00 PM  Fee: Free      - Dislocated Worker/Adult [employer] Employment  Program  Phone: (359) 512-5801  Email: chen@[employer]  Website: https://[employer]/services/employment-services/dislocated-worker-program/  Language: English, Cambodian  Hours: Mon 8:00 AM - 4:30 PM Tue 8:00 AM - 4:30 PM Wed 8:00 AM - 4:30 PM Thu 8:00 AM - 4:30 PM Fri 8:00 AM - 4:30 PM  Fee: Free  Accessibility: [place] accessible               IMPORTANT NUMBERS & WEBSITES        Emergency Services  911  .   [facility]  211 http://211unitedway.org  .   Poison Control  (137) 726-6257 http://[place]poison.org http://wis[place]poison.org  .     Suicide and Crisis Lifeline  988 http://988MeritBuilderline.org  .   Childhelp [place] Child Abuse Hotline  432.281.6370 http://Childhelphotline.org   .   [place] Sexual Assault Hotline  (883) 784-4910 (HOPE) http://Girly Stuff.zuuka!   .     [place] Runaway Safeline  (162) 997-7016 (RUNAWAY) http://Udorse.zuuka!  .   Pregnancy & Postpartum Support  Call/text 390-899-6346  [place]: http://ppsupportmn.org  [place]: http://The Old Reader.com/wi  .   Substance Abuse National Helpline ([facility])  434-353-HELP (4010) http://Findtreatment.gov   .                DISCLAIMER: [employer] does not endorse any service providers mentioned in this resource list. [employer] does not guarantee that the services mentioned in this resource list will be available to you or will improve your health or wellness.    [facility]

## 2024-03-18 ENCOUNTER — OFFICE VISIT (OUTPATIENT)
Dept: ORTHOPEDICS | Facility: CLINIC | Age: 64
End: 2024-03-18
Payer: COMMERCIAL

## 2024-03-18 ENCOUNTER — ANCILLARY PROCEDURE (OUTPATIENT)
Dept: GENERAL RADIOLOGY | Facility: CLINIC | Age: 64
End: 2024-03-18
Attending: ORTHOPAEDIC SURGERY
Payer: COMMERCIAL

## 2024-03-18 VITALS
DIASTOLIC BLOOD PRESSURE: 81 MMHG | BODY MASS INDEX: 35.44 KG/M2 | SYSTOLIC BLOOD PRESSURE: 138 MMHG | HEART RATE: 69 BPM | HEIGHT: 69 IN

## 2024-03-18 DIAGNOSIS — S52.571D CLOSED DIE-PUNCH INTRA-ARTICULAR FRACTURE OF DISTAL RADIUS, RIGHT, WITH ROUTINE HEALING, SUBSEQUENT ENCOUNTER: Primary | ICD-10-CM

## 2024-03-18 DIAGNOSIS — S52.502A: ICD-10-CM

## 2024-03-18 PROCEDURE — 99024 POSTOP FOLLOW-UP VISIT: CPT | Performed by: ORTHOPAEDIC SURGERY

## 2024-03-18 PROCEDURE — 73110 X-RAY EXAM OF WRIST: CPT | Mod: TC | Performed by: RADIOLOGY

## 2024-03-18 ASSESSMENT — PAIN SCALES - GENERAL: PAINLEVEL: NO PAIN (0)

## 2024-03-18 NOTE — LETTER
3/18/2024         RE: Nela Mares  [address] [facility] [id number]        Dear Colleague,    Thank you for referring your patient, Nela Mares, to the [facility]. Please see a copy of my visit note below.    CHIEF COMPLAINT:   Chief Complaint   Patient presents with     Right Wrist - Surgical Followup     Open reduction internal fixation. DOS 3/1/24, 2 wk s/p. Patient notes her wrist is doing fine. She has remained in splint. Able to move fingers without pain. She has some numbness in the thumb.          SURGICAL PROCEDURE: open-reduction, internal fixation right distal radius fracture   DATE OF PROCEDURE: 3/1/2024      HISTORY OF PRESENT ILLNESS    Nela Mares is a 63 year old female seen for postoperative follow-up of a right distal radius fracture open-reduction, internal fixation  that occurred 2.5 weeks ago. Since surgery, pain has been improving. Denies fevers, chills, night sweats. No wound problems. Compliant with weight bearing restrictions and elevation. There have been no issues since surgery. A little numbness in the thumb, inner aspect where the splint was wrapped.      Present symptoms: mild pain, mild swelling.    Pain severity: 0/10  Pain quality: dull and aching    Other PMH:  has a past medical history of Allergic rhinitis, cause unspecified, Cervical high risk HPV (human papillomavirus) test positive (03/21/2017), Depressive disorder, Depressive disorder, not elsewhere classified, Diabetes mellitus, type 2 (H) (02/20/2023), Hypertension, Mild persistent asthma, Obstructive sleep apnea (adult) (pediatric), Primary osteoarthritis of both knees (01/28/2022), and Scalp mass (07/2014).    She has no past medical history of Basal cell carcinoma, Cancer (H), Cerebral infarction (H), Congestive heart failure (H), COPD (chronic obstructive pulmonary disease) (H), Heart disease, History of blood transfusion, Malignant melanoma (H),  Squamous cell carcinoma, or Thyroid disease.  Patient Active Problem List   Diagnosis     Allergic rhinitis due to animals     Mild persistent asthma     Obstructive sleep apnea     Leiomyoma of uterus     Mild major depression (H)     CARDIOVASCULAR SCREENING; LDL GOAL LESS THAN 160     Hypertension goal BP (blood pressure) < 140/90     Pilar cyst     Hypertrophy of breast     IUD (intrauterine device) in place     Seasonal allergic rhinitis due to pollen     Allergic rhinitis due to dust mite     Allergic rhinitis due to mold     Overweight (H)     Impaired fasting glucose     Allergic conjunctivitis, bilateral     Paroxysmal supraventricular tachycardia     Chronic midline low back pain without sciatica     Primary osteoarthritis of both knees     Diabetes mellitus, type 2 (H)     Lichen sclerosus     Encounter for pharmacogenetic testing     Closed traumatic displaced fracture of distal end of left radius, initial encounter       Past surgical History:  has a past surgical history that includes no history of surgery; d & c; Colonoscopy (6/21/2012); Laparoscopic cholecystectomy with cholangiograms (1/4/2014); Endoscopic retrograde cholangiopancreatogram (1/4/2014); Operative hysteroscopy (6/17/2014); Remove intrauterine device (6/17/2014); Ablation Supraventricular Tachycardia (N/A, 6/2/2021); Colonoscopy (N/A, 10/20/2022); Colonoscopy (N/A, 3/20/2023); Mammoplasty reduction bilateral (Bilateral, 6/28/2023); and Open reduction internal fixation wrist (Right, 3/1/2024).    Medications:   Current Outpatient Medications:      acetaminophen (TYLENOL) 500 MG tablet, Take 2 tablets (1,000 mg) by mouth every 8 hours for 7 days, Disp: 42 tablet, Rfl: 0     albuterol (PROAIR HFA/PROVENTIL HFA/VENTOLIN HFA) 108 (90 Base) MCG/ACT inhaler, Inhale 2 puffs into the lungs every 4 hours as needed for shortness of breath or wheezing, Disp: 36 g, Rfl: 1     albuterol (PROVENTIL) (2.5 MG/3ML) 0.083% neb solution, Take 1 vial (2.5  mg) by nebulization every 4 hours as needed for shortness of breath / dyspnea or wheezing, Disp: 30 mL, Rfl: 11     ARIPiprazole (ABILIFY) 10 MG tablet, Take 1 tablet (10 mg) by mouth daily, Disp: 90 tablet, Rfl: 0     cetirizine (ZYRTEC) 10 MG tablet, Take 1 tablet (10 mg) by mouth daily, Disp: 90 tablet, Rfl: 3     Cholecalciferol (VITAMIN D) 125 MCG (5000 UT) capsule, Take 1 capsule (5,000 Units) by mouth daily, Disp: 90 capsule, Rfl: 3     clobetasol (TEMOVATE) 0.05 % external ointment, Apply twice daily to affected area for 2 weeks then 2-3 times weekly. (Patient taking differently: Apply twice daily to affected area for 2 weeks then 2-3 times weekly. Using daily per pt on visit 2/22/24), Disp: 60 g, Rfl: 0     finasteride (PROSCAR) 5 MG tablet, Take 1 tablet (5 mg) by mouth daily, Disp: 90 tablet, Rfl: 3     fluticasone (FLONASE) 50 MCG/ACT nasal spray, Spray 2 sprays into both nostrils daily, Disp: 48 g, Rfl: 3     fluticasone-salmeterol (ADVAIR) 500-50 MCG/ACT inhaler, Inhale 1 puff into the lungs every 12 hours Needs appointment for additional refills, Disp: 3 each, Rfl: 0     hydrOXYzine HCl (ATARAX) 25 MG tablet, Take 1 tablet (25 mg) by mouth every 6 hours as needed for itching, anxiety or other (pain, muscle spasms.), Disp: 20 tablet, Rfl: 0     lisinopril (ZESTRIL) 40 MG tablet, Take 1 tablet (40 mg) by mouth daily, Disp: 90 tablet, Rfl: 3     montelukast (SINGULAIR) 10 MG tablet, Take 1 tablet (10 mg) by mouth daily, Disp: 90 tablet, Rfl: 3     oxyCODONE (ROXICODONE) 5 MG tablet, Take 1-2 tablets (5-10 mg) by mouth every 6 hours as needed for moderate to severe pain, Disp: 10 tablet, Rfl: 0     senna-docusate (SENOKOT-S/PERICOLACE) 8.6-50 MG tablet, Take 1-2 tablets by mouth 2 times daily, Disp: 30 tablet, Rfl: 0     venlafaxine (EFFEXOR-ER) 225 MG 24 hr tablet, Take 1 tablet (225 mg) by mouth daily, Disp: 90 tablet, Rfl: 0    Allergies: No Known Allergies    REVIEW OF  "SYSTEMS:  CONSTITUTIONAL:NEGATIVE for fever, chills, change in weight  INTEGUMENTARY/SKIN: NEGATIVE for worrisome rashes, moles or lesions  MUSCULOSKELETAL:See HPI above  NEURO: NEGATIVE for weakness, dizziness or paresthesias      PHYSICAL EXAM:  /81   Pulse 69   Ht 1.753 m (5' 9\")   LMP 12/21/2014 (Exact Date)   BMI 35.44 kg/m     GENERAL APPEARANCE: healthy, alert, no distress   SKIN: no suspicious lesions or rashes  NEURO: Normal strength and tone, mentation intact and speech normal  PSYCH:  mentation appears normal and affect normal  RESPIRATORY: No increased work of breathing.      right UPPER EXTREMITY:  Wound / Incision: volar incision healing well.  Inspection: swelling, resolving ecchymosis  Palpation: diffuse wrist, incision.  There is no erythema of the surrounding skin.  There is no maceration of the skin.  There is no deformity in the area.    Sensation intact to light touch in median, radial, ulnar and axillary nerve distributions  Palpable 2+ radial pulse, brisk capillary refill to all fingers, wwp  Intact epl fpl fdp edc wrist flexion/extension biceps triceps deltoid      X-RAY:  3 views right wrist from 3/18/2024 were reviewed in clinic today. Volar screw and plate fixation across a healing comminuted mildly displaced fracture of the distal radius. No hardware loosening. Moderately displaced fracture of the ulnar styloid process. Osteopenia..          Impression: 63 year old female  2.5 weeks  postoperative open-reduction, internal fixation  right distal radius fracture , doing well.    Plan:   Images reviewed.  Suture removal.  Weight bearing status: non weight bearing. Gentle finger and wrist range of motion ok.  Pain control: over the counter as needed.  Immobilization: wrist brace, off at rest, hygiene, gentle range of motion.  Elevation of affected extremity  Images: right wrist  Return to clinic in 4 weeks, or sooner as needed. Plan therapy at that time.      Andrea Escalante M.D., " M.S.  Dept. of Orthopaedic Surgery  [facility]       Again, thank you for allowing me to participate in the care of your patient.        Sincerely,        Andrea Escalante MD

## 2024-03-22 ENCOUNTER — OFFICE VISIT (OUTPATIENT)
Dept: FAMILY MEDICINE | Facility: CLINIC | Age: 64
End: 2024-03-22
Payer: COMMERCIAL

## 2024-03-22 VITALS
DIASTOLIC BLOOD PRESSURE: 80 MMHG | OXYGEN SATURATION: 98 % | HEART RATE: 71 BPM | WEIGHT: 242 LBS | SYSTOLIC BLOOD PRESSURE: 134 MMHG | HEIGHT: 69 IN | RESPIRATION RATE: 18 BRPM | BODY MASS INDEX: 35.84 KG/M2 | TEMPERATURE: 98.2 F

## 2024-03-22 DIAGNOSIS — Z00.00 ROUTINE GENERAL MEDICAL EXAMINATION AT A HEALTH CARE FACILITY: Primary | ICD-10-CM

## 2024-03-22 DIAGNOSIS — M85.80 OSTEOPENIA, UNSPECIFIED LOCATION: ICD-10-CM

## 2024-03-22 DIAGNOSIS — E11.9 TYPE 2 DIABETES MELLITUS WITHOUT COMPLICATION, WITHOUT LONG-TERM CURRENT USE OF INSULIN (H): ICD-10-CM

## 2024-03-22 DIAGNOSIS — I47.10 SVT (SUPRAVENTRICULAR TACHYCARDIA) (H): ICD-10-CM

## 2024-03-22 DIAGNOSIS — E66.01 MORBID OBESITY (H): ICD-10-CM

## 2024-03-22 DIAGNOSIS — I10 ESSENTIAL HYPERTENSION WITH GOAL BLOOD PRESSURE LESS THAN 140/90: ICD-10-CM

## 2024-03-22 DIAGNOSIS — F41.9 ANXIETY: ICD-10-CM

## 2024-03-22 DIAGNOSIS — F33.42 RECURRENT MAJOR DEPRESSIVE DISORDER, IN FULL REMISSION (H): ICD-10-CM

## 2024-03-22 DIAGNOSIS — E78.5 HYPERLIPIDEMIA LDL GOAL <130: ICD-10-CM

## 2024-03-22 PROBLEM — F33.3 SEVERE EPISODE OF RECURRENT MAJOR DEPRESSIVE DISORDER, WITH PSYCHOTIC FEATURES (H): Status: RESOLVED | Noted: 2024-03-22 | Resolved: 2024-03-22

## 2024-03-22 PROBLEM — F33.3 SEVERE EPISODE OF RECURRENT MAJOR DEPRESSIVE DISORDER, WITH PSYCHOTIC FEATURES (H): Status: ACTIVE | Noted: 2024-03-22

## 2024-03-22 PROBLEM — F33.41 RECURRENT MAJOR DEPRESSIVE DISORDER, IN PARTIAL REMISSION (H): Status: ACTIVE | Noted: 2024-03-22

## 2024-03-22 PROCEDURE — 96127 BRIEF EMOTIONAL/BEHAV ASSMT: CPT | Performed by: STUDENT IN AN ORGANIZED HEALTH CARE EDUCATION/TRAINING PROGRAM

## 2024-03-22 PROCEDURE — 99214 OFFICE O/P EST MOD 30 MIN: CPT | Mod: 25 | Performed by: STUDENT IN AN ORGANIZED HEALTH CARE EDUCATION/TRAINING PROGRAM

## 2024-03-22 PROCEDURE — 99396 PREV VISIT EST AGE 40-64: CPT | Performed by: STUDENT IN AN ORGANIZED HEALTH CARE EDUCATION/TRAINING PROGRAM

## 2024-03-22 RX ORDER — ATORVASTATIN CALCIUM 10 MG/1
10 TABLET, FILM COATED ORAL AT BEDTIME
Qty: 90 TABLET | Refills: 3 | Status: SHIPPED | OUTPATIENT
Start: 2024-03-22

## 2024-03-22 RX ORDER — VENLAFAXINE HYDROCHLORIDE 225 MG/1
225 TABLET, EXTENDED RELEASE ORAL DAILY
Qty: 90 TABLET | Refills: 1 | Status: SHIPPED | OUTPATIENT
Start: 2024-03-22 | End: 2024-09-03

## 2024-03-22 RX ORDER — LISINOPRIL 40 MG/1
40 TABLET ORAL DAILY
Qty: 90 TABLET | Refills: 3 | Status: SHIPPED | OUTPATIENT
Start: 2024-03-22

## 2024-03-22 RX ORDER — ARIPIPRAZOLE 10 MG/1
10 TABLET ORAL DAILY
Qty: 90 TABLET | Refills: 1 | Status: SHIPPED | OUTPATIENT
Start: 2024-03-22 | End: 2024-06-13 | Stop reason: DRUGHIGH

## 2024-03-22 ASSESSMENT — PATIENT HEALTH QUESTIONNAIRE - PHQ9
SUM OF ALL RESPONSES TO PHQ QUESTIONS 1-9: 0
SUM OF ALL RESPONSES TO PHQ QUESTIONS 1-9: 0
10. IF YOU CHECKED OFF ANY PROBLEMS, HOW DIFFICULT HAVE THESE PROBLEMS MADE IT FOR YOU TO DO YOUR WORK, TAKE CARE OF THINGS AT HOME, OR GET ALONG WITH OTHER PEOPLE: NOT DIFFICULT AT ALL

## 2024-03-22 NOTE — PROGRESS NOTES
Preventive Care Visit  [facility]  Slime Perry DO, Family Medicine  Mar 22, 2024      Assessment & Plan     Routine general medical examination at a health care facility  Reviewed recent labwork with patient.     Recurrent major depressive disorder, in full remission (H24)  Anxiety  Doing much better. She is also now retired.  Psychiatry consulted and pt is now referred back to PCP for management. Continue on current regimen of effexor 225mg and abilify 10 mg. She has had weight gain with the abilify but labs were overall reassuring. We will recheck labs and weight check, mental health check in 6 months. Discussed option to decrease abilify at that time if mental health remains stable   - venlafaxine (EFFEXOR-ER) 225 MG 24 hr tablet; Take 1 tablet (225 mg) by mouth ember  - ARIPiprazole (ABILIFY) 10 MG tablet; Take 1 tablet (10 mg) by mouth daily    Essential hypertension with goal blood pressure less than 140/90  Chronic,stable, BP remains well controlled with  lisinopril   - lisinopril (ZESTRIL) 40 MG tablet; Take 1 tablet (40 mg) by mouth daily      Morbid obesity (H)  Continue to  monitor, discussed effect of abilify on weight and metabolic syndrome.     SVT (supraventricular tachycardia)  Paroxysmal. No recent symptoms     Osteopenia, unspecified location  Noted on recent wrist XR during her fracture management. Discussed recommendation for DEXA scan but she'd like to wait for now until healed. We will readdress at next visit or 64yo. Continue calcium/vitamin D and discussed weight bearing activities     Hyperlipidemia LDL goal <130  Recheck of labs with stable cholesterol, however ASCVD risk score discussed at 14%. Advised starting a statin. We will start lipitor and recheck levels in 6 months.   - atorvastatin (LIPITOR) 10 MG tablet; Take 1 tablet (10 mg) by mouth at bedtime    Type 2 diabetes mellitus without complication, without long-term current use of insulin (H)  Lab  "Results   Component Value Date    A1C 6.0 02/29/2024    A1C 6.0 08/30/2023    A1C 6.5 02/20/2023    A1C 6.0 12/02/2021    A1C 6.0 03/05/2021    A1C 5.7 07/20/2020    A1C 5.9 07/18/2019    A1C 5.7 03/16/2017     Remains well controlled without medication. We did discuss option to start metformin to help combat abilify effects. We will wait for now but continue in the future if metabolic syndrome worsens or weight increases. Normal foot exam   - FOOT EXAM    Patient has been advised of split billing requirements and indicates understanding: Yes          BMI  Estimated body mass index is 35.74 kg/m  as calculated from the following:    Height as of this encounter: 1.753 m (5' 9\").    Weight as of this encounter: 109.8 kg (242 lb).   Weight management plan: Discussed healthy diet and exercise guidelines    Counseling  Appropriate preventive services were discussed with this patient, including applicable screening as appropriate for fall prevention, nutrition, physical activity, Tobacco-use cessation, weight loss and cognition.  Checklist reviewing preventive services available has been given to the patient.  Reviewed patient's diet, addressing concerns and/or questions.           Delbert Marion is a 63 year old, presenting for the following:  Physical        3/22/2024     1:12 PM   Additional Questions   Accompanied by na         3/22/2024     1:12 PM   Patient Reported Additional Medications   Patient reports taking the following new medications none        Health Care Directive  Patient does not have a Health Care Directive or Living Will: Advance Directive received and scanned. Click on Code in the patient header to view.    HPI      Follow up labs    Saw mental health and released back to primary care.   Doing really well.     Broke arm recently. Now s/p ORIF 3 weeks ago. Osteopenia noted on XR wrist.    Walking some.     Eating habits odd. No off/on switch.     Now retired. Enjoying.       The 10-year ASCVD " risk score (Araceli LEÓN, et al., 2019) is: 14.1%    Values used to calculate the score:      Age: 63 years      Sex: Female      Is Non- : No      Diabetic: Yes      Tobacco smoker: No      Systolic Blood Pressure: 134 mmHg      Is BP treated: Yes      HDL Cholesterol: 39 mg/dL      Total Cholesterol: 187 mg/dL      Answers submitted by the patient for this visit:  Patient Health Questionnaire (Submitted on 3/22/2024)  If you checked off any problems, how difficult have these problems made it for you to do your work, take care of things at home, or get along with other people?: Not difficult at all  PHQ9 TOTAL SCORE: 0      Lab Results   Component Value Date    A1C 6.0 02/29/2024    A1C 6.0 08/30/2023    A1C 6.5 02/20/2023    A1C 6.0 12/02/2021    A1C 6.0 03/05/2021    A1C 5.7 07/20/2020    A1C 5.9 07/18/2019    A1C 5.7 03/16/2017             3/15/2024   General Health   How would you rate your overall physical health? (!) FAIR   Feel stress (tense, anxious, or unable to sleep) Not at all         3/15/2024   Nutrition   Three or more servings of calcium each day? Yes   Diet: Regular (no restrictions)   How many servings of fruit and vegetables per day? (!) 2-3   How many sweetened beverages each day? 0-1         3/15/2024   Exercise   Days per week of moderate/strenous exercise 0 days   Average minutes spent exercising at this level 0 min   (!) EXERCISE CONCERN      3/15/2024   Social Factors   Frequency of gathering with friends or relatives Once a week   Worry food won't last until get money to buy more No   Food not last or not have enough money for food? No   Do you have housing?  Yes   Are you worried about losing your housing? No   Lack of transportation? No   Unable to get utilities (heat,electricity)? Yes   Want help with housing or utility concern? No   (!) FINANCIAL RESOURCE STRAIN CONCERN      3/15/2024   Fall Risk   Fallen 2 or more times in the past year? No   Trouble with walking  or balance? No          3/15/2024   Dental   Dentist two times every year? Yes         3/15/2024   TB Screening   Were you born outside of the [place]? No       Today's PHQ-9 Score:       3/22/2024     1:04 PM   PHQ-9 SCORE   PHQ-9 Total Score MyChart 0   PHQ-9 Total Score 0         3/15/2024   Substance Use   Alcohol more than 3/day or more than 7/wk Not Applicable   Do you use any other substances recreationally? No     Social History     Tobacco Use    Smoking status: Never    Smokeless tobacco: Never   Vaping Use    Vaping Use: Never used   Substance Use Topics    Alcohol use: No    Drug use: No             3/15/2024   Breast Cancer Screening   Family history of breast, colon, or ovarian cancer? No / Unknown         1/25/2024   LAST FHS-7 RESULTS   1st degree relative breast or ovarian cancer No   Any relative bilateral breast cancer No   Any male have breast cancer No   Any ONE woman have BOTH breast AND ovarian cancer No   Any woman with breast cancer before 50yrs No   2 or more relatives with breast AND/OR ovarian cancer No   2 or more relatives with breast AND/OR bowel cancer No        Mammogram Screening - Mammogram every 1-2 years updated in Health Maintenance based on mutual decision making        3/15/2024   STI Screening   New sexual partner(s) since last STI/HIV test? No     History of abnormal Pap smear: NO - age 30-65 PAP every 5 years with negative HPV co-testing recommended  Last 3 Pap and HPV Results:       Latest Ref Rng & Units 8/5/2020    11:49 AM 8/5/2020    11:45 AM 5/2/2018     4:10 PM   PAP / HPV   PAP (Historical)  NIL      HPV 16 DNA NEG^Negative  Negative  Negative    HPV 18 DNA NEG^Negative  Negative  Negative    Other HR HPV NEG^Negative  Negative  Negative            Latest Ref Rng & Units 8/5/2020    11:49 AM 8/5/2020    11:45 AM 5/2/2018     4:10 PM   PAP / HPV   PAP (Historical)  NIL      HPV 16 DNA NEG^Negative  Negative  Negative    HPV 18 DNA NEG^Negative  Negative  Negative    Other  HR HPV NEG^Negative  Negative  Negative      ASCVD Risk   The 10-year ASCVD risk score (Araceli LEÓN, et al., 2019) is: 14.1%    Values used to calculate the score:      Age: 63 years      Sex: Female      Is Non- : No      Diabetic: Yes      Tobacco smoker: No      Systolic Blood Pressure: 134 mmHg      Is BP treated: Yes      HDL Cholesterol: 39 mg/dL      Total Cholesterol: 187 mg/dL           Reviewed and updated as needed this visit by Provider                    Past Medical History:   Diagnosis Date    Allergic rhinitis, cause unspecified     Cervical high risk HPV (human papillomavirus) test positive 03/21/2017    3/21/17 NIL pap/+ HR HPV (not 16 or 18).     Depressive disorder     Depressive disorder, not elsewhere classified     seasonal    Diabetes mellitus, type 2 (H) 02/20/2023    Hypertension     Mild persistent asthma     Obstructive sleep apnea (adult) (pediatric)     uses CPAP    Primary osteoarthritis of both knees 01/28/2022    Scalp mass 07/2014     Past Surgical History:   Procedure Laterality Date    COLONOSCOPY  6/21/2012    Procedure: COLONOSCOPY;  COLONOSCOPY, SCREEN;  Surgeon: Bart You MD;  Location: [facility]    COLONOSCOPY N/A 10/20/2022    Procedure: COLONOSCOPY, WITH POLYPECTOMY AND BIOPSY;  Surgeon: Chago Hong DO;  Location:  GI    COLONOSCOPY N/A 3/20/2023    Procedure: COLONOSCOPY, WITH POLYPECTOMY AND BIOPSY;  Surgeon: Lion Vidal MD;  Location:  GI    D & C      ENDOSCOPIC RETROGRADE CHOLANGIOPANCREATOGRAM  1/4/2014    Procedure: ENDOSCOPIC RETROGRADE CHOLANGIOPANCREATOGRAM;  ERCP biliary sphincterotomy, bile duct stone removal, epinepherine washing.;  Surgeon: Ba Meyers MD;  Location: [facility]    EP ABLATION SVT N/A 6/2/2021    Procedure: EP ABLATION SVT;  Surgeon: Cuca Magaña MD;  Location:  [facility]    LAPAROSCOPIC CHOLECYSTECTOMY WITH CHOLANGIOGRAMS  1/4/2014    Procedure: LAPAROSCOPIC  CHOLECYSTECTOMY WITH CHOLANGIOGRAMS;;  Surgeon: Haja Sage MD;  Location: [facility]    MAMMOPLASTY REDUCTION BILATERAL Bilateral 2023    Procedure: MAMMOPLASTY, REDUCTION, BILATERAL;  Surgeon: DARYL Knutson MD;  Location: [facility] OR    NO HISTORY OF SURGERY      OPEN REDUCTION INTERNAL FIXATION WRIST Right 3/1/2024    Procedure: OPEN REDUCTION INTERNAL FIXATION, FRACTURE, RIGHT WRIST;  Surgeon: Andrea Escalante MD;  Location: [facility]    OPERATIVE HYSTEROSCOPY  2014    Procedure: OPERATIVE HYSTEROSCOPY;  Surgeon: Paola Camara MD;  Location: [facility]    REMOVE INTRAUTERINE DEVICE  2014    Procedure: REMOVE INTRAUTERINE DEVICE;  Surgeon: Paola Camara MD;  [facility]     OB History    Para Term  AB Living   0 0 0 0 0 0   SAB IAB Ectopic Multiple Live Births   0 0 0 0 0     Lab work is in process  Labs reviewed in EPIC  BP Readings from Last 3 Encounters:   24 134/80   24 138/81   24 112/71    Wt Readings from Last 3 Encounters:   24 109.8 kg (242 lb)   24 108.9 kg (240 lb)   24 110.3 kg (243 lb 3.2 oz)                  Patient Active Problem List   Diagnosis    Allergic rhinitis due to animals    Mild persistent asthma    Obstructive sleep apnea    Leiomyoma of uterus    Mild major depression (H)    CARDIOVASCULAR SCREENING; LDL GOAL LESS THAN 160    Hypertension goal BP (blood pressure) < 140/90    Pilar cyst    Hypertrophy of breast    IUD (intrauterine device) in place    Seasonal allergic rhinitis due to pollen    Allergic rhinitis due to dust mite    Allergic rhinitis due to mold    Overweight (H)    Impaired fasting glucose    Allergic conjunctivitis, bilateral    Paroxysmal supraventricular tachycardia    Chronic midline low back pain without sciatica    Primary osteoarthritis of both knees    Diabetes mellitus, type 2 (H)    Lichen sclerosus    Encounter for pharmacogenetic testing    Closed traumatic  displaced fracture of distal end of left radius, initial encounter    Recurrent major depressive disorder, in partial remission (H24)     Past Surgical History:   Procedure Laterality Date    COLONOSCOPY  6/21/2012    Procedure: COLONOSCOPY;  COLONOSCOPY, SCREEN;  Surgeon: Bart You MD;  [facility]    COLONOSCOPY N/A 10/20/2022    Procedure: COLONOSCOPY, WITH POLYPECTOMY AND BIOPSY;  Surgeon: Chago Hong DO;  [facility]    COLONOSCOPY N/A 3/20/2023    Procedure: COLONOSCOPY, WITH POLYPECTOMY AND BIOPSY;  Surgeon: Lion Vidal MD;  Location: [facility]    D & C      ENDOSCOPIC RETROGRADE CHOLANGIOPANCREATOGRAM  1/4/2014    Procedure: ENDOSCOPIC RETROGRADE CHOLANGIOPANCREATOGRAM;  ERCP biliary sphincterotomy, bile duct stone removal, epinepherine washing.;  Surgeon: Ba Meyers MD;  [facility]    EP ABLATION SVT N/A 6/2/2021    Procedure: EP ABLATION SVT;  Surgeon: Cuca Magaña MD;  Location:  [facility]    LAPAROSCOPIC CHOLECYSTECTOMY WITH CHOLANGIOGRAMS  1/4/2014    Procedure: LAPAROSCOPIC CHOLECYSTECTOMY WITH CHOLANGIOGRAMS;;  Surgeon: Haja Sage MD;  [facility]    MAMMOPLASTY REDUCTION BILATERAL Bilateral 6/28/2023    Procedure: MAMMOPLASTY, REDUCTION, BILATERAL;  Surgeon: DARYL Knutson MD;  Location: [facility] OR    NO HISTORY OF SURGERY      OPEN REDUCTION INTERNAL FIXATION WRIST Right 3/1/2024    Procedure: OPEN REDUCTION INTERNAL FIXATION, FRACTURE, RIGHT WRIST;  Surgeon: Andrea Escalante MD;  [facility]    OPERATIVE HYSTEROSCOPY  6/17/2014    Procedure: OPERATIVE HYSTEROSCOPY;  Surgeon: Paola Camara MD;  [facility]    REMOVE INTRAUTERINE DEVICE  6/17/2014    Procedure: REMOVE INTRAUTERINE DEVICE;  Surgeon: Paola Camara MD;  [facility]       Social History     Tobacco Use    Smoking status: Never    Smokeless tobacco: Never   Substance Use Topics    Alcohol use: No     Family History    Problem Relation Age of Onset    Neurologic Disorder Mother         Graves disease    Hyperlipidemia Mother     Depression Mother     Thyroid Disease Mother     Anxiety Disorder Mother     Skin Cancer Mother     Hypertension Father     Asthma Father     Obesity Father     Skin Cancer Father     Diabetes Father     Coronary Artery Disease Father     Skin Cancer Sister     Asthma Sister     Cancer Sister         mild skin cancer,cured from excision    Asthma Sister     Depression Maternal Grandmother     C.A.D. Paternal Grandmother     Cerebrovascular Disease Paternal Grandmother     Depression Paternal Grandmother     Obesity Paternal Grandmother     C.A.D. Paternal Grandfather     Cerebrovascular Disease Paternal Grandfather     Substance Abuse Paternal Grandfather     Mental Illness Maternal Half-Brother     Thyroid Disease Maternal Half-Sister     Mental Illness Paternal Half-Brother     Thyroid Disease Paternal Half-Sister     Depression Nephew     Anxiety Disorder Nephew     Substance Abuse Nephew     Asthma Nephew     Anxiety Disorder Nephew     Substance Abuse Nephew     Asthma Nephew     Asthma Nephew     Other - See Comments Other          Current Outpatient Medications   Medication Sig Dispense Refill    albuterol (PROAIR HFA/PROVENTIL HFA/VENTOLIN HFA) 108 (90 Base) MCG/ACT inhaler Inhale 2 puffs into the lungs every 4 hours as needed for shortness of breath or wheezing 36 g 1    albuterol (PROVENTIL) (2.5 MG/3ML) 0.083% neb solution Take 1 vial (2.5 mg) by nebulization every 4 hours as needed for shortness of breath / dyspnea or wheezing 30 mL 11    amLODIPine (NORVASC) 5 MG tablet Take 1 tablet (5 mg) by mouth daily 90 tablet 3    ARIPiprazole (ABILIFY) 10 MG tablet Take 1 tablet (10 mg) by mouth daily 90 tablet 1    atorvastatin (LIPITOR) 10 MG tablet Take 1 tablet (10 mg) by mouth at bedtime 90 tablet 3    cetirizine (ZYRTEC) 10 MG tablet Take 1 tablet (10 mg) by mouth daily 90 tablet 3     "Cholecalciferol (VITAMIN D) 125 MCG (5000 UT) capsule Take 1 capsule (5,000 Units) by mouth daily 90 capsule 3    clobetasol (TEMOVATE) 0.05 % external ointment Apply twice daily to affected area for 2 weeks then 2-3 times weekly. (Patient taking differently: Apply twice daily to affected area for 2 weeks then 2-3 times weekly.  Using daily per pt on visit 2/22/24) 60 g 0    finasteride (PROSCAR) 5 MG tablet Take 1 tablet (5 mg) by mouth daily 90 tablet 3    fluticasone (FLONASE) 50 MCG/ACT nasal spray Spray 2 sprays into both nostrils daily 48 g 3    fluticasone-salmeterol (ADVAIR) 500-50 MCG/ACT inhaler Inhale 1 puff into the lungs every 12 hours Needs appointment for additional refills 3 each 0    lisinopril (ZESTRIL) 40 MG tablet Take 1 tablet (40 mg) by mouth daily 90 tablet 3    montelukast (SINGULAIR) 10 MG tablet Take 1 tablet (10 mg) by mouth daily 90 tablet 3    venlafaxine (EFFEXOR-ER) 225 MG 24 hr tablet Take 1 tablet (225 mg) by mouth daily 90 tablet 1     No Known Allergies      Review of Systems  Constitutional, HEENT, cardiovascular, pulmonary, gi and gu systems are negative, except as otherwise noted.     Objective    Exam  /80   Pulse 71   Temp 98.2  F (36.8  C) (Oral)   Resp 18   Ht 1.753 m (5' 9\")   Wt 109.8 kg (242 lb)   LMP 12/21/2014 (Exact Date)   SpO2 98%   BMI 35.74 kg/m     Estimated body mass index is 35.74 kg/m  as calculated from the following:    Height as of this encounter: 1.753 m (5' 9\").    Weight as of this encounter: 109.8 kg (242 lb).    Physical Exam  GENERAL: alert and no distress  EYES: Eyes grossly normal to inspection, PERRL and conjunctivae and sclerae normal  HENT: ear canals and TM's normal, nose and mouth without ulcers or lesions  NECK: no adenopathy, no asymmetry, masses, or scars  RESP: lungs clear to auscultation - no rales, rhonchi or wheezes  CV: regular rate and rhythm, normal S1 S2, no S3 or S4, no murmur, click or rub, no peripheral " edema  ABDOMEN: soft, nontender, no hepatosplenomegaly, no masses and bowel sounds normal  MS: no gross musculoskeletal defects noted, no edema, right wrist in brace.   SKIN: no suspicious lesions or rashes  NEURO: Normal strength and tone, mentation intact and speech normal  PSYCH: mentation appears normal, affect normal/bright        Signed Electronically by: Slime Perry, DO

## 2024-03-22 NOTE — PATIENT INSTRUCTIONS
Preventive Care Advice   This is general advice given by our system to help you stay healthy. However, your care team may have specific advice just for you. Please talk to your care team about your preventive care needs.  Nutrition  Eat 5 or more servings of fruits and vegetables each day.  Try wheat bread, brown rice and whole grain pasta (instead of white bread, rice, and pasta).  Get enough calcium and vitamin D. Check the label on foods and aim for 100% of the RDA (recommended daily allowance).  Lifestyle  Exercise at least 150 minutes each week   (30 minutes a day, 5 days a week).  Do muscle strengthening activities 2 days a week. These help control your weight and prevent disease.  No smoking.  Wear sunscreen to prevent skin cancer.  Have a dental exam and cleaning every 6 months.  Yearly exams  See your health care team every year to talk about:  Any changes in your health.  Any medicines your care team has prescribed.  Preventive care, family planning, and ways to prevent chronic diseases.  Shots (vaccines)   HPV shots (up to age 26), if you've never had them before.  Hepatitis B shots (up to age 59), if you've never had them before.  COVID-19 shot: Get this shot when it's due.  Flu shot: Get a flu shot every year.  Tetanus shot: Get a tetanus shot every 10 years.  Pneumococcal, hepatitis A, and RSV shots: Ask your care team if you need these based on your risk.  Shingles shot (for age 50 and up).  General health tests  Diabetes screening:  Starting at age 35, Get screened for diabetes at least every 3 years.  If you are younger than age 35, ask your care team if you should be screened for diabetes.  Cholesterol test: At age 39, start having a cholesterol test every 5 years, or more often if advised.  Bone density scan (DEXA): At age 50, ask your care team if you should have this scan for osteoporosis (brittle bones).  Hepatitis C: Get tested at least once in your life.  STIs (sexually transmitted  infections)  Before age 24: Ask your care team if you should be screened for STIs.  After age 24: Get screened for STIs if you're at risk. You are at risk for STIs (including HIV) if:  You are sexually active with more than one person.  You don't use condoms every time.  You or a partner was diagnosed with a sexually transmitted infection.  If you are at risk for HIV, ask about PrEP medicine to prevent HIV.  Get tested for HIV at least once in your life, whether you are at risk for HIV or not.  Cancer screening tests  Cervical cancer screening: If you have a cervix, begin getting regular cervical cancer screening tests at age 21. Most people who have regular screenings with normal results can stop after age 65. Talk about this with your provider.  Breast cancer scan (mammogram): If you've ever had breasts, begin having regular mammograms starting at age 40. This is a scan to check for breast cancer.  Colon cancer screening: It is important to start screening for colon cancer at age 45.  Have a colonoscopy test every 10 years (or more often if you're at risk) Or, ask your provider about stool tests like a FIT test every year or Cologuard test every 3 years.  To learn more about your testing options, visit: https://www.[employer]/829700.pdf.  For help making a decision, visit: https://bit.ly/oe23648.  Prostate cancer screening test: If you have a prostate and are age 55 to 69, ask your provider if you would benefit from a yearly prostate cancer screening test.  Lung cancer screening: If you are a current or former smoker age 50 to 80, ask your care team if ongoing lung cancer screenings are right for you.  For informational purposes only. Not to replace the advice of your health care provider. Copyright   2023 [employer]. All rights reserved. Clinically reviewed by the [facility] Transitions Program. [employer] [id number] - REV 01/24.

## 2024-03-28 ENCOUNTER — OFFICE VISIT (OUTPATIENT)
Dept: DERMATOLOGY | Facility: CLINIC | Age: 64
End: 2024-03-28
Attending: NURSE PRACTITIONER
Payer: COMMERCIAL

## 2024-03-28 DIAGNOSIS — L90.0 LICHEN SCLEROSUS: ICD-10-CM

## 2024-03-28 PROCEDURE — 99213 OFFICE O/P EST LOW 20 MIN: CPT | Performed by: NURSE PRACTITIONER

## 2024-03-28 RX ORDER — CLOBETASOL PROPIONATE 0.5 MG/G
OINTMENT TOPICAL
Qty: 60 G | Refills: 3 | Status: SHIPPED | OUTPATIENT
Start: 2024-03-28

## 2024-03-28 NOTE — PATIENT INSTRUCTIONS
Patient Education       Proper skin care from [facility]:    -Eliminate harsh soaps as they strip the natural oils from the skin, often resulting in dry itchy skin ( i.e. Dial, Zest, [place] Spring)  -Use mild soaps such as Cetaphil or Dove Sensitive Skin in the shower. You do not need to use soap on arms, legs, and trunk every time you shower unless visibly soiled.   -Avoid hot or cold showers.  -After showering, lightly dry off and apply moisturizing within 2-3 minutes. This will help trap moisture in the skin.   -Aggressive use of a moisturizer at least 1-2 times a day to the entire body (including -Vanicream, Cetaphil, Aquaphor or Cerave) and moisturize hands after every washing.  -We recommend using moisturizers that come in a tub that needs to be scooped out, not a pump. This has more of an oil base. It will hold moisture in your skin much better than a water base moisturizer. The above recommended are non-pore clogging.      Wear a sunscreen with at least SPF 30 on your face, ears, neck and V of the chest daily. Wear sunscreen on other areas of the body if those areas are exposed to the sun throughout the day. Sunscreens can contain physical and/or chemical blockers. Physical blockers are less likely to clog pores, these include zinc oxide and titanium dioxide. Reapply every two hour and after swimming.     Sunscreen examples: https://www.ewg.org/sunscreen/    UV radiation  UVA radiation remains constant throughout the day and throughout the year. It is a longer wavelength than UVB and therefore penetrates deeper into the skin leading to immediate and delayed tanning, photoaging, and skin cancer. 70-80% of UVA and UVB radiation occurs between the hours of 10am-2pm.  UVB radiation  UVB radiation causes the most harmful effects and is more significant during the summer months. However, snow and ice can reflect UVB radiation leading to skin damage during the winter months as well. UVB radiation is  responsible for tanning, burning, inflammation, delayed erythema (pinkness), pigmentation (brown spots), and skin cancer.     I recommend self monthly full body exams and yearly full body exams with a dermatology provider. If you develop a new or changing lesion please follow up for examination. Most skin cancers are pink and scaly or pink and pearly. However, we do see blue/brown/black skin cancers.  Consider the ABCDEs of melanoma when giving yourself your monthly full body exam ( don't forget the groin, buttocks, feet, toes, etc). A-asymmetry, B-borders, C-color, D-diameter, E-elevation or evolving. If you see any of these changes please follow up in clinic. If you cannot see your back I recommend purchasing a hand held mirror to use with a larger wall mirror.       Checking for Skin Cancer  You can find cancer early by checking your skin each month. There are 3 kinds of skin cancer. They are melanoma, basal cell carcinoma, and squamous cell carcinoma. Doing monthly skin checks is the best way to find new marks or skin changes. Follow the instructions below for checking your skin.   The ABCDEs of checking moles for melanoma   Check your moles or growths for signs of melanoma using ABCDE:   Asymmetry: the sides of the mole or growth don t match  Border: the edges are ragged, notched, or blurred  Color: the color within the mole or growth varies  Diameter: the mole or growth is larger than 6 mm (size of a pencil eraser)  Evolving: the size, shape, or color of the mole or growth is changing (evolving is not shown in the images below)    Checking for other types of skin cancer  Basal cell carcinoma or squamous cell carcinoma have symptoms such as:     A spot or mole that looks different from all other marks on your skin  Changes in how an area feels, such as itching, tenderness, or pain  Changes in the skin's surface, such as oozing, bleeding, or scaliness  A sore that does not heal  New swelling or redness beyond  the border of a mole    Who s at risk?  Anyone can get skin cancer. But you are at greater risk if you have:   Fair skin, light-colored hair, or light-colored eyes  Many moles or abnormal moles on your skin  A history of sunburns from sunlight or tanning beds  A family history of skin cancer  A history of exposure to radiation or chemicals  A weakened immune system  If you have had skin cancer in the past, you are at risk for recurring skin cancer.   How to check your skin  Do your monthly skin checkups in front of a full-length mirror. Check all parts of your body, including your:   Head (ears, face, neck, and scalp)  Torso (front, back, and sides)  Arms (tops, undersides, upper, and lower armpits)  Hands (palms, backs, and fingers, including under the nails)  Buttocks and genitals  Legs (front, back, and sides)  Feet (tops, soles, toes, including under the nails, and between toes)  If you have a lot of moles, take digital photos of them each month. Make sure to take photos both up close and from a distance. These can help you see if any moles change over time.   Most skin changes are not cancer. But if you see any changes in your skin, call your doctor right away. Only he or she can diagnose a problem. If you have skin cancer, seeing your doctor can be the first step toward getting the treatment that could save your life.   [employer] last reviewed this educational content on 4/1/2019 2000-2020 [employer]. [address] [place] [address], [place] [address]. All rights reserved. This information is not intended as a substitute for professional medical care. Always follow your healthcare professional's instructions.       When should I call my doctor?  If you are worsening or not improving, please, contact us or seek urgent care as noted below.     Who should I call with questions (adults)?  [facility] (adult and pediatric): [phone]  [facility]  [place] (adult): [phone]  [facility] ([place], [place], [place] and [place]) [phone]  For urgent needs outside of business hours call the [facility] at [phone] and ask for the dermatology resident on call to be paged  If this is a medical emergency and you are unable to reach an ER, Call 911      If you need a prescription refill, please contact your pharmacy. Refills are approved or denied by our Physicians during normal business hours, Monday through Fridays    Per office policy, refills will not be granted if you have not been seen within the past year (or sooner depending on your condition)      Vaccine status unknown

## 2024-03-28 NOTE — LETTER
3/28/2024         RE: Nela Mares  [address] [id number]        Dear Colleague,    Thank you for referring your patient, Nela Mares, to the [facility]. Please see a copy of my visit note below.    [facility] Dermatology Note  Encounter Date: Mar 28, 2024  Office Visit     Reviewed patients past medical history and pertinent chart review prior to patients visit today.     Dermatology Problem List:  Patient denies personal history of skin cancer or dysplastic nevi.    Positive history of skin cancer in her father, sister, and mother.  Types unknown but thinks they were nonmelanoma  Androgenetic alopecia, takes finasteride 5 mg daily  Lichen sclerosus, uses clobetasol    ____________________________________________    Assessment & Plan:     # vulvar lichen sclerosus, well controlled  Discussed diagnosis with patient.  Discussed that this is usually a chronic diagnosis with frequent recurrences and remissions of signs and symptoms.  Discussed that lichen sclerosus has the potential to distort or destroy vulvar anatomy but that the condition is manageable and topical corticosteroid treatments are highly effective and can inhibit progression of the disease.  Discussed importance of cessation of scratching to minimize exposures to factors that may exacerbate symptoms.  Lichen sclerosus may increase the risk of malignancy in adult women, stressed the importance of self examinations for more thickened areas or sores that do not heal especially after intensive corticosteroid treatments twice daily x1 month.  Patient will return to clinic prior to normally scheduled follow-up if she notices any of the signs and symptoms.  Discussed that this is not a contagious condition and would not be spread to sexual partners.  Discussed the importance of follow-ups at least yearly to monitor for signs and symptoms of malignancy and reduce risk of  scarring.  Advised that it is okay to use emollients or barrier cream such as petroleum jelly, Vaseline, or zinc oxide paste's for discomfort.    -given refills of prescription clobetasol 0.05% ointment to be used once daily for flares of erythema, atrophy, or symptoms.  Otherwise with no symptoms or active disease she will use twice weekly as maintenance.    Follow-up in 1 year for full body skin cancer screening and recheck of lichen sclerosis, sooner as needed if not well-controlled or for new concerns      Zoila Bailey [facility]  Dermatology    _______________________________________    CC: Derm Problem (Lichen sclerosis ( itch painful present for years), using clobetasol/And follow up on scalp cyst that small in size ( had had dozen removed ))    HPI:  Ms. Nela Mares is a(n) 63 year old female who presents today as a return patient for lichen sclerosus.  She says that she was diagnosed about 2 years ago and feels like she has been flared ever since.  She has been using clobetasol every day for most of this timeframe but now feels like she is finally under control.  She is not having any symptoms and has not seen any redness or white areas in the genitals when she examines them.    Patient is otherwise feeling well, without additional skin concerns.      Physical Exam:  SKIN: Focused examination of genitals was performed.  -Normal anatomy without erythema atrophy or white striae    - No other lesions of concern on areas examined.     Medications:  Current Outpatient Medications   Medication     clobetasol (TEMOVATE) 0.05 % external ointment     finasteride (PROSCAR) 5 MG tablet     albuterol (PROAIR HFA/PROVENTIL HFA/VENTOLIN HFA) 108 (90 Base) MCG/ACT inhaler     albuterol (PROVENTIL) (2.5 MG/3ML) 0.083% neb solution     amLODIPine (NORVASC) 5 MG tablet     ARIPiprazole (ABILIFY) 10 MG tablet     atorvastatin (LIPITOR) 10 MG tablet     cetirizine (ZYRTEC) 10 MG tablet     Cholecalciferol (VITAMIN D) 125 MCG  (5000 UT) capsule     fluticasone (FLONASE) 50 MCG/ACT nasal spray     fluticasone-salmeterol (ADVAIR) 500-50 MCG/ACT inhaler     lisinopril (ZESTRIL) 40 MG tablet     montelukast (SINGULAIR) 10 MG tablet     venlafaxine (EFFEXOR-ER) 225 MG 24 hr tablet     No current facility-administered medications for this visit.      Past Medical History:   Patient Active Problem List   Diagnosis     Allergic rhinitis due to animals     Mild persistent asthma     Obstructive sleep apnea     Leiomyoma of uterus     Mild major depression (H)     CARDIOVASCULAR SCREENING; LDL GOAL LESS THAN 160     Hypertension goal BP (blood pressure) < 140/90     Pilar cyst     Hypertrophy of breast     IUD (intrauterine device) in place     Seasonal allergic rhinitis due to pollen     Allergic rhinitis due to dust mite     Allergic rhinitis due to mold     Overweight (H)     Impaired fasting glucose     Allergic conjunctivitis, bilateral     Paroxysmal supraventricular tachycardia     Chronic midline low back pain without sciatica     Primary osteoarthritis of both knees     Diabetes mellitus, type 2 (H)     Lichen sclerosus     Encounter for pharmacogenetic testing     Closed traumatic displaced fracture of distal end of left radius, initial encounter     Recurrent major depressive disorder, in partial remission (H24)     Past Medical History:   Diagnosis Date     Allergic rhinitis, cause unspecified      Cervical high risk HPV (human papillomavirus) test positive 03/21/2017    3/21/17 NIL pap/+ HR HPV (not 16 or 18).      Depressive disorder      Depressive disorder, not elsewhere classified     seasonal     Diabetes mellitus, type 2 (H) 02/20/2023     Hypertension      Mild persistent asthma      Obstructive sleep apnea (adult) (pediatric)     uses CPAP     Primary osteoarthritis of both knees 01/28/2022     Scalp mass 07/2014       CC Sylvie aBiley, APRN CNP  [address] [facility]  [place] [id number] on close of this encounter.        Again, thank you for allowing me to participate in the care of your patient.        Sincerely,        RAYMOND Gautam CNP

## 2024-03-28 NOTE — PROGRESS NOTES
[facility] Dermatology Note  Encounter Date: Mar 28, 2024  Office Visit     Reviewed patients past medical history and pertinent chart review prior to patients visit today.     Dermatology Problem List:  Patient denies personal history of skin cancer or dysplastic nevi.    Positive history of skin cancer in her father, sister, and mother.  Types unknown but thinks they were nonmelanoma  Androgenetic alopecia, takes finasteride 5 mg daily  Lichen sclerosus, uses clobetasol    ____________________________________________    Assessment & Plan:     # vulvar lichen sclerosus, well controlled  Discussed diagnosis with patient.  Discussed that this is usually a chronic diagnosis with frequent recurrences and remissions of signs and symptoms.  Discussed that lichen sclerosus has the potential to distort or destroy vulvar anatomy but that the condition is manageable and topical corticosteroid treatments are highly effective and can inhibit progression of the disease.  Discussed importance of cessation of scratching to minimize exposures to factors that may exacerbate symptoms.  Lichen sclerosus may increase the risk of malignancy in adult women, stressed the importance of self examinations for more thickened areas or sores that do not heal especially after intensive corticosteroid treatments twice daily x1 month.  Patient will return to clinic prior to normally scheduled follow-up if she notices any of the signs and symptoms.  Discussed that this is not a contagious condition and would not be spread to sexual partners.  Discussed the importance of follow-ups at least yearly to monitor for signs and symptoms of malignancy and reduce risk of scarring.  Advised that it is okay to use emollients or barrier cream such as petroleum jelly, Vaseline, or zinc oxide paste's for discomfort.    -given refills of prescription clobetasol 0.05% ointment to be used once daily for flares of erythema, atrophy, or symptoms.   Otherwise with no symptoms or active disease she will use twice weekly as maintenance.    Follow-up in 1 year for full body skin cancer screening and recheck of lichen sclerosis, sooner as needed if not well-controlled or for new concerns      Zoila Bailey CNP  Dermatology    _______________________________________    CC: Derm Problem (Lichen sclerosis ( itch painful present for years), using clobetasol/And follow up on scalp cyst that small in size ( had had dozen removed ))    HPI:  Ms. Nela Mares is a(n) 63 year old female who presents today as a return patient for lichen sclerosus.  She says that she was diagnosed about 2 years ago and feels like she has been flared ever since.  She has been using clobetasol every day for most of this timeframe but now feels like she is finally under control.  She is not having any symptoms and has not seen any redness or white areas in the genitals when she examines them.    Patient is otherwise feeling well, without additional skin concerns.      Physical Exam:  SKIN: Focused examination of genitals was performed.  -Normal anatomy without erythema atrophy or white striae    - No other lesions of concern on areas examined.     Medications:  Current Outpatient Medications   Medication    clobetasol (TEMOVATE) 0.05 % external ointment    finasteride (PROSCAR) 5 MG tablet    albuterol (PROAIR HFA/PROVENTIL HFA/VENTOLIN HFA) 108 (90 Base) MCG/ACT inhaler    albuterol (PROVENTIL) (2.5 MG/3ML) 0.083% neb solution    amLODIPine (NORVASC) 5 MG tablet    ARIPiprazole (ABILIFY) 10 MG tablet    atorvastatin (LIPITOR) 10 MG tablet    cetirizine (ZYRTEC) 10 MG tablet    Cholecalciferol (VITAMIN D) 125 MCG (5000 UT) capsule    fluticasone (FLONASE) 50 MCG/ACT nasal spray    fluticasone-salmeterol (ADVAIR) 500-50 MCG/ACT inhaler    lisinopril (ZESTRIL) 40 MG tablet    montelukast (SINGULAIR) 10 MG tablet    venlafaxine (EFFEXOR-ER) 225 MG 24 hr tablet     No current  facility-administered medications for this visit.      Past Medical History:   Patient Active Problem List   Diagnosis    Allergic rhinitis due to animals    Mild persistent asthma    Obstructive sleep apnea    Leiomyoma of uterus    Mild major depression (H)    CARDIOVASCULAR SCREENING; LDL GOAL LESS THAN 160    Hypertension goal BP (blood pressure) < 140/90    Pilar cyst    Hypertrophy of breast    IUD (intrauterine device) in place    Seasonal allergic rhinitis due to pollen    Allergic rhinitis due to dust mite    Allergic rhinitis due to mold    Overweight (H)    Impaired fasting glucose    Allergic conjunctivitis, bilateral    Paroxysmal supraventricular tachycardia    Chronic midline low back pain without sciatica    Primary osteoarthritis of both knees    Diabetes mellitus, type 2 (H)    Lichen sclerosus    Encounter for pharmacogenetic testing    Closed traumatic displaced fracture of distal end of left radius, initial encounter    Recurrent major depressive disorder, in partial remission (H24)     Past Medical History:   Diagnosis Date    Allergic rhinitis, cause unspecified     Cervical high risk HPV (human papillomavirus) test positive 03/21/2017    3/21/17 NIL pap/+ HR HPV (not 16 or 18).     Depressive disorder     Depressive disorder, not elsewhere classified     seasonal    Diabetes mellitus, type 2 (H) 02/20/2023    Hypertension     Mild persistent asthma     Obstructive sleep apnea (adult) (pediatric)     uses CPAP    Primary osteoarthritis of both knees 01/28/2022    Scalp mass 07/2014       CC Sylvie Bailey, APRN CNP  [address]  [place] [id number] on close of this encounter.

## 2024-04-15 ENCOUNTER — ANCILLARY PROCEDURE (OUTPATIENT)
Dept: GENERAL RADIOLOGY | Facility: CLINIC | Age: 64
End: 2024-04-15
Attending: ORTHOPAEDIC SURGERY
Payer: COMMERCIAL

## 2024-04-15 ENCOUNTER — OFFICE VISIT (OUTPATIENT)
Dept: ORTHOPEDICS | Facility: CLINIC | Age: 64
End: 2024-04-15
Payer: COMMERCIAL

## 2024-04-15 VITALS
DIASTOLIC BLOOD PRESSURE: 83 MMHG | BODY MASS INDEX: 35.74 KG/M2 | HEIGHT: 69 IN | SYSTOLIC BLOOD PRESSURE: 130 MMHG | HEART RATE: 80 BPM

## 2024-04-15 DIAGNOSIS — S52.571D CLOSED DIE-PUNCH INTRA-ARTICULAR FRACTURE OF DISTAL RADIUS, RIGHT, WITH ROUTINE HEALING, SUBSEQUENT ENCOUNTER: Primary | ICD-10-CM

## 2024-04-15 DIAGNOSIS — S52.571D CLOSED DIE-PUNCH INTRA-ARTICULAR FRACTURE OF DISTAL RADIUS, RIGHT, WITH ROUTINE HEALING, SUBSEQUENT ENCOUNTER: ICD-10-CM

## 2024-04-15 PROCEDURE — 99024 POSTOP FOLLOW-UP VISIT: CPT | Performed by: ORTHOPAEDIC SURGERY

## 2024-04-15 PROCEDURE — 73110 X-RAY EXAM OF WRIST: CPT | Mod: TC | Performed by: RADIOLOGY

## 2024-04-15 ASSESSMENT — PAIN SCALES - GENERAL: PAINLEVEL: NO PAIN (0)

## 2024-04-15 NOTE — LETTER
"    4/15/2024         RE: Nela Mares  [address] [facility] [id number]        Dear Colleague,    Thank you for referring your patient, Nela Mares, to the [facility]. Please see a copy of my visit note below.    CHIEF COMPLAINT:   Chief Complaint   Patient presents with     Right Wrist - Surgical Followup     Open reduction internal fixation. DOS 3/1/24, 6 wk s/p. Patient notes her wrist is doing great. She has been wearing the brace but is not wearing it today. No issues or concerns. She is able to flex and extend without pain.         SURGICAL PROCEDURE: open-reduction, internal fixation right distal radius fracture   DATE OF PROCEDURE: 3/1/2024      HISTORY OF PRESENT ILLNESS    Nela Mares is a 63 year old female seen for postoperative follow-up of a right distal radius fracture open-reduction, internal fixation  that occurred 6+ weeks ago. Returns today stating her wrist is doing \"great\". Wearing the brace most times, but not today. No issues or concerns. She's moving the wrist better.    . A little numbness in the thumb, inner aspect where the splint was wrapped.      Present symptoms: mild pain, mild swelling.    Pain severity: 0/10  Pain quality: dull and aching    Other PMH:  has a past medical history of Allergic rhinitis, cause unspecified, Cervical high risk HPV (human papillomavirus) test positive (03/21/2017), Depressive disorder, Depressive disorder, not elsewhere classified, Diabetes mellitus, type 2 (H) (02/20/2023), Hypertension, Mild persistent asthma, Obstructive sleep apnea (adult) (pediatric), Primary osteoarthritis of both knees (01/28/2022), and Scalp mass (07/2014).    She has no past medical history of Basal cell carcinoma, Cancer (H), Cerebral infarction (H), Congestive heart failure (H), COPD (chronic obstructive pulmonary disease) (H), Heart disease, History of blood transfusion, Malignant melanoma (H), Squamous cell " carcinoma, or Thyroid disease.  Patient Active Problem List   Diagnosis     Allergic rhinitis due to animals     Mild persistent asthma     Obstructive sleep apnea     Leiomyoma of uterus     Mild major depression (H)     CARDIOVASCULAR SCREENING; LDL GOAL LESS THAN 160     Hypertension goal BP (blood pressure) < 140/90     Pilar cyst     Hypertrophy of breast     IUD (intrauterine device) in place     Seasonal allergic rhinitis due to pollen     Allergic rhinitis due to dust mite     Allergic rhinitis due to mold     Overweight (H)     Impaired fasting glucose     Allergic conjunctivitis, bilateral     Paroxysmal supraventricular tachycardia (H24)     Chronic midline low back pain without sciatica     Primary osteoarthritis of both knees     Diabetes mellitus, type 2 (H)     Lichen sclerosus     Encounter for pharmacogenetic testing     Closed traumatic displaced fracture of distal end of left radius, initial encounter     Recurrent major depressive disorder, in partial remission (H24)       Past surgical History:  has a past surgical history that includes no history of surgery; d & c; Colonoscopy (6/21/2012); Laparoscopic cholecystectomy with cholangiograms (1/4/2014); Endoscopic retrograde cholangiopancreatogram (1/4/2014); Operative hysteroscopy (6/17/2014); Remove intrauterine device (6/17/2014); Ablation Supraventricular Tachycardia (N/A, 6/2/2021); Colonoscopy (N/A, 10/20/2022); Colonoscopy (N/A, 3/20/2023); Mammoplasty reduction bilateral (Bilateral, 6/28/2023); and Open reduction internal fixation wrist (Right, 3/1/2024).    Medications:   Current Outpatient Medications:      albuterol (PROAIR HFA/PROVENTIL HFA/VENTOLIN HFA) 108 (90 Base) MCG/ACT inhaler, Inhale 2 puffs into the lungs every 4 hours as needed for shortness of breath or wheezing, Disp: 36 g, Rfl: 1     albuterol (PROVENTIL) (2.5 MG/3ML) 0.083% neb solution, Take 1 vial (2.5 mg) by nebulization every 4 hours as needed for shortness of breath /  "dyspnea or wheezing, Disp: 30 mL, Rfl: 11     amLODIPine (NORVASC) 5 MG tablet, Take 1 tablet (5 mg) by mouth daily, Disp: 90 tablet, Rfl: 3     ARIPiprazole (ABILIFY) 10 MG tablet, Take 1 tablet (10 mg) by mouth daily, Disp: 90 tablet, Rfl: 1     atorvastatin (LIPITOR) 10 MG tablet, Take 1 tablet (10 mg) by mouth at bedtime, Disp: 90 tablet, Rfl: 3     cetirizine (ZYRTEC) 10 MG tablet, Take 1 tablet (10 mg) by mouth daily, Disp: 90 tablet, Rfl: 3     Cholecalciferol (VITAMIN D) 125 MCG (5000 UT) capsule, Take 1 capsule (5,000 Units) by mouth daily, Disp: 90 capsule, Rfl: 3     clobetasol (TEMOVATE) 0.05 % external ointment, Apply twice weekly when well controlled. Increase to daily use with flares, Disp: 60 g, Rfl: 3     finasteride (PROSCAR) 5 MG tablet, Take 1 tablet (5 mg) by mouth daily, Disp: 90 tablet, Rfl: 3     fluticasone (FLONASE) 50 MCG/ACT nasal spray, Spray 2 sprays into both nostrils daily, Disp: 48 g, Rfl: 3     fluticasone-salmeterol (ADVAIR) 500-50 MCG/ACT inhaler, Inhale 1 puff into the lungs every 12 hours Needs appointment for additional refills, Disp: 3 each, Rfl: 0     lisinopril (ZESTRIL) 40 MG tablet, Take 1 tablet (40 mg) by mouth daily, Disp: 90 tablet, Rfl: 3     montelukast (SINGULAIR) 10 MG tablet, Take 1 tablet (10 mg) by mouth daily, Disp: 90 tablet, Rfl: 3     venlafaxine (EFFEXOR-ER) 225 MG 24 hr tablet, Take 1 tablet (225 mg) by mouth daily, Disp: 90 tablet, Rfl: 1    Allergies: No Known Allergies    REVIEW OF SYSTEMS:  CONSTITUTIONAL:NEGATIVE for fever, chills, change in weight  INTEGUMENTARY/SKIN: NEGATIVE for worrisome rashes, moles or lesions  MUSCULOSKELETAL:See HPI above  NEURO: NEGATIVE for weakness, dizziness or paresthesias      PHYSICAL EXAM:  /83   Pulse 80   Ht 1.753 m (5' 9\")   [facility] 12/21/2014 (Exact Date)   BMI 35.74 kg/m     GENERAL APPEARANCE: healthy, alert, no distress   SKIN: no suspicious lesions or rashes  NEURO: Normal strength and tone, mentation " intact and speech normal  PSYCH:  mentation appears normal and affect normal  RESPIRATORY: No increased work of breathing.      right UPPER EXTREMITY:  Wound / Incision: volar incision healed well.  Inspection: no swelling, no ecchymosis, no erythema.  Palpation: nontender to palpation.  There is no erythema of the surrounding skin.  There is no maceration of the skin.  There is no deformity in the area.  Range of motion: extension 50 degrees, flexion 50 degrees.    Sensation intact to light touch in median, radial, ulnar and axillary nerve distributions  Palpable 2+ radial pulse, brisk capillary refill to all fingers, wwp  Intact epl fpl fdp edc wrist flexion/extension biceps triceps deltoid      X-RAY:  3 views right wrist from 4/15/2024 were reviewed in clinic today. Volar screw and plate fixation across a healing comminuted mildly displaced fracture of the distal radius. No hardware loosening. Moderately displaced fracture of the ulnar styloid process. Osteopenia. Decreased fracture lucency, interval callus.          Impression: 63 year old female 6+ weeks  postoperative open-reduction, internal fixation  right distal radius fracture , doing well.        Plan:   Images reviewed. Fracture healing, NOT healed.  Weight bearing status: light weight bearing, ADLs  Pain control: over the counter as needed.  Immobilization: wrist brace, off at rest, hygiene, gentle range of motion.  Elevation of affected extremity    Images: right wrist  Return to clinic in 4 weeks, or sooner as needed.       Andrea Escalante M.D., M.S.  Dept. of Orthopaedic Surgery  [facility]       Again, thank you for allowing me to participate in the care of your patient.        Sincerely,        Andrea Escalante MD

## 2024-04-15 NOTE — PROGRESS NOTES
"CHIEF COMPLAINT:   Chief Complaint   Patient presents with    Right Wrist - Surgical Followup     Open reduction internal fixation. DOS 3/1/24, 6 wk s/p. Patient notes her wrist is doing great. She has been wearing the brace but is not wearing it today. No issues or concerns. She is able to flex and extend without pain.         SURGICAL PROCEDURE: open-reduction, internal fixation right distal radius fracture   DATE OF PROCEDURE: 3/1/2024      HISTORY OF PRESENT ILLNESS    Nela Mares is a 63 year old female seen for postoperative follow-up of a right distal radius fracture open-reduction, internal fixation  that occurred 6+ weeks ago. Returns today stating her wrist is doing \"great\". Wearing the brace most times, but not today. No issues or concerns. She's moving the wrist better.    . A little numbness in the thumb, inner aspect where the splint was wrapped.      Present symptoms: mild pain, mild swelling.    Pain severity: 0/10  Pain quality: dull and aching    Other PMH:  has a past medical history of Allergic rhinitis, cause unspecified, Cervical high risk HPV (human papillomavirus) test positive (03/21/2017), Depressive disorder, Depressive disorder, not elsewhere classified, Diabetes mellitus, type 2 (H) (02/20/2023), Hypertension, Mild persistent asthma, Obstructive sleep apnea (adult) (pediatric), Primary osteoarthritis of both knees (01/28/2022), and Scalp mass (07/2014).    She has no past medical history of Basal cell carcinoma, Cancer (H), Cerebral infarction (H), Congestive heart failure (H), COPD (chronic obstructive pulmonary disease) (H), Heart disease, History of blood transfusion, Malignant melanoma (H), Squamous cell carcinoma, or Thyroid disease.  Patient Active Problem List   Diagnosis    Allergic rhinitis due to animals    Mild persistent asthma    Obstructive sleep apnea    Leiomyoma of uterus    Mild major depression (H)    CARDIOVASCULAR SCREENING; LDL GOAL LESS THAN 160    " Hypertension goal BP (blood pressure) < 140/90    Pilar cyst    Hypertrophy of breast    IUD (intrauterine device) in place    Seasonal allergic rhinitis due to pollen    Allergic rhinitis due to dust mite    Allergic rhinitis due to mold    Overweight (H)    Impaired fasting glucose    Allergic conjunctivitis, bilateral    Paroxysmal supraventricular tachycardia (H24)    Chronic midline low back pain without sciatica    Primary osteoarthritis of both knees    Diabetes mellitus, type 2 (H)    Lichen sclerosus    Encounter for pharmacogenetic testing    Closed traumatic displaced fracture of distal end of left radius, initial encounter    Recurrent major depressive disorder, in partial remission (H24)       Past surgical History:  has a past surgical history that includes no history of surgery; d & c; Colonoscopy (6/21/2012); Laparoscopic cholecystectomy with cholangiograms (1/4/2014); Endoscopic retrograde cholangiopancreatogram (1/4/2014); Operative hysteroscopy (6/17/2014); Remove intrauterine device (6/17/2014); Ablation Supraventricular Tachycardia (N/A, 6/2/2021); Colonoscopy (N/A, 10/20/2022); Colonoscopy (N/A, 3/20/2023); Mammoplasty reduction bilateral (Bilateral, 6/28/2023); and Open reduction internal fixation wrist (Right, 3/1/2024).    Medications:   Current Outpatient Medications:     albuterol (PROAIR HFA/PROVENTIL HFA/VENTOLIN HFA) 108 (90 Base) MCG/ACT inhaler, Inhale 2 puffs into the lungs every 4 hours as needed for shortness of breath or wheezing, Disp: 36 g, Rfl: 1    albuterol (PROVENTIL) (2.5 MG/3ML) 0.083% neb solution, Take 1 vial (2.5 mg) by nebulization every 4 hours as needed for shortness of breath / dyspnea or wheezing, Disp: 30 mL, Rfl: 11    amLODIPine (NORVASC) 5 MG tablet, Take 1 tablet (5 mg) by mouth daily, Disp: 90 tablet, Rfl: 3    ARIPiprazole (ABILIFY) 10 MG tablet, Take 1 tablet (10 mg) by mouth daily, Disp: 90 tablet, Rfl: 1    atorvastatin (LIPITOR) 10 MG tablet, Take 1  "tablet (10 mg) by mouth at bedtime, Disp: 90 tablet, Rfl: 3    cetirizine (ZYRTEC) 10 MG tablet, Take 1 tablet (10 mg) by mouth daily, Disp: 90 tablet, Rfl: 3    Cholecalciferol (VITAMIN D) 125 MCG (5000 UT) capsule, Take 1 capsule (5,000 Units) by mouth daily, Disp: 90 capsule, Rfl: 3    clobetasol (TEMOVATE) 0.05 % external ointment, Apply twice weekly when well controlled. Increase to daily use with flares, Disp: 60 g, Rfl: 3    finasteride (PROSCAR) 5 MG tablet, Take 1 tablet (5 mg) by mouth daily, Disp: 90 tablet, Rfl: 3    fluticasone (FLONASE) 50 MCG/ACT nasal spray, Spray 2 sprays into both nostrils daily, Disp: 48 g, Rfl: 3    fluticasone-salmeterol (ADVAIR) 500-50 MCG/ACT inhaler, Inhale 1 puff into the lungs every 12 hours Needs appointment for additional refills, Disp: 3 each, Rfl: 0    lisinopril (ZESTRIL) 40 MG tablet, Take 1 tablet (40 mg) by mouth daily, Disp: 90 tablet, Rfl: 3    montelukast (SINGULAIR) 10 MG tablet, Take 1 tablet (10 mg) by mouth daily, Disp: 90 tablet, Rfl: 3    venlafaxine (EFFEXOR-ER) 225 MG 24 hr tablet, Take 1 tablet (225 mg) by mouth daily, Disp: 90 tablet, Rfl: 1    Allergies: No Known Allergies    REVIEW OF SYSTEMS:  CONSTITUTIONAL:NEGATIVE for fever, chills, change in weight  INTEGUMENTARY/SKIN: NEGATIVE for worrisome rashes, moles or lesions  MUSCULOSKELETAL:See HPI above  NEURO: NEGATIVE for weakness, dizziness or paresthesias      PHYSICAL EXAM:  /83   Pulse 80   Ht 1.753 m (5' 9\")   LMP 12/21/2014 (Exact Date)   BMI 35.74 kg/m     GENERAL APPEARANCE: healthy, alert, no distress   SKIN: no suspicious lesions or rashes  NEURO: Normal strength and tone, mentation intact and speech normal  PSYCH:  mentation appears normal and affect normal  RESPIRATORY: No increased work of breathing.      right UPPER EXTREMITY:  Wound / Incision: volar incision healed well.  Inspection: no swelling, no ecchymosis, no erythema.  Palpation: nontender to palpation.  There is no " erythema of the surrounding skin.  There is no maceration of the skin.  There is no deformity in the area.  Range of motion: extension 50 degrees, flexion 50 degrees.    Sensation intact to light touch in median, radial, ulnar and axillary nerve distributions  Palpable 2+ radial pulse, brisk capillary refill to all fingers, wwp  Intact epl fpl fdp edc wrist flexion/extension biceps triceps deltoid      X-RAY:  3 views right wrist from 4/15/2024 were reviewed in clinic today. Volar screw and plate fixation across a healing comminuted mildly displaced fracture of the distal radius. No hardware loosening. Moderately displaced fracture of the ulnar styloid process. Osteopenia. Decreased fracture lucency, interval callus.          Impression: 63 year old female 6+ weeks  postoperative open-reduction, internal fixation  right distal radius fracture , doing well.        Plan:   Images reviewed. Fracture healing, NOT healed.  Weight bearing status: light weight bearing, ADLs  Pain control: over the counter as needed.  Immobilization: wrist brace, off at rest, hygiene, gentle range of motion.  Elevation of affected extremity    Images: right wrist  Return to clinic in 4 weeks, or sooner as needed.       Andrea Escalante M.D., M.S.  Dept. of Orthopaedic Surgery  [facility]

## 2024-05-13 NOTE — PROGRESS NOTES
"CHIEF COMPLAINT:   Chief Complaint   Patient presents with    Right Wrist - Pain     Right wrist s/p open reduction internal fixation. DOS: 3/1/24. 11 weeks post-op. Doing Great. \"It's perfect\". Can do anything she could do pre-injury.          SURGICAL PROCEDURE: open-reduction, internal fixation right distal radius fracture   DATE OF PROCEDURE: 3/1/2024      HISTORY OF PRESENT ILLNESS    Nela Mares is a 63 year old female seen for postoperative follow-up of a right distal radius fracture open-reduction, internal fixation  that occurred 11 weeks ago. Returns today stating her wrist is doing \"great\". Resumed all activities without problems. No concerns.      Other PMH:  has a past medical history of Allergic rhinitis, cause unspecified, Cervical high risk HPV (human papillomavirus) test positive (03/21/2017), Depressive disorder, Depressive disorder, not elsewhere classified, Diabetes mellitus, type 2 (H) (02/20/2023), Hypertension, Mild persistent asthma, Obstructive sleep apnea (adult) (pediatric), Primary osteoarthritis of both knees (01/28/2022), and Scalp mass (07/2014).    She has no past medical history of Basal cell carcinoma, Cancer (H), Cerebral infarction (H), Congestive heart failure (H), COPD (chronic obstructive pulmonary disease) (H), Heart disease, History of blood transfusion, Malignant melanoma (H), Squamous cell carcinoma, or Thyroid disease.  Patient Active Problem List   Diagnosis    Allergic rhinitis due to animals    Mild persistent asthma    Obstructive sleep apnea    Leiomyoma of uterus    Mild major depression (H)    CARDIOVASCULAR SCREENING; LDL GOAL LESS THAN 160    Hypertension goal BP (blood pressure) < 140/90    Pilar cyst    Hypertrophy of breast    IUD (intrauterine device) in place    Seasonal allergic rhinitis due to pollen    Allergic rhinitis due to dust mite    Allergic rhinitis due to mold    Overweight (H)    Impaired fasting glucose    Allergic conjunctivitis, " bilateral    Paroxysmal supraventricular tachycardia (H24)    Chronic midline low back pain without sciatica    Primary osteoarthritis of both knees    Diabetes mellitus, type 2 (H)    Lichen sclerosus    Encounter for pharmacogenetic testing    Closed traumatic displaced fracture of distal end of left radius, initial encounter    Recurrent major depressive disorder, in partial remission (H24)       Past surgical History:  has a past surgical history that includes no history of surgery; d & c; Colonoscopy (6/21/2012); Laparoscopic cholecystectomy with cholangiograms (1/4/2014); Endoscopic retrograde cholangiopancreatogram (1/4/2014); Operative hysteroscopy (6/17/2014); Remove intrauterine device (6/17/2014); Ablation Supraventricular Tachycardia (N/A, 6/2/2021); Colonoscopy (N/A, 10/20/2022); Colonoscopy (N/A, 3/20/2023); Mammoplasty reduction bilateral (Bilateral, 6/28/2023); and Open reduction internal fixation wrist (Right, 3/1/2024).    Medications:   Current Outpatient Medications:     albuterol (PROAIR HFA/PROVENTIL HFA/VENTOLIN HFA) 108 (90 Base) MCG/ACT inhaler, Inhale 2 puffs into the lungs every 4 hours as needed for shortness of breath or wheezing, Disp: 36 g, Rfl: 1    albuterol (PROVENTIL) (2.5 MG/3ML) 0.083% neb solution, Take 1 vial (2.5 mg) by nebulization every 4 hours as needed for shortness of breath / dyspnea or wheezing, Disp: 30 mL, Rfl: 11    amLODIPine (NORVASC) 5 MG tablet, Take 1 tablet (5 mg) by mouth daily, Disp: 90 tablet, Rfl: 3    ARIPiprazole (ABILIFY) 10 MG tablet, Take 1 tablet (10 mg) by mouth daily, Disp: 90 tablet, Rfl: 1    atorvastatin (LIPITOR) 10 MG tablet, Take 1 tablet (10 mg) by mouth at bedtime, Disp: 90 tablet, Rfl: 3    cetirizine (ZYRTEC) 10 MG tablet, Take 1 tablet (10 mg) by mouth daily, Disp: 90 tablet, Rfl: 3    Cholecalciferol (VITAMIN D) 125 MCG (5000 UT) capsule, Take 1 capsule (5,000 Units) by mouth daily, Disp: 90 capsule, Rfl: 3    clobetasol (TEMOVATE) 0.05  "% external ointment, Apply twice weekly when well controlled. Increase to daily use with flares, Disp: 60 g, Rfl: 3    finasteride (PROSCAR) 5 MG tablet, Take 1 tablet (5 mg) by mouth daily, Disp: 90 tablet, Rfl: 3    fluticasone (FLONASE) 50 MCG/ACT nasal spray, Spray 2 sprays into both nostrils daily, Disp: 48 g, Rfl: 3    fluticasone-salmeterol (ADVAIR) 500-50 MCG/ACT inhaler, Inhale 1 puff into the lungs every 12 hours Needs appointment for additional refills, Disp: 3 each, Rfl: 0    lisinopril (ZESTRIL) 40 MG tablet, Take 1 tablet (40 mg) by mouth daily, Disp: 90 tablet, Rfl: 3    montelukast (SINGULAIR) 10 MG tablet, Take 1 tablet (10 mg) by mouth daily, Disp: 90 tablet, Rfl: 3    venlafaxine (EFFEXOR-ER) 225 MG 24 hr tablet, Take 1 tablet (225 mg) by mouth daily, Disp: 90 tablet, Rfl: 1    Allergies: No Known Allergies    REVIEW OF SYSTEMS:  CONSTITUTIONAL:NEGATIVE for fever, chills, change in weight  INTEGUMENTARY/SKIN: NEGATIVE for worrisome rashes, moles or lesions  MUSCULOSKELETAL:See HPI above  NEURO: NEGATIVE for weakness, dizziness or paresthesias      PHYSICAL EXAM:  Ht 1.753 m (5' 9\")   Wt 109.8 kg (242 lb)   LMP 12/21/2014 (Exact Date)   BMI 35.74 kg/m     GENERAL APPEARANCE: healthy, alert, no distress   SKIN: no suspicious lesions or rashes  NEURO: Normal strength and tone, mentation intact and speech normal  PSYCH:  mentation appears normal and affect normal  RESPIRATORY: No increased work of breathing.      right UPPER EXTREMITY:  Wound / Incision: volar incision healed well. Some palpable scar tissue.  Inspection: no swelling, no ecchymosis, no erythema.  Palpation: nontender to palpation.  There is no erythema of the surrounding skin.  There is no maceration of the skin.  There is no deformity in the area.  Range of motion: within normal limits. Fairly symmetric to the left.    Sensation intact to light touch in median, radial, ulnar and axillary nerve distributions  Palpable 2+ radial " pulse, brisk capillary refill to all fingers, wwp  Intact epl fpl fdp edc wrist flexion/extension biceps triceps deltoid      X-RAY:  3 views right wrist from 5/20/204 were reviewed in clinic today. Volar screw and plate fixation across a healing comminuted mildly displaced fracture of the distal radius. No hardware loosening. Moderately displaced fracture of the ulnar styloid process. Osteopenia. Decreased fracture lucency, interval bone bridging.          Impression: 63 year old female 11 weeks  postoperative open-reduction, internal fixation  right distal radius fracture , doing well.        Plan:   Images reviewed. Fracture healing well, clinically healed. Xray healing lags clinical healing.  Weight bearing status: progress per comfort.  Pain control: over the counter as needed.  Immobilization: none.  Elevation of affected extremity    Images: right wrist  Return to clinic  as needed.       Andrea Escalante M.D., M.S.  Dept. of Orthopaedic Surgery  [facility]

## 2024-05-20 ENCOUNTER — OFFICE VISIT (OUTPATIENT)
Dept: ORTHOPEDICS | Facility: CLINIC | Age: 64
End: 2024-05-20
Payer: COMMERCIAL

## 2024-05-20 ENCOUNTER — ANCILLARY PROCEDURE (OUTPATIENT)
Dept: GENERAL RADIOLOGY | Facility: CLINIC | Age: 64
End: 2024-05-20
Attending: PHYSICIAN ASSISTANT
Payer: COMMERCIAL

## 2024-05-20 VITALS — BODY MASS INDEX: 35.84 KG/M2 | WEIGHT: 242 LBS | HEIGHT: 69 IN

## 2024-05-20 DIAGNOSIS — Z87.81 S/P ORIF (OPEN REDUCTION INTERNAL FIXATION) FRACTURE: ICD-10-CM

## 2024-05-20 DIAGNOSIS — Z98.890 S/P ORIF (OPEN REDUCTION INTERNAL FIXATION) FRACTURE: ICD-10-CM

## 2024-05-20 DIAGNOSIS — Z98.890 S/P ORIF (OPEN REDUCTION INTERNAL FIXATION) FRACTURE: Primary | ICD-10-CM

## 2024-05-20 DIAGNOSIS — Z87.81 S/P ORIF (OPEN REDUCTION INTERNAL FIXATION) FRACTURE: Primary | ICD-10-CM

## 2024-05-20 PROCEDURE — 73110 X-RAY EXAM OF WRIST: CPT | Mod: TC | Performed by: RADIOLOGY

## 2024-05-20 PROCEDURE — 99024 POSTOP FOLLOW-UP VISIT: CPT | Performed by: ORTHOPAEDIC SURGERY

## 2024-05-20 ASSESSMENT — PAIN SCALES - GENERAL: PAINLEVEL: NO PAIN (0)

## 2024-05-20 NOTE — LETTER
"    5/20/2024         RE: Nela Mares  [address] [facility] [id number]        Dear Colleague,    Thank you for referring your patient, Nela Mares, to the [facility]. Please see a copy of my visit note below.    CHIEF COMPLAINT:   Chief Complaint   Patient presents with     Right Wrist - Pain     Right wrist s/p open reduction internal fixation. DOS: 3/1/24. 11 weeks post-op. Doing Great. \"It's perfect\". Can do anything she could do pre-injury.          SURGICAL PROCEDURE: open-reduction, internal fixation right distal radius fracture   DATE OF PROCEDURE: 3/1/2024      HISTORY OF PRESENT ILLNESS    Nela Mares is a 63 year old female seen for postoperative follow-up of a right distal radius fracture open-reduction, internal fixation  that occurred 11 weeks ago. Returns today stating her wrist is doing \"great\". Resumed all activities without problems. No concerns.      Other PMH:  has a past medical history of Allergic rhinitis, cause unspecified, Cervical high risk HPV (human papillomavirus) test positive (03/21/2017), Depressive disorder, Depressive disorder, not elsewhere classified, Diabetes mellitus, type 2 (H) (02/20/2023), Hypertension, Mild persistent asthma, Obstructive sleep apnea (adult) (pediatric), Primary osteoarthritis of both knees (01/28/2022), and Scalp mass (07/2014).    She has no past medical history of Basal cell carcinoma, Cancer (H), Cerebral infarction (H), Congestive heart failure (H), COPD (chronic obstructive pulmonary disease) (H), Heart disease, History of blood transfusion, Malignant melanoma (H), Squamous cell carcinoma, or Thyroid disease.  Patient Active Problem List   Diagnosis     Allergic rhinitis due to animals     Mild persistent asthma     Obstructive sleep apnea     Leiomyoma of uterus     Mild major depression (H)     CARDIOVASCULAR SCREENING; LDL GOAL LESS THAN 160     Hypertension goal BP (blood pressure) < " 140/90     Pilar cyst     Hypertrophy of breast     IUD (intrauterine device) in place     Seasonal allergic rhinitis due to pollen     Allergic rhinitis due to dust mite     Allergic rhinitis due to mold     Overweight (H)     Impaired fasting glucose     Allergic conjunctivitis, bilateral     Paroxysmal supraventricular tachycardia (H24)     Chronic midline low back pain without sciatica     Primary osteoarthritis of both knees     Diabetes mellitus, type 2 (H)     Lichen sclerosus     Encounter for pharmacogenetic testing     Closed traumatic displaced fracture of distal end of left radius, initial encounter     Recurrent major depressive disorder, in partial remission (H24)       Past surgical History:  has a past surgical history that includes no history of surgery; d & c; Colonoscopy (6/21/2012); Laparoscopic cholecystectomy with cholangiograms (1/4/2014); Endoscopic retrograde cholangiopancreatogram (1/4/2014); Operative hysteroscopy (6/17/2014); Remove intrauterine device (6/17/2014); Ablation Supraventricular Tachycardia (N/A, 6/2/2021); Colonoscopy (N/A, 10/20/2022); Colonoscopy (N/A, 3/20/2023); Mammoplasty reduction bilateral (Bilateral, 6/28/2023); and Open reduction internal fixation wrist (Right, 3/1/2024).    Medications:   Current Outpatient Medications:      albuterol (PROAIR HFA/PROVENTIL HFA/VENTOLIN HFA) 108 (90 Base) MCG/ACT inhaler, Inhale 2 puffs into the lungs every 4 hours as needed for shortness of breath or wheezing, Disp: 36 g, Rfl: 1     albuterol (PROVENTIL) (2.5 MG/3ML) 0.083% neb solution, Take 1 vial (2.5 mg) by nebulization every 4 hours as needed for shortness of breath / dyspnea or wheezing, Disp: 30 mL, Rfl: 11     amLODIPine (NORVASC) 5 MG tablet, Take 1 tablet (5 mg) by mouth daily, Disp: 90 tablet, Rfl: 3     ARIPiprazole (ABILIFY) 10 MG tablet, Take 1 tablet (10 mg) by mouth daily, Disp: 90 tablet, Rfl: 1     atorvastatin (LIPITOR) 10 MG tablet, Take 1 tablet (10 mg) by  "mouth at bedtime, Disp: 90 tablet, Rfl: 3     cetirizine (ZYRTEC) 10 MG tablet, Take 1 tablet (10 mg) by mouth daily, Disp: 90 tablet, Rfl: 3     Cholecalciferol (VITAMIN D) 125 MCG (5000 UT) capsule, Take 1 capsule (5,000 Units) by mouth daily, Disp: 90 capsule, Rfl: 3     clobetasol (TEMOVATE) 0.05 % external ointment, Apply twice weekly when well controlled. Increase to daily use with flares, Disp: 60 g, Rfl: 3     finasteride (PROSCAR) 5 MG tablet, Take 1 tablet (5 mg) by mouth daily, Disp: 90 tablet, Rfl: 3     fluticasone (FLONASE) 50 MCG/ACT nasal spray, Spray 2 sprays into both nostrils daily, Disp: 48 g, Rfl: 3     fluticasone-salmeterol (ADVAIR) 500-50 MCG/ACT inhaler, Inhale 1 puff into the lungs every 12 hours Needs appointment for additional refills, Disp: 3 each, Rfl: 0     lisinopril (ZESTRIL) 40 MG tablet, Take 1 tablet (40 mg) by mouth daily, Disp: 90 tablet, Rfl: 3     montelukast (SINGULAIR) 10 MG tablet, Take 1 tablet (10 mg) by mouth daily, Disp: 90 tablet, Rfl: 3     venlafaxine (EFFEXOR-ER) 225 MG 24 hr tablet, Take 1 tablet (225 mg) by mouth daily, Disp: 90 tablet, Rfl: 1    Allergies: No Known Allergies    REVIEW OF SYSTEMS:  CONSTITUTIONAL:NEGATIVE for fever, chills, change in weight  INTEGUMENTARY/SKIN: NEGATIVE for worrisome rashes, moles or lesions  MUSCULOSKELETAL:See HPI above  NEURO: NEGATIVE for weakness, dizziness or paresthesias      PHYSICAL EXAM:  Ht 1.753 m (5' 9\")   Wt 109.8 kg (242 lb)   LMP 12/21/2014 (Exact Date)   BMI 35.74 kg/m     GENERAL APPEARANCE: healthy, alert, no distress   SKIN: no suspicious lesions or rashes  NEURO: Normal strength and tone, mentation intact and speech normal  PSYCH:  mentation appears normal and affect normal  RESPIRATORY: No increased work of breathing.      right UPPER EXTREMITY:  Wound / Incision: volar incision healed well. Some palpable scar tissue.  Inspection: no swelling, no ecchymosis, no erythema.  Palpation: nontender to " palpation.  There is no erythema of the surrounding skin.  There is no maceration of the skin.  There is no deformity in the area.  Range of motion: within normal limits. Fairly symmetric to the left.    Sensation intact to light touch in median, radial, ulnar and axillary nerve distributions  Palpable 2+ radial pulse, brisk capillary refill to all fingers, wwp  Intact epl fpl fdp edc wrist flexion/extension biceps triceps deltoid      X-RAY:  3 views right wrist from 5/20/204 were reviewed in clinic today. Volar screw and plate fixation across a healing comminuted mildly displaced fracture of the distal radius. No hardware loosening. Moderately displaced fracture of the ulnar styloid process. Osteopenia. Decreased fracture lucency, interval bone bridging.          Impression: 63 year old female 11 weeks  postoperative open-reduction, internal fixation  right distal radius fracture , doing well.        Plan:   Images reviewed. Fracture healing well, clinically healed. Xray healing lags clinical healing.  Weight bearing status: progress per comfort.  Pain control: over the counter as needed.  Immobilization: none.  Elevation of affected extremity    Images: right wrist  Return to clinic  as needed.       Andrea Escalante M.D., M.S.  Dept. of Orthopaedic Surgery  [facility]       Again, thank you for allowing me to participate in the care of your patient.        Sincerely,        Andrea Escalante MD

## 2024-06-11 ASSESSMENT — ANXIETY QUESTIONNAIRES
1. FEELING NERVOUS, ANXIOUS, OR ON EDGE: SEVERAL DAYS
3. WORRYING TOO MUCH ABOUT DIFFERENT THINGS: SEVERAL DAYS
4. TROUBLE RELAXING: NOT AT ALL
5. BEING SO RESTLESS THAT IT IS HARD TO SIT STILL: NOT AT ALL
7. FEELING AFRAID AS IF SOMETHING AWFUL MIGHT HAPPEN: SEVERAL DAYS
8. IF YOU CHECKED OFF ANY PROBLEMS, HOW DIFFICULT HAVE THESE MADE IT FOR YOU TO DO YOUR WORK, TAKE CARE OF THINGS AT HOME, OR GET ALONG WITH OTHER PEOPLE?: SOMEWHAT DIFFICULT
GAD7 TOTAL SCORE: 4
IF YOU CHECKED OFF ANY PROBLEMS ON THIS QUESTIONNAIRE, HOW DIFFICULT HAVE THESE PROBLEMS MADE IT FOR YOU TO DO YOUR WORK, TAKE CARE OF THINGS AT HOME, OR GET ALONG WITH OTHER PEOPLE: SOMEWHAT DIFFICULT
7. FEELING AFRAID AS IF SOMETHING AWFUL MIGHT HAPPEN: SEVERAL DAYS
2. NOT BEING ABLE TO STOP OR CONTROL WORRYING: SEVERAL DAYS
GAD7 TOTAL SCORE: 4
6. BECOMING EASILY ANNOYED OR IRRITABLE: NOT AT ALL

## 2024-06-13 ENCOUNTER — VIRTUAL VISIT (OUTPATIENT)
Dept: FAMILY MEDICINE | Facility: CLINIC | Age: 64
End: 2024-06-13
Payer: COMMERCIAL

## 2024-06-13 DIAGNOSIS — F41.1 GAD (GENERALIZED ANXIETY DISORDER): ICD-10-CM

## 2024-06-13 DIAGNOSIS — F33.1 MODERATE EPISODE OF RECURRENT MAJOR DEPRESSIVE DISORDER (H): Primary | ICD-10-CM

## 2024-06-13 PROCEDURE — 99213 OFFICE O/P EST LOW 20 MIN: CPT | Mod: 95 | Performed by: STUDENT IN AN ORGANIZED HEALTH CARE EDUCATION/TRAINING PROGRAM

## 2024-06-13 RX ORDER — ARIPIPRAZOLE 15 MG/1
15 TABLET ORAL DAILY
Qty: 30 TABLET | Refills: 1 | Status: SHIPPED | OUTPATIENT
Start: 2024-06-13 | End: 2024-08-06

## 2024-06-13 NOTE — PROGRESS NOTES
Sanam is a 63 year old who is being evaluated via a billable video visit.    How would you like to obtain your AVS? MyChart  If the video visit is dropped, the invitation should be resent by: Text to cell phone: [phone]  Will anyone else be joining your video visit? No      Assessment & Plan     Moderate episode of recurrent major depressive disorder (H)  PETE (generalized anxiety disorder)  Worsening mental health a few weeks ago, recommend increasing abilify and continue effexor at the same dose. We will follow up in a few weeks for closer follow up. Advise calling to schedule follow up with therapist. If not improving with medication adjustment consider increasing effexor or referral back to psychiatry.   - ARIPiprazole (ABILIFY) 15 MG tablet; Take 1 tablet (15 mg) by mouth daily          Subjective   Sanam is a 63 year old, presenting for the following health issues:   Follow Up        3/22/2024     1:12 PM   Additional Questions   Accompanied by na     History of Present Illness       Mental Health Follow-up:  Patient presents to follow-up on Depression & Anxiety.Patient's depression since last visit has been:  Worse  The patient is having other symptoms associated with depression.  Patient's anxiety since last visit has been:  Medium  The patient is having other symptoms associated with anxiety.  Any significant life events: No  Patient is not feeling anxious or having panic attacks.  Patient has no concerns about alcohol or drug use.          Worsening depression in the last three weeks. No changes in personal life at that time. Some anxiety as well, worrying that something bad is going to happen, anxiety leaving the house, more fear. Depression symptoms, has no interest in doing anything, endorses anhedonia, sleeping 12--14 hours per night then take 3 hour nap in the afternoon. Fatigue, no energy, no interest in anything. No SI. Feels safe at home.       Review of Systems  Constitutional, HEENT,  cardiovascular, pulmonary, gi and gu systems are negative, except as otherwise noted.      Objective           Vitals:  No vitals were obtained today due to virtual visit.    Physical Exam   GENERAL: alert and no distress  EYES: Eyes grossly normal to inspection.  No discharge or erythema, or obvious scleral/conjunctival abnormalities.  RESP: No audible wheeze, cough, or visible cyanosis.    SKIN: Visible skin clear. No significant rash, abnormal pigmentation or lesions.  NEURO: Cranial nerves grossly intact.  Mentation and speech appropriate for age.  PSYCH: Appropriate affect, tone, and pace of words          Video-Visit Details    Type of service:  Video Visit   Originating Location (pt. Location): Home    Distant Location (provider location):  On-site  Platform used for Video Visit: Bernard  Signed Electronically by: Slime Perry DO

## 2024-06-20 ENCOUNTER — MYC MEDICAL ADVICE (OUTPATIENT)
Dept: FAMILY MEDICINE | Facility: CLINIC | Age: 64
End: 2024-06-20
Payer: COMMERCIAL

## 2024-06-20 DIAGNOSIS — F41.1 GAD (GENERALIZED ANXIETY DISORDER): ICD-10-CM

## 2024-06-20 DIAGNOSIS — F33.1 MODERATE EPISODE OF RECURRENT MAJOR DEPRESSIVE DISORDER (H): Primary | ICD-10-CM

## 2024-06-20 NOTE — TELEPHONE ENCOUNTER
[email or web address] [email or web address] message to provider.      Patient asking for referral to mental health provider Dr. Vazquez      Moderate episode of recurrent major depressive disorder (H)  PETE (generalized anxiety disorder)  Worsening mental health a few weeks ago, recommend increasing abilify and continue effexor at the same dose. We will follow up in a few weeks for closer follow up. Advise calling to schedule follow up with therapist. If not improving with medication adjustment consider increasing effexor or referral back to psychiatry.   - ARIPiprazole (ABILIFY) 15 MG tablet; Take 1 tablet (15 mg) by mouth daily       Willing to place referral?    Christine M Klisch, RN    
Opt out

## 2024-08-06 ENCOUNTER — OFFICE VISIT (OUTPATIENT)
Dept: ALLERGY | Facility: CLINIC | Age: 64
End: 2024-08-06
Attending: ALLERGY & IMMUNOLOGY
Payer: COMMERCIAL

## 2024-08-06 VITALS
OXYGEN SATURATION: 95 % | RESPIRATION RATE: 16 BRPM | DIASTOLIC BLOOD PRESSURE: 83 MMHG | HEART RATE: 75 BPM | SYSTOLIC BLOOD PRESSURE: 121 MMHG

## 2024-08-06 DIAGNOSIS — J30.1 SEASONAL ALLERGIC RHINITIS DUE TO POLLEN: ICD-10-CM

## 2024-08-06 DIAGNOSIS — F33.1 MODERATE EPISODE OF RECURRENT MAJOR DEPRESSIVE DISORDER (H): ICD-10-CM

## 2024-08-06 DIAGNOSIS — J30.81 ALLERGIC RHINITIS DUE TO ANIMALS: ICD-10-CM

## 2024-08-06 DIAGNOSIS — J45.30 MILD PERSISTENT ASTHMA WITHOUT COMPLICATION: ICD-10-CM

## 2024-08-06 DIAGNOSIS — J30.89 ALLERGIC RHINITIS DUE TO MOLD: ICD-10-CM

## 2024-08-06 DIAGNOSIS — J30.89 ALLERGIC RHINITIS DUE TO DUST MITE: ICD-10-CM

## 2024-08-06 PROCEDURE — 99214 OFFICE O/P EST MOD 30 MIN: CPT | Performed by: ALLERGY & IMMUNOLOGY

## 2024-08-06 RX ORDER — ARIPIPRAZOLE 15 MG/1
15 TABLET ORAL DAILY
Qty: 30 TABLET | Refills: 0 | Status: SHIPPED | OUTPATIENT
Start: 2024-08-06 | End: 2024-09-03

## 2024-08-06 RX ORDER — ALBUTEROL SULFATE 90 UG/1
2 AEROSOL, METERED RESPIRATORY (INHALATION) EVERY 4 HOURS PRN
Qty: 36 G | Refills: 1 | Status: SHIPPED | OUTPATIENT
Start: 2024-08-06

## 2024-08-06 RX ORDER — FLUTICASONE PROPIONATE AND SALMETEROL 250; 50 UG/1; UG/1
1 POWDER RESPIRATORY (INHALATION) EVERY 12 HOURS
Qty: 3 EACH | Refills: 0 | Status: SHIPPED | OUTPATIENT
Start: 2024-08-06

## 2024-08-06 ASSESSMENT — PAIN SCALES - GENERAL: PAINLEVEL: NO PAIN (0)

## 2024-08-06 ASSESSMENT — ASTHMA QUESTIONNAIRES: ACT_TOTALSCORE: 25

## 2024-08-06 NOTE — PROGRESS NOTES
Nela Maers was seen in the Allergy Clinic at [facility].      Nela Mares is a 63 year old Choose not to answer female who is seen today for a follow-up visit.    Sanam reports that she has been doing well.  She has not had any asthma issues in the last several months.  She continues to take Advair once daily and has had no exacerbations or oral steroid use in the past couple months.  Her allergy symptoms also seem to be well-controlled.  She has occasional watery eyes or rhinorrhea however does not find the symptoms to be significant.  She continues to take cetirizine daily along with using fluticasone nasal spray.  She has not had any side effects from her medications.  She did not have any additional questions today.      Past Medical History:   Diagnosis Date    Allergic rhinitis, cause unspecified     Cervical high risk HPV (human papillomavirus) test positive 03/21/2017    3/21/17 NIL pap/+ HR HPV (not 16 or 18).     Depressive disorder     Depressive disorder, not elsewhere classified     seasonal    Diabetes mellitus, type 2 (H) 02/20/2023    Hypertension     Mild persistent asthma     Obstructive sleep apnea (adult) (pediatric)     uses CPAP    Primary osteoarthritis of both knees 01/28/2022    Scalp mass 07/2014     Family History   Problem Relation Age of Onset    Neurologic Disorder Mother         Graves disease    Hyperlipidemia Mother     Depression Mother     Thyroid Disease Mother     Anxiety Disorder Mother     Skin Cancer Mother     Hypertension Father     Asthma Father     Obesity Father     Skin Cancer Father     Diabetes Father     Coronary Artery Disease Father     Skin Cancer Sister     Asthma Sister     Cancer Sister         mild skin cancer,cured from excision    Asthma Sister     Depression Maternal Grandmother     C.A.D. Paternal Grandmother     Cerebrovascular Disease Paternal Grandmother     Depression Paternal Grandmother     Obesity Paternal  Grandmother     POOJAA.D. Paternal Grandfather     Cerebrovascular Disease Paternal Grandfather     Substance Abuse Paternal Grandfather     Mental Illness Maternal Half-Brother     Thyroid Disease Maternal Half-Sister     Mental Illness Paternal Half-Brother     Thyroid Disease Paternal Half-Sister     Depression Nephew     Anxiety Disorder Nephew     Substance Abuse Nephew     Asthma Nephew     Anxiety Disorder Nephew     Substance Abuse Nephew     Asthma Nephew     Asthma Nephew     Other - See Comments Other      Social History     Tobacco Use    Smoking status: Never    Smokeless tobacco: Never   Vaping Use    Vaping status: Never Used   Substance Use Topics    Alcohol use: No    Drug use: No     Social History     Social History Narrative    Dairy/d 3 servings/d.     Caffeine 2 servings/d    Exercise 0 x week    Sunscreen used - No    Seatbelts used - Yes    Working smoke/CO detectors in the home - Yes    Guns stored in the home - No    Self Breast Exams - no    Eye Exam up to date - No    Dental Exam up to date - Yes    Pap Smear up to date - Yes    Mammogram up to date - Yes    Dexa Scan up to date - NOT APPLICABLE    Flex Sig / Colonoscopy up to date - NOT APPLICABLE    Immunizations up to date - Yes    Abuse: Current or Past(Physical, Sexual or Emotional)- No    Do you feel safe in your environment - Yes    GÓMEZ Cummings    [birth year]                       Past medical, family, and social history were reviewed.        Current Outpatient Medications:     albuterol (PROAIR HFA/PROVENTIL HFA/VENTOLIN HFA) 108 (90 Base) MCG/ACT inhaler, Inhale 2 puffs into the lungs every 4 hours as needed for shortness of breath or wheezing, Disp: 36 g, Rfl: 1    albuterol (PROVENTIL) (2.5 MG/3ML) 0.083% neb solution, Take 1 vial (2.5 mg) by nebulization every 4 hours as needed for shortness of breath / dyspnea or wheezing, Disp: 30 mL, Rfl: 11    amLODIPine (NORVASC) 5 MG tablet, Take 1 tablet (5 mg) by mouth daily, Disp: 90 tablet,  Rfl: 3    ARIPiprazole (ABILIFY) 15 MG tablet, Take 1 tablet (15 mg) by mouth daily, Disp: 30 tablet, Rfl: 1    atorvastatin (LIPITOR) 10 MG tablet, Take 1 tablet (10 mg) by mouth at bedtime, Disp: 90 tablet, Rfl: 3    cetirizine (ZYRTEC) 10 MG tablet, Take 1 tablet (10 mg) by mouth daily, Disp: 90 tablet, Rfl: 3    Cholecalciferol (VITAMIN D) 125 MCG (5000 UT) capsule, Take 1 capsule (5,000 Units) by mouth daily, Disp: 90 capsule, Rfl: 3    clobetasol (TEMOVATE) 0.05 % external ointment, Apply twice weekly when well controlled. Increase to daily use with flares, Disp: 60 g, Rfl: 3    finasteride (PROSCAR) 5 MG tablet, Take 1 tablet (5 mg) by mouth daily, Disp: 90 tablet, Rfl: 3    fluticasone (FLONASE) 50 MCG/ACT nasal spray, Spray 2 sprays into both nostrils daily, Disp: 48 g, Rfl: 3    fluticasone-salmeterol (ADVAIR) 500-50 MCG/ACT inhaler, Inhale 1 puff into the lungs every 12 hours Needs appointment for additional refills, Disp: 3 each, Rfl: 0    lisinopril (ZESTRIL) 40 MG tablet, Take 1 tablet (40 mg) by mouth daily, Disp: 90 tablet, Rfl: 3    montelukast (SINGULAIR) 10 MG tablet, Take 1 tablet (10 mg) by mouth daily, Disp: 90 tablet, Rfl: 3    venlafaxine (EFFEXOR-ER) 225 MG 24 hr tablet, Take 1 tablet (225 mg) by mouth daily, Disp: 90 tablet, Rfl: 1  No Known Allergies    EXAM:   /83   Pulse 75   Resp 16   LMP 12/21/2014 (Exact Date)   SpO2 95%   Physical Exam  Vitals and nursing note reviewed.   Constitutional:       Appearance: Normal appearance.   HENT:      Head: Normocephalic and atraumatic.      Right Ear: External ear normal.      Left Ear: External ear normal.      Nose: No rhinorrhea.      Mouth/Throat:      Mouth: Mucous membranes are moist. No oral lesions.      Pharynx: Oropharynx is clear. Uvula midline. No posterior oropharyngeal erythema.   Eyes:      General: Lids are normal.      Extraocular Movements: Extraocular movements intact.      Conjunctiva/sclera: Conjunctivae normal.    Neck:      Comments: No asymmetry, masses, or scars  Cardiovascular:      Rate and Rhythm: Normal rate and regular rhythm.      Heart sounds: S1 normal and S2 normal. No murmur heard.  Pulmonary:      Effort: Pulmonary effort is normal. No respiratory distress.      Breath sounds: Normal breath sounds and air entry.   Musculoskeletal:      Comments: No musculoskeletal defects appreciated   Skin:     General: Skin is warm and dry.      Findings: No lesion or rash.   Neurological:      General: No focal deficit present.      Mental Status: She is alert.   Psychiatric:         Mood and Affect: Mood and affect normal.           WORKUP:  None    ASSESSMENT/PLAN:  Nela Mares is a 63 year old female here for a follow-up visit.    1. Mild persistent asthma without complication - Well controlled, spirometry at her last visit was normal.  Discussed weaning her inhaled steroid dose today and she was in agreement with this plan. Advised to resume the higher dose of Advair should her symptoms return.    - Adult Allergy Clinic Follow-Up Order  - fluticasone-salmeterol (ADVAIR) 250-50 MCG/ACT inhaler; Inhale 1 puff into the lungs every 12 hours  Dispense: 3 each; Refill: 0  - albuterol (PROAIR HFA/PROVENTIL HFA/VENTOLIN HFA) 108 (90 Base) MCG/ACT inhaler; Inhale 2 puffs into the lungs every 4 hours as needed for shortness of breath or wheezing  Dispense: 36 g; Refill: 1    2. Seasonal allergic rhinitis due to pollen    - continue cetirizine - 10mg daily  - continue fluticasone nasal spray - 1-2 sprays in each nostril daily  - Adult Allergy Clinic Follow-Up Order    3. Allergic rhinitis due to animals    - Adult Allergy Clinic Follow-Up Order    4. Allergic rhinitis due to dust mite    - Adult Allergy Clinic Follow-Up Order    5. Allergic rhinitis due to mold    - Adult Allergy Clinic Follow-Up Order      Follow-up in 6 months, sooner if needed      Thank you for allowing me to participate in the care of Nela Arthur  Suzan.      Karen Clark MD, FAAAAI  Allergy/Immunology  [facility]      Consent was obtained from the patient to use an AI documentation tool in the creation of this note.    Chart documentation done in part with Dragon Voice Recognition Software. Although reviewed after completion, some word and grammatical errors may remain.

## 2024-08-06 NOTE — LETTER
8/6/2024      Nela Mares  [address] [id number]      Dear Colleague,    Thank you for referring your patient, Nela Mares, to the [facility]. Please see a copy of my visit note below.    Nela Mares was seen in the Allergy Clinic at [facility].      Nela Mares is a 63 year old Choose not to answer female who is seen today for a follow-up visit.    Sanam reports that she has been doing well.  She has not had any asthma issues in the last several months.  She continues to take Advair once daily and has had no exacerbations or oral steroid use in the past couple months.  Her allergy symptoms also seem to be well-controlled.  She has occasional watery eyes or rhinorrhea however does not find the symptoms to be significant.  She continues to take cetirizine daily along with using fluticasone nasal spray.  She has not had any side effects from her medications.  She did not have any additional questions today.      Past Medical History:   Diagnosis Date     Allergic rhinitis, cause unspecified      Cervical high risk HPV (human papillomavirus) test positive 03/21/2017    3/21/17 NIL pap/+ HR HPV (not 16 or 18).      Depressive disorder      Depressive disorder, not elsewhere classified     seasonal     Diabetes mellitus, type 2 (H) 02/20/2023     Hypertension      Mild persistent asthma      Obstructive sleep apnea (adult) (pediatric)     uses CPAP     Primary osteoarthritis of both knees 01/28/2022     Scalp mass 07/2014     Family History   Problem Relation Age of Onset     Neurologic Disorder Mother         Graves disease     Hyperlipidemia Mother      Depression Mother      Thyroid Disease Mother      Anxiety Disorder Mother      Skin Cancer Mother      Hypertension Father      Asthma Father      Obesity Father      Skin Cancer Father      Diabetes Father      Coronary Artery Disease Father      Skin Cancer Sister       Asthma Sister      Cancer Sister         mild skin cancer,cured from excision     Asthma Sister      Depression Maternal Grandmother      C.A.D. Paternal Grandmother      Cerebrovascular Disease Paternal Grandmother      Depression Paternal Grandmother      Obesity Paternal Grandmother      C.A.D. Paternal Grandfather      Cerebrovascular Disease Paternal Grandfather      Substance Abuse Paternal Grandfather      Mental Illness Maternal Half-Brother      Thyroid Disease Maternal Half-Sister      Mental Illness Paternal Half-Brother      Thyroid Disease Paternal Half-Sister      Depression Nephew      Anxiety Disorder Nephew      Substance Abuse Nephew      Asthma Nephew      Anxiety Disorder Nephew      Substance Abuse Nephew      Asthma Nephew      Asthma Nephew      Other - See Comments Other      Social History     Tobacco Use     Smoking status: Never     Smokeless tobacco: Never   Vaping Use     Vaping status: Never Used   Substance Use Topics     Alcohol use: No     Drug use: No     Social History     Social History Narrative    Dairy/d 3 servings/d.     Caffeine 2 servings/d    Exercise 0 x week    Sunscreen used - No    Seatbelts used - Yes    Working smoke/CO detectors in the home - Yes    Guns stored in the home - No    Self Breast Exams - no    Eye Exam up to date - No    Dental Exam up to date - Yes    Pap Smear up to date - Yes    Mammogram up to date - Yes    Dexa Scan up to date - NOT APPLICABLE    Flex Sig / Colonoscopy up to date - NOT APPLICABLE    Immunizations up to date - Yes    Abuse: Current or Past(Physical, Sexual or Emotional)- No    Do you feel safe in your environment - Yes    GÓMEZ Cummings    [birth year]                       Past medical, family, and social history were reviewed.        Current Outpatient Medications:      albuterol (PROAIR HFA/PROVENTIL HFA/VENTOLIN HFA) 108 (90 Base) MCG/ACT inhaler, Inhale 2 puffs into the lungs every 4 hours as needed for shortness of breath or wheezing,  Disp: 36 g, Rfl: 1     albuterol (PROVENTIL) (2.5 MG/3ML) 0.083% neb solution, Take 1 vial (2.5 mg) by nebulization every 4 hours as needed for shortness of breath / dyspnea or wheezing, Disp: 30 mL, Rfl: 11     amLODIPine (NORVASC) 5 MG tablet, Take 1 tablet (5 mg) by mouth daily, Disp: 90 tablet, Rfl: 3     ARIPiprazole (ABILIFY) 15 MG tablet, Take 1 tablet (15 mg) by mouth daily, Disp: 30 tablet, Rfl: 1     atorvastatin (LIPITOR) 10 MG tablet, Take 1 tablet (10 mg) by mouth at bedtime, Disp: 90 tablet, Rfl: 3     cetirizine (ZYRTEC) 10 MG tablet, Take 1 tablet (10 mg) by mouth daily, Disp: 90 tablet, Rfl: 3     Cholecalciferol (VITAMIN D) 125 MCG (5000 UT) capsule, Take 1 capsule (5,000 Units) by mouth daily, Disp: 90 capsule, Rfl: 3     clobetasol (TEMOVATE) 0.05 % external ointment, Apply twice weekly when well controlled. Increase to daily use with flares, Disp: 60 g, Rfl: 3     finasteride (PROSCAR) 5 MG tablet, Take 1 tablet (5 mg) by mouth daily, Disp: 90 tablet, Rfl: 3     fluticasone (FLONASE) 50 MCG/ACT nasal spray, Spray 2 sprays into both nostrils daily, Disp: 48 g, Rfl: 3     fluticasone-salmeterol (ADVAIR) 500-50 MCG/ACT inhaler, Inhale 1 puff into the lungs every 12 hours Needs appointment for additional refills, Disp: 3 each, Rfl: 0     lisinopril (ZESTRIL) 40 MG tablet, Take 1 tablet (40 mg) by mouth daily, Disp: 90 tablet, Rfl: 3     montelukast (SINGULAIR) 10 MG tablet, Take 1 tablet (10 mg) by mouth daily, Disp: 90 tablet, Rfl: 3     venlafaxine (EFFEXOR-ER) 225 MG 24 hr tablet, Take 1 tablet (225 mg) by mouth daily, Disp: 90 tablet, Rfl: 1  No Known Allergies    EXAM:   /83   Pulse 75   Resp 16   LMP 12/21/2014 (Exact Date)   SpO2 95%   Physical Exam  Vitals and nursing note reviewed.   Constitutional:       Appearance: Normal appearance.   HENT:      Head: Normocephalic and atraumatic.      Right Ear: External ear normal.      Left Ear: External ear normal.      Nose: No rhinorrhea.       Mouth/Throat:      Mouth: Mucous membranes are moist. No oral lesions.      Pharynx: Oropharynx is clear. Uvula midline. No posterior oropharyngeal erythema.   Eyes:      General: Lids are normal.      Extraocular Movements: Extraocular movements intact.      Conjunctiva/sclera: Conjunctivae normal.   Neck:      Comments: No asymmetry, masses, or scars  Cardiovascular:      Rate and Rhythm: Normal rate and regular rhythm.      Heart sounds: S1 normal and S2 normal. No murmur heard.  Pulmonary:      Effort: Pulmonary effort is normal. No respiratory distress.      Breath sounds: Normal breath sounds and air entry.   Musculoskeletal:      Comments: No musculoskeletal defects appreciated   Skin:     General: Skin is warm and dry.      Findings: No lesion or rash.   Neurological:      General: No focal deficit present.      Mental Status: She is alert.   Psychiatric:         Mood and Affect: Mood and affect normal.           WORKUP:  None    ASSESSMENT/PLAN:  Nela Mares is a 63 year old female here for a follow-up visit.    1. Mild persistent asthma without complication - Well controlled, spirometry at her last visit was normal.  Discussed weaning her inhaled steroid dose today and she was in agreement with this plan. Advised to resume the higher dose of Advair should her symptoms return.    - Adult Allergy Clinic Follow-Up Order  - fluticasone-salmeterol (ADVAIR) 250-50 MCG/ACT inhaler; Inhale 1 puff into the lungs every 12 hours  Dispense: 3 each; Refill: 0  - albuterol (PROAIR HFA/PROVENTIL HFA/VENTOLIN HFA) 108 (90 Base) MCG/ACT inhaler; Inhale 2 puffs into the lungs every 4 hours as needed for shortness of breath or wheezing  Dispense: 36 g; Refill: 1    2. Seasonal allergic rhinitis due to pollen    - continue cetirizine - 10mg daily  - continue fluticasone nasal spray - 1-2 sprays in each nostril daily  - Adult Allergy Clinic Follow-Up Order    3. Allergic rhinitis due to animals    - Adult Allergy  Clinic Follow-Up Order    4. Allergic rhinitis due to dust mite    - Adult Allergy Clinic Follow-Up Order    5. Allergic rhinitis due to mold    - Adult Allergy Clinic Follow-Up Order      Follow-up in 6 months, sooner if needed      Thank you for allowing me to participate in the care of Nela Mares.      Karen Clark MD, [facility]  Allergy/Immunology  [facility]      Consent was obtained from the patient to use an AI documentation tool in the creation of this note.    Chart documentation done in part with Dragon Voice Recognition Software. Although reviewed after completion, some word and grammatical errors may remain.       Again, thank you for allowing me to participate in the care of your patient.        Sincerely,        Karen Clark MD

## 2024-08-15 ENCOUNTER — LAB (OUTPATIENT)
Dept: LAB | Facility: CLINIC | Age: 64
End: 2024-08-15
Payer: COMMERCIAL

## 2024-08-15 DIAGNOSIS — E11.9 DIABETES MELLITUS, TYPE 2 (H): Primary | ICD-10-CM

## 2024-08-15 LAB
CREAT UR-MCNC: 384 MG/DL
MICROALBUMIN UR-MCNC: 102 MG/L
MICROALBUMIN/CREAT UR: 26.56 MG/G CR (ref 0–25)

## 2024-08-15 PROCEDURE — 82043 UR ALBUMIN QUANTITATIVE: CPT

## 2024-08-15 PROCEDURE — 82570 ASSAY OF URINE CREATININE: CPT

## 2024-08-29 NOTE — PROGRESS NOTES
"    [facility] - [place]     PATIENT'S NAME: Nela Mares  PREFERRED NAME: Sanam  PRONOUNS:       MRN: [id number]  : 1960  ADDRESS: [address] [place] [id number]  ACCT. NUMBER:  [id number]  DATE OF SERVICE: 24  START TIME: 1230pm  END TIME: 1252pm  PREFERRED PHONE: [phone]  May we leave a program related message: Yes  EMERGENCY CONTACT: was obtained     Fiona Mares (Sister)  [phone] (Home Phone)     SERVICE MODALITY:  Video Visit:      Provider verified identity through the following two step process.  Patient provided:  Patient  and Patient address    Telemedicine Visit: The patient's condition can be safely assessed and treated via synchronous audio and visual telemedicine encounter.      Reason for Telemedicine Visit: Services only offered telehealth    Originating Site (Patient Location): Patient's home    Distant Site (Provider Location): Provider Remote Setting- Home Office    Consent:  The patient/guardian has verbally consented to: the potential risks and benefits of telemedicine (video visit) versus in person care; bill my insurance or make self-payment for services provided; and responsibility for payment of non-covered services.     Patient would like the video invitation sent by:  My Chart    Mode of Communication:  Video Conference via Amwell    Distant Location (Provider):  Off-site    As the provider I attest to compliance with applicable laws and regulations related to telemedicine.    UNIVERSAL ADULT Mental Health DIAGNOSTIC ASSESSMENT    Identifying Information:  Patient is a 64 year old,   individual.  Patient was referred for an assessment by self.  Patient attended the session alone.    Chief Complaint:   The reason for seeking services at this time is: \"Depression\".  The problem(s) began 09/15/23.    Patient has attempted to resolve these concerns in the past through previous therapy and " psychiatry .    Social/Family History:  Patient reported they grew up in [facility]  .  They were raised by biological parents  .  Parents were always together.  Patient reported that their childhood was pretty easy, stable, happy. PT has 4 siblings. Patient described their current relationships with family of origin as good relationship with parents and siblings.  2 sisters are here in [place]..     The patient describes their cultural background as .  Cultural influences and impact on patient's life structure, values, norms, and healthcare: None.  Contextual influences on patient's health include: N/a.    These factors will be addressed in the Preliminary Treatment plan. Patient identified their preferred language to be English. Patient reported they does not need the assistance of an  or other support involved in therapy.     Patient reported had no significant delays in developmental tasks.   Patient's highest education level was college graduate  .  Patient identified the following learning problems: none reported.  Modifications will not be used to assist communication in therapy.  Patient reports they are  able to understand written materials.    Patient reported the following relationship history never .  Patient's current relationship status is single for 10 years.   Patient identified their sexual orientation as heterosexual.  Patient reported having 0  child(linda). Patient identified mother; father; siblings; friends as part of their support system.  Patient identified the quality of these relationships as stable and meaningful,  .      Patient's current living/housing situation involves staying in own home/apartment alone plus 2 dogs.  The immediate members of family and household include Self, 64,Self  and they report that housing is stable.    Patient is currently retired about 1 year.  Special .  Patient reports their finances are obtained through education salary  for 2 years, then long term. Patient does not identify finances as a current stressor.      Patient reported that they have not been involved with the legal system.    . Patient does not report being under probation/ parole/ jurisdiction. .    Patient's Strengths and Limitations:  Patient identified the following strengths or resources that will help them succeed in treatment: friends / good social support, family support, insight, and intelligence. Things that may interfere with the patient's success in treatment include: lack of social support.     Assessments:  The following assessments were completed by patient for this visit:  PHQ2:       1/18/2024     8:04 AM 2/19/2023     9:00 AM 7/25/2022     2:42 PM 5/4/2022    12:53 PM 2/13/2022     1:27 PM 12/1/2021    12:43 PM 4/26/2018     7:25 PM   PHQ-2 ( 1999 [employer])   Q1: Little interest or pleasure in doing things 0 0   0 0 0   Q2: Feeling down, depressed or hopeless 0 0   0 0 0   PHQ-2 Score 0 0   0 0 0   Q1: Little interest or pleasure in doing things Not at all Not at all   Not at all Not at all Not at all   Q2: Feeling down, depressed or hopeless Not at all Not at all   Not at all Not at all Not at all   PHQ-2 Score 0 0 Incomplete Incomplete    Incomplete 0 0 0     PHQ9:       7/25/2022     2:42 PM 2/20/2023    10:12 AM 9/27/2023     9:43 AM 11/9/2023     8:49 AM 2/28/2024    11:51 AM 3/22/2024     1:04 PM 9/3/2024    12:04 PM   PHQ-9 SCORE   PHQ-9 Total Score MyChart 0 0 13 (Moderate depression) 14 (Moderate depression) 0 0 21 (Severe depression)   PHQ-9 Total Score 0 0 13 14 0 0 21    21     GAD2:       11/7/2023     9:49 AM 8/29/2024     9:01 AM   PETE-2   Feeling nervous, anxious, or on edge 2 1   Not being able to stop or control worrying 2 1   PETE-2 Total Score 4 2    2     GAD7:       2/18/2022     8:12 AM 5/4/2022    12:53 PM 7/25/2022     2:42 PM 2/20/2023    10:12 AM 9/27/2023     9:46 AM 10/26/2023    10:12 AM 6/11/2024     3:53 PM   PETE-7 SCORE    Total Score  11 (moderate anxiety) 0 (minimal anxiety) 0 (minimal anxiety) 16 (severe anxiety) 15 (severe anxiety) 4 (minimal anxiety)   Total Score 0 11 0 0 16 15 4     CAGE-AID:       11/7/2023     9:50 AM   CAGE-AID Total Score   Total Score 0   Total Score MyChart 0 (A total score of 2 or greater is considered clinically significant)     PROMIS 10-Global Health (only subscores and total score):       11/7/2023     9:50 AM 8/29/2024     9:02 AM   PROMIS-10 Scores Only   Global Mental Health Score 7 5    5   Global Physical Health Score 11 10    10   PROMIS TOTAL - SUBSCORES 18 15    15     [place] Suicide Severity Rating Scale (Lifetime/Recent)      3/1/2024    10:57 AM 9/3/2024     1:04 PM   [place] Suicide Severity Rating (Lifetime/Recent)   Q1 Wished to be Dead (Past Month) 0-->no    Q2 Suicidal Thoughts (Past Month) 0-->no    Q6 Suicide Behavior (Lifetime) 0-->no    Level of Risk per Screen no risks indicated    Q1 Wish to be Dead (Lifetime)  Y   1. Wish to be Dead (Past 1 Month)  N   Q2 Non-Specific Active Suicidal Thoughts (Lifetime)  Y   2. Non-Specific Active Suicidal Thoughts (Past 1 Month)  N   3. Active Suicidal Ideation with any Methods (Not Plan) Without Intent to Act (Lifetime)  Y   Q3 Active Suicidal Ideation with any Methods (Not Plan) Without Intent to Act (Past 1 Month)  N   Q4 Active Suicidal Ideation with Some Intent to Act, Without Specific Plan (Lifetime)  N   4. Active Suicidal Ideation with Some Intent to Act, Without Specific Plan (Past 1 Month)  N   Q5 Active Suicidal Ideation with Specific Plan and Intent (Lifetime)  N   Most Severe Ideation Rating (Lifetime)  4   Frequency (Lifetime)  5   Duration (Lifetime)  4   Controllability (Lifetime)  3   Deterrents (Lifetime)  2   Reasons for Ideation (Lifetime)  5   Reasons for Ideation (Past 1 Month)  0   Actual Attempt (Lifetime)  N   Has subject engaged in non-suicidal self-injurious behavior? (Lifetime)  Y   Has subject engaged in  non-suicidal self-injurious behavior? (Past 3 Months)  N   Interrupted Attempts (Lifetime)  N   Aborted or Self-Interrupted Attempt (Lifetime)  N   Preparatory Acts or Behavior (Lifetime)  N   Calculated C-SSRS Risk Score (Lifetime/Recent)  No Risk Indicated       Personal and Family Medical History:  Patient does report a family history of mental health concerns.  Patient reports family history includes Anxiety Disorder in her mother, nephew, and nephew; Asthma in her father, nephew, nephew, nephew, sister, and sister; C.A.D. in her paternal grandfather and paternal grandmother; Cancer in her sister; Cerebrovascular Disease in her paternal grandfather and paternal grandmother; Coronary Artery Disease in her father; Depression in her maternal grandmother, mother, nephew, and paternal grandmother; Diabetes in her father; Hyperlipidemia in her mother; Hypertension in her father; Mental Illness in her maternal half-brother and paternal half-brother; Neurologic Disorder in her mother; Obesity in her father and paternal grandmother; Other - See Comments in an other family member; Skin Cancer in her father, mother, and sister; Substance Abuse in her nephew, nephew, and paternal grandfather; Thyroid Disease in her maternal half-sister, mother, and paternal half-sister..     Patient does report Mental Health Diagnosis and/or Treatment.  Patient reported the following previous diagnoses which include(s): an anxiety disorder; depression .  Patient reported symptoms began depression as a child.  Patient has received mental health services in the past:  therapy; psychiatry  .  Psychiatric Hospitalizations: none.  Denies IOP/PHP.  Denies EMDR ART  Patient denies a history of civil commitment.      Currently, patient none  is receiving other mental health services.  These include none.       Patient has had a physical exam to rule out medical causes for current symptoms.  Date of last physical exam was within the past year.  Client was encouraged to follow up with PCP if symptoms were to develop. The patient has a [facility] Primary Care Provider, who is named Slime Perry..  Patient reports the following current medical concerns: allergies/asthma, started on Lipitor .  Patient denies any issues with pain..   There are significant appetite / nutritional concerns / weight changes.   Patient does not report a history of head injury / trauma / cognitive impairment.      Current Outpatient Medications   Medication Sig Dispense Refill    albuterol (PROAIR HFA/PROVENTIL HFA/VENTOLIN HFA) 108 (90 Base) MCG/ACT inhaler Inhale 2 puffs into the lungs every 4 hours as needed for shortness of breath or wheezing 36 g 1    albuterol (PROVENTIL) (2.5 MG/3ML) 0.083% neb solution Take 1 vial (2.5 mg) by nebulization every 4 hours as needed for shortness of breath / dyspnea or wheezing 30 mL 11    amLODIPine (NORVASC) 5 MG tablet Take 1 tablet (5 mg) by mouth daily 90 tablet 3    ARIPiprazole (ABILIFY) 15 MG tablet Take 1 tablet (15 mg) by mouth daily 30 tablet 0    atorvastatin (LIPITOR) 10 MG tablet Take 1 tablet (10 mg) by mouth at bedtime 90 tablet 3    cetirizine (ZYRTEC) 10 MG tablet Take 1 tablet (10 mg) by mouth daily 90 tablet 3    Cholecalciferol (VITAMIN D) 125 MCG (5000 UT) capsule Take 1 capsule (5,000 Units) by mouth daily 90 capsule 3    clobetasol (TEMOVATE) 0.05 % external ointment Apply twice weekly when well controlled. Increase to daily use with flares 60 g 3    finasteride (PROSCAR) 5 MG tablet Take 1 tablet (5 mg) by mouth daily 90 tablet 3    fluticasone (FLONASE) 50 MCG/ACT nasal spray Spray 2 sprays into both nostrils daily 48 g 3    fluticasone-salmeterol (ADVAIR) 250-50 MCG/ACT inhaler Inhale 1 puff into the lungs every 12 hours 3 each 0    fluticasone-salmeterol (ADVAIR) 500-50 MCG/ACT inhaler Inhale 1 puff into the lungs every 12 hours Needs appointment for additional refills 3 each 0    lisinopril (ZESTRIL) 40 MG  tablet Take 1 tablet (40 mg) by mouth daily 90 tablet 3    venlafaxine (EFFEXOR-ER) 225 MG 24 hr tablet Take 1 tablet (225 mg) by mouth daily 90 tablet 1     No current facility-administered medications for this visit.        Medication Adherence:  Patient reports taking.  taking prescribed medications as prescribed.    Patient Allergies:  No Known Allergies    Medical History:    Past Medical History:   Diagnosis Date    Allergic rhinitis, cause unspecified     Cervical high risk HPV (human papillomavirus) test positive 03/21/2017    3/21/17 NIL pap/+ HR HPV (not 16 or 18).     Depressive disorder     Depressive disorder, not elsewhere classified     seasonal    Diabetes mellitus, type 2 (H) 02/20/2023    Hypertension     Mild persistent asthma     Obstructive sleep apnea (adult) (pediatric)     uses CPAP    Primary osteoarthritis of both knees 01/28/2022    Scalp mass 07/2014         Current Mental Status Exam:   Appearance:  Appropriate    Eye Contact:  Good   Psychomotor:  Normal       Gait / station:  no problem  Attitude / Demeanor: Cooperative   Speech      Rate / Production: Normal/ Responsive      Volume:  Normal  volume      Language:  intact  Mood:   Anxious  Depressed   Affect:   Flat    Thought Content: Clear   Thought Process: Coherent  Goal Directed       Associations: No loosening of associations  Insight:   Good   Judgment:  Intact   Orientation:  Person Place Time Situation All  Attention/concentration: Good    Substance Use:   Patient did report a family history of substance use concerns; see medical history section for details.  One nephew had struggles with use, but is currently sober.   Patient has not received chemical dependency treatment in the past.  Patient has not ever been to detox.      Patient is not currently receiving any chemical dependency treatment.           Substance History of use Age of first use Date of last use     Pattern and duration of use (include amounts and frequency)    Alcohol never used       REPORTS SUBSTANCE USE: N/A   Cannabis   never used     REPORTS SUBSTANCE USE: N/A     Amphetamines   never used     REPORTS SUBSTANCE USE: N/A   Cocaine/crack    never used       REPORTS SUBSTANCE USE: N/A   Hallucinogens never used         REPORTS SUBSTANCE USE: N/A   Inhalants never used         REPORTS SUBSTANCE USE: N/A   Heroin never used         REPORTS SUBSTANCE USE: N/A   Other Opiates never used     REPORTS SUBSTANCE USE: N/A   Benzodiazepine   never used     REPORTS SUBSTANCE USE: N/A   Barbiturates never used     REPORTS SUBSTANCE USE: N/A   Over the counter meds never used     REPORTS SUBSTANCE USE: N/A   Caffeine never used     REPORTS SUBSTANCE USE: N/A   Nicotine  never used     REPORTS SUBSTANCE USE: N/A   Other substances not listed above:  Identify:  never used     REPORTS SUBSTANCE USE: N/A     Patient reported the following problems as a result of their substance use: no problems, not applicable.    Substance Use: No symptoms    Based on the negative CAGE score and clinical interview there  are not indications of drug or alcohol abuse.    Significant Losses / Trauma / Abuse / Neglect Issues:   Patient did not  serve in the .  There are indications or report of significant loss, trauma, abuse or neglect issues related to: are no indications and client denies any losses, trauma, abuse, or neglect concerns.  Concerns for possible neglect are not present.     Safety Assessment:   Patient denies current homicidal ideation and behaviors.  Patient denies current self-injurious ideation and behaviors.    Patient denied risk behaviors associated with substance use.   Patient denies any high risk behaviors associated with mental health symptoms.  Patient reports the following current concerns for their personal safety: None.  Patient reports there are not firearms in the house.       .    History of Safety Concerns:  Patient denied a history of homicidal ideation.      Patient denied a history of personal safety concerns.    Patient denied a history of assaultive behaviors.    Patient denied a history of sexual assault behaviors.     Patient denied a history of risk behaviors associated with substance use.  Patient denies any history of high risk behaviors associated with mental health symptoms.  Patient reports the following protective factors: forward or future oriented thinking; dedication to family or friends; safe and stable environment; regular sleep; help seeking behaviors when distressed; abstinence from substances; adherence with prescribed medication; daily obligations; effective problem solving skills; commitment to well being; positive social skills; healthy fear of risky behaviors or pain; financial stability; access to a variety of clinical interventions and pets    Risk Plan:  See Recommendations for Safety and Risk Management Plan    Review of Symptoms per patient report:   Depression: Lack of interest, Change in energy level, Difficulties concentrating, Low self-worth, Feeling sad, down, or depressed, Withdrawn, Poor hygeine, and Frequent crying, feels very slowed down/flat/bluntness  Denies SA   Hx SIB, years ago, cutting  Hx of planning/ obsessional /intrusive  Olivia:  No Symptoms  Psychosis: No Symptoms  Anxiety: Excessive worry, Nervousness, Ruminations, Poor concentration, and more isolation   catastrophic thoughts  Panic:  No symptoms  Post Traumatic Stress Disorder:  No Symptoms   Eating Disorder: No Symptoms  ADD / ADHD:  No symptoms  Conduct Disorder: No symptoms  Autism Spectrum Disorder: No symptoms  Obsessive Compulsive Disorder: No Symptoms    Current Stressors / Issues:  MH update:  ROS above  Stresses:  transition into detention, no planned.  Last fall high depression went out on [employer].  Co workers filed compliant creating hostile work environment.  Forced detention.  Appetite: Variable  Sleep: Sleep 12-15  Outpatient Provider updates:  Denies  need/current.  SI/SIB/HI:  Hx of planning/method seeking.  Denies previous attempts.  Denies current suicidal ideation, intent or planning.  Hx of self harm years ago.  Safety plan created.  CAMRYN:  Denies lifetime  Side effects/compliance:  Interventions:  [facility] engaged in completing DA with psychoeducation and tx planning.  Most important:  Depression sx high.    Patient reports the following compulsive behaviors and treatment history:  n/a .      Diagnostic Criteria:   Major Depressive Disorder  CRITERIA (A-C) REPRESENT A MAJOR DEPRESSIVE EPISODE - SELECT THESE CRITERIA  A) Recurrent episode(s) - symptoms have been present during the same 2-week period and represent a change from previous functioning 5 or more symptoms (required for diagnosis)   - Depressed mood. Note: In children and adolescents, can be irritable mood.     - Diminished interest or pleasure in all, or almost all, activities.    - Increased sleep.    - Fatigue or loss of energy.    - Diminished ability to think or concentrate, or indecisiveness.    - Recurrent thoughts of death (not just fear of dying), recurrent suicidal ideation without a specific plan, or a suicide attempt or a specific plan for committing suicide.   B) The symptoms cause clinically significant distress or impairment in social, occupational, or other important areas of functioning  C) The episode is not attributable to the physiological effects of a substance or to another medical condition  D) The occurence of major depressive episode is not better explained by other thought / psychotic disorders  E) There has never been a manic episode or hypomanic episode    Functional Status:  Patient reports the following functional impairments:  chronic disease management, health maintenance, management of the household and or completion of tasks, organization, relationship(s), and self-care.     Nonprogrammatic care:  Patient is requesting basic services to address current mental health  concerns.    Clinical Summary:  1. Psychosocial, Cultural and Contextual Factors: occupational loss  .  2. Principal DSM5 Diagnoses  (Sustained by DSM5 Criteria Listed Above):   296.32 (F33.1) Major Depressive Disorder, Recurrent Episode, Moderate _ and With anxious distress.  3. Other Diagnoses that is relevant to services:   300.02 (F41.1) Generalized Anxiety Disorder.  4. Provisional Diagnosis:  n/a  5. Prognosis: Relieve Acute Symptoms.  6. Likely consequences of symptoms if not treated: decompensation of MH.  7. Client strengths include:  educated, empathetic, has a previous history of therapy, insightful, intelligent, motivated, and open to learning .     Recommendations:     1. Plan for Safety and Risk Management:   Safety and Risk: A safety and risk management plan has been developed including: Patient consented to co-developed safety plan.  Safety and risk management plan was completed - see below.  Patient agreed to use safety plan should any safety concerns arise.  A copy was given to the patient..          Report to child / adult protection services was NA.     2. Patient's identified mental health concerns with a cultural influence will be addressed by per Pt request .     3. Initial Treatment will focus on:    Depressed Mood - .  Anxiety - . .     4. Resources/Service Plan:    services are not indicated.   Modifications to assist communication are not indicated.   Additional disability accommodations are not indicated.      5. Collaboration:   Collaboration / coordination of treatment will be initiated with the following  support professionals: psychiatry.      6.  Referrals:   The following referral(s) will be initiated: Psychiatry.       A Release of Information has been obtained for the following:  n/a .     Clinical Substantiation/medical necessity for the above recommendations:  Pt has a hx of depression and anxiety  symptoms that are impacting daily functioning in daily living and  social settings. Through receiving support through CCPS model for medication and [facility] checking on use of coping skills and therapy to help combat these symptoms may provide Pt with relief. Pt reports that they are struggling to manage depressive and anxiety symptoms and again CCPS model can assist with providing coping skills, following up that pt is using these skills, safety plan or other interventions along with medication to have the best impact to manage symptoms and provide relief. At this time pt's symptoms are able to be managed with OP services and pt will be referred to a higher level of care if there are abrupt changes in presentation or risk of harm  .    7. CAMRYN:    CAMRYN:  Discussed the general effects of drugs and alcohol on health and well-being. Provider gave patient printed information about the  effects of chemical use on their health and well being. Recommendations:  N/a .     8. Records:   These were reviewed at time of assessment.   Information in this assessment was obtained from the medical record and  provided by patient who is a good historian.    Patient will have open access to their mental health medical record.    9.   Interactive Complexity: No    10. Safety Plan:   [facility] Safety Plan      Creation Date: 9/3/24 Last Update Date: 9/3/24      Step 1: Warning signs:    Warning Signs    waking mood    transition into assisted      Step 2: Internal coping strategies - Things I can do to take my mind off my problems without contacting another person:    Strategies    TV    knitting    seeing family      Step 3: People and social settings that provide distraction:    Name Contact Information    Sister Fiona in phone    SisterJazmine in phone    Best friend in phone       Places    being outside      Step 4: People whom I can ask for help during a crisis:    Name Contact Information    My sisters in phone      Step 5: Professionals or agencies I can contact during a crisis:     Clinician/Agency Name Phone Emergency Contact    [facility] Emergency Department Emergency Department Address Emergency Department Phone    [facility]        Suicide Prevention Lifeline Phone: Call or Text 489  Crisis Text Line: Text HOME to 325515     Step 6: Making the environment safer (plan for lethal means safety):   Did not identify any lethal methods     Optional: What is most important to me and worth living for?:   My dogs      Things I am able to do on my own to cope or help me feel better: watching a favorite tv show or movie, listening to music I enjoy, going outside and breathing fresh air, going for a walk or exercising, taking a shower or bath, a cold or hot beverage, a healthy snack, drawing/coloring/painting, journaling, singing or dancing, deep breathing     I can try practicing square breathing when I begin to feel anxious - inhale through the nose for the count of 4 and the first line on the square. Exhale through the mouth for the count of 4 for the second line of the square. Repeat to complete the square. Repeat the square as many times as needed.    I can also use my five senses to practice mindfulness and grounding. What are five things I can see, four things I can hear, three things I can feel, two things I can smell, and one thing I can taste.     Things that I am able to do with others to cope or help me feel better: sometimes just talking or spending time with someone else, sharing a meal or having coffee, watching a movie or playing a game, going for a walk or exercising    I can also use community resources including mental health hotlines, [facility] crisis teams, or apps.     Things I can use or do for distraction: movies/tv, music, reading, games, drawing/coloring/painting or other art, essential oils, exercise, cleaning/organizing, puzzles, crossword puzzles, word search, Sudoku       I can also download a meditation or relaxation yusef, like Calm, Headspace, or  "Insight Timer (all three offer a free version)    A great website resource is Change to Chill with active coping skills    Changes I can make to support my mental health and wellness: Attend scheduled mental health therapy and psychiatric appointments. Take my medications as prescribed. Maintain a daily schedule/routine. Abstain from all mood altering substances, including drugs, alcohol, or medications not currently prescribed to me. Implement a self-care routine.      Your [facility] has a mental health crisis team you can call 24/7: [facility], [phone]    Other things that are important when I'm in crisis: to remember that the feelings I am having right now are temporary, and it won't feel like this forever, and that it is okay and important to ask for help    Community Resources  Fast Tracker  Linking people to mental health and substance use disorder resources  Buru Buru.Revel Systems     [place] Mental Health Warm Line  Peer to peer support  Monday thru Saturday, 12 pm to 10 pm  896.141.7501 or 7.666.924.4077  Text \"Support\" to 09561    [employer] [place] [employer] Mental Illness (MIKE)  763.942.0286 or 1.888.MIKE.HELPS      Mental Health Apps  My3  https://[employer].org/    VirtualHopeBox  https://Jdguanjia.org/apps/virtual-hope-box/      Crisis beds are short term community residential facilities that provide 1-10 day stabilization services.  You can self refer via calling them and inquiring into current openings.  This can be an alternative to hospitalization if you are able to be safe within the community.  This is a voluntary stay.  Some options in the [facility] include:    [facility]  ([place]) [phone]  [facility] ( [facility]) [phone]  [facility] ([place]) [phone]  [facility]) [phone]  [facility] ([place]) [phone]  [facility] ([place]) [phone]  [facility]) [phone]    If you feel like you are in need of " more emergent support for safety concerns you can present to your local emergency room for a mental health evaluation.  Once you met with a mental health clinician and emergency room health care provider they will provide level of care recommendations and next steps.    Local Emergency Rooms can include:  [facility] ([facility]) in [facility] ([facility] ) in [facility] in [place].  This [facility] also supports EmPATH   EmPATH (Emergency Psychiatric Assessment, Treatment, and Healing) mental health unit. EmPATH is an innovative approach to emergency mental health care that is designed to guide people safely through a crisis while helping them build coping skills for the future. The unit is designed for acute psychiatric patients to receive assessment and evaluation in a therapeutic and least restrictive setting.  [place] [facility] in [facility] in [facility] ([facility]) in [place]        [place] Safety Plan. Meme Kemp and Ajit Yu. Used with permission of the authors.           Provider Name/ Credentials:  Carmen Mosquera MA [facility] September 3, 2024

## 2024-09-03 ENCOUNTER — VIRTUAL VISIT (OUTPATIENT)
Dept: BEHAVIORAL HEALTH | Facility: CLINIC | Age: 64
End: 2024-09-03
Payer: COMMERCIAL

## 2024-09-03 ENCOUNTER — VIRTUAL VISIT (OUTPATIENT)
Dept: PSYCHIATRY | Facility: CLINIC | Age: 64
End: 2024-09-03
Payer: COMMERCIAL

## 2024-09-03 DIAGNOSIS — F33.2 SEVERE EPISODE OF RECURRENT MAJOR DEPRESSIVE DISORDER, WITHOUT PSYCHOTIC FEATURES (H): Primary | ICD-10-CM

## 2024-09-03 DIAGNOSIS — F33.1 MODERATE EPISODE OF RECURRENT MAJOR DEPRESSIVE DISORDER (H): Primary | ICD-10-CM

## 2024-09-03 DIAGNOSIS — F41.1 GAD (GENERALIZED ANXIETY DISORDER): ICD-10-CM

## 2024-09-03 PROCEDURE — 99214 OFFICE O/P EST MOD 30 MIN: CPT | Mod: 95 | Performed by: PSYCHIATRY & NEUROLOGY

## 2024-09-03 PROCEDURE — G2211 COMPLEX E/M VISIT ADD ON: HCPCS | Mod: 95 | Performed by: PSYCHIATRY & NEUROLOGY

## 2024-09-03 PROCEDURE — 90791 PSYCH DIAGNOSTIC EVALUATION: CPT | Mod: 95 | Performed by: COUNSELOR

## 2024-09-03 RX ORDER — VENLAFAXINE HYDROCHLORIDE 225 MG/1
225 TABLET, EXTENDED RELEASE ORAL DAILY
Qty: 90 TABLET | Refills: 1 | Status: SHIPPED | OUTPATIENT
Start: 2024-09-03

## 2024-09-03 RX ORDER — ARIPIPRAZOLE 10 MG/1
10 TABLET ORAL DAILY
Qty: 90 TABLET | Refills: 1 | Status: SHIPPED | OUTPATIENT
Start: 2024-09-03

## 2024-09-03 ASSESSMENT — COLUMBIA-SUICIDE SEVERITY RATING SCALE - C-SSRS
1. HAVE YOU WISHED YOU WERE DEAD OR WISHED YOU COULD GO TO SLEEP AND NOT WAKE UP?: YES
1. IN THE PAST MONTH, HAVE YOU WISHED YOU WERE DEAD OR WISHED YOU COULD GO TO SLEEP AND NOT WAKE UP?: NO
REASONS FOR IDEATION PAST MONTH: DOES NOT APPLY
REASONS FOR IDEATION LIFETIME: COMPLETELY TO END OR STOP THE PAIN (YOU COULDN'T GO ON LIVING WITH THE PAIN OR HOW YOU WERE FEELING)
6. HAVE YOU EVER DONE ANYTHING, STARTED TO DO ANYTHING, OR PREPARED TO DO ANYTHING TO END YOUR LIFE?: NO
ATTEMPT LIFETIME: NO
4. HAVE YOU HAD THESE THOUGHTS AND HAD SOME INTENTION OF ACTING ON THEM?: NO
TOTAL  NUMBER OF ABORTED OR SELF INTERRUPTED ATTEMPTS LIFETIME: NO
5. HAVE YOU STARTED TO WORK OUT OR WORKED OUT THE DETAILS OF HOW TO KILL YOURSELF? DO YOU INTEND TO CARRY OUT THIS PLAN?: NO
4. HAVE YOU HAD THESE THOUGHTS AND HAD SOME INTENTION OF ACTING ON THEM?: NO
TOTAL  NUMBER OF INTERRUPTED ATTEMPTS LIFETIME: NO
2. HAVE YOU ACTUALLY HAD ANY THOUGHTS OF KILLING YOURSELF?: YES
3. HAVE YOU BEEN THINKING ABOUT HOW YOU MIGHT KILL YOURSELF?: YES
2. HAVE YOU ACTUALLY HAD ANY THOUGHTS OF KILLING YOURSELF?: NO

## 2024-09-03 ASSESSMENT — PATIENT HEALTH QUESTIONNAIRE - PHQ9
SUM OF ALL RESPONSES TO PHQ QUESTIONS 1-9: 21
10. IF YOU CHECKED OFF ANY PROBLEMS, HOW DIFFICULT HAVE THESE PROBLEMS MADE IT FOR YOU TO DO YOUR WORK, TAKE CARE OF THINGS AT HOME, OR GET ALONG WITH OTHER PEOPLE: EXTREMELY DIFFICULT
10. IF YOU CHECKED OFF ANY PROBLEMS, HOW DIFFICULT HAVE THESE PROBLEMS MADE IT FOR YOU TO DO YOUR WORK, TAKE CARE OF THINGS AT HOME, OR GET ALONG WITH OTHER PEOPLE: EXTREMELY DIFFICULT
SUM OF ALL RESPONSES TO PHQ QUESTIONS 1-9: 21

## 2024-09-03 ASSESSMENT — PAIN SCALES - GENERAL: PAINLEVEL: NO PAIN (0)

## 2024-09-03 NOTE — PROGRESS NOTES
"Telemedicine Visit: The patient's condition can be safely assessed and treated via synchronous audio and visual telemedicine encounter.      Reason for Telemedicine Visit: Patient has requested telehealth visit    Originating Site (Patient Location): Patient's home    Distant Location (provider location):  Off-site    Consent:  The patient/guardian has verbally consented to: the potential risks and benefits of telemedicine (video visit) versus in person care; bill my insurance or make self-payment for services provided; and responsibility for payment of non-covered services.     Mode of Communication:  Video Conference via bluepulse    As the provider I attest to compliance with applicable laws and regulations related to telemedicine.          Outpatient Psychiatric Progress Note    Name: Nela Mares   : 1960                    Primary Care Provider: Padma Ray MD   Therapist: No current therapist    PHQ-9 scores:      2024    11:51 AM 3/22/2024     1:04 PM 9/3/2024    12:04 PM   PHQ-9 SCORE   PHQ-9 Total Score MyChart 0 0 21 (Severe depression)   PHQ-9 Total Score 0 0 21    21       PETE-7 scores:      2023     9:46 AM 10/26/2023    10:12 AM 2024     3:53 PM   PETE-7 SCORE   Total Score 16 (severe anxiety) 15 (severe anxiety) 4 (minimal anxiety)   Total Score 16 15 4       Patient Identification:  Patient is a 64 year old, single  White Choose not to answer female  who presents for return visit with me.  Patient is currently retired. Patient attended the phone/video session alone. Patient prefers to be called: \"Sanam\".    Interim History:  I last saw Nela Mares for outpatient psychiatry return visit on 2024. During that appointment, we:    Continue venlafaxine  mg daily for depression and anxiety.  Continue Abilify/aripiprazole 10 mg daily for depression/to augment venlafaxine ER. Continue to monitor for abnormal involuntary movements. Continue to monitor " "lipid panel and A1c at least every 6-12 months. Could consider metformin XR if weight gain or metabolic abnormalities related to use of Abilify (evidence exists to support use).   Have fasting labs completed within next couple weeks at any [facility] lab. Need to call your clinic ahead of time to schedule an appointment for lab due to limited hours and no walk-ins due to Covid-19 restrictions/changes.     9/3: Pt last seen end of January and care was returned to primary care provider due to significant improvement and stability on Abilify and Effexor-XR.  Between March and June \"gradually slowed down\" and family started to notice flat affect and moving more slowly. Sleeping 15 hours a day. No motivation and everything overwhelming.  Feeling significantly depressed.  Abilify went to 15 mg a couple months ago and no changes/improvement - no side effects noted.  Patient interested in treatment plan change to address significantly worsening symptoms.  No current suicidal ideation.  No problematic drug or alcohol use.  Patient feeling quite hopeless and worried about symptoms worsening even further.  See [facility] note below for additional details.    Per [facility], [facility]qu[facility], during today's team-based visit:  Review of Symptoms per patient report:   Depression:     Lack of interest, Change in energy level, Difficulties concentrating, Low self-worth, Feeling sad, down, or depressed, Withdrawn, Poor hygeine, and Frequent crying, feels very slowed down/flat/bluntness  Denies SA   Hx SIB, years ago, cutting  Hx of planning/ obsessional /intrusive  Olivia:             No Symptoms  Psychosis:       No Symptoms  Anxiety:           Excessive worry, Nervousness, Ruminations, Poor concentration, and more isolation   catastrophic thoughts  Panic:              No symptoms  Post Traumatic Stress Disorder:  No Symptoms   Eating Disorder:          No Symptoms  ADD / ADHD:              No symptoms  Conduct Disorder:       No " "symptoms  Autism Spectrum Disorder:     No symptoms  Obsessive Compulsive Disorder:       No Symptoms     Current Stressors / Issues:  MH update:  ROS above  Stresses:  transition into longterm, no planned.  Last fall high depression went out on [employer].  Co workers filed compliant creating hostile work environment.  Forced longterm.  Appetite: Variable  Sleep: Sleep 12-15  Outpatient Provider updates:  Denies need/current.  SI/SIB/HI:  Hx of planning/method seeking.  Denies previous attempts.  Denies current suicidal ideation, intent or planning.  Hx of self harm years ago.  Safety plan created.  CAMRYN:  Denies lifetime  Side effects/compliance:  Interventions:  [facility] engaged in completing DA with psychoeducation and tx planning.  Most important:  Depression sx high.     Patient reports the following compulsive behaviors and treatment history:  n/a .      Past Psychiatric Med Trials:  Psych Meds at Intake 11/2023:  Abilify 5 mg daily   BusPar 10 mg twice daily  Effexor-.5 mg daily      Past Psych Meds:  Per PharmD note 11/1/23:  Paxil - \"not a good fit\"  Bupropion - caused extreme irritability, rage  Imipramine - reports this was aweful in terms of side effects  Fluoxetine - felt like it didn't work originally.   Lexapro - stopped working  Celexa - stopped working  Zoloft    Psychiatric ROS:  Nela Mares reports mood has been: See HPI above  Anxiety has been: See HPI above  Sleep has been: Excessive sleep, up to 15 hours daily  Olivia sxs: None  Psychosis sxs: None  ADHD/ADD sxs: NA  PTSD sxs: NA  PHQ9 and GAD7 scores were reviewed today if completed.   Medication side effects: Denies  Current stressors include: Symptoms and see HPI above  Coping mechanisms and supports include: Therapy, Family, Hobbies, and Friends    Current medications include:   Current Outpatient Medications   Medication Sig Dispense Refill    albuterol (PROAIR HFA/PROVENTIL HFA/VENTOLIN HFA) 108 (90 Base) MCG/ACT inhaler Inhale 2 " puffs into the lungs every 4 hours as needed for shortness of breath or wheezing 36 g 1    albuterol (PROVENTIL) (2.5 MG/3ML) 0.083% neb solution Take 1 vial (2.5 mg) by nebulization every 4 hours as needed for shortness of breath / dyspnea or wheezing 30 mL 11    amLODIPine (NORVASC) 5 MG tablet Take 1 tablet (5 mg) by mouth daily 90 tablet 3    ARIPiprazole (ABILIFY) 15 MG tablet Take 1 tablet (15 mg) by mouth daily 30 tablet 0    atorvastatin (LIPITOR) 10 MG tablet Take 1 tablet (10 mg) by mouth at bedtime 90 tablet 3    cetirizine (ZYRTEC) 10 MG tablet Take 1 tablet (10 mg) by mouth daily 90 tablet 3    Cholecalciferol (VITAMIN D) 125 MCG (5000 UT) capsule Take 1 capsule (5,000 Units) by mouth daily 90 capsule 3    clobetasol (TEMOVATE) 0.05 % external ointment Apply twice weekly when well controlled. Increase to daily use with flares 60 g 3    finasteride (PROSCAR) 5 MG tablet Take 1 tablet (5 mg) by mouth daily 90 tablet 3    fluticasone (FLONASE) 50 MCG/ACT nasal spray Spray 2 sprays into both nostrils daily 48 g 3    fluticasone-salmeterol (ADVAIR) 250-50 MCG/ACT inhaler Inhale 1 puff into the lungs every 12 hours 3 each 0    fluticasone-salmeterol (ADVAIR) 500-50 MCG/ACT inhaler Inhale 1 puff into the lungs every 12 hours Needs appointment for additional refills 3 each 0    lisinopril (ZESTRIL) 40 MG tablet Take 1 tablet (40 mg) by mouth daily 90 tablet 3    venlafaxine (EFFEXOR-ER) 225 MG 24 hr tablet Take 1 tablet (225 mg) by mouth daily 90 tablet 1     No current facility-administered medications for this visit.         Past Medical/Surgical History:  Past Medical History:   Diagnosis Date    Allergic rhinitis, cause unspecified     Cervical high risk HPV (human papillomavirus) test positive 03/21/2017    3/21/17 NIL pap/+ HR HPV (not 16 or 18).     Depressive disorder     Depressive disorder, not elsewhere classified     seasonal    Diabetes mellitus, type 2 (H) 02/20/2023    Hypertension     Mild  persistent asthma     Obstructive sleep apnea (adult) (pediatric)     uses CPAP    Primary osteoarthritis of both knees 01/28/2022    Scalp mass 07/2014      has a past medical history of Allergic rhinitis, cause unspecified, Cervical high risk HPV (human papillomavirus) test positive (03/21/2017), Depressive disorder, Depressive disorder, not elsewhere classified, Diabetes mellitus, type 2 (H) (02/20/2023), Hypertension, Mild persistent asthma, Obstructive sleep apnea (adult) (pediatric), Primary osteoarthritis of both knees (01/28/2022), and Scalp mass (07/2014).    She has no past medical history of Basal cell carcinoma, Cancer (H), Cerebral infarction (H), Congestive heart failure (H), COPD (chronic obstructive pulmonary disease) (H), Heart disease, History of blood transfusion, Malignant melanoma (H), Squamous cell carcinoma, or Thyroid disease.    Social History:  Reviewed. No changes to social history except as noted above in HPI.    Vital Signs:   None. This is phone/video visit.     Labs:  Most recent laboratory results reviewed:  Recent Labs   Lab Test 03/07/24  0927 08/30/23  0834   CHOL 187 187   HDL 39* 50   * 116*   TRIG 178* 105     Liver Function Studies -   Recent Labs   Lab Test 03/07/24  0927   PROTTOTAL 7.4   ALBUMIN 4.2   BILITOTAL 0.3   ALKPHOS 78   AST 52*   ALT 53*      Review of Systems:  10 systems (general, cardiovascular, respiratory, eyes, ENT, endocrine, GI, , M/S, neurological) were reviewed. Most pertinent finding(s) is/are:  denies fever, cough, persistent headaches, shortness of breath, chest pain, severe GI symptoms, trouble urinating, severe pain.  The remaining systems are all unremarkable.    Mental Status Examination (limited as this is by phone/video):  Appearance: Awake, alert, appears stated age, no acute distress, well-groomed   Attitude:  cooperative, pleasant   Motor: No gross abnormalities observed via video, not formally tested   Oriented to:  person, place,  time, and situation  Attention Span and Concentration:  normal  Speech:  clear, coherent, regular rate, rhythm, and volume  Language: intact  Mood: Depressed  Affect:  appropriate and in normal range and mood congruent  Associations:  no loose associations  Thought Process:  logical, linear and goal oriented  Thought Content:  no evidence of suicidal ideation or homicidal ideation, no evidence of psychotic thought, no auditory hallucinations present and no visual hallucinations present  Recent and Remote Memory:  Intact to interview. Not formally assessed. No amnesia.  Fund of Knowledge: appropriate  Insight:  good  Judgment:  intact, adequate for safety  Impulse Control:  intact      Suicide Risk Assessment:  Today Nela Mares reports no acute suicidality today. In addition, there are notable risk factors for self-harm, including single status, anxiety, hopelessness, withdrawing, and mood change. However, risk is mitigated by commitment to family, absence of past attempts, ability to volunteer a safety plan, history of seeking help when needed, future oriented, no access to firearms or weapons, and denies suicidal intent or plan. Therefore, based on all available evidence including the factors cited above, Nela Mares does not appear to be at imminent risk for self-harm, does not meet criteria for a 72-hr hold, and therefore remains appropriate for ongoing outpatient level of care.  A thorough assessment of risk factors related to suicide and self-harm have been reviewed and are noted above. The patient convincingly denies suicidality on several occasions. Local community safety resources reviewed for patient to use if needed. There was no deceit detected, and the patient presented in a manner that was believable.     DSM5 Diagnosis:  Major Depressive Disorder, Recurrent Episode, severe, without psychotic features  Generalized anxiety disorder    Medical comorbidities include:   Patient Active Problem  List    Diagnosis Date Noted    Recurrent major depressive disorder, in partial remission (H24) 03/22/2024     Priority: Medium    Closed traumatic displaced fracture of distal end of left radius, initial encounter 02/28/2024     Priority: Medium    Encounter for pharmacogenetic testing 11/01/2023     Priority: Medium     [id number] rapid metabolizer  CYP2C9 intermediate metabolizer      Diabetes mellitus, type 2 (H) 02/20/2023     Priority: Medium    Lichen sclerosus 02/20/2023     Priority: Medium    Chronic midline low back pain without sciatica 01/28/2022     Priority: Medium    Primary osteoarthritis of both knees 01/28/2022     Priority: Medium    Paroxysmal supraventricular tachycardia (H24) 05/10/2021     Priority: Medium     Added automatically from request for surgery [id number]      Allergic conjunctivitis, bilateral 08/27/2019     Priority: Medium    Overweight (H) 07/18/2019     Priority: Medium    Impaired fasting glucose 07/18/2019     Priority: Medium    Seasonal allergic rhinitis due to pollen 10/18/2018     Priority: Medium    Allergic rhinitis due to dust mite 10/18/2018     Priority: Medium    Allergic rhinitis due to mold 10/18/2018     Priority: Medium    IUD (intrauterine device) in place 03/21/2017     Priority: Medium     mirena place in 2015      Hypertrophy of breast 11/24/2015     Priority: Medium    Pilar cyst 07/14/2014     Priority: Medium    Hypertension goal BP (blood pressure) < 140/90 10/25/2010     Priority: Medium    CARDIOVASCULAR SCREENING; LDL GOAL LESS THAN 160 05/09/2010     Priority: Medium    Mild major depression (H)      Priority: Medium     Has used paxil, celexa, lexapro, and zoloft in the past.  Zoloft worked well although spring of 2017 symptoms worsened despite max dose.  Switched to wellbutrin - but wellbutrin triggered anxiety and rage.    Will plan to start Effexor in the fall before school year starts.      Leiomyoma of uterus 08/14/2007     Priority: Medium      Problem list name updated by automated process. Provider to review      Obstructive sleep apnea      Priority: Medium     uses CPAP          Mild persistent asthma 10/11/2005     Priority: Medium     Typically needs steroid burst about once per year for symptoms uncontrolled with continued advair and maxair q 4 hours.  Peak flow generally runs 400. With illness goes down to 350.    12/09 - given one extra burst of prednisone.  Instructed to call if she starts it so we can help her monitor her symptoms.      Allergic rhinitis due to animals 08/31/2004     Priority: Medium       Psychosocial & Contextual Factors: see HPI above    Assessment:  From Intake, 11/09/2023:  Nela Mares is a 63-year-old female with past psychiatric history including depression and anxiety who presents today for psychiatric evaluation.  Patient with long history of struggling with intermittent episodes of depression, severe at times.  Depression dates back to adolescence.  Strong family history of depression.  Has a history of several medication trials due to tachyphylaxis after around 7 years of use.  Patient most recently with very severe depressive episode lasting for several weeks.  Patient was seeing primary care provider for medication changes and has most recently been on venlafaxine and buspirone.  Aripiprazole was added about 10 days ago and patient has experienced significant relief and improvement of her severe depression symptoms.  Patient taking aripiprazole 5 mg daily to augment her routine with venlafaxine and buspirone.  Venlafaxine had been increased by adding 37.5 mg capsule to her 225 mg dose for a total of 262.5 mg daily.  That had not been very helpful.  Buspirone was added to her regimen which was helpful briefly but effects did not last at all.  Since aripiprazole is suspected to be so helpful for her symptoms, patient will start slow taper off of buspirone.  If remains stable, patient will consider dropping  venlafaxine back to 225 mg daily.  Patient will reach out if there is any regression/worsening of symptoms.  We did discuss ECT as a possible option in the future if her symptoms get as bad as they were this last episode.  Patient was open to this suggestion.  Encouraged to continue in therapy.  No acute safety concerns.  No longer having any suicidal ideation.  Family is very supportive.  Her dogs are very protective.  No problematic drug or alcohol use.     Discussed metabolic risks and movement disorder risks, including risk for permanent movement related symptoms, when using Abilify/aripiprazole.  Labs ordered for 3-month lipid panel and hemoglobin A1c check.    12/14/2023:  Patient overall doing relatively okay.  Continues to work through some increased psychosocial stressors.  Hoping things will be settling soon.  Encouraged to continue to stay active.  Encouraged to continue light therapy box and supplements.  No medication changes today except for trial of discontinuation of buspirone.  No acute safety concerns.  No SI today.  No problematic drug or alcohol use.    1/18/2024:  Patient overall with much improved symptoms that have been sustained/maintained on Abilify 10 mg daily.  No abnormal involuntary movements.  Patient encouraged to have labs completed to monitor/measure at lipid panel and A1c.  Could consider addition of metformin if metabolic derangements or if starts to gain weight.  Metformin can also be used prophylactically for such.  No acute safety concerns.  No SI.  No problematic drug or alcohol use.  Due to ongoing stability of mental health on current regimen, patient's mental health care will be returned back to primary care provider for ongoing management.    9/3/2024 (new episode of care with collaborative care psychiatric services started today):  Patient with significantly worsening depression symptoms.  Patient meeting criteria for severe depression.  Patient interested in ECT to treat  depression at this time due to severity of symptoms and hope that ECT might help improve symptoms more quickly and with fewer side effects than some other options.  No acute suicidality.  History of method seeking/planning.  None currently.  No access to firearms.  Discussed potentially changing Abilify to Saphris or Rexulti.  Patient prefers to pursue ECT at this time and wait on major medication changes.  Patient is agreeable to decreasing Abilify back to 10 mg daily since there was no improvement on 15 mg.  No problematic drug or alcohol use.    Medication side effects and alternatives were reviewed. Health promotion activities recommended and reviewed today. All questions addressed. Education and counseling completed regarding risks and benefits of medications and psychotherapy options. Recommend therapy for additional support.     Treatment Plan:  Continue venlafaxine  mg daily for depression and anxiety.  Decrease Abilify/aripiprazole back to 10 mg daily for depression/to augment venlafaxine ER.   Referral placed for ECT.  Continue all other cares per primary care provider.   Continue all other medications as reviewed per electronic medical record today.   Safety plan reviewed. To the Emergency Department as needed or call after hours crisis line at [phone] or [phone]. [place] Crisis Text Line. Text [place] to 453509 or Suicide LifeLine Chat: suicidepreventionlifeline.org/chat  Strongly recommend individual psychotherapy for additional support and ongoing development of nonpharmacologic coping skills and strategies.  Schedule an appointment with me in 3-4 weeks or sooner as needed. Call [facility] at [phone] to schedule.  Follow up with primary care provider as planned or for acute medical concerns.  Call the psychiatric nurse line with medication questions or concerns at [phone].  MyChart may be used to communicate with your provider, but this is not intended to be used  for emergencies.    Administrative Billing:   Phone Call/Video Duration: 20 Minutes  Start: 1:05p  Stop: 1:25p    The longitudinal plan of care for the diagnosis(es)/condition(s) as documented were addressed during this visit. Due to the added complexity in care, I will continue to support Sanam in the subsequent management and with ongoing continuity of care.    Episode of Care #: 1 (new episode of care started 9/3/2024)    Patient Status:  Patient will continue to be seen for ongoing consultation and stabilization.    Signed:   Madison Vazquez DO  [facility]    Disclaimer: This note consists of symbols derived from keyboarding, dictation and/or voice recognition software. As a result, there may be errors in the script that have gone undetected. Please consider this when interpreting information found in this chart.

## 2024-09-03 NOTE — PATIENT INSTRUCTIONS
Treatment Plan:  Continue venlafaxine  mg daily for depression and anxiety.  Decrease Abilify/aripiprazole back to 10 mg daily for depression/to augment venlafaxine ER.   Referral placed for ECT.  Continue all other cares per primary care provider.   Continue all other medications as reviewed per electronic medical record today.   Safety plan reviewed. To the Emergency Department as needed or call after hours crisis line at [phone] or [phone]. [place] Crisis Text Line. Text [place] to 298930 or Suicide LifeLine Chat: suicidepreventionlifeline.org/chat  Strongly recommend individual psychotherapy for additional support and ongoing development of nonpharmacologic coping skills and strategies.  Schedule an appointment with me in 3-4 weeks or sooner as needed. Call [facility] at [phone] to schedule.  Follow up with primary care provider as planned or for acute medical concerns.  Call the psychiatric nurse line with medication questions or concerns at [phone].  [employer] may be used to communicate with your provider, but this is not intended to be used for emergencies.    Patient Education   Collaborative Care Psychiatry Service  What to Expect  Here's what to expect from your Collaborative Care Psychiatry Service (CCPS).   About CCPS  CCPS means 2 people work together to help you get better. You'll meet with a behavioral health clinician and a psychiatric doctor. A behavioral health clinician helps people with mental health problems by talking with them. A psychiatric doctor helps people by giving them medicine.  How it works  At every visit, you'll see the behavioral health clinician (BHC) first. They'll talk with you about how you're doing and teach you how to feel better.   Then you'll see the psychiatric doctor. This doctor can help you deal with troubling thoughts and feelings by giving you medicine. They'll make sure you know the plan for your care.   CCPS usually takes 3 to  "6 visits. If you need more visits, we may have you start seeing a different psychiatric doctor for ongoing care.  If you have any questions or concerns, we'll be glad to talk with you.  About visits  Be open  At your visits, please talk openly about your problems. It may feel hard, but it's the best way for us to help you.  Cancelling visits  If you can't come to your visit, please call us right away at [phone]. If you don't cancel at least 24 hours (1 full day) before your visit, that's \"late cancellation.\"  Being late to visits  Being very late is the same as not showing up. You will be a \"no show\" if:  Your appointment starts with a [facility], and you're more than 15 minutes late for a 30-minute (half hour) visit. This will also cancel your appointment with the psychiatric doctor.  Your appointment is with a psychiatric doctor only, and you're more than 15 minutes late for a 30-minute (half hour) visit.  Your appointment is with a psychiatric doctor only, and you're more than 30 minutes late for a 60-minute (full hour) visit.  If you cancel late or don't show up 2 times within 6 months, we may end your care.   Getting help between visits  If you need help between visits, you can call us Monday to Friday from 8 a.m. to 4:30 p.m. at [phone].  Emergency care  Call 911 or go to the nearest emergency department if your life or someone else's life is in danger.  Call 988 anytime to reach the national Suicide and Crisis hotline.  Medicine refills  To refill your medicine, call your pharmacy. You can also call [facility] at [phone], Monday to Friday, 8 a.m. to 4:30 p.m. It can take 1 to 3 business days to get a refill.   Forms, letters, and tests  You may have papers to fill out, like FMLA, short-term disability, and workability. We can help you with these forms at your visits, but you must have an appointment. You may need more than 1 visit for this, to be in an intensive " therapy program, or both.  Before we can give you medicine for ADHD, we may refer you to get tested for it or confirm it another way.  We may not be able to give you an emotional support animal letter.  We don't do mental health checks ordered by the court.   We don't do mental health testing, but we can refer you to get tested.   Thank you for choosing us for your care.  For informational purposes only. Not to replace the advice of your health care provider. Copyright   2022 [facility]. All rights reserved. [employer] [id number] - 12/22.

## 2024-09-03 NOTE — PROGRESS NOTES
"Virtual Visit Details    Type of service:  Video Visit     Originating Location (pt. Location): {video visit patient location:465769::\"Home\"}  {PROVIDER LOCATION On-site should be selected for visits conducted from your clinic location or adjoining [facility] hospital, academic office, or other nearby [facility] building. Off-site should be selected for all other provider locations, including home:678349}  Distant Location (provider location):  {virtual location provider:663190}  Platform used for Video Visit: {Virtual Visit Platforms:353323::\"Threefold Photos\"}  "

## 2024-09-03 NOTE — NURSING NOTE
Is the patient currently in the state of [place]? YES    Current patient location: [address] [place] [id number]    Visit mode:VIDEO    If the visit is dropped, the patient can be reconnected by: VIDEO VISIT: Text to cell phone:   Telephone Information:   Mobile [phone]       Will anyone else be joining the visit? No  (If patient encounters technical issues they should call [phone])    How would you like to obtain your AVS? MyChart    Are changes needed to the allergy or medication list? No    Are refills needed on medications prescribed by this physician? NO    Rooming Documentation: Questionnaire(s) completed.    Reason for visit: Consult     JACINDA Abdullahi      Care team has reviewed attendance agreement with patient. Patient advised that two failed appointments within 6 months may lead to termination of current episode of care.

## 2024-09-05 ENCOUNTER — MYC MEDICAL ADVICE (OUTPATIENT)
Dept: PSYCHIATRY | Facility: CLINIC | Age: 64
End: 2024-09-05
Payer: COMMERCIAL

## 2024-09-13 ENCOUNTER — MYC MEDICAL ADVICE (OUTPATIENT)
Dept: PSYCHIATRY | Facility: CLINIC | Age: 64
End: 2024-09-13
Payer: COMMERCIAL

## 2024-09-13 NOTE — TELEPHONE ENCOUNTER
RN finished competing MARGE with [facility] and gave it to the  in [place] to scan into the patient's chart.     Carey Dougherty RN on 9/13/2024 at 2:34 PM

## 2024-09-13 NOTE — TELEPHONE ENCOUNTER
Reviewed Dr. Vazquez's directive -   Can someone please assist/ensure we get a signed MARGE from the patient for [facility] ECT please? Thanks!       ECT [address]     [facility], [address] [facility]   [address] [place] [address]     Phone: [phone]         RN sent the patient a [employer] message wit the MARGE link.    Carey Dougherty RN on 9/13/2024 at 1:48 PM

## 2024-09-19 ENCOUNTER — TELEPHONE (OUTPATIENT)
Dept: PSYCHIATRY | Facility: CLINIC | Age: 64
End: 2024-09-19

## 2024-09-19 NOTE — TELEPHONE ENCOUNTER
----- Message from Madison Vazquez sent at 9/3/2024  1:26 PM CDT -----  Regarding: ECT Referral  Hi there!     I just wanted to reach out and send a heads up that I have placed an ECT referral for this patient with MDD, Severe, Recurrent. Would be first time for ECT. Pt is very interested and desires the treatment. History of MDD with possible psychosis (paranioa) but no psychosis currently. Hopefully she can get scheduled for eval.

## 2024-09-23 NOTE — PROGRESS NOTES
[facility]  September 24, 2024      Behavioral Health Clinician Progress Note    Patient Name: Nela Mares           Service Type:  Individual      Service Location:   [facility] / Email (patient reached)     Session Start Time: 11am  Session End Time: 1120am      Session Length: 16 - 37      Attendees: Client     Service Modality:  Video Visit:      Provider verified identity through the following two step process.  Patient provided:  Patient is known previously to provider    Telemedicine Visit: The patient's condition can be safely assessed and treated via synchronous audio and visual telemedicine encounter.      Reason for Telemedicine Visit: Services only offered telehealth    Originating Site (Patient Location): Patient's home    Distant Site (Provider Location): Provider Remote Setting- Home Office    Consent:  The patient/guardian has verbally consented to: the potential risks and benefits of telemedicine (video visit) versus in person care; bill my insurance or make self-payment for services provided; and responsibility for payment of non-covered services.     Patient would like the video invitation sent by:  My Chart    Mode of Communication:  Video Conference via [facility]    Distant Location (Provider):  Off-site    As the provider I attest to compliance with applicable laws and regulations related to telemedicine.    Visit Activities (Refresh list every visit): [facility] Only    Diagnostic Assessment Date: 9/3/24 Carmen Mosquera MA [facility]  Treatment Plan Review Date: 9/24/24  See Flowsheets for today's PHQ-9 and PETE-7 results  Previous PHQ-9:       3/22/2024     1:04 PM 9/3/2024    12:04 PM 9/24/2024    10:45 AM   PHQ-9 SCORE   PHQ-9 Total Score MyChart 0 21 (Severe depression)    PHQ-9 Total Score 0 21    21 18     Previous PETE-7:       9/27/2023     9:46 AM 10/26/2023    10:12 AM 6/11/2024     3:53 PM   PETE-7 SCORE   Total Score 16 (severe anxiety) 15  (severe anxiety) 4 (minimal anxiety)   Total Score 16 15 4       DATA  Extended Session (60+ minutes): No  Interactive Complexity: No  Crisis: No  BH Patient: No    Treatment Objective(s) Addressed in This Session:  Target Behavior(s): disease management/lifestyle changes reducing sx    Depressed Mood: Decrease frequency and intensity of feeling down, depressed, hopeless  Anxiety: will experience a reduction in anxiety and will develop more effective coping skills to manage anxiety symptoms    Current Stressors / Issues:    [facility] only appt.   update:  ECT intake tomorrow at [facility].  Reduced Abilify since last appt.  Less sedated.  Anxiety is higher without panic.  Anxious about tomorrows intake.  Depression remains the same  Stresses:  Pending ECT   Appetite: n/a  Sleep: n/a  Outpatient Provider updates:  [facility] referral placed  SI/SIB/HI:  Hx of planning/method seeking.  Denies previous attempts.  Denies current suicidal ideation, intent or planning.  Hx of self harm years ago.  Safety plan created.  CAMRYN:  Denies lifetime  Side effects/compliance:  Interventions:  [facility] engaged in support and validation of anxiety regarding tomorrows ECT intake  Most important:  Anxious about ECT, but hopeful.      9/3   update:  ROS above  Stresses:  transition into prison, no planned.  Last fall high depression went out on [employer].  Co workers filed compliant creating hostile work environment.  Forced prison.  Appetite: Variable  Sleep: Sleep 12-15  Outpatient Provider updates:  Denies need/current.  SI/SIB/HI:  Hx of planning/method seeking.  Denies previous attempts.  Denies current suicidal ideation, intent or planning.  Hx of self harm years ago.  Safety plan created.  CAMRYN:  Denies lifetime  Side effects/compliance:  Interventions:  [facility] engaged in completing DA with psychoeducation and tx planning.  Most important:  Depression sx high.      Progress on Treatment Objective(s) / Homework:  New Objective established this  session - CONTEMPLATION (Considering change and yet undecided); Intervened by assessing the negative and positive thinking (ambivalence) about behavior change    Motivational Interviewing    MI Intervention: Co-Developed Goal: reducing sx and Expressed Empathy/Understanding     Change Talk Expressed by the Patient: Desire to change Ability to change    Provider Response to Change Talk: E - Evoked more info from patient about behavior change    Also provided psychoeducation about behavioral health condition, symptoms, and treatment options    Assessments completed prior to visit:  The following assessments were completed by patient for this visit:  GAD2:       11/7/2023     9:49 AM 8/29/2024     9:01 AM   PETE-2   Feeling nervous, anxious, or on edge 2 1   Not being able to stop or control worrying 2 1   PETE-2 Total Score 4 2    2       Care Plan review completed: Yes    Medication Review:  Changes to psychiatric medications, see updated Medication List in EPIC.     Medication Compliance:  Yes    Changes in Health Issues:   None reported    Chemical Use Review:   Substance Use: Chemical use reviewed, no active concerns identified      Tobacco Use: No current tobacco use.      Assessment: Current Emotional / Mental Status (status of significant symptoms):  Risk status (Self / Other harm or suicidal ideation)  Patient has had a history of suicidal ideation: hx of method seeking.  Denies previous attempts and self-injurious behavior: hx of   Patient denies current fears or concerns for personal safety.  Patient denies current or recent suicidal ideation or behaviors.  Patient denies current or recent homicidal ideation or behaviors.  Patient denies current or recent self injurious behavior or ideation.  Patient denies other safety concerns.  A safety and risk management plan has been developed including: Patient consented to co-developed safety plan.  A safety and risk management plan was completed.  Patient agreed to  use safety plan should any safety concerns arise.  A copy was given to the patient.    Appearance:   Appropriate   Eye Contact:   Good   Psychomotor Behavior: Normal   Attitude:   Cooperative   Orientation:   All  Speech   Rate / Production: Normal    Volume:  Normal   Mood:    Normal  Affect:    Appropriate   Thought Content:  Clear   Thought Form:  Coherent  Logical   Insight:    Good     Diagnoses:  1. Moderate episode of recurrent major depressive disorder (H)    2. PETE (generalized anxiety disorder)        Collateral Reports Completed:  Communicated with: Dr Vazquez    Plan: (Homework, other):  Patient was given information about behavioral services and encouraged to schedule a follow up appointment with the clinic [facility] as needed.  She was also given information about mental health symptoms and treatment options .  CD Recommendations: No indications of CD issues.       Carmen Mosquera [facility]    ______________________________________________________________________    Integrated Primary Care Behavioral Health Treatment Plan    Individual Treatment Plan    Patient's Name: Nela Mares   YOB: 1960  Date of Creation: 9/24/24  Date Treatment Plan Last Reviewed/Revised: 9/24/24    DSM5 Diagnoses:   1. Moderate episode of recurrent major depressive disorder (H)    2. PETE (generalized anxiety disorder)      Psychosocial / Contextual Factors: Medical Complexities, Occupational Issues, Interpersonal Concerns, and Financial Strain  PROMIS (reviewed every 90 days):   The following assessments were completed by patient for this visit:  PROMIS 10-Global Health (only subscores and total score):       11/7/2023     9:50 AM 8/29/2024     9:02 AM   PROMIS-10 Scores Only   Global Mental Health Score 7 5    5   Global Physical Health Score 11 10    10   PROMIS TOTAL - SUBSCORES 18 15    15        Referral / Collaboration:  Referral to another professional/service is not indicated at this time..    Anticipated number of  "session for this episode of care: 6-9 sessions  Anticipation frequency of session: Monthly  Anticipated Duration of each session: 16-37 minutes  Treatment plan will be reviewed in 90 days or when goals have been changed.       MeasurableTreatment Goal(s) related to diagnosis / functional impairment(s)  Goal 1: Patient will reduce depression sx   \"I will know I've met my goal when I can get daily tasks done.\"     Objective #A (Patient Action)    Patient will Increase interest, engagement, and pleasure in doing things  Decrease frequency and intensity of feeling down, depressed, hopeless  Status: New - Date: 9/24/24      Intervention(s)  [facility] will provide support through CBT, MI, Acceptance and Commitment Therapy, Dialectic Behavioral Therapy and problem solving model to explore and overcome barriers.        MeasurableTreatment Goal(s) related to diagnosis / functional impairment(s)  Goal 2: Patient will manage concerns of safety    I will know I've met my goal when I can reduce suicidal ideation .      Objective #A (Patient Action)    Patient will use previously developed safety plan on file.  Status: New - Date: 9/24/24     Intervention(s)  Therapist will provide support through CBT, MI, Acceptance and Commitment Therapy, Dialectic Behavioral Therapy and problem solving model to explore and overcome barriers.        Patient has reviewed and agreed to the above plan.    Written by  Carmen Mosquera LPCC, C     "

## 2024-09-24 ENCOUNTER — VIRTUAL VISIT (OUTPATIENT)
Dept: BEHAVIORAL HEALTH | Facility: CLINIC | Age: 64
End: 2024-09-24
Payer: COMMERCIAL

## 2024-09-24 DIAGNOSIS — F41.1 GAD (GENERALIZED ANXIETY DISORDER): ICD-10-CM

## 2024-09-24 DIAGNOSIS — F33.1 MODERATE EPISODE OF RECURRENT MAJOR DEPRESSIVE DISORDER (H): Primary | ICD-10-CM

## 2024-09-24 PROCEDURE — 90832 PSYTX W PT 30 MINUTES: CPT | Mod: 95 | Performed by: COUNSELOR

## 2024-09-25 ENCOUNTER — TELEPHONE (OUTPATIENT)
Dept: FAMILY MEDICINE | Facility: CLINIC | Age: 64
End: 2024-09-25
Payer: COMMERCIAL

## 2024-09-25 NOTE — TELEPHONE ENCOUNTER
Patient Quality Outreach    Patient is due for the following:   Diabetes -  A1C and Diabetic Follow-Up Visit  Hypertension -  Hypertension follow-up visit    Next Steps:   Schedule a office visit for chronic diasease management    Type of outreach:    [employer] message.      Questions for provider review:    None           Dahlia Putnam CMA  Chart routed to Care Team.

## 2024-10-01 ENCOUNTER — OFFICE VISIT (OUTPATIENT)
Dept: FAMILY MEDICINE | Facility: CLINIC | Age: 64
End: 2024-10-01
Payer: COMMERCIAL

## 2024-10-01 VITALS
BODY MASS INDEX: 36.37 KG/M2 | DIASTOLIC BLOOD PRESSURE: 74 MMHG | HEART RATE: 88 BPM | SYSTOLIC BLOOD PRESSURE: 122 MMHG | HEIGHT: 68 IN | TEMPERATURE: 97.8 F | OXYGEN SATURATION: 98 % | RESPIRATION RATE: 20 BRPM | WEIGHT: 240 LBS

## 2024-10-01 DIAGNOSIS — G47.33 OBSTRUCTIVE SLEEP APNEA: ICD-10-CM

## 2024-10-01 DIAGNOSIS — E11.9 TYPE 2 DIABETES MELLITUS WITHOUT COMPLICATION, WITHOUT LONG-TERM CURRENT USE OF INSULIN (H): ICD-10-CM

## 2024-10-01 DIAGNOSIS — Z23 ENCOUNTER FOR IMMUNIZATION: ICD-10-CM

## 2024-10-01 DIAGNOSIS — F33.41 RECURRENT MAJOR DEPRESSIVE DISORDER, IN PARTIAL REMISSION (H): ICD-10-CM

## 2024-10-01 DIAGNOSIS — J45.30 MILD PERSISTENT ASTHMA WITHOUT COMPLICATION: ICD-10-CM

## 2024-10-01 DIAGNOSIS — I10 HYPERTENSION GOAL BP (BLOOD PRESSURE) < 140/90: ICD-10-CM

## 2024-10-01 DIAGNOSIS — Z01.818 PREOP GENERAL PHYSICAL EXAM: Primary | ICD-10-CM

## 2024-10-01 DIAGNOSIS — I47.10 PAROXYSMAL SUPRAVENTRICULAR TACHYCARDIA (H): ICD-10-CM

## 2024-10-01 PROBLEM — R73.01 IMPAIRED FASTING GLUCOSE: Status: RESOLVED | Noted: 2019-07-18 | Resolved: 2024-10-01

## 2024-10-01 PROBLEM — Z13.79 ENCOUNTER FOR PHARMACOGENETIC TESTING: Status: RESOLVED | Noted: 2023-11-01 | Resolved: 2024-10-01

## 2024-10-01 LAB
EST. AVERAGE GLUCOSE BLD GHB EST-MCNC: 126 MG/DL
HBA1C MFR BLD: 6 % (ref 0–5.6)
HOLD SPECIMEN: NORMAL

## 2024-10-01 PROCEDURE — 90471 IMMUNIZATION ADMIN: CPT | Performed by: FAMILY MEDICINE

## 2024-10-01 PROCEDURE — 83036 HEMOGLOBIN GLYCOSYLATED A1C: CPT | Performed by: FAMILY MEDICINE

## 2024-10-01 PROCEDURE — 36415 COLL VENOUS BLD VENIPUNCTURE: CPT | Performed by: FAMILY MEDICINE

## 2024-10-01 PROCEDURE — 91320 SARSCV2 VAC 30MCG TRS-SUC IM: CPT | Performed by: FAMILY MEDICINE

## 2024-10-01 PROCEDURE — 90480 ADMN SARSCOV2 VAC 1/ONLY CMP: CPT | Performed by: FAMILY MEDICINE

## 2024-10-01 PROCEDURE — 93000 ELECTROCARDIOGRAM COMPLETE: CPT | Performed by: FAMILY MEDICINE

## 2024-10-01 PROCEDURE — 90673 RIV3 VACCINE NO PRESERV IM: CPT | Performed by: FAMILY MEDICINE

## 2024-10-01 PROCEDURE — 90678 RSV VACC PREF BIVALENT IM: CPT | Performed by: FAMILY MEDICINE

## 2024-10-01 PROCEDURE — 90472 IMMUNIZATION ADMIN EACH ADD: CPT | Performed by: FAMILY MEDICINE

## 2024-10-01 PROCEDURE — 99214 OFFICE O/P EST MOD 30 MIN: CPT | Mod: 25 | Performed by: FAMILY MEDICINE

## 2024-10-01 ASSESSMENT — PAIN SCALES - GENERAL: PAINLEVEL: NO PAIN (0)

## 2024-10-01 NOTE — PROGRESS NOTES
Preoperative Evaluation  [facility]  [address] [place] [id number]  Phone: [phone]  Primary Provider: Slime Perry DO  Pre-op Performing Provider: Pema Loredo DO  Oct 1, 2024             9/26/2024   Surgical Information   What procedure is being done? ECT   Facility or Hospital where procedure/surgery will be performed: [facility]   Who is doing the procedure / surgery? Dr. Reva Ayala   Date of surgery / procedure: 10/2/24   Time of surgery / procedure: 6:00 AM   Where do you plan to recover after surgery? at home with family        Fax number for surgical facility: Attn: Evan [phone]    Assessment & Plan     The proposed surgical procedure is considered INTERMEDIATE risk.    Preop general physical exam    - EKG 12-lead complete w/read - Clinics    Recurrent major depressive disorder, in partial remission (H)  ECT scheduled     Type 2 diabetes mellitus without complication, without long-term current use of insulin (H)  Diet controlled   - HEMOGLOBIN A1C; Future  - HEMOGLOBIN A1C    Hypertension goal BP (blood pressure) < 140/90  The current medical regimen is effective;  continue present plan and medications.      Encounter for immunization    - RSV VACCINE (ABRYSVO)  - INFLUENZA VACCINE TRIVALENT(FLUBLOK)  - COVID-19 12+ (PFIZER)    Obstructive sleep apnea  Uses cpap    Mild persistent asthma without complication  The current medical regimen is effective;  continue present plan and medications.      Paroxysmal supraventricular tachycardia (H)  S/p ablation            Risks and Recommendations  The patient has the following additional risks and recommendations for perioperative complications:  Cardiovascular:   - sp ablation   Pulmonary:    - stable asthma  Obstructive Sleep Apnea:   Uses CPAP    Antiplatelet or Anticoagulation Medication Instructions   - Patient is on no antiplatelet or anticoagulation medications.    Additional Medication  Instructions   - ACE/ARB: Continue without modification (e.g., MAC anesthesia, neurosurgery, spine surgery, heart failure, or labile hypertension with risk of hypertension).   - Calcium Channel Blockers: May be continued on the day of surgery.   - LABA, inhaled corticosteroid, long-acting anticholinergics: Continue without modification.   - rescue Inhaler: Continue PRN. Bring to hospital on the day of surgery.    Recommendation  Approval given to proceed with proposed procedure, without further diagnostic evaluation.    Delbert Marion is a 64 year old, presenting for the following:  Pre-Op Exam (DOS: 10/2/24)          10/1/2024     9:04 AM   Additional Questions   Roomed by Apple AGUILAR   Accompanied by self         10/1/2024     9:04 AM   Patient Reported Additional Medications   Patient reports taking the following new medications none     HPI related to upcoming procedure: Patient has treatment resistant depression and is being scheduled for ECT therapy        9/26/2024   Pre-Op Questionnaire   Have you ever had a heart attack or stroke? No   Have you ever had surgery on your heart or blood vessels, such as a stent placement, a coronary artery bypass, or surgery on an artery in your head, neck, heart, or legs? No   Do you have chest pain with activity? No   Do you have a history of heart failure? No   Do you currently have a cold, bronchitis or symptoms of other infection? No   Do you have a cough, shortness of breath, or wheezing? No   Do you or anyone in your family have previous history of blood clots? No   Do you or does anyone in your family have a serious bleeding problem such as prolonged bleeding following surgeries or cuts? No   Have you ever had problems with anemia or been told to take iron pills? No   Have you had any abnormal blood loss such as black, tarry or bloody stools, or abnormal vaginal bleeding? No   Have you ever had a blood transfusion? No   Are you willing to have a blood transfusion if it  is medically needed before, during, or after your surgery? Yes   Have you or any of your relatives ever had problems with anesthesia? No   Do you have sleep apnea, excessive snoring or daytime drowsiness? (!) YES uses cpap    Do you have a CPAP machine? Yes   Do you have any artifical heart valves or other implanted medical devices like a pacemaker, defibrillator, or continuous glucose monitor? No   Do you have artificial joints? No   Are you allergic to latex? No        Health Care Directive  Patient does not have a Health Care Directive or Living Will: Discussed advance care planning with patient; information given to patient to review.    Preoperative Review of    reviewed - no record of controlled substances prescribed.      Status of Chronic Conditions:  ASTHMA - Patient has a longstanding history of moderate-severe Asthma . Patient has been doing well overall noting NO SYMPTOMS and continues on medication regimen consisting of Advair and albuterol without adverse reactions or side effects.     DEPRESSION - Patient has a long history of Depression of moderate severity requiring medication for control with recent symptoms being gradually worsening..Current symptoms of depression include depressed mood.  Patient is on Effexor 225 mg daily and Abilify 10 mg daily    DIABETES - Patient has a longstanding history of DiabetesType Type II . Patient is being treated with diet and denies significant side effects. Control has been good. Complicating factors include but are not limited to: hypertension, hyperlipidemia, and morbid obesity .     HYPERTENSION - Patient has longstanding history of HTN , currently denies any symptoms referable to elevated blood pressure. Specifically denies chest pain, palpitations, dyspnea, orthopnea, PND or peripheral edema. Blood pressure readings have been in normal range. Current medication regimen is as listed below. Patient denies any side effects of medication.     SLEEP PROBLEM  - Patient has a longstanding history of obstructive sleep apnea.    Patient Active Problem List    Diagnosis Date Noted    Recurrent major depressive disorder, in partial remission (H) 03/22/2024     Priority: Medium    Closed traumatic displaced fracture of distal end of left radius, initial encounter 02/28/2024     Priority: Medium    Encounter for pharmacogenetic testing 11/01/2023     Priority: Medium     [id number] rapid metabolizer  CYP2C9 intermediate metabolizer      Diabetes mellitus, type 2 (H) 02/20/2023     Priority: Medium    Lichen sclerosus 02/20/2023     Priority: Medium    Chronic midline low back pain without sciatica 01/28/2022     Priority: Medium    Primary osteoarthritis of both knees 01/28/2022     Priority: Medium    Paroxysmal supraventricular tachycardia (H) 05/10/2021     Priority: Medium     Added automatically from request for surgery [id number]      Allergic conjunctivitis, bilateral 08/27/2019     Priority: Medium    Overweight (H) 07/18/2019     Priority: Medium    Impaired fasting glucose 07/18/2019     Priority: Medium    Seasonal allergic rhinitis due to pollen 10/18/2018     Priority: Medium    Allergic rhinitis due to dust mite 10/18/2018     Priority: Medium    Allergic rhinitis due to mold 10/18/2018     Priority: Medium    IUD (intrauterine device) in place 03/21/2017     Priority: Medium     mirena place in 2015      Hypertrophy of breast 11/24/2015     Priority: Medium    Pilar cyst 07/14/2014     Priority: Medium    Hypertension goal BP (blood pressure) < 140/90 10/25/2010     Priority: Medium    CARDIOVASCULAR SCREENING; LDL GOAL LESS THAN 160 05/09/2010     Priority: Medium    Mild major depression (H)      Priority: Medium     Has used paxil, celexa, lexapro, and zoloft in the past.  Zoloft worked well although spring of 2017 symptoms worsened despite max dose.  Switched to wellbutrin - but wellbutrin triggered anxiety and rage.    Will plan to start Effexor in the fall before  school year starts.      Leiomyoma of uterus 08/14/2007     Priority: Medium     Problem list name updated by automated process. Provider to review      Obstructive sleep apnea      Priority: Medium     uses CPAP          Mild persistent asthma 10/11/2005     Priority: Medium     Typically needs steroid burst about once per year for symptoms uncontrolled with continued advair and maxair q 4 hours.  Peak flow generally runs 400. With illness goes down to 350.    12/09 - given one extra burst of prednisone.  Instructed to call if she starts it so we can help her monitor her symptoms.      Allergic rhinitis due to animals 08/31/2004     Priority: Medium      Past Medical History:   Diagnosis Date    Allergic rhinitis, cause unspecified     Cervical high risk HPV (human papillomavirus) test positive 03/21/2017    3/21/17 NIL pap/+ HR HPV (not 16 or 18).     Depressive disorder     Depressive disorder, not elsewhere classified     seasonal    Diabetes mellitus, type 2 (H) 02/20/2023    Hypertension     Mild persistent asthma     Obstructive sleep apnea (adult) (pediatric)     uses CPAP    Primary osteoarthritis of both knees 01/28/2022    Scalp mass 07/2014     Past Surgical History:   Procedure Laterality Date    COLONOSCOPY  6/21/2012    Procedure: COLONOSCOPY;  COLONOSCOPY, SCREEN;  Surgeon: Bart You MD;  Location: [facility]    COLONOSCOPY N/A 10/20/2022    Procedure: COLONOSCOPY, WITH POLYPECTOMY AND BIOPSY;  Surgeon: Chago Hong DO;  Location: [facility]    COLONOSCOPY N/A 3/20/2023    Procedure: COLONOSCOPY, WITH POLYPECTOMY AND BIOPSY;  Surgeon: Lion Vidal MD;  Location: [facility]    D & C      ENDOSCOPIC RETROGRADE CHOLANGIOPANCREATOGRAM  1/4/2014    Procedure: ENDOSCOPIC RETROGRADE CHOLANGIOPANCREATOGRAM;  ERCP biliary sphincterotomy, bile duct stone removal, epinepherine washing.;  Surgeon: Ba Meyers MD;  Location: [facility]    EP ABLATION SVT N/A 6/2/2021    Procedure: EP ABLATION SVT;   Surgeon: Cuca Magaña MD;  [facility]    LAPAROSCOPIC CHOLECYSTECTOMY WITH CHOLANGIOGRAMS  1/4/2014    Procedure: LAPAROSCOPIC CHOLECYSTECTOMY WITH CHOLANGIOGRAMS;;  Surgeon: Haja Sage MD;  Location: [facility]    MAMMOPLASTY REDUCTION BILATERAL Bilateral 6/28/2023    Procedure: MAMMOPLASTY, REDUCTION, BILATERAL;  Surgeon: DARYL Knutson MD;  Location: [facility] OR    NO HISTORY OF SURGERY      OPEN REDUCTION INTERNAL FIXATION WRIST Right 3/1/2024    Procedure: OPEN REDUCTION INTERNAL FIXATION, FRACTURE, RIGHT WRIST;  Surgeon: Andrea Escalante MD;  Location: [facility]    OPERATIVE HYSTEROSCOPY  6/17/2014    Procedure: OPERATIVE HYSTEROSCOPY;  Surgeon: Paola Camara MD;  Location: [facility]    REMOVE INTRAUTERINE DEVICE  6/17/2014    Procedure: REMOVE INTRAUTERINE DEVICE;  Surgeon: Paola Camara MD;  Location: [facility]     Current Outpatient Medications   Medication Sig Dispense Refill    albuterol (PROAIR HFA/PROVENTIL HFA/VENTOLIN HFA) 108 (90 Base) MCG/ACT inhaler Inhale 2 puffs into the lungs every 4 hours as needed for shortness of breath or wheezing 36 g 1    albuterol (PROVENTIL) (2.5 MG/3ML) 0.083% neb solution Take 1 vial (2.5 mg) by nebulization every 4 hours as needed for shortness of breath / dyspnea or wheezing 30 mL 11    amLODIPine (NORVASC) 5 MG tablet Take 1 tablet (5 mg) by mouth daily 90 tablet 3    ARIPiprazole (ABILIFY) 10 MG tablet Take 1 tablet (10 mg) by mouth daily. 90 tablet 1    atorvastatin (LIPITOR) 10 MG tablet Take 1 tablet (10 mg) by mouth at bedtime 90 tablet 3    cetirizine (ZYRTEC) 10 MG tablet Take 1 tablet (10 mg) by mouth daily 90 tablet 3    Cholecalciferol (VITAMIN D) 125 MCG (5000 UT) capsule Take 1 capsule (5,000 Units) by mouth daily 90 capsule 3    clobetasol (TEMOVATE) 0.05 % external ointment Apply twice weekly when well controlled. Increase to daily use with flares 60 g 3    finasteride (PROSCAR) 5 MG tablet Take 1  "tablet (5 mg) by mouth daily 90 tablet 3    fluticasone (FLONASE) 50 MCG/ACT nasal spray Spray 2 sprays into both nostrils daily 48 g 3    fluticasone-salmeterol (ADVAIR) 250-50 MCG/ACT inhaler Inhale 1 puff into the lungs every 12 hours 3 each 0    fluticasone-salmeterol (ADVAIR) 500-50 MCG/ACT inhaler Inhale 1 puff into the lungs every 12 hours Needs appointment for additional refills 3 each 0    lisinopril (ZESTRIL) 40 MG tablet Take 1 tablet (40 mg) by mouth daily 90 tablet 3    venlafaxine (EFFEXOR-ER) 225 MG 24 hr tablet Take 1 tablet (225 mg) by mouth daily. 90 tablet 1       No Known Allergies     Social History     Tobacco Use    Smoking status: Never     Passive exposure: Never    Smokeless tobacco: Never   Substance Use Topics    Alcohol use: No       History   Drug Use No             Review of Systems  Constitutional, HEENT, cardiovascular, pulmonary, gi and gu systems are negative, except as otherwise noted.    Objective    /74 (BP Location: Right arm, Patient Position: Sitting, Cuff Size: Adult Large)   Pulse 88   Temp 97.8  F (36.6  C) (Oral)   Resp 20   Ht 1.727 m (5' 8\")   Wt 108.9 kg (240 lb)   LMP 12/21/2014 (Exact Date)   SpO2 98%   BMI 36.49 kg/m     Estimated body mass index is 36.49 kg/m  as calculated from the following:    Height as of this encounter: 1.727 m (5' 8\").    Weight as of this encounter: 108.9 kg (240 lb).  Physical Exam  GENERAL: alert and no distress  NECK: no adenopathy, no asymmetry, masses, or scars  RESP: lungs clear to auscultation - no rales, rhonchi or wheezes  CV: regular rate and rhythm, normal S1 S2, no S3 or S4, no murmur, click or rub, no peripheral edema  ABDOMEN: soft, nontender, no hepatosplenomegaly, no masses and bowel sounds normal  PSYCH: mentation appears normal and affect flat    Recent Labs   Lab Test 02/29/24  0950   HGB 13.1         POTASSIUM 3.6   CR 0.88   A1C 6.0*        Diagnostics  Labs pending at this time.  Results will " be reviewed when available.   EKG required for ECT and not completed in the last 90 days.     Revised Cardiac Risk Index (RCRI)  The patient has the following serious cardiovascular risks for perioperative complications:   - No serious cardiac risks = 0 points     RCRI Interpretation: 0 points: Class I (very low risk - 0.4% complication rate)         Signed Electronically by: Pema Loredo DO  A copy of this evaluation report is provided to the requesting physician.

## 2024-10-01 NOTE — PATIENT INSTRUCTIONS
How to Take Your Medication Before Surgery  Preoperative Medication Instructions   Antiplatelet or Anticoagulation Medication Instructions   - Patient is on no antiplatelet or anticoagulation medications.    Additional Medication Instructions   - ACE/ARB: Continue without modification (e.g., MAC anesthesia, neurosurgery, spine surgery, heart failure, or labile hypertension with risk of hypertension).   - Calcium Channel Blockers: May be continued on the day of surgery.   - LABA, inhaled corticosteroid, long-acting anticholinergics: Continue without modification.   - rescue Inhaler: Continue PRN. Bring to hospital on the day of surgery.       Patient Education   Preparing for Your Surgery  For Adults  Getting started  In most cases, a nurse will call to review your health history and instructions. They will give you an arrival time based on your scheduled surgery time. Please be ready to share:  Your doctor's clinic name and phone number  Your medical, surgical, and anesthesia history  A list of allergies and sensitivities  A list of medicines, including herbal treatments and over-the-counter drugs  Whether the patient has a legal guardian (ask how to send us the papers in advance)  Note: You may not receive a call if you were seen at our PAC (Preoperative Assessment Center).  Please tell us if you're pregnant--or if there's any chance you might be pregnant. Some surgeries may injure a fetus (unborn baby), so they require a pregnancy test. Surgeries that are safe for a fetus don't always need a test, and you can choose whether to have one.   Preparing for surgery  Within 10 to 30 days of surgery: Have a pre-op exam (sometimes called an H&P, or History and Physical). This can be done at a clinic or pre-operative center.  If you're having a , you may not need this exam. Talk to your care team.  At your pre-op exam, talk to your care team about all medicines you take. (This includes CBD oil and any drugs, such  as THC, marijuana, and other forms of cannabis.) If you need to stop any medicine before surgery, ask when to start taking it again.  This is for your safety. Many medicines and drugs can make you bleed too much during surgery. Some change how well surgery (anesthesia) drugs work.  Call your insurance company to let them know you're having surgery. (If you don't have insurance, call [phone].)  Call your clinic if there's any change in your health. This includes a scrape or scratch near the surgery site, or any signs of a cold (sore throat, runny nose, cough, rash, fever).  Eating and drinking guidelines  For your safety: Unless your surgeon tells you otherwise, follow the guidelines below.  Eat and drink as normal until 8 hours before you arrive for surgery. After that, no food or milk. You can spit out gum when you arrive.  Drink clear liquids until 2 hours before you arrive. These are liquids you can see through, like water, Gatorade, and Propel Water. They also include plain black coffee and tea (no cream or milk).  No alcohol for 24 hours before you arrive. The night before surgery, stop any drinks that contain THC.  If your care team tells you to take medicine on the morning of surgery, it's okay to take it with a sip of water. No other medicines or drugs are allowed (including CBD oil)--follow your care team's instructions.  If you have questions the day of surgery, call your hospital or surgery center.   Preventing infection  Shower or bathe the night before and the morning of surgery. Follow the instructions your clinic gave you. (If no instructions, use regular soap.)  Don't shave or clip hair near your surgery site. We'll remove the hair if needed.  Don't smoke or vape the morning of surgery. No chewing tobacco for 6 hours before you arrive. A nicotine patch is okay. You may spit out nicotine gum when you arrive.  For some surgeries, the surgeon will tell you to fully quit smoking and nicotine.  We  will make every effort to keep you safe from infection. We will:  Clean our hands often with soap and water (or an alcohol-based hand rub).  Clean the skin at your surgery site with a special soap that kills germs.  Give you a special gown to keep you warm. (Cold raises the risk of infection.)  Wear hair covers, masks, gowns, and gloves during surgery.  Give antibiotic medicine, if prescribed. Not all surgeries need this medicine.  What to bring on the day of surgery  Photo ID and insurance card  Copy of your health care directive, if you have one  Glasses and hearing aids (bring cases)  You can't wear contacts during surgery  Inhaler and eye drops, if you use them (tell us about these when you arrive)  CPAP machine or breathing device, if you use them  A few personal items, if spending the night  If you have . . .  A pacemaker, ICD (cardiac defibrillator), or other implant: Bring the ID card.  An implanted stimulator: Bring the remote control.  A legal guardian: Bring a copy of the certified (court-stamped) guardianship papers.  Please remove any jewelry, including body piercings. Leave jewelry and other valuables at home.  If you're going home the day of surgery  You must have a responsible adult drive you home. They should stay with you overnight as well.  If you don't have someone to stay with you, and you aren't safe to go home alone, we may keep you overnight. Insurance often won't pay for this.  After surgery  If it's hard to control your pain or you need more pain medicine, please call your surgeon's office.  Questions?   If you have any questions for your care team, list them here:   ____________________________________________________________________________________________________________________________________________________________________________________________________________________________________________________________  For informational purposes only. Not to replace the advice of your health  care provider. Copyright   2003, 2019 [facility]. All rights reserved. Clinically reviewed by Luis Valle MD. [employer] [id number] - REV 08/24.

## 2024-10-09 NOTE — TELEPHONE ENCOUNTER
Spoke with patient, currently receiving ECT at [facility].

## 2024-10-14 NOTE — PROGRESS NOTES
Patient presented after waiting 30 minutes with no reaction to allergy injections. Discharged from clinic.    Sterling Elkins RN....3/23/2020 8:07 AM      Pts  called to report that they never picked up the RX on 4-17-24 for the Valacyclovir due to cost.     He would like the Acyclovir 800 called in as that is what she use to take when in [place].   She would take every 4 hours PRN for flares.  [facility], [place]    Last visit 10-8-24  Next visit 12-9-24

## 2024-11-07 ENCOUNTER — VIRTUAL VISIT (OUTPATIENT)
Dept: PSYCHIATRY | Facility: CLINIC | Age: 64
End: 2024-11-07
Payer: COMMERCIAL

## 2024-11-07 DIAGNOSIS — F41.1 GAD (GENERALIZED ANXIETY DISORDER): ICD-10-CM

## 2024-11-07 DIAGNOSIS — F33.2 SEVERE EPISODE OF RECURRENT MAJOR DEPRESSIVE DISORDER, WITHOUT PSYCHOTIC FEATURES (H): Primary | ICD-10-CM

## 2024-11-07 DIAGNOSIS — Z79.899 ENCOUNTER FOR LONG-TERM (CURRENT) USE OF MEDICATIONS: ICD-10-CM

## 2024-11-07 DIAGNOSIS — R53.83 OTHER FATIGUE: ICD-10-CM

## 2024-11-07 PROCEDURE — G2211 COMPLEX E/M VISIT ADD ON: HCPCS | Mod: 95 | Performed by: PSYCHIATRY & NEUROLOGY

## 2024-11-07 PROCEDURE — 99214 OFFICE O/P EST MOD 30 MIN: CPT | Mod: 95 | Performed by: PSYCHIATRY & NEUROLOGY

## 2024-11-07 ASSESSMENT — PATIENT HEALTH QUESTIONNAIRE - PHQ9: SUM OF ALL RESPONSES TO PHQ QUESTIONS 1-9: 18

## 2024-11-07 ASSESSMENT — PAIN SCALES - GENERAL: PAINLEVEL_OUTOF10: NO PAIN (0)

## 2024-11-07 NOTE — PATIENT INSTRUCTIONS
Treatment Plan:  Continue venlafaxine  mg daily for depression and anxiety.  Continue Abilify/aripiprazole back to 10 mg daily for depression/to augment venlafaxine ER.   Have labs completed in next few days at any [facility] lab. Need to call your clinic ahead of time to schedule an appointment for lab due to limited hours and no walk-ins due to Covid-19 restrictions/changes.   Continue ECT as recommended.  Continue all other cares per primary care provider.   Continue all other medications as reviewed per electronic medical record today.   Safety plan reviewed. To the Emergency Department as needed or call after hours crisis line at [phone] or [phone]. [place] Crisis Text Line. Text [place] to 741705 or Suicide LifeLine Chat: suicidepreventionLuxoftline.org/chat  Strongly recommend individual psychotherapy for additional support and ongoing development of nonpharmacologic coping skills and strategies.  Schedule an appointment with me in 1-2 weeks or sooner as needed. Call [facility] at [phone] to schedule.  Follow up with primary care provider as planned or for acute medical concerns.  Call the psychiatric nurse line with medication questions or concerns at [phone].  MyChart may be used to communicate with your provider, but this is not intended to be used for emergencies.    Patient Education   Collaborative Care Psychiatry Service  What to Expect  Here's what to expect from your Collaborative Care Psychiatry Service (CCPS).   About CCPS  CCPS means 2 people work together to help you get better. You'll meet with a behavioral health clinician and a psychiatric doctor. A behavioral health clinician helps people with mental health problems by talking with them. A psychiatric doctor helps people by giving them medicine.  How it works  At every visit, you'll see the behavioral health clinician (BHC) first. They'll talk with you about how you're doing and teach you how to feel  "better.   Then you'll see the psychiatric doctor. This doctor can help you deal with troubling thoughts and feelings by giving you medicine. They'll make sure you know the plan for your care.   CCPS usually takes 3 to 6 visits. If you need more visits, we may have you start seeing a different psychiatric doctor for ongoing care.  If you have any questions or concerns, we'll be glad to talk with you.  About visits  Be open  At your visits, please talk openly about your problems. It may feel hard, but it's the best way for us to help you.  Cancelling visits  If you can't come to your visit, please call us right away at [phone]. If you don't cancel at least 24 hours (1 full day) before your visit, that's \"late cancellation.\"  Being late to visits  Being very late is the same as not showing up. You will be a \"no show\" if:  Your appointment starts with a [facility], and you're more than 15 minutes late for a 30-minute (half hour) visit. This will also cancel your appointment with the psychiatric doctor.  Your appointment is with a psychiatric doctor only, and you're more than 15 minutes late for a 30-minute (half hour) visit.  Your appointment is with a psychiatric doctor only, and you're more than 30 minutes late for a 60-minute (full hour) visit.  If you cancel late or don't show up 2 times within 6 months, we may end your care.   Getting help between visits  If you need help between visits, you can call us Monday to Friday from 8 a.m. to 4:30 p.m. at [phone].  Emergency care  Call 911 or go to the nearest emergency department if your life or someone else's life is in danger.  Call 728 anytime to reach the national Suicide and Crisis hotline.  Medicine refills  To refill your medicine, call your pharmacy. You can also call [facility]'s [facility] at [phone], Monday to Friday, 8 a.m. to 4:30 p.m. It can take 1 to 3 business days to get a refill.   Forms, letters, and tests  You may have " papers to fill out, like FMLA, short-term disability, and workability. We can help you with these forms at your visits, but you must have an appointment. You may need more than 1 visit for this, to be in an intensive therapy program, or both.  Before we can give you medicine for ADHD, we may refer you to get tested for it or confirm it another way.  We may not be able to give you an emotional support animal letter.  We don't do mental health checks ordered by the court.   We don't do mental health testing, but we can refer you to get tested.   Thank you for choosing us for your care.  For informational purposes only. Not to replace the advice of your health care provider. Copyright   2022 [facility]. All rights reserved. [employer] [id number] - 12/22.

## 2024-11-07 NOTE — PROGRESS NOTES
"Telemedicine Visit: The patient's condition can be safely assessed and treated via synchronous audio and visual telemedicine encounter.      Reason for Telemedicine Visit: Patient has requested telehealth visit    Originating Site (Patient Location): Patient's home    Distant Location (provider location):  Off-site    Consent:  The patient/guardian has verbally consented to: the potential risks and benefits of telemedicine (video visit) versus in person care; bill my insurance or make self-payment for services provided; and responsibility for payment of non-covered services.     Mode of Communication:  Video Conference via MommyCoach    As the provider I attest to compliance with applicable laws and regulations related to telemedicine.          Outpatient Psychiatric Progress Note    Name: Nela Mares   : 1960                    Primary Care Provider: Padma Ray MD   Therapist: No current therapist    PHQ-9 scores:      9/3/2024    12:04 PM 2024    10:45 AM 2024    11:23 AM   PHQ-9 SCORE   PHQ-9 Total Score MyChart 21 (Severe depression)     PHQ-9 Total Score 21    21 18 18       PETE-7 scores:      2023     9:46 AM 10/26/2023    10:12 AM 2024     3:53 PM   PETE-7 SCORE   Total Score 16 (severe anxiety) 15 (severe anxiety) 4 (minimal anxiety)   Total Score 16 15 4       Patient Identification:  Patient is a 64 year old, single  White Choose not to answer female  who presents for return visit with me.  Patient is currently retired. Patient attended the phone/video session with sister, Fiona. Patient prefers to be called: \"Sanam\".    Interim History:  I last saw Nela Mares for outpatient psychiatry return visit on 9/3/2024. During that appointment, we:    Continue venlafaxine  mg daily for depression and anxiety.  Decrease Abilify/aripiprazole back to 10 mg daily for depression/to augment venlafaxine ER.   Referral placed for ECT.    : Patient undergoing " "ECT for depression.  Anxiety better and mood just slightly better.  Patient's sister present for interview today.  Patient's sister felt patient started to see \"the old Sanam\"starting to come back around the beginning of ECT.  However, patient still with some ongoing profound fatigue and is sleeping 12 hours a day.  Sister wondering about statin use and if it can cause profound fatigue.  Sister also wondering about any possible medication interactions that could be causing patient's symptoms.  Patient's mother took a statin and felt incredibly fatigued the drug in the past.  Patient was some transient memory struggles related to ECT.  No acute safety concerns.  No problematic drug or alcohol use.    Per [facility], Carmen Mosquera, [facility], during today's team-based visit:  No [facility] appt today      Past Psychiatric Med Trials:  Psych Meds at Intake 11/2023:  Abilify 5 mg daily   BusPar 10 mg twice daily  Effexor-.5 mg daily      Past Psych Meds:  Per PharmD note 11/1/23:  Paxil - \"not a good fit\"  Bupropion - caused extreme irritability, rage  Imipramine - reports this was aweful in terms of side effects  Fluoxetine - felt like it didn't work originally.   Lexapro - stopped working  Celexa - stopped working  Zoloft    Psychiatric ROS:  Nela Mares reports mood has been: See HPI above  Anxiety has been: See HPI above  Sleep has been: See HPI above  Olivia sxs: None  Psychosis sxs: None  ADHD/ADD sxs: NA  PTSD sxs: NA  PHQ9 and GAD7 scores were reviewed today if completed.   Medication side effects: Denies  Current stressors include: Symptoms and see HPI above  Coping mechanisms and supports include: Therapy, Family, Hobbies, and Friends    Current medications include:   Current Outpatient Medications   Medication Sig Dispense Refill    albuterol (PROAIR HFA/PROVENTIL HFA/VENTOLIN HFA) 108 (90 Base) MCG/ACT inhaler Inhale 2 puffs into the lungs every 4 hours as needed for shortness of breath or wheezing 36 g 1    " albuterol (PROVENTIL) (2.5 MG/3ML) 0.083% neb solution Take 1 vial (2.5 mg) by nebulization every 4 hours as needed for shortness of breath / dyspnea or wheezing 30 mL 11    amLODIPine (NORVASC) 5 MG tablet Take 1 tablet (5 mg) by mouth daily 90 tablet 3    ARIPiprazole (ABILIFY) 10 MG tablet Take 1 tablet (10 mg) by mouth daily. 90 tablet 1    atorvastatin (LIPITOR) 10 MG tablet Take 1 tablet (10 mg) by mouth at bedtime 90 tablet 3    cetirizine (ZYRTEC) 10 MG tablet Take 1 tablet (10 mg) by mouth daily 90 tablet 3    Cholecalciferol (VITAMIN D) 125 MCG (5000 UT) capsule Take 1 capsule (5,000 Units) by mouth daily 90 capsule 3    clobetasol (TEMOVATE) 0.05 % external ointment Apply twice weekly when well controlled. Increase to daily use with flares 60 g 3    finasteride (PROSCAR) 5 MG tablet Take 1 tablet (5 mg) by mouth daily 90 tablet 3    fluticasone (FLONASE) 50 MCG/ACT nasal spray Spray 2 sprays into both nostrils daily 48 g 3    fluticasone-salmeterol (ADVAIR) 250-50 MCG/ACT inhaler Inhale 1 puff into the lungs every 12 hours 3 each 0    fluticasone-salmeterol (ADVAIR) 500-50 MCG/ACT inhaler Inhale 1 puff into the lungs every 12 hours Needs appointment for additional refills 3 each 0    lisinopril (ZESTRIL) 40 MG tablet Take 1 tablet (40 mg) by mouth daily 90 tablet 3    venlafaxine (EFFEXOR-ER) 225 MG 24 hr tablet Take 1 tablet (225 mg) by mouth daily. 90 tablet 1     No current facility-administered medications for this visit.         Past Medical/Surgical History:  Past Medical History:   Diagnosis Date    Allergic rhinitis, cause unspecified     Cervical high risk HPV (human papillomavirus) test positive 03/21/2017    3/21/17 NIL pap/+ HR HPV (not 16 or 18).     Depressive disorder     Depressive disorder, not elsewhere classified     seasonal    Diabetes mellitus, type 2 (H) 02/20/2023    Hypertension     Mild persistent asthma     Obstructive sleep apnea (adult) (pediatric)     uses CPAP    Primary  osteoarthritis of both knees 01/28/2022    Scalp mass 07/2014      has a past medical history of Allergic rhinitis, cause unspecified, Cervical high risk HPV (human papillomavirus) test positive (03/21/2017), Depressive disorder, Depressive disorder, not elsewhere classified, Diabetes mellitus, type 2 (H) (02/20/2023), Hypertension, Mild persistent asthma, Obstructive sleep apnea (adult) (pediatric), Primary osteoarthritis of both knees (01/28/2022), and Scalp mass (07/2014).    She has no past medical history of Basal cell carcinoma, Cancer (H), Cerebral infarction (H), Congestive heart failure (H), COPD (chronic obstructive pulmonary disease) (H), Heart disease, History of blood transfusion, Malignant melanoma (H), Squamous cell carcinoma, or Thyroid disease.    Social History:  Reviewed. No changes to social history except as noted above in HPI.    Vital Signs:   None. This is phone/video visit.     Labs:  Most recent laboratory results reviewed:  Recent Labs   Lab Test 03/07/24  0927 08/30/23  0834   CHOL 187 187   HDL 39* 50   * 116*   TRIG 178* 105     Liver Function Studies -   Recent Labs   Lab Test 03/07/24  0927   PROTTOTAL 7.4   ALBUMIN 4.2   BILITOTAL 0.3   ALKPHOS 78   AST 52*   ALT 53*      Review of Systems:  10 systems (general, cardiovascular, respiratory, eyes, ENT, endocrine, GI, , M/S, neurological) were reviewed. Most pertinent finding(s) is/are:  denies fever, cough, persistent headaches, shortness of breath, chest pain, severe GI symptoms, trouble urinating, severe pain.  The remaining systems are all unremarkable.    Mental Status Examination (limited as this is by phone/video):  Appearance: Awake, alert, appears stated age, no acute distress, well-groomed   Attitude:  cooperative, pleasant   Motor: No gross abnormalities observed via video, not formally tested   Oriented to:  person, place, time, and situation  Attention Span and Concentration:  normal  Speech:  clear, coherent,  regular rate, rhythm, and volume  Language: intact  Mood: Depressed  Affect:  appropriate and in normal range and mood congruent  Associations:  no loose associations  Thought Process:  logical, linear and goal oriented  Thought Content:  no evidence of suicidal ideation or homicidal ideation, no evidence of psychotic thought, no auditory hallucinations present and no visual hallucinations present  Recent and Remote Memory:  Intact to interview. Not formally assessed.   Fund of Knowledge: appropriate  Insight:  good  Judgment:  intact, adequate for safety  Impulse Control:  intact      Suicide Risk Assessment:  Today Nela Mares reports no acute suicidality today. In addition, there are notable risk factors for self-harm, including single status, anxiety, hopelessness, withdrawing, and mood change. However, risk is mitigated by commitment to family, absence of past attempts, ability to volunteer a safety plan, history of seeking help when needed, future oriented, no access to firearms or weapons, and denies suicidal intent or plan. Therefore, based on all available evidence including the factors cited above, Nela Mares does not appear to be at imminent risk for self-harm, does not meet criteria for a 72-hr hold, and therefore remains appropriate for ongoing outpatient level of care.  A thorough assessment of risk factors related to suicide and self-harm have been reviewed and are noted above. The patient convincingly denies suicidality on several occasions. Local community safety resources reviewed for patient to use if needed. There was no deceit detected, and the patient presented in a manner that was believable.     DSM5 Diagnosis:  Major Depressive Disorder, Recurrent Episode, severe, without psychotic features  Generalized anxiety disorder    Medical comorbidities include:   Patient Active Problem List    Diagnosis Date Noted    Recurrent major depressive disorder, in partial remission (H)  03/22/2024     Priority: Medium    Closed traumatic displaced fracture of distal end of left radius, initial encounter 02/28/2024     Priority: Medium    Diabetes mellitus, type 2 (H) 02/20/2023     Priority: Medium    Lichen sclerosus 02/20/2023     Priority: Medium    Chronic midline low back pain without sciatica 01/28/2022     Priority: Medium    Primary osteoarthritis of both knees 01/28/2022     Priority: Medium    Paroxysmal supraventricular tachycardia (H) 05/10/2021     Priority: Medium     Added automatically from request for surgery [id number]      Allergic conjunctivitis, bilateral 08/27/2019     Priority: Medium    Overweight (H) 07/18/2019     Priority: Medium    Seasonal allergic rhinitis due to pollen 10/18/2018     Priority: Medium    Allergic rhinitis due to dust mite 10/18/2018     Priority: Medium    Allergic rhinitis due to mold 10/18/2018     Priority: Medium    IUD (intrauterine device) in place 03/21/2017     Priority: Medium     mirena place in 2015      Hypertension goal BP (blood pressure) < 140/90 10/25/2010     Priority: Medium    Leiomyoma of uterus 08/14/2007     Priority: Medium     Problem list name updated by automated process. Provider to review      Obstructive sleep apnea      Priority: Medium     uses CPAP          Mild persistent asthma 10/11/2005     Priority: Medium     Typically needs steroid burst about once per year for symptoms uncontrolled with continued advair and maxair q 4 hours.  Peak flow generally runs 400. With illness goes down to 350.    12/09 - given one extra burst of prednisone.  Instructed to call if she starts it so we can help her monitor her symptoms.      Allergic rhinitis due to animals 08/31/2004     Priority: Medium       Psychosocial & Contextual Factors: see HPI above    Assessment:  From Intake, 11/09/2023:  Nela Mares is a 63-year-old female with past psychiatric history including depression and anxiety who presents today for psychiatric  evaluation.  Patient with long history of struggling with intermittent episodes of depression, severe at times.  Depression dates back to adolescence.  Strong family history of depression.  Has a history of several medication trials due to tachyphylaxis after around 7 years of use.  Patient most recently with very severe depressive episode lasting for several weeks.  Patient was seeing primary care provider for medication changes and has most recently been on venlafaxine and buspirone.  Aripiprazole was added about 10 days ago and patient has experienced significant relief and improvement of her severe depression symptoms.  Patient taking aripiprazole 5 mg daily to augment her routine with venlafaxine and buspirone.  Venlafaxine had been increased by adding 37.5 mg capsule to her 225 mg dose for a total of 262.5 mg daily.  That had not been very helpful.  Buspirone was added to her regimen which was helpful briefly but effects did not last at all.  Since aripiprazole is suspected to be so helpful for her symptoms, patient will start slow taper off of buspirone.  If remains stable, patient will consider dropping venlafaxine back to 225 mg daily.  Patient will reach out if there is any regression/worsening of symptoms.  We did discuss ECT as a possible option in the future if her symptoms get as bad as they were this last episode.  Patient was open to this suggestion.  Encouraged to continue in therapy.  No acute safety concerns.  No longer having any suicidal ideation.  Family is very supportive.  Her dogs are very protective.  No problematic drug or alcohol use.     Discussed metabolic risks and movement disorder risks, including risk for permanent movement related symptoms, when using Abilify/aripiprazole.  Labs ordered for 3-month lipid panel and hemoglobin A1c check.    12/14/2023:  Patient overall doing relatively okay.  Continues to work through some increased psychosocial stressors.  Hoping things will be  settling soon.  Encouraged to continue to stay active.  Encouraged to continue light therapy box and supplements.  No medication changes today except for trial of discontinuation of buspirone.  No acute safety concerns.  No SI today.  No problematic drug or alcohol use.    1/18/2024:  Patient overall with much improved symptoms that have been sustained/maintained on Abilify 10 mg daily.  No abnormal involuntary movements.  Patient encouraged to have labs completed to monitor/measure at lipid panel and A1c.  Could consider addition of metformin if metabolic derangements or if starts to gain weight.  Metformin can also be used prophylactically for such.  No acute safety concerns.  No SI.  No problematic drug or alcohol use.  Due to ongoing stability of mental health on current regimen, patient's mental health care will be returned back to primary care provider for ongoing management.    9/3/2024 (new episode of care with collaborative care psychiatric services started today):  Patient with significantly worsening depression symptoms.  Patient meeting criteria for severe depression.  Patient interested in ECT to treat depression at this time due to severity of symptoms and hope that ECT might help improve symptoms more quickly and with fewer side effects than some other options.  No acute suicidality.  History of method seeking/planning.  None currently.  No access to firearms.  Discussed potentially changing Abilify to Saphris or Rexulti.  Patient prefers to pursue ECT at this time and wait on major medication changes.  Patient is agreeable to decreasing Abilify back to 10 mg daily since there was no improvement on 15 mg.  No problematic drug or alcohol use.    11/07/2024:  Patient currently undergoing ECT once weekly.  Some transient memory struggles and pretty significant fatigue.  Some slight mood improvements but nothing profound at this time.  We will check patient's thyroid since thyroid issues run in the family  and patient does have history of transiently elevated TSH.  May need to consider something more activating medication wise to help with fatigue and excessive sedation/sleeping.  Patient had been sleeping 15+ hours a day even prior to ECT starting.  Medication therapy management referral placed since patient and sibling are wondering if there are any medication interactions or if statin could be causing problems-patient's mother with pretty profound fatigue on statin in the past apparently.  No acute suicidality.  Patient's family very supportive.  No problematic drug or alcohol use.    Medication side effects and alternatives were reviewed. Health promotion activities recommended and reviewed today. All questions addressed. Education and counseling completed regarding risks and benefits of medications and psychotherapy options. Recommend therapy for additional support.     Treatment Plan:  Continue venlafaxine  mg daily for depression and anxiety.  Continue Abilify/aripiprazole back to 10 mg daily for depression/to augment venlafaxine ER.   Have labs completed in next few days at any [facility] lab. Need to call your clinic ahead of time to schedule an appointment for lab due to limited hours and no walk-ins due to Covid-19 restrictions/changes.   Continue ECT as recommended.  Continue all other cares per primary care provider.   Continue all other medications as reviewed per electronic medical record today.   Safety plan reviewed. To the Emergency Department as needed or call after hours crisis line at [phone] or [phone]. [place] Crisis Text Line. Text [place] to 916646 or Suicide LifeLine Chat: suicidepreventionlifeline.org/chat  Strongly recommend individual psychotherapy for additional support and ongoing development of nonpharmacologic coping skills and strategies.  Schedule an appointment with me in 1-2 weeks or sooner as needed. Call [facility] at [phone] to  schedule.  Follow up with primary care provider as planned or for acute medical concerns.  Call the psychiatric nurse line with medication questions or concerns at [phone].  [employer]art may be used to communicate with your provider, but this is not intended to be used for emergencies.    Administrative Billing:   Phone Call/Video Duration: 18 Minutes  Start: 11:30a  Stop: 11:48a    The longitudinal plan of care for the diagnosis(es)/condition(s) as documented were addressed during this visit. Due to the added complexity in care, I will continue to support Sanam in the subsequent management and with ongoing continuity of care.    Episode of Care #: 2 (new episode of care started 9/3/2024)    Patient Status:  Patient will continue to be seen for ongoing consultation and stabilization.    Signed:   Madison Vazquez DO  [facility]    Disclaimer: This note consists of symbols derived from keyboarding, dictation and/or voice recognition software. As a result, there may be errors in the script that have gone undetected. Please consider this when interpreting information found in this chart.

## 2024-11-07 NOTE — NURSING NOTE
Is the patient currently in the state of [place]? YES    Current patient location: [address] [place] [id number]    Visit mode:VIDEO    If the visit is dropped, the patient can be reconnected by: VIDEO VISIT: Text to cell phone:   Telephone Information:   Mobile [phone]       Will anyone else be joining the visit? No  (If patient encounters technical issues they should call [phone])    Are changes needed to the allergy or medication list? Pt stated no changes to allergies and Pt stated no med changes    Are refills needed on medications prescribed by this physician? No    Rooming Documentation: Questionnaire(s) completed.    Reason for visit: LETICIA Lester, JACINDA

## 2024-11-11 ENCOUNTER — LAB (OUTPATIENT)
Dept: LAB | Facility: CLINIC | Age: 64
End: 2024-11-11
Payer: COMMERCIAL

## 2024-11-11 DIAGNOSIS — Z79.899 ENCOUNTER FOR LONG-TERM (CURRENT) USE OF MEDICATIONS: ICD-10-CM

## 2024-11-11 DIAGNOSIS — R53.83 OTHER FATIGUE: ICD-10-CM

## 2024-11-11 DIAGNOSIS — F33.2 SEVERE EPISODE OF RECURRENT MAJOR DEPRESSIVE DISORDER, WITHOUT PSYCHOTIC FEATURES (H): ICD-10-CM

## 2024-11-11 DIAGNOSIS — F41.1 GAD (GENERALIZED ANXIETY DISORDER): ICD-10-CM

## 2024-11-11 LAB — TSH SERPL DL<=0.005 MIU/L-ACNC: 4.18 UIU/ML (ref 0.3–4.2)

## 2024-11-11 PROCEDURE — 84443 ASSAY THYROID STIM HORMONE: CPT

## 2024-11-11 PROCEDURE — 86800 THYROGLOBULIN ANTIBODY: CPT

## 2024-11-11 PROCEDURE — 36415 COLL VENOUS BLD VENIPUNCTURE: CPT

## 2024-11-11 PROCEDURE — 86376 MICROSOMAL ANTIBODY EACH: CPT

## 2024-11-12 ENCOUNTER — VIRTUAL VISIT (OUTPATIENT)
Dept: PHARMACY | Facility: CLINIC | Age: 64
End: 2024-11-12
Attending: PSYCHIATRY & NEUROLOGY
Payer: COMMERCIAL

## 2024-11-12 DIAGNOSIS — F41.9 ANXIETY: Primary | ICD-10-CM

## 2024-11-12 DIAGNOSIS — I10 HYPERTENSION GOAL BP (BLOOD PRESSURE) < 140/90: ICD-10-CM

## 2024-11-12 DIAGNOSIS — E78.5 HYPERLIPIDEMIA LDL GOAL <100: ICD-10-CM

## 2024-11-12 DIAGNOSIS — F32.0 MILD MAJOR DEPRESSION (H): ICD-10-CM

## 2024-11-12 LAB
THYROGLOB AB SERPL IA-ACNC: <20 IU/ML
THYROPEROXIDASE AB SERPL-ACNC: 243 IU/ML

## 2024-11-12 PROCEDURE — 99605 MTMS BY PHARM NP 15 MIN: CPT | Mod: 95 | Performed by: PHARMACIST

## 2024-11-12 PROCEDURE — 99607 MTMS BY PHARM ADDL 15 MIN: CPT | Mod: 95 | Performed by: PHARMACIST

## 2024-11-12 RX ORDER — ANTACID TABLETS 500 MG/1
1000 TABLET, CHEWABLE ORAL DAILY
COMMUNITY

## 2024-11-12 NOTE — PATIENT INSTRUCTIONS
"Recommendations from today's MTM visit:                                                      Can try taking atorvastatin at night to see if this helps with fatigue. If not, changing to a different statin could be reasonable although ECT and thyroid could be contributing to fatigue as well.     Follow-up: mtm as requested    It was great speaking with you today.  I value your experience and would be very thankful for your time in providing feedback in our clinic survey. In the next few days, you may receive an email or text message from [facility] [employer] with a link to a survey related to your  clinical pharmacist.\"     To schedule another MTM appointment, please call the clinic directly or you may call the MTM scheduling line at [phone] or toll-free at [phone].     My Clinical Pharmacist's contact information:                                                      Please feel free to contact me with any questions or concerns you have.      Ara Bell, PharmD, BCPP  Medication Therapy Management Pharmacist  [facility]    "

## 2024-11-12 NOTE — Clinical Note
Hello! Thank you for the referral. Patient is going to make some adjustments to her statin to see if this helps, but discussed other possible contributing factors as you mentioned as well.   Thank you! Ara

## 2024-11-12 NOTE — PROGRESS NOTES
Medication Therapy Management (MTM) Encounter    ASSESSMENT:                            Medication Adherence/Access: No issues identified.    Hypertension   Stable.    Hyperlipidemia   Reviewed that fatigue is not a commonly reported side effect from statins, but possible and there are post-marketing reports. Could try taking at bedtime to see if that provides any benefit. If not, could try an alternate statin such as simvastatin or rosuvastatin.     Anxiety and Depression  No medication changes recommended at this time. Also discussed that ECT could be contributing to fatigue - monitor for improvement if ECT is stopped. Additionally, thyroid dysfunction could contribute - will await recommendation from provider regarding elevated thyroid peroxidase antibody.       PLAN:                            Can try taking atorvastatin at night to see if this helps with fatigue. If not, changing to a different statin could be reasonable although ECT and thyroid could be contributing to fatigue as well.     Follow-up: mtm as requested    SUBJECTIVE/OBJECTIVE:                          Sanam Mares is a 64 year old female seen for an initial visit. She was referred to me from psychiatry.      Reason for visit:     Patient and family wondering about med interactions or single meds that could be causing fatigue. Hx of mother with fatigue from statin, pt on statin. We are checking Thyroid labs. Pt akso in ECT once weekly with general anaesthesia contributing.   .    Allergies/ADRs: Reviewed in chart  Past Medical History: Reviewed in chart  Tobacco: She reports that she has never smoked. She has never been exposed to tobacco smoke. She has never used smokeless tobacco.  Alcohol: none    Medication Adherence/Access: no issues reported.    Hypertension   Amlodipine 5 mg daily  Lisinopril 40 mg daily    Patient reports no current medication side effects. Has been on current regimen for several years.   Patient does not self-monitor  "blood pressure.         Hyperlipidemia   atorvastatin 10mg daily    Patient wonders about this as a cause of her fatigue. Started taking this around the time her fatigue started - about 6 weeks ago. Mother and LORRI had extreme fatigue from Lipitor specifically. This is the first statin she has been on - reports her cholesterol was \"borderline\".          Mental Health   Anxiety and Depression  Venlafaxine 225mg daily  Abilify 10mg daily  ECT once weekly  Patient reports she has been on abilify for about 1 year and venlafaxine for about 2+ years. Doesn't think these are the culprits timing-wise. Has been doing ECT for about 6 weeks so this could be contributing. Has appt next week to determine how much longer she will be getting ECT for. Doesn't feel like it has really been helping. Thyroid labs obtained.            ----------------      I spent 20 minutes with this patient today. A copy of the visit note was provided to the patient's provider(s).    A summary of these recommendations was sent via [employer].    Ara Bell, PharmD, BCPP  Medication Therapy Management Pharmacist  [facility]      Telemedicine Visit Details  The patient's medications can be safely assessed via a telemedicine encounter.  Type of service:  Telephone visit  Originating Location (pt. Location): Home    Distant Location (provider location):  Off-site  Start Time:  130p  End Time:  2p     Medication Therapy Recommendations  Hyperlipidemia LDL goal <100   1 Current Medication: atorvastatin (LIPITOR) 10 MG tablet   Current Medication Sig: Take 1 tablet (10 mg) by mouth at bedtime   Rationale: Undesirable effect - Adverse medication event - Safety   Recommendation: Change Administration Time   Status: Patient Agreed - Adherence/Education   Identified Date: 11/12/2024 Completed Date: 11/12/2024            "

## 2024-11-14 ENCOUNTER — MYC MEDICAL ADVICE (OUTPATIENT)
Dept: PSYCHIATRY | Facility: CLINIC | Age: 64
End: 2024-11-14
Payer: COMMERCIAL

## 2024-11-14 ENCOUNTER — MYC MEDICAL ADVICE (OUTPATIENT)
Dept: FAMILY MEDICINE | Facility: CLINIC | Age: 64
End: 2024-11-14
Payer: COMMERCIAL

## 2024-11-14 DIAGNOSIS — Z79.899 ENCOUNTER FOR LONG-TERM (CURRENT) USE OF MEDICATIONS: ICD-10-CM

## 2024-11-14 DIAGNOSIS — F33.2 SEVERE EPISODE OF RECURRENT MAJOR DEPRESSIVE DISORDER, WITHOUT PSYCHOTIC FEATURES (H): Primary | ICD-10-CM

## 2024-11-14 NOTE — TELEPHONE ENCOUNTER
RN replied to patient via [employer]. See message for details.     Solomon Jones RN, BSN, PHN  [facility]: [place]

## 2024-11-18 ENCOUNTER — VIRTUAL VISIT (OUTPATIENT)
Dept: PSYCHIATRY | Facility: CLINIC | Age: 64
End: 2024-11-18
Payer: COMMERCIAL

## 2024-11-18 DIAGNOSIS — F41.1 GAD (GENERALIZED ANXIETY DISORDER): ICD-10-CM

## 2024-11-18 DIAGNOSIS — F33.2 SEVERE EPISODE OF RECURRENT MAJOR DEPRESSIVE DISORDER, WITHOUT PSYCHOTIC FEATURES (H): Primary | ICD-10-CM

## 2024-11-18 PROCEDURE — 99214 OFFICE O/P EST MOD 30 MIN: CPT | Mod: 95 | Performed by: PSYCHIATRY & NEUROLOGY

## 2024-11-18 PROCEDURE — G2211 COMPLEX E/M VISIT ADD ON: HCPCS | Mod: 95 | Performed by: PSYCHIATRY & NEUROLOGY

## 2024-11-18 ASSESSMENT — PATIENT HEALTH QUESTIONNAIRE - PHQ9: SUM OF ALL RESPONSES TO PHQ QUESTIONS 1-9: 20

## 2024-11-18 NOTE — PATIENT INSTRUCTIONS
Treatment Plan:  Continue venlafaxine  mg daily for depression and anxiety.  Continue Abilify/aripiprazole back to 10 mg daily for depression/to augment venlafaxine ER.   Continue ECT as recommended. I will try and get in touch with Dr. Tavera regarding coordinating care/continuity of care.   Continue all other cares per primary care provider.   Continue all other medications as reviewed per electronic medical record today.   Safety plan reviewed. To the Emergency Department as needed or call after hours crisis line at [phone] or [phone]. [place] Crisis Text Line. Text [place] to 779262 or Suicide LifeLine Chat: suicidepreventionlifeline.org/chat  Strongly recommend individual psychotherapy for additional support and ongoing development of nonpharmacologic coping skills and strategies.  Schedule an appointment with me in 1 month or sooner as needed. Call [facility] at [phone] to schedule.  Follow up with primary care provider as planned or for acute medical concerns.  Call the psychiatric nurse line with medication questions or concerns at [phone].  [employer] may be used to communicate with your provider, but this is not intended to be used for emergencies.    Patient Education   Collaborative Care Psychiatry Service  What to Expect  Here's what to expect from your Collaborative Care Psychiatry Service (CCPS).   About CCPS  CCPS means 2 people work together to help you get better. You'll meet with a behavioral health clinician and a psychiatric doctor. A behavioral health clinician helps people with mental health problems by talking with them. A psychiatric doctor helps people by giving them medicine.  How it works  At every visit, you'll see the behavioral health clinician (BHC) first. They'll talk with you about how you're doing and teach you how to feel better.   Then you'll see the psychiatric doctor. This doctor can help you deal with troubling thoughts and feelings by  "giving you medicine. They'll make sure you know the plan for your care.   CCPS usually takes 3 to 6 visits. If you need more visits, we may have you start seeing a different psychiatric doctor for ongoing care.  If you have any questions or concerns, we'll be glad to talk with you.  About visits  Be open  At your visits, please talk openly about your problems. It may feel hard, but it's the best way for us to help you.  Cancelling visits  If you can't come to your visit, please call us right away at [phone]. If you don't cancel at least 24 hours (1 full day) before your visit, that's \"late cancellation.\"  Being late to visits  Being very late is the same as not showing up. You will be a \"no show\" if:  Your appointment starts with a [facility], and you're more than 15 minutes late for a 30-minute (half hour) visit. This will also cancel your appointment with the psychiatric doctor.  Your appointment is with a psychiatric doctor only, and you're more than 15 minutes late for a 30-minute (half hour) visit.  Your appointment is with a psychiatric doctor only, and you're more than 30 minutes late for a 60-minute (full hour) visit.  If you cancel late or don't show up 2 times within 6 months, we may end your care.   Getting help between visits  If you need help between visits, you can call us Monday to Friday from 8 a.m. to 4:30 p.m. at [phone].  Emergency care  Call 911 or go to the nearest emergency department if your life or someone else's life is in danger.  Call 988 anytime to reach the national Suicide and Crisis hotline.  Medicine refills  To refill your medicine, call your pharmacy. You can also call [facility]'s Behavioral Access at [phone], Monday to Friday, 8 a.m. to 4:30 p.m. It can take 1 to 3 business days to get a refill.   Forms, letters, and tests  You may have papers to fill out, like FMLA, short-term disability, and workability. We can help you with these forms at your visits, but " you must have an appointment. You may need more than 1 visit for this, to be in an intensive therapy program, or both.  Before we can give you medicine for ADHD, we may refer you to get tested for it or confirm it another way.  We may not be able to give you an emotional support animal letter.  We don't do mental health checks ordered by the court.   We don't do mental health testing, but we can refer you to get tested.   Thank you for choosing us for your care.  For informational purposes only. Not to replace the advice of your health care provider. Copyright   2022 [employer] [facility]. All rights reserved. [employer] 252641 - 12/22.

## 2024-11-18 NOTE — PROGRESS NOTES
"Telemedicine Visit: The patient's condition can be safely assessed and treated via synchronous audio and visual telemedicine encounter.      Reason for Telemedicine Visit: Patient has requested telehealth visit    Originating Site (Patient Location): Patient's home    Distant Location (provider location):  Off-site    Consent:  The patient/guardian has verbally consented to: the potential risks and benefits of telemedicine (video visit) versus in person care; bill my insurance or make self-payment for services provided; and responsibility for payment of non-covered services.     Mode of Communication:  Video Conference via [employer]    As the provider I attest to compliance with applicable laws and regulations related to telemedicine.          Outpatient Psychiatric Progress Note    Name: Nela Mares   : 1960                    Primary Care Provider: Padma Ray MD   Therapist: No current therapist    PHQ-9 scores:      2024    10:45 AM 2024    11:23 AM 2024     8:23 AM   PHQ-9 SCORE   PHQ-9 Total Score 18 18 20       PETE-7 scores:      2023     9:46 AM 10/26/2023    10:12 AM 2024     3:53 PM   PETE-7 SCORE   Total Score 16 (severe anxiety) 15 (severe anxiety) 4 (minimal anxiety)   Total Score 16 15 4       Patient Identification:  Patient is a 64 year old, single  White Choose not to answer female  who presents for return visit with me.  Patient is currently retired. Patient attended the phone/video session with sister, Fiona. Patient prefers to be called: \"Sanam\".    Interim History:  I last saw Nela Mares for outpatient psychiatry return visit on 2024. During that appointment, we:    Continue venlafaxine  mg daily for depression and anxiety.  Continue Abilify/aripiprazole back to 10 mg daily for depression/to augment venlafaxine ER.   Have labs completed in next few days at any [facility] lab. Need to call your clinic ahead of time to " "schedule an appointment for lab due to limited hours and no walk-ins due to Covid-19 restrictions/changes.   Continue ECT as recommended.     11/18: Yesterday for the first day started to feel quite a bit better.  Did not attend ECT this past Wednesday.  ECT prior to this was bilateral.  Still some fuzzy short-term memory and fatigue.  Completed labs with evidence of Hashimoto's but currently TSH within normal limits.  Encouraged to follow up with primary care provider for any necessary additional labs like T3/T4 and any possible supplementation.  Sister continues to be very supportive and helpful.  No acute safety concerns.  No acute suicidality.  No problematic drug or alcohol use.  Taking medication as prescribed.    Per [facility], Carmen Mosquera [facility], during today's team-based visit:  No [facility] appt today    Past Psychiatric Med Trials:  Psych Meds at Intake 11/2023:  Abilify 5 mg daily   BusPar 10 mg twice daily  Effexor-.5 mg daily      Past Psych Meds:  Per PharmD note 11/1/23:  Paxil - \"not a good fit\"  Bupropion - caused extreme irritability, rage  Imipramine - reports this was aweful in terms of side effects  Fluoxetine - felt like it didn't work originally.   Lexapro - stopped working  Celexa - stopped working  Zoloft    Psychiatric ROS:  Nela Mares reports mood has been: See HPI above  Anxiety has been: See HPI above  Sleep has been: See HPI above  Olivia sxs: None  Psychosis sxs: None  ADHD/ADD sxs: NA  PTSD sxs: NA  PHQ9 and GAD7 scores were reviewed today if completed.   Medication side effects: Denies from medications; does have some fatigue and short-term memory loss from ECT  Current stressors include: Symptoms and see HPI above  Coping mechanisms and supports include: Therapy, Family, Hobbies, and Friends    Current medications include:   Current Outpatient Medications   Medication Sig Dispense Refill    albuterol (PROAIR HFA/PROVENTIL HFA/VENTOLIN HFA) 108 (90 Base) MCG/ACT inhaler Inhale 2 " puffs into the lungs every 4 hours as needed for shortness of breath or wheezing 36 g 1    albuterol (PROVENTIL) (2.5 MG/3ML) 0.083% neb solution Take 1 vial (2.5 mg) by nebulization every 4 hours as needed for shortness of breath / dyspnea or wheezing 30 mL 11    amLODIPine (NORVASC) 5 MG tablet Take 1 tablet (5 mg) by mouth daily 90 tablet 3    ARIPiprazole (ABILIFY) 10 MG tablet Take 1 tablet (10 mg) by mouth daily. 90 tablet 1    atorvastatin (LIPITOR) 10 MG tablet Take 1 tablet (10 mg) by mouth at bedtime 90 tablet 3    calcium carbonate 500 MG CHEW Take 1,000 mg by mouth daily.      cetirizine (ZYRTEC) 10 MG tablet Take 1 tablet (10 mg) by mouth daily 90 tablet 3    Cholecalciferol (VITAMIN D) 125 MCG (5000 UT) capsule Take 1 capsule (5,000 Units) by mouth daily 90 capsule 3    clobetasol (TEMOVATE) 0.05 % external ointment Apply twice weekly when well controlled. Increase to daily use with flares 60 g 3    finasteride (PROSCAR) 5 MG tablet Take 1 tablet (5 mg) by mouth daily 90 tablet 3    fluticasone (FLONASE) 50 MCG/ACT nasal spray Spray 2 sprays into both nostrils daily 48 g 3    fluticasone-salmeterol (ADVAIR) 250-50 MCG/ACT inhaler Inhale 1 puff into the lungs every 12 hours 3 each 0    lisinopril (ZESTRIL) 40 MG tablet Take 1 tablet (40 mg) by mouth daily 90 tablet 3    venlafaxine (EFFEXOR-ER) 225 MG 24 hr tablet Take 1 tablet (225 mg) by mouth daily. 90 tablet 1     No current facility-administered medications for this visit.         Past Medical/Surgical History:  Past Medical History:   Diagnosis Date    Allergic rhinitis, cause unspecified     Cervical high risk HPV (human papillomavirus) test positive 03/21/2017    3/21/17 NIL pap/+ HR HPV (not 16 or 18).     Depressive disorder     Depressive disorder, not elsewhere classified     seasonal    Diabetes mellitus, type 2 (H) 02/20/2023    Hypertension     Mild persistent asthma     Obstructive sleep apnea (adult) (pediatric)     uses CPAP    Primary  osteoarthritis of both knees 01/28/2022    Scalp mass 07/2014      has a past medical history of Allergic rhinitis, cause unspecified, Cervical high risk HPV (human papillomavirus) test positive (03/21/2017), Depressive disorder, Depressive disorder, not elsewhere classified, Diabetes mellitus, type 2 (H) (02/20/2023), Hypertension, Mild persistent asthma, Obstructive sleep apnea (adult) (pediatric), Primary osteoarthritis of both knees (01/28/2022), and Scalp mass (07/2014).    She has no past medical history of Basal cell carcinoma, Cancer (H), Cerebral infarction (H), Congestive heart failure (H), COPD (chronic obstructive pulmonary disease) (H), Heart disease, History of blood transfusion, Malignant melanoma (H), Squamous cell carcinoma, or Thyroid disease.    Social History:  Reviewed. No changes to social history except as noted above in HPI.    Vital Signs:   None. This is phone/video visit.     Labs:  Most recent laboratory results reviewed:      Component  Ref Range & Units 7 d ago 17 yr ago     Thyroid Peroxidase Antibody  <35 IU/mL 243 High  <10   Resulting Agency [facility]             Specimen Collected: 11/11/24 10:39 AM Last Resulted: 11/12/24 11:27 AM       Component  Ref Range & Units 7 d ago 17 yr ago     Thyroglobulin Antibody  <40 IU/mL <20 <20   Resulting Agency [facility]             Specimen Collected: 11/11/24 10:39 AM Last Resulted: 11/12/24 11:27 AM     Component  Ref Range & Units 7 d ago     TSH  0.30 - 4.20 uIU/mL 4.18   Resulting Agency [facility]             Specimen Collected: 11/11/24 10:39 AM Last Resulted: 11/11/24 10:39 PM     Lab Results   Component Value Date    A1C 6.0 10/01/2024    A1C 6.0 02/29/2024    A1C 6.0 08/30/2023    A1C 6.5 02/20/2023    A1C 6.0 12/02/2021    A1C 6.0 03/05/2021    A1C 5.7 07/20/2020    A1C 5.9 07/18/2019    A1C 5.7 03/16/2017         Recent Labs   Lab Test 03/07/24  0927 08/30/23  0834   CHOL 187 187   HDL 39* 50   * 116*   TRIG 178* 105     Liver  Function Studies -   Recent Labs   Lab Test 03/07/24  0927   PROTTOTAL 7.4   ALBUMIN 4.2   BILITOTAL 0.3   ALKPHOS 78   AST 52*   ALT 53*      Review of Systems:  10 systems (general, cardiovascular, respiratory, eyes, ENT, endocrine, GI, , M/S, neurological) were reviewed. Most pertinent finding(s) is/are:  denies fever, cough, persistent headaches, shortness of breath, chest pain, severe GI symptoms, trouble urinating, severe pain.  The remaining systems are all unremarkable.    Mental Status Examination (limited as this is by phone/video):  Appearance: Awake, alert, appears stated age, no acute distress, well-groomed   Attitude:  cooperative, pleasant   Motor: No gross abnormalities observed via video, not formally tested   Oriented to:  person, place, time, and situation  Attention Span and Concentration:  normal  Speech:  clear, coherent, regular rate, rhythm, and volume  Language: intact  Mood: a little better  Affect:  appropriate and in normal range and mood congruent  Associations:  no loose associations  Thought Process:  logical, linear and goal oriented  Thought Content:  no evidence of suicidal ideation or homicidal ideation, no evidence of psychotic thought, no auditory hallucinations present and no visual hallucinations present  Recent and Remote Memory:  Intact to interview. Not formally assessed.   Fund of Knowledge: appropriate  Insight:  good  Judgment:  intact, adequate for safety  Impulse Control:  intact      Suicide Risk Assessment:  Today Nela Mares reports no acute suicidality today. In addition, there are notable risk factors for self-harm, including single status, anxiety, hopelessness, withdrawing, and mood change. However, risk is mitigated by commitment to family, absence of past attempts, ability to volunteer a safety plan, history of seeking help when needed, future oriented, no access to firearms or weapons, and denies suicidal intent or plan. Therefore, based on all  available evidence including the factors cited above, Nela Mares does not appear to be at imminent risk for self-harm, does not meet criteria for a 72-hr hold, and therefore remains appropriate for ongoing outpatient level of care.  A thorough assessment of risk factors related to suicide and self-harm have been reviewed and are noted above. The patient convincingly denies suicidality on several occasions. Local community safety resources reviewed for patient to use if needed. There was no deceit detected, and the patient presented in a manner that was believable.     DSM5 Diagnosis:  Major Depressive Disorder, Recurrent Episode, severe, without psychotic features  Generalized anxiety disorder    Medical comorbidities include:   Patient Active Problem List    Diagnosis Date Noted    Recurrent major depressive disorder, in partial remission (H) 03/22/2024     Priority: Medium    Closed traumatic displaced fracture of distal end of left radius, initial encounter 02/28/2024     Priority: Medium    Diabetes mellitus, type 2 (H) 02/20/2023     Priority: Medium    Lichen sclerosus 02/20/2023     Priority: Medium    Chronic midline low back pain without sciatica 01/28/2022     Priority: Medium    Primary osteoarthritis of both knees 01/28/2022     Priority: Medium    Paroxysmal supraventricular tachycardia (H) 05/10/2021     Priority: Medium     Added automatically from request for surgery [id number]      Allergic conjunctivitis, bilateral 08/27/2019     Priority: Medium    Overweight (H) 07/18/2019     Priority: Medium    Seasonal allergic rhinitis due to pollen 10/18/2018     Priority: Medium    Allergic rhinitis due to dust mite 10/18/2018     Priority: Medium    Allergic rhinitis due to mold 10/18/2018     Priority: Medium    IUD (intrauterine device) in place 03/21/2017     Priority: Medium     mirena place in 2015      Hypertension goal BP (blood pressure) < 140/90 10/25/2010     Priority: Medium    Leiomyoma of  uterus 08/14/2007     Priority: Medium     Problem list name updated by automated process. Provider to review      Obstructive sleep apnea      Priority: Medium     uses CPAP          Mild persistent asthma 10/11/2005     Priority: Medium     Typically needs steroid burst about once per year for symptoms uncontrolled with continued advair and maxair q 4 hours.  Peak flow generally runs 400. With illness goes down to 350.    12/09 - given one extra burst of prednisone.  Instructed to call if she starts it so we can help her monitor her symptoms.      Allergic rhinitis due to animals 08/31/2004     Priority: Medium       Psychosocial & Contextual Factors: see HPI above    Assessment:  From Intake, 11/09/2023:  Nela Mares is a 63-year-old female with past psychiatric history including depression and anxiety who presents today for psychiatric evaluation.  Patient with long history of struggling with intermittent episodes of depression, severe at times.  Depression dates back to adolescence.  Strong family history of depression.  Has a history of several medication trials due to tachyphylaxis after around 7 years of use.  Patient most recently with very severe depressive episode lasting for several weeks.  Patient was seeing primary care provider for medication changes and has most recently been on venlafaxine and buspirone.  Aripiprazole was added about 10 days ago and patient has experienced significant relief and improvement of her severe depression symptoms.  Patient taking aripiprazole 5 mg daily to augment her routine with venlafaxine and buspirone.  Venlafaxine had been increased by adding 37.5 mg capsule to her 225 mg dose for a total of 262.5 mg daily.  That had not been very helpful.  Buspirone was added to her regimen which was helpful briefly but effects did not last at all.  Since aripiprazole is suspected to be so helpful for her symptoms, patient will start slow taper off of buspirone.  If remains  stable, patient will consider dropping venlafaxine back to 225 mg daily.  Patient will reach out if there is any regression/worsening of symptoms.  We did discuss ECT as a possible option in the future if her symptoms get as bad as they were this last episode.  Patient was open to this suggestion.  Encouraged to continue in therapy.  No acute safety concerns.  No longer having any suicidal ideation.  Family is very supportive.  Her dogs are very protective.  No problematic drug or alcohol use.     Discussed metabolic risks and movement disorder risks, including risk for permanent movement related symptoms, when using Abilify/aripiprazole.  Labs ordered for 3-month lipid panel and hemoglobin A1c check.    12/14/2023:  Patient overall doing relatively okay.  Continues to work through some increased psychosocial stressors.  Hoping things will be settling soon.  Encouraged to continue to stay active.  Encouraged to continue light therapy box and supplements.  No medication changes today except for trial of discontinuation of buspirone.  No acute safety concerns.  No SI today.  No problematic drug or alcohol use.    1/18/2024:  Patient overall with much improved symptoms that have been sustained/maintained on Abilify 10 mg daily.  No abnormal involuntary movements.  Patient encouraged to have labs completed to monitor/measure at lipid panel and A1c.  Could consider addition of metformin if metabolic derangements or if starts to gain weight.  Metformin can also be used prophylactically for such.  No acute safety concerns.  No SI.  No problematic drug or alcohol use.  Due to ongoing stability of mental health on current regimen, patient's mental health care will be returned back to primary care provider for ongoing management.    9/3/2024 (new episode of care with collaborative care psychiatric services started today):  Patient with significantly worsening depression symptoms.  Patient meeting criteria for severe  depression.  Patient interested in ECT to treat depression at this time due to severity of symptoms and hope that ECT might help improve symptoms more quickly and with fewer side effects than some other options.  No acute suicidality.  History of method seeking/planning.  None currently.  No access to firearms.  Discussed potentially changing Abilify to Saphris or Rexulti.  Patient prefers to pursue ECT at this time and wait on major medication changes.  Patient is agreeable to decreasing Abilify back to 10 mg daily since there was no improvement on 15 mg.  No problematic drug or alcohol use.    11/07/2024:  Patient currently undergoing ECT once weekly.  Some transient memory struggles and pretty significant fatigue.  Some slight mood improvements but nothing profound at this time.  We will check patient's thyroid since thyroid issues run in the family and patient does have history of transiently elevated TSH.  May need to consider something more activating medication wise to help with fatigue and excessive sedation/sleeping.  Patient had been sleeping 15+ hours a day even prior to ECT starting.  Medication therapy management referral placed since patient and sibling are wondering if there are any medication interactions or if statin could be causing problems-patient's mother with pretty profound fatigue on statin in the past apparently.  No acute suicidality.  Patient's family very supportive.  No problematic drug or alcohol use.    11/18/2024:  Patient with evidence of Hashimoto's.  Positive/reactive thyroid peroxidase antibody.  TSH currently within normal limits.  Tried to add T3 total and T4 free but not enough blood with recent blood draw.  Patient will wait until follows up with primary care provider in case more labs needing to be drawn.  Patient encouraged to follow up with primary care provider regarding evidence of Hashimoto's.  This provider will try to get in touch with ECT provider to coordinate care.   In the future could consider augmenting regimen with stimulant medication-particularly if thyroid hormone replacement not added.  Could also consider transitioning Abilify to a different neuroleptic medication to augment Effexor -only if symptoms worsen.  For now, patient will continue with ECT since has started to feel better the last couple days.  No acute safety concerns.  No acute suicidality.  Patient's family continues to be very supportive.  No problematic drug or alcohol use.    Medication side effects and alternatives were reviewed. Health promotion activities recommended and reviewed today. All questions addressed. Education and counseling completed regarding risks and benefits of medications and psychotherapy options. Recommend therapy for additional support.     Treatment Plan:  Continue venlafaxine  mg daily for depression and anxiety.  Continue Abilify/aripiprazole back to 10 mg daily for depression/to augment venlafaxine ER.   Continue ECT as recommended. I will try and get in touch with Dr. Tavera regarding coordinating care/continuity of care.   Continue all other cares per primary care provider.   Continue all other medications as reviewed per electronic medical record today.   Safety plan reviewed. To the Emergency Department as needed or call after hours crisis line at [phone] or [phone]. [place] Crisis Text Line. Text [place] to 257010 or Suicide LifeLine Chat: suicidepreventionlifeline.org/chat  Strongly recommend individual psychotherapy for additional support and ongoing development of nonpharmacologic coping skills and strategies.  Schedule an appointment with me in 1 month or sooner as needed. Call [facility] at [phone] to schedule.  Follow up with primary care provider as planned or for acute medical concerns.  Call the psychiatric nurse line with medication questions or concerns at [phone].  [employer] may be used to communicate with your provider, but  this is not intended to be used for emergencies.    Administrative Billing:   Phone Call/Video Duration: 15 Minutes  Start: 8:30a  Stop: 8:45a    The longitudinal plan of care for the diagnosis(es)/condition(s) as documented were addressed during this visit. Due to the added complexity in care, I will continue to support Sanam in the subsequent management and with ongoing continuity of care.    Episode of Care #: 3 (new episode of care started 9/3/2024)    Patient Status:  Patient will continue to be seen for ongoing consultation and stabilization.    Signed:   Madison Vazquez DO  [facility]    Disclaimer: This note consists of symbols derived from keyboarding, dictation and/or voice recognition software. As a result, there may be errors in the script that have gone undetected. Please consider this when interpreting information found in this chart.

## 2024-11-18 NOTE — NURSING NOTE
Is the patient currently in the state of [place]? YES    Current patient location: [address] [place] [id number]    Visit mode:VIDEO    If the visit is dropped, the patient can be reconnected by: VIDEO VISIT: Text to cell phone:   Telephone Information:   Mobile [phone]       Will anyone else be joining the visit? No  (If patient encounters technical issues they should call [phone])    Are changes needed to the allergy or medication list? No    Are refills needed on medications prescribed by this physician? Discuss with Provider    Rooming Documentation: Questionnaire(s) completed.    Reason for visit: JACINDA Norton

## 2024-11-18 NOTE — PROGRESS NOTES
"Virtual Visit Details    Type of service:  Video Visit     Originating Location (pt. Location): {video visit patient location:379950::\"Home\"}  {PROVIDER LOCATION On-site should be selected for visits conducted from your clinic location or adjoining [facility] hospital, academic office, or other nearby [facility] building. Off-site should be selected for all other provider locations, including home:[id number]}  Distant Location (provider location):  {virtual location provider:568054}  Platform used for Video Visit: {Virtual Visit Platforms:843772::\"Versa\"}  "

## 2024-11-21 ENCOUNTER — LAB (OUTPATIENT)
Dept: LAB | Facility: CLINIC | Age: 64
End: 2024-11-21
Payer: COMMERCIAL

## 2024-11-21 DIAGNOSIS — Z79.899 ENCOUNTER FOR LONG-TERM (CURRENT) USE OF MEDICATIONS: ICD-10-CM

## 2024-11-21 DIAGNOSIS — F33.2 SEVERE EPISODE OF RECURRENT MAJOR DEPRESSIVE DISORDER, WITHOUT PSYCHOTIC FEATURES (H): ICD-10-CM

## 2024-11-21 LAB
T3 SERPL-MCNC: 96 NG/DL (ref 85–202)
T4 FREE SERPL-MCNC: 1.08 NG/DL (ref 0.9–1.7)

## 2024-12-09 ENCOUNTER — OFFICE VISIT (OUTPATIENT)
Dept: FAMILY MEDICINE | Facility: CLINIC | Age: 64
End: 2024-12-09
Payer: COMMERCIAL

## 2024-12-09 VITALS
TEMPERATURE: 97.5 F | SYSTOLIC BLOOD PRESSURE: 120 MMHG | HEART RATE: 89 BPM | HEIGHT: 68 IN | BODY MASS INDEX: 36.37 KG/M2 | OXYGEN SATURATION: 95 % | DIASTOLIC BLOOD PRESSURE: 82 MMHG | WEIGHT: 240 LBS

## 2024-12-09 DIAGNOSIS — E06.3 HASHIMOTO'S THYROIDITIS: Primary | ICD-10-CM

## 2024-12-09 PROCEDURE — 99213 OFFICE O/P EST LOW 20 MIN: CPT | Performed by: STUDENT IN AN ORGANIZED HEALTH CARE EDUCATION/TRAINING PROGRAM

## 2024-12-09 RX ORDER — LEVOTHYROXINE SODIUM 25 UG/1
25 TABLET ORAL DAILY
Qty: 30 TABLET | Refills: 1 | Status: SHIPPED | OUTPATIENT
Start: 2024-12-09

## 2024-12-09 NOTE — PROGRESS NOTES
"  Assessment & Plan     Hashimoto's thyroiditis  Reviewed recent labs consistent with hashimoto's. Thyroid labs are in normal ranges but on the border of abnormal. Given her symptoms, which we discussed at length may be multifactorial, with her borderline labs we will treat the hypothyroidism with low dose levothyroxine. Repeat labs in 6-8 weeks. They will continue maintenance ECT until on thyroid  labs in order to monitor one variable at a time.   - levothyroxine (SYNTHROID/LEVOTHROID) 25 MCG tablet; Take 1 tablet (25 mcg) by mouth daily.  - TSH; Future  - T4, free; Future  - T3, total; Future            Subjective   Sanam is a 64 year old, presenting for the following health issues:  Thyroid Problem      History of Present Illness       Reason for visit:  Thyroid  Symptom onset:  More than a month  Symptoms include:  Fatigue, depression, constipation, hair loss, memory loss,  Symptom intensity:  Severe  Symptom progression:  Staying the same  Had these symptoms before:  No  What makes it worse:  No  What makes it better:  No   She is taking medications regularly.     Had labs done last month.    Has had a lot of fatigue, sleeping 12 hours nightly and 2 hour nap. Constipation.   Brain fog and memory issues since doing  ECT. Almost done with that. On once/week maintenance now.   Feels like mental health has been better. Doesn't feel depressed and anxious now.  Doesn't like ECT though, sleeps all day after     Hair loss but that's been for awhile so that could be something else.               Review of Systems  Constitutional, HEENT, cardiovascular, pulmonary, gi and gu systems are negative, except as otherwise noted.      Objective    /82 (BP Location: Right arm, Cuff Size: Adult Large)   Pulse 89   Temp 97.5  F (36.4  C) (Oral)   Ht 1.727 m (5' 8\")   Wt 108.9 kg (240 lb)   LMP 12/21/2014 (Exact Date)   SpO2 95%   BMI 36.49 kg/m    Body mass index is 36.49 kg/m .  Physical Exam   GENERAL: alert and no " distress  EYES: Eyes grossly normal to inspection, PERRL and conjunctivae and sclerae normal  HENT: ear canals and TM's normal, nose and mouth without ulcers or lesions  NECK: no adenopathy, no asymmetry, masses, or scars  RESP: lungs clear to auscultation - no rales, rhonchi or wheezes  CV: regular rate and rhythm, normal S1 S2, no S3 or S4, no murmur, click or rub, no peripheral edema  ABDOMEN: soft, nontender, no hepatosplenomegaly, no masses and bowel sounds normal            Signed Electronically by: Slime Perry DO

## 2024-12-11 NOTE — PROGRESS NOTES
Patient presented after waiting 30 minutes with normal reaction to allergy injections. Discharged from clinic.    Paula Walters RN, RN ............   12/2/2019...8:11 AM      [Patient preference] : as per patient preference [Access issues (e.g., transportation, impaired mobility, etc.)] : due to patient's access issues [Continuing, patient not seen in-person within last 12 months (provide details below)] : Telehealth services are continuing, patient not seen in-person within last 12 months.  [Telehealth (audio & video) - Individual/Group] : This visit was provided via telehealth using real-time 2-way audio visual technology. [Other Location: e.g. Home (Enter Location, City,State)___] : The patient, [unfilled], was located at [unfilled] at the time of the visit. [Patient's space is appropriate for telehealth and maintains privacy/confidentiality.] : Patient's space is appropriate for telehealth and maintains privacy/confidentiality. [Participant(s) identity verified] : Participant(s) identity verified. [Verbal consent obtained from patient/other participant(s)] : Verbal consent for telehealth/telephonic services obtained from patient/other participant(s) [FreeTextEntry4] : 2:11pm [FreeTextEntry5] : 3:03pm [FreeTextEntry3] : pt preference, access issues [Patient] : Patient [FreeTextEntry1] : worsened anxiety in past year, likely related to childhood trauma

## 2024-12-18 ASSESSMENT — PATIENT HEALTH QUESTIONNAIRE - PHQ9
10. IF YOU CHECKED OFF ANY PROBLEMS, HOW DIFFICULT HAVE THESE PROBLEMS MADE IT FOR YOU TO DO YOUR WORK, TAKE CARE OF THINGS AT HOME, OR GET ALONG WITH OTHER PEOPLE: SOMEWHAT DIFFICULT
SUM OF ALL RESPONSES TO PHQ QUESTIONS 1-9: 8
SUM OF ALL RESPONSES TO PHQ QUESTIONS 1-9: 8

## 2024-12-18 NOTE — PROGRESS NOTES
[facility] Psychiatry Services [facility]  December 19, 2024      Behavioral Health Clinician Progress Note    Patient Name: Nela Mares           Service Type:  Individual      Service Location:   [facility] / Email (patient reached)     Session Start Time: 230pm  Session End Time: 241      Session Length: 11min     Attendees: Client     Service Modality:  Video Visit:      Provider verified identity through the following two step process.  Patient provided:  Patient is known previously to provider    Telemedicine Visit: The patient's condition can be safely assessed and treated via synchronous audio and visual telemedicine encounter.      Reason for Telemedicine Visit: Services only offered telehealth    Originating Site (Patient Location): Patient's home    Distant Site (Provider Location): Provider Remote Setting- Home Office    Consent:  The patient/guardian has verbally consented to: the potential risks and benefits of telemedicine (video visit) versus in person care; bill my insurance or make self-payment for services provided; and responsibility for payment of non-covered services.     Patient would like the video invitation sent by:  My Chart    Mode of Communication:  Video Conference via [facility]    Distant Location (Provider):  Off-site    As the provider I attest to compliance with applicable laws and regulations related to telemedicine.    Visit Activities (Refresh list every visit): [facility] Only    Diagnostic Assessment Date: 9/3/24 Carmen Mosquera MA [facility]  Treatment Plan Review Date: 12/19/24  See Flowsheets for today's PHQ-9 and PETE-7 results  Previous PHQ-9:       11/7/2024    11:23 AM 11/18/2024     8:23 AM 12/18/2024     4:03 PM   PHQ-9 SCORE   PHQ-9 Total Score MyChart   8 (Mild depression)   PHQ-9 Total Score 18 20 8        Patient-reported     Previous PETE-7:       9/27/2023     9:46 AM 10/26/2023    10:12 AM 6/11/2024     3:53 PM   PETE-7 SCORE   Total Score 16 (severe  anxiety) 15 (severe anxiety) 4 (minimal anxiety)   Total Score 16 15 4     The following assessments were completed by patient for this visit:  GAD2:       11/7/2023     9:49 AM 8/29/2024     9:01 AM 12/14/2024     9:51 AM   PETE-2   Feeling nervous, anxious, or on edge 2 1 0   Not being able to stop or control worrying 2 1 0   PETE-2 Total Score 4 2    2 0        Patient-reported     DATA  Extended Session (60+ minutes): No  Interactive Complexity: No  Crisis: No  [facility] Patient: No    Treatment Objective(s) Addressed in This Session:  Target Behavior(s): disease management/lifestyle changes reducing sx    Depressed Mood: Decrease frequency and intensity of feeling down, depressed, hopeless  Anxiety: will experience a reduction in anxiety and will develop more effective coping skills to manage anxiety symptoms    Current Stressors / Issues:  MH update:  ECT every 2 weeks now.  Did several sessions.  Feels like she has NO depression or anxiety. Brain fog lifted. High fatigue.  Stresses:  Last week got dx with Hashimoto's disease just started synthroid. ECT planned to con't until repeat thyroid tests are improved  Going to as with sisters next week.  Appetite: n/a  Sleep: 12-14 hours per days  Outpatient Provider updates: Denies needs.  SI/SIB/HI:  Denies any!  CAMRYN:  Denies lifetime.  Side effects/compliance:  Interventions:    [facility] reviewed PT's progress and interpersonal support.  Pt notes that her sisters bring her to every one of her appts.  Call her daily and assist in tracking her moods/anxiety and sx for her to report to providers.  Most important:  thinking about getting a part time job.   Doing great!!  (High fatigue yet)    9/24  [facility] only appt.  MH update:  ECT intake tomorrow at [facility].  Reduced Abilify since last appt.  Less sedated.  Anxiety is higher without panic.  Anxious about tomorrows intake.  Depression remains the same  Stresses:  Pending ECT   Appetite: n/a  Sleep: n/a  Outpatient Provider updates:   [facility] referral placed  SI/SIB/HI:  Hx of planning/method seeking.  Denies previous attempts.  Denies current suicidal ideation, intent or planning.  Hx of self harm years ago.  Safety plan created.  CAMRYN:  Denies lifetime  Side effects/compliance:  Interventions:  [facility] engaged in support and validation of anxiety regarding tomorrows ECT intake  Most important:  Anxious about ECT, but hopeful.      9/3   update:  ROS above  Stresses:  transition into long term, no planned.  Last fall high depression went out on [employer].  Co workers filed compliant creating hostile work environment.  Forced long term.  Appetite: Variable  Sleep: Sleep 12-15  Outpatient Provider updates:  Denies need/current.  SI/SIB/HI:  Hx of planning/method seeking.  Denies previous attempts.  Denies current suicidal ideation, intent or planning.  Hx of self harm years ago.  Safety plan created.  CAMRYN:  Denies lifetime  Side effects/compliance:  Interventions:  [facility] engaged in completing DA with psychoeducation and tx planning.  Most important:  Depression sx high.      Progress on Treatment Objective(s) / Homework:  New Objective established this session - CONTEMPLATION (Considering change and yet undecided); Intervened by assessing the negative and positive thinking (ambivalence) about behavior change    Motivational Interviewing    MI Intervention: Co-Developed Goal: reducing sx and Expressed Empathy/Understanding     Change Talk Expressed by the Patient: Desire to change Ability to change    Provider Response to Change Talk: E - Evoked more info from patient about behavior change    Also provided psychoeducation about behavioral health condition, symptoms, and treatment options    Assessments completed prior to visit:  The following assessments were completed by patient for this visit:  N/a    Care Plan review completed: Yes    Medication Review:  Changes to psychiatric medications, see updated Medication List in EPIC.     Medication  Compliance:  Yes    Changes in Health Issues:   None reported    Chemical Use Review:   Substance Use: Chemical use reviewed, no active concerns identified      Tobacco Use: No current tobacco use.      Assessment: Current Emotional / Mental Status (status of significant symptoms):  Risk status (Self / Other harm or suicidal ideation)  Patient has had a history of suicidal ideation: hx of method seeking.  Denies previous attempts and self-injurious behavior: hx of   Patient denies current fears or concerns for personal safety.  Patient denies current or recent suicidal ideation or behaviors.  Patient denies current or recent homicidal ideation or behaviors.  Patient denies current or recent self injurious behavior or ideation.  Patient denies other safety concerns.  A safety and risk management plan has been developed including: Patient consented to co-developed safety plan.  A safety and risk management plan was completed.  Patient agreed to use safety plan should any safety concerns arise.  A copy was given to the patient.    Appearance:   Appropriate   Eye Contact:   Good   Psychomotor Behavior: Normal   Attitude:   Cooperative   Orientation:   All  Speech   Rate / Production: Normal    Volume:  Normal   Mood:    Normal  Affect:    Appropriate   Thought Content:  Clear   Thought Form:  Coherent  Logical   Insight:    Good     Diagnoses:  1. Moderate episode of recurrent major depressive disorder (H)    2. PETE (generalized anxiety disorder)          Collateral Reports Completed:  Communicated with: Dr Vazquez    Plan: (Homework, other):  Patient was given information about behavioral services and encouraged to schedule a follow up appointment with the clinic [facility] as needed.  She was also given information about mental health symptoms and treatment options .  CD Recommendations: No indications of CD issues.       Carmen Mosquera, [facility]    ______________________________________________________________________    Integrated  "Primary Care Behavioral Health Treatment Plan    Individual Treatment Plan    Patient's Name: Nela Mares   YOB: 1960  Date of Creation: 9/24/24  Date Treatment Plan Last Reviewed/Revised: 12/19/24    DSM5 Diagnoses:   1. Moderate episode of recurrent major depressive disorder (H)    2. PETE (generalized anxiety disorder)      Psychosocial / Contextual Factors: Medical Complexities, Occupational Issues, Interpersonal Concerns, and Financial Strain  PROMIS (reviewed every 90 days):   The following assessments were completed by patient for this visit:  PROMIS 10-Global Health (only subscores and total score):       11/7/2023     9:50 AM 8/29/2024     9:02 AM 11/7/2024    11:23 AM   PROMIS-10 Scores Only   Global Mental Health Score 7 5    5 5   Global Physical Health Score 11 10    10 14   PROMIS TOTAL - SUBSCORES 18 15    15 19        Referral / Collaboration:  Referral to another professional/service is not indicated at this time..    Anticipated number of session for this episode of care: 6-9 sessions  Anticipation frequency of session: Monthly  Anticipated Duration of each session: 16-37 minutes  Treatment plan will be reviewed in 90 days or when goals have been changed.       MeasurableTreatment Goal(s) related to diagnosis / functional impairment(s)  Goal 1: Patient will reduce depression sx   \"I will know I've met my goal when I can get daily tasks done.\"     Objective #A (Patient Action)    Patient will Increase interest, engagement, and pleasure in doing things  Decrease frequency and intensity of feeling down, depressed, hopeless  Status: Continued - Date(s): 12/19/24     Intervention(s)  [facility] will provide support through CBT, MI, Acceptance and Commitment Therapy, Dialectic Behavioral Therapy and problem solving model to explore and overcome barriers.        MeasurableTreatment Goal(s) related to diagnosis / functional impairment(s)  Goal 2: Patient will manage concerns of safety    I will " know I've met my goal when I can reduce suicidal ideation .      Objective #A (Patient Action)    Patient will use previously developed safety plan on file.  Status: Cont - Date: 9/24/24 , 12/19/24    Intervention(s)  Therapist will provide support through CBT, MI, Acceptance and Commitment Therapy, Dialectic Behavioral Therapy and problem solving model to explore and overcome barriers.        Patient has reviewed and agreed to the above plan.    Written by  Carmen Mosquera LPCC, [facility]

## 2024-12-19 ENCOUNTER — VIRTUAL VISIT (OUTPATIENT)
Dept: PSYCHIATRY | Facility: CLINIC | Age: 64
End: 2024-12-19
Payer: COMMERCIAL

## 2024-12-19 ENCOUNTER — VIRTUAL VISIT (OUTPATIENT)
Dept: BEHAVIORAL HEALTH | Facility: CLINIC | Age: 64
End: 2024-12-19
Payer: COMMERCIAL

## 2024-12-19 DIAGNOSIS — F41.1 GAD (GENERALIZED ANXIETY DISORDER): ICD-10-CM

## 2024-12-19 DIAGNOSIS — F33.1 MODERATE EPISODE OF RECURRENT MAJOR DEPRESSIVE DISORDER (H): Primary | ICD-10-CM

## 2024-12-19 DIAGNOSIS — F33.41 RECURRENT MAJOR DEPRESSIVE DISORDER, IN PARTIAL REMISSION (H): Primary | ICD-10-CM

## 2024-12-19 ASSESSMENT — PAIN SCALES - GENERAL: PAINLEVEL_OUTOF10: NO PAIN (0)

## 2024-12-19 NOTE — NURSING NOTE
Current patient location: [address] [place] [id number]    Is the patient currently in the state of [place]? YES    Visit mode:VIDEO    If the visit is dropped, the patient can be reconnected by:VIDEO VISIT: Text to cell phone:   Telephone Information:   Mobile [phone]       Will anyone else be joining the visit? NO  (If patient encounters technical issues they should call [phone])    Are changes needed to the allergy or medication list? No    Are refills needed on medications prescribed by this physician? Discuss with provider    Rooming Documentation:  Questionnaire(s) completed    Reason for visit: RECHECK    Luis Alfredo MONACO

## 2024-12-19 NOTE — PROGRESS NOTES
"Virtual Visit Details    Type of service:  Video Visit     Originating Location (pt. Location): {video visit patient location:465[address]::\"Home\"}  {PROVIDER LOCATION On-site should be selected for visits conducted from your clinic location or adjoining [facility] hospital, academic office, or other nearby [facility] building. Off-site should be selected for all other provider locations, including home:[id number]}  Distant Location (provider location):  {virtual location provider:[id number]}  Platform used for Video Visit: {Virtual Visit Platforms:331453::\"J.G. ink\"}  "

## 2024-12-19 NOTE — PROGRESS NOTES
"Telemedicine Visit: The patient's condition can be safely assessed and treated via synchronous audio and visual telemedicine encounter.      Reason for Telemedicine Visit: Patient has requested telehealth visit    Originating Site (Patient Location): Patient's home    Distant Location (provider location):  Off-site    Consent:  The patient/guardian has verbally consented to: the potential risks and benefits of telemedicine (video visit) versus in person care; bill my insurance or make self-payment for services provided; and responsibility for payment of non-covered services.     Mode of Communication:  Video Conference via Yidio    As the provider I attest to compliance with applicable laws and regulations related to telemedicine.          Outpatient Psychiatric Progress Note    Name: Nela Mares   : 1960                    Primary Care Provider: Padma Ray MD   Therapist: No current therapist    PHQ-9 scores:      2024    11:23 AM 2024     8:23 AM 2024     4:03 PM   PHQ-9 SCORE   PHQ-9 Total Score MyChart   8 (Mild depression)   PHQ-9 Total Score 18 20 8        Patient-reported       PETE-7 scores:      2023     9:46 AM 10/26/2023    10:12 AM 2024     3:53 PM   PETE-7 SCORE   Total Score 16 (severe anxiety) 15 (severe anxiety) 4 (minimal anxiety)   Total Score 16 15 4       Patient Identification:  Patient is a 64 year old, single  White Choose not to answer female  who presents for return visit with me.  Patient is currently retired. Patient attended the phone/video session alone. Patient prefers to be called: \"Sanam\".    Interim History:  I last saw Nela Mares for outpatient psychiatry return visit on 2024. During that appointment, we:    Continue venlafaxine  mg daily for depression and anxiety.  Continue Abilify/aripiprazole back to 10 mg daily for depression/to augment venlafaxine ER.   Have labs completed in next few days at any " "[facility] lab. Need to call your clinic ahead of time to schedule an appointment for lab due to limited hours and no walk-ins due to Covid-19 restrictions/changes.   Continue ECT as recommended.     12/19: Patient overall doing quite well.  Depression is lifted.  Still some ongoing fatigue.  Patient recently started Synthroid due to Hashimoto's diagnosis.  Started Synthroid about a week ago.  No acute safety concerns.  SI has resolved.  ECT every other week.  No problematic drug or alcohol use.  See [facility] note below for additional details.    Per [facility], [facility], during today's team-based visit:  MH update:  ECT every 2 weeks now.  Did several sessions.  Feels like she has NO depression or anxiety. Brain fog lifted. High fatigue.  Stresses:  Last week got dx with Hashimoto's disease just started synthroid. ECT planned to con't until repeat thyroid tests are improved  Going to xmas with sisters next week.  Appetite: n/a  Sleep: 12-14 hours per days  Outpatient Provider updates: Denies needs.  SI/SIB/HI:  Denies any!  CAMRYN:  Denies lifetime.  Side effects/compliance:  Interventions:    [facility] reviewed PT's progress and interpersonal support.  Pt notes that her sisters bring her to every one of her appts.  Call her daily and assist in tracking her moods/anxiety and sx for her to report to providers.  Most important:  thinking about getting a part time job.   Doing great!!  (High fatigue yet)    Past Psychiatric Med Trials:  Psych Meds at Intake 11/2023:  Abilify 5 mg daily   BusPar 10 mg twice daily  Effexor-.5 mg daily      Past Psych Meds:  Per PharmD note 11/1/23:  Paxil - \"not a good fit\"  Bupropion - caused extreme irritability, rage  Imipramine - reports this was aweful in terms of side effects  Fluoxetine - felt like it didn't work originally.   Lexapro - stopped working  Celexa - stopped working  Zoloft    Psychiatric ROS:  Nela Mares reports mood has been: See HPI above  Anxiety has been: " See HPI above  Sleep has been: See HPI above  Olivia sxs: None  Psychosis sxs: None  ADHD/ADD sxs: NA  PTSD sxs: NA  PHQ9 and GAD7 scores were reviewed today if completed.   Medication side effects: Denies   Current stressors include: Symptoms and see HPI above  Coping mechanisms and supports include: Therapy, Family, Hobbies, and Friends    Current medications include:   Current Outpatient Medications   Medication Sig Dispense Refill    albuterol (PROAIR HFA/PROVENTIL HFA/VENTOLIN HFA) 108 (90 Base) MCG/ACT inhaler Inhale 2 puffs into the lungs every 4 hours as needed for shortness of breath or wheezing 36 g 1    albuterol (PROVENTIL) (2.5 MG/3ML) 0.083% neb solution Take 1 vial (2.5 mg) by nebulization every 4 hours as needed for shortness of breath / dyspnea or wheezing 30 mL 11    amLODIPine (NORVASC) 5 MG tablet Take 1 tablet (5 mg) by mouth daily 90 tablet 3    ARIPiprazole (ABILIFY) 10 MG tablet Take 1 tablet (10 mg) by mouth daily. 90 tablet 1    atorvastatin (LIPITOR) 10 MG tablet Take 1 tablet (10 mg) by mouth at bedtime 90 tablet 3    calcium carbonate 500 MG CHEW Take 1,000 mg by mouth daily.      cetirizine (ZYRTEC) 10 MG tablet Take 1 tablet (10 mg) by mouth daily 90 tablet 3    Cholecalciferol (VITAMIN D) 125 MCG (5000 UT) capsule Take 1 capsule (5,000 Units) by mouth daily 90 capsule 3    clobetasol (TEMOVATE) 0.05 % external ointment Apply twice weekly when well controlled. Increase to daily use with flares 60 g 3    finasteride (PROSCAR) 5 MG tablet Take 1 tablet (5 mg) by mouth daily 90 tablet 3    fluticasone (FLONASE) 50 MCG/ACT nasal spray Spray 2 sprays into both nostrils daily 48 g 3    fluticasone-salmeterol (ADVAIR) 250-50 MCG/ACT inhaler Inhale 1 puff into the lungs every 12 hours 3 each 0    levothyroxine (SYNTHROID/LEVOTHROID) 25 MCG tablet Take 1 tablet (25 mcg) by mouth daily. 30 tablet 1    lisinopril (ZESTRIL) 40 MG tablet Take 1 tablet (40 mg) by mouth daily 90 tablet 3     venlafaxine (EFFEXOR-ER) 225 MG 24 hr tablet Take 1 tablet (225 mg) by mouth daily. 90 tablet 1     No current facility-administered medications for this visit.         Past Medical/Surgical History:  Past Medical History:   Diagnosis Date    Allergic rhinitis, cause unspecified     Cervical high risk HPV (human papillomavirus) test positive 03/21/2017    3/21/17 NIL pap/+ HR HPV (not 16 or 18).     Depressive disorder     Depressive disorder, not elsewhere classified     seasonal    Diabetes mellitus, type 2 (H) 02/20/2023    Hypertension     Mild persistent asthma     Obstructive sleep apnea (adult) (pediatric)     uses CPAP    Primary osteoarthritis of both knees 01/28/2022    Scalp mass 07/2014      has a past medical history of Allergic rhinitis, cause unspecified, Cervical high risk HPV (human papillomavirus) test positive (03/21/2017), Depressive disorder, Depressive disorder, not elsewhere classified, Diabetes mellitus, type 2 (H) (02/20/2023), Hypertension, Mild persistent asthma, Obstructive sleep apnea (adult) (pediatric), Primary osteoarthritis of both knees (01/28/2022), and Scalp mass (07/2014).    She has no past medical history of Basal cell carcinoma, Cancer (H), Cerebral infarction (H), Congestive heart failure (H), COPD (chronic obstructive pulmonary disease) (H), Heart disease, History of blood transfusion, Malignant melanoma (H), Squamous cell carcinoma, or Thyroid disease.    Social History:  Reviewed. No changes to social history except as noted above in HPI.    Vital Signs:   None. This is phone/video visit.     Labs:  Most recent laboratory results reviewed:      Component  Ref Range & Units 7 d ago 17 yr ago     Thyroid Peroxidase Antibody  <35 IU/mL 243 High  <10   Resulting Agency [facility]             Specimen Collected: 11/11/24 10:39 AM Last Resulted: 11/12/24 11:27 AM       Component  Ref Range & Units 7 d ago 17 yr ago     Thyroglobulin Antibody  <40 IU/mL <20 <20   Resulting  Agency [facility]             Specimen Collected: 11/11/24 10:39 AM Last Resulted: 11/12/24 11:27 AM     Component  Ref Range & Units 7 d ago     TSH  0.30 - 4.20 uIU/mL 4.18   Resulting Agency [facility]             Specimen Collected: 11/11/24 10:39 AM Last Resulted: 11/11/24 10:39 PM     Lab Results   Component Value Date    A1C 6.0 10/01/2024    A1C 6.0 02/29/2024    A1C 6.0 08/30/2023    A1C 6.5 02/20/2023    A1C 6.0 12/02/2021    A1C 6.0 03/05/2021    A1C 5.7 07/20/2020    A1C 5.9 07/18/2019    A1C 5.7 03/16/2017         Recent Labs   Lab Test 03/07/24  0927 08/30/23  0834   CHOL 187 187   HDL 39* 50   * 116*   TRIG 178* 105     Liver Function Studies -   Recent Labs   Lab Test 03/07/24  0927   PROTTOTAL 7.4   ALBUMIN 4.2   BILITOTAL 0.3   ALKPHOS 78   AST 52*   ALT 53*      Review of Systems:  10 systems (general, cardiovascular, respiratory, eyes, ENT, endocrine, GI, , M/S, neurological) were reviewed. Most pertinent finding(s) is/are:  denies fever, cough, persistent headaches, shortness of breath, chest pain, severe GI symptoms, trouble urinating, severe pain.  The remaining systems are all unremarkable.    Mental Status Examination (limited as this is by phone/video):  Appearance: Awake, alert, appears stated age, no acute distress, well-groomed   Attitude:  cooperative, pleasant   Motor: No gross abnormalities observed via video, not formally tested   Oriented to:  person, place, time, and situation  Attention Span and Concentration:  normal  Speech:  clear, coherent, regular rate, rhythm, and volume  Language: intact  Mood: Better  Affect:  appropriate and in normal range and mood congruent  Associations:  no loose associations  Thought Process:  logical, linear and goal oriented  Thought Content:  no evidence of suicidal ideation or homicidal ideation, no evidence of psychotic thought, no auditory hallucinations present and no visual hallucinations present  Recent and Remote Memory:  Intact to  interview. Not formally assessed.   Fund of Knowledge: appropriate  Insight:  good  Judgment:  intact, adequate for safety  Impulse Control:  intact      Suicide Risk Assessment:  Today Nela Mares reports no acute suicidality today-suicidal ideation much improved. In addition, there are notable risk factors for self-harm, including single status, anxiety, hopelessness, withdrawing, and mood change. However, risk is mitigated by commitment to family, absence of past attempts, ability to volunteer a safety plan, history of seeking help when needed, future oriented, no access to firearms or weapons, and denies suicidal intent or plan. Therefore, based on all available evidence including the factors cited above, Nela Mares does not appear to be at imminent risk for self-harm, does not meet criteria for a 72-hr hold, and therefore remains appropriate for ongoing outpatient level of care.  A thorough assessment of risk factors related to suicide and self-harm have been reviewed and are noted above. The patient convincingly denies suicidality on several occasions. Local community safety resources reviewed for patient to use if needed. There was no deceit detected, and the patient presented in a manner that was believable.     DSM5 Diagnosis:  Major Depressive Disorder, Recurrent Episode, in partial remission  Generalized anxiety disorder    Medical comorbidities include:   Patient Active Problem List    Diagnosis Date Noted    Hashimoto's thyroiditis 12/09/2024     Priority: Medium    Recurrent major depressive disorder, in partial remission (H) 03/22/2024     Priority: Medium    Closed traumatic displaced fracture of distal end of left radius, initial encounter 02/28/2024     Priority: Medium    Diabetes mellitus, type 2 (H) 02/20/2023     Priority: Medium    Lichen sclerosus 02/20/2023     Priority: Medium    Chronic midline low back pain without sciatica 01/28/2022     Priority: Medium    Primary  osteoarthritis of both knees 01/28/2022     Priority: Medium    Paroxysmal supraventricular tachycardia (H) 05/10/2021     Priority: Medium     Added automatically from request for surgery [id number]      Allergic conjunctivitis, bilateral 08/27/2019     Priority: Medium    Overweight (H) 07/18/2019     Priority: Medium    Seasonal allergic rhinitis due to pollen 10/18/2018     Priority: Medium    Allergic rhinitis due to dust mite 10/18/2018     Priority: Medium    Allergic rhinitis due to mold 10/18/2018     Priority: Medium    IUD (intrauterine device) in place 03/21/2017     Priority: Medium     mirena place in 2015      Hypertension goal BP (blood pressure) < 140/90 10/25/2010     Priority: Medium    Leiomyoma of uterus 08/14/2007     Priority: Medium     Problem list name updated by automated process. Provider to review      Obstructive sleep apnea      Priority: Medium     uses CPAP          Mild persistent asthma 10/11/2005     Priority: Medium     Typically needs steroid burst about once per year for symptoms uncontrolled with continued advair and maxair q 4 hours.  Peak flow generally runs 400. With illness goes down to 350.    12/09 - given one extra burst of prednisone.  Instructed to call if she starts it so we can help her monitor her symptoms.      Allergic rhinitis due to animals 08/31/2004     Priority: Medium       Psychosocial & Contextual Factors: see HPI above    Assessment:  From Intake, 11/09/2023:  Nela Mares is a 63-year-old female with past psychiatric history including depression and anxiety who presents today for psychiatric evaluation.  Patient with long history of struggling with intermittent episodes of depression, severe at times.  Depression dates back to adolescence.  Strong family history of depression.  Has a history of several medication trials due to tachyphylaxis after around 7 years of use.  Patient most recently with very severe depressive episode lasting for several  weeks.  Patient was seeing primary care provider for medication changes and has most recently been on venlafaxine and buspirone.  Aripiprazole was added about 10 days ago and patient has experienced significant relief and improvement of her severe depression symptoms.  Patient taking aripiprazole 5 mg daily to augment her routine with venlafaxine and buspirone.  Venlafaxine had been increased by adding 37.5 mg capsule to her 225 mg dose for a total of 262.5 mg daily.  That had not been very helpful.  Buspirone was added to her regimen which was helpful briefly but effects did not last at all.  Since aripiprazole is suspected to be so helpful for her symptoms, patient will start slow taper off of buspirone.  If remains stable, patient will consider dropping venlafaxine back to 225 mg daily.  Patient will reach out if there is any regression/worsening of symptoms.  We did discuss ECT as a possible option in the future if her symptoms get as bad as they were this last episode.  Patient was open to this suggestion.  Encouraged to continue in therapy.  No acute safety concerns.  No longer having any suicidal ideation.  Family is very supportive.  Her dogs are very protective.  No problematic drug or alcohol use.     Discussed metabolic risks and movement disorder risks, including risk for permanent movement related symptoms, when using Abilify/aripiprazole.  Labs ordered for 3-month lipid panel and hemoglobin A1c check.    12/14/2023:  Patient overall doing relatively okay.  Continues to work through some increased psychosocial stressors.  Hoping things will be settling soon.  Encouraged to continue to stay active.  Encouraged to continue light therapy box and supplements.  No medication changes today except for trial of discontinuation of buspirone.  No acute safety concerns.  No SI today.  No problematic drug or alcohol use.    1/18/2024:  Patient overall with much improved symptoms that have been sustained/maintained  on Abilify 10 mg daily.  No abnormal involuntary movements.  Patient encouraged to have labs completed to monitor/measure at lipid panel and A1c.  Could consider addition of metformin if metabolic derangements or if starts to gain weight.  Metformin can also be used prophylactically for such.  No acute safety concerns.  No SI.  No problematic drug or alcohol use.  Due to ongoing stability of mental health on current regimen, patient's mental health care will be returned back to primary care provider for ongoing management.    9/3/2024 (new episode of care with collaborative care psychiatric services started today):  Patient with significantly worsening depression symptoms.  Patient meeting criteria for severe depression.  Patient interested in ECT to treat depression at this time due to severity of symptoms and hope that ECT might help improve symptoms more quickly and with fewer side effects than some other options.  No acute suicidality.  History of method seeking/planning.  None currently.  No access to firearms.  Discussed potentially changing Abilify to Saphris or Rexulti.  Patient prefers to pursue ECT at this time and wait on major medication changes.  Patient is agreeable to decreasing Abilify back to 10 mg daily since there was no improvement on 15 mg.  No problematic drug or alcohol use.    11/07/2024:  Patient currently undergoing ECT once weekly.  Some transient memory struggles and pretty significant fatigue.  Some slight mood improvements but nothing profound at this time.  We will check patient's thyroid since thyroid issues run in the family and patient does have history of transiently elevated TSH.  May need to consider something more activating medication wise to help with fatigue and excessive sedation/sleeping.  Patient had been sleeping 15+ hours a day even prior to ECT starting.  Medication therapy management referral placed since patient and sibling are wondering if there are any medication  interactions or if statin could be causing problems-patient's mother with pretty profound fatigue on statin in the past apparently.  No acute suicidality.  Patient's family very supportive.  No problematic drug or alcohol use.    11/18/2024:  Patient with evidence of Hashimoto's.  Positive/reactive thyroid peroxidase antibody.  TSH currently within normal limits.  Tried to add T3 total and T4 free but not enough blood with recent blood draw.  Patient will wait until follows up with primary care provider in case more labs needing to be drawn.  Patient encouraged to follow up with primary care provider regarding evidence of Hashimoto's.  This provider will try to get in touch with ECT provider to coordinate care.  In the future could consider augmenting regimen with stimulant medication-particularly if thyroid hormone replacement not added.  Could also consider transitioning Abilify to a different neuroleptic medication to augment Effexor -only if symptoms worsen.  For now, patient will continue with ECT since has started to feel better the last couple days.  No acute safety concerns.  No acute suicidality.  Patient's family continues to be very supportive.  No problematic drug or alcohol use.    12/19/2024:  Patient overall doing well.  ECT every other week.  Tolerating medications well.  Patient just started Synthroid about 1 week ago.  We will wait on starting modafinil or any type of stimulant augmentation until we know how symptoms might improve or not on Synthroid.  Patient mentioned her sisters were wondering about any possible medication dose changes (particularly decreasing).  Discussed I recommend not changing any medications at this time due to just newly feeling better.  Tolerating medications well.  No acute safety concerns.  No SI.  No problematic drug or alcohol use.    Medication side effects and alternatives were reviewed. Health promotion activities recommended and reviewed today. All questions  addressed. Education and counseling completed regarding risks and benefits of medications and psychotherapy options. Recommend therapy for additional support.     Treatment Plan:  Continue venlafaxine  mg daily for depression and anxiety.  Continue Abilify/aripiprazole back to 10 mg daily for depression/to augment venlafaxine ER.   Continue ECT as recommended.  Continue all other cares per primary care provider.   Continue all other medications as reviewed per electronic medical record today.   Safety plan reviewed. To the Emergency Department as needed or call after hours crisis line at [phone] or [phone]. [place] Crisis Text Line. Text [place] to 214823 or Suicide LifeLine Chat: suicidepreventionGlopholine.org/chat  Strongly recommend individual psychotherapy for additional support and ongoing development of nonpharmacologic coping skills and strategies.  Schedule an appointment with me in 2 month or sooner as needed. Call [facility] at [phone] to schedule.  Follow up with primary care provider as planned or for acute medical concerns.  Call the psychiatric nurse line with medication questions or concerns at [phone].  OnTheList may be used to communicate with your provider, but this is not intended to be used for emergencies.    Administrative Billing:   Phone Call/Video Duration: 10 Minutes  Start: 3:02p  Stop: 3:12p    The longitudinal plan of care for the diagnosis(es)/condition(s) as documented were addressed during this visit. Due to the added complexity in care, I will continue to support Sanam in the subsequent management and with ongoing continuity of care.    Episode of Care #: 4 (new episode of care started 9/3/2024)    Patient Status:  Patient will continue to be seen for ongoing consultation and stabilization.    Signed:   Madison Vazquez DO  [facility]    Disclaimer: This note consists of symbols derived from keyboarding, dictation and/or voice recognition  software. As a result, there may be errors in the script that have gone undetected. Please consider this when interpreting information found in this chart.

## 2024-12-19 NOTE — PATIENT INSTRUCTIONS
Treatment Plan:  Continue venlafaxine  mg daily for depression and anxiety.  Continue Abilify/aripiprazole back to 10 mg daily for depression/to augment venlafaxine ER.   Continue ECT as recommended.  Continue all other cares per primary care provider.   Continue all other medications as reviewed per electronic medical record today.   Safety plan reviewed. To the Emergency Department as needed or call after hours crisis line at [phone] or [phone]. [place] Crisis Text Line. Text [place] to 394244 or Suicide LifeLine Chat: suicidepreventionlifeline.org/chat  Strongly recommend individual psychotherapy for additional support and ongoing development of nonpharmacologic coping skills and strategies.  Schedule an appointment with me in 2 month or sooner as needed. Call [facility] at [phone] to schedule.  Follow up with primary care provider as planned or for acute medical concerns.  Call the psychiatric nurse line with medication questions or concerns at [phone].  MyChart may be used to communicate with your provider, but this is not intended to be used for emergencies.

## 2025-01-09 ENCOUNTER — LAB (OUTPATIENT)
Dept: LAB | Facility: CLINIC | Age: 65
End: 2025-01-09
Payer: COMMERCIAL

## 2025-01-09 DIAGNOSIS — E06.3 HASHIMOTO'S THYROIDITIS: ICD-10-CM

## 2025-01-09 LAB
T3 SERPL-MCNC: 108 NG/DL (ref 85–202)
T4 FREE SERPL-MCNC: 1.13 NG/DL (ref 0.9–1.7)
TSH SERPL DL<=0.005 MIU/L-ACNC: 4.3 UIU/ML (ref 0.3–4.2)

## 2025-01-10 ENCOUNTER — TELEPHONE (OUTPATIENT)
Dept: FAMILY MEDICINE | Facility: CLINIC | Age: 65
End: 2025-01-10
Payer: COMMERCIAL

## 2025-01-10 DIAGNOSIS — E06.3 HASHIMOTO'S THYROIDITIS: Primary | ICD-10-CM

## 2025-01-10 NOTE — TELEPHONE ENCOUNTER
Can you see how pt is feeling on the new levothyroxine? Her thyroid labs are overall stable, slight increase in TSH so we could either keep her levothyroxine dose the same and monitor in a few months or increase slightly since the TSH is elevated.     Thank you,     Slime Perry D.O.

## 2025-01-13 RX ORDER — LEVOTHYROXINE SODIUM 50 UG/1
50 TABLET ORAL
Qty: 90 TABLET | Refills: 0 | Status: SHIPPED | OUTPATIENT
Start: 2025-01-13

## 2025-01-13 NOTE — TELEPHONE ENCOUNTER
Called and spoke with patient.    She has not noticed any change since starting levothyroxine, still having fatigue.    Patient asked why a Anti thyroglobulin antibody test was not done with latest round of blood work.    Pharmacy pended.    Christine M Klisch, RN

## 2025-01-13 NOTE — TELEPHONE ENCOUNTER
RN called patient/family and relayed provider's message. Patient/family verbalized understanding.     Yuni Holden RN, BSN  [facility] [place]

## 2025-01-13 NOTE — TELEPHONE ENCOUNTER
We don't typically monitor antibody levels to monitor response to therapy.     We will increase her levothyroxine dose to 50 mcg daily for 2 months then recommend rechecking thyroid labs - these are ordered, just schedule a lab appt at that time.     Thank you,     Slime Perry D.O.

## 2025-03-02 DIAGNOSIS — I10 ESSENTIAL HYPERTENSION WITH GOAL BLOOD PRESSURE LESS THAN 140/90: ICD-10-CM

## 2025-03-04 RX ORDER — AMLODIPINE BESYLATE 5 MG/1
5 TABLET ORAL DAILY
Qty: 90 TABLET | Refills: 0 | Status: SHIPPED | OUTPATIENT
Start: 2025-03-04

## 2025-03-17 DIAGNOSIS — F33.41 RECURRENT MAJOR DEPRESSIVE DISORDER, IN PARTIAL REMISSION: Primary | ICD-10-CM

## 2025-03-17 DIAGNOSIS — F41.1 GAD (GENERALIZED ANXIETY DISORDER): ICD-10-CM

## 2025-03-19 RX ORDER — VENLAFAXINE HYDROCHLORIDE 225 MG/1
225 TABLET, EXTENDED RELEASE ORAL DAILY
Qty: 30 TABLET | Refills: 0 | Status: SHIPPED | OUTPATIENT
Start: 2025-03-19

## 2025-03-19 NOTE — TELEPHONE ENCOUNTER
Date of Last Office Visit: 12/19/2024  Date of Next Office Visit: None; routing for A to assist pt with scheduling.    No shows since last visit: No  More than one patient-initiated cancellation (with reschedule) since last seen in clinic? No    []Medication refilled per  Medication Refill in Ambulatory Care  policy.  [x]Scope of Practice: refill request processed by LPN/MA  []Medication unable to be refilled by RN due to criteria not met as indicated below:    []Eligibility: has not had a provider visit within last 6 months   [x]Supervision: no future appointment; < 7 days before next appointment   [x]Compliance: no shows; cancellations; possible lapse in therapy   []Verification: order discrepancy; may need modification...   [x] > 30-day supply request   []Advanced refill request: > 7 days before refill date   []Controlled medication   []Medication not included in policy   []Review: new med; med adjusted <= 30 days; safety alert; requires lab monitoring...   [x]Other: Overdue for follow up      Medication(s) requested:     -  venlafaxine (EFFEXOR-ER) 225 MG 24 hr tablet   Date last ordered: 9/3/2024  Qty: 90  Refills: 1      venlafaxine (EFFEXOR-ER) 225 MG 24 hr tablet 90 tablet 1 9/3/2024 -- No   Sig - Route: Take 1 tablet (225 mg) by mouth daily. - Oral   Sent to pharmacy as: Venlafaxine HCl  MG Oral Tablet Extended Release 24 Hour (EFFEXOR-ER)   Class: E-Prescribe       Any Controlled Substance(s)? No      Requested medication(s) verified as identical to current order? Yes    Any lapse in adherence to medication(s) greater than 5 days? Possible. As per pharmacy info, last filled on 12/9/2024 Qty: 90     Additional action taken? routed encounter to provider for review.      Last visit treatment plan:   Treatment Plan:    Return in about 2 months (around 2/19/2025).     Continue venlafaxine  mg daily for depression and anxiety.  Continue Abilify/aripiprazole back to 10 mg daily for depression/to  augment venlafaxine ER.   Continue ECT as recommended.  Continue all other cares per primary care provider.   Continue all other medications as reviewed per electronic medical record today.   Safety plan reviewed. To the Emergency Department as needed or call after hours crisis line at [phone] or [phone]. [place] Crisis Text Line. Text [place] to 049185 or Suicide LifeLine Chat: suicideprevention[facility]rist[facility] Logicline.org/chat  Strongly recommend individual psychotherapy for additional support and ongoing development of nonpharmacologic coping skills and strategies.  Schedule an appointment with me in 2 month or sooner as needed. Call [facility] at [phone] to schedule.  Follow up with primary care provider as planned or for acute medical concerns.  Call the psychiatric nurse line with medication questions or concerns at [phone].  MyChart may be used to communicate with your provider, but this is not intended to be used for emergencies.    Any medication(s) require lab monitoring? No    Claire Austin LPN on 3/19/2025 at 2:03 PM

## 2025-03-21 ENCOUNTER — VIRTUAL VISIT (OUTPATIENT)
Dept: PSYCHIATRY | Facility: CLINIC | Age: 65
End: 2025-03-21
Payer: COMMERCIAL

## 2025-03-21 DIAGNOSIS — R53.83 OTHER FATIGUE: ICD-10-CM

## 2025-03-21 DIAGNOSIS — F41.1 GAD (GENERALIZED ANXIETY DISORDER): ICD-10-CM

## 2025-03-21 DIAGNOSIS — F33.41 RECURRENT MAJOR DEPRESSIVE DISORDER, IN PARTIAL REMISSION: Primary | ICD-10-CM

## 2025-03-21 PROCEDURE — 98006 SYNCH AUDIO-VIDEO EST MOD 30: CPT | Performed by: PSYCHIATRY & NEUROLOGY

## 2025-03-21 PROCEDURE — 1126F AMNT PAIN NOTED NONE PRSNT: CPT | Performed by: PSYCHIATRY & NEUROLOGY

## 2025-03-21 RX ORDER — METHYLPHENIDATE HYDROCHLORIDE 10 MG/1
5-10 TABLET ORAL 2 TIMES DAILY PRN
Qty: 60 TABLET | Refills: 0 | Status: SHIPPED | OUTPATIENT
Start: 2025-03-21

## 2025-03-21 ASSESSMENT — PAIN SCALES - GENERAL: PAINLEVEL_OUTOF10: NO PAIN (0)

## 2025-03-21 NOTE — NURSING NOTE
Current patient location: [address] [place] [id number]    Is the patient currently in the state of [place]? YES    Visit mode: VIDEO    If the visit is dropped, the patient can be reconnected by:VIDEO VISIT: Text to cell phone:   Telephone Information:   Mobile [phone]    and VIDEO VISIT: Send to e-mail at: [email or web address]    Will anyone else be joining the visit? NO  (If patient encounters technical issues they should call [phone]    Are changes needed to the allergy or medication list? No    Are refills needed on medications prescribed by this physician? YES    Rooming Documentation:  Patient will complete questionnaire(s) in [facility]    Reason for visit: RECHECK    Janice MONACO

## 2025-03-21 NOTE — PROGRESS NOTES
"Virtual Visit Details    Type of service:  Video Visit     Originating Location (pt. Location): {video visit patient location:006122::\"Home\"}  {PROVIDER LOCATION On-site should be selected for visits conducted from your clinic location or adjoining [facility] hospital, academic office, or other nearby [facility] building. Off-site should be selected for all other provider locations, including home:922898}  Distant Location (provider location):  {virtual location provider:801406}  Platform used for Video Visit: {Virtual Visit Platforms:802338::\"SocialChorus\"}  "

## 2025-03-21 NOTE — PROGRESS NOTES
"Telemedicine Visit: The patient's condition can be safely assessed and treated via synchronous audio and visual telemedicine encounter.      Reason for Telemedicine Visit: Patient has requested telehealth visit    Originating Site (Patient Location): Patient's home    Distant Location (provider location):  On-Site    Consent:  The patient/guardian has verbally consented to: the potential risks and benefits of telemedicine (video visit) versus in person care; bill my insurance or make self-payment for services provided; and responsibility for payment of non-covered services.     Mode of Communication:  Video Conference via gBox    As the provider I attest to compliance with applicable laws and regulations related to telemedicine.          Outpatient Psychiatric Progress Note    Name: Nela Mares   : 1960                    Primary Care Provider: Padma Ray MD   Therapist: No current therapist    PHQ-9 scores:      2024     8:23 AM 2024     4:03 PM 3/21/2025     2:40 PM   PHQ-9 SCORE   PHQ-9 Total Score MyChart  8 (Mild depression) 13 (Moderate depression)   PHQ-9 Total Score 20 8  13        Patient-reported       PETE-7 scores:      2023     9:46 AM 10/26/2023    10:12 AM 2024     3:53 PM   PETE-7 SCORE   Total Score 16 (severe anxiety) 15 (severe anxiety) 4 (minimal anxiety)   Total Score 16 15 4       Patient Identification:  Patient is a 64 year old, single  White Choose not to answer female  who presents for return visit with me.  Patient is currently retired and working. Patient attended the phone/video session alone. Patient prefers to be called: \"Sanam\".    Interim History:  I last saw Nela Mares for outpatient psychiatry return visit on 2024. During that appointment, we:    Continue venlafaxine  mg daily for depression and anxiety.  Continue Abilify/aripiprazole back to 10 mg daily for depression/to augment venlafaxine ER.   Continue ECT as " "recommended.  Continue all other cares per primary care provider.       3/21: Patient overall feeling kind of \"blah.\"  Ongoing low energy and low motivation.  Is working just over 30 hours a week.  Patient feels like it is helpful to be working, especially back in a school.  Sleeping up to 12 hours a night.  No longer in maintenance ECT since she got a job.  Not overtly depressed, but not feeling great either.  Family still supportive.  Taking medication as prescribed.  No new abnormal involuntary movements.  Close are fitting a little tighter.  Patient does not monitor weight at home.  No acute safety concerns.  No acute suicidality.  No problematic drug or alcohol use.  See [facility] note below for additional details.    Per [facility], [facility], during today's team-based visit:   update:  Doing okay.  Not 100%.  Feeling kind of blah.  Low energy, low motivation.  Completely done with ECT.  Quit ECT to get a job.  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  Job is going well.  Not keeping up with house or doing extras outside work.  Denies hopelessness/worthlessness.  Feels very flat/blah.  Stresses:  still seeing sisters   Appetite: n/a  Sleep: Going to bed right away from work, sleeping 12 hours  Outpatient Provider updates: Denies  SI/SIB/HI: Denies any  CAMRYN:  Denies  Side effects/compliance:  Interventions:  [facility] engaged in sx and goal exploration.    Most important:  Feb/March -flatness/depression starting creeping back in.  Hopeful for a med change?  If ECT recommended again would have to wait to summer and job is done for the year    Past Psychiatric Med Trials:  Psych Meds at Intake 11/2023:  Abilify 5 mg daily   BusPar 10 mg twice daily  Effexor-.5 mg daily      Past Psych Meds:  Per PharmD note 11/1/23:  Paxil - \"not a good fit\"  Bupropion - caused extreme irritability, rage  Imipramine - reports this was aweful in terms of side effects  Fluoxetine - felt like it didn't work originally.   Lexapro " - stopped working  Celexa - stopped working  Zoloft    Psychiatric ROS:  Nela Mares reports mood has been: See HPI above  Anxiety has been: See HPI above  Sleep has been: See HPI above  Olivia sxs: None  Psychosis sxs: None  ADHD/ADD sxs: NA  PTSD sxs: NA  PHQ9 and GAD7 scores were reviewed today if completed.   Medication side effects: Denies -maybe some weight gain from Abilify?  Current stressors include: Symptoms and see HPI above  Coping mechanisms and supports include: Therapy, Family, Hobbies, and Friends    Current medications include:   Current Outpatient Medications   Medication Sig Dispense Refill    albuterol (PROAIR HFA/PROVENTIL HFA/VENTOLIN HFA) 108 (90 Base) MCG/ACT inhaler Inhale 2 puffs into the lungs every 4 hours as needed for shortness of breath or wheezing 36 g 1    albuterol (PROVENTIL) (2.5 MG/3ML) 0.083% neb solution Take 1 vial (2.5 mg) by nebulization every 4 hours as needed for shortness of breath / dyspnea or wheezing 30 mL 11    amLODIPine (NORVASC) 5 MG tablet Take 1 tablet (5 mg) by mouth daily 90 tablet 0    ARIPiprazole (ABILIFY) 10 MG tablet Take 1 tablet (10 mg) by mouth daily. 90 tablet 1    atorvastatin (LIPITOR) 10 MG tablet Take 1 tablet (10 mg) by mouth at bedtime 90 tablet 3    calcium carbonate 500 MG CHEW Take 1,000 mg by mouth daily.      cetirizine (ZYRTEC) 10 MG tablet Take 1 tablet (10 mg) by mouth daily 90 tablet 3    Cholecalciferol (VITAMIN D) 125 MCG (5000 UT) capsule Take 1 capsule (5,000 Units) by mouth daily 90 capsule 3    clobetasol (TEMOVATE) 0.05 % external ointment Apply twice weekly when well controlled. Increase to daily use with flares 60 g 3    finasteride (PROSCAR) 5 MG tablet Take 1 tablet (5 mg) by mouth daily 90 tablet 3    fluticasone (FLONASE) 50 MCG/ACT nasal spray Spray 2 sprays into both nostrils daily 48 g 3    fluticasone-salmeterol (ADVAIR) 250-50 MCG/ACT inhaler Inhale 1 puff into the lungs every 12 hours 3 each 0    levothyroxine  (SYNTHROID/LEVOTHROID) 25 MCG tablet Take 1 tablet (25 mcg) by mouth daily. 30 tablet 1    levothyroxine (SYNTHROID/LEVOTHROID) 50 MCG tablet Take 1 tablet (50 mcg) by mouth every morning (before breakfast). 90 tablet 0    lisinopril (ZESTRIL) 40 MG tablet Take 1 tablet (40 mg) by mouth daily 90 tablet 3    venlafaxine (EFFEXOR-ER) 225 MG 24 hr tablet Take 1 tablet (225 mg) by mouth daily. Need appt for refills. 30 tablet 0     No current facility-administered medications for this visit.     [place]NPMP:  No meds on registry for the last 1 year    Past Medical/Surgical History:  Past Medical History:   Diagnosis Date    Allergic rhinitis, cause unspecified     Cervical high risk HPV (human papillomavirus) test positive 03/21/2017    3/21/17 NIL pap/+ HR HPV (not 16 or 18).     Depressive disorder     Depressive disorder, not elsewhere classified     seasonal    Diabetes mellitus, type 2 (H) 02/20/2023    Hypertension     Mild persistent asthma     Obstructive sleep apnea (adult) (pediatric)     uses CPAP    Primary osteoarthritis of both knees 01/28/2022    Scalp mass 07/2014      has a past medical history of Allergic rhinitis, cause unspecified, Cervical high risk HPV (human papillomavirus) test positive (03/21/2017), Depressive disorder, Depressive disorder, not elsewhere classified, Diabetes mellitus, type 2 (H) (02/20/2023), Hypertension, Mild persistent asthma, Obstructive sleep apnea (adult) (pediatric), Primary osteoarthritis of both knees (01/28/2022), and Scalp mass (07/2014).    She has no past medical history of Basal cell carcinoma, Cancer (H), Cerebral infarction (H), Congestive heart failure (H), COPD (chronic obstructive pulmonary disease) (H), Heart disease, History of blood transfusion, Malignant melanoma (H), Squamous cell carcinoma, or Thyroid disease.    Social History:  Reviewed. No changes to social history except as noted above in HPI.    Vital Signs:   None. This is phone/video visit.      Labs:  Most recent laboratory results reviewed:      Component  Ref Range & Units 7 d ago 17 yr ago     Thyroid Peroxidase Antibody  <35 IU/mL 243 High  <10   Resulting Agency [facility]             Specimen Collected: 11/11/24 10:39 AM Last Resulted: 11/12/24 11:27 AM       Component  Ref Range & Units 7 d ago 17 yr ago     Thyroglobulin Antibody  <40 IU/mL <20 <20   Resulting Agency [facility]             Specimen Collected: 11/11/24 10:39 AM Last Resulted: 11/12/24 11:27 AM     Component  Ref Range & Units 7 d ago     TSH  0.30 - 4.20 uIU/mL 4.18   Resulting Agency [facility]             Specimen Collected: 11/11/24 10:39 AM Last Resulted: 11/11/24 10:39 PM     Lab Results   Component Value Date    A1C 6.0 10/01/2024    A1C 6.0 02/29/2024    A1C 6.0 08/30/2023    A1C 6.5 02/20/2023    A1C 6.0 12/02/2021    A1C 6.0 03/05/2021    A1C 5.7 07/20/2020    A1C 5.9 07/18/2019    A1C 5.7 03/16/2017         Recent Labs   Lab Test 03/07/24  0927 08/30/23  0834   CHOL 187 187   HDL 39* 50   * 116*   TRIG 178* 105     Liver Function Studies -   Recent Labs   Lab Test 03/07/24  0927   PROTTOTAL 7.4   ALBUMIN 4.2   BILITOTAL 0.3   ALKPHOS 78   AST 52*   ALT 53*      Review of Systems:  10 systems (general, cardiovascular, respiratory, eyes, ENT, endocrine, GI, , M/S, neurological) were reviewed. Most pertinent finding(s) is/are:  denies fever, cough, persistent headaches, shortness of breath, chest pain, severe GI symptoms, trouble urinating, severe pain.  The remaining systems are all unremarkable.    Mental Status Examination (limited as this is by phone/video):  Appearance: Awake, alert, appears stated age, no acute distress, well-groomed   Attitude:  cooperative, pleasant   Motor: No gross abnormalities observed via video, not formally tested   Oriented to:  person, place, time, and situation  Attention Span and Concentration:  normal  Speech:  clear, coherent, regular rate, rhythm, and volume  Language:  intact  Mood: ok  Affect:  appropriate and in normal range and mood congruent  Associations:  no loose associations  Thought Process:  logical, linear and goal oriented  Thought Content:  no evidence of suicidal ideation or homicidal ideation, no evidence of psychotic thought, no auditory hallucinations present and no visual hallucinations present  Recent and Remote Memory:  Intact to interview. Not formally assessed.   Fund of Knowledge: appropriate  Insight:  good  Judgment:  intact, adequate for safety  Impulse Control:  intact      Suicide Risk Assessment:  Today Nela Mares reports no acute suicidality today-suicidal ideation much improved. In addition, there are notable risk factors for self-harm, including single status, anxiety, hopelessness, withdrawing, and mood change. However, risk is mitigated by commitment to family, absence of past attempts, ability to volunteer a safety plan, history of seeking help when needed, future oriented, no access to firearms or weapons, and denies suicidal intent or plan. Therefore, based on all available evidence including the factors cited above, Nela Mares does not appear to be at imminent risk for self-harm, does not meet criteria for a 72-hr hold, and therefore remains appropriate for ongoing outpatient level of care.  A thorough assessment of risk factors related to suicide and self-harm have been reviewed and are noted above. The patient convincingly denies suicidality on several occasions. Local community safety resources reviewed for patient to use if needed. There was no deceit detected, and the patient presented in a manner that was believable.     DSM5 Diagnosis:  Major Depressive Disorder, Recurrent Episode, in partial remission  Generalized anxiety disorder    Other: fatigue    Medical comorbidities include:   Patient Active Problem List    Diagnosis Date Noted    Hashimoto's thyroiditis 12/09/2024     Priority: Medium    Recurrent major depressive  disorder, in partial remission 03/22/2024     Priority: Medium    Closed traumatic displaced fracture of distal end of left radius, initial encounter 02/28/2024     Priority: Medium    Diabetes mellitus, type 2 (H) 02/20/2023     Priority: Medium    Lichen sclerosus 02/20/2023     Priority: Medium    Chronic midline low back pain without sciatica 01/28/2022     Priority: Medium    Primary osteoarthritis of both knees 01/28/2022     Priority: Medium    Paroxysmal supraventricular tachycardia 05/10/2021     Priority: Medium     Added automatically from request for surgery [id number]      Allergic conjunctivitis, bilateral 08/27/2019     Priority: Medium    Overweight (H) 07/18/2019     Priority: Medium    Seasonal allergic rhinitis due to pollen 10/18/2018     Priority: Medium    Allergic rhinitis due to dust mite 10/18/2018     Priority: Medium    Allergic rhinitis due to mold 10/18/2018     Priority: Medium    IUD (intrauterine device) in place 03/21/2017     Priority: Medium     mirena place in 2015      Hypertension goal BP (blood pressure) < 140/90 10/25/2010     Priority: Medium    Leiomyoma of uterus 08/14/2007     Priority: Medium     Problem list name updated by automated process. Provider to review      Obstructive sleep apnea      Priority: Medium     uses CPAP          Mild persistent asthma 10/11/2005     Priority: Medium     Typically needs steroid burst about once per year for symptoms uncontrolled with continued advair and maxair q 4 hours.  Peak flow generally runs 400. With illness goes down to 350.    12/09 - given one extra burst of prednisone.  Instructed to call if she starts it so we can help her monitor her symptoms.      Allergic rhinitis due to animals 08/31/2004     Priority: Medium       Psychosocial & Contextual Factors: see HPI above    Assessment:  From Intake, 11/09/2023:  Nela Mares is a 63-year-old female with past psychiatric history including depression and anxiety who presents  today for psychiatric evaluation.  Patient with long history of struggling with intermittent episodes of depression, severe at times.  Depression dates back to adolescence.  Strong family history of depression.  Has a history of several medication trials due to tachyphylaxis after around 7 years of use.  Patient most recently with very severe depressive episode lasting for several weeks.  Patient was seeing primary care provider for medication changes and has most recently been on venlafaxine and buspirone.  Aripiprazole was added about 10 days ago and patient has experienced significant relief and improvement of her severe depression symptoms.  Patient taking aripiprazole 5 mg daily to augment her routine with venlafaxine and buspirone.  Venlafaxine had been increased by adding 37.5 mg capsule to her 225 mg dose for a total of 262.5 mg daily.  That had not been very helpful.  Buspirone was added to her regimen which was helpful briefly but effects did not last at all.  Since aripiprazole is suspected to be so helpful for her symptoms, patient will start slow taper off of buspirone.  If remains stable, patient will consider dropping venlafaxine back to 225 mg daily.  Patient will reach out if there is any regression/worsening of symptoms.  We did discuss ECT as a possible option in the future if her symptoms get as bad as they were this last episode.  Patient was open to this suggestion.  Encouraged to continue in therapy.  No acute safety concerns.  No longer having any suicidal ideation.  Family is very supportive.  Her dogs are very protective.  No problematic drug or alcohol use.     Discussed metabolic risks and movement disorder risks, including risk for permanent movement related symptoms, when using Abilify/aripiprazole.  Labs ordered for 3-month lipid panel and hemoglobin A1c check.    12/14/2023:  Patient overall doing relatively okay.  Continues to work through some increased psychosocial stressors.   Hoping things will be settling soon.  Encouraged to continue to stay active.  Encouraged to continue light therapy box and supplements.  No medication changes today except for trial of discontinuation of buspirone.  No acute safety concerns.  No SI today.  No problematic drug or alcohol use.    1/18/2024:  Patient overall with much improved symptoms that have been sustained/maintained on Abilify 10 mg daily.  No abnormal involuntary movements.  Patient encouraged to have labs completed to monitor/measure at lipid panel and A1c.  Could consider addition of metformin if metabolic derangements or if starts to gain weight.  Metformin can also be used prophylactically for such.  No acute safety concerns.  No SI.  No problematic drug or alcohol use.  Due to ongoing stability of mental health on current regimen, patient's mental health care will be returned back to primary care provider for ongoing management.    9/3/2024 (new episode of care with collaborative care psychiatric services started today):  Patient with significantly worsening depression symptoms.  Patient meeting criteria for severe depression.  Patient interested in ECT to treat depression at this time due to severity of symptoms and hope that ECT might help improve symptoms more quickly and with fewer side effects than some other options.  No acute suicidality.  History of method seeking/planning.  None currently.  No access to firearms.  Discussed potentially changing Abilify to Saphris or Rexulti.  Patient prefers to pursue ECT at this time and wait on major medication changes.  Patient is agreeable to decreasing Abilify back to 10 mg daily since there was no improvement on 15 mg.  No problematic drug or alcohol use.    11/07/2024:  Patient currently undergoing ECT once weekly.  Some transient memory struggles and pretty significant fatigue.  Some slight mood improvements but nothing profound at this time.  We will check patient's thyroid since thyroid  issues run in the family and patient does have history of transiently elevated TSH.  May need to consider something more activating medication wise to help with fatigue and excessive sedation/sleeping.  Patient had been sleeping 15+ hours a day even prior to ECT starting.  Medication therapy management referral placed since patient and sibling are wondering if there are any medication interactions or if statin could be causing problems-patient's mother with pretty profound fatigue on statin in the past apparently.  No acute suicidality.  Patient's family very supportive.  No problematic drug or alcohol use.    11/18/2024:  Patient with evidence of Hashimoto's.  Positive/reactive thyroid peroxidase antibody.  TSH currently within normal limits.  Tried to add T3 total and T4 free but not enough blood with recent blood draw.  Patient will wait until follows up with primary care provider in case more labs needing to be drawn.  Patient encouraged to follow up with primary care provider regarding evidence of Hashimoto's.  This provider will try to get in touch with ECT provider to coordinate care.  In the future could consider augmenting regimen with stimulant medication-particularly if thyroid hormone replacement not added.  Could also consider transitioning Abilify to a different neuroleptic medication to augment Effexor -only if symptoms worsen.  For now, patient will continue with ECT since has started to feel better the last couple days.  No acute safety concerns.  No acute suicidality.  Patient's family continues to be very supportive.  No problematic drug or alcohol use.    12/19/2024:  Patient overall doing well.  ECT every other week.  Tolerating medications well.  Patient just started Synthroid about 1 week ago.  We will wait on starting modafinil or any type of stimulant augmentation until we know how symptoms might improve or not on Synthroid.  Patient mentioned her sisters were wondering about any possible  medication dose changes (particularly decreasing).  Discussed I recommend not changing any medications at this time due to just newly feeling better.  Tolerating medications well.  No acute safety concerns.  No SI.  No problematic drug or alcohol use.    3/21/2025:  Overall patient doing okay.  Maybe some very slight regression of symptoms.  Does not feel like is working too much.  Appreciates the structure.  Patient will monitor weight gain potentially from Abilify.  May need to consider decreasing/reducing this in the future.  PCP recently ordered updated lipid panel and BMP.  No new labs ordered today.  Patient encouraged to have fasting labs completed.  No new abnormal involuntary movements.  Stopped ECT.  Ongoing high fatigue.  On Synthroid 50 mcg for a very slightly elevated TSH.  Discussed addition of low dose methylphenidate.  This was discussed also with ECT provider a few months back as a possible adjunct.  Discussed risks versus benefits with patient.  Patient agreeable to starting low-dose methylphenidate.  Patient will watch for any racing heart sensations since does have history of supraventricular tachycardia.  Discussed increased risk for arrhythmias on methylphenidate.  No acute safety concerns.  No acute suicidality.  No problematic drug or alcohol use.    Medication side effects and alternatives were reviewed. Health promotion activities recommended and reviewed today. All questions addressed. Education and counseling completed regarding risks and benefits of medications and psychotherapy options. Recommend therapy for additional support.     Treatment Plan:  Continue venlafaxine  mg daily for depression and anxiety.  Continue Abilify/aripiprazole back to 10 mg daily for depression/to augment venlafaxine ER.   Continue to monitor for weight gain  Discussed getting updated lipid panel and fasting glucose (included in BMP ordered) labs completed  Start methylphenidate/Ritalin 5-10 mg twice  daily as needed for chronic fatigue, depression augmentation.  Continue to monitor blood pressure  Continue all other cares per primary care provider.   Continue all other medications as reviewed per electronic medical record today.   Safety plan reviewed. To the Emergency Department as needed or call after hours crisis line at [phone] or [phone]. [place] Crisis Text Line. Text [place] to 185407 or Suicide LifeLine Chat: suicidepreventionIntegene Internationalline.org/chat  Strongly recommend individual psychotherapy for additional support and ongoing development of nonpharmacologic coping skills and strategies.  Schedule an appointment with me in 4-6 weeks or sooner as needed. Call [facility] at [phone] to schedule.  Follow up with primary care provider as planned or for acute medical concerns.  Call the psychiatric nurse line with medication questions or concerns at [phone].  iCrackedhart may be used to communicate with your provider, but this is not intended to be used for emergencies.    Administrative Billing:   Phone Call/Video Duration: 15 Minutes  Start: 3:05p  Stop: 3:20p    The longitudinal plan of care for the diagnosis(es)/condition(s) as documented were addressed during this visit. Due to the added complexity in care, I will continue to support Sanam in the subsequent management and with ongoing continuity of care.    Episode of Care #: 5 (new episode of care started 9/3/2024)    Patient Status:  Patient will continue to be seen for ongoing consultation and stabilization.    Signed:   Madison Vazquez DO  [facility]    Disclaimer: This note consists of symbols derived from keyboarding, dictation and/or voice recognition software. As a result, there may be errors in the script that have gone undetected. Please consider this when interpreting information found in this chart.

## 2025-03-24 ENCOUNTER — LAB (OUTPATIENT)
Dept: LAB | Facility: CLINIC | Age: 65
End: 2025-03-24
Payer: COMMERCIAL

## 2025-03-24 DIAGNOSIS — E11.9 DIABETES MELLITUS, TYPE 2 (H): ICD-10-CM

## 2025-03-24 DIAGNOSIS — E06.3 HASHIMOTO'S THYROIDITIS: ICD-10-CM

## 2025-03-24 DIAGNOSIS — I10 HYPERTENSION GOAL BP (BLOOD PRESSURE) < 140/90: Primary | ICD-10-CM

## 2025-03-24 LAB
ANION GAP SERPL CALCULATED.3IONS-SCNC: 12 MMOL/L (ref 7–15)
BUN SERPL-MCNC: 12.4 MG/DL (ref 8–23)
CALCIUM SERPL-MCNC: 9.7 MG/DL (ref 8.8–10.4)
CHLORIDE SERPL-SCNC: 103 MMOL/L (ref 98–107)
CHOLEST SERPL-MCNC: 165 MG/DL
CREAT SERPL-MCNC: 0.9 MG/DL (ref 0.51–0.95)
EGFRCR SERPLBLD CKD-EPI 2021: 71 ML/MIN/1.73M2
FASTING STATUS PATIENT QL REPORTED: YES
FASTING STATUS PATIENT QL REPORTED: YES
GLUCOSE SERPL-MCNC: 110 MG/DL (ref 70–99)
HCO3 SERPL-SCNC: 25 MMOL/L (ref 22–29)
HDLC SERPL-MCNC: 50 MG/DL
LDLC SERPL CALC-MCNC: 92 MG/DL
NONHDLC SERPL-MCNC: 115 MG/DL
POTASSIUM SERPL-SCNC: 4.2 MMOL/L (ref 3.4–5.3)
SODIUM SERPL-SCNC: 140 MMOL/L (ref 135–145)
T4 FREE SERPL-MCNC: 1.32 NG/DL (ref 0.9–1.7)
TRIGL SERPL-MCNC: 115 MG/DL
TSH SERPL DL<=0.005 MIU/L-ACNC: 2.58 UIU/ML (ref 0.3–4.2)

## 2025-03-24 PROCEDURE — 36415 COLL VENOUS BLD VENIPUNCTURE: CPT

## 2025-03-24 PROCEDURE — 84443 ASSAY THYROID STIM HORMONE: CPT

## 2025-03-24 PROCEDURE — 80061 LIPID PANEL: CPT

## 2025-03-24 PROCEDURE — 80048 BASIC METABOLIC PNL TOTAL CA: CPT

## 2025-03-24 PROCEDURE — 84439 ASSAY OF FREE THYROXINE: CPT

## 2025-03-24 NOTE — PATIENT INSTRUCTIONS
Treatment Plan:  Continue venlafaxine  mg daily for depression and anxiety.  Continue Abilify/aripiprazole back to 10 mg daily for depression/to augment venlafaxine ER.   Continue to monitor for weight gain  Discussed getting updated lipid panel and fasting glucose (included in BMP ordered) labs completed  Start methylphenidate/Ritalin 5-10 mg twice daily as needed for chronic fatigue, depression augmentation.  Continue to monitor blood pressure  Continue all other cares per primary care provider.   Continue all other medications as reviewed per electronic medical record today.   Safety plan reviewed. To the Emergency Department as needed or call after hours crisis line at [phone] or [phone]. [place] Crisis Text Line. Text [place] to 263917 or Suicide LifeLine Chat: suicidepreventionWaferGen Biosystemsline.org/chat  Strongly recommend individual psychotherapy for additional support and ongoing development of nonpharmacologic coping skills and strategies.  Schedule an appointment with me in 4-6 weeks or sooner as needed. Call [facility] at [phone] to schedule.  Follow up with primary care provider as planned or for acute medical concerns.  Call the psychiatric nurse line with medication questions or concerns at [phone].  MyChart may be used to communicate with your provider, but this is not intended to be used for emergencies.    Patient Education   Collaborative Care Psychiatry Service  What to Expect  Here's what to expect from your Collaborative Care Psychiatry Service (CCPS).   About CCPS  CCPS means 2 people work together to help you get better. You'll meet with a behavioral health clinician and a psychiatric doctor. A behavioral health clinician helps people with mental health problems by talking with them. A psychiatric doctor helps people by giving them medicine.  How it works  At every visit, you'll see the behavioral health clinician (BHC) first. They'll talk with you about how  "you're doing and teach you how to feel better.   Then you'll see the psychiatric doctor. This doctor can help you deal with troubling thoughts and feelings by giving you medicine. They'll make sure you know the plan for your care.   CCPS usually takes 3 to 6 visits. If you need more visits, we may have you start seeing a different psychiatric doctor for ongoing care.  If you have any questions or concerns, we'll be glad to talk with you.  About visits  Be open  At your visits, please talk openly about your problems. It may feel hard, but it's the best way for us to help you.  Cancelling visits  If you can't come to your visit, please call us right away at [phone]. If you don't cancel at least 24 hours (1 full day) before your visit, that's \"late cancellation.\"  Being late to visits  Being very late is the same as not showing up. You will be a \"no show\" if:  Your appointment starts with a [facility], and you're more than 15 minutes late for a 30-minute (half hour) visit. This will also cancel your appointment with the psychiatric doctor.  Your appointment is with a psychiatric doctor only, and you're more than 15 minutes late for a 30-minute (half hour) visit.  Your appointment is with a psychiatric doctor only, and you're more than 30 minutes late for a 60-minute (full hour) visit.  If you cancel late or don't show up 2 times within 6 months, we may end your care.   Getting help between visits  If you need help between visits, you can call us Monday to Friday from 8 a.m. to 4:30 p.m. at [phone].  Emergency care  Call 911 or go to the nearest emergency department if your life or someone else's life is in danger.  Call 868 anytime to reach the national Suicide and Crisis hotline.  Medicine refills  To refill your medicine, call your pharmacy. You can also call [facility]'s Behavioral Access at [phone], Monday to Friday, 8 a.m. to 4:30 p.m. It can take 1 to 3 business days to get a refill.   Forms, " letters, and tests  You may have papers to fill out, like FMLA, short-term disability, and workability. We can help you with these forms at your visits, but you must have an appointment. You may need more than 1 visit for this, to be in an intensive therapy program, or both.  Before we can give you medicine for ADHD, we may refer you to get tested for it or confirm it another way.  We may not be able to give you an emotional support animal letter.  We don't do mental health checks ordered by the court.   We don't do mental health testing, but we can refer you to get tested.   Thank you for choosing us for your care.  For informational purposes only. Not to replace the advice of your health care provider. Copyright   2022 [facility]. All rights reserved. [employer] 327705 - Rev 11/24.

## 2025-03-25 DIAGNOSIS — E78.5 HYPERLIPIDEMIA LDL GOAL <130: ICD-10-CM

## 2025-03-25 RX ORDER — ATORVASTATIN CALCIUM 10 MG/1
10 TABLET, FILM COATED ORAL AT BEDTIME
Qty: 90 TABLET | Refills: 0 | Status: SHIPPED | OUTPATIENT
Start: 2025-03-25

## 2025-03-31 ENCOUNTER — ANCILLARY PROCEDURE (OUTPATIENT)
Dept: MAMMOGRAPHY | Facility: CLINIC | Age: 65
End: 2025-03-31
Attending: STUDENT IN AN ORGANIZED HEALTH CARE EDUCATION/TRAINING PROGRAM
Payer: COMMERCIAL

## 2025-03-31 DIAGNOSIS — Z12.31 VISIT FOR SCREENING MAMMOGRAM: ICD-10-CM

## 2025-03-31 PROCEDURE — 77067 SCR MAMMO BI INCL CAD: CPT | Mod: TC | Performed by: STUDENT IN AN ORGANIZED HEALTH CARE EDUCATION/TRAINING PROGRAM

## 2025-03-31 PROCEDURE — 77063 BREAST TOMOSYNTHESIS BI: CPT | Mod: TC | Performed by: STUDENT IN AN ORGANIZED HEALTH CARE EDUCATION/TRAINING PROGRAM

## 2025-04-07 ENCOUNTER — MYC REFILL (OUTPATIENT)
Dept: PSYCHIATRY | Facility: CLINIC | Age: 65
End: 2025-04-07
Payer: COMMERCIAL

## 2025-04-07 DIAGNOSIS — F33.41 RECURRENT MAJOR DEPRESSIVE DISORDER, IN PARTIAL REMISSION: ICD-10-CM

## 2025-04-07 DIAGNOSIS — F41.1 GAD (GENERALIZED ANXIETY DISORDER): ICD-10-CM

## 2025-04-07 RX ORDER — VENLAFAXINE HYDROCHLORIDE 225 MG/1
225 TABLET, EXTENDED RELEASE ORAL DAILY
Qty: 30 TABLET | Refills: 0 | Status: SHIPPED | OUTPATIENT
Start: 2025-04-07

## 2025-04-07 NOTE — TELEPHONE ENCOUNTER
Date of Last Office Visit: 03/21/2025  Date of Next Office Visit:  05/02/2025  No shows since last visit: No  More than one patient-initiated cancellation (with reschedule) since last seen in clinic? No    []Medication refilled per  Medication Refill in Ambulatory Care  policy.  [x]Scope of Practice: refill request processed by LPN/MA  []Medication unable to be refilled by RN due to criteria not met as indicated below:    []Eligibility: has not had a provider visit within last 6 months   []Supervision: no future appointment; < 7 days before next appointment   []Compliance: no shows; cancellations; lapse in therapy   []Verification: order discrepancy; may need modification...   [] > 30-day supply request   []Advanced refill request: > 7 days before refill date   []Controlled medication   []Medication not included in policy   []Review: new med; med adjusted <= 30 days; safety alert; requires lab monitoring...   []Other:      Medication(s) requested:     -  venlafaxine (EFFEXOR-ER) 225 MG 24 hr tablet   Date last ordered: 03/19/2025  Qty: 30  Refills: 0  Appropriate for refill? Provider to review.            Any Controlled Substance(s)? No      Requested medication(s) verified as identical to current order? Yes    Any lapse in adherence to medication(s) greater than 5 days? No      Additional action taken? contacted patient via via voicemail message at [phone], Left vm for patient to call the clinic back we need to know if she is due for the medication mentioned above, cued up medication/order(s), and routed encounter to provider for review.      Last visit treatment plan:   Schedule an appointment with me in 4-6 weeks   Continue venlafaxine  mg daily for depression and anxiety.  Continue Abilify/aripiprazole back to 10 mg daily for depression/to augment venlafaxine ER.   Continue to monitor for weight gain  Discussed getting updated lipid panel and fasting glucose (included in BMP ordered) labs  completed  Start methylphenidate/Ritalin 5-10 mg twice daily as needed for chronic fatigue, depression augmentation.  Continue to monitor blood pressure    Any medication(s) require lab monitoring? No    Dahlia Abraham MA on 4/7/2025 at 3:14 PM

## 2025-04-13 ENCOUNTER — HEALTH MAINTENANCE LETTER (OUTPATIENT)
Age: 65
End: 2025-04-13

## 2025-04-20 DIAGNOSIS — E06.3 HASHIMOTO'S THYROIDITIS: ICD-10-CM

## 2025-04-20 RX ORDER — LEVOTHYROXINE SODIUM 50 UG/1
50 TABLET ORAL
Qty: 90 TABLET | Refills: 1 | Status: SHIPPED | OUTPATIENT
Start: 2025-04-20

## 2025-04-21 ENCOUNTER — MYC REFILL (OUTPATIENT)
Dept: PSYCHIATRY | Facility: CLINIC | Age: 65
End: 2025-04-21
Payer: COMMERCIAL

## 2025-04-21 DIAGNOSIS — F33.41 RECURRENT MAJOR DEPRESSIVE DISORDER, IN PARTIAL REMISSION: Primary | ICD-10-CM

## 2025-04-21 RX ORDER — ARIPIPRAZOLE 10 MG/1
10 TABLET ORAL DAILY
Qty: 90 TABLET | Refills: 1 | Status: SHIPPED | OUTPATIENT
Start: 2025-04-21

## 2025-04-21 NOTE — TELEPHONE ENCOUNTER
Date of Last Office Visit: 3/21/2025  Date of Next Office Visit:  5/2/2025  No shows since last visit: No  More than one patient-initiated cancellation (with reschedule) since last seen in clinic? No    []Medication refilled per  Medication Refill in Ambulatory Care  policy.  []Scope of Practice: refill request processed by LPN/MA  [x]Medication unable to be refilled by RN due to criteria not met as indicated below:    []Eligibility: has not had a provider visit within last 6 months   []Supervision: no future appointment; < 7 days before next appointment   [x]Compliance: no shows; cancellations; lapse in therapy   []Verification: order discrepancy; may need modification...   [x] > 30-day supply request   []Advanced refill request: > 7 days before refill date   []Controlled medication   []Medication not included in policy   []Review: new med; med adjusted <= 30 days; safety alert; requires lab monitoring...   []Other:      Medication(s) requested:     -  ARIPiprazole (ABILIFY) 10 MG tablet   Date last ordered: 9/3/2024  Qty: 90  Refills: 1      Any Controlled Substance(s)? No      Requested medication(s) verified as identical to current order? Yes    Any lapse in adherence to medication(s) greater than 5 days? Unknown  Sending [employer] message to pt  As per dispense report, last filled for a 90-day supply on 12/2/24    Additional action taken?   Sent to provider    Last visit treatment plan:   Continue venlafaxine  mg daily for depression and anxiety.  Continue Abilify/aripiprazole back to 10 mg daily for depression/to augment venlafaxine ER.   Continue to monitor for weight gain  Discussed getting updated lipid panel and fasting glucose (included in BMP ordered) labs completed  Start methylphenidate/Ritalin 5-10 mg twice daily as needed for chronic fatigue, depression augmentation.  Continue to monitor blood pressure  Continue all other cares per primary care provider.   Continue all other medications as  reviewed per electronic medical record today.   Safety plan reviewed. To the Emergency Department as needed or call after hours crisis line at [phone] or [phone]. [place] Crisis Text Line. Text [place] to 308194 or Suicide LifeLine Chat: suicidepreventionlifeline.org/chat  Strongly recommend individual psychotherapy for additional support and ongoing development of nonpharmacologic coping skills and strategies.  Schedule an appointment with me in 4-6 weeks or sooner as needed. Call [facility] at [phone] to schedule.    Any medication(s) require lab monitoring? Yes   GENERAL ANTIPSYCHOTIC   Last Hemoglobin A1c:   Hemoglobin A1C   Date Value Ref Range Status   10/01/2024 6.0 (H) 0.0 - 5.6 % Final     Comment:     Normal <5.7%   Prediabetes 5.7-6.4%    Diabetes 6.5% or higher     Note: Adopted from ADA consensus guidelines.     Last Lipid Panel (fasting):   Cholesterol   Date Value Ref Range Status   03/24/2025 165 <200 mg/dL Final     Triglycerides   Date Value Ref Range Status   03/24/2025 115 <150 mg/dL Final     Direct Measure HDL   Date Value Ref Range Status   03/24/2025 50 >=50 mg/dL Final     LDL Cholesterol Calculated   Date Value Ref Range Status   03/24/2025 92 <100 mg/dL Final     Non HDL Cholesterol   Date Value Ref Range Status   03/24/2025 115 <130 mg/dL Final     Patient Fasting > 8hrs?   Date Value Ref Range Status   03/24/2025 Yes  Final   03/24/2025 Yes  Final        Claire Austin LPN on 4/21/2025 at 12:48 PM

## 2025-05-03 ENCOUNTER — MYC MEDICAL ADVICE (OUTPATIENT)
Dept: PSYCHIATRY | Facility: CLINIC | Age: 65
End: 2025-05-03
Payer: COMMERCIAL

## 2025-05-03 DIAGNOSIS — I10 ESSENTIAL HYPERTENSION WITH GOAL BLOOD PRESSURE LESS THAN 140/90: ICD-10-CM

## 2025-05-04 RX ORDER — LISINOPRIL 40 MG/1
40 TABLET ORAL DAILY
Qty: 90 TABLET | Refills: 0 | Status: SHIPPED | OUTPATIENT
Start: 2025-05-04

## 2025-05-05 NOTE — TELEPHONE ENCOUNTER
RN received a [employer] message from this patient requesting a refill of her Effexor 225 mg tablet.       RN reviewed the EHR and noted that Dr. Vazquez had refilled this prescription 5/2/25 for # 90 with 1 additional refill.      2.  RN phoned and spoke to the patient.  She was unaware that this prescription had been sent.  She thanked the clinic and will call her pharmacy.  She had no additional questions or concerns.      Ken Cao RN on 5/5/2025 at 12:25 PM

## 2025-06-01 DIAGNOSIS — I10 ESSENTIAL HYPERTENSION WITH GOAL BLOOD PRESSURE LESS THAN 140/90: ICD-10-CM

## 2025-06-01 RX ORDER — AMLODIPINE BESYLATE 5 MG/1
5 TABLET ORAL DAILY
Qty: 90 TABLET | Refills: 0 | Status: SHIPPED | OUTPATIENT
Start: 2025-06-01

## 2025-06-04 SDOH — HEALTH STABILITY: PHYSICAL HEALTH: ON AVERAGE, HOW MANY MINUTES DO YOU ENGAGE IN EXERCISE AT THIS LEVEL?: 0 MIN

## 2025-06-04 SDOH — HEALTH STABILITY: PHYSICAL HEALTH: ON AVERAGE, HOW MANY DAYS PER WEEK DO YOU ENGAGE IN MODERATE TO STRENUOUS EXERCISE (LIKE A BRISK WALK)?: 0 DAYS

## 2025-06-04 ASSESSMENT — ASTHMA QUESTIONNAIRES
QUESTION_3 LAST FOUR WEEKS HOW OFTEN DID YOUR ASTHMA SYMPTOMS (WHEEZING, COUGHING, SHORTNESS OF BREATH, CHEST TIGHTNESS OR PAIN) WAKE YOU UP AT NIGHT OR EARLIER THAN USUAL IN THE MORNING: NOT AT ALL
QUESTION_1 LAST FOUR WEEKS HOW MUCH OF THE TIME DID YOUR ASTHMA KEEP YOU FROM GETTING AS MUCH DONE AT WORK, SCHOOL OR AT HOME: NONE OF THE TIME
QUESTION_4 LAST FOUR WEEKS HOW OFTEN HAVE YOU USED YOUR RESCUE INHALER OR NEBULIZER MEDICATION (SUCH AS ALBUTEROL): NOT AT ALL
ACT_TOTALSCORE: 25
QUESTION_2 LAST FOUR WEEKS HOW OFTEN HAVE YOU HAD SHORTNESS OF BREATH: NOT AT ALL
QUESTION_5 LAST FOUR WEEKS HOW WOULD YOU RATE YOUR ASTHMA CONTROL: COMPLETELY CONTROLLED

## 2025-06-04 ASSESSMENT — SOCIAL DETERMINANTS OF HEALTH (SDOH): HOW OFTEN DO YOU GET TOGETHER WITH FRIENDS OR RELATIVES?: ONCE A WEEK

## 2025-06-09 ENCOUNTER — OFFICE VISIT (OUTPATIENT)
Dept: FAMILY MEDICINE | Facility: CLINIC | Age: 65
End: 2025-06-09
Attending: STUDENT IN AN ORGANIZED HEALTH CARE EDUCATION/TRAINING PROGRAM
Payer: COMMERCIAL

## 2025-06-09 VITALS
TEMPERATURE: 98.2 F | HEART RATE: 67 BPM | BODY MASS INDEX: 36.68 KG/M2 | WEIGHT: 242 LBS | DIASTOLIC BLOOD PRESSURE: 82 MMHG | SYSTOLIC BLOOD PRESSURE: 130 MMHG | HEIGHT: 68 IN | RESPIRATION RATE: 18 BRPM | OXYGEN SATURATION: 98 %

## 2025-06-09 DIAGNOSIS — E66.01 MORBID OBESITY (H): ICD-10-CM

## 2025-06-09 DIAGNOSIS — Z00.00 ROUTINE GENERAL MEDICAL EXAMINATION AT A HEALTH CARE FACILITY: Primary | ICD-10-CM

## 2025-06-09 DIAGNOSIS — E11.9 TYPE 2 DIABETES MELLITUS WITHOUT COMPLICATION, WITHOUT LONG-TERM CURRENT USE OF INSULIN (H): ICD-10-CM

## 2025-06-09 DIAGNOSIS — I10 ESSENTIAL HYPERTENSION WITH GOAL BLOOD PRESSURE LESS THAN 140/90: ICD-10-CM

## 2025-06-09 DIAGNOSIS — E06.3 HASHIMOTO'S THYROIDITIS: ICD-10-CM

## 2025-06-09 DIAGNOSIS — E78.5 HYPERLIPIDEMIA LDL GOAL <130: ICD-10-CM

## 2025-06-09 DIAGNOSIS — Z12.4 CERVICAL CANCER SCREENING: ICD-10-CM

## 2025-06-09 LAB
EST. AVERAGE GLUCOSE BLD GHB EST-MCNC: 128 MG/DL
HBA1C MFR BLD: 6.1 % (ref 0–5.6)

## 2025-06-09 PROCEDURE — 99396 PREV VISIT EST AGE 40-64: CPT | Performed by: STUDENT IN AN ORGANIZED HEALTH CARE EDUCATION/TRAINING PROGRAM

## 2025-06-09 PROCEDURE — 3075F SYST BP GE 130 - 139MM HG: CPT | Performed by: STUDENT IN AN ORGANIZED HEALTH CARE EDUCATION/TRAINING PROGRAM

## 2025-06-09 PROCEDURE — 83036 HEMOGLOBIN GLYCOSYLATED A1C: CPT | Performed by: STUDENT IN AN ORGANIZED HEALTH CARE EDUCATION/TRAINING PROGRAM

## 2025-06-09 PROCEDURE — 99214 OFFICE O/P EST MOD 30 MIN: CPT | Mod: 25 | Performed by: STUDENT IN AN ORGANIZED HEALTH CARE EDUCATION/TRAINING PROGRAM

## 2025-06-09 PROCEDURE — 87624 HPV HI-RISK TYP POOLED RSLT: CPT | Performed by: STUDENT IN AN ORGANIZED HEALTH CARE EDUCATION/TRAINING PROGRAM

## 2025-06-09 PROCEDURE — 36415 COLL VENOUS BLD VENIPUNCTURE: CPT | Performed by: STUDENT IN AN ORGANIZED HEALTH CARE EDUCATION/TRAINING PROGRAM

## 2025-06-09 PROCEDURE — 3079F DIAST BP 80-89 MM HG: CPT | Performed by: STUDENT IN AN ORGANIZED HEALTH CARE EDUCATION/TRAINING PROGRAM

## 2025-06-09 PROCEDURE — G0145 SCR C/V CYTO,THINLAYER,RESCR: HCPCS | Performed by: STUDENT IN AN ORGANIZED HEALTH CARE EDUCATION/TRAINING PROGRAM

## 2025-06-09 RX ORDER — AMLODIPINE BESYLATE 5 MG/1
5 TABLET ORAL DAILY
Qty: 90 TABLET | Refills: 3 | Status: SHIPPED | OUTPATIENT
Start: 2025-06-09 | End: 2025-06-09

## 2025-06-09 RX ORDER — AMLODIPINE BESYLATE 5 MG/1
5 TABLET ORAL DAILY
Qty: 90 TABLET | Refills: 3 | Status: SHIPPED | OUTPATIENT
Start: 2025-06-09

## 2025-06-09 RX ORDER — LISINOPRIL 40 MG/1
40 TABLET ORAL DAILY
Qty: 90 TABLET | Refills: 3 | Status: SHIPPED | OUTPATIENT
Start: 2025-06-09

## 2025-06-09 RX ORDER — ATORVASTATIN CALCIUM 10 MG/1
10 TABLET, FILM COATED ORAL AT BEDTIME
Qty: 90 TABLET | Refills: 3 | Status: SHIPPED | OUTPATIENT
Start: 2025-06-09

## 2025-06-09 NOTE — PROGRESS NOTES
"Preventive Care Visit  [facility]  Slime Perry DO, Family Medicine  Jun 9, 2025      Assessment & Plan     Routine general medical examination at a health care facility  Morbid obesity   Work on diet and weight loss    Hyperlipidemia LDL goal <130  Recent labs have been in a much better range with lipitor. Refills sent   - atorvastatin (LIPITOR) 10 MG tablet; Take 1 tablet (10 mg) by mouth at bedtime.    Essential hypertension with goal blood pressure less than 140/90  Chronic, stable, BP well controlled on current meds, stable at home. BMP recently checked.   - amLODIPine (NORVASC) 5 MG tablet; Take 1 tablet (5 mg) by mouth daily.  - lisinopril (ZESTRIL) 40 MG tablet; Take 1 tablet (40 mg) by mouth daily.    Hashimoto's thyroiditis  Labs stable recently. No adjustment to levothyroxine     Type 2 diabetes mellitus without complication, without long-term current use of insulin (H)  Lab Results   Component Value Date    A1C 6.1 06/09/2025    A1C 6.0 10/01/2024    A1C 6.0 02/29/2024    A1C 6.0 08/30/2023    A1C 6.5 02/20/2023    A1C 6.0 03/05/2021    A1C 5.7 07/20/2020    A1C 5.9 07/18/2019    A1C 5.7 03/16/2017     Well controlled diabetes still. No adjustment needed. Controlled by diet.   - Hemoglobin A1c; Future  - FOOT EXAM    Cervical cancer screening  - HPV and Gynecologic Cytology Panel - Recommended Age 30-65 Years    Patient has been advised of split billing requirements and indicates understanding: Yes        BMI  Estimated body mass index is 36.8 kg/m  as calculated from the following:    Height as of this encounter: 1.727 m (5' 8\").    Weight as of this encounter: 109.8 kg (242 lb).   Weight management plan: Discussed healthy diet and exercise guidelines    Counseling  Appropriate preventive services were addressed with this patient via screening, questionnaire, or discussion as appropriate for fall prevention, nutrition, physical activity, Tobacco-use cessation, social " engagement, weight loss and cognition.  Checklist reviewing preventive services available has been given to the patient.  Reviewed patient's diet, addressing concerns and/or questions.       Follow-up    Follow-up Visit   Expected date:  Jun 09, 2026 (Approximate)      Follow Up Appointment Details:     Follow-up with whom?: PCP    Follow-Up for what?: Adult Preventive    How?: In Person                 Subjective   Sanam is a 64 year old, presenting for the following:  Physical           History of Present Illness       Hypothyroidism:     Since last visit, patient describes the following symptoms::  None      Doing walking and gardening.     Not enough vegetables, eats too much/portions, needs to cut down on sugar.   Water intake is good.   1-2 cups coffee/day   No alcohol.   No smoking     Mental health has been in a good spot. Weaning off of ability. Doing well so far with the wean. Sleeping well.   Thyroid feels like it's in a good.     BP at home low 130s/low 80s.     No asthma or allergy symptoms this year. Did really well with allergy shots. Not needing albuterol inhaler. Stopped taking advair for the lat 6 months.       Lab Results   Component Value Date    A1C 6.0 10/01/2024    A1C 6.0 02/29/2024    A1C 6.0 08/30/2023    A1C 6.5 02/20/2023    A1C 6.0 12/02/2021    A1C 6.0 03/05/2021    A1C 5.7 07/20/2020    A1C 5.9 07/18/2019    A1C 5.7 03/16/2017         Advance Care Planning    Patient states has Health Care Directive and will send to [employer].        6/4/2025   General Health   How would you rate your overall physical health? (!) FAIR   Feel stress (tense, anxious, or unable to sleep) Not at all         6/4/2025   Nutrition   Three or more servings of calcium each day? (!) NO   Diet: Regular (no restrictions)   How many servings of fruit and vegetables per day? (!) 2-3   How many sweetened beverages each day? 0-1         6/4/2025   Exercise   Days per week of moderate/strenous exercise 0 days    Average minutes spent exercising at this level 0 min   (!) EXERCISE CONCERN      6/4/2025   Social Factors   Frequency of gathering with friends or relatives Once a week   Worry food won't last until get money to buy more No   Food not last or not have enough money for food? No   Do you have housing? (Housing is defined as stable permanent housing and does not include staying outside in a car, in a tent, in an abandoned building, in an overnight shelter, or couch-surfing.) Yes   Are you worried about losing your housing? No   Lack of transportation? No   Unable to get utilities (heat,electricity)? No         6/4/2025   Fall Risk   Fallen 2 or more times in the past year? No   Trouble with walking or balance? No          6/4/2025   Dental   Dentist two times every year? Yes         Today's PHQ-9 Score:       5/1/2025     4:38 PM   PHQ-9 SCORE   PHQ-9 Total Score MyChart 1 (Minimal depression)   PHQ-9 Total Score 1        Patient-reported           6/4/2025   Substance Use   Alcohol more than 3/day or more than 7/wk Not Applicable   Do you use any other substances recreationally? No     Social History     Tobacco Use    Smoking status: Never     Passive exposure: Never    Smokeless tobacco: Never   Vaping Use    Vaping status: Never Used   Substance Use Topics    Alcohol use: No    Drug use: No           3/31/2025   LAST FHS-7 RESULTS   1st degree relative breast or ovarian cancer No   Any relative bilateral breast cancer No   Any male have breast cancer No   Any ONE woman have BOTH breast AND ovarian cancer No   Any woman with breast cancer before 50yrs No   2 or more relatives with breast AND/OR ovarian cancer No   2 or more relatives with breast AND/OR bowel cancer No        Mammogram Screening - Mammogram every 1-2 years updated in Health Maintenance based on mutual decision making        6/4/2025   STI Screening   New sexual partner(s) since last STI/HIV test? No     History of abnormal Pap smear: No - age 30-  64 PAP with HPV every 5 years recommended        Latest Ref Rng & Units 8/5/2020    11:49 AM 8/5/2020    11:45 AM 5/2/2018     4:10 PM   PAP / HPV   PAP (Historical)  NIL      HPV 16 DNA NEG^Negative  Negative  Negative    HPV 18 DNA NEG^Negative  Negative  Negative    Other HR HPV NEG^Negative  Negative  Negative      ASCVD Risk   The 10-year ASCVD risk score (Araceli LEÓN, et al., 2019) is: 12.2%    Values used to calculate the score:      Age: 64 years      Sex: Female      Is Non- : No      Diabetic: Yes      Tobacco smoker: No      Systolic Blood Pressure: 130 mmHg      Is BP treated: Yes      HDL Cholesterol: 50 mg/dL      Total Cholesterol: 165 mg/dL           Reviewed and updated as needed this visit by Provider     Meds                Past Medical History:   Diagnosis Date    Allergic rhinitis, cause unspecified     Cervical high risk HPV (human papillomavirus) test positive 03/21/2017    3/21/17 NIL pap/+ HR HPV (not 16 or 18).     Depressive disorder     Depressive disorder, not elsewhere classified     seasonal    Diabetes mellitus, type 2 (H) 02/20/2023    Hypertension     Mild persistent asthma     Obstructive sleep apnea (adult) (pediatric)     uses CPAP    Primary osteoarthritis of both knees 01/28/2022    Scalp mass 07/2014     Past Surgical History:   Procedure Laterality Date    COLONOSCOPY  6/21/2012    Procedure: COLONOSCOPY;  COLONOSCOPY, SCREEN;  Surgeon: Bart You MD;  [facility]    COLONOSCOPY N/A 10/20/2022    Procedure: COLONOSCOPY, WITH POLYPECTOMY AND BIOPSY;  Surgeon: Chago Hong DO;  Location: [facility]    COLONOSCOPY N/A 3/20/2023    Procedure: COLONOSCOPY, WITH POLYPECTOMY AND BIOPSY;  Surgeon: Lion Vidal MD;  Location:  GI    D & C      ENDOSCOPIC RETROGRADE CHOLANGIOPANCREATOGRAM  1/4/2014    Procedure: ENDOSCOPIC RETROGRADE CHOLANGIOPANCREATOGRAM;  ERCP biliary sphincterotomy, bile duct stone removal, epinepherine washing.;   Surgeon: Ba Meyers MD;  Location: [facility]    EP ABLATION SVT N/A 2021    Procedure: EP ABLATION SVT;  Surgeon: Cuca Magaña MD;  [facility]: [facility]    LAPAROSCOPIC CHOLECYSTECTOMY WITH CHOLANGIOGRAMS  2014    Procedure: LAPAROSCOPIC CHOLECYSTECTOMY WITH CHOLANGIOGRAMS;;  Surgeon: Haja Sage MD;  Location: [facility]    MAMMOPLASTY REDUCTION BILATERAL Bilateral 2023    Procedure: MAMMOPLASTY, REDUCTION, BILATERAL;  Surgeon: DARYL Knutson MD;  Location: [facility]    NO HISTORY OF SURGERY      OPEN REDUCTION INTERNAL FIXATION WRIST Right 3/1/2024    Procedure: OPEN REDUCTION INTERNAL FIXATION, FRACTURE, RIGHT WRIST;  Surgeon: Andrea Escalante MD;  Location: [facility]    OPERATIVE HYSTEROSCOPY  2014    Procedure: OPERATIVE HYSTEROSCOPY;  Surgeon: Paola Camara MD;  Location: [facility]    REMOVE INTRAUTERINE DEVICE  2014    Procedure: REMOVE INTRAUTERINE DEVICE;  Surgeon: Paola Camara MD;  Location: [facility]     OB History    Para Term  AB Living   0 0 0 0 0 0   SAB IAB Ectopic Multiple Live Births   0 0 0 0 0     Lab work is in process  Labs reviewed in EPIC  BP Readings from Last 3 Encounters:   25 130/82   24 120/82   10/01/24 122/74    Wt Readings from Last 3 Encounters:   25 109.8 kg (242 lb)   24 108.9 kg (240 lb)   10/01/24 108.9 kg (240 lb)                  Patient Active Problem List   Diagnosis    Allergic rhinitis due to animals    Mild persistent asthma    Obstructive sleep apnea    Leiomyoma of uterus    Hypertension goal BP (blood pressure) < 140/90    IUD (intrauterine device) in place    Seasonal allergic rhinitis due to pollen    Allergic rhinitis due to dust mite    Allergic rhinitis due to mold    Overweight (H)    Allergic conjunctivitis, bilateral    Paroxysmal supraventricular tachycardia    Chronic midline low back pain without sciatica    Primary osteoarthritis of both knees     Diabetes mellitus, type 2 (H)    Lichen sclerosus    Closed traumatic displaced fracture of distal end of left radius, initial encounter    Recurrent major depressive disorder, in partial remission    Hashimoto's thyroiditis     Past Surgical History:   Procedure Laterality Date    COLONOSCOPY  6/21/2012    Procedure: COLONOSCOPY;  COLONOSCOPY, SCREEN;  Surgeon: Bart You MD;  [facility]    COLONOSCOPY N/A 10/20/2022    Procedure: COLONOSCOPY, WITH POLYPECTOMY AND BIOPSY;  Surgeon: Chago Hong DO;  [facility]    COLONOSCOPY N/A 3/20/2023    Procedure: COLONOSCOPY, WITH POLYPECTOMY AND BIOPSY;  Surgeon: Lion Vidal MD;  Location: [facility]    D & C      ENDOSCOPIC RETROGRADE CHOLANGIOPANCREATOGRAM  1/4/2014    Procedure: ENDOSCOPIC RETROGRADE CHOLANGIOPANCREATOGRAM;  ERCP biliary sphincterotomy, bile duct stone removal, epinepherine washing.;  Surgeon: Ba Meyers MD;  [facility]    EP ABLATION SVT N/A 6/2/2021    Procedure: EP ABLATION SVT;  Surgeon: Cuca Magaña MD;  Location:  [facility]ARD[facility] CATH LAB    LAPAROSCOPIC CHOLECYSTECTOMY WITH CHOLANGIOGRAMS  1/4/2014    Procedure: LAPAROSCOPIC CHOLECYSTECTOMY WITH CHOLANGIOGRAMS;;  Surgeon: Haja Sage MD;  [facility]    MAMMOPLASTY REDUCTION BILATERAL Bilateral 6/28/2023    Procedure: MAMMOPLASTY, REDUCTION, BILATERAL;  Surgeon: DARYL Knutson MD;  Location: [facility]    NO HISTORY OF SURGERY      OPEN REDUCTION INTERNAL FIXATION WRIST Right 3/1/2024    Procedure: OPEN REDUCTION INTERNAL FIXATION, FRACTURE, RIGHT WRIST;  Surgeon: Andrea Escalante MD;  [facility]: [facility]    OPERATIVE HYSTEROSCOPY  6/17/2014    Procedure: OPERATIVE HYSTEROSCOPY;  Surgeon: Paola Camara MD;  [facility]    REMOVE INTRAUTERINE DEVICE  6/17/2014    Procedure: REMOVE INTRAUTERINE DEVICE;  Surgeon: Paola Camara MD;  Location: [facility]       Social History     Tobacco Use    Smoking status: Never      Passive exposure: Never    Smokeless tobacco: Never   Substance Use Topics    Alcohol use: No     Family History   Problem Relation Age of Onset    Neurologic Disorder Mother         Graves disease    Hyperlipidemia Mother     Depression Mother     Thyroid Disease Mother     Anxiety Disorder Mother     Skin Cancer Mother     Hypertension Father     Asthma Father     Obesity Father     Skin Cancer Father     Diabetes Father     Coronary Artery Disease Father     Skin Cancer Sister     Asthma Sister     Cancer Sister         mild skin cancer,cured from excision    Asthma Sister     Depression Maternal Grandmother     C.A.D. Paternal Grandmother     Cerebrovascular Disease Paternal Grandmother     Depression Paternal Grandmother     Obesity Paternal Grandmother     C.A.D. Paternal Grandfather     Cerebrovascular Disease Paternal Grandfather     Substance Abuse Paternal Grandfather     Mental Illness Maternal Half-Brother     Thyroid Disease Maternal Half-Sister     Mental Illness Paternal Half-Brother     Thyroid Disease Paternal Half-Sister     Depression Nephew     Anxiety Disorder Nephew     Substance Abuse Nephew     Asthma Nephew     Anxiety Disorder Nephew     Substance Abuse Nephew     Asthma Nephew     Asthma Nephew     Other - See Comments Other          Current Outpatient Medications   Medication Sig Dispense Refill    albuterol (PROAIR HFA/PROVENTIL HFA/VENTOLIN HFA) 108 (90 Base) MCG/ACT inhaler Inhale 2 puffs into the lungs every 4 hours as needed for shortness of breath or wheezing 36 g 1    albuterol (PROVENTIL) (2.5 MG/3ML) 0.083% neb solution Take 1 vial (2.5 mg) by nebulization every 4 hours as needed for shortness of breath / dyspnea or wheezing 30 mL 11    amLODIPine (NORVASC) 5 MG tablet Take 1 tablet (5 mg) by mouth daily. 90 tablet 3    ARIPiprazole (ABILIFY) 10 MG tablet Take 0.5 tablets (5 mg) by mouth daily. 90 tablet 0    atorvastatin (LIPITOR) 10 MG tablet Take 1 tablet (10 mg) by mouth at  bedtime. 90 tablet 3    calcium carbonate 500 MG CHEW Take 1,000 mg by mouth daily.      cetirizine (ZYRTEC) 10 MG tablet Take 1 tablet (10 mg) by mouth daily 90 tablet 3    Cholecalciferol (VITAMIN D) 125 MCG (5000 UT) capsule Take 1 capsule (5,000 Units) by mouth daily 90 capsule 3    clobetasol (TEMOVATE) 0.05 % external ointment Apply twice weekly when well controlled. Increase to daily use with flares 60 g 3    fluticasone (FLONASE) 50 MCG/ACT nasal spray Spray 2 sprays into both nostrils daily 48 g 3    levothyroxine (SYNTHROID/LEVOTHROID) 50 MCG tablet Take 1 tablet (50 mcg) by mouth every morning (before breakfast). 90 tablet 1    lisinopril (ZESTRIL) 40 MG tablet Take 1 tablet (40 mg) by mouth daily. 90 tablet 3    methylphenidate (RITALIN) 10 MG tablet Take 1 tablet (10 mg) by mouth 2 times daily. 60 tablet 0    [START ON 7/3/2025] methylphenidate (RITALIN) 10 MG tablet Take 1 tablet (10 mg) by mouth 2 times daily. 60 tablet 0    venlafaxine (EFFEXOR-ER) 225 MG 24 hr tablet Take 1 tablet (225 mg) by mouth daily. 90 tablet 1     No Known Allergies  Recent Labs   Lab Test 03/24/25  1034 01/09/25  1506 11/11/24  1039 10/01/24  0914 03/07/24  0927 02/29/24  0950 08/30/23  0834 02/20/23  1013 12/02/21  1314 06/02/21  1151 11/02/20  1623 10/05/20  1028   A1C  --   --   --  6.0*  --  6.0* 6.0*   < > 6.0*  --    < >  --    LDL 92  --   --   --  112*  --  116*   < >  --   --   --   --    HDL 50  --   --   --  39*  --  50   < >  --   --   --   --    TRIG 115  --   --   --  178*  --  105   < >  --   --   --   --    ALT  --   --   --   --  53*  --   --   --  87*  --   --  120*   CR 0.90  --   --   --   --  0.88  --    < > 0.84 0.77  --  0.78   GFRESTIMATED 71  --   --   --   --  73  --    < > 75 83  --  82   GFRESTBLACK  --   --   --   --   --   --   --   --   --  >90  --  >90   POTASSIUM 4.2  --   --   --   --  3.6  --    < > 3.9 3.7   < > 3.0*   TSH 2.58 4.30*   < >  --   --   --   --   --  2.89  --   --  4.84*    <  "> = values in this interval not displayed.          Review of Systems  Constitutional, HEENT, cardiovascular, pulmonary, gi and gu systems are negative, except as otherwise noted.     Objective    Exam  /82 (Patient Position: Sitting, Cuff Size: Adult Large)   Pulse 67   Temp 98.2  F (36.8  C) (Oral)   Resp 18   Ht 1.727 m (5' 8\")   Wt 109.8 kg (242 lb)   LMP 12/21/2014 (Exact Date)   SpO2 98%   BMI 36.80 kg/m     Estimated body mass index is 36.8 kg/m  as calculated from the following:    Height as of this encounter: 1.727 m (5' 8\").    Weight as of this encounter: 109.8 kg (242 lb).    Physical Exam  GENERAL: alert and no distress  EYES: Eyes grossly normal to inspection, PERRL and conjunctivae and sclerae normal  HENT: ear canals and TM's normal, nose and mouth without ulcers or lesions  NECK: no adenopathy, no asymmetry, masses, or scars  RESP: lungs clear to auscultation - no rales, rhonchi or wheezes  CV: regular rate and rhythm, normal S1 S2, no S3 or S4, no murmur, click or rub, no peripheral edema  ABDOMEN: soft, nontender, no hepatosplenomegaly, no masses and bowel sounds normal   (female) w/bimanual: normal female external genitalia, normal urethral meatus, normal vaginal mucosa, and normal cervix without masses or discharge  MS: no gross musculoskeletal defects noted, no edema  SKIN: no suspicious lesions or rashes  NEURO: Normal strength and tone, mentation intact and speech normal  PSYCH: mentation appears normal, affect normal/bright  FOOT exam normal - normal monofilament exam         Signed Electronically by: Slime Perry DO    "

## 2025-06-09 NOTE — PATIENT INSTRUCTIONS
Preventive Care Advice   This is general advice given by our system to help you stay healthy. However, your care team may have specific advice just for you. Please talk to your care team about your preventive care needs.  Nutrition  Eat 5 or more servings of fruits and vegetables each day.  Try wheat bread, brown rice and whole grain pasta (instead of white bread, rice, and pasta).  Get enough calcium and vitamin D. Check the label on foods and aim for 100% of the RDA (recommended daily allowance).  Lifestyle  Exercise at least 150 minutes each week  (30 minutes a day, 5 days a week).  Do muscle strengthening activities 2 days a week. These help control your weight and prevent disease.  No smoking.  Wear sunscreen to prevent skin cancer.  Have a dental exam and cleaning every 6 months.  Yearly exams  See your health care team every year to talk about:  Any changes in your health.  Any medicines your care team has prescribed.  Preventive care, family planning, and ways to prevent chronic diseases.  Shots (vaccines)   HPV shots (up to age 26), if you've never had them before.  Hepatitis B shots (up to age 59), if you've never had them before.  COVID-19 shot: Get this shot when it's due.  Flu shot: Get a flu shot every year.  Tetanus shot: Get a tetanus shot every 10 years.  Pneumococcal, hepatitis A, and RSV shots: Ask your care team if you need these based on your risk.  Shingles shot (for age 50 and up)  General health tests  Diabetes screening:  Starting at age 35, Get screened for diabetes at least every 3 years.  If you are younger than age 35, ask your care team if you should be screened for diabetes.  Cholesterol test: At age 39, start having a cholesterol test every 5 years, or more often if advised.  Bone density scan (DEXA): At age 50, ask your care team if you should have this scan for osteoporosis (brittle bones).  Hepatitis C: Get tested at least once in your life.  STIs (sexually transmitted  infections)  Before age 24: Ask your care team if you should be screened for STIs.  After age 24: Get screened for STIs if you're at risk. You are at risk for STIs (including HIV) if:  You are sexually active with more than one person.  You don't use condoms every time.  You or a partner was diagnosed with a sexually transmitted infection.  If you are at risk for HIV, ask about PrEP medicine to prevent HIV.  Get tested for HIV at least once in your life, whether you are at risk for HIV or not.  Cancer screening tests  Cervical cancer screening: If you have a cervix, begin getting regular cervical cancer screening tests starting at age 21.  Breast cancer scan (mammogram): If you've ever had breasts, begin having regular mammograms starting at age 40. This is a scan to check for breast cancer.  Colon cancer screening: It is important to start screening for colon cancer at age 45.  Have a colonoscopy test every 10 years (or more often if you're at risk) Or, ask your provider about stool tests like a FIT test every year or Cologuard test every 3 years.  To learn more about your testing options, visit:   .  For help making a decision, visit:   https://bit.ly/nb26070.  Prostate cancer screening test: If you have a prostate, ask your care team if a prostate cancer screening test (PSA) at age 55 is right for you.  Lung cancer screening: If you are a current or former smoker ages 50 to 80, ask your care team if ongoing lung cancer screenings are right for you.  For informational purposes only. Not to replace the advice of your health care provider. Copyright   2023 [employer]. All rights reserved. Clinically reviewed by the [facility]itions Program. [employer] [id number] - REV 01/24.

## 2025-06-10 ENCOUNTER — RESULTS FOLLOW-UP (OUTPATIENT)
Dept: FAMILY MEDICINE | Facility: CLINIC | Age: 65
End: 2025-06-10

## 2025-06-10 LAB
HPV HR 12 DNA CVX QL NAA+PROBE: NEGATIVE
HPV16 DNA CVX QL NAA+PROBE: NEGATIVE
HPV18 DNA CVX QL NAA+PROBE: NEGATIVE
HUMAN PAPILLOMA VIRUS FINAL DIAGNOSIS: NORMAL

## 2025-06-12 LAB
BKR AP ASSOCIATED HPV REPORT: NORMAL
BKR LAB AP GYN ADEQUACY: NORMAL
BKR LAB AP GYN INTERPRETATION: NORMAL
BKR LAB AP PREVIOUS ABNORMAL: NORMAL
PATH REPORT.COMMENTS IMP SPEC: NORMAL
PATH REPORT.COMMENTS IMP SPEC: NORMAL
PATH REPORT.RELEVANT HX SPEC: NORMAL

## 2025-06-30 NOTE — PROGRESS NOTES
[facility]  7/2/25      Behavioral Health Clinician Progress Note    Patient Name: Nela Mares           Service Type:  Individual      Service Location:   [facility] / Email (patient reached)     Session Start Time: 10am  Session End Time: 1012      Session Length: 12min     Attendees: Client     Service Modality:  Video Visit:      Provider verified identity through the following two step process.  Patient provided:  Patient is known previously to provider    Telemedicine Visit: The patient's condition can be safely assessed and treated via synchronous audio and visual telemedicine encounter.      Reason for Telemedicine Visit: Services only offered telehealth    Originating Site (Patient Location): Patient's home    Distant Site (Provider Location): Provider Remote Setting- Home Office    Consent:  The patient/guardian has verbally consented to: the potential risks and benefits of telemedicine (video visit) versus in person care; bill my insurance or make self-payment for services provided; and responsibility for payment of non-covered services.     Patient would like the video invitation sent by:  My Chart    Mode of Communication:  Video Conference via [facility]    Distant Location (Provider):  Off-site    As the provider I attest to compliance with applicable laws and regulations related to telemedicine.    Visit Activities (Refresh list every visit): [facility] Only    Diagnostic Assessment Date: 9/3/24 Carmen Mosquera MA [facility]  Treatment Plan Review Date: 7/2/25  See Flowsheets for today's PHQ-9 and PETE-7 results  Previous PHQ-9:       3/21/2025     2:40 PM 5/1/2025     4:38 PM 7/1/2025    12:07 PM   PHQ-9 SCORE   PHQ-9 Total Score [facility] 13 (Moderate depression) 1 (Minimal depression) 0   PHQ-9 Total Score 13  1  0        Patient-reported       The following assessments were completed by patient for this visit:  GAD2:       11/7/2023     9:49 AM 8/29/2024      "9:01 AM 12/14/2024     9:51 AM 3/21/2025     2:40 PM 6/27/2025     1:09 PM   PETE-2   Feeling nervous, anxious, or on edge 2 1 0 0 0   Not being able to stop or control worrying 2 1 0 0 0   PETE-2 Total Score 4 2    2 0  0  0        Patient-reported     DATA  Extended Session (60+ minutes): No  Interactive Complexity: No  Crisis: No  [facility] Patient: No    Treatment Objective(s) Addressed in This Session:  Target Behavior(s): disease management/lifestyle changes reducing sx    Depressed Mood: Decrease frequency and intensity of feeling down, depressed, hopeless  Anxiety: will experience a reduction in anxiety and will develop more effective coping skills to manage anxiety symptoms    Current Stressors / Issues:  MH update:  Feeling really good.  A week and a half off Abilify.  Family believes more like old self, better energy.  Goal: of cleaning out every closet and making donations.  Started small and was able to start project.  Ritalin was so helpful!  Didn't take in June, but restarted in July thus getting things done.  Re starting work in August/ Sept.  Engaging in Gardening, walking, being outside.  Reading a lot.   Stresses:  n/a  Appetite: No concerns  Sleep: No concerns  Outpatient Provider updates: Denies  SI/SIB/HI: n/a  CAMRYN: n/a  Side effects/compliance:  Interventions:  [facility] engaged in discussion of more ground anxiety skills for at night, sent resources  Most important: waves of anxiety at night before bed, not overwhelming, just present. \"What if thoughts/irrational\".  Not related to stimulant or crash sx.  Reading helps and can often avoid this.  Needs refill of Ritalin.    Medicare in August.  It will no longer pay for Effexor at 225.  Lower doses to combine?          5/2  MH update:  with sister Fiona.  Much better then last visit.  Ritalin has been has helpful.  It started some momentum.  Hasn't taken in 10 days though as it ran out.   Been energy, better focus and concentration.  Reading books again.    " "  Stresses:  work will end in June for summer.  Appetite: no changes  Sleep:  Fatigue is much better.  Outpatient Provider updates: Denies  SI/SIB/HI:  Denies  CAMRYN: n/a  Side effects/compliance:  Interventions:  [facility] engaged in discussion re progress of tx.  C discussed routine and schedule to support med changes  Most important:  sister has concerns that she is not back to self (discussed maybe new baseline?).  Flatness, lack of quickness, disengaged.  Gait is slower.  (Sx since ECT).  Ritalin has been helpful per sister/Pt.   Sister is worried about \"over medication\".  Sanam has a fear of going backwards, but is abilify to much?    3/21   update:  Doing okay.  Not 100%.  Feeling kind of blah.  Low energy, low motivation.  Completely done with ECT.  Quit ECT to get a job.  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  Job is going well.  Not keeping up with house or doing extras outside work.  Denies hopelessness/worthlessness.  Feels very flat/blah.  Stresses:  still seeing sisters   Appetite: n/a  Sleep: Going to bed right away from work, sleeping 12 hours  Outpatient Provider updates: Denies  SI/SIB/HI: Denies any  CAMRYN:  Denies  Side effects/compliance:  Interventions:  [facility] engaged in sx and goal exploration.    Most important:  Feb/March -flatness/depression starting creeping back in.  Hopeful for a med change?  If ECT recommended again would have to wait to summer and job is done for the year    12/19   update:  ECT every 2 weeks now.  Did several sessions.  Feels like she has NO depression or anxiety. Brain fog lifted. High fatigue.  Stresses:  Last week got dx with Hashimoto's disease just started synthroid. ECT planned to con't until repeat thyroid tests are improved  Going to as with sisters next week.  Appetite: n/a  Sleep: 12-14 hours per days  Outpatient Provider updates: Denies needs.  SI/SIB/HI:  Denies any!  CAMRYN:  Denies lifetime.  Side effects/compliance:  Interventions:    [facility] reviewed " PT's progress and interpersonal support.  Pt notes that her sisters bring her to every one of her appts.  Call her daily and assist in tracking her moods/anxiety and sx for her to report to providers.  Most important:  thinking about getting a part time job.   Doing great!!  (High fatigue yet)    9/24  [facility] only appt.   update:  [place] [facility]  Reduced Abilify since last appt.  Less sedated.  Anxiety is higher without panic.  Anxious about tomorrows intake.  Depression remains the same  Stresses:  Pending ECT   Appetite: n/a  Sleep: n/a  Outpatient Provider updates:  [facility] referral placed  SI/SIB/HI:  Hx of planning/method seeking.  Denies previous attempts.  Denies current suicidal ideation, intent or planning.  Hx of self harm years ago.  Safety plan created.  CAMRYN:  Denies lifetime  Side effects/compliance:  Interventions:  [facility] engaged in support and validation of anxiety regarding tomorrows ECT intake  Most important:  Anxious about ECT, but hopeful.      9/3   update:  ROS above  Stresses:  transition into detention, no planned.  Last fall high depression went out on [employer].  Co workers filed compliant creating hostile work environment.  Forced detention.  Appetite: Variable  Sleep: Sleep 12-15  Outpatient Provider updates:  Denies need/current.  SI/SIB/HI:  Hx of planning/method seeking.  Denies previous attempts.  Denies current suicidal ideation, intent or planning.  Hx of self harm years ago.  Safety plan created.  CAMRYN:  Denies lifetime  Side effects/compliance:  Interventions:  [facility] engaged in completing DA with psychoeducation and tx planning.  Most important:  Depression sx high.    Progress on Treatment Objective(s) / Homework:  Satisfactory progress - CONTEMPLATION (Considering change and yet undecided); Intervened by assessing the negative and positive thinking (ambivalence) about behavior change    Motivational Interviewing    MI Intervention: Co-Developed Goal: reducing sx and Expressed  Empathy/Understanding     Change Talk Expressed by the Patient: Desire to change Ability to change    Provider Response to Change Talk: E - Evoked more info from patient about behavior change    Also provided psychoeducation about behavioral health condition, symptoms, and treatment options    Assessments completed prior to visit:  The following assessments were completed by patient for this visit:  N/a    Care Plan review completed: Yes    Medication Review:  Changes to psychiatric medications, see updated Medication List in EPIC.     Medication Compliance:  Yes    Changes in Health Issues:   None reported    Chemical Use Review:   Substance Use: Chemical use reviewed, no active concerns identified      Tobacco Use: No current tobacco use.      Assessment: Current Emotional / Mental Status (status of significant symptoms):  Risk status (Self / Other harm or suicidal ideation)  Patient has had a history of suicidal ideation: hx of method seeking.  Denies previous attempts and self-injurious behavior: hx of   Patient denies current fears or concerns for personal safety.  Patient denies current or recent suicidal ideation or behaviors.  Patient denies current or recent homicidal ideation or behaviors.  Patient denies current or recent self injurious behavior or ideation.  Patient denies other safety concerns.  A safety and risk management plan has been developed including: Patient consented to co-developed safety plan.  A safety and risk management plan was completed.  Patient agreed to use safety plan should any safety concerns arise.  A copy was given to the patient.    Appearance:   Appropriate   Eye Contact:   Good   Psychomotor Behavior: Normal   Attitude:   Cooperative   Orientation:   All  Speech   Rate / Production: Normal    Volume:  Normal   Mood:    Normal  Affect:    Appropriate   Thought Content:  Clear   Thought Form:  Coherent  Logical   Insight:    Good     Diagnoses:  1. Moderate episode of recurrent  major depressive disorder (H)    2. PETE (generalized anxiety disorder)          Collateral Reports Completed:  Communicated with: Dr Vazquez    Plan: (Homework, other):  Patient was given information about behavioral services and encouraged to schedule a follow up appointment with the clinic [facility] as needed.  She was also given information about mental health symptoms and treatment options .  CD Recommendations: No indications of CD issues.       Carmen Mosquera, [facility]    ______________________________________________________________________    Integrated Primary Care Behavioral Health Treatment Plan    Individual Treatment Plan    Patient's Name: Nela Mares   YOB: 1960  Date of Creation: 9/24/24  Date Treatment Plan Last Reviewed/Revised: 7/2/25    DSM5 Diagnoses:   1. Moderate episode of recurrent major depressive disorder (H)    2. PETE (generalized anxiety disorder)      Psychosocial / Contextual Factors: Medical Complexities, Occupational Issues, Interpersonal Concerns, and Financial Strain  PROMIS (reviewed every 90 days):   The following assessments were completed by patient for this visit:  PROMIS 10-Global Health (only subscores and total score):       11/7/2023     9:50 AM 8/29/2024     9:02 AM 11/7/2024    11:23 AM 3/21/2025     2:41 PM 6/27/2025     1:10 PM   PROMIS-10 Scores Only   Global Mental Health Score 7 5    5 5 9  13    Global Physical Health Score 11 10    10 14 13  18    PROMIS TOTAL - SUBSCORES 18 15    15 19 22  31        Patient-reported        Referral / Collaboration:  Referral to another professional/service is not indicated at this time..    Anticipated number of session for this episode of care: 6-9 sessions  Anticipation frequency of session: Monthly  Anticipated Duration of each session: 16-37 minutes  Treatment plan will be reviewed in 90 days or when goals have been changed.       MeasurableTreatment Goal(s) related to diagnosis / functional impairment(s)  Goal 1:  "Patient will reduce depression sx   \"I will know I've met my goal when I can get daily tasks done.\"     Objective #A (Patient Action)    Patient will Increase interest, engagement, and pleasure in doing things  Decrease frequency and intensity of feeling down, depressed, hopeless  Status: Continued - Date(s): 12/19/24 , 3/21/25, 7/2/25    Intervention(s)  [facility] will provide support through CBT, MI, Acceptance and Commitment Therapy, Dialectic Behavioral Therapy and problem solving model to explore and overcome barriers.        MeasurableTreatment Goal(s) related to diagnosis / functional impairment(s)  Goal 2: Patient will manage concerns of safety    I will know I've met my goal when I can reduce suicidal ideation .      Objective #A (Patient Action)    Patient will use previously developed safety plan on file.  Status: Cont - Date: 9/24/24 , 12/19/24, 3/21/25, 7/2/25    Intervention(s)  Therapist will provide support through CBT, MI, Acceptance and Commitment Therapy, Dialectic Behavioral Therapy and problem solving model to explore and overcome barriers.        Patient has reviewed and agreed to the above plan.    Written by  Carmen Mosquera, [facility], [facility]     "

## 2025-07-02 ENCOUNTER — VIRTUAL VISIT (OUTPATIENT)
Dept: BEHAVIORAL HEALTH | Facility: CLINIC | Age: 65
End: 2025-07-02
Payer: COMMERCIAL

## 2025-07-02 ENCOUNTER — VIRTUAL VISIT (OUTPATIENT)
Dept: PSYCHIATRY | Facility: CLINIC | Age: 65
End: 2025-07-02
Payer: COMMERCIAL

## 2025-07-02 VITALS — BODY MASS INDEX: 36.8 KG/M2 | HEIGHT: 68 IN

## 2025-07-02 DIAGNOSIS — F33.42 RECURRENT MAJOR DEPRESSIVE DISORDER, IN FULL REMISSION: Primary | ICD-10-CM

## 2025-07-02 DIAGNOSIS — F41.1 GAD (GENERALIZED ANXIETY DISORDER): ICD-10-CM

## 2025-07-02 DIAGNOSIS — F33.1 MODERATE EPISODE OF RECURRENT MAJOR DEPRESSIVE DISORDER (H): Primary | ICD-10-CM

## 2025-07-02 DIAGNOSIS — R53.83 OTHER FATIGUE: ICD-10-CM

## 2025-07-02 PROCEDURE — 99207 PR NO BILLABLE SERVICE THIS VISIT: CPT | Mod: 95 | Performed by: COUNSELOR

## 2025-07-02 PROCEDURE — 98006 SYNCH AUDIO-VIDEO EST MOD 30: CPT | Performed by: PSYCHIATRY & NEUROLOGY

## 2025-07-02 PROCEDURE — 1126F AMNT PAIN NOTED NONE PRSNT: CPT | Performed by: PSYCHIATRY & NEUROLOGY

## 2025-07-02 RX ORDER — VENLAFAXINE HYDROCHLORIDE 150 MG/1
150 CAPSULE, EXTENDED RELEASE ORAL DAILY
Qty: 90 CAPSULE | Refills: 1 | Status: SHIPPED | OUTPATIENT
Start: 2025-07-02

## 2025-07-02 RX ORDER — VENLAFAXINE HYDROCHLORIDE 75 MG/1
75 CAPSULE, EXTENDED RELEASE ORAL DAILY
Qty: 90 CAPSULE | Refills: 1 | Status: SHIPPED | OUTPATIENT
Start: 2025-07-02

## 2025-07-02 RX ORDER — METHYLPHENIDATE HYDROCHLORIDE 10 MG/1
10 TABLET ORAL 2 TIMES DAILY
Qty: 60 TABLET | Refills: 0 | Status: SHIPPED | OUTPATIENT
Start: 2025-08-02

## 2025-07-02 RX ORDER — METHYLPHENIDATE HYDROCHLORIDE 10 MG/1
10 TABLET ORAL 2 TIMES DAILY
Qty: 60 TABLET | Refills: 0 | Status: CANCELLED | OUTPATIENT
Start: 2025-07-03

## 2025-07-02 ASSESSMENT — PAIN SCALES - GENERAL: PAINLEVEL_OUTOF10: NO PAIN (0)

## 2025-07-02 NOTE — PROGRESS NOTES
"Telemedicine Visit: The patient's condition can be safely assessed and treated via synchronous audio and visual telemedicine encounter.      Reason for Telemedicine Visit: Patient has requested telehealth visit    Originating Site (Patient Location): Patient's home    Distant Location (provider location):  Off-Site    Consent:  The patient/guardian has verbally consented to: the potential risks and benefits of telemedicine (video visit) versus in person care; bill my insurance or make self-payment for services provided; and responsibility for payment of non-covered services.     Mode of Communication:  Video Conference via [employer] (formerly [employer])    As the provider I attest to compliance with applicable laws and regulations related to telemedicine.          Outpatient Psychiatric Progress Note    Name: Nela Mares   : 1960                    Primary Care Provider: Padma Ray MD   Therapist: No current therapist    PHQ-9 scores:      3/21/2025     2:40 PM 2025     4:38 PM 2025    12:07 PM   PHQ-9 SCORE   PHQ-9 Total Score MyChart 13 (Moderate depression) 1 (Minimal depression) 0   PHQ-9 Total Score 13  1  0        Patient-reported       PETE-7 scores:      2023     9:46 AM 10/26/2023    10:12 AM 2024     3:53 PM   PETE-7 SCORE   Total Score 16 (severe anxiety) 15 (severe anxiety) 4 (minimal anxiety)   Total Score 16 15 4       Patient Identification:  Patient is a 64 year old, single  White Choose not to answer female  who presents for return visit with me.  Patient is currently retired and working. Patient attended the phone/video session alone. Patient prefers to be called: \"Sanam\".    Interim History:  I last saw Nela Mares for outpatient psychiatry return visit on 2025. During that appointment, we:    Continue venlafaxine  mg daily for depression and anxiety.  Decrease/reduce Abilify/aripiprazole:    Weeks 1-3  Take 7.5 mg daily  Weeks 4-6  Take 5 mg daily   Weeks " "7-9  Take 2.5 mg daily   Weeks 10+  Trial discontinuation   Continue methylphenidate/Ritalin 5-10 mg twice daily as needed for chronic fatigue, depression augmentation.  Continue to monitor blood pressure  Continue all other cares per primary care provider.     7/2: Overall doing very well.  Methylphenidate has been incredibly helpful.  Methylphenidate helpful to lift mood, improve energy and motivation, improve focus/attention, improved task initiation and completion.  No worsening symptoms off Abilify.  Has been completely off Abilify for a week and a half.  No worsening depression symptoms.  Sisters have noticed that patient seems more like her old self.  Improved range of emotion.  Patient enjoying activities she normally enjoys participating in.  No acute safety concerns.  No acute suicidality.  No problematic drug or alcohol use.  See [facility] note below for additional details.  No chest pain, racing heart, tics from methylphenidate.    Per [facility], Carmen Mosquera, [facility], during today's team-based visit:  MH update:  Feeling really good.  A week and a half off Abilify.  Family believes more like old self, better energy.  Goal: of cleaning out every closet and making donations.  Started small and was able to start project.  Ritalin was so helpful!  Didn't take in June, but restarted in July thus getting things done.  Re starting work in August/ Sept.  Engaging in Gardening, walking, being outside.  Reading a lot.   Stresses:  n/a  Appetite: No concerns  Sleep: No concerns  Outpatient Provider updates: Denies  SI/SIB/HI: n/a  CAMRYN: n/a  Side effects/compliance:  Interventions:  [facility] engaged in discussion of more ground anxiety skills for at night, sent resources  Most important: waves of anxiety at night before bed, not overwhelming, just present. \"What if thoughts/irrational\".  Not related to stimulant or crash sx.  Reading helps and can often avoid this.  Needs refill of Ritalin.     Medicare in August.  It will no longer " "pay for Effexor at 225.  Lower doses to combine?      Past Psychiatric Med Trials:  Psych Meds at Intake 11/2023:  Abilify 5 mg daily   BusPar 10 mg twice daily  Effexor-.5 mg daily      Past Psych Meds:  Per PharmD note 11/1/23:  Paxil - \"not a good fit\"  Bupropion - caused extreme irritability, rage  Imipramine - reports this was aweful in terms of side effects  Fluoxetine - felt like it didn't work originally.   Lexapro - stopped working  Celexa - stopped working  Zoloft    Psychiatric ROS:  Nela Arthur Suzan reports mood has been: See HPI above  Anxiety has been: See HPI above  Sleep has been: See HPI above  Olivia sxs: None  Psychosis sxs: None  ADHD/ADD sxs: NA  PTSD sxs: NA  PHQ9 and GAD7 scores were reviewed today if completed.   Medication side effects: Denies  Current stressors include: Symptoms and see HPI above  Coping mechanisms and supports include: Therapy, Family, Hobbies, and Friends    Current medications include:   Current Outpatient Medications   Medication Sig Dispense Refill    albuterol (PROAIR HFA/PROVENTIL HFA/VENTOLIN HFA) 108 (90 Base) MCG/ACT inhaler Inhale 2 puffs into the lungs every 4 hours as needed for shortness of breath or wheezing 36 g 1    albuterol (PROVENTIL) (2.5 MG/3ML) 0.083% neb solution Take 1 vial (2.5 mg) by nebulization every 4 hours as needed for shortness of breath / dyspnea or wheezing 30 mL 11    amLODIPine (NORVASC) 5 MG tablet Take 1 tablet (5 mg) by mouth daily. 90 tablet 3    ARIPiprazole (ABILIFY) 10 MG tablet Take 0.5 tablets (5 mg) by mouth daily. 90 tablet 0    atorvastatin (LIPITOR) 10 MG tablet Take 1 tablet (10 mg) by mouth at bedtime. 90 tablet 3    calcium carbonate 500 MG CHEW Take 1,000 mg by mouth daily.      cetirizine (ZYRTEC) 10 MG tablet Take 1 tablet (10 mg) by mouth daily 90 tablet 3    Cholecalciferol (VITAMIN D) 125 MCG (5000 UT) capsule Take 1 capsule (5,000 Units) by mouth daily 90 capsule 3    clobetasol (TEMOVATE) 0.05 % external " ointment Apply twice weekly when well controlled. Increase to daily use with flares 60 g 3    fluticasone (FLONASE) 50 MCG/ACT nasal spray Spray 2 sprays into both nostrils daily 48 g 3    levothyroxine (SYNTHROID/LEVOTHROID) 50 MCG tablet Take 1 tablet (50 mcg) by mouth every morning (before breakfast). 90 tablet 1    lisinopril (ZESTRIL) 40 MG tablet Take 1 tablet (40 mg) by mouth daily. 90 tablet 3    methylphenidate (RITALIN) 10 MG tablet Take 1 tablet (10 mg) by mouth 2 times daily. 60 tablet 0    [START ON 7/3/2025] methylphenidate (RITALIN) 10 MG tablet Take 1 tablet (10 mg) by mouth 2 times daily. 60 tablet 0    venlafaxine (EFFEXOR-ER) 225 MG 24 hr tablet Take 1 tablet (225 mg) by mouth daily. 90 tablet 1     No current facility-administered medications for this visit.     [place]:  05/02/202505/02/20251  Methylphenidate 10 Mg Tablet  60.0030Al [id number] (1828)0/0[place]03/21/202503/21/20251  Methylphenidate 10 Mg Tablet  60.0030Al [id number] (1828)0/0[place]     Past Medical/Surgical History:  Past Medical History:   Diagnosis Date    Allergic rhinitis, cause unspecified     Cervical high risk HPV (human papillomavirus) test positive 03/21/2017    3/21/17 NIL pap/+ HR HPV (not 16 or 18).     Depressive disorder     Depressive disorder, not elsewhere classified     seasonal    Diabetes mellitus, type 2 (H) 02/20/2023    Hypertension     Mild persistent asthma     Obstructive sleep apnea (adult) (pediatric)     uses CPAP    Primary osteoarthritis of both knees 01/28/2022    Scalp mass 07/2014      has a past medical history of Allergic rhinitis, cause unspecified, Cervical high risk HPV (human papillomavirus) test positive (03/21/2017), Depressive disorder, Depressive disorder, not elsewhere classified, Diabetes mellitus, type 2 (H) (02/20/2023), Hypertension, Mild persistent asthma, Obstructive sleep apnea (adult) (pediatric), Primary osteoarthritis of both knees (01/28/2022), and Scalp mass  (07/2014).    She has no past medical history of Basal cell carcinoma, Cancer (H), Cerebral infarction (H), Congestive heart failure (H), COPD (chronic obstructive pulmonary disease) (H), Heart disease, History of blood transfusion, Malignant melanoma (H), Squamous cell carcinoma, or Thyroid disease.    Social History:  Reviewed. No changes to social history except as noted above in HPI.    Vital Signs:   None. This is phone/video visit.     Labs:  Most recent laboratory results reviewed:  Last Comprehensive Metabolic Panel:  Lab Results   Component Value Date     03/24/2025    POTASSIUM 4.2 03/24/2025    CHLORIDE 103 03/24/2025    CO2 25 03/24/2025    ANIONGAP 12 03/24/2025     (H) 03/24/2025    BUN 12.4 03/24/2025    CR 0.90 03/24/2025    GFRESTIMATED 71 03/24/2025    ASHLEY 9.7 03/24/2025         Recent Labs   Lab Test 03/24/25  1034 03/07/24  0927   CHOL 165 187   HDL 50 39*   LDL 92 112*   TRIG 115 178*                            TRIG 178* 105     Liver Function Studies -   Recent Labs   Lab Test 03/07/24  0927   PROTTOTAL 7.4   ALBUMIN 4.2   BILITOTAL 0.3   ALKPHOS 78   AST 52*   ALT 53*      Review of Systems:  10 systems (general, cardiovascular, respiratory, eyes, ENT, endocrine, GI, , M/S, neurological) were reviewed. Most pertinent finding(s) is/are:  denies fever, cough, persistent headaches, shortness of breath, chest pain, severe GI symptoms, trouble urinating, severe pain.  The remaining systems are all unremarkable.    Mental Status Examination (limited as this is by phone/video):  Appearance: Awake, alert, appears stated age, no acute distress, well-groomed   Attitude:  cooperative, pleasant   Motor: No gross abnormalities observed via video, not formally tested   Oriented to:  person, place, time, and situation  Attention Span and Concentration:  normal  Speech:  clear, coherent, regular rate, rhythm, and volume  Language: intact  Mood: Really good  Affect:  appropriate and in normal  range and mood congruent  Associations:  no loose associations  Thought Process:  logical, linear and goal oriented  Thought Content:  no evidence of suicidal ideation or homicidal ideation, no evidence of psychotic thought, no auditory hallucinations present and no visual hallucinations present  Recent and Remote Memory:  Intact to interview. Not formally assessed.   Fund of Knowledge: appropriate  Insight:  good  Judgment:  intact, adequate for safety  Impulse Control:  intact      Suicide Risk Assessment:  Today Nela Mares reports no acute suicidality today-suicidal ideation remains much improved. In addition, there are notable risk factors for self-harm, including single status, anxiety, hopelessness, withdrawing, and mood change. However, risk is mitigated by commitment to family, absence of past attempts, ability to volunteer a safety plan, history of seeking help when needed, future oriented, no access to firearms or weapons, and denies suicidal intent or plan. Therefore, based on all available evidence including the factors cited above, Nela Mares does not appear to be at imminent risk for self-harm, does not meet criteria for a 72-hr hold, and therefore remains appropriate for ongoing outpatient level of care.  A thorough assessment of risk factors related to suicide and self-harm have been reviewed and are noted above. The patient convincingly denies suicidality on several occasions. Local community safety resources reviewed for patient to use if needed. There was no deceit detected, and the patient presented in a manner that was believable.     DSM5 Diagnosis:  Major Depressive Disorder, Recurrent Episode, in full remission  Generalized anxiety disorder    Other: fatigue    Medical comorbidities include:   Patient Active Problem List    Diagnosis Date Noted    Hashimoto's thyroiditis 12/09/2024     Priority: Medium    Recurrent major depressive disorder, in partial remission 03/22/2024      Priority: Medium    Closed traumatic displaced fracture of distal end of left radius, initial encounter 02/28/2024     Priority: Medium    Diabetes mellitus, type 2 (H) 02/20/2023     Priority: Medium    Lichen sclerosus 02/20/2023     Priority: Medium    Chronic midline low back pain without sciatica 01/28/2022     Priority: Medium    Primary osteoarthritis of both knees 01/28/2022     Priority: Medium    Paroxysmal supraventricular tachycardia 05/10/2021     Priority: Medium     Added automatically from request for surgery [id number]      Allergic conjunctivitis, bilateral 08/27/2019     Priority: Medium    Overweight (H) 07/18/2019     Priority: Medium    Seasonal allergic rhinitis due to pollen 10/18/2018     Priority: Medium    Allergic rhinitis due to dust mite 10/18/2018     Priority: Medium    Allergic rhinitis due to mold 10/18/2018     Priority: Medium    IUD (intrauterine device) in place 03/21/2017     Priority: Medium     mirena place in 2015      Hypertension goal BP (blood pressure) < 140/90 10/25/2010     Priority: Medium    Leiomyoma of uterus 08/14/2007     Priority: Medium     Problem list name updated by automated process. Provider to review      Obstructive sleep apnea      Priority: Medium     uses CPAP          Mild persistent asthma 10/11/2005     Priority: Medium     Typically needs steroid burst about once per year for symptoms uncontrolled with continued advair and maxair q 4 hours.  Peak flow generally runs 400. With illness goes down to 350.    12/09 - given one extra burst of prednisone.  Instructed to call if she starts it so we can help her monitor her symptoms.      Allergic rhinitis due to animals 08/31/2004     Priority: Medium       Psychosocial & Contextual Factors: see HPI above    Assessment:  From Intake, 11/09/2023:  Nela Mares is a 63-year-old female with past psychiatric history including depression and anxiety who presents today for psychiatric evaluation.  Patient  with long history of struggling with intermittent episodes of depression, severe at times.  Depression dates back to adolescence.  Strong family history of depression.  Has a history of several medication trials due to tachyphylaxis after around 7 years of use.  Patient most recently with very severe depressive episode lasting for several weeks.  Patient was seeing primary care provider for medication changes and has most recently been on venlafaxine and buspirone.  Aripiprazole was added about 10 days ago and patient has experienced significant relief and improvement of her severe depression symptoms.  Patient taking aripiprazole 5 mg daily to augment her routine with venlafaxine and buspirone.  Venlafaxine had been increased by adding 37.5 mg capsule to her 225 mg dose for a total of 262.5 mg daily.  That had not been very helpful.  Buspirone was added to her regimen which was helpful briefly but effects did not last at all.  Since aripiprazole is suspected to be so helpful for her symptoms, patient will start slow taper off of buspirone.  If remains stable, patient will consider dropping venlafaxine back to 225 mg daily.  Patient will reach out if there is any regression/worsening of symptoms.  We did discuss ECT as a possible option in the future if her symptoms get as bad as they were this last episode.  Patient was open to this suggestion.  Encouraged to continue in therapy.  No acute safety concerns.  No longer having any suicidal ideation.  Family is very supportive.  Her dogs are very protective.  No problematic drug or alcohol use.     Discussed metabolic risks and movement disorder risks, including risk for permanent movement related symptoms, when using Abilify/aripiprazole.  Labs ordered for 3-month lipid panel and hemoglobin A1c check.    12/14/2023:  Patient overall doing relatively okay.  Continues to work through some increased psychosocial stressors.  Hoping things will be settling soon.   Encouraged to continue to stay active.  Encouraged to continue light therapy box and supplements.  No medication changes today except for trial of discontinuation of buspirone.  No acute safety concerns.  No SI today.  No problematic drug or alcohol use.    1/18/2024:  Patient overall with much improved symptoms that have been sustained/maintained on Abilify 10 mg daily.  No abnormal involuntary movements.  Patient encouraged to have labs completed to monitor/measure at lipid panel and A1c.  Could consider addition of metformin if metabolic derangements or if starts to gain weight.  Metformin can also be used prophylactically for such.  No acute safety concerns.  No SI.  No problematic drug or alcohol use.  Due to ongoing stability of mental health on current regimen, patient's mental health care will be returned back to primary care provider for ongoing management.    9/3/2024 (new episode of care with collaborative care psychiatric services started today):  Patient with significantly worsening depression symptoms.  Patient meeting criteria for severe depression.  Patient interested in ECT to treat depression at this time due to severity of symptoms and hope that ECT might help improve symptoms more quickly and with fewer side effects than some other options.  No acute suicidality.  History of method seeking/planning.  None currently.  No access to firearms.  Discussed potentially changing Abilify to Saphris or Rexulti.  Patient prefers to pursue ECT at this time and wait on major medication changes.  Patient is agreeable to decreasing Abilify back to 10 mg daily since there was no improvement on 15 mg.  No problematic drug or alcohol use.    11/07/2024:  Patient currently undergoing ECT once weekly.  Some transient memory struggles and pretty significant fatigue.  Some slight mood improvements but nothing profound at this time.  We will check patient's thyroid since thyroid issues run in the family and patient  does have history of transiently elevated TSH.  May need to consider something more activating medication wise to help with fatigue and excessive sedation/sleeping.  Patient had been sleeping 15+ hours a day even prior to ECT starting.  Medication therapy management referral placed since patient and sibling are wondering if there are any medication interactions or if statin could be causing problems-patient's mother with pretty profound fatigue on statin in the past apparently.  No acute suicidality.  Patient's family very supportive.  No problematic drug or alcohol use.    11/18/2024:  Patient with evidence of Hashimoto's.  Positive/reactive thyroid peroxidase antibody.  TSH currently within normal limits.  Tried to add T3 total and T4 free but not enough blood with recent blood draw.  Patient will wait until follows up with primary care provider in case more labs needing to be drawn.  Patient encouraged to follow up with primary care provider regarding evidence of Hashimoto's.  This provider will try to get in touch with ECT provider to coordinate care.  In the future could consider augmenting regimen with stimulant medication-particularly if thyroid hormone replacement not added.  Could also consider transitioning Abilify to a different neuroleptic medication to augment Effexor -only if symptoms worsen.  For now, patient will continue with ECT since has started to feel better the last couple days.  No acute safety concerns.  No acute suicidality.  Patient's family continues to be very supportive.  No problematic drug or alcohol use.    12/19/2024:  Patient overall doing well.  ECT every other week.  Tolerating medications well.  Patient just started Synthroid about 1 week ago.  We will wait on starting modafinil or any type of stimulant augmentation until we know how symptoms might improve or not on Synthroid.  Patient mentioned her sisters were wondering about any possible medication dose changes (particularly  decreasing).  Discussed I recommend not changing any medications at this time due to just newly feeling better.  Tolerating medications well.  No acute safety concerns.  No SI.  No problematic drug or alcohol use.    3/21/2025:  Overall patient doing okay.  Maybe some very slight regression of symptoms.  Does not feel like is working too much.  Appreciates the structure.  Patient will monitor weight gain potentially from Abilify.  May need to consider decreasing/reducing this in the future.  PCP recently ordered updated lipid panel and BMP.  No new labs ordered today.  Patient encouraged to have fasting labs completed.  No new abnormal involuntary movements.  Stopped ECT.  Ongoing high fatigue.  On Synthroid 50 mcg for a very slightly elevated TSH.  Discussed addition of low dose methylphenidate.  This was discussed also with ECT provider a few months back as a possible adjunct.  Discussed risks versus benefits with patient.  Patient agreeable to starting low-dose methylphenidate.  Patient will watch for any racing heart sensations since does have history of supraventricular tachycardia.  Discussed increased risk for arrhythmias on methylphenidate.  No acute safety concerns.  No acute suicidality.  No problematic drug or alcohol use.    5/2/2025:  Patient overall doing fairly well.  Methylphenidate has been quite helpful for mood and fatigue.  Tolerating well with no negative side effects.  May be some slowing in gait and more restricted facial expressions lately.  Could be parkinsonism side effects related to Abilify.  We will start slow taper since it was unclear how much benefit Abilify provided in the first place prior to ECT and the addition of levothyroxine and methylphenidate.  No acute safety concerns.  No acute suicidality.  Psychotherapy encouraged.  No problematic drug or alcohol use.    7/2/2025:  No medication changes today.  Patient tolerating current medications very well and feeling stable.  Off  Abilify with no worsening symptoms.  Family feels patient seems further improved off Abilify.  Methylphenidate has been very helpful.  No chest pains, racing heart, tics.  No acute safety concerns.  No SI.  No problematic drug or alcohol use.  Patient will be seen back in October for follow-up.    Medication side effects and alternatives were reviewed. Health promotion activities recommended and reviewed today. All questions addressed. Education and counseling completed regarding risks and benefits of medications and psychotherapy options. Recommend therapy for additional support.     Treatment Plan:  Continue venlafaxine  mg daily for depression and anxiety.  Continue methylphenidate/Ritalin 5-10 mg twice daily as needed for chronic fatigue, depression augmentation.  Continue to monitor blood pressure  Continue all other cares per primary care provider.   Continue all other medications as reviewed per electronic medical record today.   Safety plan reviewed. To the Emergency Department as needed or call after hours crisis line at [phone] or [phone]. [place] Crisis Text Line. Text [place] to 629036 or Suicide LifeLine Chat: suicidepreventionlifeline.org/chat  Strongly recommend individual psychotherapy for additional support and ongoing development of nonpharmacologic coping skills and strategies.  Schedule an appointment with me in 3 months or sooner as needed. Call [facility] at [phone] to schedule.  Follow up with primary care provider as planned or for acute medical concerns.  Call the psychiatric nurse line with medication questions or concerns at [phone].  MyChart may be used to communicate with your provider, but this is not intended to be used for emergencies.    Have previously discussed risks of stimulant medication including, but not limited to, decreased appetite, risk of tics (and that they may be lasting), trouble sleeping, cardiac risks such as increased heart  rate and blood pressure, and rare risk of sudden cardiac death.  Also risk of addiction/tolerance/dependence.    Administrative Billing:   Phone Call/Video Duration: 9 Minutes  Start: 10:25a  Stop: 10:34a    The longitudinal plan of care for the diagnosis(es)/condition(s) as documented were addressed during this visit. Due to the added complexity in care, I will continue to support Sanam in the subsequent management and with ongoing continuity of care.    Episode of Care #: 7 (new episode of care started 9/3/2024)    Patient Status:  Patient will continue to be seen for ongoing consultation and stabilization.    Signed:   Madison Vazquez DO  [facility]    Disclaimer: This note consists of symbols derived from keyboarding, dictation and/or voice recognition software. As a result, there may be errors in the script that have gone undetected. Please consider this when interpreting information found in this chart.

## 2025-07-02 NOTE — PROGRESS NOTES
"Virtual Visit Details    Type of service:  Video Visit     Originating Location (pt. Location): {video visit patient location:137043::\"Home\"}  {PROVIDER LOCATION On-site should be selected for visits conducted from your clinic location or adjoining [facility] hospital, academic office, or other nearby [facility] building. Off-site should be selected for all other provider locations, including home:723891}  Distant Location (provider location):  {virtual location provider:622252}  Platform used for Video Visit: {Virtual Visit Platforms:002090::\"Piece of Cake\"}  "

## 2025-07-02 NOTE — NURSING NOTE
Current patient location: [address] [place] [id number]    Is the patient currently in the state of [place]? YES    Visit mode: VIDEO    If the visit is dropped, the patient can be reconnected by:VIDEO VISIT: Send to e-mail at: [email or web address]    Will anyone else be joining the visit? NO  (If patient encounters technical issues they should call [phone])    Are changes needed to the allergy or medication list? No    Are refills needed on medications prescribed by this physician? YES    Rooming Documentation:  Questionnaire(s) completed    Reason for visit: RECHECK    Radha MONACO

## 2025-07-02 NOTE — PATIENT INSTRUCTIONS
Treatment Plan:  Continue venlafaxine  mg daily for depression and anxiety.  Continue methylphenidate/Ritalin 5-10 mg twice daily as needed for chronic fatigue, depression augmentation.  Continue to monitor blood pressure  Continue all other cares per primary care provider.   Continue all other medications as reviewed per electronic medical record today.   Safety plan reviewed. To the Emergency Department as needed or call after hours crisis line at [phone] or [phone]. [place] Crisis Text Line. Text [place] to 927843 or Suicide LifeLine Chat: suicidepreventionlifeline.org/chat  Strongly recommend individual psychotherapy for additional support and ongoing development of nonpharmacologic coping skills and strategies.  Schedule an appointment with me in 3 months or sooner as needed. Call [facility] at [phone] to schedule.  Follow up with primary care provider as planned or for acute medical concerns.  Call the psychiatric nurse line with medication questions or concerns at [phone].  [email or web address] may be used to communicate with your provider, but this is not intended to be used for emergencies.    Have previously discussed risks of stimulant medication including, but not limited to, decreased appetite, risk of tics (and that they may be lasting), trouble sleeping, cardiac risks such as increased heart rate and blood pressure, and rare risk of sudden cardiac death.  Also risk of addiction/tolerance/dependence.    Patient Education   Collaborative Care Psychiatry Service  What to Expect  Here's what to expect from your Collaborative Care Psychiatry Service (CCPS).   About CCPS  CCPS means 2 people work together to help you get better. You'll meet with a behavioral health clinician and a psychiatric doctor. A behavioral health clinician helps people with mental health problems by talking with them. A psychiatric doctor helps people by giving them medicine.  How it works  At every visit,  "you'll see the behavioral health clinician (BHC) first. They'll talk with you about how you're doing and teach you how to feel better.   Then you'll see the psychiatric doctor. This doctor can help you deal with troubling thoughts and feelings by giving you medicine. They'll make sure you know the plan for your care.   CCPS usually takes 3 to 6 visits. If you need more visits, we may have you start seeing a different psychiatric doctor for ongoing care.  If you have any questions or concerns, we'll be glad to talk with you.  About visits  Be open  At your visits, please talk openly about your problems. It may feel hard, but it's the best way for us to help you.  Cancelling visits  If you can't come to your visit, please call us right away at [phone]. If you don't cancel at least 24 hours (1 full day) before your visit, that's \"late cancellation.\"  Being late to visits  Being very late is the same as not showing up. You will be a \"no show\" if:  Your appointment starts with a BHC, and you're more than 15 minutes late for a 30-minute (half hour) visit. This will also cancel your appointment with the psychiatric doctor.  Your appointment is with a psychiatric doctor only, and you're more than 15 minutes late for a 30-minute (half hour) visit.  Your appointment is with a psychiatric doctor only, and you're more than 30 minutes late for a 60-minute (full hour) visit.  If you cancel late or don't show up 2 times within 6 months, we may end your care.   Getting help between visits  If you need help between visits, you can call us Monday to Friday from 8 a.m. to 4:30 p.m. at [phone].  Emergency care  Call 911 or go to the nearest emergency department if your life or someone else's life is in danger.  Call 278 anytime to reach the national Suicide and Crisis hotline.  Medicine refills  To refill your medicine, call your pharmacy. You can also call [facility]'s Behavioral Access at [phone], Monday " to Friday, 8 a.m. to 4:30 p.m. It can take 1 to 3 business days to get a refill.   Forms, letters, and tests  You may have papers to fill out, like FMLA, short-term disability, and workability. We can help you with these forms at your visits, but you must have an appointment. You may need more than 1 visit for this, to be in an intensive therapy program, or both.  Before we can give you medicine for ADHD, we may refer you to get tested for it or confirm it another way.  We may not be able to give you an emotional support animal letter.  We don't do mental health checks ordered by the court.   We don't do mental health testing, but we can refer you to get tested.   Thank you for choosing us for your care.  For informational purposes only. Not to replace the advice of your health care provider. Copyright   2022 [facility]. All rights reserved. [employer] 908740 - Rev 11/24.

## (undated) DEVICE — PACK HAND WRIST SOP15HWFSP

## (undated) DEVICE — PEN MARKING SKIN W/PAPER RULER 31145785

## (undated) DEVICE — INTRO CATH 12CM 8.5FR FST-CATH

## (undated) DEVICE — PREP CHLORAPREP 26ML TINTED ORANGE  260815

## (undated) DEVICE — ESU ELEC BLADE HEX-LOCKING 2.5" E1450X

## (undated) DEVICE — DRAPE IOBAN INCISE 23X17" 6650EZ

## (undated) DEVICE — GLOVE BIOGEL PI MICRO INDICATOR UNDERGLOVE SZ 7.5 48975

## (undated) DEVICE — GLOVE BIOGEL PI ULTRATOUCH G SZ 8.0 42180

## (undated) DEVICE — SOL NACL 0.9% IRRIG 500ML BOTTLE 2F7123

## (undated) DEVICE — DRILL BIT HANDINN 2.5MM DB2.5

## (undated) DEVICE — DRSG KERLIX 4 1/2"X4YDS ROLL 6730

## (undated) DEVICE — TUBE SET SMARKABLATE IRRIGATION

## (undated) DEVICE — SOL WATER IRRIG 500ML BOTTLE 2F7113

## (undated) DEVICE — LINEN TOWEL PACK X5 5464

## (undated) DEVICE — BLADE KNIFE SURG 10 371110

## (undated) DEVICE — SOL WATER IRRIG 1000ML BOTTLE 07139-09

## (undated) DEVICE — PACK HEART LEFT CUSTOM

## (undated) DEVICE — ESU GROUND PAD ADULT W/CORD E7507

## (undated) DEVICE — PAD CHUX UNDERPAD 30X30"

## (undated) DEVICE — GLOVE BIOGEL PI MICRO SZ 7.5 48575

## (undated) DEVICE — BNDG ELASTIC 4"X5YDS UNSTERILE 6611-40

## (undated) DEVICE — CATHETER EPT POLARIS X 6FR STEERABLE M0047003D0

## (undated) DEVICE — CAST PLASTER SPLINT 4X15" 7394

## (undated) DEVICE — GLOVE BIOGEL PI MICRO SZ 7.0 48570

## (undated) DEVICE — DRAPE U SPLIT 74X120" 29440

## (undated) DEVICE — DRAPE C-ARM MINI 5423

## (undated) DEVICE — GLOVE BIOGEL PI MICRO INDICATOR UNDERGLOVE SZ 8.0 48980

## (undated) DEVICE — DRILL BIT HANDINN 2.0MM FDB 2.0

## (undated) DEVICE — PEN MARKING SKIN W/LABELS 31145884

## (undated) DEVICE — SLING ARM LG 79-99157

## (undated) DEVICE — SUCTION MANIFOLD NEPTUNE 2 SYS 1 PORT 702-025-000

## (undated) DEVICE — INTRODUCER SHEATH FAST-CATH CATH-LOCK 6FRX12CM 406701

## (undated) DEVICE — SU MONOCRYL 4-0 P-3 18" UND Y494G

## (undated) DEVICE — DRSG STERI STRIP 1/2X4" R1547

## (undated) DEVICE — SU MONOCRYL 3-0 SH 27" Y316H

## (undated) DEVICE — INTRODUCER SHEATH FAST-CATH CATH-LOCK 7FRX12CM 406702

## (undated) DEVICE — CATH EP REPRO DAIG SPRM FX CRV DX EP C

## (undated) DEVICE — PATCH CARTO 3 EXTERNAL REFERENCE 3D MAPPING CREFP6

## (undated) DEVICE — ESU PENCIL SMOKE EVAC W/ROCKER SWITCH 0703-047-000

## (undated) DEVICE — CATH THERMOCOOL SMARTTOUCH SF DF CURVE

## (undated) DEVICE — SUCTION TIP YANKAUER W/O VENT K86

## (undated) DEVICE — PAD ARMBOARD FOAM EGGCRATE 50676-378

## (undated) DEVICE — SU MONOCRYL 2-0 SH 27" UND Y417H

## (undated) DEVICE — PACK MINOR CUSTOM ASC

## (undated) DEVICE — SPONGE LAP 18X18" X8435

## (undated) DEVICE — DRAPE STERI TOWEL SM 1000

## (undated) DEVICE — SOL PRP PVP IOD .75OZ PCH PKT CNTNR STRL DYNDA2232A

## (undated) DEVICE — CLOSURE SYS SKIN PREMIERPRO EXOFINFUSION 4X60CM 3473

## (undated) DEVICE — IMP WIRE KIRSCHNER 1.6MM KW062SS
Type: IMPLANTABLE DEVICE | Site: WRIST | Status: NON-FUNCTIONAL
Removed: 2024-03-01

## (undated) DEVICE — SU PROLENE 3-0 PS-2 18" 8687H

## (undated) DEVICE — STPL SKIN 35W 059037

## (undated) RX ORDER — LIDOCAINE HYDROCHLORIDE 10 MG/ML
INJECTION, SOLUTION EPIDURAL; INFILTRATION; INTRACAUDAL; PERINEURAL
Status: DISPENSED
Start: 2021-06-02

## (undated) RX ORDER — CEFAZOLIN SODIUM 2 G/50ML
SOLUTION INTRAVENOUS
Status: DISPENSED
Start: 2023-06-28

## (undated) RX ORDER — FENTANYL CITRATE 50 UG/ML
INJECTION, SOLUTION INTRAMUSCULAR; INTRAVENOUS
Status: DISPENSED
Start: 2023-06-28

## (undated) RX ORDER — FENTANYL CITRATE 50 UG/ML
INJECTION, SOLUTION INTRAMUSCULAR; INTRAVENOUS
Status: DISPENSED
Start: 2022-10-20

## (undated) RX ORDER — FENTANYL CITRATE 0.05 MG/ML
INJECTION, SOLUTION INTRAMUSCULAR; INTRAVENOUS
Status: DISPENSED
Start: 2024-03-01

## (undated) RX ORDER — PROPOFOL 10 MG/ML
INJECTION, EMULSION INTRAVENOUS
Status: DISPENSED
Start: 2023-06-28

## (undated) RX ORDER — EPINEPHRINE IN SOD CHLOR,ISO 1 MG/10 ML
SYRINGE (ML) INTRAVENOUS
Status: DISPENSED
Start: 2022-10-20

## (undated) RX ORDER — CEFAZOLIN SODIUM 2 G/50ML
SOLUTION INTRAVENOUS
Status: DISPENSED
Start: 2024-03-01

## (undated) RX ORDER — OXYCODONE HYDROCHLORIDE 5 MG/1
TABLET ORAL
Status: DISPENSED
Start: 2023-06-28

## (undated) RX ORDER — FENTANYL CITRATE 50 UG/ML
INJECTION, SOLUTION INTRAMUSCULAR; INTRAVENOUS
Status: DISPENSED
Start: 2023-03-20

## (undated) RX ORDER — FENTANYL CITRATE 50 UG/ML
INJECTION, SOLUTION INTRAMUSCULAR; INTRAVENOUS
Status: DISPENSED
Start: 2024-03-01

## (undated) RX ORDER — SODIUM CHLORIDE 9 MG/ML
INJECTION, SOLUTION INTRAVENOUS
Status: DISPENSED
Start: 2021-06-02

## (undated) RX ORDER — FENTANYL CITRATE 50 UG/ML
INJECTION, SOLUTION INTRAMUSCULAR; INTRAVENOUS
Status: DISPENSED
Start: 2021-06-02

## (undated) RX ORDER — GABAPENTIN 300 MG/1
CAPSULE ORAL
Status: DISPENSED
Start: 2023-06-28

## (undated) RX ORDER — ONDANSETRON 2 MG/ML
INJECTION INTRAMUSCULAR; INTRAVENOUS
Status: DISPENSED
Start: 2021-06-02

## (undated) RX ORDER — BUPIVACAINE HYDROCHLORIDE 2.5 MG/ML
INJECTION, SOLUTION INFILTRATION; PERINEURAL
Status: DISPENSED
Start: 2024-03-01

## (undated) RX ORDER — ACETAMINOPHEN 325 MG/1
TABLET ORAL
Status: DISPENSED
Start: 2023-06-28

## (undated) RX ORDER — HYDROMORPHONE HYDROCHLORIDE 1 MG/ML
INJECTION, SOLUTION INTRAMUSCULAR; INTRAVENOUS; SUBCUTANEOUS
Status: DISPENSED
Start: 2023-06-28

## (undated) RX ORDER — PROPOFOL 10 MG/ML
INJECTION, EMULSION INTRAVENOUS
Status: DISPENSED
Start: 2024-03-01

## (undated) RX ORDER — ACETAMINOPHEN 325 MG/1
TABLET ORAL
Status: DISPENSED
Start: 2024-03-01